# Patient Record
Sex: MALE | Race: WHITE | NOT HISPANIC OR LATINO | Employment: OTHER | URBAN - METROPOLITAN AREA
[De-identification: names, ages, dates, MRNs, and addresses within clinical notes are randomized per-mention and may not be internally consistent; named-entity substitution may affect disease eponyms.]

---

## 2017-01-02 ENCOUNTER — OFFICE VISIT (OUTPATIENT)
Dept: CARDIAC REHAB | Facility: HOSPITAL | Age: 82
End: 2017-01-02

## 2017-01-02 DIAGNOSIS — I21.4 NON-STEMI (NON-ST ELEVATED MYOCARDIAL INFARCTION) (HCC): Primary | ICD-10-CM

## 2017-01-04 ENCOUNTER — OFFICE VISIT (OUTPATIENT)
Dept: CARDIAC REHAB | Facility: HOSPITAL | Age: 82
End: 2017-01-04

## 2017-01-04 DIAGNOSIS — I21.4 NON-STEMI (NON-ST ELEVATED MYOCARDIAL INFARCTION) (HCC): Primary | ICD-10-CM

## 2017-01-05 ENCOUNTER — HOSPITAL ENCOUNTER (OUTPATIENT)
Dept: CARDIOLOGY | Facility: HOSPITAL | Age: 82
Discharge: HOME | End: 2017-01-05
Attending: INTERNAL MEDICINE | Admitting: INTERNAL MEDICINE

## 2017-01-05 ENCOUNTER — HOSPITAL ENCOUNTER (OUTPATIENT)
Dept: CARDIOLOGY | Facility: HOSPITAL | Age: 82
Discharge: HOME | End: 2017-01-05
Attending: INTERNAL MEDICINE

## 2017-01-05 VITALS
DIASTOLIC BLOOD PRESSURE: 70 MMHG | HEIGHT: 68 IN | HEART RATE: 51 BPM | WEIGHT: 170 LBS | SYSTOLIC BLOOD PRESSURE: 144 MMHG | BODY MASS INDEX: 25.76 KG/M2

## 2017-01-05 DIAGNOSIS — G45.3 AMAUROSIS FUGAX OF LEFT EYE: ICD-10-CM

## 2017-01-05 LAB
BH CV ECHO MEAS - ACS: 1.6 CM
BH CV ECHO MEAS - AI DEC SLOPE: 167.5 CM/SEC^2
BH CV ECHO MEAS - AI MAX PG: 74.2 MMHG
BH CV ECHO MEAS - AI MAX VEL: 430.6 CM/SEC
BH CV ECHO MEAS - AI P1/2T: 752.7 MSEC
BH CV ECHO MEAS - AO MAX PG (FULL): 15.9 MMHG
BH CV ECHO MEAS - AO MAX PG: 19.9 MMHG
BH CV ECHO MEAS - AO MEAN PG (FULL): 9.4 MMHG
BH CV ECHO MEAS - AO MEAN PG: 11.5 MMHG
BH CV ECHO MEAS - AO ROOT AREA (BSA CORRECTED): 1.7
BH CV ECHO MEAS - AO ROOT AREA: 8.1 CM^2
BH CV ECHO MEAS - AO ROOT DIAM: 3.2 CM
BH CV ECHO MEAS - AO V2 MAX: 223.2 CM/SEC
BH CV ECHO MEAS - AO V2 MEAN: 156.8 CM/SEC
BH CV ECHO MEAS - AO V2 VTI: 55.8 CM
BH CV ECHO MEAS - AVA(I,A): 2.1 CM^2
BH CV ECHO MEAS - AVA(I,D): 2.1 CM^2
BH CV ECHO MEAS - AVA(V,A): 2.3 CM^2
BH CV ECHO MEAS - AVA(V,D): 2.3 CM^2
BH CV ECHO MEAS - BSA(HAYCOCK): 1.9 M^2
BH CV ECHO MEAS - BSA: 1.9 M^2
BH CV ECHO MEAS - BZI_BMI: 25.8 KILOGRAMS/M^2
BH CV ECHO MEAS - BZI_METRIC_HEIGHT: 172.7 CM
BH CV ECHO MEAS - BZI_METRIC_WEIGHT: 77.1 KG
BH CV ECHO MEAS - CONTRAST EF (2CH): 58.5 ML/M^2
BH CV ECHO MEAS - CONTRAST EF 4CH: 67.1 ML/M^2
BH CV ECHO MEAS - EDV(MOD-SP2): 53 ML
BH CV ECHO MEAS - EDV(MOD-SP4): 85 ML
BH CV ECHO MEAS - EDV(TEICH): 138.1 ML
BH CV ECHO MEAS - EF(CUBED): 85.5 %
BH CV ECHO MEAS - EF(MOD-SP2): 58.5 %
BH CV ECHO MEAS - EF(MOD-SP4): 67.1 %
BH CV ECHO MEAS - EF(TEICH): 78.4 %
BH CV ECHO MEAS - ESV(MOD-SP2): 22 ML
BH CV ECHO MEAS - ESV(MOD-SP4): 28 ML
BH CV ECHO MEAS - ESV(TEICH): 29.9 ML
BH CV ECHO MEAS - FS: 47.4 %
BH CV ECHO MEAS - IVS/LVPW: 0.92
BH CV ECHO MEAS - IVSD: 1.1 CM
BH CV ECHO MEAS - LAT PEAK E' VEL: 6 CM/SEC
BH CV ECHO MEAS - LV DIASTOLIC VOL/BSA (35-75): 44.6 ML/M^2
BH CV ECHO MEAS - LV MASS(C)D: 232.8 GRAMS
BH CV ECHO MEAS - LV MASS(C)DI: 122.1 GRAMS/M^2
BH CV ECHO MEAS - LV MAX PG: 4 MMHG
BH CV ECHO MEAS - LV MEAN PG: 2.1 MMHG
BH CV ECHO MEAS - LV SYSTOLIC VOL/BSA (12-30): 14.7 ML/M^2
BH CV ECHO MEAS - LV V1 MAX: 100.4 CM/SEC
BH CV ECHO MEAS - LV V1 MEAN: 67.6 CM/SEC
BH CV ECHO MEAS - LV V1 VTI: 22.6 CM
BH CV ECHO MEAS - LVIDD: 5.3 CM
BH CV ECHO MEAS - LVIDS: 2.8 CM
BH CV ECHO MEAS - LVLD AP2: 6.4 CM
BH CV ECHO MEAS - LVLD AP4: 7.2 CM
BH CV ECHO MEAS - LVLS AP2: 5.5 CM
BH CV ECHO MEAS - LVLS AP4: 6.7 CM
BH CV ECHO MEAS - LVOT AREA (M): 5.3 CM^2
BH CV ECHO MEAS - LVOT AREA: 5.2 CM^2
BH CV ECHO MEAS - LVOT DIAM: 2.6 CM
BH CV ECHO MEAS - LVPWD: 1.2 CM
BH CV ECHO MEAS - MED PEAK E' VEL: 5 CM/SEC
BH CV ECHO MEAS - MV A DUR: 0.14 SEC
BH CV ECHO MEAS - MV A MAX VEL: 98.5 CM/SEC
BH CV ECHO MEAS - MV DEC SLOPE: 271.3 CM/SEC^2
BH CV ECHO MEAS - MV DEC TIME: 0.25 SEC
BH CV ECHO MEAS - MV E MAX VEL: 66 CM/SEC
BH CV ECHO MEAS - MV E/A: 0.67
BH CV ECHO MEAS - MV MAX PG: 4.1 MMHG
BH CV ECHO MEAS - MV MEAN PG: 1.6 MMHG
BH CV ECHO MEAS - MV P1/2T MAX VEL: 77.9 CM/SEC
BH CV ECHO MEAS - MV P1/2T: 84.1 MSEC
BH CV ECHO MEAS - MV V2 MAX: 100.8 CM/SEC
BH CV ECHO MEAS - MV V2 MEAN: 58.5 CM/SEC
BH CV ECHO MEAS - MV V2 VTI: 36.1 CM
BH CV ECHO MEAS - MVA P1/2T LCG: 2.8 CM^2
BH CV ECHO MEAS - MVA(P1/2T): 2.6 CM^2
BH CV ECHO MEAS - MVA(VTI): 3.3 CM^2
BH CV ECHO MEAS - PA MAX PG (FULL): 5.9 MMHG
BH CV ECHO MEAS - PA MAX PG: 8.3 MMHG
BH CV ECHO MEAS - PA V2 MAX: 144.4 CM/SEC
BH CV ECHO MEAS - PULM A REVS DUR: 0.13 SEC
BH CV ECHO MEAS - PULM A REVS VEL: 37.4 CM/SEC
BH CV ECHO MEAS - PULM DIAS VEL: 62.2 CM/SEC
BH CV ECHO MEAS - PULM S/D: 1.1
BH CV ECHO MEAS - PULM SYS VEL: 68.7 CM/SEC
BH CV ECHO MEAS - PVA(V,A): 2.2 CM^2
BH CV ECHO MEAS - PVA(V,D): 2.2 CM^2
BH CV ECHO MEAS - QP/QS: 0.71
BH CV ECHO MEAS - RV MAX PG: 2.4 MMHG
BH CV ECHO MEAS - RV MEAN PG: 1.5 MMHG
BH CV ECHO MEAS - RV V1 MAX: 77.6 CM/SEC
BH CV ECHO MEAS - RV V1 MEAN: 58.4 CM/SEC
BH CV ECHO MEAS - RV V1 VTI: 20.4 CM
BH CV ECHO MEAS - RVOT AREA: 4.1 CM^2
BH CV ECHO MEAS - RVOT DIAM: 2.3 CM
BH CV ECHO MEAS - SI(AO): 235.7 ML/M^2
BH CV ECHO MEAS - SI(CUBED): 68.5 ML/M^2
BH CV ECHO MEAS - SI(LVOT): 61.8 ML/M^2
BH CV ECHO MEAS - SI(MOD-SP2): 16.3 ML/M^2
BH CV ECHO MEAS - SI(MOD-SP4): 29.9 ML/M^2
BH CV ECHO MEAS - SI(TEICH): 56.8 ML/M^2
BH CV ECHO MEAS - SV(AO): 449.5 ML
BH CV ECHO MEAS - SV(CUBED): 130.6 ML
BH CV ECHO MEAS - SV(LVOT): 117.9 ML
BH CV ECHO MEAS - SV(MOD-SP2): 31 ML
BH CV ECHO MEAS - SV(MOD-SP4): 57 ML
BH CV ECHO MEAS - SV(RVOT): 83.2 ML
BH CV ECHO MEAS - SV(TEICH): 108.3 ML
BH CV ECHO MEAS - TR MAX VEL: 255.2 CM/SEC
BH CV XLRA - RV BASE: 3.6 CM
BH CV XLRA MEAS LEFT CCA RATIO VEL: 56.3 CM/SEC
BH CV XLRA MEAS LEFT DIST CCA EDV: 12.3 CM/SEC
BH CV XLRA MEAS LEFT DIST CCA PSV: 56.3 CM/SEC
BH CV XLRA MEAS LEFT DIST ICA EDV: -16.6 CM/SEC
BH CV XLRA MEAS LEFT DIST ICA PSV: -57.8 CM/SEC
BH CV XLRA MEAS LEFT ICA RATIO VEL: -67.4 CM/SEC
BH CV XLRA MEAS LEFT ICA/CCA RATIO: -1.2
BH CV XLRA MEAS LEFT MID ICA EDV: -21.7 CM/SEC
BH CV XLRA MEAS LEFT MID ICA PSV: -67.4 CM/SEC
BH CV XLRA MEAS LEFT PROX CCA EDV: 11.7 CM/SEC
BH CV XLRA MEAS LEFT PROX CCA PSV: 63.9 CM/SEC
BH CV XLRA MEAS LEFT PROX ECA EDV: -8.8 CM/SEC
BH CV XLRA MEAS LEFT PROX ECA PSV: -53.9 CM/SEC
BH CV XLRA MEAS LEFT PROX ICA EDV: -19.3 CM/SEC
BH CV XLRA MEAS LEFT PROX ICA PSV: -56.9 CM/SEC
BH CV XLRA MEAS LEFT PROX SCLA PSV: 125 CM/SEC
BH CV XLRA MEAS LEFT VERTEBRAL A EDV: 8.8 CM/SEC
BH CV XLRA MEAS LEFT VERTEBRAL A PSV: 42.2 CM/SEC
BH CV XLRA MEAS RIGHT CCA RATIO VEL: 58.9 CM/SEC
BH CV XLRA MEAS RIGHT DIST CCA EDV: 11.8 CM/SEC
BH CV XLRA MEAS RIGHT DIST CCA PSV: 58.9 CM/SEC
BH CV XLRA MEAS RIGHT DIST ICA EDV: -18.2 CM/SEC
BH CV XLRA MEAS RIGHT DIST ICA PSV: -56.9 CM/SEC
BH CV XLRA MEAS RIGHT ICA RATIO VEL: -65.3 CM/SEC
BH CV XLRA MEAS RIGHT ICA/CCA RATIO: -1.1
BH CV XLRA MEAS RIGHT MID ICA EDV: -16 CM/SEC
BH CV XLRA MEAS RIGHT MID ICA PSV: -65.3 CM/SEC
BH CV XLRA MEAS RIGHT PROX CCA EDV: 12.9 CM/SEC
BH CV XLRA MEAS RIGHT PROX CCA PSV: 72.7 CM/SEC
BH CV XLRA MEAS RIGHT PROX ECA EDV: -7.1 CM/SEC
BH CV XLRA MEAS RIGHT PROX ECA PSV: -68.8 CM/SEC
BH CV XLRA MEAS RIGHT PROX ICA EDV: -14.9 CM/SEC
BH CV XLRA MEAS RIGHT PROX ICA PSV: -55 CM/SEC
BH CV XLRA MEAS RIGHT PROX SCLA PSV: 102 CM/SEC
BH CV XLRA MEAS RIGHT VERTEBRAL A EDV: 6.7 CM/SEC
BH CV XLRA MEAS RIGHT VERTEBRAL A PSV: 27.1 CM/SEC
E/E' RATIO: 12
LEFT ARM BP: NORMAL MMHG
LEFT ATRIUM VOLUME INDEX: 29 ML/M2
LV EF 2D ECHO EST: 67 %
RIGHT ARM BP: NORMAL MMHG
Z-SCORE OF LEFT VENTRICULAR DIMENSION IN END SYSTOLE: 2.5

## 2017-01-05 PROCEDURE — 93880 EXTRACRANIAL BILAT STUDY: CPT

## 2017-01-05 PROCEDURE — 93306 TTE W/DOPPLER COMPLETE: CPT

## 2017-01-05 PROCEDURE — 93306 TTE W/DOPPLER COMPLETE: CPT | Performed by: INTERNAL MEDICINE

## 2017-01-06 ENCOUNTER — OFFICE VISIT (OUTPATIENT)
Dept: CARDIAC REHAB | Facility: HOSPITAL | Age: 82
End: 2017-01-06

## 2017-01-06 DIAGNOSIS — I21.4 NON-STEMI (NON-ST ELEVATED MYOCARDIAL INFARCTION) (HCC): Primary | ICD-10-CM

## 2017-01-09 ENCOUNTER — OFFICE VISIT (OUTPATIENT)
Dept: CARDIAC REHAB | Facility: HOSPITAL | Age: 82
End: 2017-01-09

## 2017-01-09 DIAGNOSIS — I21.4 NON-STEMI (NON-ST ELEVATED MYOCARDIAL INFARCTION) (HCC): Primary | ICD-10-CM

## 2017-01-10 ENCOUNTER — TELEPHONE (OUTPATIENT)
Dept: INTERNAL MEDICINE | Facility: CLINIC | Age: 82
End: 2017-01-10

## 2017-01-10 NOTE — TELEPHONE ENCOUNTER
----- Message from Luis Armando Lang MD sent at 1/10/2017 10:19 AM EST -----  Holter monitor study is normal.

## 2017-01-11 ENCOUNTER — OFFICE VISIT (OUTPATIENT)
Dept: CARDIAC REHAB | Facility: HOSPITAL | Age: 82
End: 2017-01-11

## 2017-01-11 DIAGNOSIS — I21.4 NON-STEMI (NON-ST ELEVATED MYOCARDIAL INFARCTION) (HCC): Primary | ICD-10-CM

## 2017-01-13 ENCOUNTER — OFFICE VISIT (OUTPATIENT)
Dept: CARDIAC REHAB | Facility: HOSPITAL | Age: 82
End: 2017-01-13

## 2017-01-13 DIAGNOSIS — I21.4 NON-STEMI (NON-ST ELEVATED MYOCARDIAL INFARCTION) (HCC): Primary | ICD-10-CM

## 2017-01-16 ENCOUNTER — OFFICE VISIT (OUTPATIENT)
Dept: CARDIAC REHAB | Facility: HOSPITAL | Age: 82
End: 2017-01-16

## 2017-01-16 DIAGNOSIS — I21.4 NON-STEMI (NON-ST ELEVATED MYOCARDIAL INFARCTION) (HCC): Primary | ICD-10-CM

## 2017-01-18 ENCOUNTER — OFFICE VISIT (OUTPATIENT)
Dept: CARDIAC REHAB | Facility: HOSPITAL | Age: 82
End: 2017-01-18

## 2017-01-18 DIAGNOSIS — I21.4 NON-STEMI (NON-ST ELEVATED MYOCARDIAL INFARCTION) (HCC): Primary | ICD-10-CM

## 2017-01-20 ENCOUNTER — OFFICE VISIT (OUTPATIENT)
Dept: CARDIAC REHAB | Facility: HOSPITAL | Age: 82
End: 2017-01-20

## 2017-01-20 DIAGNOSIS — I21.4 NON-STEMI (NON-ST ELEVATED MYOCARDIAL INFARCTION) (HCC): Primary | ICD-10-CM

## 2017-01-23 ENCOUNTER — OFFICE VISIT (OUTPATIENT)
Dept: CARDIAC REHAB | Facility: HOSPITAL | Age: 82
End: 2017-01-23

## 2017-01-23 DIAGNOSIS — I21.4 NON-STEMI (NON-ST ELEVATED MYOCARDIAL INFARCTION) (HCC): Primary | ICD-10-CM

## 2017-01-25 ENCOUNTER — OFFICE VISIT (OUTPATIENT)
Dept: CARDIAC REHAB | Facility: HOSPITAL | Age: 82
End: 2017-01-25

## 2017-01-25 DIAGNOSIS — I21.4 NON-STEMI (NON-ST ELEVATED MYOCARDIAL INFARCTION) (HCC): Primary | ICD-10-CM

## 2017-02-01 ENCOUNTER — APPOINTMENT (OUTPATIENT)
Dept: WOMENS IMAGING | Facility: HOSPITAL | Age: 82
End: 2017-02-01

## 2017-02-01 ENCOUNTER — OFFICE VISIT (OUTPATIENT)
Dept: INTERNAL MEDICINE | Facility: CLINIC | Age: 82
End: 2017-02-01

## 2017-02-01 VITALS
WEIGHT: 174 LBS | SYSTOLIC BLOOD PRESSURE: 134 MMHG | HEIGHT: 68 IN | BODY MASS INDEX: 26.37 KG/M2 | DIASTOLIC BLOOD PRESSURE: 68 MMHG

## 2017-02-01 DIAGNOSIS — M54.41 ACUTE RIGHT-SIDED LOW BACK PAIN WITH RIGHT-SIDED SCIATICA: Primary | ICD-10-CM

## 2017-02-01 LAB
BILIRUB UR QL STRIP: NEGATIVE
CLARITY UR: CLEAR
COLOR UR: YELLOW
GLUCOSE UR STRIP-MCNC: NEGATIVE MG/DL
HGB UR QL STRIP.AUTO: NEGATIVE
KETONES UR QL STRIP: ABNORMAL
LEUKOCYTE ESTERASE UR QL STRIP.AUTO: NEGATIVE
NITRITE UR QL STRIP: NEGATIVE
PH UR STRIP.AUTO: <=5 [PH] (ref 5–8)
PROT UR QL STRIP: NEGATIVE
SP GR UR STRIP: >=1.03 (ref 1–1.03)
UROBILINOGEN UR QL STRIP: ABNORMAL

## 2017-02-01 PROCEDURE — 99213 OFFICE O/P EST LOW 20 MIN: CPT | Performed by: NURSE PRACTITIONER

## 2017-02-01 PROCEDURE — 72110 X-RAY EXAM L-2 SPINE 4/>VWS: CPT | Performed by: RADIOLOGY

## 2017-02-01 PROCEDURE — 72110 X-RAY EXAM L-2 SPINE 4/>VWS: CPT | Performed by: NURSE PRACTITIONER

## 2017-02-01 PROCEDURE — 81003 URINALYSIS AUTO W/O SCOPE: CPT | Performed by: NURSE PRACTITIONER

## 2017-02-01 RX ORDER — METHYLPREDNISOLONE 4 MG/1
TABLET ORAL
Qty: 21 TABLET | Refills: 0 | Status: SHIPPED | OUTPATIENT
Start: 2017-02-01 | End: 2017-02-16

## 2017-02-01 NOTE — PROGRESS NOTES
Subjective   Vicente Delgado is a 89 y.o. male.     HPI Comments: He was at airport and helped his daughter with lifting luggage. He noticed symptoms a few  days later. He had previous MRI 6/2014.     Back Pain   This is a new problem. Episode onset: 7 days ago  The problem occurs intermittently. The problem has been gradually worsening since onset. The pain is present in the lumbar spine. The quality of the pain is described as burning. The pain radiates to the right thigh. The pain is at a severity of 2/10 (worst pain level 10). The pain is mild. The symptoms are aggravated by sitting. Associated symptoms include numbness (right thigh/right groin, intermittent ). Pertinent negatives include no abdominal pain, bladder incontinence, bowel incontinence, chest pain, dysuria, fever or leg pain. He has tried heat (lortab ) for the symptoms. The treatment provided mild relief.        The following portions of the patient's history were reviewed and updated as appropriate: allergies, current medications and problem list.    Review of Systems   Constitutional: Negative for activity change, appetite change, fatigue and fever.   Respiratory: Negative for cough.    Cardiovascular: Negative for chest pain.   Gastrointestinal: Negative for abdominal pain and bowel incontinence.   Genitourinary: Negative for bladder incontinence, dysuria, frequency and urgency.   Musculoskeletal: Positive for back pain (lower right back ).   Neurological: Positive for numbness (right thigh/right groin, intermittent ).       Objective   Physical Exam   Constitutional: He is oriented to person, place, and time. He appears well-developed and well-nourished.   HENT:   Head: Normocephalic.   Nose: Nose normal.   Cardiovascular: Regular rhythm and normal heart sounds.  Exam reveals no S3 and no S4.    No murmur heard.  Pulmonary/Chest: Effort normal and breath sounds normal. He has no decreased breath sounds. He has no wheezes. He has no rhonchi. He  has no rales.   Musculoskeletal: He exhibits no edema.        Lumbar back: He exhibits decreased range of motion, tenderness and pain. He exhibits no swelling, no edema and no spasm.        Back:    (+) SLR on right side    Neurological: He is alert and oriented to person, place, and time. Gait normal.   Reflex Scores:       Patellar reflexes are 2+ on the right side and 2+ on the left side.  Skin: Skin is warm and dry.   Psychiatric: He has a normal mood and affect.       Assessment/Plan   Vicente was seen today for back pain.    Diagnoses and all orders for this visit:    Acute right-sided low back pain with right-sided sciatica  Comments:  strain vs spasm; ice/massage and stretching   Orders:  -     XR Spine Lumbar 4+ View  -     MethylPREDNISolone (MEDROL) 4 MG tablet; follow package directions  -     Urinalysis With / Microscopic If Indicated; Future        Xray with no acute findings. Compared with previous film 4/2016. Sent for final review. He was given back exercises (handout). He will need to massage, apply ice and/or heat. If symptoms persist or worsen will refer to PT (previously went to [facility]).

## 2017-02-01 NOTE — PATIENT INSTRUCTIONS
Back Pain, Adult  Back pain is very common in adults. The cause of back pain is rarely dangerous and the pain often gets better over time. The cause of your back pain may not be known. Some common causes of back pain include:  · Strain of the muscles or ligaments supporting the spine.  · Wear and tear (degeneration) of the spinal disks.  · Arthritis.  · Direct injury to the back.  For many people, back pain may return. Since back pain is rarely dangerous, most people can learn to manage this condition on their own.  HOME CARE INSTRUCTIONS  Watch your back pain for any changes. The following actions may help to lessen any discomfort you are feeling:  · Remain active. It is stressful on your back to sit or  one place for long periods of time. Do not sit, drive, or  one place for more than 30 minutes at a time. Take short walks on even surfaces as soon as you are able. Try to increase the length of time you walk each day.  · Exercise regularly as directed by your health care provider. Exercise helps your back heal faster. It also helps avoid future injury by keeping your muscles strong and flexible.  · Do not stay in bed. Resting more than 1-2 days can delay your recovery.  · Pay attention to your body when you bend and lift. The most comfortable positions are those that put less stress on your recovering back. Always use proper lifting techniques, including:    Bending your knees.    Keeping the load close to your body.    Avoiding twisting.  · Find a comfortable position to sleep. Use a firm mattress and lie on your side with your knees slightly bent. If you lie on your back, put a pillow under your knees.  · Avoid feeling anxious or stressed. Stress increases muscle tension and can worsen back pain. It is important to recognize when you are anxious or stressed and learn ways to manage it, such as with exercise.  · Take medicines only as directed by your health care provider. Over-the-counter  medicines to reduce pain and inflammation are often the most helpful. Your health care provider may prescribe muscle relaxant drugs. These medicines help dull your pain so you can more quickly return to your normal activities and healthy exercise.  · Apply ice to the injured area:    Put ice in a plastic bag.    Place a towel between your skin and the bag.    Leave the ice on for 20 minutes, 2-3 times a day for the first 2-3 days. After that, ice and heat may be alternated to reduce pain and spasms.  · Maintain a healthy weight. Excess weight puts extra stress on your back and makes it difficult to maintain good posture.  SEEK MEDICAL CARE IF:  · You have pain that is not relieved with rest or medicine.  · You have increasing pain going down into the legs or buttocks.  · You have pain that does not improve in one week.  · You have night pain.  · You lose weight.  · You have a fever or chills.  SEEK IMMEDIATE MEDICAL CARE IF:   · You develop new bowel or bladder control problems.  · You have unusual weakness or numbness in your arms or legs.  · You develop nausea or vomiting.  · You develop abdominal pain.  · You feel faint.     This information is not intended to replace advice given to you by your health care provider. Make sure you discuss any questions you have with your health care provider.     Document Released: 12/18/2006 Document Revised: 01/08/2016 Document Reviewed: 04/21/2015  [employer] Interactive Patient Education ©2016 [employer]

## 2017-02-08 ENCOUNTER — TELEPHONE (OUTPATIENT)
Dept: INTERNAL MEDICINE | Facility: CLINIC | Age: 82
End: 2017-02-08

## 2017-02-08 DIAGNOSIS — M54.41 ACUTE RIGHT-SIDED LOW BACK PAIN WITH RIGHT-SIDED SCIATICA: ICD-10-CM

## 2017-02-08 DIAGNOSIS — M54.42 ACUTE RIGHT-SIDED LOW BACK PAIN WITH LEFT-SIDED SCIATICA: Primary | ICD-10-CM

## 2017-02-08 NOTE — TELEPHONE ENCOUNTER
----- Message from Margy Medina sent at 2/8/2017  9:14 AM EST -----  Contact: Patient  Patient called.  He was seen by [facility] a week ago for low back pain.  States he used the steroids and is using the exercises, and is still having problems.  Wants to know if [facility] can [facility].  Please advise.     Patient:  [phone]    [facility]:  [phone]

## 2017-02-08 NOTE — TELEPHONE ENCOUNTER
----- Message from Margy Medina sent at 2/8/2017  2:19 PM EST -----  Contact: Patient's daughter  Patient's daughter called regarding appt for patient.  Informed her I had talked to patient earlier and he only informed me he wanted appt with [facility], and daughter confirmed.  This should be made at [facility] at # [id number]     [facility]: [phone]

## 2017-02-13 ENCOUNTER — TRANSCRIBE ORDERS (OUTPATIENT)
Dept: CARDIAC REHAB | Facility: HOSPITAL | Age: 82
End: 2017-02-13

## 2017-02-13 DIAGNOSIS — I21.4 NON-STEMI (NON-ST ELEVATED MYOCARDIAL INFARCTION) (HCC): Primary | ICD-10-CM

## 2017-02-16 ENCOUNTER — OFFICE VISIT (OUTPATIENT)
Dept: INTERNAL MEDICINE | Facility: CLINIC | Age: 82
End: 2017-02-16

## 2017-02-16 VITALS
DIASTOLIC BLOOD PRESSURE: 60 MMHG | SYSTOLIC BLOOD PRESSURE: 112 MMHG | BODY MASS INDEX: 26.67 KG/M2 | HEIGHT: 68 IN | OXYGEN SATURATION: 95 % | WEIGHT: 176 LBS | HEART RATE: 53 BPM

## 2017-02-16 DIAGNOSIS — I10 ESSENTIAL HYPERTENSION: ICD-10-CM

## 2017-02-16 DIAGNOSIS — F39 MOOD DISORDER (HCC): ICD-10-CM

## 2017-02-16 DIAGNOSIS — I25.10 CORONARY ARTERY DISEASE INVOLVING NATIVE CORONARY ARTERY OF NATIVE HEART WITHOUT ANGINA PECTORIS: Primary | ICD-10-CM

## 2017-02-16 PROCEDURE — 99214 OFFICE O/P EST MOD 30 MIN: CPT | Performed by: INTERNAL MEDICINE

## 2017-02-16 RX ORDER — LORAZEPAM 1 MG/1
1 TABLET ORAL EVERY 6 HOURS PRN
Qty: 30 TABLET | Refills: 0 | Status: SHIPPED | OUTPATIENT
Start: 2017-02-16 | End: 2017-03-08 | Stop reason: SDUPTHER

## 2017-02-16 NOTE — PROGRESS NOTES
Subjective   Vicente Delgado is a 89 y.o. male.     Hyperlipidemia   This is a chronic problem. The current episode started more than 1 year ago.   Hypertension   This is a chronic problem. The current episode started more than 1 year ago.        The following portions of the patient's history were reviewed and updated as appropriate: allergies, current medications, past family history, past medical history, past social history, past surgical history and problem list.    Review of Systems   Constitutional:        Here for F/U Doing about the same Under a lot of stress caring for wife who has Alzheimers   HENT: Negative.    Eyes: Negative.         Has another episode of flashes of light  in L eye only Last about 15 seconds & went away none since All his cardiac studies were nomal NEG Already taking ASA 81MG QD   Respiratory: Negative.    Cardiovascular: Negative.    Gastrointestinal: Negative.    Genitourinary: Negative.    Musculoskeletal:        Usual  aches & pains   Neurological: Negative.        Objective   Physical Exam   Constitutional: He is oriented to person, place, and time. He appears well-developed.   HENT:   Head: Normocephalic.   Eyes: EOM are normal.   Neck: Neck supple.   Cardiovascular: Normal rate, regular rhythm and normal heart sounds.    Repeat 146/86   Pulmonary/Chest: Effort normal and breath sounds normal.   Neurological: He is alert and oriented to person, place, and time.   Vitals reviewed.      Assessment/Plan   Vicente was seen today for hyperlipidemia, hypertension and med refill.    Diagnoses and all orders for this visit:    Coronary artery disease involving native coronary artery of native heart without angina pectoris    Essential hypertension    Mood disorder  -     LORazepam (ATIVAN) 1 MG tablet; Take 1 tablet by mouth Every 6 (Six) Hours As Needed for anxiety.

## 2017-02-17 ENCOUNTER — APPOINTMENT (OUTPATIENT)
Dept: CARDIAC REHAB | Facility: HOSPITAL | Age: 82
End: 2017-02-17

## 2017-03-01 ENCOUNTER — OFFICE VISIT (OUTPATIENT)
Dept: CARDIAC REHAB | Facility: HOSPITAL | Age: 82
End: 2017-03-01

## 2017-03-01 DIAGNOSIS — I21.4 NON-STEMI (NON-ST ELEVATED MYOCARDIAL INFARCTION) (HCC): Primary | ICD-10-CM

## 2017-03-03 ENCOUNTER — OFFICE VISIT (OUTPATIENT)
Dept: CARDIAC REHAB | Facility: HOSPITAL | Age: 82
End: 2017-03-03

## 2017-03-03 DIAGNOSIS — I21.4 NON-STEMI (NON-ST ELEVATED MYOCARDIAL INFARCTION) (HCC): Primary | ICD-10-CM

## 2017-03-06 ENCOUNTER — OFFICE VISIT (OUTPATIENT)
Dept: CARDIAC REHAB | Facility: HOSPITAL | Age: 82
End: 2017-03-06

## 2017-03-06 DIAGNOSIS — I21.4 NON-STEMI (NON-ST ELEVATED MYOCARDIAL INFARCTION) (HCC): Primary | ICD-10-CM

## 2017-03-08 ENCOUNTER — OFFICE VISIT (OUTPATIENT)
Dept: CARDIAC REHAB | Facility: HOSPITAL | Age: 82
End: 2017-03-08

## 2017-03-08 DIAGNOSIS — F39 MOOD DISORDER (HCC): ICD-10-CM

## 2017-03-08 DIAGNOSIS — I21.4 NON-STEMI (NON-ST ELEVATED MYOCARDIAL INFARCTION) (HCC): Primary | ICD-10-CM

## 2017-03-09 RX ORDER — LORAZEPAM 1 MG/1
TABLET ORAL
Qty: 30 TABLET | Refills: 2 | OUTPATIENT
Start: 2017-03-09 | End: 2017-07-24 | Stop reason: SDUPTHER

## 2017-03-10 ENCOUNTER — OFFICE VISIT (OUTPATIENT)
Dept: CARDIAC REHAB | Facility: HOSPITAL | Age: 82
End: 2017-03-10

## 2017-03-10 DIAGNOSIS — I21.4 NON-STEMI (NON-ST ELEVATED MYOCARDIAL INFARCTION) (HCC): Primary | ICD-10-CM

## 2017-03-13 ENCOUNTER — OFFICE VISIT (OUTPATIENT)
Dept: CARDIAC REHAB | Facility: HOSPITAL | Age: 82
End: 2017-03-13

## 2017-03-13 DIAGNOSIS — I21.4 NON-STEMI (NON-ST ELEVATED MYOCARDIAL INFARCTION) (HCC): Primary | ICD-10-CM

## 2017-03-15 ENCOUNTER — OFFICE VISIT (OUTPATIENT)
Dept: CARDIAC REHAB | Facility: HOSPITAL | Age: 82
End: 2017-03-15

## 2017-03-15 DIAGNOSIS — I21.4 NON-STEMI (NON-ST ELEVATED MYOCARDIAL INFARCTION) (HCC): Primary | ICD-10-CM

## 2017-03-17 ENCOUNTER — OFFICE VISIT (OUTPATIENT)
Dept: CARDIAC REHAB | Facility: HOSPITAL | Age: 82
End: 2017-03-17

## 2017-03-17 DIAGNOSIS — I21.4 NON-STEMI (NON-ST ELEVATED MYOCARDIAL INFARCTION) (HCC): Primary | ICD-10-CM

## 2017-03-20 ENCOUNTER — OFFICE VISIT (OUTPATIENT)
Dept: CARDIAC REHAB | Facility: HOSPITAL | Age: 82
End: 2017-03-20

## 2017-03-20 DIAGNOSIS — I21.4 NON-STEMI (NON-ST ELEVATED MYOCARDIAL INFARCTION) (HCC): Primary | ICD-10-CM

## 2017-03-22 ENCOUNTER — OFFICE VISIT (OUTPATIENT)
Dept: CARDIAC REHAB | Facility: HOSPITAL | Age: 82
End: 2017-03-22

## 2017-03-22 DIAGNOSIS — I21.4 NON-STEMI (NON-ST ELEVATED MYOCARDIAL INFARCTION) (HCC): Primary | ICD-10-CM

## 2017-03-24 ENCOUNTER — OFFICE VISIT (OUTPATIENT)
Dept: CARDIAC REHAB | Facility: HOSPITAL | Age: 82
End: 2017-03-24

## 2017-03-24 DIAGNOSIS — I21.4 NON-STEMI (NON-ST ELEVATED MYOCARDIAL INFARCTION) (HCC): Primary | ICD-10-CM

## 2017-03-27 ENCOUNTER — OFFICE VISIT (OUTPATIENT)
Dept: CARDIAC REHAB | Facility: HOSPITAL | Age: 82
End: 2017-03-27

## 2017-03-27 DIAGNOSIS — I21.4 NON-STEMI (NON-ST ELEVATED MYOCARDIAL INFARCTION) (HCC): Primary | ICD-10-CM

## 2017-03-29 ENCOUNTER — OFFICE VISIT (OUTPATIENT)
Dept: CARDIAC REHAB | Facility: HOSPITAL | Age: 82
End: 2017-03-29

## 2017-03-29 DIAGNOSIS — I21.4 NON-STEMI (NON-ST ELEVATED MYOCARDIAL INFARCTION) (HCC): Primary | ICD-10-CM

## 2017-03-31 ENCOUNTER — OFFICE VISIT (OUTPATIENT)
Dept: CARDIAC REHAB | Facility: HOSPITAL | Age: 82
End: 2017-03-31

## 2017-03-31 DIAGNOSIS — I21.4 NON-STEMI (NON-ST ELEVATED MYOCARDIAL INFARCTION) (HCC): Primary | ICD-10-CM

## 2017-04-03 ENCOUNTER — OFFICE VISIT (OUTPATIENT)
Dept: CARDIAC REHAB | Facility: HOSPITAL | Age: 82
End: 2017-04-03

## 2017-04-03 DIAGNOSIS — I21.4 NON-STEMI (NON-ST ELEVATED MYOCARDIAL INFARCTION) (HCC): Primary | ICD-10-CM

## 2017-04-05 ENCOUNTER — OFFICE VISIT (OUTPATIENT)
Dept: CARDIAC REHAB | Facility: HOSPITAL | Age: 82
End: 2017-04-05

## 2017-04-05 DIAGNOSIS — I21.4 NON-STEMI (NON-ST ELEVATED MYOCARDIAL INFARCTION) (HCC): Primary | ICD-10-CM

## 2017-04-07 ENCOUNTER — OFFICE VISIT (OUTPATIENT)
Dept: CARDIAC REHAB | Facility: HOSPITAL | Age: 82
End: 2017-04-07

## 2017-04-07 DIAGNOSIS — I21.4 NON-STEMI (NON-ST ELEVATED MYOCARDIAL INFARCTION) (HCC): Primary | ICD-10-CM

## 2017-04-11 ENCOUNTER — OFFICE VISIT (OUTPATIENT)
Dept: CARDIAC REHAB | Facility: HOSPITAL | Age: 82
End: 2017-04-11

## 2017-04-11 DIAGNOSIS — I21.4 NON-STEMI (NON-ST ELEVATED MYOCARDIAL INFARCTION) (HCC): Primary | ICD-10-CM

## 2017-04-12 ENCOUNTER — OFFICE VISIT (OUTPATIENT)
Dept: CARDIAC REHAB | Facility: HOSPITAL | Age: 82
End: 2017-04-12

## 2017-04-12 DIAGNOSIS — I21.4 NON-STEMI (NON-ST ELEVATED MYOCARDIAL INFARCTION) (HCC): Primary | ICD-10-CM

## 2017-04-14 ENCOUNTER — OFFICE VISIT (OUTPATIENT)
Dept: CARDIAC REHAB | Facility: HOSPITAL | Age: 82
End: 2017-04-14

## 2017-04-14 DIAGNOSIS — I21.4 NON-STEMI (NON-ST ELEVATED MYOCARDIAL INFARCTION) (HCC): Primary | ICD-10-CM

## 2017-04-17 ENCOUNTER — OFFICE VISIT (OUTPATIENT)
Dept: CARDIAC REHAB | Facility: HOSPITAL | Age: 82
End: 2017-04-17

## 2017-04-17 DIAGNOSIS — I21.4 NON-STEMI (NON-ST ELEVATED MYOCARDIAL INFARCTION) (HCC): Primary | ICD-10-CM

## 2017-04-19 ENCOUNTER — OFFICE VISIT (OUTPATIENT)
Dept: CARDIAC REHAB | Facility: HOSPITAL | Age: 82
End: 2017-04-19

## 2017-04-19 DIAGNOSIS — I21.4 NON-STEMI (NON-ST ELEVATED MYOCARDIAL INFARCTION) (HCC): Primary | ICD-10-CM

## 2017-04-21 ENCOUNTER — OFFICE VISIT (OUTPATIENT)
Dept: CARDIAC REHAB | Facility: HOSPITAL | Age: 82
End: 2017-04-21

## 2017-04-21 DIAGNOSIS — I21.4 NON-STEMI (NON-ST ELEVATED MYOCARDIAL INFARCTION) (HCC): Primary | ICD-10-CM

## 2017-04-24 ENCOUNTER — OFFICE VISIT (OUTPATIENT)
Dept: CARDIAC REHAB | Facility: HOSPITAL | Age: 82
End: 2017-04-24

## 2017-04-24 DIAGNOSIS — I21.4 NON-STEMI (NON-ST ELEVATED MYOCARDIAL INFARCTION) (HCC): Primary | ICD-10-CM

## 2017-04-26 ENCOUNTER — OFFICE VISIT (OUTPATIENT)
Dept: CARDIAC REHAB | Facility: HOSPITAL | Age: 82
End: 2017-04-26

## 2017-04-26 DIAGNOSIS — I21.4 NON-STEMI (NON-ST ELEVATED MYOCARDIAL INFARCTION) (HCC): Primary | ICD-10-CM

## 2017-04-28 ENCOUNTER — OFFICE VISIT (OUTPATIENT)
Dept: CARDIAC REHAB | Facility: HOSPITAL | Age: 82
End: 2017-04-28

## 2017-04-28 DIAGNOSIS — I21.4 NON-STEMI (NON-ST ELEVATED MYOCARDIAL INFARCTION) (HCC): Primary | ICD-10-CM

## 2017-05-03 ENCOUNTER — OFFICE VISIT (OUTPATIENT)
Dept: CARDIAC REHAB | Facility: HOSPITAL | Age: 82
End: 2017-05-03

## 2017-05-03 DIAGNOSIS — I21.4 NON-STEMI (NON-ST ELEVATED MYOCARDIAL INFARCTION) (HCC): Primary | ICD-10-CM

## 2017-05-05 ENCOUNTER — OFFICE VISIT (OUTPATIENT)
Dept: CARDIAC REHAB | Facility: HOSPITAL | Age: 82
End: 2017-05-05

## 2017-05-05 DIAGNOSIS — I21.4 NON-STEMI (NON-ST ELEVATED MYOCARDIAL INFARCTION) (HCC): Primary | ICD-10-CM

## 2017-05-08 ENCOUNTER — OFFICE VISIT (OUTPATIENT)
Dept: CARDIAC REHAB | Facility: HOSPITAL | Age: 82
End: 2017-05-08

## 2017-05-08 DIAGNOSIS — I21.4 NON-STEMI (NON-ST ELEVATED MYOCARDIAL INFARCTION) (HCC): Primary | ICD-10-CM

## 2017-05-10 ENCOUNTER — OFFICE VISIT (OUTPATIENT)
Dept: CARDIAC REHAB | Facility: HOSPITAL | Age: 82
End: 2017-05-10

## 2017-05-10 DIAGNOSIS — I21.4 NON-STEMI (NON-ST ELEVATED MYOCARDIAL INFARCTION) (HCC): Primary | ICD-10-CM

## 2017-05-12 ENCOUNTER — OFFICE VISIT (OUTPATIENT)
Dept: CARDIAC REHAB | Facility: HOSPITAL | Age: 82
End: 2017-05-12

## 2017-05-12 DIAGNOSIS — I21.4 NON-STEMI (NON-ST ELEVATED MYOCARDIAL INFARCTION) (HCC): Primary | ICD-10-CM

## 2017-05-15 ENCOUNTER — OFFICE VISIT (OUTPATIENT)
Dept: CARDIAC REHAB | Facility: HOSPITAL | Age: 82
End: 2017-05-15

## 2017-05-15 DIAGNOSIS — I21.4 NON-STEMI (NON-ST ELEVATED MYOCARDIAL INFARCTION) (HCC): Primary | ICD-10-CM

## 2017-05-16 ENCOUNTER — OFFICE VISIT (OUTPATIENT)
Dept: INTERNAL MEDICINE | Facility: CLINIC | Age: 82
End: 2017-05-16

## 2017-05-16 VITALS
HEART RATE: 48 BPM | HEIGHT: 68 IN | WEIGHT: 173 LBS | BODY MASS INDEX: 26.22 KG/M2 | OXYGEN SATURATION: 95 % | SYSTOLIC BLOOD PRESSURE: 132 MMHG | DIASTOLIC BLOOD PRESSURE: 74 MMHG

## 2017-05-16 DIAGNOSIS — I10 ESSENTIAL HYPERTENSION: Primary | ICD-10-CM

## 2017-05-16 DIAGNOSIS — E78.2 MIXED HYPERLIPIDEMIA: ICD-10-CM

## 2017-05-16 DIAGNOSIS — F39 MOOD DISORDER (HCC): ICD-10-CM

## 2017-05-16 DIAGNOSIS — I25.10 CORONARY ARTERY DISEASE INVOLVING NATIVE CORONARY ARTERY OF NATIVE HEART WITHOUT ANGINA PECTORIS: ICD-10-CM

## 2017-05-16 LAB
BASOPHILS # BLD AUTO: 0.02 10*3/MM3 (ref 0–0.2)
BASOPHILS NFR BLD AUTO: 0.4 % (ref 0–2)
DEPRECATED RDW RBC AUTO: 43.7 FL (ref 37–54)
EOSINOPHIL # BLD AUTO: 0.05 10*3/MM3 (ref 0–0.7)
EOSINOPHIL NFR BLD AUTO: 0.9 % (ref 0–5)
ERYTHROCYTE [DISTWIDTH] IN BLOOD BY AUTOMATED COUNT: 12.8 % (ref 11.5–15)
HCT VFR BLD AUTO: 43.4 % (ref 40.1–51)
HGB BLD-MCNC: 14.7 G/DL (ref 13.7–17.5)
LYMPHOCYTES # BLD AUTO: 1.79 10*3/MM3 (ref 0.8–7)
LYMPHOCYTES NFR BLD AUTO: 31.8 % (ref 10–60)
MCH RBC QN AUTO: 31.7 PG (ref 26–34)
MCHC RBC AUTO-ENTMCNC: 33.9 G/DL (ref 31–37)
MCV RBC AUTO: 93.7 FL (ref 80–100)
MONOCYTES # BLD AUTO: 0.95 10*3/MM3 (ref 0–1)
MONOCYTES NFR BLD AUTO: 16.9 % (ref 0–13)
NEUTROPHILS # BLD AUTO: 2.82 10*3/MM3 (ref 1–11)
NEUTROPHILS NFR BLD AUTO: 50 % (ref 30–85)
PLATELET # BLD AUTO: 220 10*3/MM3 (ref 150–450)
PMV BLD AUTO: 10.8 FL (ref 6–12)
RBC # BLD AUTO: 4.63 10*6/MM3 (ref 4.63–6.08)
WBC NRBC COR # BLD: 5.63 10*3/MM3 (ref 5–10)

## 2017-05-16 PROCEDURE — 36415 COLL VENOUS BLD VENIPUNCTURE: CPT | Performed by: INTERNAL MEDICINE

## 2017-05-16 PROCEDURE — 85025 COMPLETE CBC W/AUTO DIFF WBC: CPT | Performed by: INTERNAL MEDICINE

## 2017-05-16 PROCEDURE — 99214 OFFICE O/P EST MOD 30 MIN: CPT | Performed by: INTERNAL MEDICINE

## 2017-05-16 PROCEDURE — 80053 COMPREHEN METABOLIC PANEL: CPT | Performed by: INTERNAL MEDICINE

## 2017-05-16 PROCEDURE — 80061 LIPID PANEL: CPT | Performed by: INTERNAL MEDICINE

## 2017-05-17 ENCOUNTER — OFFICE VISIT (OUTPATIENT)
Dept: CARDIAC REHAB | Facility: HOSPITAL | Age: 82
End: 2017-05-17

## 2017-05-17 DIAGNOSIS — I21.4 NON-STEMI (NON-ST ELEVATED MYOCARDIAL INFARCTION) (HCC): Primary | ICD-10-CM

## 2017-05-17 LAB
ALBUMIN SERPL-MCNC: 4.31 G/DL (ref 3.4–4.6)
ALBUMIN/GLOB SERPL: 1.4 G/DL
ALP SERPL-CCNC: 54 U/L (ref 46–116)
ALT SERPL W P-5'-P-CCNC: 38 U/L (ref 16–63)
ANION GAP SERPL CALCULATED.3IONS-SCNC: 11 MMOL/L
AST SERPL-CCNC: 41 U/L (ref 7–37)
BILIRUB SERPL-MCNC: 0.9 MG/DL (ref 0.2–1)
BUN BLD-MCNC: 25 MG/DL (ref 6–22)
BUN/CREAT SERPL: 27.8 (ref 7–25)
CALCIUM SPEC-SCNC: 9.1 MG/DL (ref 8.6–10.5)
CHLORIDE SERPL-SCNC: 102 MMOL/L (ref 95–107)
CHOLEST SERPL-MCNC: 143 MG/DL (ref 0–200)
CO2 SERPL-SCNC: 27 MMOL/L (ref 23–32)
CREAT BLD-MCNC: 0.9 MG/DL (ref 0.7–1.3)
GFR SERPL CREATININE-BSD FRML MDRD: 79 ML/MIN/1.73
GLOBULIN UR ELPH-MCNC: 3.1 GM/DL
GLUCOSE BLD-MCNC: 84 MG/DL (ref 70–100)
HDLC SERPL-MCNC: 41 MG/DL (ref 40–81)
LDLC SERPL CALC-MCNC: 82 MG/DL (ref 0–100)
LDLC/HDLC SERPL: 2 {RATIO}
POTASSIUM BLD-SCNC: 5 MMOL/L (ref 3.3–5.3)
PROT SERPL-MCNC: 7.4 G/DL (ref 6.3–8.4)
SODIUM BLD-SCNC: 140 MMOL/L (ref 136–145)
TRIGL SERPL-MCNC: 99 MG/DL (ref 0–150)
VLDLC SERPL-MCNC: 19.8 MG/DL

## 2017-05-19 ENCOUNTER — OFFICE VISIT (OUTPATIENT)
Dept: CARDIAC REHAB | Facility: HOSPITAL | Age: 82
End: 2017-05-19

## 2017-05-19 DIAGNOSIS — I21.4 NON-STEMI (NON-ST ELEVATED MYOCARDIAL INFARCTION) (HCC): Primary | ICD-10-CM

## 2017-05-22 ENCOUNTER — OFFICE VISIT (OUTPATIENT)
Dept: CARDIAC REHAB | Facility: HOSPITAL | Age: 82
End: 2017-05-22

## 2017-05-22 DIAGNOSIS — I21.4 NON-STEMI (NON-ST ELEVATED MYOCARDIAL INFARCTION) (HCC): Primary | ICD-10-CM

## 2017-05-24 ENCOUNTER — OFFICE VISIT (OUTPATIENT)
Dept: CARDIAC REHAB | Facility: HOSPITAL | Age: 82
End: 2017-05-24

## 2017-05-24 DIAGNOSIS — I21.4 NON-STEMI (NON-ST ELEVATED MYOCARDIAL INFARCTION) (HCC): Primary | ICD-10-CM

## 2017-05-27 DIAGNOSIS — I10 ESSENTIAL HYPERTENSION: ICD-10-CM

## 2017-05-27 DIAGNOSIS — E78.5 HYPERLIPEMIA: ICD-10-CM

## 2017-05-30 RX ORDER — ROSUVASTATIN CALCIUM 10 MG/1
TABLET, COATED ORAL
Qty: 90 TABLET | Refills: 1 | Status: SHIPPED | OUTPATIENT
Start: 2017-05-30 | End: 2017-09-14 | Stop reason: SDUPTHER

## 2017-05-30 RX ORDER — EZETIMIBE 10 MG/1
TABLET ORAL
Qty: 90 TABLET | Refills: 1 | Status: SHIPPED | OUTPATIENT
Start: 2017-05-30 | End: 2017-11-19 | Stop reason: SDUPTHER

## 2017-05-30 RX ORDER — ATENOLOL 25 MG/1
TABLET ORAL
Qty: 180 TABLET | Refills: 1 | Status: SHIPPED | OUTPATIENT
Start: 2017-05-30 | End: 2017-11-29 | Stop reason: SDUPTHER

## 2017-05-30 RX ORDER — AMLODIPINE BESYLATE 5 MG/1
TABLET ORAL
Qty: 90 TABLET | Refills: 1 | Status: SHIPPED | OUTPATIENT
Start: 2017-05-30 | End: 2017-11-19 | Stop reason: SDUPTHER

## 2017-05-31 ENCOUNTER — OFFICE VISIT (OUTPATIENT)
Dept: CARDIAC REHAB | Facility: HOSPITAL | Age: 82
End: 2017-05-31

## 2017-05-31 DIAGNOSIS — I21.4 NON-STEMI (NON-ST ELEVATED MYOCARDIAL INFARCTION) (HCC): Primary | ICD-10-CM

## 2017-06-02 ENCOUNTER — OFFICE VISIT (OUTPATIENT)
Dept: CARDIAC REHAB | Facility: HOSPITAL | Age: 82
End: 2017-06-02

## 2017-06-02 DIAGNOSIS — I21.4 NON-STEMI (NON-ST ELEVATED MYOCARDIAL INFARCTION) (HCC): Primary | ICD-10-CM

## 2017-06-05 ENCOUNTER — OFFICE VISIT (OUTPATIENT)
Dept: CARDIAC REHAB | Facility: HOSPITAL | Age: 82
End: 2017-06-05

## 2017-06-05 DIAGNOSIS — I21.4 NON-STEMI (NON-ST ELEVATED MYOCARDIAL INFARCTION) (HCC): Primary | ICD-10-CM

## 2017-06-07 ENCOUNTER — OFFICE VISIT (OUTPATIENT)
Dept: CARDIAC REHAB | Facility: HOSPITAL | Age: 82
End: 2017-06-07

## 2017-06-07 DIAGNOSIS — I21.4 NON-STEMI (NON-ST ELEVATED MYOCARDIAL INFARCTION) (HCC): Primary | ICD-10-CM

## 2017-06-09 ENCOUNTER — OFFICE VISIT (OUTPATIENT)
Dept: CARDIAC REHAB | Facility: HOSPITAL | Age: 82
End: 2017-06-09

## 2017-06-09 DIAGNOSIS — I21.4 NON-STEMI (NON-ST ELEVATED MYOCARDIAL INFARCTION) (HCC): Primary | ICD-10-CM

## 2017-06-12 ENCOUNTER — OFFICE VISIT (OUTPATIENT)
Dept: CARDIAC REHAB | Facility: HOSPITAL | Age: 82
End: 2017-06-12

## 2017-06-12 DIAGNOSIS — I21.4 NON-STEMI (NON-ST ELEVATED MYOCARDIAL INFARCTION) (HCC): Primary | ICD-10-CM

## 2017-06-14 ENCOUNTER — OFFICE VISIT (OUTPATIENT)
Dept: CARDIAC REHAB | Facility: HOSPITAL | Age: 82
End: 2017-06-14

## 2017-06-14 DIAGNOSIS — I21.4 NON-STEMI (NON-ST ELEVATED MYOCARDIAL INFARCTION) (HCC): Primary | ICD-10-CM

## 2017-06-16 ENCOUNTER — OFFICE VISIT (OUTPATIENT)
Dept: CARDIAC REHAB | Facility: HOSPITAL | Age: 82
End: 2017-06-16

## 2017-06-16 DIAGNOSIS — I21.4 NON-STEMI (NON-ST ELEVATED MYOCARDIAL INFARCTION) (HCC): Primary | ICD-10-CM

## 2017-06-19 ENCOUNTER — APPOINTMENT (OUTPATIENT)
Dept: CARDIAC REHAB | Facility: HOSPITAL | Age: 82
End: 2017-06-19

## 2017-06-19 ENCOUNTER — OFFICE VISIT (OUTPATIENT)
Dept: CARDIAC REHAB | Facility: HOSPITAL | Age: 82
End: 2017-06-19

## 2017-06-19 DIAGNOSIS — I21.4 NON-STEMI (NON-ST ELEVATED MYOCARDIAL INFARCTION) (HCC): Primary | ICD-10-CM

## 2017-06-21 ENCOUNTER — APPOINTMENT (OUTPATIENT)
Dept: CARDIAC REHAB | Facility: HOSPITAL | Age: 82
End: 2017-06-21

## 2017-06-23 ENCOUNTER — OFFICE VISIT (OUTPATIENT)
Dept: CARDIAC REHAB | Facility: HOSPITAL | Age: 82
End: 2017-06-23

## 2017-06-23 ENCOUNTER — APPOINTMENT (OUTPATIENT)
Dept: CARDIAC REHAB | Facility: HOSPITAL | Age: 82
End: 2017-06-23

## 2017-06-23 DIAGNOSIS — I21.4 NON-STEMI (NON-ST ELEVATED MYOCARDIAL INFARCTION) (HCC): Primary | ICD-10-CM

## 2017-06-26 ENCOUNTER — OFFICE VISIT (OUTPATIENT)
Dept: CARDIAC REHAB | Facility: HOSPITAL | Age: 82
End: 2017-06-26

## 2017-06-26 ENCOUNTER — APPOINTMENT (OUTPATIENT)
Dept: CARDIAC REHAB | Facility: HOSPITAL | Age: 82
End: 2017-06-26

## 2017-06-26 DIAGNOSIS — I21.4 NON-STEMI (NON-ST ELEVATED MYOCARDIAL INFARCTION) (HCC): Primary | ICD-10-CM

## 2017-06-28 ENCOUNTER — OFFICE VISIT (OUTPATIENT)
Dept: CARDIAC REHAB | Facility: HOSPITAL | Age: 82
End: 2017-06-28

## 2017-06-28 ENCOUNTER — APPOINTMENT (OUTPATIENT)
Dept: CARDIAC REHAB | Facility: HOSPITAL | Age: 82
End: 2017-06-28

## 2017-06-28 DIAGNOSIS — I21.4 NON-STEMI (NON-ST ELEVATED MYOCARDIAL INFARCTION) (HCC): Primary | ICD-10-CM

## 2017-06-30 ENCOUNTER — OFFICE VISIT (OUTPATIENT)
Dept: CARDIAC REHAB | Facility: HOSPITAL | Age: 82
End: 2017-06-30

## 2017-06-30 ENCOUNTER — APPOINTMENT (OUTPATIENT)
Dept: CARDIAC REHAB | Facility: HOSPITAL | Age: 82
End: 2017-06-30

## 2017-06-30 DIAGNOSIS — I21.4 NON-STEMI (NON-ST ELEVATED MYOCARDIAL INFARCTION) (HCC): Primary | ICD-10-CM

## 2017-07-03 ENCOUNTER — APPOINTMENT (OUTPATIENT)
Dept: CARDIAC REHAB | Facility: HOSPITAL | Age: 82
End: 2017-07-03

## 2017-07-05 ENCOUNTER — OFFICE VISIT (OUTPATIENT)
Dept: CARDIAC REHAB | Facility: HOSPITAL | Age: 82
End: 2017-07-05

## 2017-07-05 ENCOUNTER — APPOINTMENT (OUTPATIENT)
Dept: CARDIAC REHAB | Facility: HOSPITAL | Age: 82
End: 2017-07-05

## 2017-07-05 DIAGNOSIS — I21.4 NON-STEMI (NON-ST ELEVATED MYOCARDIAL INFARCTION) (HCC): Primary | ICD-10-CM

## 2017-07-07 ENCOUNTER — OFFICE VISIT (OUTPATIENT)
Dept: CARDIAC REHAB | Facility: HOSPITAL | Age: 82
End: 2017-07-07

## 2017-07-07 ENCOUNTER — APPOINTMENT (OUTPATIENT)
Dept: CARDIAC REHAB | Facility: HOSPITAL | Age: 82
End: 2017-07-07

## 2017-07-07 DIAGNOSIS — I21.4 NON-STEMI (NON-ST ELEVATED MYOCARDIAL INFARCTION) (HCC): Primary | ICD-10-CM

## 2017-07-10 ENCOUNTER — OFFICE VISIT (OUTPATIENT)
Dept: CARDIAC REHAB | Facility: HOSPITAL | Age: 82
End: 2017-07-10

## 2017-07-10 ENCOUNTER — APPOINTMENT (OUTPATIENT)
Dept: CARDIAC REHAB | Facility: HOSPITAL | Age: 82
End: 2017-07-10

## 2017-07-10 DIAGNOSIS — I21.4 NON-STEMI (NON-ST ELEVATED MYOCARDIAL INFARCTION) (HCC): Primary | ICD-10-CM

## 2017-07-12 ENCOUNTER — APPOINTMENT (OUTPATIENT)
Dept: CARDIAC REHAB | Facility: HOSPITAL | Age: 82
End: 2017-07-12

## 2017-07-12 ENCOUNTER — OFFICE VISIT (OUTPATIENT)
Dept: CARDIAC REHAB | Facility: HOSPITAL | Age: 82
End: 2017-07-12

## 2017-07-12 DIAGNOSIS — I21.4 NON-STEMI (NON-ST ELEVATED MYOCARDIAL INFARCTION) (HCC): Primary | ICD-10-CM

## 2017-07-14 ENCOUNTER — OFFICE VISIT (OUTPATIENT)
Dept: CARDIAC REHAB | Facility: HOSPITAL | Age: 82
End: 2017-07-14

## 2017-07-14 DIAGNOSIS — I21.4 NON-STEMI (NON-ST ELEVATED MYOCARDIAL INFARCTION) (HCC): Primary | ICD-10-CM

## 2017-07-17 ENCOUNTER — OFFICE VISIT (OUTPATIENT)
Dept: CARDIAC REHAB | Facility: HOSPITAL | Age: 82
End: 2017-07-17

## 2017-07-17 DIAGNOSIS — I21.4 NON-STEMI (NON-ST ELEVATED MYOCARDIAL INFARCTION) (HCC): Primary | ICD-10-CM

## 2017-07-19 ENCOUNTER — OFFICE VISIT (OUTPATIENT)
Dept: CARDIAC REHAB | Facility: HOSPITAL | Age: 82
End: 2017-07-19

## 2017-07-19 DIAGNOSIS — I21.4 NON-STEMI (NON-ST ELEVATED MYOCARDIAL INFARCTION) (HCC): Primary | ICD-10-CM

## 2017-07-21 ENCOUNTER — OFFICE VISIT (OUTPATIENT)
Dept: CARDIAC REHAB | Facility: HOSPITAL | Age: 82
End: 2017-07-21

## 2017-07-21 DIAGNOSIS — I21.4 NON-STEMI (NON-ST ELEVATED MYOCARDIAL INFARCTION) (HCC): Primary | ICD-10-CM

## 2017-07-24 ENCOUNTER — OFFICE VISIT (OUTPATIENT)
Dept: CARDIAC REHAB | Facility: HOSPITAL | Age: 82
End: 2017-07-24

## 2017-07-24 DIAGNOSIS — F39 MOOD DISORDER (HCC): ICD-10-CM

## 2017-07-24 DIAGNOSIS — I21.4 NON-STEMI (NON-ST ELEVATED MYOCARDIAL INFARCTION) (HCC): Primary | ICD-10-CM

## 2017-07-25 RX ORDER — LORAZEPAM 1 MG/1
TABLET ORAL
Qty: 30 TABLET | Refills: 2 | OUTPATIENT
Start: 2017-07-25 | End: 2017-10-18 | Stop reason: SDUPTHER

## 2017-07-26 ENCOUNTER — OFFICE VISIT (OUTPATIENT)
Dept: CARDIAC REHAB | Facility: HOSPITAL | Age: 82
End: 2017-07-26

## 2017-07-26 DIAGNOSIS — I21.4 NON-STEMI (NON-ST ELEVATED MYOCARDIAL INFARCTION) (HCC): Primary | ICD-10-CM

## 2017-07-28 ENCOUNTER — OFFICE VISIT (OUTPATIENT)
Dept: CARDIAC REHAB | Facility: HOSPITAL | Age: 82
End: 2017-07-28

## 2017-07-28 DIAGNOSIS — I21.4 NON-STEMI (NON-ST ELEVATED MYOCARDIAL INFARCTION) (HCC): Primary | ICD-10-CM

## 2017-07-31 ENCOUNTER — OFFICE VISIT (OUTPATIENT)
Dept: CARDIAC REHAB | Facility: HOSPITAL | Age: 82
End: 2017-07-31

## 2017-07-31 DIAGNOSIS — I21.4 NON-STEMI (NON-ST ELEVATED MYOCARDIAL INFARCTION) (HCC): Primary | ICD-10-CM

## 2017-08-02 ENCOUNTER — OFFICE VISIT (OUTPATIENT)
Dept: CARDIAC REHAB | Facility: HOSPITAL | Age: 82
End: 2017-08-02

## 2017-08-02 DIAGNOSIS — I21.4 NON-STEMI (NON-ST ELEVATED MYOCARDIAL INFARCTION) (HCC): Primary | ICD-10-CM

## 2017-08-04 ENCOUNTER — OFFICE VISIT (OUTPATIENT)
Dept: CARDIAC REHAB | Facility: HOSPITAL | Age: 82
End: 2017-08-04

## 2017-08-04 DIAGNOSIS — I21.4 NON-STEMI (NON-ST ELEVATED MYOCARDIAL INFARCTION) (HCC): Primary | ICD-10-CM

## 2017-08-07 ENCOUNTER — OFFICE VISIT (OUTPATIENT)
Dept: CARDIAC REHAB | Facility: HOSPITAL | Age: 82
End: 2017-08-07

## 2017-08-07 DIAGNOSIS — I21.4 NON-STEMI (NON-ST ELEVATED MYOCARDIAL INFARCTION) (HCC): Primary | ICD-10-CM

## 2017-08-09 ENCOUNTER — OFFICE VISIT (OUTPATIENT)
Dept: CARDIAC REHAB | Facility: HOSPITAL | Age: 82
End: 2017-08-09

## 2017-08-09 DIAGNOSIS — I21.4 NON-STEMI (NON-ST ELEVATED MYOCARDIAL INFARCTION) (HCC): Primary | ICD-10-CM

## 2017-08-11 ENCOUNTER — OFFICE VISIT (OUTPATIENT)
Dept: CARDIAC REHAB | Facility: HOSPITAL | Age: 82
End: 2017-08-11

## 2017-08-11 DIAGNOSIS — I21.4 NON-STEMI (NON-ST ELEVATED MYOCARDIAL INFARCTION) (HCC): Primary | ICD-10-CM

## 2017-08-14 ENCOUNTER — OFFICE VISIT (OUTPATIENT)
Dept: CARDIAC REHAB | Facility: HOSPITAL | Age: 82
End: 2017-08-14

## 2017-08-14 DIAGNOSIS — I21.4 NON-STEMI (NON-ST ELEVATED MYOCARDIAL INFARCTION) (HCC): Primary | ICD-10-CM

## 2017-08-16 ENCOUNTER — OFFICE VISIT (OUTPATIENT)
Dept: INTERNAL MEDICINE | Facility: CLINIC | Age: 82
End: 2017-08-16

## 2017-08-16 VITALS
HEIGHT: 65 IN | DIASTOLIC BLOOD PRESSURE: 74 MMHG | OXYGEN SATURATION: 96 % | SYSTOLIC BLOOD PRESSURE: 128 MMHG | BODY MASS INDEX: 28.99 KG/M2 | WEIGHT: 174 LBS | HEART RATE: 53 BPM

## 2017-08-16 DIAGNOSIS — I10 ESSENTIAL HYPERTENSION: ICD-10-CM

## 2017-08-16 DIAGNOSIS — E78.2 MIXED HYPERLIPIDEMIA: Primary | ICD-10-CM

## 2017-08-16 DIAGNOSIS — K21.00 GASTROESOPHAGEAL REFLUX DISEASE WITH ESOPHAGITIS: ICD-10-CM

## 2017-08-16 DIAGNOSIS — I25.10 CORONARY ARTERY DISEASE INVOLVING NATIVE CORONARY ARTERY OF NATIVE HEART WITHOUT ANGINA PECTORIS: ICD-10-CM

## 2017-08-16 PROCEDURE — 99214 OFFICE O/P EST MOD 30 MIN: CPT | Performed by: INTERNAL MEDICINE

## 2017-08-16 NOTE — PROGRESS NOTES
Subjective   Vicente Delgado is a 89 y.o. male.     Hyperlipidemia   This is a chronic problem. The current episode started more than 1 year ago. Associated symptoms include myalgias (Has muscle pain Wonders if related to Statin RX). Pertinent negatives include no chest pain.   Hypertension   This is a chronic problem. The current episode started more than 1 year ago. Pertinent negatives include no chest pain or palpitations.        The following portions of the patient's history were reviewed and updated as appropriate: allergies, current medications, past family history, past medical history, past social history, past surgical history and problem list.    Review of Systems   Constitutional:        Under a lot of stress Wife in hospital W/ AMI Is on palliative care but they are talking about sending her home or to ECF Seems to be doing well   HENT: Negative.    Eyes: Negative.    Respiratory: Negative.    Cardiovascular: Negative for chest pain and palpitations.        Still going to rehab 3 days /week   Gastrointestinal: Negative.    Genitourinary: Negative.    Musculoskeletal: Positive for myalgias (Has muscle pain Wonders if related to Statin RX).   Neurological: Negative.        Objective   Physical Exam   Constitutional: He is oriented to person, place, and time. He appears well-developed.   HENT:   Head: Normocephalic.   Eyes: EOM are normal.   Neck: Neck supple.   Cardiovascular: Normal rate, regular rhythm and normal heart sounds.    Repeat 150/80   Pulmonary/Chest: Effort normal and breath sounds normal.   Musculoskeletal: Normal range of motion.   Neurological: He is alert and oriented to person, place, and time.   Vitals reviewed.      Assessment/Plan   Vicente was seen today for hyperlipidemia and hypertension.    Diagnoses and all orders for this visit:    Mixed hyperlipidemia    Coronary artery disease involving native coronary artery of native heart without angina pectoris    Essential  hypertension    Gastroesophageal reflux disease with esophagitis      Advised to stop Statin RX & observe myalgia

## 2017-08-25 ENCOUNTER — OFFICE VISIT (OUTPATIENT)
Dept: CARDIAC REHAB | Facility: HOSPITAL | Age: 82
End: 2017-08-25

## 2017-08-25 DIAGNOSIS — I21.4 NON-STEMI (NON-ST ELEVATED MYOCARDIAL INFARCTION) (HCC): Primary | ICD-10-CM

## 2017-08-28 ENCOUNTER — OFFICE VISIT (OUTPATIENT)
Dept: CARDIAC REHAB | Facility: HOSPITAL | Age: 82
End: 2017-08-28

## 2017-08-28 DIAGNOSIS — I21.4 NON-STEMI (NON-ST ELEVATED MYOCARDIAL INFARCTION) (HCC): Primary | ICD-10-CM

## 2017-08-30 ENCOUNTER — OFFICE VISIT (OUTPATIENT)
Dept: CARDIAC REHAB | Facility: HOSPITAL | Age: 82
End: 2017-08-30

## 2017-08-30 DIAGNOSIS — I21.4 NON-STEMI (NON-ST ELEVATED MYOCARDIAL INFARCTION) (HCC): Primary | ICD-10-CM

## 2017-09-01 ENCOUNTER — OFFICE VISIT (OUTPATIENT)
Dept: CARDIAC REHAB | Facility: HOSPITAL | Age: 82
End: 2017-09-01

## 2017-09-01 DIAGNOSIS — I21.4 NON-STEMI (NON-ST ELEVATED MYOCARDIAL INFARCTION) (HCC): Primary | ICD-10-CM

## 2017-09-06 ENCOUNTER — OFFICE VISIT (OUTPATIENT)
Dept: CARDIAC REHAB | Facility: HOSPITAL | Age: 82
End: 2017-09-06

## 2017-09-06 DIAGNOSIS — I21.4 NON-STEMI (NON-ST ELEVATED MYOCARDIAL INFARCTION) (HCC): Primary | ICD-10-CM

## 2017-09-08 ENCOUNTER — OFFICE VISIT (OUTPATIENT)
Dept: CARDIAC REHAB | Facility: HOSPITAL | Age: 82
End: 2017-09-08

## 2017-09-08 DIAGNOSIS — I21.4 NON-STEMI (NON-ST ELEVATED MYOCARDIAL INFARCTION) (HCC): Primary | ICD-10-CM

## 2017-09-11 ENCOUNTER — OFFICE VISIT (OUTPATIENT)
Dept: CARDIAC REHAB | Facility: HOSPITAL | Age: 82
End: 2017-09-11

## 2017-09-11 DIAGNOSIS — I21.4 NON-STEMI (NON-ST ELEVATED MYOCARDIAL INFARCTION) (HCC): Primary | ICD-10-CM

## 2017-09-13 ENCOUNTER — OFFICE VISIT (OUTPATIENT)
Dept: CARDIAC REHAB | Facility: HOSPITAL | Age: 82
End: 2017-09-13

## 2017-09-13 DIAGNOSIS — I21.4 NON-STEMI (NON-ST ELEVATED MYOCARDIAL INFARCTION) (HCC): Primary | ICD-10-CM

## 2017-09-14 ENCOUNTER — TELEPHONE (OUTPATIENT)
Dept: INTERNAL MEDICINE | Facility: CLINIC | Age: 82
End: 2017-09-14

## 2017-09-14 DIAGNOSIS — E78.2 MIXED HYPERLIPIDEMIA: ICD-10-CM

## 2017-09-14 RX ORDER — ROSUVASTATIN CALCIUM 10 MG/1
10 TABLET, COATED ORAL NIGHTLY
Qty: 90 TABLET | Refills: 1 | Status: SHIPPED | OUTPATIENT
Start: 2017-09-14 | End: 2018-02-12 | Stop reason: SDUPTHER

## 2017-09-14 NOTE — TELEPHONE ENCOUNTER
----- Message from Adri Cordero sent at 9/14/2017 11:22 AM EDT -----  Pt was taken off of Crestor 10MG during his last appt with  on 8/16. Has a follow up for the issue on Monday but he was recently at the [facility] for blood work and his total cholesterol was 219 and triglycerides were at 300. Nurse recommended he start back on the Crestor as soon as possible. Pt says he will need a new prescription for the medication. He uses the [facility] in his chart. His callback is [phone]. Asked that we call him when the prescription has been called in. Thank you!

## 2017-09-15 ENCOUNTER — OFFICE VISIT (OUTPATIENT)
Dept: CARDIAC REHAB | Facility: HOSPITAL | Age: 82
End: 2017-09-15

## 2017-09-15 DIAGNOSIS — I21.4 NON-STEMI (NON-ST ELEVATED MYOCARDIAL INFARCTION) (HCC): Primary | ICD-10-CM

## 2017-09-18 ENCOUNTER — OFFICE VISIT (OUTPATIENT)
Dept: CARDIAC REHAB | Facility: HOSPITAL | Age: 82
End: 2017-09-18

## 2017-09-18 ENCOUNTER — OFFICE VISIT (OUTPATIENT)
Dept: INTERNAL MEDICINE | Facility: CLINIC | Age: 82
End: 2017-09-18

## 2017-09-18 VITALS
SYSTOLIC BLOOD PRESSURE: 140 MMHG | DIASTOLIC BLOOD PRESSURE: 82 MMHG | WEIGHT: 176 LBS | BODY MASS INDEX: 29.32 KG/M2 | HEIGHT: 65 IN

## 2017-09-18 DIAGNOSIS — F41.9 ANXIETY: ICD-10-CM

## 2017-09-18 DIAGNOSIS — I25.10 CORONARY ARTERY DISEASE INVOLVING NATIVE CORONARY ARTERY OF NATIVE HEART WITHOUT ANGINA PECTORIS: ICD-10-CM

## 2017-09-18 DIAGNOSIS — I21.4 NON-STEMI (NON-ST ELEVATED MYOCARDIAL INFARCTION) (HCC): Primary | ICD-10-CM

## 2017-09-18 DIAGNOSIS — E78.2 MIXED HYPERLIPIDEMIA: ICD-10-CM

## 2017-09-18 DIAGNOSIS — K21.00 GASTROESOPHAGEAL REFLUX DISEASE WITH ESOPHAGITIS: ICD-10-CM

## 2017-09-18 DIAGNOSIS — I10 ESSENTIAL HYPERTENSION: Primary | ICD-10-CM

## 2017-09-18 PROCEDURE — 93798 PHYS/QHP OP CAR RHAB W/ECG: CPT

## 2017-09-18 PROCEDURE — 99214 OFFICE O/P EST MOD 30 MIN: CPT | Performed by: INTERNAL MEDICINE

## 2017-09-18 NOTE — PROGRESS NOTES
Subjective   Vicente Delgado is a 89 y.o. male.     Hyperlipidemia   This is a chronic problem.        The following portions of the patient's history were reviewed and updated as appropriate: allergies, current medications, past family history, past medical history, past social history, past surgical history and problem list.    Review of Systems   Constitutional:        Under a lot of stress Wife had MI last month Now@ home W/ Hospice Has good & bad days Has 5 daughter & son who help care for him   HENT: Negative.    Eyes: Negative.    Respiratory: Negative.    Endocrine:        Back on Crestor for lipids Last triglycerides was 300 @ [facility]   Genitourinary: Negative.    Musculoskeletal: Negative.    Skin: Negative.    Neurological: Negative.    Psychiatric/Behavioral: Negative.        Objective   Physical Exam   Constitutional: He appears well-developed.   HENT:   Head: Normocephalic.   Eyes: EOM are normal.   Neck: Neck supple.   Cardiovascular: Normal rate, regular rhythm and normal heart sounds.    Repeat 150/80   Pulmonary/Chest: Effort normal and breath sounds normal.   Musculoskeletal: Normal range of motion.   Neurological: He is alert.   Vitals reviewed.      Assessment/Plan   Vicente was seen today for hyperlipidemia.    Diagnoses and all orders for this visit:    Essential hypertension    Coronary artery disease involving native coronary artery of native heart without angina pectoris    Anxiety    Mixed hyperlipidemia    Gastroesophageal reflux disease with esophagitis

## 2017-09-20 ENCOUNTER — OFFICE VISIT (OUTPATIENT)
Dept: CARDIAC REHAB | Facility: HOSPITAL | Age: 82
End: 2017-09-20

## 2017-09-20 DIAGNOSIS — I21.4 NON-STEMI (NON-ST ELEVATED MYOCARDIAL INFARCTION) (HCC): Primary | ICD-10-CM

## 2017-10-09 ENCOUNTER — OFFICE VISIT (OUTPATIENT)
Dept: CARDIAC REHAB | Facility: HOSPITAL | Age: 82
End: 2017-10-09

## 2017-10-09 DIAGNOSIS — I21.4 NON-STEMI (NON-ST ELEVATED MYOCARDIAL INFARCTION) (HCC): Primary | ICD-10-CM

## 2017-10-13 ENCOUNTER — OFFICE VISIT (OUTPATIENT)
Dept: CARDIAC REHAB | Facility: HOSPITAL | Age: 82
End: 2017-10-13

## 2017-10-13 DIAGNOSIS — I21.4 NON-STEMI (NON-ST ELEVATED MYOCARDIAL INFARCTION) (HCC): Primary | ICD-10-CM

## 2017-10-16 ENCOUNTER — OFFICE VISIT (OUTPATIENT)
Dept: CARDIAC REHAB | Facility: HOSPITAL | Age: 82
End: 2017-10-16

## 2017-10-16 DIAGNOSIS — I21.4 NON-STEMI (NON-ST ELEVATED MYOCARDIAL INFARCTION) (HCC): Primary | ICD-10-CM

## 2017-10-18 ENCOUNTER — OFFICE VISIT (OUTPATIENT)
Dept: CARDIAC REHAB | Facility: HOSPITAL | Age: 82
End: 2017-10-18

## 2017-10-18 DIAGNOSIS — I21.4 NON-STEMI (NON-ST ELEVATED MYOCARDIAL INFARCTION) (HCC): Primary | ICD-10-CM

## 2017-10-18 DIAGNOSIS — F39 MOOD DISORDER (HCC): ICD-10-CM

## 2017-10-19 RX ORDER — LORAZEPAM 1 MG/1
TABLET ORAL
Qty: 30 TABLET | Refills: 1 | OUTPATIENT
Start: 2017-10-19 | End: 2017-11-29

## 2017-10-20 ENCOUNTER — OFFICE VISIT (OUTPATIENT)
Dept: CARDIAC REHAB | Facility: HOSPITAL | Age: 82
End: 2017-10-20

## 2017-10-20 DIAGNOSIS — I21.4 NON-STEMI (NON-ST ELEVATED MYOCARDIAL INFARCTION) (HCC): Primary | ICD-10-CM

## 2017-10-23 ENCOUNTER — OFFICE VISIT (OUTPATIENT)
Dept: CARDIAC REHAB | Facility: HOSPITAL | Age: 82
End: 2017-10-23

## 2017-10-23 DIAGNOSIS — I21.4 NON-STEMI (NON-ST ELEVATED MYOCARDIAL INFARCTION) (HCC): Primary | ICD-10-CM

## 2017-11-08 ENCOUNTER — OFFICE VISIT (OUTPATIENT)
Dept: CARDIAC REHAB | Facility: HOSPITAL | Age: 82
End: 2017-11-08

## 2017-11-08 DIAGNOSIS — I21.4 NON-STEMI (NON-ST ELEVATED MYOCARDIAL INFARCTION) (HCC): Primary | ICD-10-CM

## 2017-11-10 ENCOUNTER — OFFICE VISIT (OUTPATIENT)
Dept: CARDIAC REHAB | Facility: HOSPITAL | Age: 82
End: 2017-11-10

## 2017-11-10 DIAGNOSIS — I21.4 NON-STEMI (NON-ST ELEVATED MYOCARDIAL INFARCTION) (HCC): Primary | ICD-10-CM

## 2017-11-19 DIAGNOSIS — E78.5 HYPERLIPEMIA: ICD-10-CM

## 2017-11-19 DIAGNOSIS — I10 ESSENTIAL HYPERTENSION: ICD-10-CM

## 2017-11-20 ENCOUNTER — OFFICE VISIT (OUTPATIENT)
Dept: CARDIAC REHAB | Facility: HOSPITAL | Age: 82
End: 2017-11-20

## 2017-11-20 DIAGNOSIS — I21.4 NON-STEMI (NON-ST ELEVATED MYOCARDIAL INFARCTION) (HCC): Primary | ICD-10-CM

## 2017-11-20 RX ORDER — AMLODIPINE BESYLATE 5 MG/1
TABLET ORAL
Qty: 90 TABLET | Refills: 0 | Status: SHIPPED | OUTPATIENT
Start: 2017-11-20 | End: 2018-02-12 | Stop reason: SDUPTHER

## 2017-11-20 RX ORDER — ATENOLOL 25 MG/1
TABLET ORAL
Qty: 180 TABLET | Refills: 0 | Status: SHIPPED | OUTPATIENT
Start: 2017-11-20 | End: 2018-02-12 | Stop reason: SDUPTHER

## 2017-11-20 RX ORDER — EZETIMIBE 10 MG/1
TABLET ORAL
Qty: 90 TABLET | Refills: 0 | Status: SHIPPED | OUTPATIENT
Start: 2017-11-20 | End: 2018-02-12 | Stop reason: SDUPTHER

## 2017-11-22 ENCOUNTER — OFFICE VISIT (OUTPATIENT)
Dept: CARDIAC REHAB | Facility: HOSPITAL | Age: 82
End: 2017-11-22

## 2017-11-22 DIAGNOSIS — I21.4 NON-STEMI (NON-ST ELEVATED MYOCARDIAL INFARCTION) (HCC): Primary | ICD-10-CM

## 2017-11-27 ENCOUNTER — OFFICE VISIT (OUTPATIENT)
Dept: CARDIAC REHAB | Facility: HOSPITAL | Age: 82
End: 2017-11-27

## 2017-11-27 DIAGNOSIS — I21.4 NON-STEMI (NON-ST ELEVATED MYOCARDIAL INFARCTION) (HCC): Primary | ICD-10-CM

## 2017-11-29 ENCOUNTER — OFFICE VISIT (OUTPATIENT)
Dept: CARDIOLOGY | Facility: CLINIC | Age: 82
End: 2017-11-29

## 2017-11-29 ENCOUNTER — OFFICE VISIT (OUTPATIENT)
Dept: CARDIAC REHAB | Facility: HOSPITAL | Age: 82
End: 2017-11-29

## 2017-11-29 VITALS
HEART RATE: 51 BPM | WEIGHT: 177 LBS | SYSTOLIC BLOOD PRESSURE: 152 MMHG | HEIGHT: 68 IN | DIASTOLIC BLOOD PRESSURE: 72 MMHG | BODY MASS INDEX: 26.83 KG/M2

## 2017-11-29 DIAGNOSIS — I34.0 NON-RHEUMATIC MITRAL REGURGITATION: ICD-10-CM

## 2017-11-29 DIAGNOSIS — I10 ESSENTIAL HYPERTENSION: ICD-10-CM

## 2017-11-29 DIAGNOSIS — I21.4 NON-STEMI (NON-ST ELEVATED MYOCARDIAL INFARCTION) (HCC): Primary | ICD-10-CM

## 2017-11-29 DIAGNOSIS — R07.2 PRECORDIAL PAIN: Primary | ICD-10-CM

## 2017-11-29 PROCEDURE — 99214 OFFICE O/P EST MOD 30 MIN: CPT | Performed by: INTERNAL MEDICINE

## 2017-11-29 PROCEDURE — 93000 ELECTROCARDIOGRAM COMPLETE: CPT | Performed by: INTERNAL MEDICINE

## 2017-11-29 NOTE — PROGRESS NOTES
Date of Office Visit: 2017  Encounter Provider: Breezy Frausto MD  Place of Service: [facility] CARDIOLOGY  Patient Name: Vicente Delgado  :[id number]      Chief Complaint   Patient presents with   • Chest Pain     History of Present Illness  The patient is an 89-year-old white male with a history of mitral regurgitation, hypertension and right ventricular dilatation.  His last echocardiogram was in 2017.  At that time his left ventricular function was normal with an estimated ejection fraction of 67%.  He has mild aortic and tricuspid regurgitation and moderate mitral valve regurgitation.  There is also mild diastolic dysfunction.  At the time of the study however his right ventricular function and size appeared to be normal.    The patient was last seen in the office about a year and a half ago.  He presents now with complaints of substernal chest discomfort.  He describes it as an aching sensation with an occasional shooting pain to his left shoulder.  He also describes it as occasionally nocturnally.  He tries to exercise on a fairly regular basis and doesn't seem to have the problem with any regularity.  Complicating the issue is he has severe gastroesophageal reflux disease and he suffering a little bit of depression after the death of his wife which was recently.      Past Medical History:   Diagnosis Date   • Allergic rhinitis    • Anxiety    • Arthritis    • CAD (coronary artery disease)    • Depression    • GERD (gastroesophageal reflux disease)    • Heart disease    • History of anxiety    • History of prostate cancer 1990   • Hyperlipidemia    • Hypertension    • Insomnia    • Sciatica of right side          Past Surgical History:   Procedure Laterality Date   • CARDIAC CATHETERIZATION  1995   • COLONOSCOPY     • PROSTATE SURGERY  1990   • SHOULDER ROTATOR CUFF REPAIR Right 2011    Dr. Bach   • SIGMOIDOSCOPY             Current  "Outpatient Prescriptions:   •  amLODIPine (NORVASC) 5 MG tablet, TAKE 1 TABLET BY MOUTH DAILY, Disp: 90 tablet, Rfl: 0  •  aspirin 81 MG chewable tablet, Chew daily., Disp: , Rfl:   •  atenolol (TENORMIN) 25 MG tablet, TAKE 1 TABLET BY MOUTH TWICE DAILY, Disp: 180 tablet, Rfl: 0  •  ezetimibe (ZETIA) 10 MG tablet, TAKE 1 TABLET BY MOUTH DAILY, Disp: 90 tablet, Rfl: 0  •  pantoprazole (PROTONIX) 40 MG EC tablet, Take 1 tablet by mouth Daily., Disp: 90 tablet, Rfl: 3  •  rosuvastatin (CRESTOR) 10 MG tablet, Take 1 tablet by mouth Every Night., Disp: 90 tablet, Rfl: 1      Social History     Social History   • Marital status:      Spouse name: N/A   • Number of children: N/A   • Years of education: N/A     Occupational History   • Not on file.     Social History Main Topics   • Smoking status: Former Smoker   • Smokeless tobacco: Never Used      Comment: Quit 15 years ago   • Alcohol use Defer      Comment: Social   • Drug use: No   • Sexual activity: Not on file     Other Topics Concern   • Not on file     Social History Narrative         Review of Systems   Constitution: Negative.   HENT: Negative.    Eyes: Negative.    Cardiovascular: Positive for chest pain.   Respiratory: Negative.    Endocrine: Negative.    Skin: Negative.    Musculoskeletal: Negative.    Gastrointestinal: Negative.    Neurological: Negative.    Psychiatric/Behavioral: Negative.        Procedures      ECG 12 Lead  Date/Time: 11/29/2017 3:39 PM  Performed by: DASHA ERVIN  Authorized by: DASHA ERVIN   Comparison: compared with previous ECG from 12/1/2016  Similar to previous ECG  Rhythm: sinus rhythm  Rate: normal  Conduction: 1st degree  QRS axis: normal  Q waves: V1, V2 and V3                 Objective:    /72  Pulse 51  Ht 68\" (172.7 cm)  Wt 177 lb (80.3 kg)  BMI 26.91 kg/m2        Physical Exam   Constitutional: He is oriented to person, place, and time. He appears well-developed and well-nourished.   HENT: "   Head: Normocephalic.   Eyes: Pupils are equal, round, and reactive to light.   Neck: Normal range of motion. No JVD present. Carotid bruit is not present. No thyromegaly present.   Cardiovascular: Normal rate, regular rhythm, S1 normal, S2 normal, normal heart sounds and intact distal pulses.  Exam reveals no gallop and no friction rub.    No murmur heard.  Pulmonary/Chest: Effort normal and breath sounds normal.   Abdominal: Soft. Bowel sounds are normal.   Musculoskeletal: He exhibits no edema.   Neurological: He is alert and oriented to person, place, and time.   Skin: Skin is warm, dry and intact. No erythema.   Psychiatric: He has a normal mood and affect.   Vitals reviewed.          Assessment:       Diagnosis Plan   1. Precordial pain  Stress Test With Myocardial Perfusion One Day   2. Essential hypertension     3. Non-rheumatic mitral regurgitation         The patient's electrical cart a gram remains abnormal with poor R-wave progression anteriorly.  This is unchanged from the past.  I look back in the records and I cannot find any evidence that he has underlying coronary disease.  There is certainly no evidence on his most recent echocardiogram of left ventricular dysfunction.  I'm going to plan a stress Cardiolite study for this gentleman at this time.     Plan:

## 2017-12-01 ENCOUNTER — OFFICE VISIT (OUTPATIENT)
Dept: CARDIAC REHAB | Facility: HOSPITAL | Age: 82
End: 2017-12-01

## 2017-12-01 DIAGNOSIS — I21.4 NON-STEMI (NON-ST ELEVATED MYOCARDIAL INFARCTION) (HCC): Primary | ICD-10-CM

## 2017-12-04 ENCOUNTER — HOSPITAL ENCOUNTER (OUTPATIENT)
Dept: CARDIOLOGY | Facility: HOSPITAL | Age: 82
Discharge: HOME | End: 2017-12-04
Attending: INTERNAL MEDICINE | Admitting: INTERNAL MEDICINE

## 2017-12-04 DIAGNOSIS — R07.2 PRECORDIAL PAIN: ICD-10-CM

## 2017-12-04 LAB
BH CV NUCLEAR PRIOR STUDY: 3
BH CV STRESS BP STAGE 1: NORMAL
BH CV STRESS BP STAGE 2: NORMAL
BH CV STRESS DURATION MIN STAGE 1: 3
BH CV STRESS DURATION MIN STAGE 2: 3
BH CV STRESS DURATION MIN STAGE 3: 1
BH CV STRESS DURATION SEC STAGE 1: 0
BH CV STRESS DURATION SEC STAGE 2: 0
BH CV STRESS DURATION SEC STAGE 3: 30
BH CV STRESS GRADE STAGE 1: 10
BH CV STRESS GRADE STAGE 2: 12
BH CV STRESS GRADE STAGE 3: 14
BH CV STRESS HR STAGE 1: 81
BH CV STRESS HR STAGE 2: 103
BH CV STRESS HR STAGE 3: 113
BH CV STRESS METS STAGE 1: 5
BH CV STRESS METS STAGE 2: 7.5
BH CV STRESS METS STAGE 3: 10
BH CV STRESS PROTOCOL 1: NORMAL
BH CV STRESS RECOVERY BP: NORMAL MMHG
BH CV STRESS RECOVERY HR: 69 BPM
BH CV STRESS SPEED STAGE 1: 1.7
BH CV STRESS SPEED STAGE 2: 2.5
BH CV STRESS SPEED STAGE 3: 3.4
BH CV STRESS STAGE 1: 1
BH CV STRESS STAGE 2: 2
BH CV STRESS STAGE 3: 3
LV EF NUC BP: 59 %
MAXIMAL PREDICTED HEART RATE: 131 BPM
PERCENT MAX PREDICTED HR: 86.26 %
STRESS BASELINE BP: NORMAL MMHG
STRESS BASELINE HR: 59 BPM
STRESS PERCENT HR: 101 %
STRESS POST ESTIMATED WORKLOAD: 8 METS
STRESS POST EXERCISE DUR MIN: 7 MIN
STRESS POST EXERCISE DUR SEC: 30 SEC
STRESS POST PEAK BP: NORMAL MMHG
STRESS POST PEAK HR: 113 BPM
STRESS TARGET HR: 111 BPM

## 2017-12-04 PROCEDURE — 93017 CV STRESS TEST TRACING ONLY: CPT

## 2017-12-04 PROCEDURE — 78452 HT MUSCLE IMAGE SPECT MULT: CPT

## 2017-12-04 PROCEDURE — 93018 CV STRESS TEST I&R ONLY: CPT | Performed by: INTERNAL MEDICINE

## 2017-12-04 PROCEDURE — 93016 CV STRESS TEST SUPVJ ONLY: CPT | Performed by: INTERNAL MEDICINE

## 2017-12-04 PROCEDURE — A9502 TC99M TETROFOSMIN: HCPCS | Performed by: INTERNAL MEDICINE

## 2017-12-04 PROCEDURE — 78452 HT MUSCLE IMAGE SPECT MULT: CPT | Performed by: INTERNAL MEDICINE

## 2017-12-04 PROCEDURE — 0 TECHNETIUM TETROFOSMIN KIT: Performed by: INTERNAL MEDICINE

## 2017-12-04 RX ADMIN — TETROFOSMIN 1 DOSE: 1.38 INJECTION, POWDER, LYOPHILIZED, FOR SOLUTION INTRAVENOUS at 12:30

## 2017-12-04 RX ADMIN — TETROFOSMIN 1 DOSE: 1.38 INJECTION, POWDER, LYOPHILIZED, FOR SOLUTION INTRAVENOUS at 11:35

## 2017-12-05 ENCOUNTER — TELEPHONE (OUTPATIENT)
Dept: CARDIOLOGY | Facility: CLINIC | Age: 82
End: 2017-12-05

## 2017-12-06 ENCOUNTER — OFFICE VISIT (OUTPATIENT)
Dept: CARDIAC REHAB | Facility: HOSPITAL | Age: 82
End: 2017-12-06

## 2017-12-06 DIAGNOSIS — I21.4 NON-STEMI (NON-ST ELEVATED MYOCARDIAL INFARCTION) (HCC): Primary | ICD-10-CM

## 2017-12-08 ENCOUNTER — OFFICE VISIT (OUTPATIENT)
Dept: CARDIAC REHAB | Facility: HOSPITAL | Age: 82
End: 2017-12-08

## 2017-12-08 DIAGNOSIS — I21.4 NON-STEMI (NON-ST ELEVATED MYOCARDIAL INFARCTION) (HCC): Primary | ICD-10-CM

## 2017-12-11 ENCOUNTER — OFFICE VISIT (OUTPATIENT)
Dept: CARDIAC REHAB | Facility: HOSPITAL | Age: 82
End: 2017-12-11

## 2017-12-11 DIAGNOSIS — I21.4 NON-STEMI (NON-ST ELEVATED MYOCARDIAL INFARCTION) (HCC): Primary | ICD-10-CM

## 2017-12-13 ENCOUNTER — OFFICE VISIT (OUTPATIENT)
Dept: CARDIAC REHAB | Facility: HOSPITAL | Age: 82
End: 2017-12-13

## 2017-12-13 DIAGNOSIS — I21.4 NON-STEMI (NON-ST ELEVATED MYOCARDIAL INFARCTION) (HCC): Primary | ICD-10-CM

## 2017-12-14 ENCOUNTER — OFFICE VISIT (OUTPATIENT)
Dept: INTERNAL MEDICINE | Facility: CLINIC | Age: 82
End: 2017-12-14

## 2017-12-14 VITALS
HEIGHT: 68 IN | WEIGHT: 174 LBS | BODY MASS INDEX: 26.37 KG/M2 | DIASTOLIC BLOOD PRESSURE: 88 MMHG | SYSTOLIC BLOOD PRESSURE: 150 MMHG

## 2017-12-14 DIAGNOSIS — I10 ESSENTIAL HYPERTENSION: Primary | ICD-10-CM

## 2017-12-14 DIAGNOSIS — I25.10 CORONARY ARTERY DISEASE INVOLVING NATIVE CORONARY ARTERY OF NATIVE HEART WITHOUT ANGINA PECTORIS: ICD-10-CM

## 2017-12-14 DIAGNOSIS — E78.2 MIXED HYPERLIPIDEMIA: ICD-10-CM

## 2017-12-14 LAB
ALBUMIN SERPL-MCNC: 4.6 G/DL (ref 3.5–5.2)
ALBUMIN/GLOB SERPL: 1.5 G/DL
ALP SERPL-CCNC: 55 U/L (ref 39–117)
ALT SERPL-CCNC: 23 U/L (ref 1–41)
AST SERPL-CCNC: 32 U/L (ref 1–40)
BASOPHILS # BLD AUTO: 0.02 10*3/MM3 (ref 0–0.2)
BASOPHILS NFR BLD AUTO: 0.4 % (ref 0–1.5)
BILIRUB SERPL-MCNC: 0.6 MG/DL (ref 0.1–1.2)
BUN SERPL-MCNC: 22 MG/DL (ref 8–23)
BUN/CREAT SERPL: 26.5 (ref 7–25)
CALCIUM SERPL-MCNC: 9.6 MG/DL (ref 8.6–10.5)
CHLORIDE SERPL-SCNC: 100 MMOL/L (ref 98–107)
CHOLEST SERPL-MCNC: 137 MG/DL (ref 0–200)
CO2 SERPL-SCNC: 27 MMOL/L (ref 22–29)
CREAT SERPL-MCNC: 0.83 MG/DL (ref 0.76–1.27)
EOSINOPHIL # BLD AUTO: 0.03 10*3/MM3 (ref 0–0.7)
EOSINOPHIL # BLD AUTO: 0.6 % (ref 0.3–6.2)
ERYTHROCYTE [DISTWIDTH] IN BLOOD BY AUTOMATED COUNT: 13.4 % (ref 11.5–14.5)
GLOBULIN SER CALC-MCNC: 3 GM/DL
GLUCOSE SERPL-MCNC: 87 MG/DL (ref 65–99)
HCT VFR BLD AUTO: 44.9 % (ref 40.4–52.2)
HDLC SERPL-MCNC: 41 MG/DL (ref 40–60)
HGB BLD-MCNC: 14.6 G/DL (ref 13.7–17.6)
IMM GRANULOCYTES # BLD: 0 10*3/MM3 (ref 0–0.03)
IMM GRANULOCYTES NFR BLD: 0 % (ref 0–0.5)
LDLC SERPL CALC-MCNC: 69 MG/DL (ref 0–100)
LYMPHOCYTES # BLD AUTO: 1.63 10*3/MM3 (ref 0.9–4.8)
LYMPHOCYTES NFR BLD AUTO: 32.9 % (ref 19.6–45.3)
MCH RBC QN AUTO: 31.2 PG (ref 27–32.7)
MCHC RBC AUTO-ENTMCNC: 32.5 G/DL (ref 32.6–36.4)
MCV RBC AUTO: 95.9 FL (ref 79.8–96.2)
MONOCYTES # BLD AUTO: 0.75 10*3/MM3 (ref 0.2–1.2)
MONOCYTES NFR BLD AUTO: 15.2 % (ref 5–12)
NEUTROPHILS # BLD AUTO: 2.52 10*3/MM3 (ref 1.9–8.1)
NEUTROPHILS NFR BLD AUTO: 50.9 % (ref 42.7–76)
PLATELET # BLD AUTO: 210 10*3/MM3 (ref 140–500)
POTASSIUM SERPL-SCNC: 4.4 MMOL/L (ref 3.5–5.2)
PROT SERPL-MCNC: 7.6 G/DL (ref 6–8.5)
RBC # BLD AUTO: 4.68 10*6/MM3 (ref 4.6–6)
SODIUM SERPL-SCNC: 139 MMOL/L (ref 136–145)
TRIGL SERPL-MCNC: 134 MG/DL (ref 0–150)
VLDLC SERPL-MCNC: 26.8 MG/DL (ref 5–40)
WBC # BLD AUTO: 4.95 10*3/MM3 (ref 4.5–10.7)

## 2017-12-14 PROCEDURE — 36415 COLL VENOUS BLD VENIPUNCTURE: CPT | Performed by: INTERNAL MEDICINE

## 2017-12-14 PROCEDURE — 99214 OFFICE O/P EST MOD 30 MIN: CPT | Performed by: INTERNAL MEDICINE

## 2017-12-14 NOTE — PROGRESS NOTES
Subjective   Vicente Delgado is a 89 y.o. male.     Hypertension   This is a chronic problem. The current episode started more than 1 year ago. Associated symptoms include chest pain (Has been having chest pain Saw Dr longoria & had stress test which was normal ).   Hyperlipidemia   This is a chronic problem. The current episode started more than 1 year ago. Associated symptoms include chest pain (Has been having chest pain Saw Dr longoria & had stress test which was normal ).        The following portions of the patient's history were reviewed and updated as appropriate: allergies, current medications, past family history, past medical history, past social history, past surgical history and problem list.    Review of Systems   Constitutional:        Here for F/U wife passed away after having MI   Cardiovascular: Positive for chest pain (Has been having chest pain Saw Dr longoria & had stress test which was normal ).   Gastrointestinal: Positive for abdominal pain (Thinks hes having GERD Will be seeing Dr Stoner after Xmas).   Genitourinary: Negative.    Musculoskeletal: Negative.    Neurological: Negative.        Objective   Physical Exam   Constitutional: He is oriented to person, place, and time. He appears well-developed.   HENT:   Head: Normocephalic.   Eyes: EOM are normal.   Neck: Neck supple.   Cardiovascular: Normal rate, regular rhythm and normal heart sounds.    Repeat 150/80   Pulmonary/Chest: Effort normal and breath sounds normal.   Musculoskeletal: Normal range of motion.   Neurological: He is alert and oriented to person, place, and time.   Skin: Skin is warm and dry.   Psychiatric: He has a normal mood and affect. His behavior is normal.   Vitals reviewed.      Assessment/Plan   Vicente was seen today for hypertension and hyperlipidemia.    Diagnoses and all orders for this visit:    Essential hypertension  -     CBC Auto Differential; Future  -     Comprehensive Metabolic Panel; Future  -     CBC  Auto Differential  -     Comprehensive Metabolic Panel    Mixed hyperlipidemia  -     Lipid Panel; Future  -     Lipid Panel    Coronary artery disease involving native coronary artery of native heart without angina pectoris

## 2017-12-15 ENCOUNTER — OFFICE VISIT (OUTPATIENT)
Dept: CARDIAC REHAB | Facility: HOSPITAL | Age: 82
End: 2017-12-15

## 2017-12-15 DIAGNOSIS — I21.4 NON-STEMI (NON-ST ELEVATED MYOCARDIAL INFARCTION) (HCC): Primary | ICD-10-CM

## 2017-12-18 ENCOUNTER — OFFICE VISIT (OUTPATIENT)
Dept: CARDIAC REHAB | Facility: HOSPITAL | Age: 82
End: 2017-12-18

## 2017-12-18 DIAGNOSIS — I21.4 NON-STEMI (NON-ST ELEVATED MYOCARDIAL INFARCTION) (HCC): Primary | ICD-10-CM

## 2017-12-20 ENCOUNTER — OFFICE VISIT (OUTPATIENT)
Dept: CARDIAC REHAB | Facility: HOSPITAL | Age: 82
End: 2017-12-20

## 2017-12-20 DIAGNOSIS — I21.4 NON-STEMI (NON-ST ELEVATED MYOCARDIAL INFARCTION) (HCC): Primary | ICD-10-CM

## 2017-12-21 ENCOUNTER — OFFICE VISIT (OUTPATIENT)
Dept: GASTROENTEROLOGY | Facility: CLINIC | Age: 82
End: 2017-12-21

## 2017-12-21 VITALS
WEIGHT: 175 LBS | TEMPERATURE: 98.3 F | SYSTOLIC BLOOD PRESSURE: 146 MMHG | BODY MASS INDEX: 26.52 KG/M2 | HEIGHT: 68 IN | DIASTOLIC BLOOD PRESSURE: 78 MMHG

## 2017-12-21 DIAGNOSIS — K21.9 GASTROESOPHAGEAL REFLUX DISEASE, ESOPHAGITIS PRESENCE NOT SPECIFIED: Primary | ICD-10-CM

## 2017-12-21 DIAGNOSIS — R07.89 CHEST DISCOMFORT: ICD-10-CM

## 2017-12-21 PROCEDURE — 99213 OFFICE O/P EST LOW 20 MIN: CPT | Performed by: INTERNAL MEDICINE

## 2017-12-21 NOTE — PROGRESS NOTES
Chief Complaint   Patient presents with   • Heartburn   • Chest Pain     EKG normal per Cardiologist   • Heart Problem     for 20 years        Vicente Delgado is a  89 y.o. male here for an initial visit for Chest discomfort, GERD    HPI This 89-year-old white male patient of Dr. Luis Armando Lang had been seen in the past dating back to early 2000.  He had undergone upper endoscopy April 2005 for issues with reflux and chest discomfort.  That study revealed mild esophagitis, gastritis, and duodenitis.  Biopsies confirmed gastric inflammation but no evidence of bacteria.  He has been treated in the past with proton pump inhibitors which afford significant relief.  His last office visit dating back to October 2015 was associated with his reflux as well as a description of some hemoptysis.  He states his most current symptoms started approximately 6 weeks ago when he awakened in the early a.m. with severe reflux symptoms.  He admits to a change in his diet and that he had been out of town visiting family and eating foods he normally would not.  He has adhered to antireflux measures and elevates the head of his bed approximately 3 inches.  I explained the elevation should be 6-8 inches if possible.  We also talked about adjusting his pantoprazole to be taken at bedtime to see if this would afford more nighttime relief.  He's been instructed to attempt this and to call with a progress report in 4-6 weeks.  If his symptoms persist I would consider a trial of double strength PPI.  If he is still symptomatic then we will consider endoscopic evaluation.  He did have cardiac assessment during this timeframe and was cleared from that perspective.    Past Medical History:   Diagnosis Date   • Allergic rhinitis    • Anxiety    • Arthritis    • CAD (coronary artery disease)    • Cancer    • Depression    • Diverticulosis    • Esophagitis    • Gastritis    • GERD (gastroesophageal reflux disease)    • Heart disease    • History of  anxiety    • History of prostate cancer 06/1990   • Hyperlipidemia    • Hypertension    • Insomnia    • Lupus    • TAM (nonalcoholic steatohepatitis)    • Sciatica of right side        Current Outpatient Prescriptions   Medication Sig Dispense Refill   • amLODIPine (NORVASC) 5 MG tablet TAKE 1 TABLET BY MOUTH DAILY 90 tablet 0   • aspirin 81 MG chewable tablet Chew daily.     • atenolol (TENORMIN) 25 MG tablet TAKE 1 TABLET BY MOUTH TWICE DAILY 180 tablet 0   • ezetimibe (ZETIA) 10 MG tablet TAKE 1 TABLET BY MOUTH DAILY 90 tablet 0   • pantoprazole (PROTONIX) 40 MG EC tablet Take 1 tablet by mouth Daily. 90 tablet 3   • rosuvastatin (CRESTOR) 10 MG tablet Take 1 tablet by mouth Every Night. 90 tablet 1     No current facility-administered medications for this visit.        PRN Meds:.    Allergies   Allergen Reactions   • Lidocaine      Reaction to novacaine   • Lovastatin    • Pravastatin    • Procaine    • Simvastatin        Social History     Social History   • Marital status:      Spouse name: N/A   • Number of children: N/A   • Years of education: N/A     Occupational History   • Not on file.     Social History Main Topics   • Smoking status: Former Smoker   • Smokeless tobacco: Never Used      Comment: Quit 15 years ago   • Alcohol use No      Comment: Social   • Drug use: No   • Sexual activity: No     Other Topics Concern   • Not on file     Social History Narrative       Family History   Problem Relation Age of Onset   • Heart failure Mother    • Alcohol abuse Father    • Diabetes Father        Review of Systems   Constitutional: Positive for fatigue. Negative for activity change, appetite change, diaphoresis, fever and unexpected weight change.   HENT: Negative for congestion, facial swelling, sore throat, trouble swallowing and voice change.    Eyes: Negative for photophobia and visual disturbance.   Respiratory: Negative for cough, choking and shortness of breath.    Cardiovascular: Positive for  chest pain. Negative for palpitations.   Gastrointestinal: Negative for abdominal distention, abdominal pain, anal bleeding, blood in stool, constipation, diarrhea, nausea, rectal pain and vomiting.        GERD   Endocrine: Negative for polyphagia.   Musculoskeletal: Negative for arthralgias, back pain, gait problem, joint swelling and neck pain.   Skin: Negative for color change, pallor and rash.   Allergic/Immunologic: Negative for food allergies.   Neurological: Positive for weakness. Negative for dizziness, syncope, speech difficulty, light-headedness and headaches.   Hematological: Does not bruise/bleed easily.   Psychiatric/Behavioral: Negative for agitation, confusion and sleep disturbance.       Vitals:    12/21/17 1300   BP: 146/78   Temp: 98.3 °F (36.8 °C)       Physical Exam   Constitutional: He is oriented to person, place, and time. He appears well-developed and well-nourished.   HENT:   Head: Normocephalic.   Mouth/Throat: Oropharynx is clear and moist.   Eyes: Conjunctivae and EOM are normal.   Neck: Normal range of motion.   Cardiovascular: Normal rate and regular rhythm.    Pulmonary/Chest: Breath sounds normal.   Abdominal: Soft. Bowel sounds are normal.   Musculoskeletal: Normal range of motion.   Neurological: He is alert and oriented to person, place, and time.   Skin: Skin is warm and dry.   Psychiatric: He has a normal mood and affect. His behavior is normal.       ASSESSMENT  #1 GERD: Recent exacerbation  #2 chest discomfort: Suspect reflux related with negative cardiac workup      PLAN  Patient to try pantoprazole with evening meal or at bedtime  He will call with progress report in 4-6 weeks, if still symptomatic we'll consider double strength regimen and possible endoscopic evaluation      ICD-10-CM ICD-9-CM   1. Gastroesophageal reflux disease, esophagitis presence not specified K21.9 530.81   2. Chest discomfort R07.89 786.59          Answers for HPI/ROS submitted by the patient on  12/20/2017   Neurologic complaint  altered mental status: No  clumsiness: No  focal sensory loss: No  focal weakness: No  loss of balance: No  memory loss: No  near-syncope: No  slurred speech: No  visual change: No  Chronicity: new  Onset: more than 1 month ago  Onset quality: insidiously  Progression since onset: gradually improving  auditory change: No  aura: No  bladder incontinence: Yes  bowel incontinence: No  vertigo: No  Treatments tried: aspirin, medication  Improvement on treatment: mild

## 2017-12-22 ENCOUNTER — OFFICE VISIT (OUTPATIENT)
Dept: CARDIAC REHAB | Facility: HOSPITAL | Age: 82
End: 2017-12-22

## 2017-12-22 DIAGNOSIS — I21.4 NON-STEMI (NON-ST ELEVATED MYOCARDIAL INFARCTION) (HCC): Primary | ICD-10-CM

## 2017-12-27 ENCOUNTER — OFFICE VISIT (OUTPATIENT)
Dept: CARDIAC REHAB | Facility: HOSPITAL | Age: 82
End: 2017-12-27

## 2017-12-27 DIAGNOSIS — I21.4 NON-STEMI (NON-ST ELEVATED MYOCARDIAL INFARCTION) (HCC): Primary | ICD-10-CM

## 2017-12-29 ENCOUNTER — OFFICE VISIT (OUTPATIENT)
Dept: CARDIAC REHAB | Facility: HOSPITAL | Age: 82
End: 2017-12-29

## 2017-12-29 DIAGNOSIS — I21.4 NON-STEMI (NON-ST ELEVATED MYOCARDIAL INFARCTION) (HCC): Primary | ICD-10-CM

## 2018-01-05 ENCOUNTER — TRANSCRIBE ORDERS (OUTPATIENT)
Dept: CARDIAC REHAB | Facility: HOSPITAL | Age: 83
End: 2018-01-05

## 2018-01-05 ENCOUNTER — OFFICE VISIT (OUTPATIENT)
Dept: CARDIAC REHAB | Facility: HOSPITAL | Age: 83
End: 2018-01-05

## 2018-01-05 DIAGNOSIS — I21.4 NON-STEMI (NON-ST ELEVATED MYOCARDIAL INFARCTION) (HCC): Primary | ICD-10-CM

## 2018-01-08 ENCOUNTER — OFFICE VISIT (OUTPATIENT)
Dept: CARDIAC REHAB | Facility: HOSPITAL | Age: 83
End: 2018-01-08

## 2018-01-08 DIAGNOSIS — I21.4 NON-STEMI (NON-ST ELEVATED MYOCARDIAL INFARCTION) (HCC): Primary | ICD-10-CM

## 2018-01-10 ENCOUNTER — OFFICE VISIT (OUTPATIENT)
Dept: CARDIAC REHAB | Facility: HOSPITAL | Age: 83
End: 2018-01-10

## 2018-01-10 DIAGNOSIS — I21.4 NON-STEMI (NON-ST ELEVATED MYOCARDIAL INFARCTION) (HCC): Primary | ICD-10-CM

## 2018-01-15 ENCOUNTER — OFFICE VISIT (OUTPATIENT)
Dept: CARDIAC REHAB | Facility: HOSPITAL | Age: 83
End: 2018-01-15

## 2018-01-15 DIAGNOSIS — I21.4 NON-STEMI (NON-ST ELEVATED MYOCARDIAL INFARCTION) (HCC): Primary | ICD-10-CM

## 2018-02-05 ENCOUNTER — OFFICE VISIT (OUTPATIENT)
Dept: CARDIAC REHAB | Facility: HOSPITAL | Age: 83
End: 2018-02-05

## 2018-02-05 DIAGNOSIS — I21.4 NON-STEMI (NON-ST ELEVATED MYOCARDIAL INFARCTION) (HCC): Primary | ICD-10-CM

## 2018-02-07 ENCOUNTER — OFFICE VISIT (OUTPATIENT)
Dept: CARDIAC REHAB | Facility: HOSPITAL | Age: 83
End: 2018-02-07

## 2018-02-07 DIAGNOSIS — I21.4 NON-STEMI (NON-ST ELEVATED MYOCARDIAL INFARCTION) (HCC): Primary | ICD-10-CM

## 2018-02-08 ENCOUNTER — OFFICE VISIT (OUTPATIENT)
Dept: INTERNAL MEDICINE | Facility: CLINIC | Age: 83
End: 2018-02-08

## 2018-02-08 VITALS
TEMPERATURE: 98 F | HEART RATE: 61 BPM | HEIGHT: 67 IN | WEIGHT: 180 LBS | OXYGEN SATURATION: 96 % | BODY MASS INDEX: 28.25 KG/M2 | SYSTOLIC BLOOD PRESSURE: 110 MMHG | DIASTOLIC BLOOD PRESSURE: 72 MMHG

## 2018-02-08 DIAGNOSIS — R68.89 FLU-LIKE SYMPTOMS: Primary | ICD-10-CM

## 2018-02-08 DIAGNOSIS — A08.4 VIRAL GASTROENTERITIS: ICD-10-CM

## 2018-02-08 LAB
EXPIRATION DATE: NORMAL
FLUAV AG NPH QL: NEGATIVE
FLUBV AG NPH QL: NEGATIVE
INTERNAL CONTROL: NORMAL
Lab: NORMAL

## 2018-02-08 PROCEDURE — 99213 OFFICE O/P EST LOW 20 MIN: CPT | Performed by: NURSE PRACTITIONER

## 2018-02-08 PROCEDURE — 87804 INFLUENZA ASSAY W/OPTIC: CPT | Performed by: NURSE PRACTITIONER

## 2018-02-08 NOTE — PATIENT INSTRUCTIONS
Viral Gastroenteritis, Adult  Viral gastroenteritis is also known as the stomach flu. This condition is caused by various viruses. These viruses can be passed from person to person very easily (are very contagious). This condition may affect your stomach, small intestine, and large intestine. It can cause sudden watery diarrhea, fever, and vomiting.  Diarrhea and vomiting can make you feel weak and cause you to become dehydrated. You may not be able to keep fluids down. Dehydration can make you tired and thirsty, cause you to have a dry mouth, and decrease how often you urinate. Older adults and people with other diseases or a weak immune system are at higher risk for dehydration.  It is important to replace the fluids that you lose from diarrhea and vomiting. If you become severely dehydrated, you may need to get fluids through an IV tube.  What are the causes?  Gastroenteritis is caused by various viruses, including rotavirus and norovirus. Norovirus is the most common cause in adults.  You can get sick by eating food, drinking water, or touching a surface contaminated with one of these viruses. You can also get sick from sharing utensils or other personal items with an infected person.  What increases the risk?  This condition is more likely to develop in people:  · Who have a weak defense system (immune system).  · Who live with one or more children who are younger than 2 years old.  · Who live in a nursing home.  · Who go on cruise ships.  What are the signs or symptoms?  Symptoms of this condition start suddenly 1-2 days after exposure to a virus. Symptoms may last a few days or as long as a week. The most common symptoms are watery diarrhea and vomiting. Other symptoms include:  · Fever.  · Headache.  · Fatigue.  · Pain in the abdomen.  · Chills.  · Weakness.  · Nausea.  · Muscle aches.  · Loss of appetite.  How is this diagnosed?  This condition is diagnosed with a medical history and physical exam. You may  also have a stool test to check for viruses or other infections.  How is this treated?  This condition typically goes away on its own. The focus of treatment is to restore lost fluids (rehydration). Your health care provider may recommend that you take an oral rehydration solution (ORS) to replace important salts and minerals (electrolytes) in your body. Severe cases of this condition may require giving fluids through an IV tube.  Treatment may also include medicine to help with your symptoms.  Follow these instructions at home:  Follow instructions from your health care provider about how to care for yourself at home.  Eating and drinking  Follow these recommendations as told by your health care provider:  · Take an ORS. This is a drink that is sold at pharmacies and retail stores.  · Drink clear fluids in small amounts as you are able. Clear fluids include water, ice chips, diluted fruit juice, and low-calorie sports drinks.  · Eat bland, easy-to-digest foods in small amounts as you are able. These foods include bananas, applesauce, rice, lean meats, toast, and crackers.  · Avoid fluids that contain a lot of sugar or caffeine, such as energy drinks, sports drinks, and soda.  · Avoid alcohol.  · Avoid spicy or fatty foods.  General instructions  · Drink enough fluid to keep your urine clear or pale yellow.  · Wash your hands often. If soap and water are not available, use hand .  · Make sure that all people in your household wash their hands well and often.  · Take over-the-counter and prescription medicines only as told by your health care provider.  · Rest at home while you recover.  · Watch your condition for any changes.  · Take a warm bath to relieve any burning or pain from frequent diarrhea episodes.  · Keep all follow-up visits as told by your health care provider. This is important.  Contact a health care provider if:  · You cannot keep fluids down.  · Your symptoms get worse.  · You have new  symptoms.  · You feel light-headed or dizzy.  · You have muscle cramps.  Get help right away if:  · You have chest pain.  · You feel extremely weak or you faint.  · You see blood in your vomit.  · Your vomit looks like coffee grounds.  · You have bloody or black stools or stools that look like tar.  · You have a severe headache, a stiff neck, or both.  · You have a rash.  · You have severe pain, cramping, or bloating in your abdomen.  · You have trouble breathing or you are breathing very quickly.  · Your heart is beating very quickly.  · Your skin feels cold and clammy.  · You feel confused.  · You have pain when you urinate.  · You have signs of dehydration, such as:  ¨ Dark urine, very little urine, or no urine.  ¨ Cracked lips.  ¨ Dry mouth.  ¨ Sunken eyes.  ¨ Sleepiness.  ¨ Weakness.  This information is not intended to replace advice given to you by your health care provider. Make sure you discuss any questions you have with your health care provider.  Document Released: 12/18/2006 Document Revised: 05/31/2017 Document Reviewed: 08/23/2016  [employer] Patient Education © 2017 [employer]

## 2018-02-08 NOTE — PROGRESS NOTES
Subjective   Vicente Delgado is a 89 y.o. male.     HPI Comments: He returned from [place] on 2/2/2018. No known ID contact.     Flu Symptoms   This is a new problem. The current episode started in the past 7 days. The problem has been gradually worsening. Associated symptoms include chills, coughing (very little productive ), fatigue, myalgias, nausea, a sore throat and vomiting (3 episodes, resolved now  (after eating spaghetti and meatballs-he forced 2 of the episodes)). Pertinent negatives include no abdominal pain, chest pain, congestion, fever (? ) or headaches. Associated symptoms comments: Belching . Treatments tried: vitamin c, aleve  The treatment provided mild relief.        The following portions of the patient's history were reviewed and updated as appropriate: allergies, current medications and problem list.    Review of Systems   Constitutional: Positive for chills and fatigue. Negative for appetite change and fever (? ).   HENT: Positive for postnasal drip (very little ), rhinorrhea (very little ) and sore throat. Negative for congestion, ear discharge, ear pain, facial swelling, hearing loss, sinus pressure, sneezing and tinnitus.    Respiratory: Positive for cough (very little productive ). Negative for chest tightness, shortness of breath and wheezing.    Cardiovascular: Negative for chest pain, palpitations and leg swelling.   Gastrointestinal: Positive for nausea and vomiting (3 episodes, resolved now  (after eating spaghetti and meatballs-he forced 2 of the episodes)). Negative for abdominal pain, blood in stool and diarrhea.        Belching    Musculoskeletal: Positive for myalgias.   Neurological: Positive for light-headedness. Negative for headaches.   Hematological: Negative for adenopathy.       Objective   Physical Exam   Constitutional: He appears well-developed and well-nourished. He is cooperative. He does not have a sickly appearance. He does not appear ill.   HENT:   Head:  Normocephalic.   Right Ear: Hearing and external ear normal. No drainage, swelling or tenderness. No mastoid tenderness. Tympanic membrane is bulging. Tympanic membrane is not injected, not scarred and not erythematous. Tympanic membrane mobility is normal. No middle ear effusion. No decreased hearing is noted.   Left Ear: Hearing and external ear normal. No drainage, swelling or tenderness. No mastoid tenderness. Tympanic membrane is bulging. Tympanic membrane is not injected, not scarred and not erythematous. Tympanic membrane mobility is normal.  No middle ear effusion. No decreased hearing is noted.   Nose: Nose normal. No mucosal edema, rhinorrhea, sinus tenderness or nasal deformity. Right sinus exhibits no maxillary sinus tenderness and no frontal sinus tenderness. Left sinus exhibits no maxillary sinus tenderness and no frontal sinus tenderness.   Mouth/Throat: Mucous membranes are normal. Normal dentition. Posterior oropharyngeal erythema (mild ) present. No tonsillar exudate.   Eyes: Conjunctivae, EOM and lids are normal. Pupils are equal, round, and reactive to light. Right eye exhibits no discharge and no exudate. Left eye exhibits no discharge and no exudate.   Neck: Trachea normal and normal range of motion. No edema present. No thyroid mass and no thyromegaly present.   Cardiovascular: Regular rhythm, normal heart sounds and normal pulses.    No murmur heard.  Pulmonary/Chest: Breath sounds normal. No respiratory distress. He has no decreased breath sounds. He has no wheezes. He has no rhonchi. He has no rales.   Lymphadenopathy:        Head (right side): No submental, no submandibular, no tonsillar, no preauricular, no posterior auricular and no occipital adenopathy present.        Head (left side): No submental, no submandibular, no tonsillar, no preauricular, no posterior auricular and no occipital adenopathy present.     He has no cervical adenopathy.   Neurological: He is alert. No cranial nerve  deficit. Gait normal.   Skin: Skin is warm, dry and intact. No cyanosis. Nails show no clubbing.       Assessment/Plan   Vicente was seen today for flu symptoms.    Diagnoses and all orders for this visit:    Flu-like symptoms  -     POCT Influenza A/B    Viral gastroenteritis  Comments:  drink plenty of water; bland diet advance as tolerated    Negative flu. He will return if worsening of symptoms. He is accompanied by his daughter.

## 2018-02-12 DIAGNOSIS — E78.2 MIXED HYPERLIPIDEMIA: ICD-10-CM

## 2018-02-12 DIAGNOSIS — I10 ESSENTIAL HYPERTENSION: ICD-10-CM

## 2018-02-12 DIAGNOSIS — K21.00 GASTROESOPHAGEAL REFLUX DISEASE WITH ESOPHAGITIS: ICD-10-CM

## 2018-02-12 DIAGNOSIS — E78.5 HYPERLIPEMIA: ICD-10-CM

## 2018-02-13 ENCOUNTER — APPOINTMENT (OUTPATIENT)
Dept: WOMENS IMAGING | Facility: HOSPITAL | Age: 83
End: 2018-02-13

## 2018-02-13 ENCOUNTER — OFFICE VISIT (OUTPATIENT)
Dept: INTERNAL MEDICINE | Facility: CLINIC | Age: 83
End: 2018-02-13

## 2018-02-13 VITALS
SYSTOLIC BLOOD PRESSURE: 128 MMHG | OXYGEN SATURATION: 96 % | DIASTOLIC BLOOD PRESSURE: 86 MMHG | HEART RATE: 63 BPM | RESPIRATION RATE: 16 BRPM | WEIGHT: 173 LBS | TEMPERATURE: 97.7 F | BODY MASS INDEX: 27.1 KG/M2

## 2018-02-13 DIAGNOSIS — R05.9 COUGH: Primary | ICD-10-CM

## 2018-02-13 DIAGNOSIS — R04.2 HEMOPTYSIS: ICD-10-CM

## 2018-02-13 DIAGNOSIS — K21.00 GASTROESOPHAGEAL REFLUX DISEASE WITH ESOPHAGITIS: ICD-10-CM

## 2018-02-13 DIAGNOSIS — J40 BRONCHITIS: ICD-10-CM

## 2018-02-13 PROCEDURE — 71046 X-RAY EXAM CHEST 2 VIEWS: CPT | Performed by: RADIOLOGY

## 2018-02-13 PROCEDURE — 99214 OFFICE O/P EST MOD 30 MIN: CPT | Performed by: NURSE PRACTITIONER

## 2018-02-13 PROCEDURE — 71046 X-RAY EXAM CHEST 2 VIEWS: CPT | Performed by: NURSE PRACTITIONER

## 2018-02-13 RX ORDER — PANTOPRAZOLE SODIUM 40 MG/1
TABLET, DELAYED RELEASE ORAL
Qty: 90 TABLET | Refills: 0 | Status: SHIPPED | OUTPATIENT
Start: 2018-02-13 | End: 2018-05-18 | Stop reason: SDUPTHER

## 2018-02-13 RX ORDER — EZETIMIBE 10 MG/1
TABLET ORAL
Qty: 90 TABLET | Refills: 0 | Status: SHIPPED | OUTPATIENT
Start: 2018-02-13 | End: 2018-05-18 | Stop reason: SDUPTHER

## 2018-02-13 RX ORDER — AMLODIPINE BESYLATE 5 MG/1
TABLET ORAL
Qty: 90 TABLET | Refills: 0 | Status: SHIPPED | OUTPATIENT
Start: 2018-02-13 | End: 2018-05-18 | Stop reason: SDUPTHER

## 2018-02-13 RX ORDER — ATENOLOL 25 MG/1
TABLET ORAL
Qty: 180 TABLET | Refills: 0 | Status: SHIPPED | OUTPATIENT
Start: 2018-02-13 | End: 2018-05-18 | Stop reason: SDUPTHER

## 2018-02-13 RX ORDER — ROSUVASTATIN CALCIUM 10 MG/1
TABLET, COATED ORAL
Qty: 90 TABLET | Refills: 0 | Status: SHIPPED | OUTPATIENT
Start: 2018-02-13 | End: 2018-05-18 | Stop reason: SDUPTHER

## 2018-02-13 RX ORDER — AZITHROMYCIN 250 MG/1
250 TABLET, FILM COATED ORAL DAILY
Qty: 6 TABLET | Refills: 0 | Status: SHIPPED | OUTPATIENT
Start: 2018-02-13 | End: 2018-04-18

## 2018-02-13 RX ORDER — GUAIFENESIN 600 MG/1
600 TABLET, EXTENDED RELEASE ORAL 2 TIMES DAILY
Qty: 14 TABLET | Refills: 0
Start: 2018-02-13 | End: 2018-02-20

## 2018-02-13 NOTE — PROGRESS NOTES
"Subjective   Vicente Delgado is a 89 y.o. male.     HPI Comments: He was seen in office on 2/8/2018 for gastro symptoms. He reports this morning he was coughing a lot and then coughed up \"bright red\" blood in sputum. He is going to see GI and have endoscopy ( Dr Stoner)./    URI    This is a new problem. The current episode started in the past 7 days. The problem has been gradually worsening. Maximum temperature: low grade fever  Associated symptoms include congestion, coughing, nausea (resolved ), vomiting (resolved ) and wheezing. Pertinent negatives include no abdominal pain, chest pain, ear pain, headaches, rhinorrhea, sinus pain, sneezing or sore throat. Treatments tried: aleve  The treatment provided mild relief.        The following portions of the patient's history were reviewed and updated as appropriate: allergies, current medications and problem list.    Review of Systems   HENT: Positive for congestion and postnasal drip. Negative for ear pain, rhinorrhea, sinus pain, sinus pressure, sneezing, sore throat and voice change.    Respiratory: Positive for cough and wheezing. Negative for chest tightness and shortness of breath.    Cardiovascular: Negative for chest pain.   Gastrointestinal: Positive for nausea (resolved ) and vomiting (resolved ). Negative for abdominal pain.        Reflux    Neurological: Negative for headaches.       Objective   Physical Exam   Constitutional: He appears well-developed and well-nourished. He is cooperative. He does not have a sickly appearance. He does not appear ill.   HENT:   Head: Normocephalic.   Right Ear: Tympanic membrane and external ear normal. No drainage, swelling or tenderness. No mastoid tenderness. Tympanic membrane is not injected, not scarred, not erythematous and not bulging. Tympanic membrane mobility is normal. No middle ear effusion. Decreased hearing is noted.   Left Ear: Tympanic membrane and external ear normal. No drainage, swelling or " tenderness. No mastoid tenderness. Tympanic membrane is not injected, not scarred, not erythematous and not bulging. Tympanic membrane mobility is normal.  No middle ear effusion. Decreased hearing is noted.   Nose: Nose normal. No mucosal edema, rhinorrhea, sinus tenderness or nasal deformity. Right sinus exhibits no maxillary sinus tenderness and no frontal sinus tenderness. Left sinus exhibits no maxillary sinus tenderness and no frontal sinus tenderness.   Mouth/Throat: Mucous membranes are normal. Normal dentition. Posterior oropharyngeal erythema (mild ) present. No tonsillar exudate.   Eyes: Conjunctivae and lids are normal. Pupils are equal, round, and reactive to light. Right eye exhibits no discharge and no exudate. Left eye exhibits no discharge and no exudate.   Neck: Trachea normal and normal range of motion. Carotid bruit is not present. No edema present. No thyroid mass and no thyromegaly present.   Cardiovascular: Regular rhythm, normal heart sounds and normal pulses.    No murmur heard.  Pulmonary/Chest: Breath sounds normal. No respiratory distress. He has no decreased breath sounds. He has no wheezes. He has no rhonchi. He has no rales.   Moist cough    Lymphadenopathy:        Head (right side): No submental, no submandibular, no tonsillar, no preauricular, no posterior auricular and no occipital adenopathy present.        Head (left side): No submental, no submandibular, no tonsillar, no preauricular, no posterior auricular and no occipital adenopathy present.     He has no cervical adenopathy.   Neurological: He is alert.   Skin: Skin is warm, dry and intact. No cyanosis. Nails show no clubbing.       Assessment/Plan   Vicente was seen today for uri and coughing up blood.    Diagnoses and all orders for this visit:    Cough  -     XR Chest 2 View  -     HYDROcod Polst-CPM Polst ER (TUSSIONEX PENNKINETIC) 10-8 MG/5ML ER suspension; Take 5 mL by mouth Every 12 (Twelve) Hours As Needed for  Cough.    Hemoptysis  Comments:  stop aleve, will monitor     Bronchitis  Comments:  drink plenty of water   Orders:  -     guaiFENesin (MUCINEX) 600 MG 12 hr tablet; Take 1 tablet by mouth 2 (Two) Times a Day for 7 days.  -     azithromycin (ZITHROMAX Z-GARDENIA) 250 MG tablet; Take 1 tablet by mouth Daily. Take 2 tablets the first day, then 1 tablet daily for 4 days.    Gastroesophageal reflux disease with esophagitis  Comments:  due to see Dr Stoner       Discussed case with Dr Lang. CXR with no acute findings. Compared with previous film 5/2011. Sent for final review.   He is due to see Dr Stoner for reflux.    Controlled substance agreement form obtained.   NEERAJ query complete. Treatment plan to include limited course of prescribed controlled substance. Risks including addiction, benefits, and alternatives presented to patient.

## 2018-02-15 ENCOUNTER — OFFICE VISIT (OUTPATIENT)
Dept: GASTROENTEROLOGY | Facility: CLINIC | Age: 83
End: 2018-02-15

## 2018-02-15 VITALS
TEMPERATURE: 98.5 F | HEIGHT: 67 IN | DIASTOLIC BLOOD PRESSURE: 76 MMHG | BODY MASS INDEX: 27.15 KG/M2 | WEIGHT: 173 LBS | SYSTOLIC BLOOD PRESSURE: 136 MMHG

## 2018-02-15 DIAGNOSIS — R10.13 EPIGASTRIC PAIN: ICD-10-CM

## 2018-02-15 DIAGNOSIS — K21.9 GASTROESOPHAGEAL REFLUX DISEASE, ESOPHAGITIS PRESENCE NOT SPECIFIED: Primary | ICD-10-CM

## 2018-02-15 PROCEDURE — 99213 OFFICE O/P EST LOW 20 MIN: CPT | Performed by: INTERNAL MEDICINE

## 2018-02-15 NOTE — PROGRESS NOTES
Chief Complaint   Patient presents with   • Abdominal Pain     Epigastric pain (GERD)        Vicente Delgado is a  89 y.o. male here for a follow up visit for GERD, epigastric pain    HPI this 89-year-old white male patient of Dr. Luis Armando Lang returns in follow-up since his initial visit in December 2017.  At that time.  Discussed issues with reflux and adjusted his dose of pantoprazole to be taken at bedtime as well as elevated the head of his bed.  He noted significant improvement of his reflux symptoms with dose adjustments, however, he had traveled to [place] and upon his return flight in January had acquired a virus that precipitated gastroenteritis.  This was associated with nausea and vomiting as well as fever.  He had been treated with an antibiotic course as well as an expectorant and codeine to address his cough.  He showing gradual improvement of the symptoms and I advised she complete these medications to see if his reflux again will be controlled with the aforementioned adjustments.  He is to call with a progress report in 2 weeks.  If his symptoms do not jesus we will consider increasing the pantoprazole to twice daily and if that is not successful then I would offer upper endoscopic evaluation.    Past Medical History:   Diagnosis Date   • Allergic rhinitis    • Anxiety    • Arthritis    • CAD (coronary artery disease)    • Cancer    • Depression    • Diverticulosis    • Esophagitis    • Gastritis    • GERD (gastroesophageal reflux disease)    • Heart disease    • History of anxiety    • History of prostate cancer 06/1990   • Hyperlipidemia    • Hypertension    • Insomnia    • Lupus    • TAM (nonalcoholic steatohepatitis)    • Sciatica of right side        Current Outpatient Prescriptions   Medication Sig Dispense Refill   • amLODIPine (NORVASC) 5 MG tablet TAKE 1 TABLET BY MOUTH DAILY 90 tablet 0   • aspirin 81 MG chewable tablet Chew daily.     • atenolol (TENORMIN) 25 MG tablet TAKE 1 TABLET BY  MOUTH TWICE DAILY 180 tablet 0   • azithromycin (ZITHROMAX Z-GARDENIA) 250 MG tablet Take 1 tablet by mouth Daily. Take 2 tablets the first day, then 1 tablet daily for 4 days. 6 tablet 0   • ezetimibe (ZETIA) 10 MG tablet TAKE 1 TABLET BY MOUTH DAILY 90 tablet 0   • guaiFENesin (MUCINEX) 600 MG 12 hr tablet Take 1 tablet by mouth 2 (Two) Times a Day for 7 days. 14 tablet 0   • HYDROcod Polst-CPM Polst ER (TUSSIONEX PENNKINETIC) 10-8 MG/5ML ER suspension Take 5 mL by mouth Every 12 (Twelve) Hours As Needed for Cough. 50 mL 0   • pantoprazole (PROTONIX) 40 MG EC tablet TAKE 1 TABLET BY MOUTH DAILY 90 tablet 0   • rosuvastatin (CRESTOR) 10 MG tablet TAKE 1 TABLET BY MOUTH EVERY NIGHT 90 tablet 0     No current facility-administered medications for this visit.        PRN Meds:.    Allergies   Allergen Reactions   • Lidocaine      Reaction to novacaine   • Lovastatin    • Pravastatin    • Procaine    • Simvastatin        Social History     Social History   • Marital status:      Spouse name: N/A   • Number of children: N/A   • Years of education: N/A     Occupational History   • Not on file.     Social History Main Topics   • Smoking status: Former Smoker   • Smokeless tobacco: Never Used      Comment: Quit 15 years ago   • Alcohol use No      Comment: Social   • Drug use: No   • Sexual activity: No     Other Topics Concern   • Not on file     Social History Narrative       Family History   Problem Relation Age of Onset   • Heart failure Mother    • Alcohol abuse Father    • Diabetes Father        Review of Systems   Constitutional: Negative for activity change, appetite change, fatigue and unexpected weight change.   HENT: Negative for congestion, facial swelling, sore throat, trouble swallowing and voice change.    Eyes: Negative for photophobia and visual disturbance.   Respiratory: Negative for cough and choking.    Cardiovascular: Negative for chest pain.   Gastrointestinal: Negative for abdominal distention,  abdominal pain, anal bleeding, blood in stool, constipation, diarrhea, nausea, rectal pain and vomiting.        GERD   Endocrine: Negative for polyphagia.   Musculoskeletal: Negative for arthralgias, gait problem and joint swelling.        Swelling in his hands   Skin: Negative for color change, pallor and rash.   Allergic/Immunologic: Negative for food allergies.   Neurological: Negative for speech difficulty and headaches.   Hematological: Does not bruise/bleed easily.   Psychiatric/Behavioral: Negative for agitation, confusion and sleep disturbance.       Vitals:    02/15/18 1636   BP: 136/76   Temp: 98.5 °F (36.9 °C)       Physical Exam   Constitutional: He is oriented to person, place, and time. He appears well-developed and well-nourished.   HENT:   Head: Normocephalic.   Mouth/Throat: Oropharynx is clear and moist.   Eyes: Conjunctivae and EOM are normal.   Neck: Normal range of motion.   Cardiovascular: Normal rate and regular rhythm.    Pulmonary/Chest: Breath sounds normal.   Abdominal: Soft. Bowel sounds are normal.   Musculoskeletal: Normal range of motion.   Neurological: He is alert and oriented to person, place, and time.   Skin: Skin is warm and dry.   Psychiatric: He has a normal mood and affect. His behavior is normal.       ASSESSMENT   #1 GERD: Better with PPI and antireflux measures      PLAN  Patient to call with a progress report in 2 weeks.  If symptoms persist would consider double strength PPI      ICD-10-CM ICD-9-CM   1. Gastroesophageal reflux disease, esophagitis presence not specified K21.9 530.81   2. Epigastric pain R10.13 789.06

## 2018-02-16 ENCOUNTER — OFFICE VISIT (OUTPATIENT)
Dept: CARDIAC REHAB | Facility: HOSPITAL | Age: 83
End: 2018-02-16

## 2018-02-16 DIAGNOSIS — I21.4 NON-STEMI (NON-ST ELEVATED MYOCARDIAL INFARCTION) (HCC): Primary | ICD-10-CM

## 2018-02-19 ENCOUNTER — OFFICE VISIT (OUTPATIENT)
Dept: CARDIAC REHAB | Facility: HOSPITAL | Age: 83
End: 2018-02-19

## 2018-02-19 DIAGNOSIS — I21.4 NON-STEMI (NON-ST ELEVATED MYOCARDIAL INFARCTION) (HCC): Primary | ICD-10-CM

## 2018-02-21 ENCOUNTER — OFFICE VISIT (OUTPATIENT)
Dept: CARDIAC REHAB | Facility: HOSPITAL | Age: 83
End: 2018-02-21

## 2018-02-21 DIAGNOSIS — I21.4 NON-STEMI (NON-ST ELEVATED MYOCARDIAL INFARCTION) (HCC): Primary | ICD-10-CM

## 2018-02-23 ENCOUNTER — OFFICE VISIT (OUTPATIENT)
Dept: CARDIAC REHAB | Facility: HOSPITAL | Age: 83
End: 2018-02-23

## 2018-02-23 DIAGNOSIS — I21.4 NON-STEMI (NON-ST ELEVATED MYOCARDIAL INFARCTION) (HCC): Primary | ICD-10-CM

## 2018-02-26 ENCOUNTER — OFFICE VISIT (OUTPATIENT)
Dept: CARDIAC REHAB | Facility: HOSPITAL | Age: 83
End: 2018-02-26

## 2018-02-26 DIAGNOSIS — I21.4 NON-STEMI (NON-ST ELEVATED MYOCARDIAL INFARCTION) (HCC): Primary | ICD-10-CM

## 2018-02-28 ENCOUNTER — OFFICE VISIT (OUTPATIENT)
Dept: CARDIAC REHAB | Facility: HOSPITAL | Age: 83
End: 2018-02-28

## 2018-02-28 DIAGNOSIS — I21.4 NON-STEMI (NON-ST ELEVATED MYOCARDIAL INFARCTION) (HCC): Primary | ICD-10-CM

## 2018-03-05 ENCOUNTER — APPOINTMENT (OUTPATIENT)
Dept: GENERAL RADIOLOGY | Facility: HOSPITAL | Age: 83
End: 2018-03-05

## 2018-03-05 ENCOUNTER — TELEPHONE (OUTPATIENT)
Dept: GASTROENTEROLOGY | Facility: CLINIC | Age: 83
End: 2018-03-05

## 2018-03-05 ENCOUNTER — HOSPITAL ENCOUNTER (EMERGENCY)
Facility: HOSPITAL | Age: 83
Discharge: HOME | End: 2018-03-05
Attending: EMERGENCY MEDICINE | Admitting: EMERGENCY MEDICINE

## 2018-03-05 VITALS
BODY MASS INDEX: 25.76 KG/M2 | HEIGHT: 68 IN | DIASTOLIC BLOOD PRESSURE: 70 MMHG | RESPIRATION RATE: 18 BRPM | HEART RATE: 59 BPM | OXYGEN SATURATION: 94 % | WEIGHT: 170 LBS | TEMPERATURE: 97.6 F | SYSTOLIC BLOOD PRESSURE: 120 MMHG

## 2018-03-05 DIAGNOSIS — Z87.19 HISTORY OF GASTROESOPHAGEAL REFLUX (GERD): ICD-10-CM

## 2018-03-05 DIAGNOSIS — R07.81 PLEURITIC CHEST PAIN: Primary | ICD-10-CM

## 2018-03-05 LAB
ALBUMIN SERPL-MCNC: 4.5 G/DL (ref 3.5–5.2)
ALBUMIN/GLOB SERPL: 1.4 G/DL
ALP SERPL-CCNC: 51 U/L (ref 39–117)
ALT SERPL W P-5'-P-CCNC: 21 U/L (ref 1–41)
ANION GAP SERPL CALCULATED.3IONS-SCNC: 10.5 MMOL/L
AST SERPL-CCNC: 25 U/L (ref 1–40)
BASOPHILS # BLD AUTO: 0.01 10*3/MM3 (ref 0–0.2)
BASOPHILS NFR BLD AUTO: 0.1 % (ref 0–1.5)
BILIRUB SERPL-MCNC: 0.8 MG/DL (ref 0.1–1.2)
BUN BLD-MCNC: 21 MG/DL (ref 8–23)
BUN/CREAT SERPL: 25.6 (ref 7–25)
CALCIUM SPEC-SCNC: 9.6 MG/DL (ref 8.2–9.6)
CHLORIDE SERPL-SCNC: 102 MMOL/L (ref 98–107)
CO2 SERPL-SCNC: 28.5 MMOL/L (ref 22–29)
CREAT BLD-MCNC: 0.82 MG/DL (ref 0.76–1.27)
D DIMER PPP FEU-MCNC: 0.31 MCGFEU/ML (ref 0–0.49)
DEPRECATED RDW RBC AUTO: 44.4 FL (ref 37–54)
EOSINOPHIL # BLD AUTO: 0.05 10*3/MM3 (ref 0–0.7)
EOSINOPHIL NFR BLD AUTO: 0.6 % (ref 0.3–6.2)
ERYTHROCYTE [DISTWIDTH] IN BLOOD BY AUTOMATED COUNT: 13 % (ref 11.5–14.5)
GFR SERPL CREATININE-BSD FRML MDRD: 88 ML/MIN/1.73
GLOBULIN UR ELPH-MCNC: 3.2 GM/DL
GLUCOSE BLD-MCNC: 90 MG/DL (ref 65–99)
HCT VFR BLD AUTO: 44 % (ref 40.4–52.2)
HGB BLD-MCNC: 14.6 G/DL (ref 13.7–17.6)
HOLD SPECIMEN: NORMAL
HOLD SPECIMEN: NORMAL
IMM GRANULOCYTES # BLD: 0.02 10*3/MM3 (ref 0–0.03)
IMM GRANULOCYTES NFR BLD: 0.2 % (ref 0–0.5)
LYMPHOCYTES # BLD AUTO: 1.61 10*3/MM3 (ref 0.9–4.8)
LYMPHOCYTES NFR BLD AUTO: 18.7 % (ref 19.6–45.3)
MCH RBC QN AUTO: 31.1 PG (ref 27–32.7)
MCHC RBC AUTO-ENTMCNC: 33.2 G/DL (ref 32.6–36.4)
MCV RBC AUTO: 93.6 FL (ref 79.8–96.2)
MONOCYTES # BLD AUTO: 1.62 10*3/MM3 (ref 0.2–1.2)
MONOCYTES NFR BLD AUTO: 18.8 % (ref 5–12)
NEUTROPHILS # BLD AUTO: 5.29 10*3/MM3 (ref 1.9–8.1)
NEUTROPHILS NFR BLD AUTO: 61.6 % (ref 42.7–76)
NT-PROBNP SERPL-MCNC: 126.2 PG/ML (ref 0–1800)
PLATELET # BLD AUTO: 210 10*3/MM3 (ref 140–500)
PMV BLD AUTO: 10.3 FL (ref 6–12)
POTASSIUM BLD-SCNC: 4.1 MMOL/L (ref 3.5–5.2)
PROT SERPL-MCNC: 7.7 G/DL (ref 6–8.5)
RBC # BLD AUTO: 4.7 10*6/MM3 (ref 4.6–6)
SODIUM BLD-SCNC: 141 MMOL/L (ref 136–145)
TROPONIN T SERPL-MCNC: <0.01 NG/ML (ref 0–0.03)
TROPONIN T SERPL-MCNC: <0.01 NG/ML (ref 0–0.03)
WBC NRBC COR # BLD: 8.6 10*3/MM3 (ref 4.5–10.7)
WHOLE BLOOD HOLD SPECIMEN: NORMAL
WHOLE BLOOD HOLD SPECIMEN: NORMAL

## 2018-03-05 PROCEDURE — 83880 ASSAY OF NATRIURETIC PEPTIDE: CPT | Performed by: PHYSICIAN ASSISTANT

## 2018-03-05 PROCEDURE — 93010 ELECTROCARDIOGRAM REPORT: CPT | Performed by: INTERNAL MEDICINE

## 2018-03-05 PROCEDURE — 25010000002 KETOROLAC TROMETHAMINE PER 15 MG: Performed by: PHYSICIAN ASSISTANT

## 2018-03-05 PROCEDURE — 85025 COMPLETE CBC W/AUTO DIFF WBC: CPT | Performed by: EMERGENCY MEDICINE

## 2018-03-05 PROCEDURE — 93005 ELECTROCARDIOGRAM TRACING: CPT | Performed by: EMERGENCY MEDICINE

## 2018-03-05 PROCEDURE — 84484 ASSAY OF TROPONIN QUANT: CPT | Performed by: PHYSICIAN ASSISTANT

## 2018-03-05 PROCEDURE — 96374 THER/PROPH/DIAG INJ IV PUSH: CPT

## 2018-03-05 PROCEDURE — 71046 X-RAY EXAM CHEST 2 VIEWS: CPT

## 2018-03-05 PROCEDURE — 99285 EMERGENCY DEPT VISIT HI MDM: CPT

## 2018-03-05 PROCEDURE — 85379 FIBRIN DEGRADATION QUANT: CPT | Performed by: PHYSICIAN ASSISTANT

## 2018-03-05 PROCEDURE — 80053 COMPREHEN METABOLIC PANEL: CPT | Performed by: EMERGENCY MEDICINE

## 2018-03-05 PROCEDURE — 84484 ASSAY OF TROPONIN QUANT: CPT | Performed by: EMERGENCY MEDICINE

## 2018-03-05 RX ORDER — ASPIRIN 325 MG
325 TABLET ORAL ONCE
Status: DISCONTINUED | OUTPATIENT
Start: 2018-03-05 | End: 2018-03-05

## 2018-03-05 RX ORDER — DICLOFENAC POTASSIUM 50 MG/1
50 TABLET, FILM COATED ORAL 3 TIMES DAILY
Qty: 21 TABLET | Refills: 0 | Status: SHIPPED | OUTPATIENT
Start: 2018-03-05 | End: 2018-04-18

## 2018-03-05 RX ORDER — DOCUSATE SODIUM 100 MG/1
100 CAPSULE, LIQUID FILLED ORAL ONCE
Status: COMPLETED | OUTPATIENT
Start: 2018-03-05 | End: 2018-03-05

## 2018-03-05 RX ORDER — KETOROLAC TROMETHAMINE 30 MG/ML
15 INJECTION, SOLUTION INTRAMUSCULAR; INTRAVENOUS ONCE
Status: COMPLETED | OUTPATIENT
Start: 2018-03-05 | End: 2018-03-05

## 2018-03-05 RX ORDER — SODIUM CHLORIDE 0.9 % (FLUSH) 0.9 %
10 SYRINGE (ML) INJECTION AS NEEDED
Status: DISCONTINUED | OUTPATIENT
Start: 2018-03-05 | End: 2018-03-05 | Stop reason: HOSPADM

## 2018-03-05 RX ADMIN — DOCUSATE SODIUM 100 MG: 100 CAPSULE, LIQUID FILLED ORAL at 09:41

## 2018-03-05 RX ADMIN — KETOROLAC TROMETHAMINE 15 MG: 30 INJECTION, SOLUTION INTRAMUSCULAR at 08:36

## 2018-03-05 NOTE — ED PROVIDER NOTES
Pt presents to the ED with hx of GERD c/o 'stabbing' midsternal CP that radiates intermittently to his back muscles which began at 0100.  On exam, pt is in no distress.  Pt's heart is RRR, his lungs are CTAB, his abd is soft and nontender, and he has no calf tenderness.  Pt states the CP is still present upon breathing.    EKG           EKG time: 0616  Rhythm/Rate: SR, 60  P waves and RI: normal, 1st degree AV block  QRS, axis: L axis deviation, anterior Q waves  ST and T waves: nonspecific changes     Interpreted Contemporaneously by me, independently viewed  Unchanged compared to prior 11/11/12.    I reviewed the pt's workup including CXR showing NAD and currently unremarkable lab work.  I agree with the plan to repeat Troponin level.       Attestation:  I supervised care provided by the midlevel provider.    We have discussed this patient's history, physical exam, and treatment plan.   I have reviewed the note and personally saw and examined the patient and agree with the plan of care.    Documentation assistance provided by iglesia Lucio for Dr. Torres. Information recorded by the eligioe was done at my direction and has been verified and validated by me.     Huma Lucio  03/05/18 9192       Nabor Torres MD  03/05/18 6058

## 2018-03-05 NOTE — TELEPHONE ENCOUNTER
----- Message from Paty Kingston sent at 3/5/2018 10:05 AM EST -----  Patient came by, he went to the ER this morning thinking he was having a heart attack. He was not. He is just wanting to update [facility] on everything. I said we would call if we have any questions.       Thanks

## 2018-03-05 NOTE — TELEPHONE ENCOUNTER
Called pt and pt reports that he had chest pain with stabbing that would not leave.  He states he was soa.  This sent him to the ER.  He states they checked his heart out and it was fine.  He reports they told him he had pleuritic type pain. He states they gave him cataflam.  Advised pt would update Dr Stoner.

## 2018-03-05 NOTE — ED PROVIDER NOTES
"EMERGENCY DEPARTMENT ENCOUNTER    CHIEF COMPLAINT  Chief Complaint: CP  History given by: patient and family  History limited by: nothing  Room Number: 13/13  PMD: Luis Armando Lang MD      HPI:  Pt is a 89 y.o. male who presents with constant midsternal CP that radiates intermittently towards the R side of his chest and to his back around 0100 this morning. Pt states it \"feels like I have to burp but I can't\". Pt reports pain is exacerbated by taking deep breaths, alleviated by laying back, and ranks it a 6/10. Pt had bronchitis last month and states he went on a 4 day retreat this past weekend where he felt like he exerted himself. Pt took 325mg ASA on his way to ER.    Duration: since 0100 this morning.  Timing: constant  Location: midsternal  Radiation: R side of chest and his back  Quality: \"it feels like I have to burp but I can't\"  Intensity/Severity: moderate  Progression: unchanged  Associated Symptoms: none stated  Aggravating Factors: taking deep breaths  Alleviating Factors: laying back  Previous Episodes: Pt does not report any previous episodes.  Treatment before arrival: Pt took 325mg ASA on his way to ER.    MEDICAL RECORD REVIEW  Pt has hx of HTN, CAD, high cholesterol, and lupus. He is currently in cardiac rehab for previous NSTEMI. Pt had echo completed on 1/5/17 that showed EF of 67%. He then had a stress test 12/4/17 that showed occasional PVCs and an EF of 59%.    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/21/2016   • CAD (coronary artery disease) 07/01/2016   • Hyperlipidemia 07/01/2016     Resolved Ambulatory Problems     Diagnosis Date Noted   • No Resolved Ambulatory Problems     Past Medical History:   Diagnosis Date   • Allergic rhinitis    • Anxiety    • Arthritis    • CAD (coronary artery disease)    • Cancer    • Depression    • " Diverticulosis    • Esophagitis    • Gastritis    • GERD (gastroesophageal reflux disease)    • Heart disease    • History of anxiety    • History of prostate cancer 06/1990   • Hyperlipidemia    • Hypertension    • Insomnia    • Lupus    • TAM (nonalcoholic steatohepatitis)    • Sciatica of right side        PAST SURGICAL HISTORY  Past Surgical History:   Procedure Laterality Date   • ABDOMINAL SURGERY     • CARDIAC CATHETERIZATION  11/16/1995   • COLONOSCOPY  01/09/2002   • ELBOW PROCEDURE     • JOINT REPLACEMENT     • LUNG BIOPSY     • NASAL POLYP SURGERY     • PACEMAKER IMPLANTATION     • PROSTATE SURGERY  06/1990   • SHOULDER ROTATOR CUFF REPAIR Right 08/24/2011    Dr. Bach   • SIGMOIDOSCOPY     • UPPER GASTROINTESTINAL ENDOSCOPY  09/12/1997    Gastritis, Duodenitis, hiatal hernia (Pathology: Gastric antrum minimal chronic inflammation)   • UPPER GASTROINTESTINAL ENDOSCOPY  04/11/2005    Mild esophagitis, Small hiatal hernia, Mild gastritis and mild duodenitis       FAMILY HISTORY  Family History   Problem Relation Age of Onset   • Heart failure Mother    • Alcohol abuse Father    • Diabetes Father        SOCIAL HISTORY  Social History     Social History   • Marital status:      Spouse name: N/A   • Number of children: N/A   • Years of education: N/A     Occupational History   • Not on file.     Social History Main Topics   • Smoking status: Former Smoker   • Smokeless tobacco: Never Used      Comment: Quit 15 years ago   • Alcohol use No      Comment: Social   • Drug use: No   • Sexual activity: No     Other Topics Concern   • Not on file     Social History Narrative       ALLERGIES  Lidocaine; Lovastatin; Pravastatin; Procaine; and Simvastatin    REVIEW OF SYSTEMS  Review of Systems   Constitutional: Negative for activity change, appetite change and fever.   HENT: Negative for congestion and sore throat.    Respiratory: Negative for cough and shortness of breath.    Cardiovascular: Positive for  chest pain (midsternal, radiates intermittently to R side of chest and back). Negative for leg swelling.   Gastrointestinal: Negative for abdominal pain, diarrhea and vomiting.   Genitourinary: Negative for decreased urine volume and dysuria.   Musculoskeletal: Negative for neck pain.   Skin: Negative for rash and wound.   Neurological: Negative for weakness, numbness and headaches.   All other systems reviewed and are negative.      PHYSICAL EXAM  ED Triage Vitals   Temp Heart Rate Resp BP SpO2   03/05/18 0606 03/05/18 0606 03/05/18 0606 03/05/18 0613 03/05/18 0606   97.6 °F (36.4 °C) 72 20 165/84 98 %      Temp src Heart Rate Source Patient Position BP Location FiO2 (%)   03/05/18 0606 -- 03/05/18 0613 03/05/18 0613 --   Tympanic  Lying Right arm        Physical Exam   Constitutional: He is oriented to person, place, and time and well-developed, well-nourished, and in no distress. No distress.   HENT:   Head: Normocephalic and atraumatic.   Mouth/Throat: Oropharynx is clear and moist.   Eyes: EOM are normal. Pupils are equal, round, and reactive to light.   Neck: Normal range of motion. Neck supple.   Cardiovascular: Normal rate, regular rhythm and normal heart sounds.    Pulmonary/Chest: Effort normal and breath sounds normal. No respiratory distress. He has no wheezes. He exhibits tenderness.   Positive pleuretic chest pain that occurs with deep breathing. Pt felt better when he was sitting up versus lying back.     Abdominal: Soft. He exhibits no distension. There is no tenderness. There is no rebound and no guarding.   Musculoskeletal: Normal range of motion. He exhibits no edema.   Lymphadenopathy:     He has no cervical adenopathy.   Neurological: He is alert and oriented to person, place, and time.   Skin: Skin is warm and dry. No rash noted. No pallor.   Psychiatric: Mood, memory, affect and judgment normal.   Nursing note and vitals reviewed.      LAB RESULTS  Recent Results (from the past 24 hour(s))    Comprehensive Metabolic Panel    Collection Time: 03/05/18  6:29 AM   Result Value Ref Range    Glucose 90 65 - 99 mg/dL    BUN 21 8 - 23 mg/dL    Creatinine 0.82 0.76 - 1.27 mg/dL    Sodium 141 136 - 145 mmol/L    Potassium 4.1 3.5 - 5.2 mmol/L    Chloride 102 98 - 107 mmol/L    CO2 28.5 22.0 - 29.0 mmol/L    Calcium 9.6 8.2 - 9.6 mg/dL    Total Protein 7.7 6.0 - 8.5 g/dL    Albumin 4.50 3.50 - 5.20 g/dL    ALT (SGPT) 21 1 - 41 U/L    AST (SGOT) 25 1 - 40 U/L    Alkaline Phosphatase 51 39 - 117 U/L    Total Bilirubin 0.8 0.1 - 1.2 mg/dL    eGFR Non African Amer 88 >60 mL/min/1.73    Globulin 3.2 gm/dL    A/G Ratio 1.4 g/dL    BUN/Creatinine Ratio 25.6 (H) 7.0 - 25.0    Anion Gap 10.5 mmol/L   Troponin    Collection Time: 03/05/18  6:29 AM   Result Value Ref Range    Troponin T <0.010 0.000 - 0.030 ng/mL   Light Blue Top    Collection Time: 03/05/18  6:29 AM   Result Value Ref Range    Extra Tube hold for add-on    Green Top (Gel)    Collection Time: 03/05/18  6:29 AM   Result Value Ref Range    Extra Tube Hold for add-ons.    Lavender Top    Collection Time: 03/05/18  6:29 AM   Result Value Ref Range    Extra Tube hold for add-on    Gold Top - SST    Collection Time: 03/05/18  6:29 AM   Result Value Ref Range    Extra Tube Hold for add-ons.    CBC Auto Differential    Collection Time: 03/05/18  6:29 AM   Result Value Ref Range    WBC 8.60 4.50 - 10.70 10*3/mm3    RBC 4.70 4.60 - 6.00 10*6/mm3    Hemoglobin 14.6 13.7 - 17.6 g/dL    Hematocrit 44.0 40.4 - 52.2 %    MCV 93.6 79.8 - 96.2 fL    MCH 31.1 27.0 - 32.7 pg    MCHC 33.2 32.6 - 36.4 g/dL    RDW 13.0 11.5 - 14.5 %    RDW-SD 44.4 37.0 - 54.0 fl    MPV 10.3 6.0 - 12.0 fL    Platelets 210 140 - 500 10*3/mm3    Neutrophil % 61.6 42.7 - 76.0 %    Lymphocyte % 18.7 (L) 19.6 - 45.3 %    Monocyte % 18.8 (H) 5.0 - 12.0 %    Eosinophil % 0.6 0.3 - 6.2 %    Basophil % 0.1 0.0 - 1.5 %    Immature Grans % 0.2 0.0 - 0.5 %    Neutrophils, Absolute 5.29 1.90 - 8.10 10*3/mm3     Lymphocytes, Absolute 1.61 0.90 - 4.80 10*3/mm3    Monocytes, Absolute 1.62 (H) 0.20 - 1.20 10*3/mm3    Eosinophils, Absolute 0.05 0.00 - 0.70 10*3/mm3    Basophils, Absolute 0.01 0.00 - 0.20 10*3/mm3    Immature Grans, Absolute 0.02 0.00 - 0.03 10*3/mm3   BNP    Collection Time: 03/05/18  6:29 AM   Result Value Ref Range    proBNP 126.2 0.0 - 1800.0 pg/mL   D-dimer, Quantitative    Collection Time: 03/05/18  6:29 AM   Result Value Ref Range    D-Dimer, Quantitative 0.31 0.00 - 0.49 MCGFEU/mL   Troponin    Collection Time: 03/05/18  7:44 AM   Result Value Ref Range    Troponin T <0.010 0.000 - 0.030 ng/mL       I ordered the above labs and reviewed the results    RADIOLOGY  XR Chest 2 View   Final Result   HISTORY: 89-year-old male with chest pain     COMPARISON: 11/11/2012     FINDINGS:  1. Stable negative acute chest     This report was finalized on 3/5/2018 6:52 AM by Dr. Ventura Simeon MD.       I ordered the above noted radiological studies and reviewed the images on the PACS system.      EKG    ekg was interpreted by Dr. Torres      [facility]  [facility]    History  Highly suspicious              2    Moderately suspicious             1    Slightly or non-suspicious             0    ECG  Significant ST depression              2    Nonspecific repol disturbance            1    Normal               0    Age  > or = 65                          2     46-65                           1    < or = 45                          0    Risk factors (hypercholesterolemia, HTN, DM, smoking, pos fam hx, obesity)                            > or = to 3 RF for atherosclerotic dx   2    1 or 2                 1    No risk factors                0    Troponin > or = 3x normal limit               2    1-3x normal limit    1    < or = Normal limit    0    Score  0 - 3 is low risk    This patient's HEART score is 3     We discussed the shared decision pathway regarding the patient's heart score and choice for being discharged home  "versus admitted to the hospital for further evaluation. Patient is in agreement to be discharged at this time for follow-up with her family physician and/ or cardiologist.        COURSE & MEDICAL DECISION MAKING  Pertinent Labs and Imaging studies that were ordered and reviewed are noted above.  Results were reviewed/discussed with the patient and they were also made aware of online assess.  Pt also made aware that some labs, such as cultures, will not be resulted during ER visit and follow up with PMD is necessary.       PROGRESS AND CONSULTS    Progress Notes:    ED Course     0720  Reviewed pt's labs and CXR, which showed nothing acute.    0721  Reviewed pt's history and workup with Dr. Torres.  After a bedside evaluation; Dr Torres agrees with the plan of care to order repeat troponin and evaluate.    0830  Repeat troponin was negative. Will order pain medication and reassess in 30 minutes.    0910  Rechecked pt who is resting comfortably. Notified pt of his negative lab and CXR. Discussed plan to d/c home. Patient requested a Colace in the department prior to discharge.  Declined a Rx for home.  The patient also went in to detail that his GERD has been \"out of control\".  States that Dr. Stoner, his GI physician, has been changing around some of his medications for better control.  I discussed that this may be a component to the picture but also feel pleurisy is a component.  I discussed that I did not want to add a medication for his GERD, as Dr. Stoner is actively managing these medications.  He plans to go directly to Dr. Stoner's office after his ER discharge today.    0917   The patient's history, physical exam, and lab findings were discussed with the physician, who also performed a face to face history and physical exam.  I discussed all results and noted any abnormalities with patient.  Discussed absoute need to recheck abnormalities with their family physician.  I answered any of the " "patient's questions.  Discussed plan for discharge, as there is no emergent indication for admission.  Pt is agreeable and understands need for follow up and repeat testing.  Pt is aware that discharge does not mean that nothing is wrong but it indicates no emergency is present and they must continue care with their family physician.  Pt is discharged with instructions to follow up with primary care doctor to have their blood pressure rechecked.       MEDICATIONS GIVEN IN ER  Medications   sodium chloride 0.9 % flush 10 mL (not administered)   docusate sodium (COLACE) capsule 100 mg (not administered)   ketorolac (TORADOL) injection 15 mg (15 mg Intravenous Given 3/5/18 0836)       /70  Pulse 59  Temp 97.6 °F (36.4 °C) (Tympanic)   Resp 18  Ht 172.7 cm (68\")  Wt 77.1 kg (170 lb)  SpO2 94%  BMI 25.85 kg/m2      DIAGNOSIS  Final diagnoses:   Pleuritic chest pain   History of gastroesophageal reflux (GERD)       FOLLOW UP   Luis Armando Lang MD  [address] [facility] [address]  [place] [address]  [phone]          Matthew MCGOVERN MD  [address]  [place] [address]  [phone]            RX     Medication List      New Prescriptions          diclofenac 50 MG tablet   Commonly known as:  CATAFLAM   Take 1 tablet by mouth 3 (Three) Times a Day.         Stop          azithromycin 250 MG tablet   Commonly known as:  ZITHROMAX Z-GARDENIA       HYDROcod Polst-CPM Polst ER 10-8 MG/5ML ER suspension   Commonly known as:  TUSSIONEX PENNKINETIC           Documentation assistance provided by iglesia Warren for Trice Hernandez PA-C.  Information recorded by the iglesia was done at my direction and has been verified and validated by me.  Electronically signed by Britni Warren on 3/5/2018 at time 9:28 AM     Britni Warren  03/05/18 0929       Trice Hernandez PA-C  03/05/18 0951       Trice Hernandez PA-C  03/05/18 0957    "

## 2018-03-05 NOTE — DISCHARGE INSTRUCTIONS
PLEASE READ AND REVIEW ALL DISCHARGE INSTRUCTIONS.     Please follow up with your primary care physician for any further evaluation/treatment and further management of your blood pressure.     Recheck in emergency department for any worsening and/or concerning symptoms including change in or worsening of chest pain, fever, vomiting.     Take all prescribed medicine as written and continue chronic medication.    Please follow up with Dr. Stoner today, regarding your chornic GERD.    Begin anti-inflammatory for pleuritic chest pain.  DO NOT TAKE WITH ADDITIONAL ALEVE OR IBUPROFEN.

## 2018-03-05 NOTE — ED NOTES
Pt from home (lives by himself) complains of mid-sternal CP and SOA that started @ 0030 this am. Pt has difficulty taking a deep breath due to the pain.  Pt denies any N/V or diaphoresis. Pt states the pain radiates to his back and his shoulders. It is not associated with movement.  Pt has history of GERD and anxiety which he has been   Dr Lang is PCP; Dr Turner is his cardiologist and Dr Stoner for his GERD.  Pt had diagnosis in Feb 2018 with Bronchitis. All symptoms had resolved from this before he left. He went on a retreat this weekend for 4 days and he was constantly going. He just returned yesterday at 1830.      Doreen Ugarte RN  03/05/18 1807

## 2018-03-07 ENCOUNTER — TELEPHONE (OUTPATIENT)
Dept: SOCIAL WORK | Facility: HOSPITAL | Age: 83
End: 2018-03-07

## 2018-03-12 ENCOUNTER — OFFICE VISIT (OUTPATIENT)
Dept: CARDIAC REHAB | Facility: HOSPITAL | Age: 83
End: 2018-03-12

## 2018-03-12 DIAGNOSIS — I21.4 NON-STEMI (NON-ST ELEVATED MYOCARDIAL INFARCTION) (HCC): Primary | ICD-10-CM

## 2018-03-14 ENCOUNTER — OFFICE VISIT (OUTPATIENT)
Dept: CARDIAC REHAB | Facility: HOSPITAL | Age: 83
End: 2018-03-14

## 2018-03-14 DIAGNOSIS — I21.4 NON-STEMI (NON-ST ELEVATED MYOCARDIAL INFARCTION) (HCC): Primary | ICD-10-CM

## 2018-03-16 ENCOUNTER — OFFICE VISIT (OUTPATIENT)
Dept: CARDIAC REHAB | Facility: HOSPITAL | Age: 83
End: 2018-03-16

## 2018-03-16 DIAGNOSIS — I21.4 NON-STEMI (NON-ST ELEVATED MYOCARDIAL INFARCTION) (HCC): Primary | ICD-10-CM

## 2018-03-19 ENCOUNTER — OFFICE VISIT (OUTPATIENT)
Dept: CARDIAC REHAB | Facility: HOSPITAL | Age: 83
End: 2018-03-19

## 2018-03-19 DIAGNOSIS — I21.4 NON-STEMI (NON-ST ELEVATED MYOCARDIAL INFARCTION) (HCC): Primary | ICD-10-CM

## 2018-03-20 ENCOUNTER — OFFICE VISIT (OUTPATIENT)
Dept: CARDIAC REHAB | Facility: HOSPITAL | Age: 83
End: 2018-03-20

## 2018-03-20 DIAGNOSIS — I21.4 NON-STEMI (NON-ST ELEVATED MYOCARDIAL INFARCTION) (HCC): Primary | ICD-10-CM

## 2018-03-26 ENCOUNTER — OFFICE VISIT (OUTPATIENT)
Dept: CARDIAC REHAB | Facility: HOSPITAL | Age: 83
End: 2018-03-26

## 2018-03-26 DIAGNOSIS — I21.4 NON-STEMI (NON-ST ELEVATED MYOCARDIAL INFARCTION) (HCC): Primary | ICD-10-CM

## 2018-03-28 ENCOUNTER — OFFICE VISIT (OUTPATIENT)
Dept: CARDIAC REHAB | Facility: HOSPITAL | Age: 83
End: 2018-03-28

## 2018-03-28 DIAGNOSIS — I21.4 NON-STEMI (NON-ST ELEVATED MYOCARDIAL INFARCTION) (HCC): Primary | ICD-10-CM

## 2018-03-30 ENCOUNTER — OFFICE VISIT (OUTPATIENT)
Dept: CARDIAC REHAB | Facility: HOSPITAL | Age: 83
End: 2018-03-30

## 2018-03-30 DIAGNOSIS — I21.4 NON-STEMI (NON-ST ELEVATED MYOCARDIAL INFARCTION) (HCC): Primary | ICD-10-CM

## 2018-04-02 ENCOUNTER — OFFICE VISIT (OUTPATIENT)
Dept: CARDIAC REHAB | Facility: HOSPITAL | Age: 83
End: 2018-04-02

## 2018-04-02 DIAGNOSIS — I21.4 NON-STEMI (NON-ST ELEVATED MYOCARDIAL INFARCTION) (HCC): Primary | ICD-10-CM

## 2018-04-02 RX ORDER — LORAZEPAM 1 MG/1
1 TABLET ORAL EVERY 6 HOURS PRN
Qty: 30 TABLET | Refills: 0 | OUTPATIENT
Start: 2018-04-02 | End: 2018-06-13 | Stop reason: SDUPTHER

## 2018-04-02 NOTE — TELEPHONE ENCOUNTER
----- Message from Bernie Mcknight sent at 4/2/2018 11:50 AM EDT -----  Contact: patient stopped by  Vicente Delgado requested refill of his Lorazepam 1 mg tablets.    [facility] [address] [place], [place] [address]SAIGE TENA AT [facility]([address] [address] - [phone]  - [phone] (Phone)  [phone] (Fax)    Patient call back:  [phone]    Thank you,    Bernie

## 2018-04-04 ENCOUNTER — OFFICE VISIT (OUTPATIENT)
Dept: CARDIAC REHAB | Facility: HOSPITAL | Age: 83
End: 2018-04-04

## 2018-04-04 DIAGNOSIS — I21.4 NON-STEMI (NON-ST ELEVATED MYOCARDIAL INFARCTION) (HCC): Primary | ICD-10-CM

## 2018-04-06 ENCOUNTER — OFFICE VISIT (OUTPATIENT)
Dept: CARDIAC REHAB | Facility: HOSPITAL | Age: 83
End: 2018-04-06

## 2018-04-06 DIAGNOSIS — I21.4 NON-STEMI (NON-ST ELEVATED MYOCARDIAL INFARCTION) (HCC): Primary | ICD-10-CM

## 2018-04-16 ENCOUNTER — OFFICE VISIT (OUTPATIENT)
Dept: CARDIAC REHAB | Facility: HOSPITAL | Age: 83
End: 2018-04-16

## 2018-04-16 DIAGNOSIS — I21.4 NON-STEMI (NON-ST ELEVATED MYOCARDIAL INFARCTION) (HCC): Primary | ICD-10-CM

## 2018-04-18 ENCOUNTER — OFFICE VISIT (OUTPATIENT)
Dept: CARDIAC REHAB | Facility: HOSPITAL | Age: 83
End: 2018-04-18

## 2018-04-18 ENCOUNTER — OFFICE VISIT (OUTPATIENT)
Dept: INTERNAL MEDICINE | Facility: CLINIC | Age: 83
End: 2018-04-18

## 2018-04-18 VITALS
HEIGHT: 67 IN | DIASTOLIC BLOOD PRESSURE: 80 MMHG | OXYGEN SATURATION: 98 % | WEIGHT: 175 LBS | SYSTOLIC BLOOD PRESSURE: 146 MMHG | BODY MASS INDEX: 27.47 KG/M2 | HEART RATE: 54 BPM

## 2018-04-18 DIAGNOSIS — E78.2 MIXED HYPERLIPIDEMIA: ICD-10-CM

## 2018-04-18 DIAGNOSIS — K21.00 GASTROESOPHAGEAL REFLUX DISEASE WITH ESOPHAGITIS: ICD-10-CM

## 2018-04-18 DIAGNOSIS — I25.10 CORONARY ARTERY DISEASE INVOLVING NATIVE CORONARY ARTERY OF NATIVE HEART WITHOUT ANGINA PECTORIS: ICD-10-CM

## 2018-04-18 DIAGNOSIS — I21.4 NON-STEMI (NON-ST ELEVATED MYOCARDIAL INFARCTION) (HCC): Primary | ICD-10-CM

## 2018-04-18 DIAGNOSIS — I10 ESSENTIAL HYPERTENSION: Primary | ICD-10-CM

## 2018-04-18 LAB
ALBUMIN SERPL-MCNC: 4.3 G/DL (ref 3.5–5.2)
ALBUMIN/GLOB SERPL: 1.3 G/DL
ALP SERPL-CCNC: 44 U/L (ref 39–117)
ALT SERPL W P-5'-P-CCNC: 22 U/L (ref 1–41)
ANION GAP SERPL CALCULATED.3IONS-SCNC: 12.4 MMOL/L
AST SERPL-CCNC: 31 U/L (ref 1–40)
BASOPHILS # BLD AUTO: 0.01 10*3/MM3 (ref 0–0.2)
BASOPHILS NFR BLD AUTO: 0.2 % (ref 0–1.5)
BILIRUB SERPL-MCNC: 0.7 MG/DL (ref 0.1–1.2)
BUN BLD-MCNC: 22 MG/DL (ref 8–23)
BUN/CREAT SERPL: 24.7 (ref 7–25)
CALCIUM SPEC-SCNC: 9.9 MG/DL (ref 8.2–9.6)
CHLORIDE SERPL-SCNC: 100 MMOL/L (ref 98–107)
CHOLEST SERPL-MCNC: 152 MG/DL (ref 0–200)
CO2 SERPL-SCNC: 26.6 MMOL/L (ref 22–29)
CREAT BLD-MCNC: 0.89 MG/DL (ref 0.76–1.27)
EOSINOPHIL # BLD AUTO: 0.03 10*3/MM3 (ref 0–0.7)
EOSINOPHIL # BLD AUTO: 0.6 % (ref 0.3–6.2)
ERYTHROCYTE [DISTWIDTH] IN BLOOD BY AUTOMATED COUNT: 13.8 % (ref 11.5–14.5)
GFR SERPL CREATININE-BSD FRML MDRD: 80 ML/MIN/1.73
GLOBULIN UR ELPH-MCNC: 3.4 GM/DL
GLUCOSE BLD-MCNC: 83 MG/DL (ref 65–99)
HCT VFR BLD AUTO: 44.4 % (ref 40.4–52.2)
HDLC SERPL-MCNC: 40 MG/DL (ref 40–60)
HGB BLD-MCNC: 14.6 G/DL (ref 13.7–17.6)
IMM GRANULOCYTES # BLD: 0 10*3/MM3 (ref 0–0.03)
IMM GRANULOCYTES NFR BLD: 0 % (ref 0–0.5)
LDLC SERPL CALC-MCNC: 82 MG/DL (ref 0–100)
LDLC/HDLC SERPL: 2.05 {RATIO}
LYMPHOCYTES # BLD AUTO: 1.55 10*3/MM3 (ref 0.9–4.8)
LYMPHOCYTES NFR BLD AUTO: 29.4 % (ref 19.6–45.3)
MCH RBC QN AUTO: 30.9 PG (ref 27–32.7)
MCHC RBC AUTO-ENTMCNC: 32.9 G/DL (ref 32.6–36.4)
MCV RBC AUTO: 94.1 FL (ref 79.8–96.2)
MONOCYTES # BLD AUTO: 0.92 10*3/MM3 (ref 0.2–1.2)
MONOCYTES NFR BLD AUTO: 17.5 % (ref 5–12)
NEUTROPHILS # BLD AUTO: 2.76 10*3/MM3 (ref 1.9–8.1)
NEUTROPHILS NFR BLD AUTO: 52.3 % (ref 42.7–76)
PLATELET # BLD AUTO: 204 10*3/MM3 (ref 140–500)
POTASSIUM BLD-SCNC: 4.4 MMOL/L (ref 3.5–5.2)
PROT SERPL-MCNC: 7.7 G/DL (ref 6–8.5)
RBC # BLD AUTO: 4.72 10*6/MM3 (ref 4.6–6)
SODIUM BLD-SCNC: 139 MMOL/L (ref 136–145)
TRIGL SERPL-MCNC: 151 MG/DL (ref 0–150)
VLDLC SERPL-MCNC: 30.2 MG/DL (ref 5–40)
WBC # BLD AUTO: 5.27 10*3/MM3 (ref 4.5–10.7)

## 2018-04-18 PROCEDURE — 80053 COMPREHEN METABOLIC PANEL: CPT | Performed by: INTERNAL MEDICINE

## 2018-04-18 PROCEDURE — 80061 LIPID PANEL: CPT | Performed by: INTERNAL MEDICINE

## 2018-04-18 PROCEDURE — 99214 OFFICE O/P EST MOD 30 MIN: CPT | Performed by: INTERNAL MEDICINE

## 2018-04-18 NOTE — PROGRESS NOTES
Subjective   Vicente Delgado is a 89 y.o. male.     Here for F/U  & general review         The following portions of the patient's history were reviewed and updated as appropriate: allergies, current medications, past family history, past medical history, past social history, past surgical history and problem list.    Review of Systems   Constitutional:        Has been doing well Lonesome since wife passed away last October   HENT: Negative.    Eyes: Negative.    Respiratory: Negative.    Cardiovascular:        Going to cardiac rehab regularly   Gastrointestinal:        Hasn'Pam any more GERD SX   Genitourinary: Negative.    Musculoskeletal: Positive for arthralgias (Rt shoulder replacemen is uncomfortable if he lays on it & has decreased ROM).   Neurological: Negative.        Objective   Physical Exam   Constitutional: He is oriented to person, place, and time. He appears well-developed and well-nourished.   HENT:   Head: Normocephalic.   Eyes: EOM are normal.   Neck: Neck supple.   Cardiovascular: Normal rate, regular rhythm, normal heart sounds and intact distal pulses.    Repeat 150/90   Pulmonary/Chest: Effort normal and breath sounds normal.   Neurological: He is alert and oriented to person, place, and time.   Has pretty good sensation in both feet   Skin: Skin is warm and dry.       Assessment/Plan   Diagnoses and all orders for this visit:    Essential hypertension  -     CBC Auto Differential; Future  -     Comprehensive Metabolic Panel; Future    Coronary artery disease involving native coronary artery of native heart without angina pectoris    Mixed hyperlipidemia  -     Lipid Panel; Future    Gastroesophageal reflux disease with esophagitis

## 2018-04-20 ENCOUNTER — OFFICE VISIT (OUTPATIENT)
Dept: CARDIAC REHAB | Facility: HOSPITAL | Age: 83
End: 2018-04-20

## 2018-04-20 DIAGNOSIS — I21.4 NON-STEMI (NON-ST ELEVATED MYOCARDIAL INFARCTION) (HCC): Primary | ICD-10-CM

## 2018-04-23 ENCOUNTER — OFFICE VISIT (OUTPATIENT)
Dept: CARDIAC REHAB | Facility: HOSPITAL | Age: 83
End: 2018-04-23

## 2018-04-23 DIAGNOSIS — I21.4 NON-STEMI (NON-ST ELEVATED MYOCARDIAL INFARCTION) (HCC): Primary | ICD-10-CM

## 2018-04-25 ENCOUNTER — OFFICE VISIT (OUTPATIENT)
Dept: CARDIAC REHAB | Facility: HOSPITAL | Age: 83
End: 2018-04-25

## 2018-04-25 DIAGNOSIS — I21.4 NON-STEMI (NON-ST ELEVATED MYOCARDIAL INFARCTION) (HCC): Primary | ICD-10-CM

## 2018-04-27 ENCOUNTER — OFFICE VISIT (OUTPATIENT)
Dept: CARDIAC REHAB | Facility: HOSPITAL | Age: 83
End: 2018-04-27

## 2018-04-27 DIAGNOSIS — I21.4 NON-STEMI (NON-ST ELEVATED MYOCARDIAL INFARCTION) (HCC): Primary | ICD-10-CM

## 2018-04-30 ENCOUNTER — OFFICE VISIT (OUTPATIENT)
Dept: CARDIAC REHAB | Facility: HOSPITAL | Age: 83
End: 2018-04-30

## 2018-04-30 DIAGNOSIS — I21.4 NON-STEMI (NON-ST ELEVATED MYOCARDIAL INFARCTION) (HCC): Primary | ICD-10-CM

## 2018-05-02 ENCOUNTER — OFFICE VISIT (OUTPATIENT)
Dept: CARDIAC REHAB | Facility: HOSPITAL | Age: 83
End: 2018-05-02

## 2018-05-02 DIAGNOSIS — I21.4 NON-STEMI (NON-ST ELEVATED MYOCARDIAL INFARCTION) (HCC): Primary | ICD-10-CM

## 2018-05-04 ENCOUNTER — OFFICE VISIT (OUTPATIENT)
Dept: CARDIAC REHAB | Facility: HOSPITAL | Age: 83
End: 2018-05-04

## 2018-05-04 DIAGNOSIS — I21.4 NON-STEMI (NON-ST ELEVATED MYOCARDIAL INFARCTION) (HCC): Primary | ICD-10-CM

## 2018-05-07 ENCOUNTER — OFFICE VISIT (OUTPATIENT)
Dept: CARDIAC REHAB | Facility: HOSPITAL | Age: 83
End: 2018-05-07

## 2018-05-07 DIAGNOSIS — I21.4 NON-STEMI (NON-ST ELEVATED MYOCARDIAL INFARCTION) (HCC): Primary | ICD-10-CM

## 2018-05-09 ENCOUNTER — OFFICE VISIT (OUTPATIENT)
Dept: CARDIAC REHAB | Facility: HOSPITAL | Age: 83
End: 2018-05-09

## 2018-05-09 DIAGNOSIS — I21.4 NON-STEMI (NON-ST ELEVATED MYOCARDIAL INFARCTION) (HCC): Primary | ICD-10-CM

## 2018-05-11 ENCOUNTER — OFFICE VISIT (OUTPATIENT)
Dept: CARDIAC REHAB | Facility: HOSPITAL | Age: 83
End: 2018-05-11

## 2018-05-11 DIAGNOSIS — I21.4 NON-STEMI (NON-ST ELEVATED MYOCARDIAL INFARCTION) (HCC): Primary | ICD-10-CM

## 2018-05-14 ENCOUNTER — OFFICE VISIT (OUTPATIENT)
Dept: CARDIAC REHAB | Facility: HOSPITAL | Age: 83
End: 2018-05-14

## 2018-05-14 DIAGNOSIS — I21.4 NON-STEMI (NON-ST ELEVATED MYOCARDIAL INFARCTION) (HCC): Primary | ICD-10-CM

## 2018-05-16 ENCOUNTER — OFFICE VISIT (OUTPATIENT)
Dept: CARDIAC REHAB | Facility: HOSPITAL | Age: 83
End: 2018-05-16

## 2018-05-16 DIAGNOSIS — I21.4 NON-STEMI (NON-ST ELEVATED MYOCARDIAL INFARCTION) (HCC): Primary | ICD-10-CM

## 2018-05-18 ENCOUNTER — OFFICE VISIT (OUTPATIENT)
Dept: CARDIAC REHAB | Facility: HOSPITAL | Age: 83
End: 2018-05-18

## 2018-05-18 DIAGNOSIS — K21.00 GASTROESOPHAGEAL REFLUX DISEASE WITH ESOPHAGITIS: ICD-10-CM

## 2018-05-18 DIAGNOSIS — E78.2 MIXED HYPERLIPIDEMIA: ICD-10-CM

## 2018-05-18 DIAGNOSIS — E78.5 HYPERLIPEMIA: ICD-10-CM

## 2018-05-18 DIAGNOSIS — I21.4 NON-STEMI (NON-ST ELEVATED MYOCARDIAL INFARCTION) (HCC): Primary | ICD-10-CM

## 2018-05-18 DIAGNOSIS — I10 ESSENTIAL HYPERTENSION: ICD-10-CM

## 2018-05-18 RX ORDER — EZETIMIBE 10 MG/1
TABLET ORAL
Qty: 90 TABLET | Refills: 0 | Status: SHIPPED | OUTPATIENT
Start: 2018-05-18 | End: 2018-08-13 | Stop reason: SDUPTHER

## 2018-05-18 RX ORDER — AMLODIPINE BESYLATE 5 MG/1
TABLET ORAL
Qty: 90 TABLET | Refills: 0 | Status: SHIPPED | OUTPATIENT
Start: 2018-05-18 | End: 2018-07-26

## 2018-05-18 RX ORDER — ROSUVASTATIN CALCIUM 10 MG/1
TABLET, COATED ORAL
Qty: 90 TABLET | Refills: 0 | Status: SHIPPED | OUTPATIENT
Start: 2018-05-18 | End: 2018-08-13 | Stop reason: SDUPTHER

## 2018-05-18 RX ORDER — PANTOPRAZOLE SODIUM 40 MG/1
TABLET, DELAYED RELEASE ORAL
Qty: 90 TABLET | Refills: 0 | Status: SHIPPED | OUTPATIENT
Start: 2018-05-18 | End: 2018-08-13 | Stop reason: SDUPTHER

## 2018-05-18 RX ORDER — ATENOLOL 25 MG/1
TABLET ORAL
Qty: 180 TABLET | Refills: 0 | Status: SHIPPED | OUTPATIENT
Start: 2018-05-18 | End: 2019-01-17 | Stop reason: SDUPTHER

## 2018-05-21 ENCOUNTER — OFFICE VISIT (OUTPATIENT)
Dept: CARDIAC REHAB | Facility: HOSPITAL | Age: 83
End: 2018-05-21

## 2018-05-21 DIAGNOSIS — I21.4 NON-STEMI (NON-ST ELEVATED MYOCARDIAL INFARCTION) (HCC): Primary | ICD-10-CM

## 2018-05-23 ENCOUNTER — OFFICE VISIT (OUTPATIENT)
Dept: CARDIAC REHAB | Facility: HOSPITAL | Age: 83
End: 2018-05-23

## 2018-05-23 DIAGNOSIS — I21.4 NON-STEMI (NON-ST ELEVATED MYOCARDIAL INFARCTION) (HCC): Primary | ICD-10-CM

## 2018-05-25 ENCOUNTER — OFFICE VISIT (OUTPATIENT)
Dept: CARDIAC REHAB | Facility: HOSPITAL | Age: 83
End: 2018-05-25

## 2018-05-25 DIAGNOSIS — I21.4 NON-STEMI (NON-ST ELEVATED MYOCARDIAL INFARCTION) (HCC): Primary | ICD-10-CM

## 2018-05-30 ENCOUNTER — OFFICE VISIT (OUTPATIENT)
Dept: CARDIAC REHAB | Facility: HOSPITAL | Age: 83
End: 2018-05-30

## 2018-05-30 DIAGNOSIS — I21.4 NON-STEMI (NON-ST ELEVATED MYOCARDIAL INFARCTION) (HCC): Primary | ICD-10-CM

## 2018-06-01 ENCOUNTER — OFFICE VISIT (OUTPATIENT)
Dept: CARDIAC REHAB | Facility: HOSPITAL | Age: 83
End: 2018-06-01

## 2018-06-01 ENCOUNTER — OFFICE VISIT (OUTPATIENT)
Dept: INTERNAL MEDICINE | Facility: CLINIC | Age: 83
End: 2018-06-01

## 2018-06-01 VITALS
WEIGHT: 180 LBS | DIASTOLIC BLOOD PRESSURE: 80 MMHG | HEART RATE: 98 BPM | BODY MASS INDEX: 28.19 KG/M2 | SYSTOLIC BLOOD PRESSURE: 118 MMHG | TEMPERATURE: 97.5 F

## 2018-06-01 DIAGNOSIS — M54.50 ACUTE RIGHT-SIDED LOW BACK PAIN WITHOUT SCIATICA: Primary | ICD-10-CM

## 2018-06-01 DIAGNOSIS — I21.4 NON-STEMI (NON-ST ELEVATED MYOCARDIAL INFARCTION) (HCC): Primary | ICD-10-CM

## 2018-06-01 LAB
BILIRUB UR QL STRIP: NEGATIVE
CLARITY UR: CLEAR
COLOR UR: YELLOW
GLUCOSE UR STRIP-MCNC: NEGATIVE MG/DL
HGB UR QL STRIP.AUTO: NEGATIVE
KETONES UR QL STRIP: NEGATIVE
LEUKOCYTE ESTERASE UR QL STRIP.AUTO: NEGATIVE
NITRITE UR QL STRIP: NEGATIVE
PH UR STRIP.AUTO: 5.5 [PH] (ref 5–8)
PROT UR QL STRIP: NEGATIVE
SP GR UR STRIP: >=1.03 (ref 1–1.03)
UROBILINOGEN UR QL STRIP: NORMAL

## 2018-06-01 PROCEDURE — 81003 URINALYSIS AUTO W/O SCOPE: CPT | Performed by: NURSE PRACTITIONER

## 2018-06-01 PROCEDURE — 99213 OFFICE O/P EST LOW 20 MIN: CPT | Performed by: NURSE PRACTITIONER

## 2018-06-01 RX ORDER — METHYLPREDNISOLONE 4 MG/1
TABLET ORAL
Qty: 21 TABLET | Refills: 0 | Status: SHIPPED | OUTPATIENT
Start: 2018-06-01 | End: 2018-06-13

## 2018-06-01 NOTE — PROGRESS NOTES
Subjective   Vicente Delgado is a 89 y.o. male.     No recent trauma or injury.      Back Pain   This is a new problem. The current episode started 1 to 4 weeks ago. The problem has been gradually worsening since onset. The pain is present in the lumbar spine (RIGHT SIDE ). Quality: intermittent stabbing  The pain does not radiate. The pain is at a severity of 5/10. The pain is moderate. The symptoms are aggravated by sitting, twisting and standing. Pertinent negatives include no abdominal pain, bladder incontinence, bowel incontinence, chest pain, fever, leg pain, numbness or tingling. Treatments tried: aleve, heat, tens unit, massage  The treatment provided mild relief.        The following portions of the patient's history were reviewed and updated as appropriate: allergies, current medications, past social history, past surgical history and problem list.    Review of Systems   Constitutional: Negative for activity change, appetite change, fatigue and fever.   Respiratory: Negative for cough, shortness of breath and wheezing.    Cardiovascular: Negative for chest pain, palpitations and leg swelling.   Gastrointestinal: Negative for abdominal pain and bowel incontinence.   Genitourinary: Negative for bladder incontinence.   Musculoskeletal: Positive for back pain.   Neurological: Negative for tingling and numbness.       Objective   Physical Exam   Constitutional: He is oriented to person, place, and time. He appears well-developed and well-nourished.   HENT:   Head: Normocephalic.   Nose: Nose normal.   Cardiovascular: Regular rhythm and normal heart sounds.  Exam reveals no S3 and no S4.    No murmur heard.  Pulmonary/Chest: Effort normal and breath sounds normal. He has no decreased breath sounds. He has no wheezes. He has no rhonchi. He has no rales.   Musculoskeletal: He exhibits no edema.        Right hip: He exhibits normal range of motion, normal strength and no tenderness.        Left hip: He exhibits  normal range of motion, normal strength and no tenderness.        Lumbar back: He exhibits tenderness and pain. He exhibits normal range of motion and no spasm.   (-) SLR    Neurological: He is alert and oriented to person, place, and time. Gait normal.   Reflex Scores:       Patellar reflexes are 2+ on the right side and 2+ on the left side.  Skin: Skin is warm and dry.   Psychiatric: He has a normal mood and affect.       Assessment/Plan   Vicente was seen today for back pain.    Diagnoses and all orders for this visit:    Acute right-sided low back pain without sciatica  Comments:  heat, massage and stretching   Orders:  -     Urinalysis With / Microscopic If Indicated (No Culture) - Urine, Clean Catch; Future  -     Urinalysis With / Microscopic If Indicated (No Culture) - Urine, Clean Catch  -     Ambulatory Referral to Physical Therapy Evaluate and treat  -     MethylPREDNISolone (MEDROL) 4 MG tablet; follow package directions    Urine normal. Will hold on imaging today (our machine is down). He will return sooner if no relief and will obtain imaging.

## 2018-06-08 ENCOUNTER — OFFICE VISIT (OUTPATIENT)
Dept: CARDIAC REHAB | Facility: HOSPITAL | Age: 83
End: 2018-06-08

## 2018-06-08 DIAGNOSIS — I21.4 NON-STEMI (NON-ST ELEVATED MYOCARDIAL INFARCTION) (HCC): Primary | ICD-10-CM

## 2018-06-11 ENCOUNTER — OFFICE VISIT (OUTPATIENT)
Dept: CARDIAC REHAB | Facility: HOSPITAL | Age: 83
End: 2018-06-11

## 2018-06-11 DIAGNOSIS — I21.4 NON-STEMI (NON-ST ELEVATED MYOCARDIAL INFARCTION) (HCC): Primary | ICD-10-CM

## 2018-06-13 ENCOUNTER — OFFICE VISIT (OUTPATIENT)
Dept: CARDIAC REHAB | Facility: HOSPITAL | Age: 83
End: 2018-06-13

## 2018-06-13 ENCOUNTER — OFFICE VISIT (OUTPATIENT)
Dept: INTERNAL MEDICINE | Facility: CLINIC | Age: 83
End: 2018-06-13

## 2018-06-13 VITALS
HEIGHT: 67 IN | OXYGEN SATURATION: 98 % | BODY MASS INDEX: 27 KG/M2 | WEIGHT: 172 LBS | DIASTOLIC BLOOD PRESSURE: 82 MMHG | SYSTOLIC BLOOD PRESSURE: 136 MMHG | HEART RATE: 53 BPM

## 2018-06-13 DIAGNOSIS — I10 ESSENTIAL HYPERTENSION: ICD-10-CM

## 2018-06-13 DIAGNOSIS — G89.29 CHRONIC MIDLINE LOW BACK PAIN WITHOUT SCIATICA: Primary | ICD-10-CM

## 2018-06-13 DIAGNOSIS — I25.10 CORONARY ARTERY DISEASE INVOLVING NATIVE CORONARY ARTERY OF NATIVE HEART WITHOUT ANGINA PECTORIS: ICD-10-CM

## 2018-06-13 DIAGNOSIS — M54.50 CHRONIC MIDLINE LOW BACK PAIN WITHOUT SCIATICA: Primary | ICD-10-CM

## 2018-06-13 DIAGNOSIS — I21.4 NON-STEMI (NON-ST ELEVATED MYOCARDIAL INFARCTION) (HCC): Primary | ICD-10-CM

## 2018-06-13 DIAGNOSIS — F41.9 ANXIETY DISORDER, UNSPECIFIED TYPE: ICD-10-CM

## 2018-06-13 PROCEDURE — 99214 OFFICE O/P EST MOD 30 MIN: CPT | Performed by: INTERNAL MEDICINE

## 2018-06-13 RX ORDER — LORAZEPAM 1 MG/1
1 TABLET ORAL EVERY 6 HOURS PRN
Qty: 30 TABLET | Refills: 0 | OUTPATIENT
Start: 2018-06-13 | End: 2018-07-26

## 2018-06-13 NOTE — PROGRESS NOTES
Subjective   Vicente Delgado is a 89 y.o. male.     Back Pain   This is a new problem. The current episode started 1 to 4 weeks ago. Pertinent negatives include no abdominal pain or chest pain.        The following portions of the patient's history were reviewed and updated as appropriate: allergies, current medications, past family history, past medical history, past social history, past surgical history and problem list.    Review of Systems   Constitutional:        Generally doing well   HENT: Negative.    Respiratory: Negative.    Cardiovascular: Negative.  Negative for chest pain and palpitations.   Gastrointestinal: Negative.  Negative for abdominal pain and constipation.   Genitourinary: Negative.    Musculoskeletal: Positive for back pain ( Here for F/U back pain Is doing better Going to [facility] for PT & making progress Will finish end of month).   Neurological: Negative.    Psychiatric/Behavioral:        Continues to grieve over loss of wife       Objective   Physical Exam   Constitutional: He appears well-developed.   HENT:   Head: Normocephalic.   Eyes: EOM are normal.   Neck: Neck supple.   Cardiovascular: Normal rate, regular rhythm and normal heart sounds.    Repeat 150/80   Pulmonary/Chest: Effort normal and breath sounds normal.   Musculoskeletal:   Back exam unremarkable   Vitals reviewed.      Assessment/Plan   Vicente was seen today for back pain.    Diagnoses and all orders for this visit:    Chronic midline low back pain without sciatica    Essential hypertension    Coronary artery disease involving native coronary artery of native heart without angina pectoris    Anxiety disorder, unspecified type  -     LORazepam (ATIVAN) 1 MG tablet; Take 1 tablet by mouth Every 6 (Six) Hours As Needed for Anxiety.

## 2018-06-15 ENCOUNTER — OFFICE VISIT (OUTPATIENT)
Dept: CARDIAC REHAB | Facility: HOSPITAL | Age: 83
End: 2018-06-15

## 2018-06-15 DIAGNOSIS — I21.4 NON-STEMI (NON-ST ELEVATED MYOCARDIAL INFARCTION) (HCC): Primary | ICD-10-CM

## 2018-06-18 ENCOUNTER — OFFICE VISIT (OUTPATIENT)
Dept: CARDIAC REHAB | Facility: HOSPITAL | Age: 83
End: 2018-06-18

## 2018-06-18 DIAGNOSIS — I21.4 NON-STEMI (NON-ST ELEVATED MYOCARDIAL INFARCTION) (HCC): Primary | ICD-10-CM

## 2018-06-18 DIAGNOSIS — I10 ESSENTIAL HYPERTENSION: ICD-10-CM

## 2018-06-18 RX ORDER — ATENOLOL 25 MG/1
TABLET ORAL
Qty: 180 TABLET | Refills: 1 | Status: SHIPPED | OUTPATIENT
Start: 2018-06-18 | End: 2018-07-12 | Stop reason: SDUPTHER

## 2018-06-20 ENCOUNTER — OFFICE VISIT (OUTPATIENT)
Dept: CARDIAC REHAB | Facility: HOSPITAL | Age: 83
End: 2018-06-20

## 2018-06-20 DIAGNOSIS — I21.4 NON-STEMI (NON-ST ELEVATED MYOCARDIAL INFARCTION) (HCC): Primary | ICD-10-CM

## 2018-06-21 ENCOUNTER — OFFICE VISIT (OUTPATIENT)
Dept: INTERNAL MEDICINE | Facility: CLINIC | Age: 83
End: 2018-06-21

## 2018-06-21 VITALS
WEIGHT: 177 LBS | BODY MASS INDEX: 27.72 KG/M2 | OXYGEN SATURATION: 98 % | HEART RATE: 54 BPM | DIASTOLIC BLOOD PRESSURE: 82 MMHG | SYSTOLIC BLOOD PRESSURE: 122 MMHG

## 2018-06-21 DIAGNOSIS — M54.2 CERVICALGIA: ICD-10-CM
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
## 2018-06-21 DIAGNOSIS — H53.9 VISUAL DISTURBANCE: Primary | ICD-10-CM

## 2018-06-21 PROCEDURE — 99214 OFFICE O/P EST MOD 30 MIN: CPT | Performed by: NURSE PRACTITIONER

## 2018-06-22 ENCOUNTER — OFFICE VISIT (OUTPATIENT)
Dept: CARDIAC REHAB | Facility: HOSPITAL | Age: 83
End: 2018-06-22

## 2018-06-22 DIAGNOSIS — I21.4 NON-STEMI (NON-ST ELEVATED MYOCARDIAL INFARCTION) (HCC): Primary | ICD-10-CM
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
## 2018-06-25 ENCOUNTER — OFFICE VISIT (OUTPATIENT)
Dept: CARDIAC REHAB | Facility: HOSPITAL | Age: 83
End: 2018-06-25

## 2018-06-25 DIAGNOSIS — I21.4 NON-STEMI (NON-ST ELEVATED MYOCARDIAL INFARCTION) (HCC): Primary | ICD-10-CM

## 2018-06-27 ENCOUNTER — OFFICE VISIT (OUTPATIENT)
Dept: CARDIAC REHAB | Facility: HOSPITAL | Age: 83
End: 2018-06-27

## 2018-06-27 DIAGNOSIS — I21.4 NON-STEMI (NON-ST ELEVATED MYOCARDIAL INFARCTION) (HCC): Primary | ICD-10-CM

## 2018-06-29 ENCOUNTER — OFFICE VISIT (OUTPATIENT)
Dept: CARDIAC REHAB | Facility: HOSPITAL | Age: 83
End: 2018-06-29

## 2018-06-29 DIAGNOSIS — I21.4 NON-STEMI (NON-ST ELEVATED MYOCARDIAL INFARCTION) (HCC): Primary | ICD-10-CM

## 2018-07-02 ENCOUNTER — OFFICE VISIT (OUTPATIENT)
Dept: CARDIAC REHAB | Facility: HOSPITAL | Age: 83
End: 2018-07-02

## 2018-07-02 DIAGNOSIS — I21.4 NON-STEMI (NON-ST ELEVATED MYOCARDIAL INFARCTION) (HCC): Primary | ICD-10-CM

## 2018-07-06 ENCOUNTER — OFFICE VISIT (OUTPATIENT)
Dept: CARDIAC REHAB | Facility: HOSPITAL | Age: 83
End: 2018-07-06

## 2018-07-06 DIAGNOSIS — I21.4 NON-STEMI (NON-ST ELEVATED MYOCARDIAL INFARCTION) (HCC): Primary | ICD-10-CM

## 2018-07-11 ENCOUNTER — OFFICE VISIT (OUTPATIENT)
Dept: CARDIAC REHAB | Facility: HOSPITAL | Age: 83
End: 2018-07-11

## 2018-07-11 DIAGNOSIS — I21.4 NON-STEMI (NON-ST ELEVATED MYOCARDIAL INFARCTION) (HCC): Primary | ICD-10-CM

## 2018-07-12 ENCOUNTER — OFFICE VISIT (OUTPATIENT)
Dept: INTERNAL MEDICINE | Facility: CLINIC | Age: 83
End: 2018-07-12

## 2018-07-12 VITALS
HEIGHT: 67 IN | SYSTOLIC BLOOD PRESSURE: 120 MMHG | BODY MASS INDEX: 27.78 KG/M2 | TEMPERATURE: 97.4 F | DIASTOLIC BLOOD PRESSURE: 78 MMHG | HEART RATE: 52 BPM | OXYGEN SATURATION: 98 % | WEIGHT: 177 LBS

## 2018-07-12 DIAGNOSIS — M54.50 ACUTE RIGHT-SIDED LOW BACK PAIN WITHOUT SCIATICA: Primary | ICD-10-CM

## 2018-07-12 PROCEDURE — 99213 OFFICE O/P EST LOW 20 MIN: CPT | Performed by: NURSE PRACTITIONER

## 2018-07-13 NOTE — PROGRESS NOTES
Subjective   Vicente Delgado is a 89 y.o. male who presents due to low back pain.    He was doing well with his low back tightness and discomfort until the area was massaged earlier this week. Since then he c/o worsening muscle tightness in right lumbar area which is worse with prolonged sitting/standing. No radicular sx.  He was seen recently and c/o visual disturbance with blurred vision, has seen his opth (Dr. Elias) and is scheduled for cataract surgery next week.      Back Pain   This is a recurrent problem. The current episode started more than 1 month ago. The problem occurs daily. The problem has been waxing and waning since onset. The pain is present in the lumbar spine (RIGHT SIDE ). Quality: intermittent stabbing  The pain does not radiate. The pain is at a severity of 5/10. The pain is moderate. The symptoms are aggravated by sitting, twisting and standing. Pertinent negatives include no abdominal pain, bladder incontinence, bowel incontinence, chest pain, fever, headaches, leg pain, numbness, tingling or weakness. Treatments tried: has started PT which has been helpful. The treatment provided moderate relief.        The following portions of the patient's history were reviewed and updated as appropriate: allergies, current medications, past social history and problem list.    Past Medical History:   Diagnosis Date   • Allergic rhinitis    • Anxiety    • Arthritis    • CAD (coronary artery disease)    • Cancer (CMS/HCC)    • Depression    • Diverticulosis    • Esophagitis    • Gastritis    • GERD (gastroesophageal reflux disease)    • Heart disease    • History of anxiety    • History of prostate cancer 06/1990   • Hyperlipidemia    • Hypertension    • Insomnia    • Lupus    • TAM (nonalcoholic steatohepatitis)    • Sciatica of right side          Current Outpatient Prescriptions:   •  amLODIPine (NORVASC) 5 MG tablet, TAKE 1 TABLET BY MOUTH DAILY, Disp: 90 tablet, Rfl: 0  •  aspirin 81 MG chewable  "tablet, Chew daily., Disp: , Rfl:   •  atenolol (TENORMIN) 25 MG tablet, TAKE 1 TABLET BY MOUTH TWICE DAILY, Disp: 180 tablet, Rfl: 0  •  ezetimibe (ZETIA) 10 MG tablet, TAKE 1 TABLET BY MOUTH DAILY, Disp: 90 tablet, Rfl: 0  •  LORazepam (ATIVAN) 1 MG tablet, Take 1 tablet by mouth Every 6 (Six) Hours As Needed for Anxiety., Disp: 30 tablet, Rfl: 0  •  pantoprazole (PROTONIX) 40 MG EC tablet, TAKE 1 TABLET BY MOUTH DAILY, Disp: 90 tablet, Rfl: 0  •  rosuvastatin (CRESTOR) 10 MG tablet, TAKE 1 TABLET BY MOUTH EVERY NIGHT, Disp: 90 tablet, Rfl: 0  •  Acetaminophen (TYLENOL) 325 MG capsule, Take 1-2 capsules by mouth 3 (Three) Times a Day As Needed (mild pain) for up to 7 days., Disp: 30 capsule, Rfl: 0    Allergies   Allergen Reactions   • Lidocaine      Reaction to novacaine   • Lovastatin    • Pravastatin    • Procaine    • Simvastatin        Review of Systems   Constitutional: Positive for activity change. Negative for appetite change, chills, fever and unexpected weight change.   HENT: Negative for ear pain, sinus pressure and sore throat.    Eyes: Positive for visual disturbance.   Respiratory: Negative for cough, shortness of breath and wheezing.    Cardiovascular: Negative for chest pain, palpitations and leg swelling.   Gastrointestinal: Negative for abdominal pain, blood in stool and bowel incontinence.   Genitourinary: Negative for bladder incontinence and difficulty urinating (no change in bladder function).   Musculoskeletal: Positive for back pain and gait problem.   Neurological: Negative for dizziness, tingling, syncope, weakness, light-headedness, numbness and headaches.   Psychiatric/Behavioral: Negative for dysphoric mood.       Objective   Vitals:    07/12/18 0823   BP: 120/78   BP Location: Left arm   Patient Position: Sitting   Cuff Size: Adult   Pulse: 52   Temp: 97.4 °F (36.3 °C)   TempSrc: Oral   SpO2: 98%   Weight: 80.3 kg (177 lb)   Height: 170.2 cm (67\")     Physical Exam   Constitutional: " He appears well-developed and well-nourished. He is cooperative. He does not have a sickly appearance. He does not appear ill.   HENT:   Head: Normocephalic.   Right Ear: Hearing, tympanic membrane and external ear normal.   Left Ear: Hearing, tympanic membrane and external ear normal.   Nose: Nose normal. No mucosal edema, rhinorrhea, sinus tenderness or nasal deformity. Right sinus exhibits no maxillary sinus tenderness and no frontal sinus tenderness. Left sinus exhibits no maxillary sinus tenderness and no frontal sinus tenderness.   Mouth/Throat: Oropharynx is clear and moist and mucous membranes are normal. Normal dentition.   Eyes: Conjunctivae and lids are normal. Right eye exhibits no discharge and no exudate. Left eye exhibits no discharge and no exudate.   Neck: Trachea normal and normal range of motion. Carotid bruit is not present. No edema present. No thyroid mass and no thyromegaly present.   Cardiovascular: Regular rhythm, normal heart sounds and normal pulses.    No murmur heard.  Pulmonary/Chest: Breath sounds normal. No respiratory distress. He has no decreased breath sounds. He has no wheezes. He has no rhonchi. He has no rales.   Musculoskeletal:        Lumbar back: He exhibits tenderness and spasm.   Lymphadenopathy:        Head (right side): No submental, no submandibular, no tonsillar, no preauricular, no posterior auricular and no occipital adenopathy present.        Head (left side): No submental, no submandibular, no tonsillar, no preauricular, no posterior auricular and no occipital adenopathy present.   Neurological: He is alert. No sensory deficit.   Skin: Skin is warm, dry and intact. No cyanosis. Nails show no clubbing.       Assessment/Plan   Vicente was seen today for back pain.    Diagnoses and all orders for this visit:    Acute right-sided low back pain without sciatica  -     Acetaminophen (TYLENOL) 325 MG capsule; Take 1-2 capsules by mouth 3 (Three) Times a Day As Needed (mild  pain) for up to 7 days.    Apply heat to area and continue PT (attends on Tues & Thur), sx appear more muscular at this time without neurological symptoms. Continue to monitor.

## 2018-07-23 ENCOUNTER — OFFICE VISIT (OUTPATIENT)
Dept: INTERNAL MEDICINE | Facility: CLINIC | Age: 83
End: 2018-07-23

## 2018-07-23 VITALS
WEIGHT: 179 LBS | HEIGHT: 67 IN | DIASTOLIC BLOOD PRESSURE: 68 MMHG | SYSTOLIC BLOOD PRESSURE: 136 MMHG | BODY MASS INDEX: 28.09 KG/M2

## 2018-07-23 DIAGNOSIS — M62.08 DIASTASIS RECTI: Primary | ICD-10-CM

## 2018-07-23 DIAGNOSIS — I25.10 CORONARY ARTERY DISEASE INVOLVING NATIVE CORONARY ARTERY OF NATIVE HEART WITHOUT ANGINA PECTORIS: ICD-10-CM

## 2018-07-23 DIAGNOSIS — I10 ESSENTIAL HYPERTENSION: ICD-10-CM

## 2018-07-23 PROCEDURE — 99214 OFFICE O/P EST MOD 30 MIN: CPT | Performed by: INTERNAL MEDICINE

## 2018-07-23 NOTE — PROGRESS NOTES
Subjective   Vicente Delgado is a 89 y.o. male.     Abdomial  mass         The following portions of the patient's history were reviewed and updated as appropriate: allergies, current medications, past family history, past medical history, past social history, past surgical history and problem list.    Review of Systems   Constitutional:        Generally feeling feeling well    HENT: Positive for hearing loss (Wears aids).    Eyes:        Had cataract removal Dr Elias OS To have OD done soon Taking Gtts QID for cataract surgery    Respiratory: Negative.    Cardiovascular: Negative for chest pain and palpitations.        To see Dr Frausto later this week    Gastrointestinal:        After he sat up from having drops put in eye he noticed a lump in his abdomen Not tender or painful Noticed last Thursday after surgery BMs OK   Genitourinary: Negative.    Musculoskeletal: Negative.        Objective   Physical Exam   Constitutional: He is oriented to person, place, and time. He appears well-developed and well-nourished.   HENT:   Head: Normocephalic.   Eyes: EOM are normal.   Neck: Neck supple.   Cardiovascular: Normal rate, regular rhythm and normal heart sounds.    Re peat 150/80   Pulmonary/Chest: Effort normal and breath sounds normal.   Abdominal: Soft. Bowel sounds are normal. He exhibits no mass. There is no tenderness.   Has diastasis rectii Re aassured as to benign nature of his problem   Musculoskeletal: Normal range of motion.   Neurological: He is alert and oriented to person, place, and time.       Assessment/Plan   Vicente was seen today for hernia.    Diagnoses and all orders for this visit:    Diastasis recti    Coronary artery disease involving native coronary artery of native heart without angina pectoris    Essential hypertension

## 2018-07-26 ENCOUNTER — OFFICE VISIT (OUTPATIENT)
Dept: CARDIOLOGY | Facility: CLINIC | Age: 83
End: 2018-07-26

## 2018-07-26 VITALS
DIASTOLIC BLOOD PRESSURE: 68 MMHG | SYSTOLIC BLOOD PRESSURE: 90 MMHG | HEART RATE: 50 BPM | BODY MASS INDEX: 27.13 KG/M2 | HEIGHT: 68 IN | WEIGHT: 179 LBS

## 2018-07-26 DIAGNOSIS — I34.0 NON-RHEUMATIC MITRAL REGURGITATION: Primary | ICD-10-CM

## 2018-07-26 PROCEDURE — 93000 ELECTROCARDIOGRAM COMPLETE: CPT | Performed by: INTERNAL MEDICINE

## 2018-07-26 PROCEDURE — 99214 OFFICE O/P EST MOD 30 MIN: CPT | Performed by: INTERNAL MEDICINE

## 2018-07-26 NOTE — PROGRESS NOTES
Date of Office Visit: 2018  Encounter Provider: Breezy Frausto MD  Place of Service: [facility] CARDIOLOGY  Patient Name: Vicente Delgado  :1928      Chief Complaint   Patient presents with   • Hypertension     History of Present Illness    The patient is a 89-year-old white male with a history of hypertension, mitral regurgitation  who enters the office today for follow-up evaluation.  Since I saw him 6 months ago he has done actually very well from a cardiac standpoint.  He does not complain of any chest pain or shortness of breath.  He denies any lightheadedness nor dizziness.  Occasionally he will notice some peripheral edema as well as easy fatigability.  I do note today that his blood pressure is rather low.  He does not complain of any dizziness.     He was seen in December he had been complaining of chest pain.  He had a stress Cardiolite evaluation that was unremarkable with respect to coronary disease.  Did have some premature ventricular ectopics.  An echocardiogram was also performed which shows that he has mild aortic valve regurgitation as well as moderate mitral valve regurgitation.  The results of his most recent echo showed that his right-sided chambers appeared to be normal    Past Medical History:   Diagnosis Date   • Allergic rhinitis    • Anxiety    • Arthritis    • CAD (coronary artery disease)    • Cancer (CMS/HCC)    • Depression    • Diverticulosis    • Esophagitis    • Gastritis    • GERD (gastroesophageal reflux disease)    • Heart disease    • History of anxiety    • History of prostate cancer 1990   • Hyperlipidemia    • Hypertension    • Insomnia    • Lupus    • TAM (nonalcoholic steatohepatitis)    • Sciatica of right side          Past Surgical History:   Procedure Laterality Date   • ABDOMINAL SURGERY     • CARDIAC CATHETERIZATION  1995   • CATARACT EXTRACTION Left 2018   • COLONOSCOPY  2002   • ELBOW PROCEDURE     •  JOINT REPLACEMENT     • LUNG BIOPSY     • NASAL POLYP SURGERY     • PACEMAKER IMPLANTATION     • PROSTATE SURGERY  06/1990   • SHOULDER ROTATOR CUFF REPAIR Right 08/24/2011    Dr. Bach   • SIGMOIDOSCOPY     • UPPER GASTROINTESTINAL ENDOSCOPY  09/12/1997    Gastritis, Duodenitis, hiatal hernia (Pathology: Gastric antrum minimal chronic inflammation)   • UPPER GASTROINTESTINAL ENDOSCOPY  04/11/2005    Mild esophagitis, Small hiatal hernia, Mild gastritis and mild duodenitis           Current Outpatient Prescriptions:   •  aspirin 81 MG chewable tablet, Chew daily., Disp: , Rfl:   •  atenolol (TENORMIN) 25 MG tablet, TAKE 1 TABLET BY MOUTH TWICE DAILY, Disp: 180 tablet, Rfl: 0  •  ezetimibe (ZETIA) 10 MG tablet, TAKE 1 TABLET BY MOUTH DAILY, Disp: 90 tablet, Rfl: 0  •  pantoprazole (PROTONIX) 40 MG EC tablet, TAKE 1 TABLET BY MOUTH DAILY, Disp: 90 tablet, Rfl: 0  •  rosuvastatin (CRESTOR) 10 MG tablet, TAKE 1 TABLET BY MOUTH EVERY NIGHT, Disp: 90 tablet, Rfl: 0      Social History     Social History   • Marital status:      Spouse name: N/A   • Number of children: N/A   • Years of education: N/A     Occupational History   • Not on file.     Social History Main Topics   • Smoking status: Former Smoker   • Smokeless tobacco: Never Used      Comment: Quit 15 years ago   • Alcohol use No      Comment: Daily caffeine use   • Drug use: No   • Sexual activity: No     Other Topics Concern   • Not on file     Social History Narrative   • No narrative on file         Review of Systems   Constitution: Positive for malaise/fatigue.   HENT: Negative.    Eyes: Negative.    Cardiovascular: Positive for leg swelling.   Respiratory: Negative.    Endocrine: Negative.    Skin: Negative.    Musculoskeletal: Negative.    Gastrointestinal: Negative.    Neurological: Negative.    Psychiatric/Behavioral: Negative.        Procedures      ECG 12 Lead  Date/Time: 7/26/2018 3:25 PM  Performed by: DASHA ERVIN  Authorized by:  "DASHA ERVIN   Comparison: compared with previous ECG from 3/5/2018  Similar to previous ECG  Rhythm: sinus rhythm  Rate: normal  Conduction: 1st degree  QRS axis: normal  Other findings: PRWP                Objective:    BP 90/68   Pulse 50   Ht 172.7 cm (68\")   Wt 81.2 kg (179 lb)   BMI 27.22 kg/m²         Physical Exam   Constitutional: He is oriented to person, place, and time. He appears well-developed and well-nourished.   HENT:   Head: Normocephalic.   Eyes: Pupils are equal, round, and reactive to light.   Neck: Normal range of motion. No JVD present. Carotid bruit is not present. No thyromegaly present.   Cardiovascular: Normal rate, regular rhythm, S1 normal, S2 normal and intact distal pulses.  Exam reveals no gallop and no friction rub.    Murmur heard.  High-pitched blowing holosystolic murmur is present with a grade of 1/6  at the apex  Pulmonary/Chest: Effort normal and breath sounds normal.   Abdominal: Soft. Bowel sounds are normal.   Musculoskeletal: He exhibits no edema.   Neurological: He is alert and oriented to person, place, and time.   Skin: Skin is warm, dry and intact. No erythema.   Psychiatric: He has a normal mood and affect.   Vitals reviewed.          Assessment:        1.  Hypertension: Patient's blood pressures a little on the low side.  I'm going to discontinue his amlodipine  2.  Ankle edema: At this point I believe it is secondary to amlodipine.  Discontinue  3.  Mitral regurgitation: Asymptomatic   Plan:   Follow-up in one year  "

## 2018-07-27 ENCOUNTER — PATIENT OUTREACH (OUTPATIENT)
Dept: CASE MANAGEMENT | Facility: OTHER | Age: 83
End: 2018-07-27

## 2018-07-27 NOTE — OUTREACH NOTE
Care Management Plan 7/27/2018   Lifestyle Goals Eat a healthy diet;Medication management;Routine follow-up with doctor(s)   Self Management Get flu/pneumonia shot;Medication Adherence   Annual Wellness Visit:  Patient Will Schedule   Care Gaps Addressed Pneumonia Vaccine   Specific Disease Process Teaching Heart Disease   Does patient have depression diagnosis? No   Ed Visits past 12 months: 1   Hospitalizations past 12 months None   Health Literacy Good     The main concerns and/or symptoms the patient would like to address are: Plans for cataract surgery 8/1/18    Education/instruction provided by Care Coordinator: As above, Discuss with Dr. Lang zoster and pneumonia vaccines, AWV due.    Follow Up Outreach Due: As needed    Kyree Kaufman RN

## 2018-08-10 ENCOUNTER — OFFICE VISIT (OUTPATIENT)
Dept: CARDIAC REHAB | Facility: HOSPITAL | Age: 83
End: 2018-08-10

## 2018-08-10 DIAGNOSIS — I21.4 NON-STEMI (NON-ST ELEVATED MYOCARDIAL INFARCTION) (HCC): Primary | ICD-10-CM

## 2018-08-13 ENCOUNTER — OFFICE VISIT (OUTPATIENT)
Dept: CARDIAC REHAB | Facility: HOSPITAL | Age: 83
End: 2018-08-13

## 2018-08-13 DIAGNOSIS — I21.4 NON-STEMI (NON-ST ELEVATED MYOCARDIAL INFARCTION) (HCC): Primary | ICD-10-CM

## 2018-08-13 DIAGNOSIS — E78.5 HYPERLIPEMIA: ICD-10-CM

## 2018-08-13 DIAGNOSIS — E78.2 MIXED HYPERLIPIDEMIA: ICD-10-CM

## 2018-08-13 DIAGNOSIS — K21.00 GASTROESOPHAGEAL REFLUX DISEASE WITH ESOPHAGITIS: ICD-10-CM

## 2018-08-13 RX ORDER — EZETIMIBE 10 MG/1
TABLET ORAL
Qty: 90 TABLET | Refills: 1 | Status: SHIPPED | OUTPATIENT
Start: 2018-08-13 | End: 2018-12-20 | Stop reason: SDUPTHER

## 2018-08-13 RX ORDER — ROSUVASTATIN CALCIUM 10 MG/1
TABLET, COATED ORAL
Qty: 90 TABLET | Refills: 1 | Status: SHIPPED | OUTPATIENT
Start: 2018-08-13 | End: 2018-12-20 | Stop reason: SDUPTHER

## 2018-08-13 RX ORDER — PANTOPRAZOLE SODIUM 40 MG/1
TABLET, DELAYED RELEASE ORAL
Qty: 90 TABLET | Refills: 1 | Status: SHIPPED | OUTPATIENT
Start: 2018-08-13 | End: 2018-12-20 | Stop reason: SDUPTHER

## 2018-08-15 ENCOUNTER — OFFICE VISIT (OUTPATIENT)
Dept: CARDIAC REHAB | Facility: HOSPITAL | Age: 83
End: 2018-08-15

## 2018-08-15 DIAGNOSIS — I21.4 NON-STEMI (NON-ST ELEVATED MYOCARDIAL INFARCTION) (HCC): Primary | ICD-10-CM

## 2018-08-17 ENCOUNTER — OFFICE VISIT (OUTPATIENT)
Dept: CARDIAC REHAB | Facility: HOSPITAL | Age: 83
End: 2018-08-17

## 2018-08-17 DIAGNOSIS — I21.4 NON-STEMI (NON-ST ELEVATED MYOCARDIAL INFARCTION) (HCC): Primary | ICD-10-CM

## 2018-08-20 ENCOUNTER — OFFICE VISIT (OUTPATIENT)
Dept: CARDIAC REHAB | Facility: HOSPITAL | Age: 83
End: 2018-08-20

## 2018-08-20 DIAGNOSIS — I21.4 NON-STEMI (NON-ST ELEVATED MYOCARDIAL INFARCTION) (HCC): Primary | ICD-10-CM

## 2018-08-22 ENCOUNTER — OFFICE VISIT (OUTPATIENT)
Dept: CARDIAC REHAB | Facility: HOSPITAL | Age: 83
End: 2018-08-22

## 2018-08-22 ENCOUNTER — OFFICE VISIT (OUTPATIENT)
Dept: INTERNAL MEDICINE | Facility: CLINIC | Age: 83
End: 2018-08-22

## 2018-08-22 VITALS
HEART RATE: 51 BPM | DIASTOLIC BLOOD PRESSURE: 80 MMHG | WEIGHT: 173 LBS | HEIGHT: 67 IN | BODY MASS INDEX: 27.15 KG/M2 | OXYGEN SATURATION: 98 % | SYSTOLIC BLOOD PRESSURE: 120 MMHG

## 2018-08-22 DIAGNOSIS — I10 ESSENTIAL HYPERTENSION: ICD-10-CM

## 2018-08-22 DIAGNOSIS — I21.4 NON-STEMI (NON-ST ELEVATED MYOCARDIAL INFARCTION) (HCC): Primary | ICD-10-CM

## 2018-08-22 DIAGNOSIS — E78.2 MIXED HYPERLIPIDEMIA: ICD-10-CM

## 2018-08-22 DIAGNOSIS — K21.00 GASTROESOPHAGEAL REFLUX DISEASE WITH ESOPHAGITIS: ICD-10-CM

## 2018-08-22 DIAGNOSIS — I25.10 CORONARY ARTERY DISEASE INVOLVING NATIVE CORONARY ARTERY OF NATIVE HEART WITHOUT ANGINA PECTORIS: Primary | ICD-10-CM

## 2018-08-22 DIAGNOSIS — M15.9 PRIMARY OSTEOARTHRITIS INVOLVING MULTIPLE JOINTS: ICD-10-CM

## 2018-08-22 LAB
ALBUMIN SERPL-MCNC: 4.8 G/DL (ref 3.5–5.2)
ALBUMIN/GLOB SERPL: 1.6 G/DL
ALP SERPL-CCNC: 54 U/L (ref 39–117)
ALT SERPL W P-5'-P-CCNC: 21 U/L (ref 1–41)
ANION GAP SERPL CALCULATED.3IONS-SCNC: 9.8 MMOL/L
AST SERPL-CCNC: 23 U/L (ref 1–40)
BASOPHILS # BLD AUTO: 0.02 10*3/MM3 (ref 0–0.2)
BASOPHILS NFR BLD AUTO: 0.3 % (ref 0–2)
BILIRUB SERPL-MCNC: 0.6 MG/DL (ref 0.1–1.2)
BUN BLD-MCNC: 20 MG/DL (ref 8–23)
BUN/CREAT SERPL: 20.4 (ref 7–25)
CALCIUM SPEC-SCNC: 9.3 MG/DL (ref 8.2–9.6)
CHLORIDE SERPL-SCNC: 102 MMOL/L (ref 98–107)
CHOLEST SERPL-MCNC: 146 MG/DL (ref 0–200)
CO2 SERPL-SCNC: 27.2 MMOL/L (ref 22–29)
CREAT BLD-MCNC: 0.98 MG/DL (ref 0.76–1.27)
DEPRECATED RDW RBC AUTO: 43.4 FL (ref 37–54)
EOSINOPHIL # BLD AUTO: 0.04 10*3/MM3 (ref 0–0.7)
EOSINOPHIL NFR BLD AUTO: 0.7 % (ref 0–5)
ERYTHROCYTE [DISTWIDTH] IN BLOOD BY AUTOMATED COUNT: 12.9 % (ref 11.5–15)
GFR SERPL CREATININE-BSD FRML MDRD: 72 ML/MIN/1.73
GLOBULIN UR ELPH-MCNC: 3 GM/DL
GLUCOSE BLD-MCNC: 80 MG/DL (ref 65–99)
HCT VFR BLD AUTO: 44.7 % (ref 40.1–51)
HDLC SERPL-MCNC: 35 MG/DL (ref 40–60)
HGB BLD-MCNC: 14.8 G/DL (ref 13.7–17.5)
LDLC SERPL CALC-MCNC: 80 MG/DL (ref 0–100)
LDLC/HDLC SERPL: 2.3 {RATIO}
LYMPHOCYTES # BLD AUTO: 1.56 10*3/MM3 (ref 0.8–7)
LYMPHOCYTES NFR BLD AUTO: 27 % (ref 10–60)
MCH RBC QN AUTO: 31.3 PG (ref 26–34)
MCHC RBC AUTO-ENTMCNC: 33.1 G/DL (ref 31–37)
MCV RBC AUTO: 94.5 FL (ref 80–100)
MONOCYTES # BLD AUTO: 0.88 10*3/MM3 (ref 0–1)
MONOCYTES NFR BLD AUTO: 15.3 % (ref 0–13)
NEUTROPHILS # BLD AUTO: 3.27 10*3/MM3 (ref 1–11)
NEUTROPHILS NFR BLD AUTO: 56.7 % (ref 30–85)
PLATELET # BLD AUTO: 210 10*3/MM3 (ref 150–450)
PMV BLD AUTO: 10.6 FL (ref 6–12)
POTASSIUM BLD-SCNC: 4.6 MMOL/L (ref 3.5–5.2)
PROT SERPL-MCNC: 7.8 G/DL (ref 6–8.5)
RBC # BLD AUTO: 4.73 10*6/MM3 (ref 4.63–6.08)
SODIUM BLD-SCNC: 139 MMOL/L (ref 136–145)
TRIGL SERPL-MCNC: 153 MG/DL (ref 0–150)
VLDLC SERPL-MCNC: 30.6 MG/DL (ref 5–40)
WBC NRBC COR # BLD: 5.77 10*3/MM3 (ref 5–10)

## 2018-08-22 PROCEDURE — 36415 COLL VENOUS BLD VENIPUNCTURE: CPT | Performed by: INTERNAL MEDICINE

## 2018-08-22 PROCEDURE — 80061 LIPID PANEL: CPT | Performed by: INTERNAL MEDICINE

## 2018-08-22 PROCEDURE — 80053 COMPREHEN METABOLIC PANEL: CPT | Performed by: INTERNAL MEDICINE

## 2018-08-22 PROCEDURE — 85025 COMPLETE CBC W/AUTO DIFF WBC: CPT | Performed by: INTERNAL MEDICINE

## 2018-08-22 PROCEDURE — 99214 OFFICE O/P EST MOD 30 MIN: CPT | Performed by: INTERNAL MEDICINE

## 2018-08-22 RX ORDER — CLOTRIMAZOLE AND BETAMETHASONE DIPROPIONATE 10; .64 MG/G; MG/G
CREAM TOPICAL
Refills: 2 | COMMUNITY
Start: 2018-08-09 | End: 2020-05-07

## 2018-08-22 NOTE — PROGRESS NOTES
Subjective   Vicente Delgado is a 89 y.o. male.     Hypertension   This is a chronic problem. The current episode started more than 1 year ago. Pertinent negatives include no chest pain or palpitations.        The following portions of the patient's history were reviewed and updated as appropriate: allergies, current medications, past family history, past medical history, past social history, past surgical history and problem list.    Review of Systems   Constitutional: Positive for fatigue (Not as much energy Back to going to rehab 3 days/week).        Here for F/U Doing OK Getting over cataract surgery   HENT: Positive for hearing loss (Wears hearing aids).    Eyes:        Recent cataract surgery   Respiratory: Negative.    Cardiovascular: Negative.  Negative for chest pain and palpitations.   Gastrointestinal: Negative.    Genitourinary: Negative.    Musculoskeletal: Negative.    Neurological: Negative.        Objective   Physical Exam   Constitutional: He is oriented to person, place, and time. He appears well-developed and well-nourished.   HENT:   Head: Normocephalic.   Eyes: EOM are normal.   Neck: Normal range of motion.   Cardiovascular: Normal rate, regular rhythm and normal heart sounds.    Repeat 150/90   Pulmonary/Chest: Effort normal and breath sounds normal.   Musculoskeletal: Normal range of motion.   Neurological: He is alert and oriented to person, place, and time.   Skin: Skin is warm and dry.       Assessment/Plan   Vicente was seen today for hyperlipidemia, hypertension and coronary artery disease.    Diagnoses and all orders for this visit:    Coronary artery disease involving native coronary artery of native heart without angina pectoris    Essential hypertension  -     CBC Auto Differential; Future  -     Comprehensive Metabolic Panel; Future  -     CBC Auto Differential  -     Comprehensive Metabolic Panel    Gastroesophageal reflux disease with esophagitis    Mixed hyperlipidemia  -     Lipid  Panel; Future  -     Lipid Panel    Primary osteoarthritis involving multiple joints

## 2018-09-10 ENCOUNTER — OFFICE VISIT (OUTPATIENT)
Dept: CARDIAC REHAB | Facility: HOSPITAL | Age: 83
End: 2018-09-10

## 2018-09-10 DIAGNOSIS — I21.4 NON-STEMI (NON-ST ELEVATED MYOCARDIAL INFARCTION) (HCC): Primary | ICD-10-CM

## 2018-09-12 ENCOUNTER — OFFICE VISIT (OUTPATIENT)
Dept: CARDIAC REHAB | Facility: HOSPITAL | Age: 83
End: 2018-09-12

## 2018-09-12 DIAGNOSIS — I21.4 NON-STEMI (NON-ST ELEVATED MYOCARDIAL INFARCTION) (HCC): Primary | ICD-10-CM

## 2018-09-14 ENCOUNTER — OFFICE VISIT (OUTPATIENT)
Dept: CARDIAC REHAB | Facility: HOSPITAL | Age: 83
End: 2018-09-14

## 2018-09-14 DIAGNOSIS — I21.4 NON-STEMI (NON-ST ELEVATED MYOCARDIAL INFARCTION) (HCC): Primary | ICD-10-CM

## 2018-09-17 ENCOUNTER — OFFICE VISIT (OUTPATIENT)
Dept: CARDIAC REHAB | Facility: HOSPITAL | Age: 83
End: 2018-09-17

## 2018-09-17 DIAGNOSIS — I21.4 NON-STEMI (NON-ST ELEVATED MYOCARDIAL INFARCTION) (HCC): Primary | ICD-10-CM

## 2018-09-19 ENCOUNTER — PATIENT MESSAGE (OUTPATIENT)
Dept: CARDIOLOGY | Facility: CLINIC | Age: 83
End: 2018-09-19

## 2018-09-19 ENCOUNTER — OFFICE VISIT (OUTPATIENT)
Dept: CARDIAC REHAB | Facility: HOSPITAL | Age: 83
End: 2018-09-19

## 2018-09-19 DIAGNOSIS — I21.4 NON-STEMI (NON-ST ELEVATED MYOCARDIAL INFARCTION) (HCC): Primary | ICD-10-CM

## 2018-09-20 NOTE — TELEPHONE ENCOUNTER
From: Vicente Delgado  To: Breezy Frausto MD  Sent: 9/19/2018 7:30 PM EDT  Subject: Non-Urgent Medical Question    With all the confusion regarding 81 mg. Aspirin, I have been on this dose for over 25 years an louise 89 years old is it necessary for me to continue this aspirin thank you. Vicente Delgado

## 2018-09-21 ENCOUNTER — OFFICE VISIT (OUTPATIENT)
Dept: CARDIAC REHAB | Facility: HOSPITAL | Age: 83
End: 2018-09-21

## 2018-09-21 DIAGNOSIS — I21.4 NON-STEMI (NON-ST ELEVATED MYOCARDIAL INFARCTION) (HCC): Primary | ICD-10-CM

## 2018-09-24 ENCOUNTER — OFFICE VISIT (OUTPATIENT)
Dept: CARDIAC REHAB | Facility: HOSPITAL | Age: 83
End: 2018-09-24

## 2018-09-24 DIAGNOSIS — I21.4 NON-STEMI (NON-ST ELEVATED MYOCARDIAL INFARCTION) (HCC): Primary | ICD-10-CM

## 2018-09-26 ENCOUNTER — OFFICE VISIT (OUTPATIENT)
Dept: CARDIAC REHAB | Facility: HOSPITAL | Age: 83
End: 2018-09-26

## 2018-09-26 DIAGNOSIS — I21.4 NON-STEMI (NON-ST ELEVATED MYOCARDIAL INFARCTION) (HCC): Primary | ICD-10-CM

## 2018-09-28 ENCOUNTER — OFFICE VISIT (OUTPATIENT)
Dept: CARDIAC REHAB | Facility: HOSPITAL | Age: 83
End: 2018-09-28

## 2018-09-28 DIAGNOSIS — I21.4 NON-STEMI (NON-ST ELEVATED MYOCARDIAL INFARCTION) (HCC): Primary | ICD-10-CM

## 2018-10-01 ENCOUNTER — OFFICE VISIT (OUTPATIENT)
Dept: CARDIAC REHAB | Facility: HOSPITAL | Age: 83
End: 2018-10-01

## 2018-10-01 DIAGNOSIS — I21.4 NON-STEMI (NON-ST ELEVATED MYOCARDIAL INFARCTION) (HCC): Primary | ICD-10-CM

## 2018-10-03 ENCOUNTER — OFFICE VISIT (OUTPATIENT)
Dept: CARDIAC REHAB | Facility: HOSPITAL | Age: 83
End: 2018-10-03

## 2018-10-03 DIAGNOSIS — I21.4 NON-STEMI (NON-ST ELEVATED MYOCARDIAL INFARCTION) (HCC): Primary | ICD-10-CM

## 2018-10-03 NOTE — PROGRESS NOTES
Tolerated exercise well, no complaints voiced.   89 yr old male with PMHX of CAD,HTN,Lipid D/O,Anemia,CRI,DM who presents s/p fall with RT hip pain.  1.Pt at moderate risk for franklin-operative cardiac complication.  2.HTN and CAD-asa,lopressor.  3.Anemia-IV Iron.  4.CRI-F/U lytes.  5.DM-Insulin.  6.Pain control.  7.GI and DVT prophylaxis.

## 2018-10-05 ENCOUNTER — TELEPHONE (OUTPATIENT)
Dept: CARDIOLOGY | Facility: CLINIC | Age: 83
End: 2018-10-05

## 2018-10-05 ENCOUNTER — OFFICE VISIT (OUTPATIENT)
Dept: CARDIAC REHAB | Facility: HOSPITAL | Age: 83
End: 2018-10-05

## 2018-10-05 DIAGNOSIS — I21.4 NON-STEMI (NON-ST ELEVATED MYOCARDIAL INFARCTION) (HCC): Primary | ICD-10-CM

## 2018-10-05 RX ORDER — BENAZEPRIL HYDROCHLORIDE 10 MG/1
10 TABLET ORAL 2 TIMES DAILY
Qty: 180 TABLET | Refills: 1 | Status: SHIPPED | OUTPATIENT
Start: 2018-10-05 | End: 2019-03-22 | Stop reason: SDUPTHER

## 2018-10-05 NOTE — TELEPHONE ENCOUNTER
Pt had been taken off of Norvasc by you previously, but recently when they are taking his bp in Cardiac rehab it has been going up. Tuesday it was the highest it has ever been at 185/84. Weds it was 153/86 & today 158/87. However after stopping the Norvasc, the swelling in his ankles disappeared. Do you want to try him on another bp med, he is about to go on a trip to the [facility] next week. Pt # [phone]. dmk

## 2018-10-08 ENCOUNTER — OFFICE VISIT (OUTPATIENT)
Dept: CARDIAC REHAB | Facility: HOSPITAL | Age: 83
End: 2018-10-08

## 2018-10-08 DIAGNOSIS — I21.4 NON-STEMI (NON-ST ELEVATED MYOCARDIAL INFARCTION) (HCC): Primary | ICD-10-CM

## 2018-10-09 ENCOUNTER — EPISODE CHANGES (OUTPATIENT)
Dept: CASE MANAGEMENT | Facility: OTHER | Age: 83
End: 2018-10-09

## 2018-10-17 ENCOUNTER — OFFICE VISIT (OUTPATIENT)
Dept: CARDIAC REHAB | Facility: HOSPITAL | Age: 83
End: 2018-10-17

## 2018-10-17 DIAGNOSIS — I21.4 NON-STEMI (NON-ST ELEVATED MYOCARDIAL INFARCTION) (HCC): Primary | ICD-10-CM

## 2018-10-22 ENCOUNTER — OFFICE VISIT (OUTPATIENT)
Dept: CARDIAC REHAB | Facility: HOSPITAL | Age: 83
End: 2018-10-22

## 2018-10-22 DIAGNOSIS — I21.4 NON-STEMI (NON-ST ELEVATED MYOCARDIAL INFARCTION) (HCC): Primary | ICD-10-CM

## 2018-10-24 ENCOUNTER — OFFICE VISIT (OUTPATIENT)
Dept: CARDIAC REHAB | Facility: HOSPITAL | Age: 83
End: 2018-10-24

## 2018-10-24 DIAGNOSIS — I21.4 NON-STEMI (NON-ST ELEVATED MYOCARDIAL INFARCTION) (HCC): Primary | ICD-10-CM

## 2018-10-26 ENCOUNTER — OFFICE VISIT (OUTPATIENT)
Dept: CARDIAC REHAB | Facility: HOSPITAL | Age: 83
End: 2018-10-26

## 2018-10-26 DIAGNOSIS — I21.4 NON-STEMI (NON-ST ELEVATED MYOCARDIAL INFARCTION) (HCC): Primary | ICD-10-CM

## 2018-10-29 ENCOUNTER — OFFICE VISIT (OUTPATIENT)
Dept: CARDIAC REHAB | Facility: HOSPITAL | Age: 83
End: 2018-10-29

## 2018-10-29 DIAGNOSIS — I21.4 NON-STEMI (NON-ST ELEVATED MYOCARDIAL INFARCTION) (HCC): Primary | ICD-10-CM

## 2018-10-29 DIAGNOSIS — F41.9 ANXIETY DISORDER, UNSPECIFIED TYPE: ICD-10-CM

## 2018-10-29 RX ORDER — LORAZEPAM 1 MG/1
1 TABLET ORAL EVERY 6 HOURS PRN
Qty: 30 TABLET | Refills: 0 | OUTPATIENT
Start: 2018-10-29 | End: 2019-01-17 | Stop reason: SDUPTHER

## 2018-10-29 NOTE — TELEPHONE ENCOUNTER
Last OV   08/22    Next OV not scheduled     NEERAJ done 07/12    please review med refill and advise

## 2018-10-29 NOTE — TELEPHONE ENCOUNTER
----- Message from Shantel Conway sent at 10/25/2018  4:35 PM EDT -----  Regarding: Rx Refill Request  Contact: [phone]  Patient came into the office requesting refill on Lorazepam.  Please call patient when ready for pickup.  Thanks.

## 2018-11-02 ENCOUNTER — OFFICE VISIT (OUTPATIENT)
Dept: INTERNAL MEDICINE | Facility: CLINIC | Age: 83
End: 2018-11-02

## 2018-11-02 ENCOUNTER — OFFICE VISIT (OUTPATIENT)
Dept: CARDIAC REHAB | Facility: HOSPITAL | Age: 83
End: 2018-11-02

## 2018-11-02 VITALS
BODY MASS INDEX: 27.78 KG/M2 | SYSTOLIC BLOOD PRESSURE: 136 MMHG | HEART RATE: 53 BPM | WEIGHT: 177 LBS | DIASTOLIC BLOOD PRESSURE: 84 MMHG | OXYGEN SATURATION: 98 % | HEIGHT: 67 IN

## 2018-11-02 DIAGNOSIS — I21.4 NON-STEMI (NON-ST ELEVATED MYOCARDIAL INFARCTION) (HCC): Primary | ICD-10-CM

## 2018-11-02 DIAGNOSIS — M54.41 ACUTE MIDLINE LOW BACK PAIN WITH RIGHT-SIDED SCIATICA: Primary | ICD-10-CM

## 2018-11-02 PROCEDURE — 99213 OFFICE O/P EST LOW 20 MIN: CPT | Performed by: NURSE PRACTITIONER

## 2018-11-02 RX ORDER — METHYLPREDNISOLONE 4 MG/1
TABLET ORAL
Qty: 21 EACH | Refills: 0 | Status: SHIPPED | OUTPATIENT
Start: 2018-11-02 | End: 2018-12-03

## 2018-11-02 NOTE — PROGRESS NOTES
Subjective   Vicente Delgado is a 89 y.o. male.     He has chronic back pain, however in the last week has had increased in symptoms. He has been very active with his family visiting several places throughout town with increased walking.       Back Pain   This is a chronic problem. Episode onset: worst in last week  The problem occurs constantly. The problem has been gradually worsening since onset. The pain is present in the lumbar spine. The quality of the pain is described as stabbing and aching. Radiates to: right hip and right knee  The pain is at a severity of 5/10. The pain is moderate. The symptoms are aggravated by standing and bending. Associated symptoms include leg pain (right leg ). Pertinent negatives include no abdominal pain, bladder incontinence, bowel incontinence, chest pain, fever, numbness or tingling. Treatments tried: tens unit, massage  The treatment provided moderate relief.        The following portions of the patient's history were reviewed and updated as appropriate: allergies, current medications, past family history, past medical history, past social history, past surgical history and problem list.    Review of Systems   Constitutional: Negative for activity change, appetite change, fatigue and fever.   Respiratory: Negative for cough, shortness of breath and wheezing.    Cardiovascular: Negative for chest pain, palpitations and leg swelling.   Gastrointestinal: Negative for abdominal pain and bowel incontinence.   Genitourinary: Negative for bladder incontinence.   Musculoskeletal: Positive for arthralgias (right knee ) and back pain. Negative for joint swelling.   Neurological: Negative for tingling and numbness.       Objective   Physical Exam   Constitutional: He is oriented to person, place, and time. He appears well-developed and well-nourished.   HENT:   Head: Normocephalic.   Nose: Nose normal.   Cardiovascular: Regular rhythm and normal heart sounds.  Exam reveals no S3 and no  S4.    No murmur heard.  Pulmonary/Chest: Effort normal and breath sounds normal. He has no decreased breath sounds. He has no wheezes. He has no rhonchi. He has no rales.   Musculoskeletal: He exhibits no edema.        Lumbar back: He exhibits decreased range of motion, tenderness and pain. He exhibits no swelling.   (+) SLR    Neurological: He is alert and oriented to person, place, and time. Gait normal.   Skin: Skin is warm and dry.   Psychiatric: He has a normal mood and affect.       Assessment/Plan   Vicente was seen today for lumbar pain.    Diagnoses and all orders for this visit:    Acute midline low back pain with right-sided sciatica  Comments:  needs to stretch, continue with tens unit, heat and massage   Orders:  -     MethylPREDNISolone (MEDROL, GARDENIA,) 4 MG tablet; Take as directed on package instructions.  -     Ambulatory Referral to Physical Therapy Evaluate and treat

## 2018-11-05 ENCOUNTER — OFFICE VISIT (OUTPATIENT)
Dept: CARDIAC REHAB | Facility: HOSPITAL | Age: 83
End: 2018-11-05

## 2018-11-05 DIAGNOSIS — I21.4 NON-STEMI (NON-ST ELEVATED MYOCARDIAL INFARCTION) (HCC): Primary | ICD-10-CM

## 2018-11-09 ENCOUNTER — OFFICE VISIT (OUTPATIENT)
Dept: CARDIAC REHAB | Facility: HOSPITAL | Age: 83
End: 2018-11-09

## 2018-11-09 DIAGNOSIS — I21.4 NON-STEMI (NON-ST ELEVATED MYOCARDIAL INFARCTION) (HCC): Primary | ICD-10-CM

## 2018-11-12 ENCOUNTER — OFFICE VISIT (OUTPATIENT)
Dept: CARDIAC REHAB | Facility: HOSPITAL | Age: 83
End: 2018-11-12

## 2018-11-12 DIAGNOSIS — I21.4 NON-STEMI (NON-ST ELEVATED MYOCARDIAL INFARCTION) (HCC): Primary | ICD-10-CM

## 2018-11-14 ENCOUNTER — OFFICE VISIT (OUTPATIENT)
Dept: CARDIAC REHAB | Facility: HOSPITAL | Age: 83
End: 2018-11-14

## 2018-11-14 DIAGNOSIS — I21.4 NON-STEMI (NON-ST ELEVATED MYOCARDIAL INFARCTION) (HCC): Primary | ICD-10-CM

## 2018-11-16 ENCOUNTER — OFFICE VISIT (OUTPATIENT)
Dept: CARDIAC REHAB | Facility: HOSPITAL | Age: 83
End: 2018-11-16

## 2018-11-16 DIAGNOSIS — I21.4 NON-STEMI (NON-ST ELEVATED MYOCARDIAL INFARCTION) (HCC): Primary | ICD-10-CM

## 2018-11-23 ENCOUNTER — EPISODE CHANGES (OUTPATIENT)
Dept: CASE MANAGEMENT | Facility: OTHER | Age: 83
End: 2018-11-23

## 2018-11-30 ENCOUNTER — OFFICE VISIT (OUTPATIENT)
Dept: CARDIAC REHAB | Facility: HOSPITAL | Age: 83
End: 2018-11-30

## 2018-11-30 DIAGNOSIS — I21.4 NON-STEMI (NON-ST ELEVATED MYOCARDIAL INFARCTION) (HCC): Primary | ICD-10-CM

## 2018-12-03 ENCOUNTER — OFFICE VISIT (OUTPATIENT)
Dept: INTERNAL MEDICINE | Facility: CLINIC | Age: 83
End: 2018-12-03

## 2018-12-03 ENCOUNTER — OFFICE VISIT (OUTPATIENT)
Dept: CARDIAC REHAB | Facility: HOSPITAL | Age: 83
End: 2018-12-03

## 2018-12-03 VITALS
HEIGHT: 67 IN | DIASTOLIC BLOOD PRESSURE: 80 MMHG | WEIGHT: 178 LBS | HEART RATE: 57 BPM | SYSTOLIC BLOOD PRESSURE: 122 MMHG | OXYGEN SATURATION: 98 % | BODY MASS INDEX: 27.94 KG/M2

## 2018-12-03 DIAGNOSIS — Z23 NEED FOR VACCINATION: ICD-10-CM

## 2018-12-03 DIAGNOSIS — I21.4 NON-STEMI (NON-ST ELEVATED MYOCARDIAL INFARCTION) (HCC): Primary | ICD-10-CM

## 2018-12-03 DIAGNOSIS — M54.41 ACUTE MIDLINE LOW BACK PAIN WITH RIGHT-SIDED SCIATICA: Primary | ICD-10-CM

## 2018-12-03 PROCEDURE — 90670 PCV13 VACCINE IM: CPT | Performed by: NURSE PRACTITIONER

## 2018-12-03 PROCEDURE — 99213 OFFICE O/P EST LOW 20 MIN: CPT | Performed by: NURSE PRACTITIONER

## 2018-12-03 PROCEDURE — G0009 ADMIN PNEUMOCOCCAL VACCINE: HCPCS | Performed by: NURSE PRACTITIONER

## 2018-12-03 NOTE — PROGRESS NOTES
Subjective   Vicente Delgado is a 89 y.o. male.     He has been going to PT twice a week and doing much better. He has three more sessions left.       Back Pain   This is a recurrent problem. The problem has been gradually improving since onset. The pain is present in the lumbar spine. The quality of the pain is described as aching. The pain does not radiate. The pain is at a severity of 3/10. The pain is mild. Pertinent negatives include no abdominal pain, bladder incontinence, bowel incontinence, chest pain, fever, leg pain, numbness or tingling. Treatments tried: aleve, oral steroid, PT therapy  The treatment provided moderate relief.        The following portions of the patient's history were reviewed and updated as appropriate: allergies, current medications, past family history, past medical history, past social history, past surgical history and problem list.    Review of Systems   Constitutional: Negative for fever.   Cardiovascular: Negative for chest pain, palpitations and leg swelling.   Gastrointestinal: Negative for abdominal pain and bowel incontinence.   Genitourinary: Negative for bladder incontinence.   Musculoskeletal: Positive for back pain (improved).   Neurological: Negative for dizziness, tingling and numbness.       Objective   Physical Exam   Constitutional: He is oriented to person, place, and time. He appears well-developed and well-nourished.   HENT:   Head: Normocephalic.   Nose: Nose normal.   Cardiovascular: Regular rhythm and normal heart sounds. Exam reveals no S3 and no S4.   No murmur heard.  Pulmonary/Chest: Effort normal and breath sounds normal. He has no decreased breath sounds. He has no wheezes. He has no rhonchi. He has no rales.   Musculoskeletal: He exhibits no edema.        Lumbar back: He exhibits tenderness (mild on left lower back ). He exhibits normal range of motion, no pain and no spasm.        Back:    (-) SLR    Neurological: He is alert and oriented to person,  place, and time. Gait normal.   Reflex Scores:       Patellar reflexes are 2+ on the right side and 2+ on the left side.  Skin: Skin is warm and dry.   Psychiatric: He has a normal mood and affect.       Assessment/Plan   Vicente was seen today for lumbar pain.    Diagnoses and all orders for this visit:    Acute midline low back pain with right-sided sciatica  Comments:  much improved; he will complete PT and return if any changes     Need for vaccination  -     Pneumococcal Conjugate Vaccine 13-Valent All (PCV13)

## 2018-12-05 ENCOUNTER — OFFICE VISIT (OUTPATIENT)
Dept: CARDIAC REHAB | Facility: HOSPITAL | Age: 83
End: 2018-12-05

## 2018-12-05 DIAGNOSIS — I21.4 NON-STEMI (NON-ST ELEVATED MYOCARDIAL INFARCTION) (HCC): Primary | ICD-10-CM

## 2018-12-07 ENCOUNTER — OFFICE VISIT (OUTPATIENT)
Dept: CARDIAC REHAB | Facility: HOSPITAL | Age: 83
End: 2018-12-07

## 2018-12-07 DIAGNOSIS — I21.4 NON-STEMI (NON-ST ELEVATED MYOCARDIAL INFARCTION) (HCC): Primary | ICD-10-CM

## 2018-12-10 ENCOUNTER — OFFICE VISIT (OUTPATIENT)
Dept: CARDIAC REHAB | Facility: HOSPITAL | Age: 83
End: 2018-12-10

## 2018-12-10 DIAGNOSIS — I21.4 NON-STEMI (NON-ST ELEVATED MYOCARDIAL INFARCTION) (HCC): Primary | ICD-10-CM

## 2018-12-12 ENCOUNTER — OFFICE VISIT (OUTPATIENT)
Dept: CARDIAC REHAB | Facility: HOSPITAL | Age: 83
End: 2018-12-12

## 2018-12-12 DIAGNOSIS — I21.4 NON-STEMI (NON-ST ELEVATED MYOCARDIAL INFARCTION) (HCC): Primary | ICD-10-CM

## 2018-12-14 ENCOUNTER — TRANSCRIBE ORDERS (OUTPATIENT)
Dept: CARDIAC REHAB | Facility: HOSPITAL | Age: 83
End: 2018-12-14

## 2018-12-14 ENCOUNTER — OFFICE VISIT (OUTPATIENT)
Dept: CARDIAC REHAB | Facility: HOSPITAL | Age: 83
End: 2018-12-14

## 2018-12-14 DIAGNOSIS — I21.4 NON-STEMI (NON-ST ELEVATED MYOCARDIAL INFARCTION) (HCC): Primary | ICD-10-CM

## 2018-12-17 ENCOUNTER — OFFICE VISIT (OUTPATIENT)
Dept: CARDIAC REHAB | Facility: HOSPITAL | Age: 83
End: 2018-12-17

## 2018-12-17 DIAGNOSIS — I21.4 NON-STEMI (NON-ST ELEVATED MYOCARDIAL INFARCTION) (HCC): Primary | ICD-10-CM

## 2018-12-19 ENCOUNTER — OFFICE VISIT (OUTPATIENT)
Dept: CARDIAC REHAB | Facility: HOSPITAL | Age: 83
End: 2018-12-19

## 2018-12-19 DIAGNOSIS — I21.4 NON-STEMI (NON-ST ELEVATED MYOCARDIAL INFARCTION) (HCC): Primary | ICD-10-CM

## 2018-12-20 DIAGNOSIS — K21.00 GASTROESOPHAGEAL REFLUX DISEASE WITH ESOPHAGITIS: ICD-10-CM

## 2018-12-20 DIAGNOSIS — E78.5 HYPERLIPEMIA: ICD-10-CM

## 2018-12-20 DIAGNOSIS — E78.2 MIXED HYPERLIPIDEMIA: ICD-10-CM

## 2018-12-20 RX ORDER — ROSUVASTATIN CALCIUM 10 MG/1
TABLET, COATED ORAL
Qty: 90 TABLET | Refills: 0 | Status: SHIPPED | OUTPATIENT
Start: 2018-12-20 | End: 2019-02-12 | Stop reason: SDUPTHER

## 2018-12-20 RX ORDER — PANTOPRAZOLE SODIUM 40 MG/1
TABLET, DELAYED RELEASE ORAL
Qty: 90 TABLET | Refills: 0 | Status: SHIPPED | OUTPATIENT
Start: 2018-12-20 | End: 2019-02-12 | Stop reason: SDUPTHER

## 2018-12-20 RX ORDER — EZETIMIBE 10 MG/1
TABLET ORAL
Qty: 90 TABLET | Refills: 0 | Status: SHIPPED | OUTPATIENT
Start: 2018-12-20 | End: 2019-02-13 | Stop reason: SDUPTHER

## 2018-12-22 DIAGNOSIS — I10 ESSENTIAL HYPERTENSION: ICD-10-CM

## 2018-12-24 ENCOUNTER — OFFICE VISIT (OUTPATIENT)
Dept: CARDIAC REHAB | Facility: HOSPITAL | Age: 83
End: 2018-12-24

## 2018-12-24 DIAGNOSIS — I21.4 NON-STEMI (NON-ST ELEVATED MYOCARDIAL INFARCTION) (HCC): Primary | ICD-10-CM

## 2018-12-24 RX ORDER — ATENOLOL 25 MG/1
TABLET ORAL
Qty: 180 TABLET | Refills: 0 | Status: SHIPPED | OUTPATIENT
Start: 2018-12-24 | End: 2019-03-22 | Stop reason: SDUPTHER

## 2018-12-28 ENCOUNTER — OFFICE VISIT (OUTPATIENT)
Dept: CARDIAC REHAB | Facility: HOSPITAL | Age: 83
End: 2018-12-28

## 2018-12-28 DIAGNOSIS — I21.4 NON-STEMI (NON-ST ELEVATED MYOCARDIAL INFARCTION) (HCC): Primary | ICD-10-CM

## 2018-12-31 ENCOUNTER — OFFICE VISIT (OUTPATIENT)
Dept: CARDIAC REHAB | Facility: HOSPITAL | Age: 83
End: 2018-12-31

## 2018-12-31 DIAGNOSIS — I21.4 NON-STEMI (NON-ST ELEVATED MYOCARDIAL INFARCTION) (HCC): Primary | ICD-10-CM

## 2019-01-02 ENCOUNTER — OFFICE VISIT (OUTPATIENT)
Dept: CARDIAC REHAB | Facility: HOSPITAL | Age: 84
End: 2019-01-02

## 2019-01-02 DIAGNOSIS — I21.4 NON-STEMI (NON-ST ELEVATED MYOCARDIAL INFARCTION) (HCC): Primary | ICD-10-CM

## 2019-01-04 ENCOUNTER — OFFICE VISIT (OUTPATIENT)
Dept: CARDIAC REHAB | Facility: HOSPITAL | Age: 84
End: 2019-01-04

## 2019-01-04 DIAGNOSIS — I21.4 NON-STEMI (NON-ST ELEVATED MYOCARDIAL INFARCTION) (HCC): Primary | ICD-10-CM

## 2019-01-07 ENCOUNTER — OFFICE VISIT (OUTPATIENT)
Dept: CARDIAC REHAB | Facility: HOSPITAL | Age: 84
End: 2019-01-07

## 2019-01-07 DIAGNOSIS — I21.4 NON-STEMI (NON-ST ELEVATED MYOCARDIAL INFARCTION) (HCC): Primary | ICD-10-CM

## 2019-01-16 ENCOUNTER — OFFICE VISIT (OUTPATIENT)
Dept: CARDIAC REHAB | Facility: HOSPITAL | Age: 84
End: 2019-01-16

## 2019-01-16 DIAGNOSIS — I21.4 NON-STEMI (NON-ST ELEVATED MYOCARDIAL INFARCTION) (HCC): Primary | ICD-10-CM

## 2019-01-17 ENCOUNTER — OFFICE VISIT (OUTPATIENT)
Dept: INTERNAL MEDICINE | Facility: CLINIC | Age: 84
End: 2019-01-17

## 2019-01-17 ENCOUNTER — TELEPHONE (OUTPATIENT)
Dept: INTERNAL MEDICINE | Facility: CLINIC | Age: 84
End: 2019-01-17

## 2019-01-17 VITALS
OXYGEN SATURATION: 98 % | SYSTOLIC BLOOD PRESSURE: 130 MMHG | WEIGHT: 176 LBS | HEART RATE: 54 BPM | BODY MASS INDEX: 27.62 KG/M2 | HEIGHT: 67 IN | DIASTOLIC BLOOD PRESSURE: 78 MMHG

## 2019-01-17 DIAGNOSIS — K21.00 GASTROESOPHAGEAL REFLUX DISEASE WITH ESOPHAGITIS: ICD-10-CM

## 2019-01-17 DIAGNOSIS — M54.41 ACUTE MIDLINE LOW BACK PAIN WITH RIGHT-SIDED SCIATICA: ICD-10-CM

## 2019-01-17 DIAGNOSIS — F41.9 ANXIETY DISORDER, UNSPECIFIED TYPE: ICD-10-CM

## 2019-01-17 DIAGNOSIS — I10 ESSENTIAL HYPERTENSION: ICD-10-CM

## 2019-01-17 DIAGNOSIS — E78.5 HYPERLIPIDEMIA, UNSPECIFIED HYPERLIPIDEMIA TYPE: Primary | ICD-10-CM

## 2019-01-17 LAB
ALBUMIN SERPL-MCNC: 4.6 G/DL (ref 3.5–5.2)
ALBUMIN/GLOB SERPL: 1.5 G/DL
ALP SERPL-CCNC: 49 U/L (ref 39–117)
ALT SERPL-CCNC: 21 U/L (ref 1–41)
AST SERPL-CCNC: 27 U/L (ref 1–40)
BILIRUB SERPL-MCNC: 0.7 MG/DL (ref 0.1–1.2)
BUN SERPL-MCNC: 22 MG/DL (ref 8–23)
BUN/CREAT SERPL: 21.6 (ref 7–25)
CALCIUM SERPL-MCNC: 9.8 MG/DL (ref 8.2–9.6)
CHLORIDE SERPL-SCNC: 101 MMOL/L (ref 98–107)
CHOLEST SERPL-MCNC: 138 MG/DL (ref 0–200)
CO2 SERPL-SCNC: 25.4 MMOL/L (ref 22–29)
CREAT SERPL-MCNC: 1.02 MG/DL (ref 0.76–1.27)
DEPRECATED RDW RBC AUTO: 44.7 FL (ref 37–54)
ERYTHROCYTE [DISTWIDTH] IN BLOOD BY AUTOMATED COUNT: 13.3 % (ref 11.5–15)
GLOBULIN SER CALC-MCNC: 3.1 GM/DL
GLUCOSE SERPL-MCNC: 86 MG/DL (ref 65–99)
HCT VFR BLD AUTO: 43.6 % (ref 40.1–51)
HDLC SERPL-MCNC: 44 MG/DL (ref 40–60)
HGB BLD-MCNC: 14.7 G/DL (ref 13.7–17.5)
LDLC SERPL CALC-MCNC: 77 MG/DL (ref 0–100)
MCH RBC QN AUTO: 31.2 PG (ref 26–34)
MCHC RBC AUTO-ENTMCNC: 33.7 G/DL (ref 31–37)
MCV RBC AUTO: 92.6 FL (ref 80–100)
PLATELET # BLD AUTO: 181 10*3/MM3 (ref 150–450)
PMV BLD AUTO: 10.4 FL (ref 6–12)
POTASSIUM SERPL-SCNC: 4.5 MMOL/L (ref 3.5–5.2)
PROT SERPL-MCNC: 7.7 G/DL (ref 6–8.5)
RBC # BLD AUTO: 4.71 10*6/MM3 (ref 4.63–6.08)
SODIUM SERPL-SCNC: 141 MMOL/L (ref 136–145)
TRIGL SERPL-MCNC: 83 MG/DL (ref 0–150)
VLDLC SERPL-MCNC: 16.6 MG/DL (ref 5–40)
WBC NRBC COR # BLD: 5.55 10*3/MM3 (ref 5–10)

## 2019-01-17 PROCEDURE — 85027 COMPLETE CBC AUTOMATED: CPT | Performed by: NURSE PRACTITIONER

## 2019-01-17 PROCEDURE — 99214 OFFICE O/P EST MOD 30 MIN: CPT | Performed by: NURSE PRACTITIONER

## 2019-01-17 RX ORDER — LORAZEPAM 1 MG/1
1 TABLET ORAL EVERY 6 HOURS PRN
Qty: 30 TABLET | Refills: 0 | OUTPATIENT
Start: 2019-01-17 | End: 2019-07-31 | Stop reason: SDUPTHER

## 2019-01-17 NOTE — PROGRESS NOTES
Subjective   Vicente Delgado is a 89 y.o. male.     He has been doing back exercises every other day. He has been going to massage therapist every other week, which has helped with his back pain. He just returned [place] and cardiac rehab yesterday and everything went well.       Hyperlipidemia   This is a chronic problem. The current episode started more than 1 year ago. The problem is controlled. Recent lipid tests were reviewed and are normal. Pertinent negatives include no chest pain, leg pain, myalgias or shortness of breath. Current antihyperlipidemic treatment includes statins. The current treatment provides significant improvement of lipids.   Hypertension   This is a chronic problem. The current episode started more than 1 year ago. The problem is controlled. Pertinent negatives include no anxiety, blurred vision, chest pain, headaches, orthopnea, palpitations, peripheral edema, PND or shortness of breath. Current antihypertension treatment includes beta blockers and ACE inhibitors. The current treatment provides significant improvement.        The following portions of the patient's history were reviewed and updated as appropriate: allergies, current medications, past family history, past medical history, past social history, past surgical history and problem list.    Review of Systems   Constitutional: Negative for activity change, appetite change, fatigue and fever.        Overall doing well    Eyes: Negative for blurred vision.   Respiratory: Negative for cough, shortness of breath and wheezing.    Cardiovascular: Negative for chest pain, palpitations, orthopnea, leg swelling and PND.   Musculoskeletal: Positive for back pain (improved with PT exercises and massage therapy ). Negative for myalgias.   Neurological: Negative for dizziness, speech difficulty, weakness and headaches.       Objective   Physical Exam   Constitutional: He is oriented to person, place, and time. He appears well-developed and  well-nourished.   HENT:   Head: Normocephalic.   Right Ear: Decreased hearing is noted.   Left Ear: Decreased hearing is noted.   Nose: Nose normal.   Bilateral hearing aids    Cardiovascular: Regular rhythm. Exam reveals no S3 and no S4.   Murmur heard.  Repeat bp left arm 132/78  No pedal edema    Pulmonary/Chest: Effort normal and breath sounds normal. He has no decreased breath sounds. He has no wheezes. He has no rhonchi. He has no rales.   Musculoskeletal: He exhibits no edema.   Neurological: He is alert and oriented to person, place, and time. Gait normal.   Skin: Skin is warm and dry.   Psychiatric: He has a normal mood and affect.       Assessment/Plan   Vicente was seen today for hyperlipidemia and hypertension.    Diagnoses and all orders for this visit:    Hyperlipidemia, unspecified hyperlipidemia type  -     Lipid Panel; Future  -     Lipid Panel    Essential hypertension  -     Comprehensive Metabolic Panel; Future  -     CBC (No Diff); Future  -     Comprehensive Metabolic Panel  -     CBC (No Diff)    Gastroesophageal reflux disease with esophagitis    Acute midline low back pain with right-sided sciatica  Comments:  resolved     Anxiety disorder, unspecified type  Comments:  stable; using ativan sparingly   Orders:  -     LORazepam (ATIVAN) 1 MG tablet; Take 1 tablet by mouth Every 6 (Six) Hours As Needed for Anxiety.

## 2019-01-18 ENCOUNTER — OFFICE VISIT (OUTPATIENT)
Dept: CARDIAC REHAB | Facility: HOSPITAL | Age: 84
End: 2019-01-18

## 2019-01-18 DIAGNOSIS — I21.4 NON-STEMI (NON-ST ELEVATED MYOCARDIAL INFARCTION) (HCC): Primary | ICD-10-CM

## 2019-01-21 ENCOUNTER — OFFICE VISIT (OUTPATIENT)
Dept: CARDIAC REHAB | Facility: HOSPITAL | Age: 84
End: 2019-01-21

## 2019-01-21 DIAGNOSIS — I21.4 NON-STEMI (NON-ST ELEVATED MYOCARDIAL INFARCTION) (HCC): Primary | ICD-10-CM

## 2019-01-23 ENCOUNTER — OFFICE VISIT (OUTPATIENT)
Dept: CARDIAC REHAB | Facility: HOSPITAL | Age: 84
End: 2019-01-23

## 2019-01-23 DIAGNOSIS — I21.4 NON-STEMI (NON-ST ELEVATED MYOCARDIAL INFARCTION) (HCC): Primary | ICD-10-CM

## 2019-01-25 ENCOUNTER — OFFICE VISIT (OUTPATIENT)
Dept: CARDIAC REHAB | Facility: HOSPITAL | Age: 84
End: 2019-01-25

## 2019-01-25 DIAGNOSIS — I21.4 NON-STEMI (NON-ST ELEVATED MYOCARDIAL INFARCTION) (HCC): Primary | ICD-10-CM

## 2019-01-28 ENCOUNTER — OFFICE VISIT (OUTPATIENT)
Dept: CARDIAC REHAB | Facility: HOSPITAL | Age: 84
End: 2019-01-28

## 2019-01-28 DIAGNOSIS — I21.4 NON-STEMI (NON-ST ELEVATED MYOCARDIAL INFARCTION) (HCC): Primary | ICD-10-CM

## 2019-02-01 ENCOUNTER — OFFICE VISIT (OUTPATIENT)
Dept: CARDIAC REHAB | Facility: HOSPITAL | Age: 84
End: 2019-02-01

## 2019-02-01 DIAGNOSIS — I21.4 NON-STEMI (NON-ST ELEVATED MYOCARDIAL INFARCTION) (HCC): Primary | ICD-10-CM

## 2019-02-04 ENCOUNTER — OFFICE VISIT (OUTPATIENT)
Dept: CARDIAC REHAB | Facility: HOSPITAL | Age: 84
End: 2019-02-04

## 2019-02-04 DIAGNOSIS — I21.4 NON-STEMI (NON-ST ELEVATED MYOCARDIAL INFARCTION) (HCC): Primary | ICD-10-CM

## 2019-02-06 ENCOUNTER — OFFICE VISIT (OUTPATIENT)
Dept: CARDIAC REHAB | Facility: HOSPITAL | Age: 84
End: 2019-02-06

## 2019-02-06 DIAGNOSIS — I21.4 NON-STEMI (NON-ST ELEVATED MYOCARDIAL INFARCTION) (HCC): Primary | ICD-10-CM

## 2019-02-08 ENCOUNTER — OFFICE VISIT (OUTPATIENT)
Dept: CARDIAC REHAB | Facility: HOSPITAL | Age: 84
End: 2019-02-08

## 2019-02-08 DIAGNOSIS — I21.4 NON-STEMI (NON-ST ELEVATED MYOCARDIAL INFARCTION) (HCC): Primary | ICD-10-CM

## 2019-02-11 ENCOUNTER — OFFICE VISIT (OUTPATIENT)
Dept: CARDIAC REHAB | Facility: HOSPITAL | Age: 84
End: 2019-02-11

## 2019-02-11 DIAGNOSIS — I21.4 NON-STEMI (NON-ST ELEVATED MYOCARDIAL INFARCTION) (HCC): Primary | ICD-10-CM

## 2019-02-12 DIAGNOSIS — E78.2 MIXED HYPERLIPIDEMIA: ICD-10-CM

## 2019-02-12 DIAGNOSIS — K21.00 GASTROESOPHAGEAL REFLUX DISEASE WITH ESOPHAGITIS: ICD-10-CM

## 2019-02-12 RX ORDER — PANTOPRAZOLE SODIUM 40 MG/1
40 TABLET, DELAYED RELEASE ORAL DAILY
Qty: 90 TABLET | Refills: 1 | Status: SHIPPED | OUTPATIENT
Start: 2019-02-12 | End: 2019-09-13 | Stop reason: SDUPTHER

## 2019-02-12 RX ORDER — ROSUVASTATIN CALCIUM 10 MG/1
10 TABLET, COATED ORAL NIGHTLY
Qty: 90 TABLET | Refills: 1 | Status: SHIPPED | OUTPATIENT
Start: 2019-02-12 | End: 2019-09-13 | Stop reason: SDUPTHER

## 2019-02-13 ENCOUNTER — OFFICE VISIT (OUTPATIENT)
Dept: CARDIAC REHAB | Facility: HOSPITAL | Age: 84
End: 2019-02-13

## 2019-02-13 DIAGNOSIS — E78.5 HYPERLIPEMIA: ICD-10-CM

## 2019-02-13 DIAGNOSIS — I21.4 NON-STEMI (NON-ST ELEVATED MYOCARDIAL INFARCTION) (HCC): Primary | ICD-10-CM

## 2019-02-13 RX ORDER — EZETIMIBE 10 MG/1
10 TABLET ORAL DAILY
Qty: 90 TABLET | Refills: 0 | Status: SHIPPED | OUTPATIENT
Start: 2019-02-13 | End: 2019-09-13 | Stop reason: SDUPTHER

## 2019-02-15 ENCOUNTER — OFFICE VISIT (OUTPATIENT)
Dept: CARDIAC REHAB | Facility: HOSPITAL | Age: 84
End: 2019-02-15

## 2019-02-15 DIAGNOSIS — I21.4 NON-STEMI (NON-ST ELEVATED MYOCARDIAL INFARCTION) (HCC): Primary | ICD-10-CM

## 2019-02-18 ENCOUNTER — OFFICE VISIT (OUTPATIENT)
Dept: CARDIAC REHAB | Facility: HOSPITAL | Age: 84
End: 2019-02-18

## 2019-02-18 DIAGNOSIS — I21.4 NON-STEMI (NON-ST ELEVATED MYOCARDIAL INFARCTION) (HCC): Primary | ICD-10-CM

## 2019-02-20 ENCOUNTER — OFFICE VISIT (OUTPATIENT)
Dept: CARDIAC REHAB | Facility: HOSPITAL | Age: 84
End: 2019-02-20

## 2019-02-20 DIAGNOSIS — I21.4 NON-STEMI (NON-ST ELEVATED MYOCARDIAL INFARCTION) (HCC): Primary | ICD-10-CM

## 2019-02-22 ENCOUNTER — OFFICE VISIT (OUTPATIENT)
Dept: CARDIAC REHAB | Facility: HOSPITAL | Age: 84
End: 2019-02-22

## 2019-02-22 DIAGNOSIS — I21.4 NON-STEMI (NON-ST ELEVATED MYOCARDIAL INFARCTION) (HCC): Primary | ICD-10-CM

## 2019-02-25 ENCOUNTER — OFFICE VISIT (OUTPATIENT)
Dept: CARDIAC REHAB | Facility: HOSPITAL | Age: 84
End: 2019-02-25

## 2019-02-25 DIAGNOSIS — I21.4 NON-STEMI (NON-ST ELEVATED MYOCARDIAL INFARCTION) (HCC): Primary | ICD-10-CM

## 2019-02-27 ENCOUNTER — OFFICE VISIT (OUTPATIENT)
Dept: CARDIAC REHAB | Facility: HOSPITAL | Age: 84
End: 2019-02-27

## 2019-02-27 DIAGNOSIS — I21.4 NON-STEMI (NON-ST ELEVATED MYOCARDIAL INFARCTION) (HCC): Primary | ICD-10-CM

## 2019-03-04 ENCOUNTER — OFFICE VISIT (OUTPATIENT)
Dept: CARDIAC REHAB | Facility: HOSPITAL | Age: 84
End: 2019-03-04

## 2019-03-04 DIAGNOSIS — I21.4 NON-STEMI (NON-ST ELEVATED MYOCARDIAL INFARCTION) (HCC): Primary | ICD-10-CM

## 2019-03-06 ENCOUNTER — OFFICE VISIT (OUTPATIENT)
Dept: CARDIAC REHAB | Facility: HOSPITAL | Age: 84
End: 2019-03-06

## 2019-03-06 DIAGNOSIS — I21.4 NON-STEMI (NON-ST ELEVATED MYOCARDIAL INFARCTION) (HCC): Primary | ICD-10-CM

## 2019-03-22 ENCOUNTER — OFFICE VISIT (OUTPATIENT)
Dept: CARDIAC REHAB | Facility: HOSPITAL | Age: 84
End: 2019-03-22

## 2019-03-22 DIAGNOSIS — I21.4 NON-STEMI (NON-ST ELEVATED MYOCARDIAL INFARCTION) (HCC): Primary | ICD-10-CM

## 2019-03-22 DIAGNOSIS — I10 ESSENTIAL HYPERTENSION: ICD-10-CM

## 2019-03-22 RX ORDER — ATENOLOL 25 MG/1
25 TABLET ORAL 2 TIMES DAILY
Qty: 180 TABLET | Refills: 0 | Status: SHIPPED | OUTPATIENT
Start: 2019-03-22 | End: 2019-09-13 | Stop reason: SDUPTHER

## 2019-03-25 ENCOUNTER — OFFICE VISIT (OUTPATIENT)
Dept: CARDIAC REHAB | Facility: HOSPITAL | Age: 84
End: 2019-03-25

## 2019-03-25 DIAGNOSIS — I21.4 NON-STEMI (NON-ST ELEVATED MYOCARDIAL INFARCTION) (HCC): Primary | ICD-10-CM

## 2019-03-25 RX ORDER — BENAZEPRIL HYDROCHLORIDE 10 MG/1
TABLET ORAL
Qty: 180 TABLET | Refills: 2 | Status: SHIPPED | OUTPATIENT
Start: 2019-03-25 | End: 2019-12-12 | Stop reason: SDUPTHER

## 2019-03-27 ENCOUNTER — OFFICE VISIT (OUTPATIENT)
Dept: CARDIAC REHAB | Facility: HOSPITAL | Age: 84
End: 2019-03-27

## 2019-03-27 DIAGNOSIS — I21.4 NON-STEMI (NON-ST ELEVATED MYOCARDIAL INFARCTION) (HCC): Primary | ICD-10-CM

## 2019-03-29 ENCOUNTER — OFFICE VISIT (OUTPATIENT)
Dept: CARDIAC REHAB | Facility: HOSPITAL | Age: 84
End: 2019-03-29

## 2019-03-29 DIAGNOSIS — I21.4 NON-STEMI (NON-ST ELEVATED MYOCARDIAL INFARCTION) (HCC): Primary | ICD-10-CM

## 2019-04-01 ENCOUNTER — OFFICE VISIT (OUTPATIENT)
Dept: CARDIAC REHAB | Facility: HOSPITAL | Age: 84
End: 2019-04-01

## 2019-04-01 DIAGNOSIS — I21.4 NON-STEMI (NON-ST ELEVATED MYOCARDIAL INFARCTION) (HCC): Primary | ICD-10-CM

## 2019-04-05 ENCOUNTER — OFFICE VISIT (OUTPATIENT)
Dept: CARDIAC REHAB | Facility: HOSPITAL | Age: 84
End: 2019-04-05

## 2019-04-05 DIAGNOSIS — I21.4 NON-STEMI (NON-ST ELEVATED MYOCARDIAL INFARCTION) (HCC): Primary | ICD-10-CM

## 2019-04-08 ENCOUNTER — OFFICE VISIT (OUTPATIENT)
Dept: CARDIAC REHAB | Facility: HOSPITAL | Age: 84
End: 2019-04-08

## 2019-04-08 DIAGNOSIS — I21.4 NON-STEMI (NON-ST ELEVATED MYOCARDIAL INFARCTION) (HCC): Primary | ICD-10-CM

## 2019-04-10 ENCOUNTER — OFFICE VISIT (OUTPATIENT)
Dept: CARDIAC REHAB | Facility: HOSPITAL | Age: 84
End: 2019-04-10

## 2019-04-10 DIAGNOSIS — I21.4 NON-STEMI (NON-ST ELEVATED MYOCARDIAL INFARCTION) (HCC): Primary | ICD-10-CM

## 2019-04-12 ENCOUNTER — OFFICE VISIT (OUTPATIENT)
Dept: CARDIAC REHAB | Facility: HOSPITAL | Age: 84
End: 2019-04-12

## 2019-04-12 DIAGNOSIS — I21.4 NON-STEMI (NON-ST ELEVATED MYOCARDIAL INFARCTION) (HCC): Primary | ICD-10-CM

## 2019-04-15 ENCOUNTER — OFFICE VISIT (OUTPATIENT)
Dept: CARDIAC REHAB | Facility: HOSPITAL | Age: 84
End: 2019-04-15

## 2019-04-15 DIAGNOSIS — I21.4 NON-STEMI (NON-ST ELEVATED MYOCARDIAL INFARCTION) (HCC): Primary | ICD-10-CM

## 2019-04-17 ENCOUNTER — OFFICE VISIT (OUTPATIENT)
Dept: CARDIAC REHAB | Facility: HOSPITAL | Age: 84
End: 2019-04-17

## 2019-04-17 DIAGNOSIS — I21.4 NON-STEMI (NON-ST ELEVATED MYOCARDIAL INFARCTION) (HCC): Primary | ICD-10-CM

## 2019-04-19 ENCOUNTER — OFFICE VISIT (OUTPATIENT)
Dept: CARDIAC REHAB | Facility: HOSPITAL | Age: 84
End: 2019-04-19

## 2019-04-19 DIAGNOSIS — I21.4 NON-STEMI (NON-ST ELEVATED MYOCARDIAL INFARCTION) (HCC): Primary | ICD-10-CM

## 2019-04-22 ENCOUNTER — OFFICE VISIT (OUTPATIENT)
Dept: CARDIAC REHAB | Facility: HOSPITAL | Age: 84
End: 2019-04-22

## 2019-04-22 ENCOUNTER — OFFICE VISIT (OUTPATIENT)
Dept: INTERNAL MEDICINE | Facility: CLINIC | Age: 84
End: 2019-04-22

## 2019-04-22 VITALS
HEART RATE: 57 BPM | BODY MASS INDEX: 27.47 KG/M2 | DIASTOLIC BLOOD PRESSURE: 82 MMHG | HEIGHT: 67 IN | SYSTOLIC BLOOD PRESSURE: 130 MMHG | WEIGHT: 175 LBS | OXYGEN SATURATION: 94 %

## 2019-04-22 DIAGNOSIS — F41.9 ANXIETY DISORDER, UNSPECIFIED TYPE: ICD-10-CM

## 2019-04-22 DIAGNOSIS — I21.4 NON-STEMI (NON-ST ELEVATED MYOCARDIAL INFARCTION) (HCC): Primary | ICD-10-CM

## 2019-04-22 DIAGNOSIS — E78.5 HYPERLIPIDEMIA, UNSPECIFIED HYPERLIPIDEMIA TYPE: ICD-10-CM

## 2019-04-22 DIAGNOSIS — I10 ESSENTIAL HYPERTENSION: Primary | ICD-10-CM

## 2019-04-22 LAB
ALBUMIN SERPL-MCNC: 4.7 G/DL (ref 3.5–5.2)
ALBUMIN/GLOB SERPL: 1.7 G/DL
ALP SERPL-CCNC: 49 U/L (ref 39–117)
ALT SERPL-CCNC: 19 U/L (ref 1–41)
AST SERPL-CCNC: 23 U/L (ref 1–40)
BILIRUB SERPL-MCNC: 0.6 MG/DL (ref 0.2–1.2)
BUN SERPL-MCNC: 19 MG/DL (ref 8–23)
BUN/CREAT SERPL: 19.8 (ref 7–25)
CALCIUM SERPL-MCNC: 9.9 MG/DL (ref 8.2–9.6)
CHLORIDE SERPL-SCNC: 101 MMOL/L (ref 98–107)
CHOLEST SERPL-MCNC: 141 MG/DL (ref 0–200)
CO2 SERPL-SCNC: 28.8 MMOL/L (ref 22–29)
CREAT SERPL-MCNC: 0.96 MG/DL (ref 0.76–1.27)
DEPRECATED RDW RBC AUTO: 43.6 FL (ref 37–54)
ERYTHROCYTE [DISTWIDTH] IN BLOOD BY AUTOMATED COUNT: 12.9 % (ref 12.3–15.4)
GLOBULIN SER CALC-MCNC: 2.7 GM/DL
GLUCOSE SERPL-MCNC: 88 MG/DL (ref 65–99)
HCT VFR BLD AUTO: 43.9 % (ref 37.5–51)
HDLC SERPL-MCNC: 39 MG/DL (ref 40–60)
HGB BLD-MCNC: 14.4 G/DL (ref 13–17.7)
LDLC SERPL CALC-MCNC: 73 MG/DL (ref 0–100)
MCH RBC QN AUTO: 31 PG (ref 26.6–33)
MCHC RBC AUTO-ENTMCNC: 32.8 G/DL (ref 31.5–35.7)
MCV RBC AUTO: 94.6 FL (ref 79–97)
PLATELET # BLD AUTO: 177 10*3/MM3 (ref 140–450)
PMV BLD AUTO: 10.1 FL (ref 6–12)
POTASSIUM SERPL-SCNC: 4.7 MMOL/L (ref 3.5–5.2)
PROT SERPL-MCNC: 7.4 G/DL (ref 6–8.5)
RBC # BLD AUTO: 4.64 10*6/MM3 (ref 4.14–5.8)
SODIUM SERPL-SCNC: 140 MMOL/L (ref 136–145)
TRIGL SERPL-MCNC: 143 MG/DL (ref 0–150)
VLDLC SERPL-MCNC: 28.6 MG/DL
WBC NRBC COR # BLD: 4.95 10*3/MM3 (ref 3.4–10.8)

## 2019-04-22 PROCEDURE — 99214 OFFICE O/P EST MOD 30 MIN: CPT | Performed by: NURSE PRACTITIONER

## 2019-04-22 PROCEDURE — 85027 COMPLETE CBC AUTOMATED: CPT | Performed by: NURSE PRACTITIONER

## 2019-04-22 NOTE — PROGRESS NOTES
Subjective   Vicente Delgado is a 89 y.o. male.     He continues with cardiac rehab three times a week. He has been going to a podiatrist for typical foot care. He is up to date with eye exam.       Hypertension   This is a chronic problem. The current episode started more than 1 year ago. The problem is controlled. Associated symptoms include anxiety. Pertinent negatives include no blurred vision, chest pain, headaches, orthopnea, palpitations, peripheral edema, PND or shortness of breath. Current antihypertension treatment includes ACE inhibitors and beta blockers. The current treatment provides significant improvement.   Hyperlipidemia   This is a chronic problem. The current episode started more than 1 year ago. The problem is controlled. Recent lipid tests were reviewed and are normal. Pertinent negatives include no chest pain, leg pain, myalgias or shortness of breath. Current antihyperlipidemic treatment includes statins. The current treatment provides significant improvement of lipids.   Anxiety   Presents for follow-up visit. Symptoms include nervous/anxious behavior (improved, no use of ativan ). Patient reports no chest pain, excessive worry, palpitations or shortness of breath. Symptoms occur rarely. The severity of symptoms is mild.     Compliance with medications is 0-25%.        The following portions of the patient's history were reviewed and updated as appropriate: allergies, current medications, past family history, past medical history, past social history, past surgical history and problem list.    Review of Systems   Constitutional: Negative for activity change, appetite change, fatigue and fever.        Overall doing well    Eyes: Positive for visual disturbance (wears glasses ). Negative for blurred vision.   Respiratory: Negative for cough, shortness of breath and wheezing.    Cardiovascular: Negative for chest pain, palpitations, orthopnea, leg swelling and PND.   Musculoskeletal: Negative  for back pain and myalgias.   Neurological: Negative for headaches.   Hematological: Bruises/bleeds easily (right hip ).   Psychiatric/Behavioral: The patient is nervous/anxious (improved, no use of ativan ).        Objective   Physical Exam   Constitutional: He is oriented to person, place, and time. He appears well-developed and well-nourished.   HENT:   Head: Normocephalic.   Right Ear: Decreased hearing is noted.   Left Ear: Decreased hearing is noted.   Nose: Nose normal.   Bilateral hearing aids    Neck: Carotid bruit is not present. No thyroid mass and no thyromegaly present.   Cardiovascular: Regular rhythm. Exam reveals no S3 and no S4.   Murmur heard.  Repeat bp left arm 128/78  No pedal edema    Pulmonary/Chest: Effort normal and breath sounds normal. He has no decreased breath sounds. He has no wheezes. He has no rhonchi. He has no rales.   Musculoskeletal: He exhibits no edema.   Neurological: He is alert and oriented to person, place, and time. Gait normal.   Skin: Skin is warm and dry. Bruising (yellowish greenish right hip) noted.   Psychiatric: He has a normal mood and affect. His speech is normal and behavior is normal. Judgment and thought content normal. Cognition and memory are normal.       Assessment/Plan   Vicente was seen today for hypertension, hyperlipidemia and anxiety.    Diagnoses and all orders for this visit:    Essential hypertension  Comments:  stable   Orders:  -     Comprehensive Metabolic Panel; Future  -     CBC (No Diff); Future  -     Comprehensive Metabolic Panel  -     CBC (No Diff)    Hyperlipidemia, unspecified hyperlipidemia type  Comments:  tolerating statin therapy   Orders:  -     Lipid Panel; Future  -     Lipid Panel    Anxiety disorder, unspecified type  Comments:  stable       He will return for wellness visit at next appt.   He declines shingrix vaccination

## 2019-04-24 ENCOUNTER — OFFICE VISIT (OUTPATIENT)
Dept: CARDIAC REHAB | Facility: HOSPITAL | Age: 84
End: 2019-04-24

## 2019-04-24 DIAGNOSIS — I21.4 NON-STEMI (NON-ST ELEVATED MYOCARDIAL INFARCTION) (HCC): Primary | ICD-10-CM

## 2019-04-26 ENCOUNTER — OFFICE VISIT (OUTPATIENT)
Dept: CARDIAC REHAB | Facility: HOSPITAL | Age: 84
End: 2019-04-26

## 2019-04-26 DIAGNOSIS — I21.4 NON-STEMI (NON-ST ELEVATED MYOCARDIAL INFARCTION) (HCC): Primary | ICD-10-CM

## 2019-04-29 ENCOUNTER — OFFICE VISIT (OUTPATIENT)
Dept: CARDIAC REHAB | Facility: HOSPITAL | Age: 84
End: 2019-04-29

## 2019-04-29 DIAGNOSIS — I21.4 NON-STEMI (NON-ST ELEVATED MYOCARDIAL INFARCTION) (HCC): Primary | ICD-10-CM

## 2019-05-01 ENCOUNTER — OFFICE VISIT (OUTPATIENT)
Dept: CARDIAC REHAB | Facility: HOSPITAL | Age: 84
End: 2019-05-01

## 2019-05-01 DIAGNOSIS — I21.4 NON-STEMI (NON-ST ELEVATED MYOCARDIAL INFARCTION) (HCC): Primary | ICD-10-CM

## 2019-05-03 ENCOUNTER — OFFICE VISIT (OUTPATIENT)
Dept: CARDIAC REHAB | Facility: HOSPITAL | Age: 84
End: 2019-05-03

## 2019-05-03 DIAGNOSIS — I21.4 NON-STEMI (NON-ST ELEVATED MYOCARDIAL INFARCTION) (HCC): Primary | ICD-10-CM

## 2019-05-06 ENCOUNTER — OFFICE VISIT (OUTPATIENT)
Dept: CARDIAC REHAB | Facility: HOSPITAL | Age: 84
End: 2019-05-06

## 2019-05-06 DIAGNOSIS — I21.4 NON-STEMI (NON-ST ELEVATED MYOCARDIAL INFARCTION) (HCC): Primary | ICD-10-CM

## 2019-05-08 ENCOUNTER — OFFICE VISIT (OUTPATIENT)
Dept: CARDIAC REHAB | Facility: HOSPITAL | Age: 84
End: 2019-05-08

## 2019-05-08 DIAGNOSIS — I21.4 NON-STEMI (NON-ST ELEVATED MYOCARDIAL INFARCTION) (HCC): Primary | ICD-10-CM

## 2019-05-13 ENCOUNTER — OFFICE VISIT (OUTPATIENT)
Dept: CARDIAC REHAB | Facility: HOSPITAL | Age: 84
End: 2019-05-13

## 2019-05-13 DIAGNOSIS — I21.4 NON-STEMI (NON-ST ELEVATED MYOCARDIAL INFARCTION) (HCC): Primary | ICD-10-CM

## 2019-05-15 ENCOUNTER — OFFICE VISIT (OUTPATIENT)
Dept: CARDIAC REHAB | Facility: HOSPITAL | Age: 84
End: 2019-05-15

## 2019-05-15 DIAGNOSIS — I21.4 NON-STEMI (NON-ST ELEVATED MYOCARDIAL INFARCTION) (HCC): Primary | ICD-10-CM

## 2019-05-17 ENCOUNTER — OFFICE VISIT (OUTPATIENT)
Dept: CARDIAC REHAB | Facility: HOSPITAL | Age: 84
End: 2019-05-17

## 2019-05-17 DIAGNOSIS — I21.4 NON-STEMI (NON-ST ELEVATED MYOCARDIAL INFARCTION) (HCC): Primary | ICD-10-CM

## 2019-05-20 ENCOUNTER — OFFICE VISIT (OUTPATIENT)
Dept: CARDIAC REHAB | Facility: HOSPITAL | Age: 84
End: 2019-05-20

## 2019-05-20 ENCOUNTER — OFFICE VISIT (OUTPATIENT)
Dept: INTERNAL MEDICINE | Facility: CLINIC | Age: 84
End: 2019-05-20

## 2019-05-20 VITALS
TEMPERATURE: 98 F | DIASTOLIC BLOOD PRESSURE: 80 MMHG | SYSTOLIC BLOOD PRESSURE: 138 MMHG | HEART RATE: 67 BPM | OXYGEN SATURATION: 97 % | WEIGHT: 176 LBS | BODY MASS INDEX: 27.56 KG/M2

## 2019-05-20 DIAGNOSIS — J30.2 SEASONAL ALLERGIC RHINITIS, UNSPECIFIED TRIGGER: Primary | ICD-10-CM

## 2019-05-20 DIAGNOSIS — I21.4 NON-STEMI (NON-ST ELEVATED MYOCARDIAL INFARCTION) (HCC): Primary | ICD-10-CM

## 2019-05-20 PROCEDURE — 99213 OFFICE O/P EST LOW 20 MIN: CPT | Performed by: NURSE PRACTITIONER

## 2019-05-20 NOTE — PROGRESS NOTES
Subjective   Vicente Delgado is a 89 y.o. male.     He presents today for chest congestion x 2 days. He developed a sore throat and cough 4 days ago following being outside for an extended period of time.       URI    This is a new problem. The current episode started in the past 7 days. The problem has been gradually improving. There has been no fever. Associated symptoms include congestion, coughing (very little sputum), rhinorrhea (resolved with claritin), sneezing and a sore throat. Pertinent negatives include no abdominal pain, chest pain, ear pain, headaches, nausea, plugged ear sensation, sinus pain or wheezing. Treatments tried: claritin, flonase. The treatment provided moderate relief.        The following portions of the patient's history were reviewed and updated as appropriate: allergies, current medications, past family history, past medical history, past social history, past surgical history and problem list.    Review of Systems   HENT: Positive for congestion, rhinorrhea (resolved with claritin), sinus pressure (resolved with claritin), sneezing and sore throat. Negative for ear pain and sinus pain.    Respiratory: Positive for cough (very little sputum). Negative for chest tightness, shortness of breath and wheezing.    Cardiovascular: Negative for chest pain.   Gastrointestinal: Negative for abdominal pain and nausea.   Neurological: Negative for headaches.       Objective   Physical Exam   Constitutional: He appears well-developed and well-nourished.   HENT:   Head: Normocephalic and atraumatic.   Cardiovascular: Normal rate, regular rhythm and normal heart sounds.   No murmur heard.  Pulmonary/Chest: Effort normal and breath sounds normal. No respiratory distress.   Musculoskeletal: Normal range of motion.   Neurological: He is alert.   Skin: Skin is warm and dry.   Psychiatric: He has a normal mood and affect. His behavior is normal. Judgment and thought content normal.   Vitals  reviewed.      Assessment/Plan   Vicente was seen today for nasal congestion and sore throat.    Diagnoses and all orders for this visit:    Seasonal allergic rhinitis, unspecified trigger    Continue taking claritin and flonase. Add mucinex to help with chest congestion.     Increase fluid intake and rest.    Return for worsening of sx.

## 2019-05-22 ENCOUNTER — TELEPHONE (OUTPATIENT)
Dept: INTERNAL MEDICINE | Facility: CLINIC | Age: 84
End: 2019-05-22

## 2019-05-22 ENCOUNTER — OFFICE VISIT (OUTPATIENT)
Dept: CARDIAC REHAB | Facility: HOSPITAL | Age: 84
End: 2019-05-22

## 2019-05-22 DIAGNOSIS — I21.4 NON-STEMI (NON-ST ELEVATED MYOCARDIAL INFARCTION) (HCC): Primary | ICD-10-CM

## 2019-05-22 DIAGNOSIS — J06.9 ACUTE URI: Primary | ICD-10-CM

## 2019-05-22 RX ORDER — AZITHROMYCIN 250 MG/1
TABLET, FILM COATED ORAL
Qty: 6 TABLET | Refills: 0 | Status: SHIPPED | OUTPATIENT
Start: 2019-05-22 | End: 2019-05-28

## 2019-05-22 NOTE — TELEPHONE ENCOUNTER
Spoke to pt he was in cardiac rehab and was having a hard time completing tasks. He does not feel that he is any better and would like and abx. Given his history and age I will send abx.

## 2019-05-24 ENCOUNTER — OFFICE VISIT (OUTPATIENT)
Dept: CARDIAC REHAB | Facility: HOSPITAL | Age: 84
End: 2019-05-24

## 2019-05-24 DIAGNOSIS — I21.4 NON-STEMI (NON-ST ELEVATED MYOCARDIAL INFARCTION) (HCC): Primary | ICD-10-CM

## 2019-05-29 ENCOUNTER — OFFICE VISIT (OUTPATIENT)
Dept: CARDIAC REHAB | Facility: HOSPITAL | Age: 84
End: 2019-05-29

## 2019-05-29 DIAGNOSIS — I21.4 NON-STEMI (NON-ST ELEVATED MYOCARDIAL INFARCTION) (HCC): Primary | ICD-10-CM

## 2019-05-31 ENCOUNTER — OFFICE VISIT (OUTPATIENT)
Dept: CARDIAC REHAB | Facility: HOSPITAL | Age: 84
End: 2019-05-31

## 2019-05-31 DIAGNOSIS — I21.4 NON-STEMI (NON-ST ELEVATED MYOCARDIAL INFARCTION) (HCC): Primary | ICD-10-CM

## 2019-06-12 ENCOUNTER — OFFICE VISIT (OUTPATIENT)
Dept: CARDIAC REHAB | Facility: HOSPITAL | Age: 84
End: 2019-06-12

## 2019-06-12 DIAGNOSIS — I21.4 NON-STEMI (NON-ST ELEVATED MYOCARDIAL INFARCTION) (HCC): Primary | ICD-10-CM

## 2019-06-17 ENCOUNTER — OFFICE VISIT (OUTPATIENT)
Dept: CARDIAC REHAB | Facility: HOSPITAL | Age: 84
End: 2019-06-17

## 2019-06-17 DIAGNOSIS — I21.4 NON-STEMI (NON-ST ELEVATED MYOCARDIAL INFARCTION) (HCC): Primary | ICD-10-CM

## 2019-06-19 ENCOUNTER — OFFICE VISIT (OUTPATIENT)
Dept: CARDIAC REHAB | Facility: HOSPITAL | Age: 84
End: 2019-06-19

## 2019-06-19 DIAGNOSIS — I21.4 NON-STEMI (NON-ST ELEVATED MYOCARDIAL INFARCTION) (HCC): Primary | ICD-10-CM

## 2019-06-21 ENCOUNTER — OFFICE VISIT (OUTPATIENT)
Dept: CARDIAC REHAB | Facility: HOSPITAL | Age: 84
End: 2019-06-21

## 2019-06-21 DIAGNOSIS — I21.4 NON-STEMI (NON-ST ELEVATED MYOCARDIAL INFARCTION) (HCC): Primary | ICD-10-CM

## 2019-06-24 ENCOUNTER — OFFICE VISIT (OUTPATIENT)
Dept: CARDIAC REHAB | Facility: HOSPITAL | Age: 84
End: 2019-06-24

## 2019-06-24 DIAGNOSIS — I21.4 NON-STEMI (NON-ST ELEVATED MYOCARDIAL INFARCTION) (HCC): Primary | ICD-10-CM

## 2019-06-26 ENCOUNTER — OFFICE VISIT (OUTPATIENT)
Dept: CARDIAC REHAB | Facility: HOSPITAL | Age: 84
End: 2019-06-26

## 2019-06-26 DIAGNOSIS — I21.4 NON-STEMI (NON-ST ELEVATED MYOCARDIAL INFARCTION) (HCC): Primary | ICD-10-CM

## 2019-06-28 ENCOUNTER — OFFICE VISIT (OUTPATIENT)
Dept: CARDIAC REHAB | Facility: HOSPITAL | Age: 84
End: 2019-06-28

## 2019-06-28 DIAGNOSIS — I21.4 NON-STEMI (NON-ST ELEVATED MYOCARDIAL INFARCTION) (HCC): Primary | ICD-10-CM

## 2019-07-01 ENCOUNTER — OFFICE VISIT (OUTPATIENT)
Dept: CARDIAC REHAB | Facility: HOSPITAL | Age: 84
End: 2019-07-01

## 2019-07-01 DIAGNOSIS — I21.4 NON-STEMI (NON-ST ELEVATED MYOCARDIAL INFARCTION) (HCC): Primary | ICD-10-CM

## 2019-07-03 ENCOUNTER — OFFICE VISIT (OUTPATIENT)
Dept: CARDIAC REHAB | Facility: HOSPITAL | Age: 84
End: 2019-07-03

## 2019-07-03 DIAGNOSIS — I21.4 NON-STEMI (NON-ST ELEVATED MYOCARDIAL INFARCTION) (HCC): Primary | ICD-10-CM

## 2019-07-05 ENCOUNTER — OFFICE VISIT (OUTPATIENT)
Dept: CARDIAC REHAB | Facility: HOSPITAL | Age: 84
End: 2019-07-05

## 2019-07-05 DIAGNOSIS — I21.4 NON-STEMI (NON-ST ELEVATED MYOCARDIAL INFARCTION) (HCC): Primary | ICD-10-CM

## 2019-07-08 ENCOUNTER — OFFICE VISIT (OUTPATIENT)
Dept: CARDIAC REHAB | Facility: HOSPITAL | Age: 84
End: 2019-07-08

## 2019-07-08 DIAGNOSIS — I21.4 NON-STEMI (NON-ST ELEVATED MYOCARDIAL INFARCTION) (HCC): Primary | ICD-10-CM

## 2019-07-10 ENCOUNTER — OFFICE VISIT (OUTPATIENT)
Dept: CARDIAC REHAB | Facility: HOSPITAL | Age: 84
End: 2019-07-10

## 2019-07-10 DIAGNOSIS — I21.4 NON-STEMI (NON-ST ELEVATED MYOCARDIAL INFARCTION) (HCC): Primary | ICD-10-CM

## 2019-07-17 ENCOUNTER — OFFICE VISIT (OUTPATIENT)
Dept: CARDIAC REHAB | Facility: HOSPITAL | Age: 84
End: 2019-07-17

## 2019-07-17 DIAGNOSIS — I21.4 NON-STEMI (NON-ST ELEVATED MYOCARDIAL INFARCTION) (HCC): Primary | ICD-10-CM

## 2019-07-19 ENCOUNTER — OFFICE VISIT (OUTPATIENT)
Dept: CARDIAC REHAB | Facility: HOSPITAL | Age: 84
End: 2019-07-19

## 2019-07-19 DIAGNOSIS — I21.4 NON-STEMI (NON-ST ELEVATED MYOCARDIAL INFARCTION) (HCC): Primary | ICD-10-CM

## 2019-07-22 ENCOUNTER — OFFICE VISIT (OUTPATIENT)
Dept: CARDIAC REHAB | Facility: HOSPITAL | Age: 84
End: 2019-07-22

## 2019-07-22 DIAGNOSIS — I21.4 NON-STEMI (NON-ST ELEVATED MYOCARDIAL INFARCTION) (HCC): Primary | ICD-10-CM

## 2019-07-24 ENCOUNTER — OFFICE VISIT (OUTPATIENT)
Dept: CARDIAC REHAB | Facility: HOSPITAL | Age: 84
End: 2019-07-24

## 2019-07-24 ENCOUNTER — OFFICE VISIT (OUTPATIENT)
Dept: CARDIOLOGY | Facility: CLINIC | Age: 84
End: 2019-07-24

## 2019-07-24 VITALS
BODY MASS INDEX: 27.88 KG/M2 | OXYGEN SATURATION: 98 % | HEIGHT: 67 IN | DIASTOLIC BLOOD PRESSURE: 74 MMHG | WEIGHT: 177.6 LBS | SYSTOLIC BLOOD PRESSURE: 106 MMHG | HEART RATE: 57 BPM

## 2019-07-24 DIAGNOSIS — I34.0 NON-RHEUMATIC MITRAL REGURGITATION: Primary | ICD-10-CM

## 2019-07-24 DIAGNOSIS — E78.2 MIXED HYPERLIPIDEMIA: ICD-10-CM

## 2019-07-24 DIAGNOSIS — I21.4 NON-STEMI (NON-ST ELEVATED MYOCARDIAL INFARCTION) (HCC): Primary | ICD-10-CM

## 2019-07-24 PROCEDURE — 99214 OFFICE O/P EST MOD 30 MIN: CPT | Performed by: INTERNAL MEDICINE

## 2019-07-24 PROCEDURE — 93000 ELECTROCARDIOGRAM COMPLETE: CPT | Performed by: INTERNAL MEDICINE

## 2019-07-24 NOTE — PROGRESS NOTES
Date of Office Visit: 2019  Encounter Provider: Breezy Frausto MD  Place of Service: [facility] CARDIOLOGY  Patient Name: Vicente Delgado  :1928    Mitral regurgitation, hyperlipidemia, hypertension  History of Present Illness    The patient is a 89-year-old white male who returns to the office today for follow-up.  He has been diagnosed with mitral regurgitation, nonrheumatic and has been treated over the years for hypertension.  He also carries a diagnosis of coronary disease but this is fairly insignificant involving only a small nondominant right coronary artery.    The patient's echocardiogram was last performed in .  At that time his left ventricular function was normal with an ejection fraction of 67%.  The aortic valve was abnormal in structure with mild regurgitation but no stenosis.  There is moderate mitral valve regurgitation with moderate mitral annular calcification.  His right-sided chambers are normal.    The patient reports only peripheral edema secondary to his amlodipine.  He denies any palpitations or shortness of breath.  He has no complaints of angina pectoris.  He denies fatigue.  Past Medical History:   Diagnosis Date   • Allergic rhinitis    • Anxiety    • Arthritis    • CAD (coronary artery disease)    • Cancer (CMS/HCC)    • Depression    • Diverticulosis    • Esophagitis    • Gastritis    • GERD (gastroesophageal reflux disease)    • Heart disease    • History of anxiety    • History of prostate cancer 1990   • Hyperlipidemia    • Hypertension    • Insomnia    • Lupus    • TAM (nonalcoholic steatohepatitis)    • Sciatica of right side          Past Surgical History:   Procedure Laterality Date   • ABDOMINAL SURGERY     • CARDIAC CATHETERIZATION  1995   • CATARACT EXTRACTION Left 2018   • COLONOSCOPY  2002   • ELBOW PROCEDURE     • JOINT REPLACEMENT     • LUNG BIOPSY     • NASAL POLYP SURGERY     • PACEMAKER  IMPLANTATION     • PROSTATE SURGERY  06/1990   • SHOULDER ROTATOR CUFF REPAIR Right 08/24/2011    Dr. Bach   • SIGMOIDOSCOPY     • UPPER GASTROINTESTINAL ENDOSCOPY  09/12/1997    Gastritis, Duodenitis, hiatal hernia (Pathology: Gastric antrum minimal chronic inflammation)   • UPPER GASTROINTESTINAL ENDOSCOPY  04/11/2005    Mild esophagitis, Small hiatal hernia, Mild gastritis and mild duodenitis           Current Outpatient Medications:   •  aspirin 81 MG chewable tablet, Chew daily., Disp: , Rfl:   •  atenolol (TENORMIN) 25 MG tablet, Take 1 tablet by mouth 2 (Two) Times a Day., Disp: 180 tablet, Rfl: 0  •  benazepril (LOTENSIN) 10 MG tablet, TAKE 1 TABLET BY MOUTH TWICE DAILY, Disp: 180 tablet, Rfl: 2  •  clotrimazole-betamethasone (LOTRISONE) 1-0.05 % cream, JESS EXT AA BID UTD, Disp: , Rfl: 2  •  ezetimibe (ZETIA) 10 MG tablet, Take 1 tablet by mouth Daily., Disp: 90 tablet, Rfl: 0  •  LORazepam (ATIVAN) 1 MG tablet, Take 1 tablet by mouth Every 6 (Six) Hours As Needed for Anxiety., Disp: 30 tablet, Rfl: 0  •  pantoprazole (PROTONIX) 40 MG EC tablet, Take 1 tablet by mouth Daily., Disp: 90 tablet, Rfl: 1  •  rosuvastatin (CRESTOR) 10 MG tablet, Take 1 tablet by mouth Every Night., Disp: 90 tablet, Rfl: 1      Social History     Socioeconomic History   • Marital status:      Spouse name: Not on file   • Number of children: Not on file   • Years of education: Not on file   • Highest education level: Not on file   Tobacco Use   • Smoking status: Former Smoker   • Smokeless tobacco: Never Used   • Tobacco comment: Quit 15 years ago   Substance and Sexual Activity   • Alcohol use: No     Comment: Daily caffeine use   • Drug use: No   • Sexual activity: No         Review of Systems   Constitution: Negative.   HENT: Negative.    Eyes: Negative.    Cardiovascular: Positive for leg swelling.   Respiratory: Negative.    Endocrine: Negative.    Skin: Negative.    Musculoskeletal: Negative.    Gastrointestinal:  "Negative.    Neurological: Negative.    Psychiatric/Behavioral: Negative.        Procedures      ECG 12 Lead  Date/Time: 7/24/2019 1:11 PM  Performed by: Breezy Frausto MD  Authorized by: Breezy Frausto MD   Comparison: compared with previous ECG from 7/26/2018  Similar to previous ECG  Rhythm: sinus rhythm                Objective:    /74 (BP Location: Left arm, Patient Position: Standing, Cuff Size: Adult)   Pulse 57   Ht 170.2 cm (67\")   Wt 80.6 kg (177 lb 9.6 oz)   SpO2 98%   BMI 27.82 kg/m²         Physical Exam   Constitutional: He is oriented to person, place, and time. He appears well-developed and well-nourished.   HENT:   Head: Normocephalic.   Eyes: Pupils are equal, round, and reactive to light.   Neck: Normal range of motion. No JVD present. Carotid bruit is not present. No thyromegaly present.   Cardiovascular: Normal rate, regular rhythm, S1 normal, S2 normal and intact distal pulses. Exam reveals no gallop and no friction rub.   Murmur heard.  High-pitched blowing holosystolic murmur is present with a grade of 1/6 at the apex.  Pulmonary/Chest: Effort normal and breath sounds normal.   Abdominal: Soft. Bowel sounds are normal.   Musculoskeletal: He exhibits no edema.   Neurological: He is alert and oriented to person, place, and time.   Skin: Skin is warm, dry and intact. No erythema.   Psychiatric: He has a normal mood and affect.   Vitals reviewed.          Assessment:       Diagnosis Plan   1. Non-rheumatic mitral regurgitation     2. Mixed hyperlipidemia       1.  Mitral regurgitation: Mild to moderate, asymptomatic  2.  Aortic regurgitation: Mild, asymptomatic  3.  Hyperlipidemia: The patient is on Crestor.  He asked if he could take grapefruit.  Crestor is 1 of the statins that is compatible with grapefruit.    No changes in medication are being made at the present time.  I will see him back in 1 year  Plan:         "

## 2019-07-26 ENCOUNTER — OFFICE VISIT (OUTPATIENT)
Dept: CARDIAC REHAB | Facility: HOSPITAL | Age: 84
End: 2019-07-26

## 2019-07-26 DIAGNOSIS — I21.4 NON-STEMI (NON-ST ELEVATED MYOCARDIAL INFARCTION) (HCC): Primary | ICD-10-CM

## 2019-07-29 ENCOUNTER — OFFICE VISIT (OUTPATIENT)
Dept: CARDIAC REHAB | Facility: HOSPITAL | Age: 84
End: 2019-07-29

## 2019-07-29 DIAGNOSIS — I21.4 NON-STEMI (NON-ST ELEVATED MYOCARDIAL INFARCTION) (HCC): Primary | ICD-10-CM

## 2019-07-31 ENCOUNTER — OFFICE VISIT (OUTPATIENT)
Dept: CARDIAC REHAB | Facility: HOSPITAL | Age: 84
End: 2019-07-31

## 2019-07-31 ENCOUNTER — OFFICE VISIT (OUTPATIENT)
Dept: INTERNAL MEDICINE | Facility: CLINIC | Age: 84
End: 2019-07-31

## 2019-07-31 VITALS
HEIGHT: 67 IN | SYSTOLIC BLOOD PRESSURE: 126 MMHG | WEIGHT: 177.8 LBS | OXYGEN SATURATION: 98 % | BODY MASS INDEX: 27.91 KG/M2 | HEART RATE: 54 BPM | DIASTOLIC BLOOD PRESSURE: 80 MMHG

## 2019-07-31 DIAGNOSIS — I10 ESSENTIAL HYPERTENSION: ICD-10-CM

## 2019-07-31 DIAGNOSIS — F41.9 ANXIETY DISORDER, UNSPECIFIED TYPE: ICD-10-CM

## 2019-07-31 DIAGNOSIS — I21.4 NON-STEMI (NON-ST ELEVATED MYOCARDIAL INFARCTION) (HCC): Primary | ICD-10-CM

## 2019-07-31 DIAGNOSIS — E78.5 HYPERLIPIDEMIA, UNSPECIFIED HYPERLIPIDEMIA TYPE: ICD-10-CM

## 2019-07-31 DIAGNOSIS — K21.00 GASTROESOPHAGEAL REFLUX DISEASE WITH ESOPHAGITIS: ICD-10-CM

## 2019-07-31 DIAGNOSIS — Z00.00 MEDICARE ANNUAL WELLNESS VISIT, INITIAL: Primary | ICD-10-CM

## 2019-07-31 LAB
ALBUMIN SERPL-MCNC: 4.7 G/DL (ref 3.5–5.2)
ALBUMIN/GLOB SERPL: 1.7 G/DL
ALP SERPL-CCNC: 43 U/L (ref 39–117)
ALT SERPL W P-5'-P-CCNC: 19 U/L (ref 1–41)
ANION GAP SERPL CALCULATED.3IONS-SCNC: 11.3 MMOL/L (ref 5–15)
AST SERPL-CCNC: 26 U/L (ref 1–40)
BILIRUB SERPL-MCNC: 0.6 MG/DL (ref 0.2–1.2)
BUN BLD-MCNC: 20 MG/DL (ref 8–23)
BUN/CREAT SERPL: 20.6 (ref 7–25)
CALCIUM SPEC-SCNC: 8.7 MG/DL (ref 8.2–9.6)
CHLORIDE SERPL-SCNC: 101 MMOL/L (ref 98–107)
CHOLEST SERPL-MCNC: 153 MG/DL (ref 0–200)
CO2 SERPL-SCNC: 27.7 MMOL/L (ref 22–29)
CREAT BLD-MCNC: 0.97 MG/DL (ref 0.76–1.27)
DEPRECATED RDW RBC AUTO: 43.9 FL (ref 37–54)
ERYTHROCYTE [DISTWIDTH] IN BLOOD BY AUTOMATED COUNT: 12.9 % (ref 12.3–15.4)
GFR SERPL CREATININE-BSD FRML MDRD: 73 ML/MIN/1.73
GLOBULIN UR ELPH-MCNC: 2.7 GM/DL
GLUCOSE BLD-MCNC: 90 MG/DL (ref 65–99)
HCT VFR BLD AUTO: 45.4 % (ref 37.5–51)
HDLC SERPL-MCNC: 37 MG/DL (ref 40–60)
HGB BLD-MCNC: 15 G/DL (ref 13–17.7)
LDLC SERPL CALC-MCNC: 70 MG/DL (ref 0–100)
LDLC/HDLC SERPL: 1.9 {RATIO}
MCH RBC QN AUTO: 31.1 PG (ref 26.6–33)
MCHC RBC AUTO-ENTMCNC: 33 G/DL (ref 31.5–35.7)
MCV RBC AUTO: 94.2 FL (ref 79–97)
PLATELET # BLD AUTO: 187 10*3/MM3 (ref 140–450)
PMV BLD AUTO: 10 FL (ref 6–12)
POTASSIUM BLD-SCNC: 5.3 MMOL/L (ref 3.5–5.2)
PROT SERPL-MCNC: 7.4 G/DL (ref 6–8.5)
RBC # BLD AUTO: 4.82 10*6/MM3 (ref 4.14–5.8)
SODIUM BLD-SCNC: 140 MMOL/L (ref 136–145)
TRIGL SERPL-MCNC: 228 MG/DL (ref 0–150)
VLDLC SERPL-MCNC: 45.6 MG/DL (ref 5–40)
WBC NRBC COR # BLD: 4.99 10*3/MM3 (ref 3.4–10.8)

## 2019-07-31 PROCEDURE — 85027 COMPLETE CBC AUTOMATED: CPT | Performed by: NURSE PRACTITIONER

## 2019-07-31 PROCEDURE — G0438 PPPS, INITIAL VISIT: HCPCS | Performed by: NURSE PRACTITIONER

## 2019-07-31 PROCEDURE — 99214 OFFICE O/P EST MOD 30 MIN: CPT | Performed by: NURSE PRACTITIONER

## 2019-07-31 PROCEDURE — 36415 COLL VENOUS BLD VENIPUNCTURE: CPT | Performed by: NURSE PRACTITIONER

## 2019-07-31 PROCEDURE — 80061 LIPID PANEL: CPT | Performed by: NURSE PRACTITIONER

## 2019-07-31 PROCEDURE — 80053 COMPREHEN METABOLIC PANEL: CPT | Performed by: NURSE PRACTITIONER

## 2019-07-31 RX ORDER — LORAZEPAM 1 MG/1
1 TABLET ORAL EVERY 6 HOURS PRN
Qty: 30 TABLET | Refills: 0 | Status: SHIPPED | OUTPATIENT
Start: 2019-07-31 | End: 2019-11-07 | Stop reason: SDUPTHER

## 2019-07-31 NOTE — PATIENT INSTRUCTIONS
Medicare Wellness  Personal Prevention Plan of Service     Date of Office Visit:  2019  Encounter Provider:  GE Remy  Place of Service:  [facility] INTERNAL MEDICINE  Patient Name: Vicente Delgado  :  1928    As part of the Medicare Wellness portion of your visit today, we are providing you with this personalized preventive plan of services (PPPS). This plan is based upon recommendations of the [place] Preventive Services Task Force (USPSTF) and the Advisory Committee on Immunization Practices (ACIP).    This lists the preventive care services that should be considered, and provides dates of when you are due. Items listed as completed are up-to-date and do not require any further intervention.    Health Maintenance   Topic Date Due   • TDAP/TD VACCINES (1 - Tdap) 2005   • MEDICARE ANNUAL WELLNESS  2016   • ZOSTER VACCINE (1 of 2) 2019 (Originally 1978)   • INFLUENZA VACCINE  2019   • LIPID PANEL  2020   • PNEUMOCOCCAL VACCINES (65+ LOW/MEDIUM RISK)  Completed       Orders Placed This Encounter   Procedures   • Comprehensive Metabolic Panel     Standing Status:   Future     Standing Expiration Date:   2020   • Lipid Panel     Standing Status:   Future     Standing Expiration Date:   2020   • CBC No Differential     Standing Status:   Future     Standing Expiration Date:   2020     Budget-Friendly Healthy Eating  There are many ways to save money at the grocery store and continue to eat healthy. You can be successful if you:  · Plan meals according to your budget.  · Make a grocery list and only purchase food according to your grocery list.  · Prepare food yourself.  What are tips for following this plan?    Reading food labels  · Compare food labels between brand name foods and the store brand. Often the nutritional value is the same, but the store brand is lower cost.  · Look for products that do not have added  "sugar, fat, or salt (sodium). These often cost the same but are healthier for you. Products may be labeled as:  ? Sugar-free.  ? Nonfat.  ? Low-fat.  ? Sodium-free.  ? Low-sodium.  · Look for lean ground beef labeled as at least 92% lean and 8% fat.  Shopping  · Buy only the items on your grocery list and go only to the areas of the store that have the items on your list.  · Use coupons only for foods and brands you normally buy. Avoid buying items you wouldn't normally buy simply because they are on sale.  · Check online and in newspapers for weekly deals.  · Buy healthy items from the bulk bins when available, such as herbs, spices, flour, pasta, nuts, and dried fruit.  · Buy fruits and vegetables that are in season. Prices are usually lower on in-season produce.  · Look at the unit price on the price tag. Use it to compare different brands and sizes to find out which item is the best deal.  · Choose healthy items that are often low-cost, such as carrots, potatoes, apples, bananas, and oranges. Dried or canned beans are a low-cost protein source.  · Buy in bulk and freeze extra food. Items you can buy in bulk include meats, fish, poultry, frozen fruits, and frozen vegetables.  · Avoid buying \"ready-to-eat\" foods, such as pre-cut fruits and vegetables and pre-made salads.  · If possible, shop around to discover where you can find the best prices. Consider other retailers such as dollar stores, larger wholesale stores, local fruit and vegetable [employer], and [employer] markets.  · Do not shop when you are hungry. If you shop while hungry, it may be hard to stick to your list and budget.  · Resist impulse buying. Use your grocery list as your official plan for the week.  · Buy a variety of vegetables and fruits by purchasing fresh, frozen, and canned items.  · Look at the top and bottom shelves for deals. Foods at eye level (eye level of an adult or child) are usually more expensive.  · Be efficient with your time when " "shopping. The more time you spend at the store, the more money you are likely to spend.  · To save money when choosing more expensive foods like meats and dairy:  ? Choose cheaper cuts of meat, such as bone-in chicken thighs and drumsticks instead of skinless and boneless chicken. When you are ready to prepare the chicken, you can remove the skin yourself to make it healthier.  ? Choose lean meats like chicken or turkey instead of beef.  ? Choose canned seafood, such as tuna, salmon, or sardines.  ? Buy eggs as a low-cost source of protein.  ? Buy dried beans and peas, such as lentils, split peas, or kidney beans instead of meats. Dried beans and peas are a good alternative source of protein.  ? Buy the larger tubs of yogurt instead of individual-sized containers.  · Choose water instead of sodas and other sweetened beverages.  · Avoid buying chips, cookies, and other \"junk food.\" These items are usually expensive and not healthy.  Cooking  · Make extra food and freeze the extras in meal-sized containers or in individual portions for fast meals and snacks.  · Pre-cook on days when you have extra time to prepare meals in advance. You can keep these meals in the fridge or freezer and reheat for a quick meal.  · When you come home from the grocery store, wash, peel, and cut fruits and vegetables so they are ready to use and eat. This will help reduce food waste.  Meal planning  · Do not eat out or get fast food. Prepare food at home.  · Make a grocery list and make sure to bring it with you to the store. If you have a smart phone, you could use your phone to create your shopping list.  · Plan meals and snacks according to a grocery list and budget you create.  · Use leftovers in your meal plan for the week.  · Look for recipes where you can cook once and make enough food for two meals.  · Include budget-friendly meals like stews, casseroles, and stir-mera dishes.  · Try some meatless meals or try \"no cook\" meals like " salads.  · Make sure that half your plate is filled with fruits or vegetables. Choose from fresh, frozen, or canned fruits and vegetables. If eating canned, remember to rinse them before eating. This will remove any excess salt added for packaging.  Summary  · Eating healthy on a budget is possible if you plan your meals according to your budget, purchase according to your budget and grocery list, and prepare food yourself.  · Tips for buying more food on a limited budget include buying generic brands, using coupons only for foods you normally buy, and buying healthy items from the bulk bins when available.  · Tips for buying cheaper food to replace expensive food include choosing cheaper, lean cuts of meat, and buying dried beans and peas.  This information is not intended to replace advice given to you by your health care provider. Make sure you discuss any questions you have with your health care provider.  Document Released: 08/21/2015 Document Revised: 12/19/2018 Document Reviewed: 12/19/2018  [employer] Interactive Patient Education © 2019 [employer]    Understanding Your Risk for Falls  Each year, millions of people suffer serious injuries from falls. It is important to understand your risk for falling. Talk with your health care provider about your risk and what you can do to lower it. There are actions you can take at home to lower your risk.  If you do have a serious fall, it is important to tell your health care provider. Falling once raises your risk for falling again.  How can falls affect me?  Serious injuries from falls are common. These include:  · Broken bones. Most hip fractures are caused by falls.  · Traumatic brain injury (TBI). Falls are the most common cause of TBI.  Fear of falling can also cause you to avoid activities and stay at home. This can make your muscles weaker and actually raise your risk for a fall.  What can increase my risk?  Serious injuries from a fall most often happen to  people older than age 65. Children and young adults ages 15-29 are also at higher risk. The more risk factors you have for falling, the higher your risk. Risk factors include:  · Weakness in the lower body.  · Lack (deficiency) of vitamin D.  · Weak bones (osteoporosis).  · Being generally weak or confused due to long-term (chronic) illness.  · Dizziness or balance problems.  · Poor vision.  · Having depression.  · Medicine that causes dizziness or drowsiness. These can include medicines for your blood pressure, heart, anxiety, insomnia, or edema, as well as pain medicines and muscle relaxants.  · Drinking alcohol.  · Foot pain or improper footwear.  · Working at a dangerous job.  · Having had a fall in the past.  · Tripping hazards at home, such as floor clutter or loose rugs, or poor lighting.  · Having pets or clutter in your home.  What actions can I take to lower my risk of falling?    · Maintain physical fitness:  ? Do strength and balance exercises. Consider taking a regular class to build strength and balance. Yoga and [employer]dsey chi are good options.  ? Have your eyes checked every year and your vision prescription updated as needed.  · Remove all clutter from walkways and stairways, including extension cords.  · Use a cordless phone.  · Do not use throw rugs. Make sure all carpeting is taped or tacked down securely.  · Use good lighting in all rooms. Keep a flashlight near your bed.  · Make sure there is a clear path from your bed to the bathroom. Use night-lights.  · Install grab bars for your tub, shower, and toilet. Use a bath mat in your tub or shower.  · Attach secure railings on both sides of your stairs.  · Repair uneven or broken steps.  · Use a cane or walker as directed by your health care provider.  · Wear nonskid shoes. Do not wear high heels. Do not walk around the house in socks or slippers.  · Avoid walking on icy or slippery surfaces. Walk on the grass instead of on icy or slick sidewalks. Where  you can, use ice melt to get rid of ice on walkways.  Questions to ask your health care provider  · Can you help me evaluate my risk for a fall?  · Do any of my medicines make me more likely to fall?  · Should I take a vitamin D supplement?  · What exercises can I do to improve my strength and balance?  · Should I make an appointment to have my vision checked?  · Do I need a bone density test to check for osteoporosis?  · Would it help to use a cane or a walker?  Where to find more information  · Centers for Disease Control and Prevention, STEADI: cdc.gov  · Community-Based Fall Prevention Programs: cdc.gov  · National [place] on Aging: rb3olot.christopher.nih.gov  Contact a health care provider if:  · You fall at home.  · You are afraid of falling at home.  · You feel weak, drowsy, or dizzy at home.  Summary  · People 65 and older are at high risk for falling. However, older people are not the only ones injured in falls. Children and young adults have a higher-than-normal risk, too.  · Talk with your health care provider about your risks for falling and how to lower those risks.  · Taking certain precautions at home can lower your risk for falling.  · If you fall, always tell your health care provider.  This information is not intended to replace advice given to you by your health care provider. Make sure you discuss any questions you have with your health care provider.  Document Released: 08/01/2018 Document Revised: 08/01/2018 Document Reviewed: 08/01/2018  [employer] Interactive Patient Education © 2019 [employer]      Return in about 3 months (around 10/31/2019) for Recheck.

## 2019-07-31 NOTE — PROGRESS NOTES
The ABCs of the Annual Wellness Visit  Initial Medicare Wellness Visit    Chief Complaint   Patient presents with   • Medicare Wellness-Initial Visit       Subjective   History of Present Illness:  Vicente Delgado is a 89 y.o. male who presents for an Initial Medicare Wellness Visit.    HEALTH RISK ASSESSMENT    Recent Hospitalizations:  No hospitalization(s) within the last year.    Current Medical Providers:  Patient Care Team:  Luis Armando Lang MD as PCP - General (Internal Medicine)  Margoth Louis APRN as PCP - Claims Attributed  Margoth Louis APRN as Nurse Practitioner (Family Medicine)  Reginaldo Palacio MD (Dermatology)  Janki Elias MD as Consulting Physician (Ophthalmology)  Kristopher Machado DPM as Consulting Physician (Podiatry)  Aby Marquez MD as Consulting Physician (Hand Surgery)  Wilton Ramos MD as Consulting Physician (Orthopedic Surgery)    Smoking Status:  Social History     Tobacco Use   Smoking Status Former Smoker   Smokeless Tobacco Never Used   Tobacco Comment    Quit 15 years ago       Alcohol Consumption:  Social History     Substance and Sexual Activity   Alcohol Use No    Comment: Daily caffeine use       Depression Screen:   PHQ-2/PHQ-9 Depression Screening 7/31/2019   Little interest or pleasure in doing things 0   Feeling down, depressed, or hopeless 0   Total Score 0       Fall Risk Screen:  STEADI Fall Risk Assessment was completed, and patient is at LOW risk for falls.Assessment completed on:7/31/2019    Health Habits and Functional and Cognitive Screening:  Functional & Cognitive Status 7/31/2019   Do you have difficulty preparing food and eating? No   Do you have difficulty bathing yourself, getting dressed or grooming yourself? No   Do you have difficulty using the toilet? No   Do you have difficulty moving around from place to place? No   Do you have trouble with steps or getting out of a bed or a chair? No   Do you need help using the  phone?  No   Are you deaf or do you have serious difficulty hearing?  No   Do you need help with transportation? No   Do you need help shopping? No   Do you need help preparing meals?  No   Do you need help with housework?  No   Do you need help with laundry? No   Do you need help taking your medications? No   Do you need help managing money? No   Do you ever drive or ride in a car without wearing a seat belt? No   Have you felt unusual stress, anger or loneliness in the last month? No   Who do you live with? Alone   If you need help, do you have trouble finding someone available to you? No   Do you have difficulty concentrating, remembering or making decisions? No         Does the patient have evidence of cognitive impairment? No    Asprin use counseling:Taking ASA appropriately as indicated    Age-appropriate Screening Schedule:  Refer to the list below for future screening recommendations based on patient's age, sex and/or medical conditions. Orders for these recommended tests are listed in the plan section. The patient has been provided with a written plan.    Health Maintenance   Topic Date Due   • TDAP/TD VACCINES (1 - Tdap) 08/11/2005   • ZOSTER VACCINE (1 of 2) 07/31/2019 (Originally 1/11/1978)   • INFLUENZA VACCINE  08/01/2019   • LIPID PANEL  04/22/2020   • PNEUMOCOCCAL VACCINES (65+ LOW/MEDIUM RISK)  Completed          The following portions of the patient's history were reviewed and updated as appropriate: allergies, current medications, past family history, past medical history, past social history, past surgical history and problem list.    Outpatient Medications Prior to Visit   Medication Sig Dispense Refill   • aspirin 81 MG chewable tablet Chew daily.     • atenolol (TENORMIN) 25 MG tablet Take 1 tablet by mouth 2 (Two) Times a Day. 180 tablet 0   • benazepril (LOTENSIN) 10 MG tablet TAKE 1 TABLET BY MOUTH TWICE DAILY 180 tablet 2   • clotrimazole-betamethasone (LOTRISONE) 1-0.05 % cream JESS EXT AA  "BID UTD  2   • ezetimibe (ZETIA) 10 MG tablet Take 1 tablet by mouth Daily. 90 tablet 0   • pantoprazole (PROTONIX) 40 MG EC tablet Take 1 tablet by mouth Daily. 90 tablet 1   • rosuvastatin (CRESTOR) 10 MG tablet Take 1 tablet by mouth Every Night. 90 tablet 1   • LORazepam (ATIVAN) 1 MG tablet Take 1 tablet by mouth Every 6 (Six) Hours As Needed for Anxiety. 30 tablet 0     No facility-administered medications prior to visit.        Patient Active Problem List   Diagnosis   • BP (high blood pressure)   • MI (mitral incompetence)   • Disorder of right ventricle of heart   • CAD (coronary artery disease)   • Hyperlipidemia       Advanced Care Planning:  Patient has an advance directive - a copy has not been provided. Have asked the patient to send this to us to add to record    Review of Systems    Compared to one year ago, the patient feels his physical health is worse.  Compared to one year ago, the patient feels his mental health is the same.    Reviewed chart for potential of high risk medication in the elderly: yes  Reviewed chart for potential of harmful drug interactions in the elderly:yes    Objective         Vitals:    07/31/19 1009   BP: 126/80   BP Location: Left arm   Patient Position: Sitting   Cuff Size: Adult   Pulse: 54   SpO2: 98%   Weight: 80.6 kg (177 lb 12.8 oz)   Height: 170.2 cm (67\")       Body mass index is 27.85 kg/m².  Discussed the patient's BMI with him. The BMI is above average; BMI management plan is completed.    Physical Exam    Lab Results   Component Value Date    TRIG 228 (H) 07/31/2019    HDL 37 (L) 07/31/2019    LDL 70 07/31/2019    VLDL 45.6 (H) 07/31/2019        Assessment/Plan   Medicare Risks and Personalized Health Plan  CMS Preventative Services Quick Reference  Advance Directive Discussion  Fall Risk  Hearing Problem  Immunizations Discussed/Encouraged (specific immunizations; adacel Tdap and Shingrix )  Obesity/Overweight     The above risks/problems have been discussed " with the patient.  Pertinent information has been shared with the patient in the After Visit Summary.  Follow up plans and orders are seen below in the Assessment/Plan Section.    Diagnoses and all orders for this visit:    1. Medicare annual wellness visit, initial (Primary)    2. Essential hypertension  Comments:  stable   Orders:  -     Comprehensive Metabolic Panel; Future  -     CBC No Differential; Future  -     Comprehensive Metabolic Panel  -     CBC No Differential    3. Hyperlipidemia, unspecified hyperlipidemia type  Comments:  tolerating statin therapy   Orders:  -     Lipid Panel; Future  -     Lipid Panel    4. Gastroesophageal reflux disease with esophagitis  Comments:  stable with protonix     5. Anxiety disorder, unspecified type  Comments:  using ativan sparingly   Orders:  -     LORazepam (ATIVAN) 1 MG tablet; Take 1 tablet by mouth Every 6 (Six) Hours As Needed for Anxiety.  Dispense: 30 tablet; Refill: 0      Follow Up:  Return in about 3 months (around 10/31/2019) for Recheck.     An After Visit Summary and PPPS were given to the patient.      He will bring copy of advance directive  Discussed healthy diet and exercise  Discussed falls prevention  He declines shingrix

## 2019-07-31 NOTE — PROGRESS NOTES
Subjective   Vicente Delgado is a 89 y.o. male.     He is still going to cardiac rehab three times a week and tolerating well. He has been having hand pain and due to see Dr Marquez next week. He saw cardiologist last week and good exam. He is also having right shoulder pain and he is due to see next week. He will be having routine skin exam in next couple weeks. He continues with back massages every 2 weeks, which has been helpful. He is up to date with eye exam.       Hypertension   This is a chronic problem. The current episode started more than 1 year ago. The problem is unchanged. The problem is controlled. Pertinent negatives include no anxiety, blurred vision, chest pain, headaches, orthopnea, palpitations, peripheral edema, PND or shortness of breath. Current antihypertension treatment includes ACE inhibitors and beta blockers. The current treatment provides significant improvement.   Hyperlipidemia   This is a chronic problem. The current episode started more than 1 year ago. The problem is controlled. Recent lipid tests were reviewed and are normal. Pertinent negatives include no chest pain, leg pain, myalgias or shortness of breath. Current antihyperlipidemic treatment includes statins and ezetimibe. The current treatment provides significant improvement of lipids.   Heartburn   He reports no abdominal pain, no belching, no chest pain, no coughing or no wheezing. This is a chronic problem. The current episode started more than 1 year ago. The problem has been unchanged. The symptoms are aggravated by certain foods. Pertinent negatives include no fatigue. He has tried a PPI for the symptoms. The treatment provided significant relief.        The following portions of the patient's history were reviewed and updated as appropriate: allergies, current medications, past family history, past medical history, past social history, past surgical history and problem list.    Review of Systems   Constitutional:  Negative for activity change, appetite change, fatigue and fever.   Eyes: Positive for visual disturbance (wears glasses, left eye blurred, had recent eye examg). Negative for blurred vision.   Respiratory: Negative for cough, shortness of breath and wheezing.    Cardiovascular: Negative for chest pain, palpitations, orthopnea, leg swelling and PND.   Gastrointestinal: Negative for abdominal pain.   Musculoskeletal: Positive for arthralgias (hand pain and right shoulder pain ). Negative for myalgias.   Neurological: Negative for dizziness, light-headedness, numbness and headaches.       Objective   Physical Exam   Constitutional: He is oriented to person, place, and time. He appears well-developed and well-nourished.   HENT:   Head: Normocephalic.   Nose: Nose normal.   Neck: Carotid bruit is not present. No thyroid mass and no thyromegaly present.   Cardiovascular: Regular rhythm and normal heart sounds. Exam reveals no S3 and no S4.   No murmur heard.  Repeat bp left arm 138/82  No pedal edema    Pulmonary/Chest: Effort normal and breath sounds normal. He has no decreased breath sounds. He has no wheezes. He has no rhonchi. He has no rales.   Musculoskeletal: He exhibits no edema.   Neurological: He is alert and oriented to person, place, and time. Gait normal.   Skin: Skin is warm and dry.   Psychiatric: He has a normal mood and affect. His speech is normal and behavior is normal. Judgment and thought content normal. Cognition and memory are normal.       Assessment/Plan   Vicente was seen today for medicare wellness-initial visit.    Diagnoses and all orders for this visit:    Medicare annual wellness visit, initial    Essential hypertension  Comments:  stable   Orders:  -     Comprehensive Metabolic Panel; Future  -     CBC No Differential; Future  -     Comprehensive Metabolic Panel  -     CBC No Differential    Hyperlipidemia, unspecified hyperlipidemia type  Comments:  tolerating statin therapy   Orders:  -      Lipid Panel; Future  -     Lipid Panel    Gastroesophageal reflux disease with esophagitis  Comments:  stable with protonix     Anxiety disorder, unspecified type  Comments:  using ativan sparingly   Orders:  -     LORazepam (ATIVAN) 1 MG tablet; Take 1 tablet by mouth Every 6 (Six) Hours As Needed for Anxiety.    NEERAJ query complete. Treatment plan to include limited course of prescribedcontrolled substance. Risks including addiction, benefits, and alternatives presented to patient.

## 2019-08-02 ENCOUNTER — OFFICE VISIT (OUTPATIENT)
Dept: CARDIAC REHAB | Facility: HOSPITAL | Age: 84
End: 2019-08-02

## 2019-08-02 DIAGNOSIS — I21.4 NON-STEMI (NON-ST ELEVATED MYOCARDIAL INFARCTION) (HCC): Primary | ICD-10-CM

## 2019-08-05 ENCOUNTER — OFFICE VISIT (OUTPATIENT)
Dept: CARDIAC REHAB | Facility: HOSPITAL | Age: 84
End: 2019-08-05

## 2019-08-05 DIAGNOSIS — I21.4 NON-STEMI (NON-ST ELEVATED MYOCARDIAL INFARCTION) (HCC): Primary | ICD-10-CM

## 2019-08-07 ENCOUNTER — OFFICE VISIT (OUTPATIENT)
Dept: CARDIAC REHAB | Facility: HOSPITAL | Age: 84
End: 2019-08-07

## 2019-08-07 DIAGNOSIS — I21.4 NON-STEMI (NON-ST ELEVATED MYOCARDIAL INFARCTION) (HCC): Primary | ICD-10-CM

## 2019-08-09 ENCOUNTER — OFFICE VISIT (OUTPATIENT)
Dept: CARDIAC REHAB | Facility: HOSPITAL | Age: 84
End: 2019-08-09

## 2019-08-09 DIAGNOSIS — I21.4 NON-STEMI (NON-ST ELEVATED MYOCARDIAL INFARCTION) (HCC): Primary | ICD-10-CM

## 2019-08-12 ENCOUNTER — OFFICE VISIT (OUTPATIENT)
Dept: CARDIAC REHAB | Facility: HOSPITAL | Age: 84
End: 2019-08-12

## 2019-08-12 DIAGNOSIS — I21.4 NON-STEMI (NON-ST ELEVATED MYOCARDIAL INFARCTION) (HCC): Primary | ICD-10-CM

## 2019-08-14 ENCOUNTER — OFFICE VISIT (OUTPATIENT)
Dept: CARDIAC REHAB | Facility: HOSPITAL | Age: 84
End: 2019-08-14

## 2019-08-14 DIAGNOSIS — I21.4 NON-STEMI (NON-ST ELEVATED MYOCARDIAL INFARCTION) (HCC): Primary | ICD-10-CM

## 2019-08-16 ENCOUNTER — OFFICE VISIT (OUTPATIENT)
Dept: CARDIAC REHAB | Facility: HOSPITAL | Age: 84
End: 2019-08-16

## 2019-08-16 DIAGNOSIS — I21.4 NON-STEMI (NON-ST ELEVATED MYOCARDIAL INFARCTION) (HCC): Primary | ICD-10-CM

## 2019-08-19 ENCOUNTER — OFFICE VISIT (OUTPATIENT)
Dept: CARDIAC REHAB | Facility: HOSPITAL | Age: 84
End: 2019-08-19

## 2019-08-19 DIAGNOSIS — I21.4 NON-STEMI (NON-ST ELEVATED MYOCARDIAL INFARCTION) (HCC): Primary | ICD-10-CM

## 2019-08-23 ENCOUNTER — OFFICE VISIT (OUTPATIENT)
Dept: INTERNAL MEDICINE | Facility: CLINIC | Age: 84
End: 2019-08-23

## 2019-08-23 VITALS
WEIGHT: 175.4 LBS | HEIGHT: 67 IN | SYSTOLIC BLOOD PRESSURE: 122 MMHG | BODY MASS INDEX: 27.53 KG/M2 | HEART RATE: 58 BPM | OXYGEN SATURATION: 99 % | DIASTOLIC BLOOD PRESSURE: 70 MMHG | TEMPERATURE: 97.9 F

## 2019-08-23 DIAGNOSIS — J06.9 ACUTE URI: Primary | ICD-10-CM

## 2019-08-23 PROCEDURE — 99213 OFFICE O/P EST LOW 20 MIN: CPT | Performed by: NURSE PRACTITIONER

## 2019-08-23 RX ORDER — FEXOFENADINE HYDROCHLORIDE 60 MG/1
60 TABLET, FILM COATED ORAL DAILY
Qty: 7 TABLET | Refills: 0
Start: 2019-08-23 | End: 2020-05-07

## 2019-08-23 RX ORDER — AZELASTINE 1 MG/ML
2 SPRAY, METERED NASAL 2 TIMES DAILY PRN
Qty: 30 ML | Refills: 1 | Status: SHIPPED | OUTPATIENT
Start: 2019-08-23 | End: 2021-01-12

## 2019-08-23 RX ORDER — AZITHROMYCIN 250 MG/1
250 TABLET, FILM COATED ORAL DAILY
Qty: 6 TABLET | Refills: 0 | Status: SHIPPED | OUTPATIENT
Start: 2019-08-23 | End: 2019-11-07

## 2019-08-23 NOTE — PATIENT INSTRUCTIONS
"Upper Respiratory Infection, Adult  An upper respiratory infection (URI) affects the nose, throat, and upper air passages. URIs are caused by germs (viruses). The most common type of URI is often called \"the common cold.\"  Medicines cannot cure URIs, but you can do things at home to relieve your symptoms. URIs usually get better within 7-10 days.  Follow these instructions at home:  Activity  · Rest as needed.  · If you have a fever, stay home from work or school until your fever is gone, or until your doctor says you may return to work or school.  ? You should stay home until you cannot spread the infection anymore (you are not contagious).  ? Your doctor may have you wear a face mask so you have less risk of spreading the infection.  Relieving symptoms  · Gargle with a salt-water mixture 3-4 times a day or as needed. To make a salt-water mixture, completely dissolve ½-1 tsp of salt in 1 cup of warm water.  · Use a cool-mist humidifier to add moisture to the air. This can help you breathe more easily.  Eating and drinking    · Drink enough fluid to keep your pee (urine) pale yellow.  · Eat soups and other clear broths.  General instructions    · Take over-the-counter and prescription medicines only as told by your doctor. These include cold medicines, fever reducers, and cough suppressants.  · Do not use any products that contain nicotine or tobacco. These include cigarettes and e-cigarettes. If you need help quitting, ask your doctor.  · Avoid being where people are smoking (avoid secondhand smoke).  · Make sure you get regular shots and get the flu shot every year.  · Keep all follow-up visits as told by your doctor. This is important.  How to avoid spreading infection to others    · Wash your hands often with soap and water. If you do not have soap and water, use hand .  · Avoid touching your mouth, face, eyes, or nose.  · Cough or sneeze into a tissue or your sleeve or elbow. Do not cough or sneeze " "into your hand or into the air.  Contact a doctor if:  · You are getting worse, not better.  · You have any of these:  ? A fever.  ? Chills.  ? Brown or red mucus in your nose.  ? Yellow or brown fluid (discharge)coming from your nose.  ? Pain in your face, especially when you bend forward.  ? Swollen neck glands.  ? Pain with swallowing.  ? White areas in the back of your throat.  Get help right away if:  · You have shortness of breath that gets worse.  · You have very bad or constant:  ? Headache.  ? Ear pain.  ? Pain in your forehead, behind your eyes, and over your cheekbones (sinus pain).  ? Chest pain.  · You have long-lasting (chronic) lung disease along with any of these:  ? Wheezing.  ? Long-lasting cough.  ? Coughing up blood.  ? A change in your usual mucus.  · You have a stiff neck.  · You have changes in your:  ? Vision.  ? Hearing.  ? Thinking.  ? Mood.  Summary  · An upper respiratory infection (URI) is caused by a germ called a virus. The most common type of URI is often called \"the common cold.\"  · URIs usually get better within 7-10 days.  · Take over-the-counter and prescription medicines only as told by your doctor.  This information is not intended to replace advice given to you by your health care provider. Make sure you discuss any questions you have with your health care provider.  Document Released: 06/05/2009 Document Revised: 08/10/2018 Document Reviewed: 08/10/2018  [employer] Patient Education © 2019 [employer]      "

## 2019-08-23 NOTE — PROGRESS NOTES
Subjective   Vicente Delgado is a 89 y.o. male.     No recent air travel. [place] and started with symptoms shortly afterwards (eyes burning, sore throat and itchy ears).       URI    This is a new problem. The current episode started in the past 7 days. There has been no fever. Associated symptoms include coughing (dry intermittent, ), headaches, rhinorrhea and a sore throat. Pertinent negatives include no abdominal pain, chest pain, congestion, ear pain, nausea, plugged ear sensation, sinus pain, sneezing, vomiting or wheezing. Associated symptoms comments: Irritated eyes and burning . Treatments tried: mucinex (one dose) and tylenol  The treatment provided mild relief.        The following portions of the patient's history were reviewed and updated as appropriate: allergies, current medications, past social history and problem list.    Review of Systems   Constitutional: Positive for fatigue. Negative for chills and fever.   HENT: Positive for postnasal drip, rhinorrhea and sore throat. Negative for congestion, ear pain, sinus pain and sneezing.    Respiratory: Positive for cough (dry intermittent, ). Negative for wheezing.    Cardiovascular: Negative for chest pain.   Gastrointestinal: Negative for abdominal pain, nausea and vomiting.   Neurological: Positive for headaches.       Objective   Physical Exam   Constitutional: He appears well-developed and well-nourished. He is cooperative. He does not have a sickly appearance. He does not appear ill.   HENT:   Head: Normocephalic.   Right Ear: Hearing, tympanic membrane and external ear normal. No drainage, swelling or tenderness. No mastoid tenderness. Tympanic membrane is not injected, not scarred, not erythematous and not bulging. Tympanic membrane mobility is normal. No middle ear effusion. No decreased hearing is noted.   Left Ear: Hearing, tympanic membrane and external ear normal. No drainage, swelling or tenderness. No mastoid  tenderness. Tympanic membrane is not injected, not scarred, not erythematous and not bulging. Tympanic membrane mobility is normal.  No middle ear effusion. No decreased hearing is noted.   Nose: Mucosal edema and rhinorrhea present. No sinus tenderness or nasal deformity. Right sinus exhibits no maxillary sinus tenderness and no frontal sinus tenderness. Left sinus exhibits no maxillary sinus tenderness and no frontal sinus tenderness.   Mouth/Throat: Mucous membranes are normal. Normal dentition. Posterior oropharyngeal erythema present. No tonsillar exudate.   Eyes: Conjunctivae and lids are normal. Pupils are equal, round, and reactive to light. Right eye exhibits no discharge and no exudate. Left eye exhibits no discharge and no exudate.   Neck: Trachea normal and normal range of motion. No edema present. No thyroid mass and no thyromegaly present.   Cardiovascular: Regular rhythm, normal heart sounds and normal pulses.   No murmur heard.  Pulmonary/Chest: Breath sounds normal. No respiratory distress. He has no decreased breath sounds. He has no wheezes. He has no rhonchi. He has no rales.   Dry cough    Lymphadenopathy:        Head (right side): No submental, no submandibular, no tonsillar, no preauricular, no posterior auricular and no occipital adenopathy present.        Head (left side): No submental, no submandibular, no tonsillar, no preauricular, no posterior auricular and no occipital adenopathy present.     He has no cervical adenopathy.   Neurological: He is alert.   Skin: Skin is warm, dry and intact. No cyanosis. Nails show no clubbing.       Assessment/Plan   Vicente was seen today for nasal congestion.    Diagnoses and all orders for this visit:    Acute URI  -     azithromycin (ZITHROMAX Z-GARDENIA) 250 MG tablet; Take 1 tablet by mouth Daily. Take 2 tablets the first day, then 1 tablet daily for 4 days.  -     fexofenadine (ALLEGRA ALLERGY) 60 MG tablet; Take 1 tablet by mouth Daily.  -     azelastine  (ASTELIN) 0.1 % nasal spray; 2 sprays into the nostril(s) as directed by provider 2 (Two) Times a Day As Needed for Rhinitis. Use in each nostril as directed      He will return for worsening of symptoms.

## 2019-08-26 ENCOUNTER — OFFICE VISIT (OUTPATIENT)
Dept: CARDIAC REHAB | Facility: HOSPITAL | Age: 84
End: 2019-08-26

## 2019-08-26 DIAGNOSIS — I21.4 NON-STEMI (NON-ST ELEVATED MYOCARDIAL INFARCTION) (HCC): Primary | ICD-10-CM

## 2019-08-28 ENCOUNTER — OFFICE VISIT (OUTPATIENT)
Dept: CARDIAC REHAB | Facility: HOSPITAL | Age: 84
End: 2019-08-28

## 2019-08-28 DIAGNOSIS — I21.4 NON-STEMI (NON-ST ELEVATED MYOCARDIAL INFARCTION) (HCC): Primary | ICD-10-CM

## 2019-08-30 ENCOUNTER — OFFICE VISIT (OUTPATIENT)
Dept: CARDIAC REHAB | Facility: HOSPITAL | Age: 84
End: 2019-08-30

## 2019-08-30 DIAGNOSIS — I21.4 NON-STEMI (NON-ST ELEVATED MYOCARDIAL INFARCTION) (HCC): Primary | ICD-10-CM

## 2019-09-06 ENCOUNTER — OFFICE VISIT (OUTPATIENT)
Dept: CARDIAC REHAB | Facility: HOSPITAL | Age: 84
End: 2019-09-06

## 2019-09-06 DIAGNOSIS — I21.4 NON-STEMI (NON-ST ELEVATED MYOCARDIAL INFARCTION) (HCC): Primary | ICD-10-CM

## 2019-09-09 ENCOUNTER — OFFICE VISIT (OUTPATIENT)
Dept: CARDIAC REHAB | Facility: HOSPITAL | Age: 84
End: 2019-09-09

## 2019-09-09 DIAGNOSIS — I21.4 NON-STEMI (NON-ST ELEVATED MYOCARDIAL INFARCTION) (HCC): Primary | ICD-10-CM

## 2019-09-11 ENCOUNTER — OFFICE VISIT (OUTPATIENT)
Dept: CARDIAC REHAB | Facility: HOSPITAL | Age: 84
End: 2019-09-11

## 2019-09-11 DIAGNOSIS — I21.4 NON-STEMI (NON-ST ELEVATED MYOCARDIAL INFARCTION) (HCC): Primary | ICD-10-CM

## 2019-09-13 ENCOUNTER — OFFICE VISIT (OUTPATIENT)
Dept: CARDIAC REHAB | Facility: HOSPITAL | Age: 84
End: 2019-09-13

## 2019-09-13 DIAGNOSIS — I10 ESSENTIAL HYPERTENSION: ICD-10-CM

## 2019-09-13 DIAGNOSIS — E78.5 HYPERLIPEMIA: ICD-10-CM

## 2019-09-13 DIAGNOSIS — K21.00 GASTROESOPHAGEAL REFLUX DISEASE WITH ESOPHAGITIS: ICD-10-CM

## 2019-09-13 DIAGNOSIS — I21.4 NON-STEMI (NON-ST ELEVATED MYOCARDIAL INFARCTION) (HCC): Primary | ICD-10-CM

## 2019-09-13 DIAGNOSIS — E78.2 MIXED HYPERLIPIDEMIA: ICD-10-CM

## 2019-09-13 RX ORDER — EZETIMIBE 10 MG/1
10 TABLET ORAL DAILY
Qty: 90 TABLET | Refills: 1 | Status: SHIPPED | OUTPATIENT
Start: 2019-09-13 | End: 2020-03-16

## 2019-09-13 RX ORDER — ROSUVASTATIN CALCIUM 10 MG/1
10 TABLET, COATED ORAL NIGHTLY
Qty: 90 TABLET | Refills: 1 | Status: SHIPPED | OUTPATIENT
Start: 2019-09-13 | End: 2020-03-16

## 2019-09-13 RX ORDER — ATENOLOL 25 MG/1
25 TABLET ORAL 2 TIMES DAILY
Qty: 180 TABLET | Refills: 1 | Status: SHIPPED | OUTPATIENT
Start: 2019-09-13 | End: 2020-03-16

## 2019-09-13 RX ORDER — PANTOPRAZOLE SODIUM 40 MG/1
40 TABLET, DELAYED RELEASE ORAL DAILY
Qty: 90 TABLET | Refills: 1 | Status: SHIPPED | OUTPATIENT
Start: 2019-09-13 | End: 2019-11-07 | Stop reason: SDUPTHER

## 2019-09-16 ENCOUNTER — OFFICE VISIT (OUTPATIENT)
Dept: CARDIAC REHAB | Facility: HOSPITAL | Age: 84
End: 2019-09-16

## 2019-09-16 DIAGNOSIS — I21.4 NON-STEMI (NON-ST ELEVATED MYOCARDIAL INFARCTION) (HCC): Primary | ICD-10-CM

## 2019-09-18 ENCOUNTER — OFFICE VISIT (OUTPATIENT)
Dept: CARDIAC REHAB | Facility: HOSPITAL | Age: 84
End: 2019-09-18

## 2019-09-18 DIAGNOSIS — I21.4 NON-STEMI (NON-ST ELEVATED MYOCARDIAL INFARCTION) (HCC): Primary | ICD-10-CM

## 2019-09-20 ENCOUNTER — OFFICE VISIT (OUTPATIENT)
Dept: CARDIAC REHAB | Facility: HOSPITAL | Age: 84
End: 2019-09-20

## 2019-09-20 DIAGNOSIS — I21.4 NON-STEMI (NON-ST ELEVATED MYOCARDIAL INFARCTION) (HCC): Primary | ICD-10-CM

## 2019-09-23 ENCOUNTER — OFFICE VISIT (OUTPATIENT)
Dept: CARDIAC REHAB | Facility: HOSPITAL | Age: 84
End: 2019-09-23

## 2019-09-23 DIAGNOSIS — I21.4 NON-STEMI (NON-ST ELEVATED MYOCARDIAL INFARCTION) (HCC): Primary | ICD-10-CM

## 2019-09-25 ENCOUNTER — OFFICE VISIT (OUTPATIENT)
Dept: CARDIAC REHAB | Facility: HOSPITAL | Age: 84
End: 2019-09-25

## 2019-09-25 DIAGNOSIS — I21.4 NON-STEMI (NON-ST ELEVATED MYOCARDIAL INFARCTION) (HCC): Primary | ICD-10-CM

## 2019-09-27 ENCOUNTER — OFFICE VISIT (OUTPATIENT)
Dept: CARDIAC REHAB | Facility: HOSPITAL | Age: 84
End: 2019-09-27

## 2019-09-27 DIAGNOSIS — I21.4 NON-STEMI (NON-ST ELEVATED MYOCARDIAL INFARCTION) (HCC): Primary | ICD-10-CM

## 2019-10-02 ENCOUNTER — OFFICE VISIT (OUTPATIENT)
Dept: CARDIAC REHAB | Facility: HOSPITAL | Age: 84
End: 2019-10-02

## 2019-10-02 DIAGNOSIS — I21.4 NON-STEMI (NON-ST ELEVATED MYOCARDIAL INFARCTION) (HCC): Primary | ICD-10-CM

## 2019-10-07 ENCOUNTER — OFFICE VISIT (OUTPATIENT)
Dept: CARDIAC REHAB | Facility: HOSPITAL | Age: 84
End: 2019-10-07

## 2019-10-07 DIAGNOSIS — I21.4 NON-STEMI (NON-ST ELEVATED MYOCARDIAL INFARCTION) (HCC): Primary | ICD-10-CM

## 2019-10-14 ENCOUNTER — OFFICE VISIT (OUTPATIENT)
Dept: CARDIAC REHAB | Facility: HOSPITAL | Age: 84
End: 2019-10-14

## 2019-10-14 DIAGNOSIS — I21.4 NON-STEMI (NON-ST ELEVATED MYOCARDIAL INFARCTION) (HCC): Primary | ICD-10-CM

## 2019-10-16 ENCOUNTER — OFFICE VISIT (OUTPATIENT)
Dept: CARDIAC REHAB | Facility: HOSPITAL | Age: 84
End: 2019-10-16

## 2019-10-16 DIAGNOSIS — I21.4 NON-STEMI (NON-ST ELEVATED MYOCARDIAL INFARCTION) (HCC): Primary | ICD-10-CM

## 2019-10-21 ENCOUNTER — OFFICE VISIT (OUTPATIENT)
Dept: CARDIAC REHAB | Facility: HOSPITAL | Age: 84
End: 2019-10-21

## 2019-10-21 DIAGNOSIS — I21.4 NON-STEMI (NON-ST ELEVATED MYOCARDIAL INFARCTION) (HCC): Primary | ICD-10-CM

## 2019-10-25 ENCOUNTER — OFFICE VISIT (OUTPATIENT)
Dept: CARDIAC REHAB | Facility: HOSPITAL | Age: 84
End: 2019-10-25

## 2019-10-25 DIAGNOSIS — I21.4 NON-STEMI (NON-ST ELEVATED MYOCARDIAL INFARCTION) (HCC): Primary | ICD-10-CM

## 2019-10-28 ENCOUNTER — OFFICE VISIT (OUTPATIENT)
Dept: CARDIAC REHAB | Facility: HOSPITAL | Age: 84
End: 2019-10-28

## 2019-10-28 DIAGNOSIS — I21.4 NON-STEMI (NON-ST ELEVATED MYOCARDIAL INFARCTION) (HCC): Primary | ICD-10-CM

## 2019-10-30 ENCOUNTER — OFFICE VISIT (OUTPATIENT)
Dept: CARDIAC REHAB | Facility: HOSPITAL | Age: 84
End: 2019-10-30

## 2019-10-30 DIAGNOSIS — I21.4 NON-STEMI (NON-ST ELEVATED MYOCARDIAL INFARCTION) (HCC): Primary | ICD-10-CM

## 2019-11-01 ENCOUNTER — OFFICE VISIT (OUTPATIENT)
Dept: CARDIAC REHAB | Facility: HOSPITAL | Age: 84
End: 2019-11-01

## 2019-11-01 DIAGNOSIS — I21.4 NON-STEMI (NON-ST ELEVATED MYOCARDIAL INFARCTION) (HCC): Primary | ICD-10-CM

## 2019-11-04 ENCOUNTER — OFFICE VISIT (OUTPATIENT)
Dept: CARDIAC REHAB | Facility: HOSPITAL | Age: 84
End: 2019-11-04

## 2019-11-04 DIAGNOSIS — I21.4 NON-STEMI (NON-ST ELEVATED MYOCARDIAL INFARCTION) (HCC): Primary | ICD-10-CM

## 2019-11-06 ENCOUNTER — OFFICE VISIT (OUTPATIENT)
Dept: CARDIAC REHAB | Facility: HOSPITAL | Age: 84
End: 2019-11-06

## 2019-11-06 DIAGNOSIS — I21.4 NON-STEMI (NON-ST ELEVATED MYOCARDIAL INFARCTION) (HCC): Primary | ICD-10-CM

## 2019-11-07 ENCOUNTER — OFFICE VISIT (OUTPATIENT)
Dept: INTERNAL MEDICINE | Facility: CLINIC | Age: 84
End: 2019-11-07

## 2019-11-07 VITALS
OXYGEN SATURATION: 97 % | HEART RATE: 61 BPM | SYSTOLIC BLOOD PRESSURE: 126 MMHG | HEIGHT: 67 IN | DIASTOLIC BLOOD PRESSURE: 74 MMHG | WEIGHT: 176.2 LBS | BODY MASS INDEX: 27.65 KG/M2

## 2019-11-07 DIAGNOSIS — E78.2 MIXED HYPERLIPIDEMIA: ICD-10-CM

## 2019-11-07 DIAGNOSIS — F41.9 ANXIETY DISORDER, UNSPECIFIED TYPE: ICD-10-CM

## 2019-11-07 DIAGNOSIS — K21.00 GASTROESOPHAGEAL REFLUX DISEASE WITH ESOPHAGITIS: ICD-10-CM

## 2019-11-07 DIAGNOSIS — I10 ESSENTIAL HYPERTENSION: Primary | ICD-10-CM

## 2019-11-07 PROCEDURE — 99214 OFFICE O/P EST MOD 30 MIN: CPT | Performed by: NURSE PRACTITIONER

## 2019-11-07 RX ORDER — PANTOPRAZOLE SODIUM 40 MG/1
40 TABLET, DELAYED RELEASE ORAL DAILY
Qty: 90 TABLET | Refills: 1 | Status: SHIPPED | OUTPATIENT
Start: 2019-11-07 | End: 2020-03-16

## 2019-11-07 RX ORDER — LORAZEPAM 1 MG/1
1 TABLET ORAL EVERY 6 HOURS PRN
Qty: 30 TABLET | Refills: 0 | Status: SHIPPED | OUTPATIENT
Start: 2019-11-07 | End: 2020-03-11 | Stop reason: SDUPTHER

## 2019-11-07 NOTE — PROGRESS NOTES
Subjective   Vicente Delgado is a 89 y.o. male.     Overall doing well. He continues with cardiac rehab three times a week.       Hypertension   This is a chronic problem. The current episode started more than 1 year ago. The problem is unchanged. Pertinent negatives include no anxiety, blurred vision, chest pain, headaches, orthopnea, palpitations, peripheral edema, PND or shortness of breath. Current antihypertension treatment includes beta blockers and ACE inhibitors. The current treatment provides significant improvement.   Hyperlipidemia   This is a chronic problem. The current episode started more than 1 year ago. Recent lipid tests were reviewed and are variable. Pertinent negatives include no chest pain or shortness of breath. Current antihyperlipidemic treatment includes ezetimibe and statins. The current treatment provides significant improvement of lipids.        The following portions of the patient's history were reviewed and updated as appropriate: allergies, current medications, past family history, past medical history, past social history, past surgical history and problem list.    Review of Systems   Constitutional: Negative for activity change, appetite change, fatigue and fever.        Overall doing well    Eyes: Positive for visual disturbance (having ocular migraines, seeing opthalmologist currently ). Negative for blurred vision.   Respiratory: Negative for cough, shortness of breath and wheezing.    Cardiovascular: Negative for chest pain, palpitations, orthopnea, leg swelling and PND.   Neurological: Negative for dizziness, speech difficulty and headaches.   Psychiatric/Behavioral: Negative for decreased concentration, dysphoric mood, hallucinations, self-injury, sleep disturbance and suicidal ideas. The patient is nervous/anxious.        Objective   Physical Exam   Constitutional: He is oriented to person, place, and time. He appears well-developed and well-nourished.   HENT:   Head:  Normocephalic.   Nose: Nose normal.   Neck: Carotid bruit is not present. No thyroid mass and no thyromegaly present.   Cardiovascular: Regular rhythm. Exam reveals no S3 and no S4.   Murmur heard.  Repeat bp left arm 130/80   No pedal edema    Pulmonary/Chest: Effort normal and breath sounds normal. He has no decreased breath sounds. He has no wheezes. He has no rhonchi. He has no rales.   Musculoskeletal: He exhibits no edema.   Neurological: He is alert and oriented to person, place, and time. Gait normal.   Skin: Skin is warm and dry.   Psychiatric: He has a normal mood and affect.       Assessment/Plan   Vicente was seen today for hypertension and hyperlipidemia.    Diagnoses and all orders for this visit:    Essential hypertension  Comments:  stable     Mixed hyperlipidemia  Comments:  tolerating statin therapy   Orders:  -     Comprehensive Metabolic Panel; Future  -     Lipid Panel; Future    Gastroesophageal reflux disease with esophagitis  Comments:  stable with protonix   Orders:  -     pantoprazole (PROTONIX) 40 MG EC tablet; Take 1 tablet by mouth Daily.    Anxiety disorder, unspecified type  Comments:  using ativan sparingly   Orders:  -     LORazepam (ATIVAN) 1 MG tablet; Take 1 tablet by mouth Every 6 (Six) Hours As Needed for Anxiety.      NEERAJ query complete. Treatment plan to include limited course of prescribed controlled substance. Risks including addiction, benefits, and alternatives presented to patient.

## 2019-11-08 ENCOUNTER — OFFICE VISIT (OUTPATIENT)
Dept: CARDIAC REHAB | Facility: HOSPITAL | Age: 84
End: 2019-11-08

## 2019-11-08 DIAGNOSIS — I21.4 NON-STEMI (NON-ST ELEVATED MYOCARDIAL INFARCTION) (HCC): Primary | ICD-10-CM

## 2019-11-08 LAB
ALBUMIN SERPL-MCNC: 4.9 G/DL (ref 3.5–5.2)
ALBUMIN/GLOB SERPL: 2 G/DL
ALP SERPL-CCNC: 53 U/L (ref 39–117)
ALT SERPL-CCNC: 16 U/L (ref 1–41)
AST SERPL-CCNC: 24 U/L (ref 1–40)
BILIRUB SERPL-MCNC: 0.5 MG/DL (ref 0.2–1.2)
BUN SERPL-MCNC: 17 MG/DL (ref 8–23)
BUN/CREAT SERPL: 17.3 (ref 7–25)
CALCIUM SERPL-MCNC: 9.5 MG/DL (ref 8.2–9.6)
CHLORIDE SERPL-SCNC: 101 MMOL/L (ref 98–107)
CHOLEST SERPL-MCNC: 153 MG/DL (ref 0–200)
CO2 SERPL-SCNC: 24 MMOL/L (ref 22–29)
CREAT SERPL-MCNC: 0.98 MG/DL (ref 0.76–1.27)
GLOBULIN SER CALC-MCNC: 2.4 GM/DL
GLUCOSE SERPL-MCNC: 87 MG/DL (ref 65–99)
HDLC SERPL-MCNC: 43 MG/DL (ref 40–60)
LDLC SERPL CALC-MCNC: 86 MG/DL (ref 0–100)
POTASSIUM SERPL-SCNC: 4.8 MMOL/L (ref 3.5–5.2)
PROT SERPL-MCNC: 7.3 G/DL (ref 6–8.5)
SODIUM SERPL-SCNC: 140 MMOL/L (ref 136–145)
TRIGL SERPL-MCNC: 120 MG/DL (ref 0–150)
VLDLC SERPL-MCNC: 24 MG/DL

## 2019-11-13 ENCOUNTER — OFFICE VISIT (OUTPATIENT)
Dept: CARDIAC REHAB | Facility: HOSPITAL | Age: 84
End: 2019-11-13

## 2019-11-13 DIAGNOSIS — I21.4 NON-STEMI (NON-ST ELEVATED MYOCARDIAL INFARCTION) (HCC): Primary | ICD-10-CM

## 2019-11-15 ENCOUNTER — OFFICE VISIT (OUTPATIENT)
Dept: CARDIAC REHAB | Facility: HOSPITAL | Age: 84
End: 2019-11-15

## 2019-11-15 DIAGNOSIS — I21.4 NON-STEMI (NON-ST ELEVATED MYOCARDIAL INFARCTION) (HCC): Primary | ICD-10-CM

## 2019-11-18 ENCOUNTER — OFFICE VISIT (OUTPATIENT)
Dept: CARDIAC REHAB | Facility: HOSPITAL | Age: 84
End: 2019-11-18

## 2019-11-18 DIAGNOSIS — I21.4 NON-STEMI (NON-ST ELEVATED MYOCARDIAL INFARCTION) (HCC): Primary | ICD-10-CM

## 2019-11-20 ENCOUNTER — OFFICE VISIT (OUTPATIENT)
Dept: CARDIAC REHAB | Facility: HOSPITAL | Age: 84
End: 2019-11-20

## 2019-11-20 DIAGNOSIS — I21.4 NON-STEMI (NON-ST ELEVATED MYOCARDIAL INFARCTION) (HCC): Primary | ICD-10-CM

## 2019-12-02 ENCOUNTER — OFFICE VISIT (OUTPATIENT)
Dept: CARDIAC REHAB | Facility: HOSPITAL | Age: 84
End: 2019-12-02

## 2019-12-02 DIAGNOSIS — I21.4 NON-STEMI (NON-ST ELEVATED MYOCARDIAL INFARCTION) (HCC): Primary | ICD-10-CM

## 2019-12-04 ENCOUNTER — OFFICE VISIT (OUTPATIENT)
Dept: CARDIAC REHAB | Facility: HOSPITAL | Age: 84
End: 2019-12-04

## 2019-12-04 DIAGNOSIS — I21.4 NON-STEMI (NON-ST ELEVATED MYOCARDIAL INFARCTION) (HCC): Primary | ICD-10-CM

## 2019-12-06 ENCOUNTER — OFFICE VISIT (OUTPATIENT)
Dept: CARDIAC REHAB | Facility: HOSPITAL | Age: 84
End: 2019-12-06

## 2019-12-06 DIAGNOSIS — I21.4 NON-STEMI (NON-ST ELEVATED MYOCARDIAL INFARCTION) (HCC): Primary | ICD-10-CM

## 2019-12-11 ENCOUNTER — TRANSCRIBE ORDERS (OUTPATIENT)
Dept: CARDIAC REHAB | Facility: HOSPITAL | Age: 84
End: 2019-12-11

## 2019-12-11 DIAGNOSIS — I21.4 NON-STEMI (NON-ST ELEVATED MYOCARDIAL INFARCTION) (HCC): Primary | ICD-10-CM

## 2019-12-12 RX ORDER — BENAZEPRIL HYDROCHLORIDE 10 MG/1
TABLET ORAL
Qty: 180 TABLET | Refills: 2 | Status: SHIPPED | OUTPATIENT
Start: 2019-12-12 | End: 2020-09-08

## 2019-12-18 ENCOUNTER — LAB REQUISITION (OUTPATIENT)
Dept: LAB | Facility: HOSPITAL | Age: 84
End: 2019-12-18

## 2019-12-18 DIAGNOSIS — G56.01 CARPAL TUNNEL SYNDROME, RIGHT UPPER LIMB: ICD-10-CM

## 2019-12-18 PROCEDURE — 88305 TISSUE EXAM BY PATHOLOGIST: CPT | Performed by: PLASTIC SURGERY

## 2019-12-20 LAB
CYTO UR: NORMAL
LAB AP CASE REPORT: NORMAL
LAB AP CLINICAL INFORMATION: NORMAL
PATH REPORT.FINAL DX SPEC: NORMAL
PATH REPORT.GROSS SPEC: NORMAL

## 2019-12-27 ENCOUNTER — OFFICE VISIT (OUTPATIENT)
Dept: CARDIAC REHAB | Facility: HOSPITAL | Age: 84
End: 2019-12-27

## 2019-12-27 DIAGNOSIS — I21.4 NON-STEMI (NON-ST ELEVATED MYOCARDIAL INFARCTION) (HCC): Primary | ICD-10-CM

## 2019-12-30 ENCOUNTER — OFFICE VISIT (OUTPATIENT)
Dept: CARDIAC REHAB | Facility: HOSPITAL | Age: 84
End: 2019-12-30

## 2019-12-30 DIAGNOSIS — I21.4 NON-STEMI (NON-ST ELEVATED MYOCARDIAL INFARCTION) (HCC): Primary | ICD-10-CM

## 2020-01-03 ENCOUNTER — OFFICE VISIT (OUTPATIENT)
Dept: CARDIAC REHAB | Facility: HOSPITAL | Age: 85
End: 2020-01-03

## 2020-01-03 DIAGNOSIS — I21.4 NON-STEMI (NON-ST ELEVATED MYOCARDIAL INFARCTION) (HCC): Primary | ICD-10-CM

## 2020-01-06 ENCOUNTER — OFFICE VISIT (OUTPATIENT)
Dept: CARDIAC REHAB | Facility: HOSPITAL | Age: 85
End: 2020-01-06

## 2020-01-06 DIAGNOSIS — I21.4 NON-STEMI (NON-ST ELEVATED MYOCARDIAL INFARCTION) (HCC): Primary | ICD-10-CM

## 2020-01-08 ENCOUNTER — OFFICE VISIT (OUTPATIENT)
Dept: CARDIAC REHAB | Facility: HOSPITAL | Age: 85
End: 2020-01-08

## 2020-01-08 DIAGNOSIS — I21.4 NON-STEMI (NON-ST ELEVATED MYOCARDIAL INFARCTION) (HCC): Primary | ICD-10-CM

## 2020-01-13 ENCOUNTER — OFFICE VISIT (OUTPATIENT)
Dept: CARDIAC REHAB | Facility: HOSPITAL | Age: 85
End: 2020-01-13

## 2020-01-13 DIAGNOSIS — I21.4 NON-STEMI (NON-ST ELEVATED MYOCARDIAL INFARCTION) (HCC): Primary | ICD-10-CM

## 2020-01-15 ENCOUNTER — OFFICE VISIT (OUTPATIENT)
Dept: CARDIAC REHAB | Facility: HOSPITAL | Age: 85
End: 2020-01-15

## 2020-01-15 DIAGNOSIS — I21.4 NON-STEMI (NON-ST ELEVATED MYOCARDIAL INFARCTION) (HCC): Primary | ICD-10-CM

## 2020-01-17 ENCOUNTER — OFFICE VISIT (OUTPATIENT)
Dept: CARDIAC REHAB | Facility: HOSPITAL | Age: 85
End: 2020-01-17

## 2020-01-17 DIAGNOSIS — I21.4 NON-STEMI (NON-ST ELEVATED MYOCARDIAL INFARCTION) (HCC): Primary | ICD-10-CM

## 2020-01-20 ENCOUNTER — OFFICE VISIT (OUTPATIENT)
Dept: CARDIAC REHAB | Facility: HOSPITAL | Age: 85
End: 2020-01-20

## 2020-01-20 DIAGNOSIS — I21.4 NON-STEMI (NON-ST ELEVATED MYOCARDIAL INFARCTION) (HCC): Primary | ICD-10-CM

## 2020-02-07 ENCOUNTER — OFFICE VISIT (OUTPATIENT)
Dept: INTERNAL MEDICINE | Facility: CLINIC | Age: 85
End: 2020-02-07

## 2020-02-07 VITALS
SYSTOLIC BLOOD PRESSURE: 124 MMHG | BODY MASS INDEX: 27.84 KG/M2 | WEIGHT: 177.4 LBS | DIASTOLIC BLOOD PRESSURE: 82 MMHG | HEIGHT: 67 IN

## 2020-02-07 DIAGNOSIS — E78.2 MIXED HYPERLIPIDEMIA: ICD-10-CM

## 2020-02-07 DIAGNOSIS — F41.9 ANXIETY DISORDER, UNSPECIFIED TYPE: ICD-10-CM

## 2020-02-07 DIAGNOSIS — I10 ESSENTIAL HYPERTENSION: Primary | ICD-10-CM

## 2020-02-07 DIAGNOSIS — S39.012A LUMBAR STRAIN, INITIAL ENCOUNTER: ICD-10-CM

## 2020-02-07 DIAGNOSIS — K21.00 GASTROESOPHAGEAL REFLUX DISEASE WITH ESOPHAGITIS: ICD-10-CM

## 2020-02-07 LAB
ALBUMIN SERPL-MCNC: 4.7 G/DL (ref 3.5–5.2)
ALBUMIN/GLOB SERPL: 2 G/DL
ALP SERPL-CCNC: 42 U/L (ref 39–117)
ALT SERPL-CCNC: 15 U/L (ref 1–41)
AST SERPL-CCNC: 21 U/L (ref 1–40)
BILIRUB SERPL-MCNC: 0.6 MG/DL (ref 0.2–1.2)
BUN SERPL-MCNC: 19 MG/DL (ref 8–23)
BUN/CREAT SERPL: 22.4 (ref 7–25)
CALCIUM SERPL-MCNC: 9.3 MG/DL (ref 8.2–9.6)
CHLORIDE SERPL-SCNC: 101 MMOL/L (ref 98–107)
CO2 SERPL-SCNC: 24.8 MMOL/L (ref 22–29)
CREAT SERPL-MCNC: 0.85 MG/DL (ref 0.76–1.27)
GLOBULIN SER CALC-MCNC: 2.3 GM/DL
GLUCOSE SERPL-MCNC: 78 MG/DL (ref 65–99)
POTASSIUM SERPL-SCNC: 4.6 MMOL/L (ref 3.5–5.2)
PROT SERPL-MCNC: 7 G/DL (ref 6–8.5)
SODIUM SERPL-SCNC: 139 MMOL/L (ref 136–145)

## 2020-02-07 PROCEDURE — 99214 OFFICE O/P EST MOD 30 MIN: CPT | Performed by: NURSE PRACTITIONER

## 2020-02-07 NOTE — PROGRESS NOTES
Subjective   Vicente Delgado is a 89 y.o. male.     He just returned from his trip with his daughter and while he was there he started having back pain. He went to urgent care and was instructed to go to PT once her returned home. He denies any known injury.     Hypertension   This is a chronic problem. The current episode started more than 1 year ago. The problem is unchanged. The problem is controlled. Associated symptoms include anxiety and palpitations (forgot to take medicationf). Pertinent negatives include no blurred vision, chest pain, headaches, orthopnea, peripheral edema, PND or shortness of breath. Current antihypertension treatment includes ACE inhibitors and beta blockers. The current treatment provides significant improvement.   Hyperlipidemia   This is a chronic problem. The current episode started more than 1 year ago. The problem is controlled. Recent lipid tests were reviewed and are normal. Pertinent negatives include no chest pain, leg pain or shortness of breath. Current antihyperlipidemic treatment includes statins and ezetimibe. The current treatment provides significant improvement of lipids.   Back Pain   This is a recurrent problem. The current episode started 1 to 4 weeks ago. The problem occurs constantly. The problem is unchanged. The pain is present in the lumbar spine. The quality of the pain is described as aching. The pain does not radiate. The pain is at a severity of 6/10. The pain is moderate. Pertinent negatives include no abdominal pain, bladder incontinence, bowel incontinence, chest pain, fever, headaches, leg pain, numbness or tingling. Treatments tried: heat, massage, cbd oil         The following portions of the patient's history were reviewed and updated as appropriate: allergies, current medications, past family history, past medical history, past social history, past surgical history and problem list.    Review of Systems   Constitutional: Negative for activity change,  appetite change, fatigue and fever.   Eyes: Negative for blurred vision and visual disturbance.   Respiratory: Negative for cough, shortness of breath and wheezing.    Cardiovascular: Positive for palpitations (forgot to take medicationf). Negative for chest pain, orthopnea, leg swelling and PND.   Gastrointestinal: Negative for abdominal pain and bowel incontinence.   Genitourinary: Negative for bladder incontinence.   Musculoskeletal: Positive for back pain.   Neurological: Negative for dizziness, tingling, numbness and headaches.   Psychiatric/Behavioral: Negative for decreased concentration, dysphoric mood, sleep disturbance and suicidal ideas. The patient is nervous/anxious (taking ativan sparingly ).        Objective   Physical Exam   Constitutional: He is oriented to person, place, and time. He appears well-developed and well-nourished.   HENT:   Head: Normocephalic.   Nose: Nose normal.   Bilateral hearing aids    Neck: Carotid bruit is not present. No thyroid mass and no thyromegaly present.   Cardiovascular: Regular rhythm. Exam reveals no S3 and no S4.   Murmur heard.  Repeat bp left arm 132/78  No pedal edema    Pulmonary/Chest: Effort normal and breath sounds normal. He has no decreased breath sounds. He has no wheezes. He has no rhonchi. He has no rales.   Musculoskeletal: He exhibits no edema.        Lumbar back: He exhibits tenderness. He exhibits normal range of motion, no pain, no spasm and normal pulse.   (-) SLR   Left paravertebral tenderness with palpation    Neurological: He is alert and oriented to person, place, and time. Gait normal.   Reflex Scores:       Patellar reflexes are 2+ on the right side and 2+ on the left side.  Skin: Skin is warm and dry.   Psychiatric: He has a normal mood and affect. His speech is normal and behavior is normal. Judgment and thought content normal. Cognition and memory are normal.       Assessment/Plan   Vicente was seen today for hypertension.    Diagnoses and  all orders for this visit:    Essential hypertension  Comments:  stable   Orders:  -     Comprehensive Metabolic Panel  -     CBC No Differential; Future    Mixed hyperlipidemia  Comments:  tolerating statin therapy     Anxiety disorder, unspecified type  Comments:  stable using ativan sparingly     Gastroesophageal reflux disease with esophagitis  Comments:  stable with protonix     Lumbar strain, initial encounter  Comments:  will send to PT (patient requested [place] location near home)  Orders:  -     Ambulatory Referral to Physical Therapy Evaluate and treat

## 2020-02-12 ENCOUNTER — RESULTS ENCOUNTER (OUTPATIENT)
Dept: INTERNAL MEDICINE | Facility: CLINIC | Age: 85
End: 2020-02-12

## 2020-02-12 DIAGNOSIS — I10 ESSENTIAL HYPERTENSION: ICD-10-CM

## 2020-02-19 ENCOUNTER — OFFICE VISIT (OUTPATIENT)
Dept: INTERNAL MEDICINE | Facility: CLINIC | Age: 85
End: 2020-02-19

## 2020-02-19 VITALS
HEIGHT: 67 IN | TEMPERATURE: 97.5 F | OXYGEN SATURATION: 97 % | BODY MASS INDEX: 27.56 KG/M2 | HEART RATE: 54 BPM | WEIGHT: 175.6 LBS | SYSTOLIC BLOOD PRESSURE: 110 MMHG | DIASTOLIC BLOOD PRESSURE: 70 MMHG

## 2020-02-19 DIAGNOSIS — R10.13 EPIGASTRIC PAIN: Primary | ICD-10-CM

## 2020-02-19 LAB
ALBUMIN SERPL-MCNC: 4.5 G/DL (ref 3.5–5.2)
ALBUMIN/GLOB SERPL: 1.5 G/DL
ALP SERPL-CCNC: 51 U/L (ref 39–117)
ALT SERPL W P-5'-P-CCNC: 15 U/L (ref 1–41)
ANION GAP SERPL CALCULATED.3IONS-SCNC: 15.3 MMOL/L (ref 5–15)
AST SERPL-CCNC: 23 U/L (ref 1–40)
BASOPHILS # BLD AUTO: 0.04 10*3/MM3 (ref 0–0.2)
BASOPHILS NFR BLD AUTO: 0.7 % (ref 0–1.5)
BILIRUB SERPL-MCNC: 0.4 MG/DL (ref 0.2–1.2)
BUN BLD-MCNC: 20 MG/DL (ref 8–23)
BUN/CREAT SERPL: 23 (ref 7–25)
CALCIUM SPEC-SCNC: 9.6 MG/DL (ref 8.2–9.6)
CHLORIDE SERPL-SCNC: 99 MMOL/L (ref 98–107)
CO2 SERPL-SCNC: 24.7 MMOL/L (ref 22–29)
CREAT BLD-MCNC: 0.87 MG/DL (ref 0.76–1.27)
DEPRECATED RDW RBC AUTO: 41.9 FL (ref 37–54)
EOSINOPHIL # BLD AUTO: 0.2 10*3/MM3 (ref 0–0.4)
EOSINOPHIL NFR BLD AUTO: 3.3 % (ref 0.3–6.2)
ERYTHROCYTE [DISTWIDTH] IN BLOOD BY AUTOMATED COUNT: 12.3 % (ref 12.3–15.4)
GFR SERPL CREATININE-BSD FRML MDRD: 82 ML/MIN/1.73
GLOBULIN UR ELPH-MCNC: 3 GM/DL
GLUCOSE BLD-MCNC: 84 MG/DL (ref 65–99)
HCT VFR BLD AUTO: 43.5 % (ref 37.5–51)
HGB BLD-MCNC: 14.4 G/DL (ref 13–17.7)
IMM GRANULOCYTES # BLD AUTO: 0.02 10*3/MM3 (ref 0–0.05)
IMM GRANULOCYTES NFR BLD AUTO: 0.3 % (ref 0–0.5)
LYMPHOCYTES # BLD AUTO: 1.53 10*3/MM3 (ref 0.7–3.1)
LYMPHOCYTES NFR BLD AUTO: 25.4 % (ref 19.6–45.3)
MCH RBC QN AUTO: 30.7 PG (ref 26.6–33)
MCHC RBC AUTO-ENTMCNC: 33.1 G/DL (ref 31.5–35.7)
MCV RBC AUTO: 92.8 FL (ref 79–97)
MONOCYTES # BLD AUTO: 1.11 10*3/MM3 (ref 0.1–0.9)
MONOCYTES NFR BLD AUTO: 18.4 % (ref 5–12)
NEUTROPHILS # BLD AUTO: 3.13 10*3/MM3 (ref 1.7–7)
NEUTROPHILS NFR BLD AUTO: 51.9 % (ref 42.7–76)
NRBC BLD AUTO-RTO: 0 /100 WBC (ref 0–0.2)
PLATELET # BLD AUTO: 200 10*3/MM3 (ref 140–450)
PMV BLD AUTO: 10.3 FL (ref 6–12)
POTASSIUM BLD-SCNC: 4.4 MMOL/L (ref 3.5–5.2)
PROT SERPL-MCNC: 7.5 G/DL (ref 6–8.5)
RBC # BLD AUTO: 4.69 10*6/MM3 (ref 4.14–5.8)
SODIUM BLD-SCNC: 139 MMOL/L (ref 136–145)
WBC NRBC COR # BLD: 6.03 10*3/MM3 (ref 3.4–10.8)

## 2020-02-19 PROCEDURE — 99213 OFFICE O/P EST LOW 20 MIN: CPT | Performed by: NURSE PRACTITIONER

## 2020-02-19 PROCEDURE — 85025 COMPLETE CBC W/AUTO DIFF WBC: CPT | Performed by: NURSE PRACTITIONER

## 2020-02-19 PROCEDURE — 80053 COMPREHEN METABOLIC PANEL: CPT | Performed by: NURSE PRACTITIONER

## 2020-02-19 PROCEDURE — 36415 COLL VENOUS BLD VENIPUNCTURE: CPT | Performed by: NURSE PRACTITIONER

## 2020-02-19 NOTE — PROGRESS NOTES
Subjective   Vicente Delgado is a 89 y.o. male.     He started with pain in his abdomen this Monday that has progressive worsen. It is constant aching pain. He reports normal. He states pain started after doing PT exercise. He denies any nausea/vomiting. No bowel or urinary symptom changes.     Abdominal Pain   This is a new problem. The current episode started in the past 7 days. The problem has been unchanged. The pain is located in the epigastric region. The pain is at a severity of 5/10. The pain is moderate. The quality of the pain is cramping and dull. The abdominal pain radiates to the back. Associated symptoms include belching. Pertinent negatives include no anorexia, arthralgias, constipation, diarrhea, dysuria, fever, hematuria, melena, nausea or vomiting. Associated symptoms comments: Decreased appetite . The pain is relieved by certain positions and eating. He has tried acetaminophen for the symptoms. The treatment provided mild relief.        The following portions of the patient's history were reviewed and updated as appropriate: allergies, current medications, past family history, past medical history, past social history, past surgical history and problem list.    Review of Systems   Constitutional: Positive for appetite change. Negative for activity change, fatigue and fever.   Respiratory: Negative for cough, shortness of breath and wheezing.    Cardiovascular: Negative for chest pain, palpitations and leg swelling.   Gastrointestinal: Positive for abdominal pain. Negative for abdominal distention, anorexia, blood in stool, constipation, diarrhea, melena, nausea and vomiting.   Genitourinary: Negative for dysuria, hematuria and urgency.   Musculoskeletal: Positive for back pain. Negative for arthralgias.       Objective   Physical Exam   Constitutional: He is oriented to person, place, and time. He appears well-developed and well-nourished.   HENT:   Head: Normocephalic.   Nose: Nose normal.    Cardiovascular: Regular rhythm and normal heart sounds. Exam reveals no S3 and no S4.   No murmur heard.  Pulmonary/Chest: Effort normal and breath sounds normal. He has no decreased breath sounds. He has no wheezes. He has no rhonchi. He has no rales.   Abdominal: Normal appearance and bowel sounds are normal. There is tenderness in the epigastric area. There is guarding.   Musculoskeletal: He exhibits no edema.        Lumbar back: He exhibits tenderness. He exhibits normal range of motion, no pain and no spasm.   Neurological: He is alert and oriented to person, place, and time. Gait normal.   Skin: Skin is warm and dry.   Psychiatric: He has a normal mood and affect.       Assessment/Plan   Vicente was seen today for abdominal pain.    Diagnoses and all orders for this visit:    Epigastric pain  -     Cancel: CBC & Differential  -     Cancel: Comprehensive Metabolic Panel  -     Comprehensive Metabolic Panel  -     Cancel: CBC Auto Differential  -     Scan Slide; Future  -     Cancel: Scan Slide  -     US Gallbladder; Future  -     CBC & Differential; Future  -     CBC & Differential  -     CBC Auto Differential      Normal CBC. Will check labs and obtain US gallbladder, although symptoms appear more muscular,

## 2020-02-20 ENCOUNTER — HOSPITAL ENCOUNTER (OUTPATIENT)
Dept: ULTRASOUND IMAGING | Facility: HOSPITAL | Age: 85
Discharge: HOME | End: 2020-02-20
Admitting: NURSE PRACTITIONER

## 2020-02-20 DIAGNOSIS — R10.13 EPIGASTRIC PAIN: ICD-10-CM

## 2020-02-20 LAB — REQUEST PROBLEM: NORMAL

## 2020-02-20 PROCEDURE — 76705 ECHO EXAM OF ABDOMEN: CPT

## 2020-02-21 ENCOUNTER — TELEPHONE (OUTPATIENT)
Dept: INTERNAL MEDICINE | Facility: CLINIC | Age: 85
End: 2020-02-21

## 2020-02-21 NOTE — TELEPHONE ENCOUNTER
I called patient to follow up with patient. I informed of lab work. He still having pain in his stomach. However back and hip pain have improved. He denies any nausea and vomiting. He denies any chest pain or shortness of breath.  I am awaiting US gallbladder results. He has been advised to ER if any worsening of symptoms.

## 2020-02-25 ENCOUNTER — TELEPHONE (OUTPATIENT)
Dept: INTERNAL MEDICINE | Facility: CLINIC | Age: 85
End: 2020-02-25

## 2020-02-25 NOTE — TELEPHONE ENCOUNTER
PATIENT CALLED TO REQUEST THE RESULTS ON HIS U/S HE HAD ON HIS GALLBLADDER AND WOULD LIKE TO GET A CALL BACK AS SOON AS POSSIBLE. PT CAN BE REACHED -[phone]

## 2020-02-25 NOTE — TELEPHONE ENCOUNTER
I returned call to patient and informed him of US gallbladder results (gallstone noted and renal cyst) . He still has abdominal pain., however improved. His current pain 5/10. He reports sometimes eating makes it worst and he experience the pain the morning. Will obtain further imaging.

## 2020-02-26 DIAGNOSIS — R10.13 EPIGASTRIC PAIN: Primary | ICD-10-CM

## 2020-02-28 ENCOUNTER — HOSPITAL ENCOUNTER (OUTPATIENT)
Dept: CT IMAGING | Facility: HOSPITAL | Age: 85
Discharge: HOME | End: 2020-02-28
Admitting: NURSE PRACTITIONER

## 2020-02-28 DIAGNOSIS — R10.13 EPIGASTRIC PAIN: ICD-10-CM

## 2020-02-28 LAB — CREAT BLDA-MCNC: 1.1 MG/DL (ref 0.6–1.3)

## 2020-02-28 PROCEDURE — 82565 ASSAY OF CREATININE: CPT

## 2020-02-28 PROCEDURE — 25010000002 IOPAMIDOL 61 % SOLUTION: Performed by: NURSE PRACTITIONER

## 2020-02-28 PROCEDURE — 74177 CT ABD & PELVIS W/CONTRAST: CPT

## 2020-02-28 RX ADMIN — IOPAMIDOL 85 ML: 612 INJECTION, SOLUTION INTRAVENOUS at 15:05

## 2020-03-02 ENCOUNTER — TELEPHONE (OUTPATIENT)
Dept: INTERNAL MEDICINE | Facility: CLINIC | Age: 85
End: 2020-03-02

## 2020-03-02 NOTE — TELEPHONE ENCOUNTER
He reports his symptoms have improved some, however still having symptoms. He reports no fever, nausea or vomiting. His symptoms are not exacerbated with food intake. He will monitor symptoms.

## 2020-03-03 ENCOUNTER — TELEPHONE (OUTPATIENT)
Dept: INTERNAL MEDICINE | Facility: CLINIC | Age: 85
End: 2020-03-03

## 2020-03-03 DIAGNOSIS — K80.20 CALCULUS OF GALLBLADDER WITHOUT CHOLECYSTITIS WITHOUT OBSTRUCTION: Primary | ICD-10-CM

## 2020-03-03 NOTE — TELEPHONE ENCOUNTER
I called patient and his daughter was present and discussed his right upper quadrant pain. Discussed obtain HIDA scan. His daughter Sandra.

## 2020-03-04 ENCOUNTER — OFFICE VISIT (OUTPATIENT)
Dept: CARDIAC REHAB | Facility: HOSPITAL | Age: 85
End: 2020-03-04

## 2020-03-04 DIAGNOSIS — I21.4 NON-STEMI (NON-ST ELEVATED MYOCARDIAL INFARCTION) (HCC): Primary | ICD-10-CM

## 2020-03-06 ENCOUNTER — OFFICE VISIT (OUTPATIENT)
Dept: CARDIAC REHAB | Facility: HOSPITAL | Age: 85
End: 2020-03-06

## 2020-03-06 DIAGNOSIS — I21.4 NON-STEMI (NON-ST ELEVATED MYOCARDIAL INFARCTION) (HCC): Primary | ICD-10-CM

## 2020-03-09 ENCOUNTER — HOSPITAL ENCOUNTER (OUTPATIENT)
Dept: NUCLEAR MEDICINE | Facility: HOSPITAL | Age: 85
Discharge: HOME | End: 2020-03-09

## 2020-03-09 ENCOUNTER — OFFICE VISIT (OUTPATIENT)
Dept: CARDIAC REHAB | Facility: HOSPITAL | Age: 85
End: 2020-03-09

## 2020-03-09 DIAGNOSIS — K80.20 CALCULUS OF GALLBLADDER WITHOUT CHOLECYSTITIS WITHOUT OBSTRUCTION: ICD-10-CM

## 2020-03-09 DIAGNOSIS — I21.4 NON-STEMI (NON-ST ELEVATED MYOCARDIAL INFARCTION) (HCC): Primary | ICD-10-CM

## 2020-03-09 PROCEDURE — 0 TECHNETIUM TC 99M MEBROFENIN KIT: Performed by: NURSE PRACTITIONER

## 2020-03-09 PROCEDURE — 78227 HEPATOBIL SYST IMAGE W/DRUG: CPT

## 2020-03-09 PROCEDURE — A9537 TC99M MEBROFENIN: HCPCS | Performed by: NURSE PRACTITIONER

## 2020-03-09 PROCEDURE — 25010000002 SINCALIDE PER 5 MCG: Performed by: NURSE PRACTITIONER

## 2020-03-09 RX ORDER — KIT FOR THE PREPARATION OF TECHNETIUM TC 99M MEBROFENIN 45 MG/10ML
1 INJECTION, POWDER, LYOPHILIZED, FOR SOLUTION INTRAVENOUS
Status: COMPLETED | OUTPATIENT
Start: 2020-03-09 | End: 2020-03-09

## 2020-03-09 RX ADMIN — MEBROFENIN 1 DOSE: 45 INJECTION, POWDER, LYOPHILIZED, FOR SOLUTION INTRAVENOUS at 08:30

## 2020-03-09 RX ADMIN — SINCALIDE 1.6 MCG: 5 INJECTION, POWDER, LYOPHILIZED, FOR SOLUTION INTRAVENOUS at 09:50

## 2020-03-11 ENCOUNTER — APPOINTMENT (OUTPATIENT)
Dept: WOMENS IMAGING | Facility: HOSPITAL | Age: 85
End: 2020-03-11

## 2020-03-11 ENCOUNTER — OFFICE VISIT (OUTPATIENT)
Dept: CARDIAC REHAB | Facility: HOSPITAL | Age: 85
End: 2020-03-11

## 2020-03-11 ENCOUNTER — OFFICE VISIT (OUTPATIENT)
Dept: INTERNAL MEDICINE | Facility: CLINIC | Age: 85
End: 2020-03-11

## 2020-03-11 VITALS
WEIGHT: 176 LBS | DIASTOLIC BLOOD PRESSURE: 76 MMHG | OXYGEN SATURATION: 98 % | HEART RATE: 52 BPM | TEMPERATURE: 97.7 F | BODY MASS INDEX: 27.62 KG/M2 | HEIGHT: 67 IN | SYSTOLIC BLOOD PRESSURE: 134 MMHG

## 2020-03-11 DIAGNOSIS — I21.4 NON-STEMI (NON-ST ELEVATED MYOCARDIAL INFARCTION) (HCC): Primary | ICD-10-CM

## 2020-03-11 DIAGNOSIS — S39.012D LUMBAR STRAIN, SUBSEQUENT ENCOUNTER: ICD-10-CM

## 2020-03-11 DIAGNOSIS — F41.9 ANXIETY DISORDER, UNSPECIFIED TYPE: ICD-10-CM

## 2020-03-11 DIAGNOSIS — R07.81 RIB PAIN ON RIGHT SIDE: Primary | ICD-10-CM

## 2020-03-11 PROCEDURE — 99213 OFFICE O/P EST LOW 20 MIN: CPT | Performed by: NURSE PRACTITIONER

## 2020-03-11 PROCEDURE — 71100 X-RAY EXAM RIBS UNI 2 VIEWS: CPT | Performed by: RADIOLOGY

## 2020-03-11 PROCEDURE — 71046 X-RAY EXAM CHEST 2 VIEWS: CPT | Performed by: NURSE PRACTITIONER

## 2020-03-11 PROCEDURE — 71100 X-RAY EXAM RIBS UNI 2 VIEWS: CPT | Performed by: NURSE PRACTITIONER

## 2020-03-11 PROCEDURE — 71046 X-RAY EXAM CHEST 2 VIEWS: CPT | Performed by: RADIOLOGY

## 2020-03-11 RX ORDER — LORAZEPAM 1 MG/1
1 TABLET ORAL EVERY 6 HOURS PRN
Qty: 30 TABLET | Refills: 0 | Status: SHIPPED | OUTPATIENT
Start: 2020-03-11 | End: 2020-07-28

## 2020-03-11 NOTE — PROGRESS NOTES
Subjective   Vicente Delgado is a 89 y.o. male.     He presents for reevaluation of right flank, upper abdominal/back pain. He still persist with intermittent pain. He denies any change in symptoms with food intake. Most of his pain is aggravated with positional change at which time he will get a catching pain.  He has US gallbladder/HIDA scan and CT scan of abd/pelvis. He has gallstones and gallbladder function is 18%. I have requested he return to clinic to obtain xray of ribs and CXR due to r/o rib fracture. He denies any injury, however due to presentation of symptoms and discussion would like to r/o. Also recent labs stable.     Abdominal Pain   This is a new problem. The current episode started 1 to 4 weeks ago. The problem occurs intermittently. The problem has been waxing and waning. The pain is located in the RUQ. Quality: intermittent sharp, grabbing pain  The abdominal pain radiates to the right flank. Pertinent negatives include no anorexia, arthralgias, belching, constipation, diarrhea, frequency, melena, myalgias, nausea or vomiting. Treatments tried: tylenol    Back Pain   This is a new problem. The current episode started 1 to 4 weeks ago. The problem has been waxing and waning since onset. Pain location: right rib area  The symptoms are aggravated by sitting, standing and twisting. Associated symptoms include abdominal pain. Pertinent negatives include no bladder incontinence, bowel incontinence, chest pain, leg pain, numbness, pelvic pain or tingling.        The following portions of the patient's history were reviewed and updated as appropriate: allergies, current medications, past family history, past medical history, past social history, past surgical history and problem list.    Review of Systems   Constitutional: Negative for activity change, appetite change and fatigue.   Respiratory: Negative for wheezing.    Cardiovascular: Negative for chest pain and leg swelling.   Gastrointestinal:  Positive for abdominal pain. Negative for anorexia, bowel incontinence, constipation, diarrhea, melena, nausea and vomiting.   Genitourinary: Negative for bladder incontinence, frequency and pelvic pain.   Musculoskeletal: Positive for back pain. Negative for arthralgias and myalgias.   Neurological: Negative for tingling and numbness.       Objective   Physical Exam   Constitutional: He is oriented to person, place, and time. He appears well-developed and well-nourished.   HENT:   Head: Normocephalic.   Nose: Nose normal.   Cardiovascular: Regular rhythm and normal heart sounds. Exam reveals no S3 and no S4.   No murmur heard.  Pulmonary/Chest: Effort normal and breath sounds normal. He has no decreased breath sounds. He has no wheezes. He has no rhonchi. He has no rales. He exhibits tenderness. He exhibits no mass, no laceration and no crepitus.       Abdominal: Normal appearance and bowel sounds are normal. There is no tenderness. There is no CVA tenderness.   Musculoskeletal: He exhibits no edema.        Arms:  Neurological: He is alert and oriented to person, place, and time. Gait normal.   Skin: Skin is warm and dry.   Psychiatric: He has a normal mood and affect.       Assessment/Plan   Vicente was seen today for abdominal pain and back pain.    Diagnoses and all orders for this visit:    Rib pain on right side  -     XR Chest 2 View  -     XR Ribs 2 View Right    Lumbar strain, subsequent encounter  Comments:  improved     Anxiety disorder, unspecified type  Comments:  using ativan sparingly   Orders:  -     LORazepam (ATIVAN) 1 MG tablet; Take 1 tablet by mouth Every 6 (Six) Hours As Needed for Anxiety.    Xray of rib with fracture noted. Sent for review. Compared with previous film,   fredi up to date. He was advised to use ativan sparingly.

## 2020-03-13 ENCOUNTER — OFFICE VISIT (OUTPATIENT)
Dept: CARDIAC REHAB | Facility: HOSPITAL | Age: 85
End: 2020-03-13

## 2020-03-13 DIAGNOSIS — I21.4 NON-STEMI (NON-ST ELEVATED MYOCARDIAL INFARCTION) (HCC): Primary | ICD-10-CM

## 2020-03-16 DIAGNOSIS — K21.00 GASTROESOPHAGEAL REFLUX DISEASE WITH ESOPHAGITIS: ICD-10-CM

## 2020-03-16 DIAGNOSIS — E78.2 MIXED HYPERLIPIDEMIA: ICD-10-CM

## 2020-03-16 DIAGNOSIS — E78.5 HYPERLIPEMIA: ICD-10-CM

## 2020-03-16 DIAGNOSIS — I10 ESSENTIAL HYPERTENSION: ICD-10-CM

## 2020-03-16 RX ORDER — ATENOLOL 25 MG/1
TABLET ORAL
Qty: 180 TABLET | Refills: 1 | Status: SHIPPED | OUTPATIENT
Start: 2020-03-16 | End: 2020-09-08

## 2020-03-16 RX ORDER — EZETIMIBE 10 MG/1
10 TABLET ORAL DAILY
Qty: 90 TABLET | Refills: 1 | Status: SHIPPED | OUTPATIENT
Start: 2020-03-16 | End: 2020-09-08

## 2020-03-16 RX ORDER — ROSUVASTATIN CALCIUM 10 MG/1
10 TABLET, COATED ORAL NIGHTLY
Qty: 90 TABLET | Refills: 1 | Status: SHIPPED | OUTPATIENT
Start: 2020-03-16 | End: 2020-09-08

## 2020-03-16 RX ORDER — PANTOPRAZOLE SODIUM 40 MG/1
40 TABLET, DELAYED RELEASE ORAL DAILY
Qty: 90 TABLET | Refills: 1 | Status: SHIPPED | OUTPATIENT
Start: 2020-03-16 | End: 2020-09-08

## 2020-03-17 ENCOUNTER — TELEPHONE (OUTPATIENT)
Dept: INTERNAL MEDICINE | Facility: CLINIC | Age: 85
End: 2020-03-17

## 2020-03-17 NOTE — TELEPHONE ENCOUNTER
I attempted to call patient to discuss his imaging results. There was no answer and unable to leave message. I will attempt to call patient again tomorrow.

## 2020-05-07 ENCOUNTER — OFFICE VISIT (OUTPATIENT)
Dept: INTERNAL MEDICINE | Facility: CLINIC | Age: 85
End: 2020-05-07

## 2020-05-07 ENCOUNTER — APPOINTMENT (OUTPATIENT)
Dept: WOMENS IMAGING | Facility: HOSPITAL | Age: 85
End: 2020-05-07

## 2020-05-07 VITALS
HEIGHT: 67 IN | DIASTOLIC BLOOD PRESSURE: 80 MMHG | BODY MASS INDEX: 27.47 KG/M2 | OXYGEN SATURATION: 98 % | HEART RATE: 49 BPM | SYSTOLIC BLOOD PRESSURE: 138 MMHG | WEIGHT: 175 LBS

## 2020-05-07 DIAGNOSIS — I10 ESSENTIAL HYPERTENSION: Primary | ICD-10-CM

## 2020-05-07 DIAGNOSIS — E78.2 MIXED HYPERLIPIDEMIA: ICD-10-CM

## 2020-05-07 DIAGNOSIS — M25.552 PAIN OF LEFT HIP JOINT: ICD-10-CM

## 2020-05-07 DIAGNOSIS — F41.9 ANXIETY DISORDER, UNSPECIFIED TYPE: ICD-10-CM

## 2020-05-07 DIAGNOSIS — K21.00 GASTROESOPHAGEAL REFLUX DISEASE WITH ESOPHAGITIS: ICD-10-CM

## 2020-05-07 LAB
ALBUMIN SERPL-MCNC: 4.4 G/DL (ref 3.5–5.2)
ALBUMIN/GLOB SERPL: 1.6 G/DL
ALP SERPL-CCNC: 52 U/L (ref 39–117)
ALT SERPL-CCNC: 18 U/L (ref 1–41)
AST SERPL-CCNC: 29 U/L (ref 1–40)
BASOPHILS # BLD AUTO: 0.02 10*3/MM3 (ref 0–0.2)
BASOPHILS NFR BLD AUTO: 0.4 % (ref 0–1.5)
BILIRUB SERPL-MCNC: 0.6 MG/DL (ref 0.2–1.2)
BUN SERPL-MCNC: 20 MG/DL (ref 8–23)
BUN/CREAT SERPL: 20.6 (ref 7–25)
CALCIUM SERPL-MCNC: 9.7 MG/DL (ref 8.2–9.6)
CHLORIDE SERPL-SCNC: 102 MMOL/L (ref 98–107)
CHOLEST SERPL-MCNC: 127 MG/DL (ref 0–200)
CO2 SERPL-SCNC: 27 MMOL/L (ref 22–29)
CREAT SERPL-MCNC: 0.97 MG/DL (ref 0.76–1.27)
EOSINOPHIL # BLD AUTO: 0.06 10*3/MM3 (ref 0–0.4)
EOSINOPHIL NFR BLD AUTO: 1.2 % (ref 0.3–6.2)
ERYTHROCYTE [DISTWIDTH] IN BLOOD BY AUTOMATED COUNT: 12.3 % (ref 12.3–15.4)
GLOBULIN SER CALC-MCNC: 2.8 GM/DL
GLUCOSE SERPL-MCNC: 88 MG/DL (ref 65–99)
HCT VFR BLD AUTO: 42.1 % (ref 37.5–51)
HDLC SERPL-MCNC: 35 MG/DL (ref 40–60)
HGB BLD-MCNC: 14.2 G/DL (ref 13–17.7)
IMM GRANULOCYTES # BLD AUTO: 0.01 10*3/MM3 (ref 0–0.05)
IMM GRANULOCYTES NFR BLD AUTO: 0.2 % (ref 0–0.5)
LDLC SERPL CALC-MCNC: 60 MG/DL (ref 0–100)
LYMPHOCYTES # BLD AUTO: 1.39 10*3/MM3 (ref 0.7–3.1)
LYMPHOCYTES NFR BLD AUTO: 27.1 % (ref 19.6–45.3)
MCH RBC QN AUTO: 30.9 PG (ref 26.6–33)
MCHC RBC AUTO-ENTMCNC: 33.7 G/DL (ref 31.5–35.7)
MCV RBC AUTO: 91.5 FL (ref 79–97)
MONOCYTES # BLD AUTO: 0.88 10*3/MM3 (ref 0.1–0.9)
MONOCYTES NFR BLD AUTO: 17.2 % (ref 5–12)
NEUTROPHILS # BLD AUTO: 2.77 10*3/MM3 (ref 1.7–7)
NEUTROPHILS NFR BLD AUTO: 53.9 % (ref 42.7–76)
NRBC BLD AUTO-RTO: 0 /100 WBC (ref 0–0.2)
PLATELET # BLD AUTO: 218 10*3/MM3 (ref 140–450)
POTASSIUM SERPL-SCNC: 4.4 MMOL/L (ref 3.5–5.2)
PROT SERPL-MCNC: 7.2 G/DL (ref 6–8.5)
RBC # BLD AUTO: 4.6 10*6/MM3 (ref 4.14–5.8)
SODIUM SERPL-SCNC: 140 MMOL/L (ref 136–145)
TRIGL SERPL-MCNC: 160 MG/DL (ref 0–150)
VLDLC SERPL CALC-MCNC: 32 MG/DL (ref 5–40)
WBC # BLD AUTO: 5.13 10*3/MM3 (ref 3.4–10.8)

## 2020-05-07 PROCEDURE — 73502 X-RAY EXAM HIP UNI 2-3 VIEWS: CPT | Performed by: NURSE PRACTITIONER

## 2020-05-07 PROCEDURE — 99214 OFFICE O/P EST MOD 30 MIN: CPT | Performed by: NURSE PRACTITIONER

## 2020-05-07 PROCEDURE — 73130 X-RAY EXAM OF HAND: CPT | Performed by: RADIOLOGY

## 2020-05-07 RX ORDER — METHYLPREDNISOLONE 4 MG/1
TABLET ORAL
Qty: 21 TABLET | Refills: 0 | Status: SHIPPED | OUTPATIENT
Start: 2020-05-07 | End: 2020-05-22

## 2020-05-07 NOTE — PROGRESS NOTES
Subjective   Vicente Delgado is a 89 y.o. male.     He has been practicing social distancing. He does not check his blood pressure. He has been exercising  (riding bike) every day except rainy days 30-60 minutes. He has been having left hip pain.     Hypertension   This is a chronic problem. The current episode started more than 1 year ago. The problem is unchanged. Associated symptoms include anxiety. Pertinent negatives include no blurred vision, chest pain, headaches, orthopnea, palpitations, peripheral edema, PND, shortness of breath or sweats. Current antihypertension treatment includes beta blockers and ACE inhibitors. The current treatment provides significant improvement.   Hyperlipidemia   This is a chronic problem. The current episode started more than 1 year ago. The problem is controlled. Recent lipid tests were reviewed and are normal. Pertinent negatives include no chest pain, focal weakness, leg pain, myalgias or shortness of breath. Current antihyperlipidemic treatment includes statins and ezetimibe. The current treatment provides significant improvement of lipids.   Hip Pain    The incident occurred more than 1 week ago. The pain is present in the left hip. Quality: stinging, throbbing, aching  The pain is at a severity of 7/10. The pain is moderate. The pain has been fluctuating since onset. Pertinent negatives include no inability to bear weight, loss of motion, loss of sensation, muscle weakness, numbness or tingling. Exacerbated by: walking and sitting  He has tried ice, heat and rest (hydrocodone ) for the symptoms. The treatment provided moderate relief.        The following portions of the patient's history were reviewed and updated as appropriate: allergies, current medications, past family history, past medical history, past social history, past surgical history and problem list.    Review of Systems   Constitutional: Negative for activity change, appetite change, fatigue and fever.   HENT:  Positive for hearing loss (bilateral hearing loss ).    Eyes: Negative for blurred vision.   Respiratory: Negative for cough, shortness of breath and wheezing.    Cardiovascular: Negative for chest pain, palpitations, orthopnea, leg swelling and PND.   Gastrointestinal: Negative for abdominal pain, anal bleeding, blood in stool, constipation, diarrhea and nausea.        Bowel movement regular      Musculoskeletal: Positive for arthralgias (right shoulder and left hip ). Negative for myalgias.   Neurological: Negative for tingling, focal weakness, numbness and headaches.   Psychiatric/Behavioral: The patient is nervous/anxious.        Objective   Physical Exam   Constitutional: He is oriented to person, place, and time. He appears well-developed and well-nourished.   HENT:   Head: Normocephalic.   Nose: Nose normal.   Cardiovascular: Regular rhythm. Exam reveals no S3 and no S4.   Murmur heard.  Repeat bp left arm 142/78  No pedal edema    Pulmonary/Chest: Effort normal and breath sounds normal. He has no decreased breath sounds. He has no wheezes. He has no rhonchi. He has no rales.   Musculoskeletal: He exhibits no edema.        Right hip: He exhibits normal range of motion, normal strength and no tenderness.        Left hip: He exhibits tenderness. He exhibits normal range of motion, no swelling and no deformity.   (-) SLR    Neurological: He is alert and oriented to person, place, and time. Gait normal.   Skin: Skin is warm and dry.   Psychiatric: He has a normal mood and affect. His speech is normal and behavior is normal. Judgment and thought content normal. Cognition and memory are normal.       Assessment/Plan   Vicente was seen today for hypertension and hyperlipidemia.    Diagnoses and all orders for this visit:    Essential hypertension  Comments:  stable   Orders:  -     CBC & Differential  -     Comprehensive Metabolic Panel    Mixed hyperlipidemia  Comments:  tolerating statin therapy   Orders:  -      Lipid Panel    Gastroesophageal reflux disease with esophagitis  Comments:  stable with protonix     Anxiety disorder, unspecified type  Comments:  using ativan sparingly     Pain of left hip joint  Comments:  if symptoms persist will refer to ortho   Orders:  -     XR Hip With or Without Pelvis 2 - 3 View Left  -     methylPREDNISolone (Medrol) 4 MG tablet; follow package directions      Xray of left hip with degenerative changes noted. Sent for review.

## 2020-05-08 ENCOUNTER — TELEPHONE (OUTPATIENT)
Dept: INTERNAL MEDICINE | Facility: CLINIC | Age: 85
End: 2020-05-08

## 2020-05-08 NOTE — TELEPHONE ENCOUNTER
I called and informed patient of xray of hip results-moderate degenerative changes. Also discussed lab work (see comments). He reports he has had significant improvement in hip pain since started oral steroid. He will update me next week on how is he doing.

## 2020-05-13 ENCOUNTER — TELEPHONE (OUTPATIENT)
Dept: INTERNAL MEDICINE | Facility: CLINIC | Age: 85
End: 2020-05-13

## 2020-05-13 NOTE — TELEPHONE ENCOUNTER
PATIENT CALLED IN AND STATED HE FINISHED THE STEROIDS  5/12/2020. THAT WERE PRESCRIBED AND HE SAID ALL IS GOOD ,   PATIENT CALL BACK [phone].

## 2020-05-22 ENCOUNTER — HOSPITAL ENCOUNTER (EMERGENCY)
Facility: HOSPITAL | Age: 85
Discharge: HOME | End: 2020-05-22
Attending: EMERGENCY MEDICINE | Admitting: EMERGENCY MEDICINE

## 2020-05-22 ENCOUNTER — APPOINTMENT (OUTPATIENT)
Dept: CT IMAGING | Facility: HOSPITAL | Age: 85
End: 2020-05-22

## 2020-05-22 VITALS
BODY MASS INDEX: 27.62 KG/M2 | DIASTOLIC BLOOD PRESSURE: 71 MMHG | WEIGHT: 176 LBS | HEART RATE: 50 BPM | RESPIRATION RATE: 18 BRPM | HEIGHT: 67 IN | TEMPERATURE: 97.9 F | OXYGEN SATURATION: 93 % | SYSTOLIC BLOOD PRESSURE: 151 MMHG

## 2020-05-22 DIAGNOSIS — M54.9 MUSCULOSKELETAL BACK PAIN: Primary | ICD-10-CM

## 2020-05-22 LAB
ALBUMIN SERPL-MCNC: 4.2 G/DL (ref 3.5–5.2)
ALBUMIN/GLOB SERPL: 1.7 G/DL
ALP SERPL-CCNC: 41 U/L (ref 39–117)
ALT SERPL W P-5'-P-CCNC: 16 U/L (ref 1–41)
ANION GAP SERPL CALCULATED.3IONS-SCNC: 9.2 MMOL/L (ref 5–15)
AST SERPL-CCNC: 21 U/L (ref 1–40)
BASOPHILS # BLD AUTO: 0.03 10*3/MM3 (ref 0–0.2)
BASOPHILS NFR BLD AUTO: 0.5 % (ref 0–1.5)
BILIRUB SERPL-MCNC: 0.4 MG/DL (ref 0.2–1.2)
BILIRUB UR QL STRIP: NEGATIVE
BUN BLD-MCNC: 19 MG/DL (ref 8–23)
BUN/CREAT SERPL: 22.6 (ref 7–25)
CALCIUM SPEC-SCNC: 9 MG/DL (ref 8.2–9.6)
CHLORIDE SERPL-SCNC: 104 MMOL/L (ref 98–107)
CLARITY UR: CLEAR
CO2 SERPL-SCNC: 26.8 MMOL/L (ref 22–29)
COLOR UR: YELLOW
CREAT BLD-MCNC: 0.84 MG/DL (ref 0.76–1.27)
DEPRECATED RDW RBC AUTO: 40.2 FL (ref 37–54)
EOSINOPHIL # BLD AUTO: 0.07 10*3/MM3 (ref 0–0.4)
EOSINOPHIL NFR BLD AUTO: 1.1 % (ref 0.3–6.2)
ERYTHROCYTE [DISTWIDTH] IN BLOOD BY AUTOMATED COUNT: 12.3 % (ref 12.3–15.4)
GFR SERPL CREATININE-BSD FRML MDRD: 85 ML/MIN/1.73
GLOBULIN UR ELPH-MCNC: 2.5 GM/DL
GLUCOSE BLD-MCNC: 88 MG/DL (ref 65–99)
GLUCOSE UR STRIP-MCNC: NEGATIVE MG/DL
HCT VFR BLD AUTO: 40.6 % (ref 37.5–51)
HGB BLD-MCNC: 14 G/DL (ref 13–17.7)
HGB UR QL STRIP.AUTO: NEGATIVE
HOLD SPECIMEN: NORMAL
HOLD SPECIMEN: NORMAL
IMM GRANULOCYTES # BLD AUTO: 0.01 10*3/MM3 (ref 0–0.05)
IMM GRANULOCYTES NFR BLD AUTO: 0.2 % (ref 0–0.5)
KETONES UR QL STRIP: NEGATIVE
LEUKOCYTE ESTERASE UR QL STRIP.AUTO: NEGATIVE
LIPASE SERPL-CCNC: 23 U/L (ref 13–60)
LYMPHOCYTES # BLD AUTO: 1.39 10*3/MM3 (ref 0.7–3.1)
LYMPHOCYTES NFR BLD AUTO: 22.4 % (ref 19.6–45.3)
MCH RBC QN AUTO: 31.4 PG (ref 26.6–33)
MCHC RBC AUTO-ENTMCNC: 34.5 G/DL (ref 31.5–35.7)
MCV RBC AUTO: 91 FL (ref 79–97)
MONOCYTES # BLD AUTO: 1.12 10*3/MM3 (ref 0.1–0.9)
MONOCYTES NFR BLD AUTO: 18.1 % (ref 5–12)
NEUTROPHILS # BLD AUTO: 3.58 10*3/MM3 (ref 1.7–7)
NEUTROPHILS NFR BLD AUTO: 57.7 % (ref 42.7–76)
NITRITE UR QL STRIP: NEGATIVE
NRBC BLD AUTO-RTO: 0 /100 WBC (ref 0–0.2)
PH UR STRIP.AUTO: 6.5 [PH] (ref 5–8)
PLATELET # BLD AUTO: 184 10*3/MM3 (ref 140–450)
PMV BLD AUTO: 9.8 FL (ref 6–12)
POTASSIUM BLD-SCNC: 4 MMOL/L (ref 3.5–5.2)
PROT SERPL-MCNC: 6.7 G/DL (ref 6–8.5)
PROT UR QL STRIP: NEGATIVE
RBC # BLD AUTO: 4.46 10*6/MM3 (ref 4.14–5.8)
SODIUM BLD-SCNC: 140 MMOL/L (ref 136–145)
SP GR UR STRIP: 1.02 (ref 1–1.03)
UROBILINOGEN UR QL STRIP: NORMAL
WBC NRBC COR # BLD: 6.2 10*3/MM3 (ref 3.4–10.8)
WHOLE BLOOD HOLD SPECIMEN: NORMAL
WHOLE BLOOD HOLD SPECIMEN: NORMAL

## 2020-05-22 PROCEDURE — 96374 THER/PROPH/DIAG INJ IV PUSH: CPT

## 2020-05-22 PROCEDURE — 81003 URINALYSIS AUTO W/O SCOPE: CPT | Performed by: EMERGENCY MEDICINE

## 2020-05-22 PROCEDURE — 85025 COMPLETE CBC W/AUTO DIFF WBC: CPT | Performed by: EMERGENCY MEDICINE

## 2020-05-22 PROCEDURE — 80053 COMPREHEN METABOLIC PANEL: CPT | Performed by: EMERGENCY MEDICINE

## 2020-05-22 PROCEDURE — 99283 EMERGENCY DEPT VISIT LOW MDM: CPT

## 2020-05-22 PROCEDURE — 25010000002 KETOROLAC TROMETHAMINE PER 15 MG: Performed by: PHYSICIAN ASSISTANT

## 2020-05-22 PROCEDURE — 83690 ASSAY OF LIPASE: CPT | Performed by: EMERGENCY MEDICINE

## 2020-05-22 PROCEDURE — 74176 CT ABD & PELVIS W/O CONTRAST: CPT

## 2020-05-22 RX ORDER — KETOROLAC TROMETHAMINE 15 MG/ML
15 INJECTION, SOLUTION INTRAMUSCULAR; INTRAVENOUS ONCE
Status: COMPLETED | OUTPATIENT
Start: 2020-05-22 | End: 2020-05-22

## 2020-05-22 RX ORDER — ACETAMINOPHEN 500 MG
1000 TABLET ORAL ONCE
Status: COMPLETED | OUTPATIENT
Start: 2020-05-22 | End: 2020-05-22

## 2020-05-22 RX ORDER — SODIUM CHLORIDE 0.9 % (FLUSH) 0.9 %
10 SYRINGE (ML) INJECTION AS NEEDED
Status: DISCONTINUED | OUTPATIENT
Start: 2020-05-22 | End: 2020-05-22 | Stop reason: HOSPADM

## 2020-05-22 RX ADMIN — KETOROLAC TROMETHAMINE 15 MG: 15 INJECTION, SOLUTION INTRAMUSCULAR; INTRAVENOUS at 08:18

## 2020-05-22 RX ADMIN — ACETAMINOPHEN 1000 MG: 500 TABLET, FILM COATED ORAL at 07:36

## 2020-05-22 NOTE — ED PROVIDER NOTES
Pt presents to the ED c/o  left lower quadrant abdominal pain starting abruptly last night.  It is been intermittent since onset.  No nausea, vomiting, diarrhea.  No urinary difficulties.     On exam,   Awake, alert, no acute distress.  He looks great for 89 years of age.  Slightly hard of hearing.  CV-regular rhythm and rate, no murmurs, gallops or rubs.  Lungs-clear to auscultation bilaterally without wheezes rhonchi rales.  Abdomen-soft, nondistended.  There is no CVA tenderness.  He has tenderness to deep palpation in the left lower quadrant without guarding or rebound.     Plan: Labs are unremarkable.  Urinalysis is unremarkable.  He does have a history of AAA, although small on CT scan from January.  I am going to give him a small dose of IV Toradol and repeat a CT scan.     8:42 AM  Abdominal CT was independently viewed by me and discussed with Dr. Collado, no acute processes are identified.  For official interpretation, see dictated report.    Discussed all lab and imaging with the patient.  The patient's abdominal exam has improved.  I explained that the patient should return to the emergency department should the pain recur or for any new symptoms (fever, blood in emesis/stool).  I also advised the patient to follow up with their PMD for further evaluation.  There is nothing on workup to suggest appendicitis, diverticulitis, cholecystitis, pancreatitis, peritonitis, mesenteric ischemia, ovarian/testicular torsion, ureteral stones or any other emergent intra-abdominal process.  My clinical suspicion for serious intra-abdominal pathology is low, and the patient can be safely discharged home for outpatient follow up.    Attestation:  The [facility] and I have discussed this patient's history, physical exam, and treatment plan.  I have reviewed the documentation and personally had a face to face interaction with the patient. I affirm the documentation and agree with the treatment and plan.  The attached note describes  my personal findings.          Nabor Guerin MD  05/22/20 [id number]

## 2020-05-22 NOTE — ED TRIAGE NOTES
"Pt here with left sided flank pain that started around 5pm last date. Pt is pain free at this time but states pain is worse with certain movement. Pt states\" I went to the doctor and was told I have a kidney stone not for sure where they said it was but I know they said I had one\" Pt has mask on at time of triage  "

## 2020-05-22 NOTE — DISCHARGE INSTRUCTIONS
Please take Tylenol as needed for your pain and light activity for the next few days.  Do not exceed more than 4 g of Tylenol a day.  Follow-up with your primary care provider.

## 2020-05-22 NOTE — ED PROVIDER NOTES
EMERGENCY DEPARTMENT ENCOUNTER    Room Number:  01/01  Date seen:  5/22/2020  Time seen: 7:12 AM  PCP: Margoth Louis APRN  Historian: Patient    HPI:  Chief complaint: Left lower quadrant abdominal pain  Context:Vicente Delgado is a 89 y.o. male, who presents to the ED with c/o sudden left lower quadrant abdominal pain which started last night.  Says it has been waxing and waning since then, had a CT of the abdomen and HIDA scan done in January which showed gallstones and kidney stone.  Patient denies dysuria, hematuria, urinary frequency, he does report that he has a history of prostatectomy and urinary incontinence is chronic for him.  Last bowel movement was yesterday at 5 PM.  Patient denies cough, shortness of breath, fever, chest pain.    Timing: Sudden  Duration: 1 day  Location: Left lower quadrant  Radiation: No radiation  Quality: Waxing and waning    Patient was placed in face mask in first look. Patient was wearing facemask when I entered the room and throughout our encounter. I wore full protective equipment throughout this patient encounter including a face mask, and gloves. Hand hygiene was performed before donning protective equipment and after removal when leaving the room.      MEDICAL RECORD REVIEW      ALLERGIES  Lidocaine; Lovastatin; Pravastatin; Procaine; and Simvastatin    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/21/2016   • CAD (coronary artery disease) 07/01/2016   • Hyperlipidemia 07/01/2016     Resolved Ambulatory Problems     Diagnosis Date Noted   • No Resolved Ambulatory Problems     Past Medical History:   Diagnosis Date   • Allergic rhinitis    • Anxiety    • Arthritis    • Cancer (CMS/HCC)    • Depression    • Diverticulosis    • Esophagitis    • Gastritis    • GERD (gastroesophageal reflux disease)    • Heart disease    •  History of anxiety    • History of prostate cancer 06/1990   • Hypertension    • Insomnia    • Lupus (CMS/HCC)    • TAM (nonalcoholic steatohepatitis)    • Sciatica of right side        PAST SURGICAL HISTORY  Past Surgical History:   Procedure Laterality Date   • ABDOMINAL SURGERY     • CARDIAC CATHETERIZATION  11/16/1995   • CATARACT EXTRACTION Left 07/2018   • COLONOSCOPY  01/09/2002   • ELBOW PROCEDURE     • JOINT REPLACEMENT     • LUNG BIOPSY     • NASAL POLYP SURGERY     • PACEMAKER IMPLANTATION     • PROSTATE SURGERY  06/1990   • SHOULDER ROTATOR CUFF REPAIR Right 08/24/2011    Dr. Bach   • SIGMOIDOSCOPY     • UPPER GASTROINTESTINAL ENDOSCOPY  09/12/1997    Gastritis, Duodenitis, hiatal hernia (Pathology: Gastric antrum minimal chronic inflammation)   • UPPER GASTROINTESTINAL ENDOSCOPY  04/11/2005    Mild esophagitis, Small hiatal hernia, Mild gastritis and mild duodenitis       FAMILY HISTORY  Family History   Problem Relation Age of Onset   • Heart failure Mother    • Alcohol abuse Father    • Diabetes Father        SOCIAL HISTORY  Social History     Socioeconomic History   • Marital status:      Spouse name: Not on file   • Number of children: Not on file   • Years of education: Not on file   • Highest education level: Not on file   Tobacco Use   • Smoking status: Former Smoker   • Smokeless tobacco: Never Used   • Tobacco comment: Quit 15 years ago   Substance and Sexual Activity   • Alcohol use: No     Comment: Daily caffeine use   • Drug use: No   • Sexual activity: Never       REVIEW OF SYSTEMS  Review of Systems    All systems reviewed and negative except for those discussed in HPI.     PHYSICAL EXAM    ED Triage Vitals   Temp Heart Rate Resp BP SpO2   05/22/20 0526 05/22/20 0526 05/22/20 0526 05/22/20 0636 05/22/20 0526   97.9 °F (36.6 °C) 55 18 169/81 98 %      Temp src Heart Rate Source Patient Position BP Location FiO2 (%)   -- -- -- -- --            Physical Exam    I have reviewed the  triage vital signs and nursing notes.      GENERAL: not distressed; slightly hard of hearing  HENT: nares patent  EYES: no scleral icterus  NECK: no ROM limitations  CV: regular rhythm, regular rate  RESPIRATORY: normal effort  ABDOMEN: soft; no CVA tenderness; left lower quadrant some tenderness without guarding or rebound  : deferred  MUSCULOSKELETAL: no deformity  NEURO: alert, moves all extremities, follows commands  SKIN: warm, dry    LAB RESULTS  Recent Results (from the past 24 hour(s))   Comprehensive Metabolic Panel    Collection Time: 05/22/20  6:39 AM   Result Value Ref Range    Glucose 88 65 - 99 mg/dL    BUN 19 8 - 23 mg/dL    Creatinine 0.84 0.76 - 1.27 mg/dL    Sodium 140 136 - 145 mmol/L    Potassium 4.0 3.5 - 5.2 mmol/L    Chloride 104 98 - 107 mmol/L    CO2 26.8 22.0 - 29.0 mmol/L    Calcium 9.0 8.2 - 9.6 mg/dL    Total Protein 6.7 6.0 - 8.5 g/dL    Albumin 4.20 3.50 - 5.20 g/dL    ALT (SGPT) 16 1 - 41 U/L    AST (SGOT) 21 1 - 40 U/L    Alkaline Phosphatase 41 39 - 117 U/L    Total Bilirubin 0.4 0.2 - 1.2 mg/dL    eGFR Non African Amer 85 >60 mL/min/1.73    Globulin 2.5 gm/dL    A/G Ratio 1.7 g/dL    BUN/Creatinine Ratio 22.6 7.0 - 25.0    Anion Gap 9.2 5.0 - 15.0 mmol/L   Lipase    Collection Time: 05/22/20  6:39 AM   Result Value Ref Range    Lipase 23 13 - 60 U/L   Light Blue Top    Collection Time: 05/22/20  6:39 AM   Result Value Ref Range    Extra Tube hold for add-on    Green Top (Gel)    Collection Time: 05/22/20  6:39 AM   Result Value Ref Range    Extra Tube Hold for add-ons.    Lavender Top    Collection Time: 05/22/20  6:39 AM   Result Value Ref Range    Extra Tube hold for add-on    Gold Top - SST    Collection Time: 05/22/20  6:39 AM   Result Value Ref Range    Extra Tube Hold for add-ons.    CBC Auto Differential    Collection Time: 05/22/20  6:39 AM   Result Value Ref Range    WBC 6.20 3.40 - 10.80 10*3/mm3    RBC 4.46 4.14 - 5.80 10*6/mm3    Hemoglobin 14.0 13.0 - 17.7 g/dL     Hematocrit 40.6 37.5 - 51.0 %    MCV 91.0 79.0 - 97.0 fL    MCH 31.4 26.6 - 33.0 pg    MCHC 34.5 31.5 - 35.7 g/dL    RDW 12.3 12.3 - 15.4 %    RDW-SD 40.2 37.0 - 54.0 fl    MPV 9.8 6.0 - 12.0 fL    Platelets 184 140 - 450 10*3/mm3    Neutrophil % 57.7 42.7 - 76.0 %    Lymphocyte % 22.4 19.6 - 45.3 %    Monocyte % 18.1 (H) 5.0 - 12.0 %    Eosinophil % 1.1 0.3 - 6.2 %    Basophil % 0.5 0.0 - 1.5 %    Immature Grans % 0.2 0.0 - 0.5 %    Neutrophils, Absolute 3.58 1.70 - 7.00 10*3/mm3    Lymphocytes, Absolute 1.39 0.70 - 3.10 10*3/mm3    Monocytes, Absolute 1.12 (H) 0.10 - 0.90 10*3/mm3    Eosinophils, Absolute 0.07 0.00 - 0.40 10*3/mm3    Basophils, Absolute 0.03 0.00 - 0.20 10*3/mm3    Immature Grans, Absolute 0.01 0.00 - 0.05 10*3/mm3    nRBC 0.0 0.0 - 0.2 /100 WBC   Urinalysis With Microscopic If Indicated (No Culture) - Urine, Clean Catch    Collection Time: 05/22/20  6:42 AM   Result Value Ref Range    Color, UA Yellow Yellow, Straw    Appearance, UA Clear Clear    pH, UA 6.5 5.0 - 8.0    Specific Gravity, UA 1.017 1.005 - 1.030    Glucose, UA Negative Negative    Ketones, UA Negative Negative    Bilirubin, UA Negative Negative    Blood, UA Negative Negative    Protein, UA Negative Negative    Leuk Esterase, UA Negative Negative    Nitrite, UA Negative Negative    Urobilinogen, UA 0.2 E.U./dL 0.2 - 1.0 E.U./dL         RADIOLOGY RESULTS  CT Abdomen Pelvis Without Contrast   Final Result   1. There is sigmoid diverticulosis without evidence for diverticulitis.   2. Left nephrolithiasis. There are no ureteral stones or hydronephrosis   bilaterally.   3. There are stable interstitial lung changes at the lung bases, but   there are some groundglass densities at the periphery which were not   present previously and for this reason COVID-19 testing is recommended.       Discussed with EDITH Daily.       This report was finalized on 5/22/2020 11:58 AM by Dr. Rasheeda Collado M.D.                PROGRESS, DATA ANALYSIS,  CONSULTS AND MEDICAL DECISION MAKING  All labs have been independently reviewed by me.  All radiology studies have been reviewed by me and discussed with radiologist dictating the report. Discussion below represents my analysis of pertinent findings related to patient's condition, differential diagnosis, treatment plan and final disposition.          The differential diagnosis include but are not limited to kidney stone, musculoskeletal strain, degenerative disc disease,.    Pt has been slightly bradycardic in the ED with HR in the 50s. It appears from previous visits, his HR is at baseline in the 50s.      Recheck patient and patient reports feeling better after the Tylenol and Toradol.  Informed him of his imaging and lab results.  He still denying any shortness of breath, cough, fever, chest pain.  Patient does report that he rides his bike 30 minutes to 1 hour every day which he thinks may be contributing to his pain.  Advised to take Tylenol as needed every 6 hours and light activity the next few days.  Reports that he will follow-up with his nurse practitioner in a few days.  All questions addressed at this time.     Reviewed pt's history and workup with Dr. Guerin.  After a bedside evaluation, Dr. Winston agrees with the plan of care.    The patient's history, physical exam, and lab findings were discussed with the physician, who also performed a face to face history and physical exam.  I discussed all results and noted any abnormalities with patient.  Discussed absoute need to recheck abnormalities with their family physician.  I answered any of the patient's questions.  Discussed plan for discharge, as there is no emergent indication for admission.  Pt is agreeable and understands need for follow up and repeat testing.  Pt is aware that discharge does not mean that nothing is wrong but it indicates no emergency is present and they must continue care with their family physician.  Pt is discharged with  "instructions to follow up with primary care doctor to have their blood pressure rechecked.          Disposition vitals:  /71   Pulse 50   Temp 97.9 °F (36.6 °C)   Resp 18   Ht 170.2 cm (67\")   Wt 79.8 kg (176 lb)   SpO2 93%   BMI 27.57 kg/m²       DIAGNOSIS  Final diagnoses:   Musculoskeletal back pain       FOLLOW UP   Margoth Louis, GE  [address] [facility] [address] [phone]    Call   For follow up if symptoms continue    [facility] Emergency Department  [address] [facility] [address] [phone]    As needed, If symptoms worsen         Elsa Mathew PA-C  05/22/20 2223    "

## 2020-07-06 ENCOUNTER — OFFICE VISIT (OUTPATIENT)
Dept: INTERNAL MEDICINE | Facility: CLINIC | Age: 85
End: 2020-07-06

## 2020-07-06 VITALS
SYSTOLIC BLOOD PRESSURE: 128 MMHG | BODY MASS INDEX: 27.56 KG/M2 | HEIGHT: 67 IN | HEART RATE: 56 BPM | WEIGHT: 175.6 LBS | OXYGEN SATURATION: 98 % | DIASTOLIC BLOOD PRESSURE: 74 MMHG

## 2020-07-06 DIAGNOSIS — M54.6 THORACIC SPINE PAIN: ICD-10-CM

## 2020-07-06 DIAGNOSIS — K80.20 CALCULUS OF GALLBLADDER WITHOUT CHOLECYSTITIS WITHOUT OBSTRUCTION: ICD-10-CM

## 2020-07-06 DIAGNOSIS — M54.50 CHRONIC BILATERAL LOW BACK PAIN WITHOUT SCIATICA: Primary | ICD-10-CM

## 2020-07-06 DIAGNOSIS — G89.29 CHRONIC BILATERAL LOW BACK PAIN WITHOUT SCIATICA: Primary | ICD-10-CM

## 2020-07-06 PROCEDURE — 99214 OFFICE O/P EST MOD 30 MIN: CPT | Performed by: NURSE PRACTITIONER

## 2020-07-06 RX ORDER — LANOLIN ALCOHOL/MO/W.PET/CERES
1000 CREAM (GRAM) TOPICAL DAILY
COMMUNITY
End: 2020-09-10

## 2020-07-06 RX ORDER — BACLOFEN 10 MG/1
5 TABLET ORAL EVERY 8 HOURS PRN
Qty: 30 TABLET | Refills: 0 | Status: SHIPPED | OUTPATIENT
Start: 2020-07-06 | End: 2020-07-28

## 2020-07-06 NOTE — PROGRESS NOTES
Subjective   Vicente Delgado is a 89 y.o. male.     He went to [facility] ER due to musculoskeletal pain and abdominal pain. He had CT abd/pelvis and no acute findings.     Back Pain   This is a chronic problem. The current episode started more than 1 year ago. The problem occurs intermittently. The problem has been waxing and waning since onset. The pain is present in the lumbar spine and thoracic spine. The quality of the pain is described as cramping. The pain is severe. The symptoms are aggravated by sitting and twisting. Associated symptoms include abdominal pain (stable ). Pertinent negatives include no chest pain, fever or headaches. Treatments tried: stretching, heat, tylenol  The treatment provided mild relief.   Abdominal Pain   This is a recurrent problem. The current episode started more than 1 month ago. The problem occurs intermittently. The problem has been waxing and waning. Pertinent negatives include no constipation, fever, headaches, nausea or vomiting.        The following portions of the patient's history were reviewed and updated as appropriate: allergies, current medications, past family history, past medical history, past social history, past surgical history and problem list.    Review of Systems   Constitutional: Negative for activity change, appetite change, fatigue and fever.   Respiratory: Negative for cough, shortness of breath and wheezing.    Cardiovascular: Negative for chest pain, palpitations and leg swelling.   Gastrointestinal: Positive for abdominal pain (stable ). Negative for blood in stool, constipation, nausea and vomiting.   Musculoskeletal: Positive for back pain (waxing and waning, throacic and lumbar region ).   Neurological: Negative for dizziness and headaches.       Objective   Physical Exam   Constitutional: He is oriented to person, place, and time. He appears well-developed and well-nourished.   HENT:   Head: Normocephalic.   Nose: Nose normal.   Cardiovascular:  Regular rhythm and normal heart sounds. Exam reveals no S3 and no S4.   No murmur heard.  Pulmonary/Chest: Effort normal and breath sounds normal. He has no decreased breath sounds. He has no wheezes. He has no rhonchi. He has no rales.   Musculoskeletal: He exhibits no edema.        Thoracic back: He exhibits tenderness and pain. He exhibits normal range of motion.        Lumbar back: He exhibits tenderness and pain. He exhibits normal range of motion.        Back:    (-) SLR    Neurological: He is alert and oriented to person, place, and time. Gait normal.   Reflex Scores:       Patellar reflexes are 2+ on the right side and 2+ on the left side.  Skin: Skin is warm and dry.   Psychiatric: He has a normal mood and affect.       Assessment/Plan   Vicente was seen today for back pain and abdominal pain.    Diagnoses and all orders for this visit:    Chronic bilateral low back pain without sciatica  -     MRI Lumbar Spine Without Contrast; Future    Thoracic spine pain  -     MRI thoracic spine wo contrast; Future  -     baclofen (LIORESAL) 10 MG tablet; Take 0.5 tablets by mouth Every 8 (Eight) Hours As Needed for Muscle Spasms.  -     Ambulatory Referral to Pain Management    Calculus of gallbladder without cholecystitis without obstruction  Comments:  will monitor for now     He is accompanied by his daughter. Will try low dose baclofen prn. May take tylenol prn and needs routine stretching. He was advised to drowsiness and family will stay with patient. He was advised no ativan which he takes sparingly. Will obtain MRI of lumbar and thoracic region and consider epidural injections for pain relief.

## 2020-07-09 NOTE — PROGRESS NOTES
CHIEF COMPLAINT: Back Pain    Vicente Delgado is a 89 y.o. male referred here by GE Remy. 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 Located right  and does radiate across the back sometimes, but not to the chest.  Severity:  Pain rated as a 3 /10.  Symptoms have been episodic.  Exacerbation:  Nothing in particular.   Alleviation:  Baclofen helps a lot.  Associated Symptoms:  Denies radiating pain to the chest, no lesions or rash.   Ambulates: Without assistive device.   Limitations: This pain limits the patient from nothing.   Goals: Functional goals include ability to do nothing.     Past therapies:  Physical Therapy: yes  Chiropractor: yes  Massage Therapy: no  TENS: yes  Neck or back surgery: no  Past pain management: no      Back Pain   The current episode started more than 1 month ago. The problem occurs daily. The problem has been waxing and waning since onset. The pain is present in the lumbar spine. The quality of the pain is described as aching, burning, shooting and stabbing. The pain is at a severity of 8/10. The pain is the same all the time. The symptoms are aggravated by bending, position and twisting. Stiffness is present in the morning and at night. Associated symptoms include bladder incontinence and numbness. Pertinent negatives include no abdominal pain, bowel incontinence, chest pain, dysuria, fever, headaches, leg pain, paresis, paresthesias, pelvic pain, perianal numbness, tingling or weakness. Risk factors include history of cancer.        PEG Assessment   What number best describes your pain on average in the past week?3  What number best describes how, during the past week, pain has interfered with your  enjoyment of life?2  What number best describes how, during the past week, pain has interfered with your general activity?  0        Current Outpatient Medications:   •  aspirin 81 MG chewable tablet, Chew daily., Disp: , Rfl:   •  atenolol (TENORMIN) 25 MG tablet, TAKE 1 TABLET BY MOUTH TWICE DAILY, Disp: 180 tablet, Rfl: 1  •  azelastine (ASTELIN) 0.1 % nasal spray, 2 sprays into the nostril(s) as directed by provider 2 (Two) Times a Day As Needed for Rhinitis. Use in each nostril as directed, Disp: 30 mL, Rfl: 1  •  baclofen (LIORESAL) 10 MG tablet, Take 0.5 tablets by mouth Every 8 (Eight) Hours As Needed for Muscle Spasms., Disp: 30 tablet, Rfl: 0  •  benazepril (LOTENSIN) 10 MG tablet, TAKE 1 TABLET BY MOUTH TWICE DAILY, Disp: 180 tablet, Rfl: 2  •  ezetimibe (ZETIA) 10 MG tablet, TAKE 1 TABLET BY MOUTH DAILY, Disp: 90 tablet, Rfl: 1  •  fluocinonide (LIDEX) 0.05 % cream, APPLY TO OUTER EAR CANAL TID PRF ITCHING, Disp: , Rfl: 0  •  LORazepam (ATIVAN) 1 MG tablet, Take 1 tablet by mouth Every 6 (Six) Hours As Needed for Anxiety., Disp: 30 tablet, Rfl: 0  •  pantoprazole (PROTONIX) 40 MG EC tablet, TAKE 1 TABLET BY MOUTH DAILY, Disp: 90 tablet, Rfl: 1  •  rosuvastatin (CRESTOR) 10 MG tablet, TAKE 1 TABLET BY MOUTH EVERY NIGHT, Disp: 90 tablet, Rfl: 1  •  vitamin B-12 (CYANOCOBALAMIN) 1000 MCG tablet, Take 1,000 mcg by mouth Daily., Disp: , Rfl:     The following portions of the patient's history were reviewed and updated as appropriate: allergies, current medications, past family history, past medical history, past social history, past surgical history and problem list.      REVIEW OF PERTINENT MEDICAL DATA    5/22/2020 Creatinine 0.84, Platelets 184    5/22/2020 CT Abd/Pelvis: FINDINGS: Noncontrasted liver appears unremarkable. There is a gallstone  within the gallbladder without evidence for cholecystitis and there is  no biliary dilatation. Splenic size is normal. Noncontrasted pancreas,  adrenals, and right  kidney appear unremarkable other than a stable 3 cm  cyst at the lower pole of the right kidney. There is no change in the 2  nonobstructing stones at the upper pole of the left kidney and there is  also a stable 1.3 cm slightly hyperdense left renal lesion which likely  represents a proteinaceous cyst. There is formed stool throughout most  of the colon. There is moderate sigmoid diverticulosis without  convincing evidence for diverticulitis. The appendix appears normal.  Radical prostatectomy changes are noted. There is no free fluid or  lymphadenopathy. 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 There are no pulmonary  airspace consolidations or effusions at the visualized lower lung  fields. There is stable coarsening of the interstitium and  bronchiolectasis at the lung bases. There is some groundglass densities  at the lung bases which were not present on the previous examination.     IMPRESSION:  1. There is sigmoid diverticulosis without evidence for diverticulitis.  2. Left nephrolithiasis. There are no ureteral stones or hydronephrosis  bilaterally.  3. There are stable interstitial lung changes at the lung bases, but  there are some groundglass densities at the periphery which were not  present previously and for this reason COVID-19 testing is recommended.    Review of Systems   Constitutional: Negative for fever.   HENT: Positive for hearing loss. Negative for congestion.    Cardiovascular: Negative for chest pain.   Gastrointestinal: Negative for abdominal pain and bowel incontinence.   Genitourinary: Positive for bladder incontinence. Negative for dysuria and pelvic pain.   Musculoskeletal: Positive for back pain.   Neurological: Positive for numbness. Negative for tingling, weakness, headaches and paresthesias.  "  Psychiatric/Behavioral: Positive for sleep disturbance. Negative for suicidal ideas. The patient is not nervous/anxious.          Vitals:    07/10/20 1527   BP: 168/67   Pulse: 50   Resp: 18   Temp: 96.8 °F (36 °C)   SpO2: 97%   Weight: 79.6 kg (175 lb 6.4 oz)   Height: 170.2 cm (67\")   PainSc:   3         Objective   Physical Exam   Constitutional: He is oriented to person, place, and time. He appears well-developed and well-nourished.   HENT:   Head: Normocephalic and atraumatic.   Eyes: Conjunctivae are normal. No scleral icterus.   Cardiovascular: Normal rate and regular rhythm.   Pulmonary/Chest: Effort normal. No respiratory distress.   Musculoskeletal:   Negative tenderness to palpation of the thoracic spinous processes, negative tenderness to palpation of the thoracic facet joints, negative tenderness to palpation of the ribs in the region of pain, negative tenderness to palpation of the muscle overlying the area of pain.  Negative pain with stretching of the psoas muscle on the right side.     Neurological: He is alert and oriented to person, place, and time.   Skin: Skin is warm and dry.   Psychiatric: He has a normal mood and affect. His behavior is normal.   Vitals reviewed.      Assessment/Plan   Problems Addressed this Visit     None      Visit Diagnoses     Chronic pain syndrome    -  Primary    Relevant Orders    Urine Drug Screen Confirmation - Urine, Clean Catch    POC Urine Drug Screen, Triage    Chronic right-sided thoracic back pain        Muscle spasm              - Baseline urine drug screen was obtained.  - Reviewed pertinent labs.   - Reviewed pertinent imaging.   - Advised that he obtain the MRIs that were ordered, as this could be an atypical facet or disc mediated pain.   - Discussed possible topical NSAID for this area.  We will touch base with his cardiologist prior to starting it.   - Continue Baclofen at this time since he denies side effects and has significant relief from this " medication.   - Due to complexities with cormorbidities and odd presentation of pain, spent at least 25 minutes of 47 minute visit with this patient evaluating and discussing treatment plan, possible pain pathologies, and options for pain management.     --- Follow-up after MRI.            NEERAJ REPORT    NEERAJ report has been reviewed and scanned into the patient's chart.    As the clinician, I personally reviewed the NEERAJ from 7/9/2020 while the patient was in the office today.    While examining this patient, I wore protective equipment including a mask and gloves.  I washed my hands before and after this patient encounter.  The patient wore a mask throughout the visit as well.     Destinee Snider MD  Pain Management

## 2020-07-10 ENCOUNTER — OFFICE VISIT (OUTPATIENT)
Dept: PAIN MEDICINE | Facility: CLINIC | Age: 85
End: 2020-07-10

## 2020-07-10 VITALS
SYSTOLIC BLOOD PRESSURE: 168 MMHG | OXYGEN SATURATION: 97 % | TEMPERATURE: 96.8 F | HEART RATE: 50 BPM | RESPIRATION RATE: 18 BRPM | DIASTOLIC BLOOD PRESSURE: 67 MMHG | BODY MASS INDEX: 27.53 KG/M2 | WEIGHT: 175.4 LBS | HEIGHT: 67 IN

## 2020-07-10 DIAGNOSIS — G89.4 CHRONIC PAIN SYNDROME: Primary | ICD-10-CM

## 2020-07-10 DIAGNOSIS — M62.838 MUSCLE SPASM: ICD-10-CM

## 2020-07-10 DIAGNOSIS — M54.6 CHRONIC RIGHT-SIDED THORACIC BACK PAIN: ICD-10-CM

## 2020-07-10 DIAGNOSIS — G89.29 CHRONIC RIGHT-SIDED THORACIC BACK PAIN: ICD-10-CM

## 2020-07-10 LAB
POC AMPHETAMINES: NEGATIVE
POC BARBITURATES: NEGATIVE
POC BENZODIAZEPHINES: NEGATIVE
POC COCAINE: NEGATIVE
POC METHADONE: NEGATIVE
POC METHAMPHETAMINE SCREEN URINE: NEGATIVE
POC OPIATES: NEGATIVE
POC OXYCODONE: NEGATIVE
POC PHENCYCLIDINE: NEGATIVE
POC PROPOXYPHENE: NEGATIVE
POC THC: NEGATIVE
POC TRICYCLIC ANTIDEPRESSANTS: NEGATIVE

## 2020-07-10 PROCEDURE — 80305 DRUG TEST PRSMV DIR OPT OBS: CPT | Performed by: ANESTHESIOLOGY

## 2020-07-10 PROCEDURE — 99204 OFFICE O/P NEW MOD 45 MIN: CPT | Performed by: ANESTHESIOLOGY

## 2020-07-13 ENCOUNTER — DOCUMENTATION (OUTPATIENT)
Dept: PAIN MEDICINE | Facility: CLINIC | Age: 85
End: 2020-07-13

## 2020-07-13 ENCOUNTER — APPOINTMENT (OUTPATIENT)
Dept: CARDIAC REHAB | Facility: HOSPITAL | Age: 85
End: 2020-07-13

## 2020-07-13 NOTE — PROGRESS NOTES
Attempted to call  Danny to let him know his cardiologist is okay with Aspercreme or Voltaren gel.  He did not answer and did not have a voice mail.

## 2020-07-15 ENCOUNTER — RESULTS ENCOUNTER (OUTPATIENT)
Dept: PAIN MEDICINE | Facility: CLINIC | Age: 85
End: 2020-07-15

## 2020-07-15 ENCOUNTER — APPOINTMENT (OUTPATIENT)
Dept: CARDIAC REHAB | Facility: HOSPITAL | Age: 85
End: 2020-07-15

## 2020-07-15 DIAGNOSIS — G89.4 CHRONIC PAIN SYNDROME: ICD-10-CM

## 2020-07-17 ENCOUNTER — APPOINTMENT (OUTPATIENT)
Dept: CARDIAC REHAB | Facility: HOSPITAL | Age: 85
End: 2020-07-17

## 2020-07-20 ENCOUNTER — APPOINTMENT (OUTPATIENT)
Dept: CARDIAC REHAB | Facility: HOSPITAL | Age: 85
End: 2020-07-20

## 2020-07-22 ENCOUNTER — APPOINTMENT (OUTPATIENT)
Dept: CARDIAC REHAB | Facility: HOSPITAL | Age: 85
End: 2020-07-22

## 2020-07-24 ENCOUNTER — APPOINTMENT (OUTPATIENT)
Dept: CARDIAC REHAB | Facility: HOSPITAL | Age: 85
End: 2020-07-24

## 2020-07-27 ENCOUNTER — HOSPITAL ENCOUNTER (OUTPATIENT)
Dept: MRI IMAGING | Facility: HOSPITAL | Age: 85
Discharge: HOME | End: 2020-07-27
Admitting: NURSE PRACTITIONER

## 2020-07-27 ENCOUNTER — APPOINTMENT (OUTPATIENT)
Dept: CARDIAC REHAB | Facility: HOSPITAL | Age: 85
End: 2020-07-27

## 2020-07-27 ENCOUNTER — HOSPITAL ENCOUNTER (OUTPATIENT)
Dept: MRI IMAGING | Facility: HOSPITAL | Age: 85
Discharge: HOME | End: 2020-07-27

## 2020-07-27 DIAGNOSIS — M54.6 THORACIC SPINE PAIN: ICD-10-CM

## 2020-07-27 DIAGNOSIS — M54.50 CHRONIC BILATERAL LOW BACK PAIN WITHOUT SCIATICA: ICD-10-CM

## 2020-07-27 DIAGNOSIS — G89.29 CHRONIC BILATERAL LOW BACK PAIN WITHOUT SCIATICA: ICD-10-CM

## 2020-07-27 PROCEDURE — 72148 MRI LUMBAR SPINE W/O DYE: CPT

## 2020-07-27 PROCEDURE — 72146 MRI CHEST SPINE W/O DYE: CPT

## 2020-07-28 ENCOUNTER — OFFICE VISIT (OUTPATIENT)
Dept: CARDIOLOGY | Facility: CLINIC | Age: 85
End: 2020-07-28

## 2020-07-28 VITALS
BODY MASS INDEX: 27.28 KG/M2 | HEIGHT: 67 IN | SYSTOLIC BLOOD PRESSURE: 140 MMHG | WEIGHT: 173.8 LBS | HEART RATE: 54 BPM | DIASTOLIC BLOOD PRESSURE: 70 MMHG

## 2020-07-28 DIAGNOSIS — I25.10 CORONARY ARTERY DISEASE INVOLVING NATIVE CORONARY ARTERY OF NATIVE HEART WITHOUT ANGINA PECTORIS: Primary | ICD-10-CM

## 2020-07-28 DIAGNOSIS — I34.0 NONRHEUMATIC MITRAL VALVE REGURGITATION: ICD-10-CM

## 2020-07-28 DIAGNOSIS — I35.1 NONRHEUMATIC AORTIC VALVE INSUFFICIENCY: ICD-10-CM

## 2020-07-28 DIAGNOSIS — I10 ESSENTIAL HYPERTENSION: ICD-10-CM

## 2020-07-28 DIAGNOSIS — E78.2 MIXED HYPERLIPIDEMIA: ICD-10-CM

## 2020-07-28 PROCEDURE — 93000 ELECTROCARDIOGRAM COMPLETE: CPT | Performed by: INTERNAL MEDICINE

## 2020-07-28 PROCEDURE — 99214 OFFICE O/P EST MOD 30 MIN: CPT | Performed by: INTERNAL MEDICINE

## 2020-07-28 NOTE — PROGRESS NOTES
Date of Office Visit: 2020    Patient Name: Vicente Delgado  : 1928    Encounter Provider: Breezy Frausto MD  Referring Provider: No ref. provider found  Place of Service: [facility] CARDIOLOGY  Patient Care Team:  Margoth Louis APRN as PCP - General (Family Medicine)  Margoth Louis APRN as PCP - Claims Attributed  Margoth Louis APRN as Nurse Practitioner (Family Medicine)  Reginaldo Palacio MD (Dermatology)  Janki Elias MD as Consulting Physician (Ophthalmology)  Kristopher Machado DPM as Consulting Physician (Podiatry)  Aby Marquez MD as Consulting Physician (Hand Surgery)  Wilton Ramos MD as Consulting Physician (Orthopedic Surgery)      Chief Complaint   Patient presents with   • Non-rheumatic mitral regurgitation     History of Present Illness    Patient is a 89-year-old white male with a history of coronary disease as well as mitral regurgitation.  He returns to the office today for follow-up.  He denies any chest pain, shortness of breath, lightheadedness nor dizziness.  Occasionally he will have some fatigue issues if he overdoes it.    The patient has normal left ventricular systolic function.  He has mild regurgitation of the aortic valve but no stenosis.  There is also moderate mitral valve regurgitation as well as mitral annular calcification but no significant enlargement of the right-sided chambers.  Respect to coronary disease the patient has only mild coronary artery disease.    The patient reports that he feels quite well.  He is exercising every day at least riding a bicycle or walking.  He does not have any symptoms.    Past Medical History:   Diagnosis Date   • Allergic rhinitis    • Anxiety    • Arthritis    • CAD (coronary artery disease)    • Cancer (CMS/[facility])    • Depression    • Diverticulosis    • Esophagitis    • Gastritis    • GERD (gastroesophageal reflux disease)    • Heart disease     • History of anxiety    • History of prostate cancer 06/1990   • Hyperlipidemia    • Hypertension    • Insomnia    • Lupus (CMS/HCC)    • TAM (nonalcoholic steatohepatitis)    • Sciatica of right side          Past Surgical History:   Procedure Laterality Date   • ABDOMINAL SURGERY     • CARDIAC CATHETERIZATION  11/16/1995   • CATARACT EXTRACTION Left 07/2018   • COLONOSCOPY  01/09/2002   • ELBOW PROCEDURE     • JOINT REPLACEMENT     • LUNG BIOPSY     • NASAL POLYP SURGERY     • PACEMAKER IMPLANTATION     • PROSTATE SURGERY  06/1990   • SHOULDER ROTATOR CUFF REPAIR Right 08/24/2011    Dr. Bach   • SIGMOIDOSCOPY     • UPPER GASTROINTESTINAL ENDOSCOPY  09/12/1997    Gastritis, Duodenitis, hiatal hernia (Pathology: Gastric antrum minimal chronic inflammation)   • UPPER GASTROINTESTINAL ENDOSCOPY  04/11/2005    Mild esophagitis, Small hiatal hernia, Mild gastritis and mild duodenitis           Current Outpatient Medications:   •  aspirin 81 MG chewable tablet, Chew daily., Disp: , Rfl:   •  atenolol (TENORMIN) 25 MG tablet, TAKE 1 TABLET BY MOUTH TWICE DAILY, Disp: 180 tablet, Rfl: 1  •  azelastine (ASTELIN) 0.1 % nasal spray, 2 sprays into the nostril(s) as directed by provider 2 (Two) Times a Day As Needed for Rhinitis. Use in each nostril as directed, Disp: 30 mL, Rfl: 1  •  benazepril (LOTENSIN) 10 MG tablet, TAKE 1 TABLET BY MOUTH TWICE DAILY, Disp: 180 tablet, Rfl: 2  •  ezetimibe (ZETIA) 10 MG tablet, TAKE 1 TABLET BY MOUTH DAILY, Disp: 90 tablet, Rfl: 1  •  pantoprazole (PROTONIX) 40 MG EC tablet, TAKE 1 TABLET BY MOUTH DAILY, Disp: 90 tablet, Rfl: 1  •  rosuvastatin (CRESTOR) 10 MG tablet, TAKE 1 TABLET BY MOUTH EVERY NIGHT, Disp: 90 tablet, Rfl: 1  •  vitamin B-12 (CYANOCOBALAMIN) 1000 MCG tablet, Take 1,000 mcg by mouth Daily., Disp: , Rfl:       Social History     Socioeconomic History   • Marital status:      Spouse name: Not on file   • Number of children: Not on file   • Years of education:  "Not on file   • Highest education level: Not on file   Tobacco Use   • Smoking status: Current Some Day Smoker     Types: Cigars   • Smokeless tobacco: Never Used   • Tobacco comment: Quit 15 years ago   Substance and Sexual Activity   • Alcohol use: No     Comment: Daily caffeine use   • Drug use: No   • Sexual activity: Not Currently         Review of Systems   Constitution: Negative.   HENT: Negative.    Eyes: Negative.    Cardiovascular: Negative.    Respiratory: Negative.    Endocrine: Negative.    Skin: Negative.    Musculoskeletal: Negative.    Gastrointestinal: Negative.    Neurological: Negative.    Psychiatric/Behavioral: Negative.        Procedures      ECG 12 Lead  Date/Time: 7/28/2020 11:28 AM  Performed by: Breezy Frausto MD  Authorized by: Breezy Frausto MD   Comparison: compared with previous ECG from 7/24/2019  Similar to previous ECG  Rhythm: sinus rhythm  Rate: normal  Conduction: conduction normal  Q waves: II, aVF, V1, V2, V3 and V4    QRS axis: normal                  Objective:    /70   Pulse 54   Ht 170.2 cm (67\")   Wt 78.8 kg (173 lb 12.8 oz)   BMI 27.22 kg/m²         Physical Exam   Constitutional: He is oriented to person, place, and time. He appears well-developed and well-nourished.   HENT:   Head: Normocephalic.   Eyes: Pupils are equal, round, and reactive to light.   Neck: Normal range of motion. No JVD present. Carotid bruit is not present. No thyromegaly present.   Cardiovascular: Normal rate, regular rhythm, S1 normal, S2 normal, normal heart sounds and intact distal pulses. Exam reveals no gallop and no friction rub.   No murmur heard.  Pulmonary/Chest: Effort normal and breath sounds normal.   Abdominal: Soft. Bowel sounds are normal.   Musculoskeletal: He exhibits no edema.   Neurological: He is alert and oriented to person, place, and time.   Skin: Skin is warm, dry and intact. No erythema.   Psychiatric: He has a normal mood and affect.   Vitals " reviewed.          Assessment:       Diagnosis Plan   1. Coronary artery disease involving native coronary artery of native heart without angina pectoris     2. Nonrheumatic mitral valve regurgitation     3. Mixed hyperlipidemia     4. Essential hypertension     5. Nonrheumatic aortic valve insufficiency       1.  Coronary artery disease: Minimal  2.  Mitral regurgitation: Moderate asymptomatic  3.  Aortic regurgitation: Mild  4.  Hypertension: Controlled  5.  Dyslipidemia: On statin therapy       Plan:

## 2020-07-29 ENCOUNTER — APPOINTMENT (OUTPATIENT)
Dept: CARDIAC REHAB | Facility: HOSPITAL | Age: 85
End: 2020-07-29

## 2020-07-31 ENCOUNTER — TELEPHONE (OUTPATIENT)
Dept: INTERNAL MEDICINE | Facility: CLINIC | Age: 85
End: 2020-07-31

## 2020-07-31 ENCOUNTER — APPOINTMENT (OUTPATIENT)
Dept: CARDIAC REHAB | Facility: HOSPITAL | Age: 85
End: 2020-07-31

## 2020-07-31 ENCOUNTER — TELEPHONE (OUTPATIENT)
Dept: PAIN MEDICINE | Facility: CLINIC | Age: 85
End: 2020-07-31

## 2020-07-31 ENCOUNTER — TELEPHONE (OUTPATIENT)
Dept: PEDIATRICS | Facility: OTHER | Age: 85
End: 2020-07-31

## 2020-07-31 DIAGNOSIS — G89.29 CHRONIC BILATERAL LOW BACK PAIN WITHOUT SCIATICA: Primary | ICD-10-CM

## 2020-07-31 DIAGNOSIS — M54.50 CHRONIC BILATERAL LOW BACK PAIN WITHOUT SCIATICA: Primary | ICD-10-CM

## 2020-07-31 RX ORDER — METHYLPREDNISOLONE 4 MG/1
TABLET ORAL
Qty: 21 TABLET | Refills: 0 | Status: SHIPPED | OUTPATIENT
Start: 2020-07-31 | End: 2020-08-05

## 2020-07-31 NOTE — TELEPHONE ENCOUNTER
I returned call to patient daughter and he is in acute pain since yesterday after twisting the wrong way. I did inform her of MRI findings. He will reschedule appt with pain management due to recent MRI. He has been advised to continue with baclofen prn, voltaren topical and will send oral steroids due to weekend.

## 2020-07-31 NOTE — TELEPHONE ENCOUNTER
DAUGHTER STATED DAD  IS IN PAIN. HE HAD THE LUMBAR SPINE MRI DONE AND HE HAS AN APPOINTMENT ON THE 17TH TO SEE [facility] BUT WANTS TO KNOW WHAT HE SHOULD DO ABOUT HIS PAIN UNTIL THEN. DOES HE GO BACK TO PAIN MANAGEMENT?    PLEASE ADVISE  [phone] BRYAN

## 2020-07-31 NOTE — TELEPHONE ENCOUNTER
DAUGHTER STATED DAD  IS IN PAIN. HE HAD THE LUMBAR SPINE MRI DONE AND HE HAS AN APPOINTMENT ON THE 17TH TO SEE [facility] BUT WANTS TO KNOW WHAT HE SHOULD DO ABOUT HIS PAIN UNTIL THEN. DOES HE GO BACK TO PAIN MANAGEMENT?    PLEASE ADVISE  [phone] BRYAN

## 2020-07-31 NOTE — TELEPHONE ENCOUNTER
I attempted to call both patient and his daughter Constance  (both contact numbers listed) to discuss MRI findings. There was no answer and unable to leave message. Will send [facility].

## 2020-08-03 ENCOUNTER — APPOINTMENT (OUTPATIENT)
Dept: CARDIAC REHAB | Facility: HOSPITAL | Age: 85
End: 2020-08-03

## 2020-08-04 NOTE — PROGRESS NOTES
The patient has a pain history of the following:  Chronic pain syndrome  Chronic right-sided thoracic back pain  Muscle spasm    Previous interventions that the patient has received include:   None for this issue    Pain medications include:  Topical diclofenac  Baclofen - no benefit   PO steroids     Other conservative modalities which the patient reports using include:  Physical therapy  Chiropractor   TENs unit     HPI:     CHIEF COMPLAINT Back Pain      Subjective   Vicente Delgado is a 89 y.o. male  who presents for follow-up.  He has a history of right sided thoracic pain.    His pain has improved slightly. He has been on a steroid pack which may be helping some. He has also been on baclofen which he states does not work. He states that the pain is worst when he bends or twists.  It comes and goes and is shooting/sharp.        PEG Assessment   What number best describes your pain on average in the past week?5  What number best describes how, during the past week, pain has interfered with your enjoyment of life?0  What number best describes how, during the past week, pain has interfered with your general activity?  5      The following portions of the patient's history were reviewed and updated as appropriate: allergies, current medications, past family history, past medical history, past social history, past surgical history and problem list.    Review of Systems   Constitutional: Positive for activity change. Negative for chills, fatigue and fever.   HENT: Positive for hearing loss. Negative for congestion.    Respiratory: Negative for chest tightness and shortness of breath.    Cardiovascular: Negative for chest pain.   Gastrointestinal: Negative for abdominal pain, constipation and diarrhea.   Genitourinary: Negative for difficulty urinating and dysuria.   Musculoskeletal: Positive for back pain.   Neurological: Positive for numbness. Negative for dizziness, weakness, light-headedness and headaches.    Psychiatric/Behavioral: Negative for agitation, sleep disturbance and suicidal ideas. The patient is not nervous/anxious.      I have reviewed and confirmed the accuracy of the ROS as documented by the MA/LPN/RN Destinee Snider MD    Review of Imagin2020 Thoracic & Lumbar MRI   IMPRESSION:     A disc osteophyte complex eccentric to the right at the T1-T2 level  results in moderate right foraminal narrowing. No significant degree of  canal stenosis is identified.        TECHNIQUE: MRI of the lumbar spine was obtained with sagittal T1,  proton-density, and T2-weighted images. Additionally, there are axial T1  and T2-weighted images through the lumbar spine.     COMPARISON: Comparison is made to previous MRI of the lumbar spine dated  2014.     FINDINGS: The conus medullaris terminates at the L1-L2 level and has  normal signal intensity. The visualized distal thoracic spinal cord is  unremarkable.     At L1-L2, there is no significant canal or foraminal narrowing.     At L2-L3, there is degenerative retrolisthesis of L2 on L3 by  approximately 5 mm. On the previous examination, there was approximately  2 mm of retrolisthesis of L2 on L3. Also, there are degenerative disc  changes identified at the L2-L3 level along with adjacent degenerative  endplate marrow edema. These degenerative disc changes and adjacent  degenerative endplate marrow changes have progressed in the interval.  There is mild-to-moderate bilateral foraminal narrowing at the L2-L3  level secondary to bulging disc material. This foraminal narrowing has  progressed in the interval. There is a mild degree of central canal  stenosis secondary to disc bulging, and this has also progressed in the  interval. Mild-to-moderate facet hypertrophic changes are identified.     At L3-L4, there is a mild degree of central canal stenosis secondary to  disc bulging, ligamentum flavum thickening, and mild facet arthropathy.  There is mild bilateral  "foraminal narrowing secondary to bulging disc  material. The foraminal narrowing is stable when compared to the prior  exam. Mild degree of central canal stenosis is new when compared to the  previous study. Again noted is mild narrowing of the left lateral  recess.     At L4-L5, there are degenerative disc changes along with adjacent  degenerative endplate marrow changes. There is a mild-to-moderate degree  of bilateral foraminal narrowing secondary to a disc osteophyte complex.  Similar findings are noted on the prior exam. Mild-to-moderate facet  hypertrophy is seen.     At L5-S1, there is mild-to-moderate facet hypertrophic change. There is  a posterior annular fissure. However, there is no significant canal or  foraminal narrowing.     IMPRESSION:     There is grade 1 degenerative retrolisthesis of L2 on L3 by  approximately 5 mm and this has progressed from approximately 2 mm on  the previous examination. Degenerative disc changes along with adjacent  degenerative endplate marrow changes have also progressed.  Mild-to-moderate bilateral foraminal narrowing and mild central canal  stenosis at L2-L3 have additionally progressed.      At the L3-L4 level, there is a mild degree of central canal stenosis and  mild degree of bilateral foraminal narrowing. A similar degree of L3-4  foraminal narrowing is seen on the prior exam, although canal narrowing  is new since the previous study.     At the L4-L5 level, mild-to-moderate bilateral foraminal narrowing is  noted and these findings are stable.     At L5-S1, a posterior annular fissure is again noted.     The remaining multilevel degenerative phenomena within the lumbar spine  are as discussed in detail above.      Vitals:    08/05/20 0940   BP: 161/73   Pulse: 55   Resp: 16   SpO2: 98%   Weight: 78.9 kg (174 lb)   Height: 170.2 cm (67\")   PainSc:   5   PainLoc: Back         Objective   Physical Exam   Constitutional: He appears well-developed and well-nourished. "   HENT:   Head: Normocephalic.   Hearing slightly diminished   Pulmonary/Chest: Effort normal. No respiratory distress.   Musculoskeletal:   Negative tenderness to palpation over the painful region.  Negative vertebral spinous process pain.  Negative pain with back extension.   Neurological: He is alert.   Skin: Skin is warm and dry.   Psychiatric: He has a normal mood and affect. His behavior is normal.   Vitals reviewed.          Assessment/Plan   Problems Addressed this Visit     None      Visit Diagnoses     Chronic right-sided thoracic back pain    -  Primary    Chronic pain syndrome        Muscle spasm            - Reviewed thoracic and lumbar MRI with the patient and his daughter.   - His daughter was present and provided some of the history.  - Explained options.  I would recommend that if he feels the pain interferes with his life significantly that we see if he benefits from a T8-9 epidural steroid injection.  It does appear that he has some degeneration in this area on MRI that is worse towards the right side.  Risks discussed.  He would like to hold on scheduling the procedure at this time.     --- Follow-up prn.            NEERAJ REPORT    NEERAJ report has been reviewed and scanned into the patient's chart.    As the clinician, I personally reviewed the NEERAJ from 8/4/2020 .    While examining this patient, I wore protective equipment including a mask and gloves.  I washed my hands before and after this patient encounter.  The patient wore a mask throughout the visit as well.     Destinee Snider MD  Pain Management

## 2020-08-05 ENCOUNTER — OFFICE VISIT (OUTPATIENT)
Dept: PAIN MEDICINE | Facility: CLINIC | Age: 85
End: 2020-08-05

## 2020-08-05 ENCOUNTER — APPOINTMENT (OUTPATIENT)
Dept: CARDIAC REHAB | Facility: HOSPITAL | Age: 85
End: 2020-08-05

## 2020-08-05 VITALS
OXYGEN SATURATION: 98 % | DIASTOLIC BLOOD PRESSURE: 73 MMHG | SYSTOLIC BLOOD PRESSURE: 161 MMHG | BODY MASS INDEX: 27.31 KG/M2 | HEART RATE: 55 BPM | RESPIRATION RATE: 16 BRPM | HEIGHT: 67 IN | WEIGHT: 174 LBS

## 2020-08-05 DIAGNOSIS — G89.4 CHRONIC PAIN SYNDROME: ICD-10-CM

## 2020-08-05 DIAGNOSIS — M62.838 MUSCLE SPASM: ICD-10-CM

## 2020-08-05 DIAGNOSIS — M54.6 CHRONIC RIGHT-SIDED THORACIC BACK PAIN: Primary | ICD-10-CM

## 2020-08-05 DIAGNOSIS — G89.29 CHRONIC RIGHT-SIDED THORACIC BACK PAIN: Primary | ICD-10-CM

## 2020-08-05 PROCEDURE — 99212 OFFICE O/P EST SF 10 MIN: CPT | Performed by: ANESTHESIOLOGY

## 2020-08-05 RX ORDER — BACLOFEN 10 MG/1
10 TABLET ORAL 3 TIMES DAILY
COMMUNITY
End: 2020-09-28

## 2020-08-07 ENCOUNTER — APPOINTMENT (OUTPATIENT)
Dept: CARDIAC REHAB | Facility: HOSPITAL | Age: 85
End: 2020-08-07

## 2020-08-10 ENCOUNTER — APPOINTMENT (OUTPATIENT)
Dept: CARDIAC REHAB | Facility: HOSPITAL | Age: 85
End: 2020-08-10

## 2020-08-12 ENCOUNTER — APPOINTMENT (OUTPATIENT)
Dept: CARDIAC REHAB | Facility: HOSPITAL | Age: 85
End: 2020-08-12

## 2020-08-14 ENCOUNTER — APPOINTMENT (OUTPATIENT)
Dept: CARDIAC REHAB | Facility: HOSPITAL | Age: 85
End: 2020-08-14

## 2020-08-17 ENCOUNTER — APPOINTMENT (OUTPATIENT)
Dept: CARDIAC REHAB | Facility: HOSPITAL | Age: 85
End: 2020-08-17

## 2020-08-19 ENCOUNTER — APPOINTMENT (OUTPATIENT)
Dept: CARDIAC REHAB | Facility: HOSPITAL | Age: 85
End: 2020-08-19

## 2020-08-21 ENCOUNTER — APPOINTMENT (OUTPATIENT)
Dept: CARDIAC REHAB | Facility: HOSPITAL | Age: 85
End: 2020-08-21

## 2020-08-26 NOTE — PROGRESS NOTES
The patient has a pain history of the following:  Chronic pain syndrome  Chronic right-sided thoracic back pain  Muscle spasm  Thoracic disc disease  Thoracic spondylosis      Previous interventions that the patient has received include:   None for this issue     Pain medications include:  Topical diclofenac  Baclofen - no benefit   PO steroids      Other conservative modalities which the patient reports using include:  Physical therapy  Chiropractor   TENs unit     HPI:     TELEPHONE VISIT    You have chosen to receive care through a telephone visit. Do you consent to use a telephone visit for your medical care today? Yes    CHIEF COMPLAINT Back pain      Subjective   Vicente Delgado is a 89 y.o. male  who presents for follow-up.  He has a history of right sided thoracic pain.  At his last visit we reviewed his imaging and discussed a T8-9 Epidural steroid injection to help with his pain.      Mr. Delgado continues having sharp, shooting right mid-back pains that seem to be brought on by certain movements.  He states that one day this week he also had abdominal bloating/hard to the touch.  That has since resolved and has only happened on the one occasion.  He wants to move forward with other treatment options today.  He asks about acupuncture, medication, and thoracic epidural injection.     He asks that I speak with his daughter today who also provided part of the history for him.          REVIEW OF PERTINENT MEDICAL DATA  No new     The following portions of the patient's history were reviewed and updated as appropriate: allergies, current medications, past family history, past medical history, past social history, past surgical history and problem list.    Review of Systems   Constitutional: Positive for activity change.   Musculoskeletal: Positive for back pain.     Objective   Physical Exam   Constitutional: He is oriented to person, place, and time.   Neurological: He is alert and oriented to person, place,  and time.   Negative slurring   Psychiatric: He has a normal mood and affect. His behavior is normal.           Assessment/Plan   Problems Addressed this Visit     None      Visit Diagnoses     Chronic right-sided thoracic back pain    -  Primary    Chronic pain syndrome        Muscle spasm        Thoracic disc disease        Thoracic spondylosis            - He will trial acupuncture to see if that helps with his pain.   - If his abdominal bloating returns and does not go away within a day or he stops having bowel movements, I have advised him to seek medical attention.   - Will start gabapentin with low dose and slow titration.  Explained side effects in detail.  Discussed medication with the patient.  Included in this discussion was the potential for side effects and adverse events.  Patient verbalized understanding and wished to proceed.  Prescription will be sent to pharmacy.    --- Follow-up 1 month office visit.            NEERAJ REPORT    As part of the patient's treatment plan, I am prescribing controlled substances. The patient has been made aware of appropriate use of such medications, including potential risk of somnolence, limited ability to drive and/or work safely, and the potential for dependence or overdose. It has also bee made clear that these medications are for use by this patient only, without concomitant use of alcohol or other substances unless prescribed.       NEERAJ report has been reviewed and scanned into the patient's chart.    As the clinician, I personally reviewed the NEERAJ from 8/26/2020 .    History and physical exam exhibit continued safe and appropriate use of controlled substances.     Destinee Snider MD  Pain Management         Our practice is offering alternative &/or electronic methods to continue to follow our patients while at the same time further the efforts toward social distancing, in accordance with our organizational policies, professional societies' guidance, and  [facility] mandates.  I support the Healthy at Home campaign and in this visit I have counseled the patient on our needs to limit in-person office visits and physical encounters with medical facilities whenever possible.  I have also educated the patient on the medical necessities of maintaining social distancing while we continue to function during this crisis period.       The patient was counseled on the need to consider telehealth options. The patient agreed to a Telephone Check-In.     TIME:  Total Time:  12 minutes.

## 2020-08-27 ENCOUNTER — OFFICE VISIT (OUTPATIENT)
Dept: PAIN MEDICINE | Facility: CLINIC | Age: 85
End: 2020-08-27

## 2020-08-27 DIAGNOSIS — G89.29 CHRONIC RIGHT-SIDED THORACIC BACK PAIN: Primary | ICD-10-CM

## 2020-08-27 DIAGNOSIS — M54.6 CHRONIC RIGHT-SIDED THORACIC BACK PAIN: Primary | ICD-10-CM

## 2020-08-27 DIAGNOSIS — G89.4 CHRONIC PAIN SYNDROME: ICD-10-CM

## 2020-08-27 DIAGNOSIS — M62.838 MUSCLE SPASM: ICD-10-CM

## 2020-08-27 DIAGNOSIS — M47.814 THORACIC SPONDYLOSIS: ICD-10-CM

## 2020-08-27 DIAGNOSIS — M51.9 THORACIC DISC DISEASE: ICD-10-CM

## 2020-08-27 PROCEDURE — 99442 PR PHYS/QHP TELEPHONE EVALUATION 11-20 MIN: CPT | Performed by: ANESTHESIOLOGY

## 2020-08-27 RX ORDER — GABAPENTIN 100 MG/1
CAPSULE ORAL
Qty: 90 CAPSULE | Refills: 0 | Status: SHIPPED | OUTPATIENT
Start: 2020-08-27 | End: 2020-09-10 | Stop reason: SINTOL

## 2020-08-28 DIAGNOSIS — F41.9 ANXIETY DISORDER, UNSPECIFIED TYPE: ICD-10-CM

## 2020-08-28 RX ORDER — LORAZEPAM 1 MG/1
1 TABLET ORAL EVERY 6 HOURS PRN
Qty: 30 TABLET | Refills: 0 | Status: SHIPPED | OUTPATIENT
Start: 2020-08-28 | End: 2020-09-28

## 2020-08-28 RX ORDER — LORAZEPAM 1 MG/1
TABLET ORAL
Qty: 30 TABLET | OUTPATIENT
Start: 2020-08-28

## 2020-08-28 NOTE — TELEPHONE ENCOUNTER
Last office visit: 07/06/2020  Last fill date: 03/11/2020  Elliott ordered   CSA Agreements: Needs to be updated.

## 2020-09-08 DIAGNOSIS — E78.5 HYPERLIPEMIA: ICD-10-CM

## 2020-09-08 DIAGNOSIS — K21.00 GASTROESOPHAGEAL REFLUX DISEASE WITH ESOPHAGITIS: ICD-10-CM

## 2020-09-08 DIAGNOSIS — I10 ESSENTIAL HYPERTENSION: ICD-10-CM

## 2020-09-08 DIAGNOSIS — E78.2 MIXED HYPERLIPIDEMIA: ICD-10-CM

## 2020-09-08 RX ORDER — ROSUVASTATIN CALCIUM 10 MG/1
10 TABLET, COATED ORAL NIGHTLY
Qty: 90 TABLET | Refills: 1 | Status: SHIPPED | OUTPATIENT
Start: 2020-09-08 | End: 2021-02-12 | Stop reason: SDUPTHER

## 2020-09-08 RX ORDER — PANTOPRAZOLE SODIUM 40 MG/1
40 TABLET, DELAYED RELEASE ORAL DAILY
Qty: 90 TABLET | Refills: 1 | Status: SHIPPED | OUTPATIENT
Start: 2020-09-08 | End: 2021-02-12 | Stop reason: SDUPTHER

## 2020-09-08 RX ORDER — ATENOLOL 25 MG/1
TABLET ORAL
Qty: 180 TABLET | Refills: 1 | Status: SHIPPED | OUTPATIENT
Start: 2020-09-08 | End: 2020-09-28

## 2020-09-08 RX ORDER — EZETIMIBE 10 MG/1
10 TABLET ORAL DAILY
Qty: 90 TABLET | Refills: 1 | Status: ON HOLD | OUTPATIENT
Start: 2020-09-08 | End: 2020-09-28

## 2020-09-08 RX ORDER — BENAZEPRIL HYDROCHLORIDE 10 MG/1
TABLET ORAL
Qty: 180 TABLET | Refills: 2 | Status: SHIPPED | OUTPATIENT
Start: 2020-09-08 | End: 2020-09-28

## 2020-09-10 ENCOUNTER — OFFICE VISIT (OUTPATIENT)
Dept: INTERNAL MEDICINE | Facility: CLINIC | Age: 85
End: 2020-09-10

## 2020-09-10 VITALS
HEART RATE: 51 BPM | SYSTOLIC BLOOD PRESSURE: 110 MMHG | HEIGHT: 67 IN | WEIGHT: 174.8 LBS | OXYGEN SATURATION: 99 % | BODY MASS INDEX: 27.44 KG/M2 | DIASTOLIC BLOOD PRESSURE: 70 MMHG

## 2020-09-10 DIAGNOSIS — E78.2 MIXED HYPERLIPIDEMIA: ICD-10-CM

## 2020-09-10 DIAGNOSIS — M54.6 THORACIC SPINE PAIN: ICD-10-CM

## 2020-09-10 DIAGNOSIS — K21.00 GASTROESOPHAGEAL REFLUX DISEASE WITH ESOPHAGITIS: ICD-10-CM

## 2020-09-10 DIAGNOSIS — Z00.00 MEDICARE ANNUAL WELLNESS VISIT, SUBSEQUENT: Primary | ICD-10-CM

## 2020-09-10 DIAGNOSIS — I10 ESSENTIAL HYPERTENSION: ICD-10-CM

## 2020-09-10 PROCEDURE — G0439 PPPS, SUBSEQ VISIT: HCPCS | Performed by: NURSE PRACTITIONER

## 2020-09-10 RX ORDER — MAGNESIUM GLUCONATE 27 MG(500)
500 TABLET ORAL DAILY
COMMUNITY
End: 2020-09-28

## 2020-09-10 NOTE — PROGRESS NOTES
The ABCs of the Annual Wellness Visit  Subsequent Medicare Wellness Visit    Chief Complaint   Patient presents with   • Medicare Wellness-subsequent       Subjective   History of Present Illness:  Vicente Delgado is a 89 y.o. male who presents for a Subsequent Medicare Wellness Visit.    HEALTH RISK ASSESSMENT    Recent Hospitalizations:  No hospitalization(s) within the last year.    Current Medical Providers:  Patient Care Team:  Margoth Louis APRN as PCP - General (Family Medicine)  Margoth Louis APRN as PCP - Claims Attributed  Margoth Louis APRN as Nurse Practitioner (Family Medicine)  Reginaldo Palacio MD (Dermatology)  Janki Elias MD as Consulting Physician (Ophthalmology)  Kristopher Machado DPM as Consulting Physician (Podiatry)  Aby Marquez MD as Consulting Physician (Hand Surgery)  Wilton Ramos MD as Consulting Physician (Orthopedic Surgery)  Destinee Snider MD as Consulting Physician (Pain Medicine)  Breezy Frausot MD as Consulting Physician (Cardiology)    Smoking Status:  Social History     Tobacco Use   Smoking Status Current Some Day Smoker   • Types: Cigars   Smokeless Tobacco Never Used   Tobacco Comment    Quit 15 years ago       Alcohol Consumption:  Social History     Substance and Sexual Activity   Alcohol Use No    Comment: Daily caffeine use       Depression Screen:   PHQ-2/PHQ-9 Depression Screening 9/10/2020   Little interest or pleasure in doing things 0   Feeling down, depressed, or hopeless 1   Trouble falling or staying asleep, or sleeping too much 0   Feeling tired or having little energy 0   Poor appetite or overeating 0   Feeling bad about yourself - or that you are a failure or have let yourself or your family down 0   Trouble concentrating on things, such as reading the newspaper or watching television 0   Moving or speaking so slowly that other people could have noticed. Or the opposite - being so fidgety or restless that you  have been moving around a lot more than usual 0   Thoughts that you would be better off dead, or of hurting yourself in some way 0   Total Score 1   If you checked off any problems, how difficult have these problems made it for you to do your work, take care of things at home, or get along with other people? Not difficult at all       Fall Risk Screen:  EZRA Fall Risk Assessment was completed, and patient is at LOW risk for falls.Assessment completed on:9/10/2020    Health Habits and Functional and Cognitive Screening:  Functional & Cognitive Status 9/10/2020   Do you have difficulty preparing food and eating? No   Do you have difficulty bathing yourself, getting dressed or grooming yourself? No   Do you have difficulty using the toilet? No   Do you have difficulty moving around from place to place? No   Do you have trouble with steps or getting out of a bed or a chair? No   Do you need help using the phone?  No   Are you deaf or do you have serious difficulty hearing?  Yes   Do you need help with transportation? No   Do you need help shopping? No   Do you need help preparing meals?  No   Do you need help with housework?  No   Do you need help with laundry? No   Do you need help taking your medications? No   Do you need help managing money? No   Do you ever drive or ride in a car without wearing a seat belt? No   Have you felt unusual stress, anger or loneliness in the last month? No   Who do you live with? Alone   If you need help, do you have trouble finding someone available to you? No   Have you been bothered in the last four weeks by sexual problems? No   Do you have difficulty concentrating, remembering or making decisions? No         Does the patient have evidence of cognitive impairment? No    Asprin use counseling:Taking ASA appropriately as indicated    Age-appropriate Screening Schedule:  Refer to the list below for future screening recommendations based on patient's age, sex and/or medical conditions.  Orders for these recommended tests are listed in the plan section. The patient has been provided with a written plan.    Health Maintenance   Topic Date Due   • TDAP/TD VACCINES (1 - Tdap) 01/11/1939   • ZOSTER VACCINE (1 of 2) 01/11/1978   • LIPID PANEL  05/07/2021   • INFLUENZA VACCINE  Completed          The following portions of the patient's history were reviewed and updated as appropriate: allergies, current medications, past family history, past medical history, past social history, past surgical history and problem list.    Outpatient Medications Prior to Visit   Medication Sig Dispense Refill   • aspirin 81 MG chewable tablet Chew daily.     • atenolol (TENORMIN) 25 MG tablet TAKE 1 TABLET BY MOUTH TWICE DAILY 180 tablet 1   • azelastine (ASTELIN) 0.1 % nasal spray 2 sprays into the nostril(s) as directed by provider 2 (Two) Times a Day As Needed for Rhinitis. Use in each nostril as directed 30 mL 1   • baclofen (LIORESAL) 10 MG tablet Take 10 mg by mouth 3 (Three) Times a Day.     • benazepril (LOTENSIN) 10 MG tablet TAKE 1 TABLET BY MOUTH TWICE DAILY 180 tablet 2   • ezetimibe (ZETIA) 10 MG tablet TAKE 1 TABLET BY MOUTH DAILY 90 tablet 1   • LORazepam (ATIVAN) 1 MG tablet Take 1 tablet by mouth Every 6 (Six) Hours As Needed for Anxiety. 30 tablet 0   • magnesium gluconate (MAGONATE) 500 MG tablet Take 500 mg by mouth Daily.     • pantoprazole (PROTONIX) 40 MG EC tablet TAKE 1 TABLET BY MOUTH DAILY 90 tablet 1   • rosuvastatin (CRESTOR) 10 MG tablet TAKE 1 TABLET BY MOUTH EVERY NIGHT 90 tablet 1   • gabapentin (NEURONTIN) 100 MG capsule Take 1 capsule nightly for 1 week.  If no side effects increase to 1 capsule BID for 1 week.  Then increase to 1 capsule TID. 90 capsule 0   • vitamin B-12 (CYANOCOBALAMIN) 1000 MCG tablet Take 1,000 mcg by mouth Daily.       No facility-administered medications prior to visit.        Patient Active Problem List   Diagnosis   • BP (high blood pressure)   • MI (mitral  "incompetence)   • Disorder of right ventricle of heart   • CAD (coronary artery disease)   • Hyperlipidemia       Advanced Care Planning:  ACP discussion was held with the patient during this visit. Patient has an advance directive (not in EMR), copy requested.    Review of Systems       Compared to one year ago, the patient feels his physical health is worse.  Compared to one year ago, the patient feels his mental health is the same.    Reviewed chart for potential of high risk medication in the elderly: yes  Reviewed chart for potential of harmful drug interactions in the elderly:yes    Objective         Vitals:    09/10/20 1032   BP: 110/70   BP Location: Left arm   Patient Position: Sitting   Cuff Size: Adult   Pulse: 51   SpO2: 99%   Weight: 79.3 kg (174 lb 12.8 oz)   Height: 170.2 cm (67\")       Body mass index is 27.38 kg/m².  Discussed the patient's BMI with him. The BMI is in the acceptable range.    Physical Exam   Neck: Carotid bruit is not present. No thyroid mass and no thyromegaly present.   Cardiovascular:   Murmur heard.  Repeat bp left arm 138/68  No pedal edema              Assessment/Plan   Medicare Risks and Personalized Health Plan  CMS Preventative Services Quick Reference  Advance Directive Discussion  Immunizations Discussed/Encouraged (specific immunizations; Shingrix )    The above risks/problems have been discussed with the patient.  Pertinent information has been shared with the patient in the After Visit Summary.  Follow up plans and orders are seen below in the Assessment/Plan Section.    Diagnoses and all orders for this visit:    1. Medicare annual wellness visit, subsequent (Primary)    2. Essential hypertension  Comments:  well controlled   Orders:  -     Magnesium; Future  -     Comprehensive Metabolic Panel  -     Magnesium    3. Mixed hyperlipidemia  Comments:  tolerating statin therapy    4. Gastroesophageal reflux disease with esophagitis  Comments:  stable with protonix    5. " Thoracic spine pain  Comments:  current with accupuncture as of this week; he started otc magnesium per there recommendations      Follow Up:  Return in about 6 months (around 3/10/2021) for Recheck, HTN, HLD.     An After Visit Summary and PPPS were given to the patient.        He is accompanied by his daughter Sandra  He will provide copy of advance directive  He will hold on shingrix vaccination   He was advised to covid 19 precautions-facial mask, social distancing and hand washing

## 2020-09-11 LAB
ALBUMIN SERPL-MCNC: 4.6 G/DL (ref 3.5–5.2)
ALBUMIN/GLOB SERPL: 2.2 G/DL
ALP SERPL-CCNC: 47 U/L (ref 39–117)
ALT SERPL-CCNC: 18 U/L (ref 1–41)
AST SERPL-CCNC: 24 U/L (ref 1–40)
BILIRUB SERPL-MCNC: 0.5 MG/DL (ref 0–1.2)
BUN SERPL-MCNC: 19 MG/DL (ref 8–23)
BUN/CREAT SERPL: 23.5 (ref 7–25)
CALCIUM SERPL-MCNC: 9.1 MG/DL (ref 8.2–9.6)
CHLORIDE SERPL-SCNC: 101 MMOL/L (ref 98–107)
CO2 SERPL-SCNC: 28.7 MMOL/L (ref 22–29)
CREAT SERPL-MCNC: 0.81 MG/DL (ref 0.76–1.27)
GLOBULIN SER CALC-MCNC: 2.1 GM/DL
GLUCOSE SERPL-MCNC: 73 MG/DL (ref 65–99)
MAGNESIUM SERPL-MCNC: 2.2 MG/DL (ref 1.7–2.3)
POTASSIUM SERPL-SCNC: 4.5 MMOL/L (ref 3.5–5.2)
PROT SERPL-MCNC: 6.7 G/DL (ref 6–8.5)
SODIUM SERPL-SCNC: 138 MMOL/L (ref 136–145)

## 2020-09-28 ENCOUNTER — APPOINTMENT (OUTPATIENT)
Dept: CT IMAGING | Facility: HOSPITAL | Age: 85
End: 2020-09-28

## 2020-09-28 ENCOUNTER — TELEPHONE (OUTPATIENT)
Dept: INTERNAL MEDICINE | Facility: CLINIC | Age: 85
End: 2020-09-28

## 2020-09-28 ENCOUNTER — HOSPITAL ENCOUNTER (OUTPATIENT)
Facility: HOSPITAL | Age: 85
LOS: 1 days | Discharge: HOME | End: 2020-10-01
Attending: EMERGENCY MEDICINE | Admitting: SURGERY

## 2020-09-28 DIAGNOSIS — K80.50 BILIARY COLIC: ICD-10-CM

## 2020-09-28 DIAGNOSIS — K80.20 CALCULUS OF GALLBLADDER WITHOUT CHOLECYSTITIS WITHOUT OBSTRUCTION: Primary | ICD-10-CM

## 2020-09-28 LAB
ALBUMIN SERPL-MCNC: 4.4 G/DL (ref 3.5–5.2)
ALBUMIN/GLOB SERPL: 1.7 G/DL
ALP SERPL-CCNC: 45 U/L (ref 39–117)
ALT SERPL W P-5'-P-CCNC: 16 U/L (ref 1–41)
ANION GAP SERPL CALCULATED.3IONS-SCNC: 8.3 MMOL/L (ref 5–15)
AST SERPL-CCNC: 23 U/L (ref 1–40)
B PARAPERT DNA SPEC QL NAA+PROBE: NOT DETECTED
B PERT DNA SPEC QL NAA+PROBE: NOT DETECTED
BACTERIA UR QL AUTO: ABNORMAL /HPF
BASOPHILS # BLD AUTO: 0.01 10*3/MM3 (ref 0–0.2)
BASOPHILS NFR BLD AUTO: 0.2 % (ref 0–1.5)
BILIRUB SERPL-MCNC: 0.5 MG/DL (ref 0–1.2)
BILIRUB UR QL STRIP: NEGATIVE
BUN SERPL-MCNC: 20 MG/DL (ref 8–23)
BUN/CREAT SERPL: 23.3 (ref 7–25)
C PNEUM DNA NPH QL NAA+NON-PROBE: NOT DETECTED
CALCIUM SPEC-SCNC: 9.1 MG/DL (ref 8.2–9.6)
CHLORIDE SERPL-SCNC: 102 MMOL/L (ref 98–107)
CLARITY UR: CLEAR
CO2 SERPL-SCNC: 25.7 MMOL/L (ref 22–29)
COLOR UR: YELLOW
CREAT SERPL-MCNC: 0.86 MG/DL (ref 0.76–1.27)
DEPRECATED RDW RBC AUTO: 40.9 FL (ref 37–54)
EOSINOPHIL # BLD AUTO: 0.02 10*3/MM3 (ref 0–0.4)
EOSINOPHIL NFR BLD AUTO: 0.4 % (ref 0.3–6.2)
ERYTHROCYTE [DISTWIDTH] IN BLOOD BY AUTOMATED COUNT: 12.4 % (ref 12.3–15.4)
FLUAV H1 2009 PAND RNA NPH QL NAA+PROBE: NOT DETECTED
FLUAV H1 HA GENE NPH QL NAA+PROBE: NOT DETECTED
FLUAV H3 RNA NPH QL NAA+PROBE: NOT DETECTED
FLUAV SUBTYP SPEC NAA+PROBE: NOT DETECTED
FLUBV RNA ISLT QL NAA+PROBE: NOT DETECTED
GFR SERPL CREATININE-BSD FRML MDRD: 83 ML/MIN/1.73
GLOBULIN UR ELPH-MCNC: 2.6 GM/DL
GLUCOSE SERPL-MCNC: 94 MG/DL (ref 65–99)
GLUCOSE UR STRIP-MCNC: NEGATIVE MG/DL
HADV DNA SPEC NAA+PROBE: NOT DETECTED
HCOV 229E RNA SPEC QL NAA+PROBE: NOT DETECTED
HCOV HKU1 RNA SPEC QL NAA+PROBE: NOT DETECTED
HCOV NL63 RNA SPEC QL NAA+PROBE: NOT DETECTED
HCOV OC43 RNA SPEC QL NAA+PROBE: NOT DETECTED
HCT VFR BLD AUTO: 40.6 % (ref 37.5–51)
HGB BLD-MCNC: 13.7 G/DL (ref 13–17.7)
HGB UR QL STRIP.AUTO: ABNORMAL
HMPV RNA NPH QL NAA+NON-PROBE: NOT DETECTED
HOLD SPECIMEN: NORMAL
HOLD SPECIMEN: NORMAL
HPIV1 RNA SPEC QL NAA+PROBE: NOT DETECTED
HPIV2 RNA SPEC QL NAA+PROBE: NOT DETECTED
HPIV3 RNA NPH QL NAA+PROBE: NOT DETECTED
HPIV4 P GENE NPH QL NAA+PROBE: NOT DETECTED
HYALINE CASTS UR QL AUTO: ABNORMAL /LPF
IMM GRANULOCYTES # BLD AUTO: 0.01 10*3/MM3 (ref 0–0.05)
IMM GRANULOCYTES NFR BLD AUTO: 0.2 % (ref 0–0.5)
INR PPP: 1.1 (ref 0.9–1.1)
KETONES UR QL STRIP: NEGATIVE
LEUKOCYTE ESTERASE UR QL STRIP.AUTO: NEGATIVE
LIPASE SERPL-CCNC: 26 U/L (ref 13–60)
LYMPHOCYTES # BLD AUTO: 1.3 10*3/MM3 (ref 0.7–3.1)
LYMPHOCYTES NFR BLD AUTO: 25.5 % (ref 19.6–45.3)
M PNEUMO IGG SER IA-ACNC: NOT DETECTED
MCH RBC QN AUTO: 30.7 PG (ref 26.6–33)
MCHC RBC AUTO-ENTMCNC: 33.7 G/DL (ref 31.5–35.7)
MCV RBC AUTO: 91 FL (ref 79–97)
MONOCYTES # BLD AUTO: 0.85 10*3/MM3 (ref 0.1–0.9)
MONOCYTES NFR BLD AUTO: 16.7 % (ref 5–12)
NEUTROPHILS NFR BLD AUTO: 2.91 10*3/MM3 (ref 1.7–7)
NEUTROPHILS NFR BLD AUTO: 57 % (ref 42.7–76)
NITRITE UR QL STRIP: NEGATIVE
NRBC BLD AUTO-RTO: 0 /100 WBC (ref 0–0.2)
PH UR STRIP.AUTO: 6.5 [PH] (ref 5–8)
PLATELET # BLD AUTO: 186 10*3/MM3 (ref 140–450)
PMV BLD AUTO: 9.7 FL (ref 6–12)
POTASSIUM SERPL-SCNC: 4.2 MMOL/L (ref 3.5–5.2)
PROT SERPL-MCNC: 7 G/DL (ref 6–8.5)
PROT UR QL STRIP: NEGATIVE
PROTHROMBIN TIME: 14.1 SECONDS (ref 11.7–14.2)
RBC # BLD AUTO: 4.46 10*6/MM3 (ref 4.14–5.8)
RBC # UR: ABNORMAL /HPF
REF LAB TEST METHOD: ABNORMAL
RHINOVIRUS RNA SPEC NAA+PROBE: NOT DETECTED
RSV RNA NPH QL NAA+NON-PROBE: NOT DETECTED
SARS-COV-2 RNA NPH QL NAA+NON-PROBE: NOT DETECTED
SODIUM SERPL-SCNC: 136 MMOL/L (ref 136–145)
SP GR UR STRIP: <=1.005 (ref 1–1.03)
SQUAMOUS #/AREA URNS HPF: ABNORMAL /HPF
TROPONIN T SERPL-MCNC: <0.01 NG/ML (ref 0–0.03)
UROBILINOGEN UR QL STRIP: ABNORMAL
WBC # BLD AUTO: 5.1 10*3/MM3 (ref 3.4–10.8)
WBC UR QL AUTO: ABNORMAL /HPF
WHOLE BLOOD HOLD SPECIMEN: NORMAL
WHOLE BLOOD HOLD SPECIMEN: NORMAL

## 2020-09-28 PROCEDURE — 93005 ELECTROCARDIOGRAM TRACING: CPT | Performed by: EMERGENCY MEDICINE

## 2020-09-28 PROCEDURE — 85025 COMPLETE CBC W/AUTO DIFF WBC: CPT | Performed by: EMERGENCY MEDICINE

## 2020-09-28 PROCEDURE — 93010 ELECTROCARDIOGRAM REPORT: CPT | Performed by: INTERNAL MEDICINE

## 2020-09-28 PROCEDURE — 81001 URINALYSIS AUTO W/SCOPE: CPT | Performed by: EMERGENCY MEDICINE

## 2020-09-28 PROCEDURE — 99204 OFFICE O/P NEW MOD 45 MIN: CPT | Performed by: SURGERY

## 2020-09-28 PROCEDURE — 0202U NFCT DS 22 TRGT SARS-COV-2: CPT | Performed by: EMERGENCY MEDICINE

## 2020-09-28 PROCEDURE — 85610 PROTHROMBIN TIME: CPT | Performed by: EMERGENCY MEDICINE

## 2020-09-28 PROCEDURE — 84484 ASSAY OF TROPONIN QUANT: CPT | Performed by: EMERGENCY MEDICINE

## 2020-09-28 PROCEDURE — 99284 EMERGENCY DEPT VISIT MOD MDM: CPT

## 2020-09-28 PROCEDURE — 25010000002 IOPAMIDOL 61 % SOLUTION: Performed by: EMERGENCY MEDICINE

## 2020-09-28 PROCEDURE — 80053 COMPREHEN METABOLIC PANEL: CPT | Performed by: EMERGENCY MEDICINE

## 2020-09-28 PROCEDURE — 83690 ASSAY OF LIPASE: CPT | Performed by: EMERGENCY MEDICINE

## 2020-09-28 PROCEDURE — 74177 CT ABD & PELVIS W/CONTRAST: CPT

## 2020-09-28 RX ORDER — HYDROCODONE BITARTRATE AND ACETAMINOPHEN 5; 325 MG/1; MG/1
1 TABLET ORAL EVERY 6 HOURS PRN
Status: DISCONTINUED | OUTPATIENT
Start: 2020-09-28 | End: 2020-10-01 | Stop reason: HOSPADM

## 2020-09-28 RX ORDER — ACETAMINOPHEN 160 MG/5ML
650 SOLUTION ORAL EVERY 4 HOURS PRN
Status: DISCONTINUED | OUTPATIENT
Start: 2020-09-28 | End: 2020-10-01 | Stop reason: HOSPADM

## 2020-09-28 RX ORDER — ATENOLOL 25 MG/1
25 TABLET ORAL 2 TIMES DAILY
Status: DISCONTINUED | OUTPATIENT
Start: 2020-09-28 | End: 2020-10-01 | Stop reason: HOSPADM

## 2020-09-28 RX ORDER — SODIUM CHLORIDE 0.9 % (FLUSH) 0.9 %
10 SYRINGE (ML) INJECTION EVERY 12 HOURS SCHEDULED
Status: DISCONTINUED | OUTPATIENT
Start: 2020-09-28 | End: 2020-10-01 | Stop reason: HOSPADM

## 2020-09-28 RX ORDER — ONDANSETRON 2 MG/ML
4 INJECTION INTRAMUSCULAR; INTRAVENOUS ONCE
Status: DISCONTINUED | OUTPATIENT
Start: 2020-09-28 | End: 2020-10-01 | Stop reason: HOSPADM

## 2020-09-28 RX ORDER — SODIUM CHLORIDE 0.9 % (FLUSH) 0.9 %
10 SYRINGE (ML) INJECTION AS NEEDED
Status: DISCONTINUED | OUTPATIENT
Start: 2020-09-28 | End: 2020-10-01 | Stop reason: HOSPADM

## 2020-09-28 RX ORDER — ACETAMINOPHEN 325 MG/1
650 TABLET ORAL EVERY 4 HOURS PRN
Status: DISCONTINUED | OUTPATIENT
Start: 2020-09-28 | End: 2020-10-01 | Stop reason: HOSPADM

## 2020-09-28 RX ORDER — ONDANSETRON 2 MG/ML
4 INJECTION INTRAMUSCULAR; INTRAVENOUS EVERY 6 HOURS PRN
Status: DISCONTINUED | OUTPATIENT
Start: 2020-09-28 | End: 2020-10-01 | Stop reason: HOSPADM

## 2020-09-28 RX ORDER — LISINOPRIL 10 MG/1
10 TABLET ORAL
Status: DISCONTINUED | OUTPATIENT
Start: 2020-09-28 | End: 2020-10-01 | Stop reason: HOSPADM

## 2020-09-28 RX ORDER — ASPIRIN 81 MG/1
81 TABLET, CHEWABLE ORAL DAILY
Status: DISCONTINUED | OUTPATIENT
Start: 2020-09-28 | End: 2020-10-01 | Stop reason: HOSPADM

## 2020-09-28 RX ORDER — ATENOLOL 25 MG/1
25 TABLET ORAL 2 TIMES DAILY
COMMUNITY
End: 2021-02-12 | Stop reason: SDUPTHER

## 2020-09-28 RX ORDER — MORPHINE SULFATE 2 MG/ML
2 INJECTION, SOLUTION INTRAMUSCULAR; INTRAVENOUS ONCE
Status: DISCONTINUED | OUTPATIENT
Start: 2020-09-28 | End: 2020-10-01 | Stop reason: HOSPADM

## 2020-09-28 RX ORDER — BENAZEPRIL HYDROCHLORIDE 10 MG/1
10 TABLET ORAL 2 TIMES DAILY
COMMUNITY
End: 2021-02-12 | Stop reason: SDUPTHER

## 2020-09-28 RX ORDER — MORPHINE SULFATE 2 MG/ML
2 INJECTION, SOLUTION INTRAMUSCULAR; INTRAVENOUS ONCE
Status: DISCONTINUED | OUTPATIENT
Start: 2020-09-28 | End: 2020-09-28

## 2020-09-28 RX ORDER — ACETAMINOPHEN 650 MG/1
650 SUPPOSITORY RECTAL EVERY 4 HOURS PRN
Status: DISCONTINUED | OUTPATIENT
Start: 2020-09-28 | End: 2020-10-01 | Stop reason: HOSPADM

## 2020-09-28 RX ORDER — ONDANSETRON 2 MG/ML
4 INJECTION INTRAMUSCULAR; INTRAVENOUS ONCE
Status: DISCONTINUED | OUTPATIENT
Start: 2020-09-28 | End: 2020-09-28

## 2020-09-28 RX ORDER — PANTOPRAZOLE SODIUM 40 MG/1
40 TABLET, DELAYED RELEASE ORAL DAILY
Status: DISCONTINUED | OUTPATIENT
Start: 2020-09-28 | End: 2020-10-01 | Stop reason: HOSPADM

## 2020-09-28 RX ADMIN — IOPAMIDOL 85 ML: 612 INJECTION, SOLUTION INTRAVENOUS at 11:37

## 2020-09-28 RX ADMIN — HYDROCODONE BITARTRATE AND ACETAMINOPHEN 1 TABLET: 5; 325 TABLET ORAL at 19:27

## 2020-09-28 RX ADMIN — SODIUM CHLORIDE 500 ML: 9 INJECTION, SOLUTION INTRAVENOUS at 10:11

## 2020-09-28 RX ADMIN — PANTOPRAZOLE SODIUM 40 MG: 40 TABLET, DELAYED RELEASE ORAL at 22:03

## 2020-09-28 RX ADMIN — SODIUM CHLORIDE, PRESERVATIVE FREE 10 ML: 5 INJECTION INTRAVENOUS at 22:04

## 2020-09-28 RX ADMIN — SODIUM CHLORIDE, PRESERVATIVE FREE 10 ML: 5 INJECTION INTRAVENOUS at 10:10

## 2020-09-29 ENCOUNTER — APPOINTMENT (OUTPATIENT)
Dept: GENERAL RADIOLOGY | Facility: HOSPITAL | Age: 85
End: 2020-09-29

## 2020-09-29 ENCOUNTER — ANESTHESIA EVENT (OUTPATIENT)
Dept: PERIOP | Facility: HOSPITAL | Age: 85
End: 2020-09-29

## 2020-09-29 ENCOUNTER — ANESTHESIA (OUTPATIENT)
Dept: PERIOP | Facility: HOSPITAL | Age: 85
End: 2020-09-29

## 2020-09-29 LAB
ANION GAP SERPL CALCULATED.3IONS-SCNC: 9.7 MMOL/L (ref 5–15)
BASOPHILS # BLD AUTO: 0.01 10*3/MM3 (ref 0–0.2)
BASOPHILS NFR BLD AUTO: 0.2 % (ref 0–1.5)
BUN SERPL-MCNC: 20 MG/DL (ref 8–23)
BUN/CREAT SERPL: 23.5 (ref 7–25)
CALCIUM SPEC-SCNC: 9.4 MG/DL (ref 8.2–9.6)
CHLORIDE SERPL-SCNC: 101 MMOL/L (ref 98–107)
CO2 SERPL-SCNC: 26.3 MMOL/L (ref 22–29)
CREAT SERPL-MCNC: 0.85 MG/DL (ref 0.76–1.27)
DEPRECATED RDW RBC AUTO: 40.7 FL (ref 37–54)
EOSINOPHIL # BLD AUTO: 0.04 10*3/MM3 (ref 0–0.4)
EOSINOPHIL NFR BLD AUTO: 0.9 % (ref 0.3–6.2)
ERYTHROCYTE [DISTWIDTH] IN BLOOD BY AUTOMATED COUNT: 12.4 % (ref 12.3–15.4)
GFR SERPL CREATININE-BSD FRML MDRD: 84 ML/MIN/1.73
GLUCOSE SERPL-MCNC: 78 MG/DL (ref 65–99)
HCT VFR BLD AUTO: 41.3 % (ref 37.5–51)
HGB BLD-MCNC: 14 G/DL (ref 13–17.7)
IMM GRANULOCYTES # BLD AUTO: 0.01 10*3/MM3 (ref 0–0.05)
IMM GRANULOCYTES NFR BLD AUTO: 0.2 % (ref 0–0.5)
LYMPHOCYTES # BLD AUTO: 1.44 10*3/MM3 (ref 0.7–3.1)
LYMPHOCYTES NFR BLD AUTO: 31.2 % (ref 19.6–45.3)
MAGNESIUM SERPL-MCNC: 2.2 MG/DL (ref 1.7–2.3)
MCH RBC QN AUTO: 30.8 PG (ref 26.6–33)
MCHC RBC AUTO-ENTMCNC: 33.9 G/DL (ref 31.5–35.7)
MCV RBC AUTO: 91 FL (ref 79–97)
MONOCYTES # BLD AUTO: 0.86 10*3/MM3 (ref 0.1–0.9)
MONOCYTES NFR BLD AUTO: 18.7 % (ref 5–12)
NEUTROPHILS NFR BLD AUTO: 2.25 10*3/MM3 (ref 1.7–7)
NEUTROPHILS NFR BLD AUTO: 48.8 % (ref 42.7–76)
NRBC BLD AUTO-RTO: 0 /100 WBC (ref 0–0.2)
PLATELET # BLD AUTO: 190 10*3/MM3 (ref 140–450)
PMV BLD AUTO: 9.8 FL (ref 6–12)
POTASSIUM SERPL-SCNC: 4 MMOL/L (ref 3.5–5.2)
RBC # BLD AUTO: 4.54 10*6/MM3 (ref 4.14–5.8)
SODIUM SERPL-SCNC: 137 MMOL/L (ref 136–145)
WBC # BLD AUTO: 4.61 10*3/MM3 (ref 3.4–10.8)

## 2020-09-29 PROCEDURE — 25010000002 DEXAMETHASONE PER 1 MG: Performed by: NURSE ANESTHETIST, CERTIFIED REGISTERED

## 2020-09-29 PROCEDURE — 25010000002 FENTANYL CITRATE (PF) 100 MCG/2ML SOLUTION: Performed by: NURSE ANESTHETIST, CERTIFIED REGISTERED

## 2020-09-29 PROCEDURE — G0378 HOSPITAL OBSERVATION PER HR: HCPCS

## 2020-09-29 PROCEDURE — 25010000002 ONDANSETRON PER 1 MG: Performed by: NURSE ANESTHETIST, CERTIFIED REGISTERED

## 2020-09-29 PROCEDURE — 99214 OFFICE O/P EST MOD 30 MIN: CPT | Performed by: INTERNAL MEDICINE

## 2020-09-29 PROCEDURE — 25010000002 NEOSTIGMINE PER 0.5 MG: Performed by: NURSE ANESTHETIST, CERTIFIED REGISTERED

## 2020-09-29 PROCEDURE — 0 IOTHALAMATE 60 % SOLUTION: Performed by: SURGERY

## 2020-09-29 PROCEDURE — 25010000003 CEFAZOLIN IN DEXTROSE 2-4 GM/100ML-% SOLUTION: Performed by: SURGERY

## 2020-09-29 PROCEDURE — 80048 BASIC METABOLIC PNL TOTAL CA: CPT | Performed by: INTERNAL MEDICINE

## 2020-09-29 PROCEDURE — 85025 COMPLETE CBC W/AUTO DIFF WBC: CPT | Performed by: INTERNAL MEDICINE

## 2020-09-29 PROCEDURE — 25010000002 FENTANYL CITRATE (PF) 100 MCG/2ML SOLUTION: Performed by: ANESTHESIOLOGY

## 2020-09-29 PROCEDURE — 47562 LAPAROSCOPIC CHOLECYSTECTOMY: CPT | Performed by: SURGERY

## 2020-09-29 PROCEDURE — 88304 TISSUE EXAM BY PATHOLOGIST: CPT | Performed by: SURGERY

## 2020-09-29 PROCEDURE — 83735 ASSAY OF MAGNESIUM: CPT | Performed by: INTERNAL MEDICINE

## 2020-09-29 PROCEDURE — 25010000002 PROPOFOL 10 MG/ML EMULSION: Performed by: NURSE ANESTHETIST, CERTIFIED REGISTERED

## 2020-09-29 PROCEDURE — 96374 THER/PROPH/DIAG INJ IV PUSH: CPT

## 2020-09-29 PROCEDURE — 25010000002 MORPHINE PER 10 MG: Performed by: NURSE PRACTITIONER

## 2020-09-29 PROCEDURE — 25010000002 PHENYLEPHRINE PER 1 ML: Performed by: NURSE ANESTHETIST, CERTIFIED REGISTERED

## 2020-09-29 DEVICE — SEAL HEMO SURG ARISTA/AH ABS/PWDR 3GM: Type: IMPLANTABLE DEVICE | Site: ABDOMEN | Status: FUNCTIONAL

## 2020-09-29 RX ORDER — FENTANYL CITRATE 50 UG/ML
25 INJECTION, SOLUTION INTRAMUSCULAR; INTRAVENOUS
Status: DISCONTINUED | OUTPATIENT
Start: 2020-09-29 | End: 2020-09-29 | Stop reason: HOSPADM

## 2020-09-29 RX ORDER — MORPHINE SULFATE 2 MG/ML
2 INJECTION, SOLUTION INTRAMUSCULAR; INTRAVENOUS ONCE
Status: COMPLETED | OUTPATIENT
Start: 2020-09-29 | End: 2020-09-29

## 2020-09-29 RX ORDER — ONDANSETRON 2 MG/ML
4 INJECTION INTRAMUSCULAR; INTRAVENOUS ONCE AS NEEDED
Status: DISCONTINUED | OUTPATIENT
Start: 2020-09-29 | End: 2020-09-29 | Stop reason: HOSPADM

## 2020-09-29 RX ORDER — LIDOCAINE HYDROCHLORIDE 10 MG/ML
0.5 INJECTION, SOLUTION EPIDURAL; INFILTRATION; INTRACAUDAL; PERINEURAL ONCE AS NEEDED
Status: DISCONTINUED | OUTPATIENT
Start: 2020-09-29 | End: 2020-09-29 | Stop reason: HOSPADM

## 2020-09-29 RX ORDER — NALOXONE HCL 0.4 MG/ML
0.2 VIAL (ML) INJECTION AS NEEDED
Status: DISCONTINUED | OUTPATIENT
Start: 2020-09-29 | End: 2020-09-29 | Stop reason: HOSPADM

## 2020-09-29 RX ORDER — HYDROMORPHONE HYDROCHLORIDE 1 MG/ML
0.25 INJECTION, SOLUTION INTRAMUSCULAR; INTRAVENOUS; SUBCUTANEOUS
Status: DISCONTINUED | OUTPATIENT
Start: 2020-09-29 | End: 2020-09-29 | Stop reason: HOSPADM

## 2020-09-29 RX ORDER — ROCURONIUM BROMIDE 10 MG/ML
INJECTION, SOLUTION INTRAVENOUS AS NEEDED
Status: DISCONTINUED | OUTPATIENT
Start: 2020-09-29 | End: 2020-09-29 | Stop reason: SURG

## 2020-09-29 RX ORDER — PROMETHAZINE HYDROCHLORIDE 25 MG/1
25 SUPPOSITORY RECTAL ONCE AS NEEDED
Status: DISCONTINUED | OUTPATIENT
Start: 2020-09-29 | End: 2020-09-29 | Stop reason: HOSPADM

## 2020-09-29 RX ORDER — FAMOTIDINE 10 MG/ML
20 INJECTION, SOLUTION INTRAVENOUS ONCE
Status: COMPLETED | OUTPATIENT
Start: 2020-09-29 | End: 2020-09-29

## 2020-09-29 RX ORDER — BUPIVACAINE HYDROCHLORIDE 2.5 MG/ML
INJECTION, SOLUTION EPIDURAL; INFILTRATION; INTRACAUDAL AS NEEDED
Status: DISCONTINUED | OUTPATIENT
Start: 2020-09-29 | End: 2020-09-29 | Stop reason: HOSPADM

## 2020-09-29 RX ORDER — LABETALOL HYDROCHLORIDE 5 MG/ML
5 INJECTION, SOLUTION INTRAVENOUS
Status: DISCONTINUED | OUTPATIENT
Start: 2020-09-29 | End: 2020-09-29 | Stop reason: HOSPADM

## 2020-09-29 RX ORDER — DIPHENHYDRAMINE HCL 25 MG
25 CAPSULE ORAL
Status: DISCONTINUED | OUTPATIENT
Start: 2020-09-29 | End: 2020-09-29 | Stop reason: HOSPADM

## 2020-09-29 RX ORDER — HYDROCODONE BITARTRATE AND ACETAMINOPHEN 7.5; 325 MG/1; MG/1
1 TABLET ORAL ONCE AS NEEDED
Status: DISCONTINUED | OUTPATIENT
Start: 2020-09-29 | End: 2020-09-29 | Stop reason: HOSPADM

## 2020-09-29 RX ORDER — PROPOFOL 10 MG/ML
VIAL (ML) INTRAVENOUS AS NEEDED
Status: DISCONTINUED | OUTPATIENT
Start: 2020-09-29 | End: 2020-09-29 | Stop reason: SURG

## 2020-09-29 RX ORDER — ONDANSETRON 2 MG/ML
INJECTION INTRAMUSCULAR; INTRAVENOUS AS NEEDED
Status: DISCONTINUED | OUTPATIENT
Start: 2020-09-29 | End: 2020-09-29 | Stop reason: SURG

## 2020-09-29 RX ORDER — FLUMAZENIL 0.1 MG/ML
0.2 INJECTION INTRAVENOUS AS NEEDED
Status: DISCONTINUED | OUTPATIENT
Start: 2020-09-29 | End: 2020-09-29 | Stop reason: HOSPADM

## 2020-09-29 RX ORDER — FENTANYL CITRATE 50 UG/ML
INJECTION, SOLUTION INTRAMUSCULAR; INTRAVENOUS AS NEEDED
Status: DISCONTINUED | OUTPATIENT
Start: 2020-09-29 | End: 2020-09-29 | Stop reason: SURG

## 2020-09-29 RX ORDER — GLYCOPYRROLATE 0.2 MG/ML
INJECTION INTRAMUSCULAR; INTRAVENOUS AS NEEDED
Status: DISCONTINUED | OUTPATIENT
Start: 2020-09-29 | End: 2020-09-29 | Stop reason: SURG

## 2020-09-29 RX ORDER — MAGNESIUM HYDROXIDE 1200 MG/15ML
LIQUID ORAL AS NEEDED
Status: DISCONTINUED | OUTPATIENT
Start: 2020-09-29 | End: 2020-09-29 | Stop reason: HOSPADM

## 2020-09-29 RX ORDER — SODIUM CHLORIDE, SODIUM LACTATE, POTASSIUM CHLORIDE, CALCIUM CHLORIDE 600; 310; 30; 20 MG/100ML; MG/100ML; MG/100ML; MG/100ML
9 INJECTION, SOLUTION INTRAVENOUS CONTINUOUS
Status: DISCONTINUED | OUTPATIENT
Start: 2020-09-29 | End: 2020-09-29

## 2020-09-29 RX ORDER — DIPHENHYDRAMINE HYDROCHLORIDE 50 MG/ML
12.5 INJECTION INTRAMUSCULAR; INTRAVENOUS
Status: DISCONTINUED | OUTPATIENT
Start: 2020-09-29 | End: 2020-09-29 | Stop reason: HOSPADM

## 2020-09-29 RX ORDER — PROMETHAZINE HYDROCHLORIDE 25 MG/1
25 TABLET ORAL ONCE AS NEEDED
Status: DISCONTINUED | OUTPATIENT
Start: 2020-09-29 | End: 2020-09-29 | Stop reason: HOSPADM

## 2020-09-29 RX ORDER — EPHEDRINE SULFATE 50 MG/ML
5 INJECTION, SOLUTION INTRAVENOUS ONCE AS NEEDED
Status: DISCONTINUED | OUTPATIENT
Start: 2020-09-29 | End: 2020-09-29 | Stop reason: HOSPADM

## 2020-09-29 RX ORDER — SODIUM CHLORIDE 0.9 % (FLUSH) 0.9 %
3-10 SYRINGE (ML) INJECTION AS NEEDED
Status: DISCONTINUED | OUTPATIENT
Start: 2020-09-29 | End: 2020-09-29 | Stop reason: HOSPADM

## 2020-09-29 RX ORDER — DEXAMETHASONE SODIUM PHOSPHATE 10 MG/ML
INJECTION INTRAMUSCULAR; INTRAVENOUS AS NEEDED
Status: DISCONTINUED | OUTPATIENT
Start: 2020-09-29 | End: 2020-09-29 | Stop reason: SURG

## 2020-09-29 RX ORDER — CEFAZOLIN SODIUM 2 G/100ML
2 INJECTION, SOLUTION INTRAVENOUS ONCE
Status: COMPLETED | OUTPATIENT
Start: 2020-09-29 | End: 2020-09-29

## 2020-09-29 RX ORDER — EPHEDRINE SULFATE 50 MG/ML
INJECTION, SOLUTION INTRAVENOUS AS NEEDED
Status: DISCONTINUED | OUTPATIENT
Start: 2020-09-29 | End: 2020-09-29 | Stop reason: SURG

## 2020-09-29 RX ORDER — MIDAZOLAM HYDROCHLORIDE 1 MG/ML
1 INJECTION INTRAMUSCULAR; INTRAVENOUS
Status: DISCONTINUED | OUTPATIENT
Start: 2020-09-29 | End: 2020-09-29 | Stop reason: HOSPADM

## 2020-09-29 RX ORDER — SODIUM CHLORIDE 0.9 % (FLUSH) 0.9 %
3 SYRINGE (ML) INJECTION EVERY 12 HOURS SCHEDULED
Status: DISCONTINUED | OUTPATIENT
Start: 2020-09-29 | End: 2020-09-29 | Stop reason: HOSPADM

## 2020-09-29 RX ORDER — OXYCODONE AND ACETAMINOPHEN 7.5; 325 MG/1; MG/1
1 TABLET ORAL ONCE AS NEEDED
Status: DISCONTINUED | OUTPATIENT
Start: 2020-09-29 | End: 2020-09-29 | Stop reason: HOSPADM

## 2020-09-29 RX ORDER — FENTANYL CITRATE 50 UG/ML
50 INJECTION, SOLUTION INTRAMUSCULAR; INTRAVENOUS
Status: DISCONTINUED | OUTPATIENT
Start: 2020-09-29 | End: 2020-09-29 | Stop reason: HOSPADM

## 2020-09-29 RX ADMIN — HYDROCODONE BITARTRATE AND ACETAMINOPHEN 1 TABLET: 5; 325 TABLET ORAL at 04:24

## 2020-09-29 RX ADMIN — PROPOFOL 10 MG: 10 INJECTION, EMULSION INTRAVENOUS at 15:20

## 2020-09-29 RX ADMIN — PROPOFOL 110 MG: 10 INJECTION, EMULSION INTRAVENOUS at 14:29

## 2020-09-29 RX ADMIN — PHENYLEPHRINE HYDROCHLORIDE 100 MCG: 10 INJECTION INTRAVENOUS at 15:15

## 2020-09-29 RX ADMIN — MORPHINE SULFATE 2 MG: 2 INJECTION, SOLUTION INTRAMUSCULAR; INTRAVENOUS at 06:10

## 2020-09-29 RX ADMIN — SODIUM CHLORIDE, POTASSIUM CHLORIDE, SODIUM LACTATE AND CALCIUM CHLORIDE 9 ML/HR: 600; 310; 30; 20 INJECTION, SOLUTION INTRAVENOUS at 14:15

## 2020-09-29 RX ADMIN — EPHEDRINE SULFATE 10 MG: 50 INJECTION INTRAVENOUS at 15:13

## 2020-09-29 RX ADMIN — EPHEDRINE SULFATE 5 MG: 50 INJECTION INTRAVENOUS at 15:11

## 2020-09-29 RX ADMIN — FENTANYL CITRATE 25 MCG: 50 INJECTION INTRAMUSCULAR; INTRAVENOUS at 15:20

## 2020-09-29 RX ADMIN — SODIUM CHLORIDE, POTASSIUM CHLORIDE, SODIUM LACTATE AND CALCIUM CHLORIDE: 600; 310; 30; 20 INJECTION, SOLUTION INTRAVENOUS at 14:24

## 2020-09-29 RX ADMIN — GLYCOPYRROLATE 0.2 MG: 0.2 INJECTION INTRAMUSCULAR; INTRAVENOUS at 15:33

## 2020-09-29 RX ADMIN — EPHEDRINE SULFATE 5 MG: 50 INJECTION INTRAVENOUS at 15:12

## 2020-09-29 RX ADMIN — ROCURONIUM BROMIDE 5 MG: 10 INJECTION INTRAVENOUS at 14:42

## 2020-09-29 RX ADMIN — SODIUM CHLORIDE, PRESERVATIVE FREE 10 ML: 5 INJECTION INTRAVENOUS at 08:36

## 2020-09-29 RX ADMIN — FENTANYL CITRATE 25 MCG: 50 INJECTION INTRAMUSCULAR; INTRAVENOUS at 14:53

## 2020-09-29 RX ADMIN — NEOSTIGMINE METHYLSULFATE 3 MG: 1 INJECTION INTRAMUSCULAR; INTRAVENOUS; SUBCUTANEOUS at 15:30

## 2020-09-29 RX ADMIN — NEOSTIGMINE METHYLSULFATE 1 MG: 1 INJECTION INTRAMUSCULAR; INTRAVENOUS; SUBCUTANEOUS at 15:33

## 2020-09-29 RX ADMIN — CEFAZOLIN SODIUM 2 G: 2 INJECTION, SOLUTION INTRAVENOUS at 14:36

## 2020-09-29 RX ADMIN — FENTANYL CITRATE 25 MCG: 50 INJECTION INTRAMUSCULAR; INTRAVENOUS at 14:41

## 2020-09-29 RX ADMIN — ROCURONIUM BROMIDE 30 MG: 10 INJECTION INTRAVENOUS at 14:29

## 2020-09-29 RX ADMIN — PROPOFOL 20 MG: 10 INJECTION, EMULSION INTRAVENOUS at 14:53

## 2020-09-29 RX ADMIN — FENTANYL CITRATE 25 MCG: 50 INJECTION INTRAMUSCULAR; INTRAVENOUS at 14:26

## 2020-09-29 RX ADMIN — SODIUM CHLORIDE, PRESERVATIVE FREE 10 ML: 5 INJECTION INTRAVENOUS at 21:00

## 2020-09-29 RX ADMIN — EPHEDRINE SULFATE 10 MG: 50 INJECTION INTRAVENOUS at 14:50

## 2020-09-29 RX ADMIN — PANTOPRAZOLE SODIUM 40 MG: 40 TABLET, DELAYED RELEASE ORAL at 08:35

## 2020-09-29 RX ADMIN — EPHEDRINE SULFATE 5 MG: 50 INJECTION INTRAVENOUS at 15:15

## 2020-09-29 RX ADMIN — GLYCOPYRROLATE 0.1 MG: 0.2 INJECTION INTRAMUSCULAR; INTRAVENOUS at 14:46

## 2020-09-29 RX ADMIN — PHENYLEPHRINE HYDROCHLORIDE 50 MCG: 10 INJECTION INTRAVENOUS at 15:13

## 2020-09-29 RX ADMIN — LISINOPRIL 10 MG: 10 TABLET ORAL at 08:35

## 2020-09-29 RX ADMIN — PROPOFOL 20 MG: 10 INJECTION, EMULSION INTRAVENOUS at 15:23

## 2020-09-29 RX ADMIN — GLYCOPYRROLATE 0.4 MG: 0.2 INJECTION INTRAMUSCULAR; INTRAVENOUS at 15:30

## 2020-09-29 RX ADMIN — ONDANSETRON HYDROCHLORIDE 4 MG: 2 SOLUTION INTRAMUSCULAR; INTRAVENOUS at 15:28

## 2020-09-29 RX ADMIN — FENTANYL CITRATE 50 MCG: 50 INJECTION INTRAMUSCULAR; INTRAVENOUS at 16:15

## 2020-09-29 RX ADMIN — DEXAMETHASONE SODIUM PHOSPHATE 10 MG: 10 INJECTION INTRAMUSCULAR; INTRAVENOUS at 14:46

## 2020-09-29 RX ADMIN — FENTANYL CITRATE 50 MCG: 50 INJECTION INTRAMUSCULAR; INTRAVENOUS at 16:05

## 2020-09-29 RX ADMIN — FAMOTIDINE 20 MG: 10 INJECTION, SOLUTION INTRAVENOUS at 14:15

## 2020-09-29 RX ADMIN — ATENOLOL 25 MG: 25 TABLET ORAL at 22:26

## 2020-09-29 NOTE — ANESTHESIA PREPROCEDURE EVALUATION
Anesthesia Evaluation     Patient summary reviewed and Nursing notes reviewed                Airway   Mallampati: II  Dental      Pulmonary    (+) a smoker Current,   Cardiovascular     ECG reviewed  Patient on routine beta blocker  Rhythm: regular  Rate: abnormal    (+) pacemaker pacemaker, hypertension, valvular problems/murmurs MR, CAD, dysrhythmias Bradycardia, hyperlipidemia,       Neuro/Psych  (+) numbness, psychiatric history Anxiety and Depression,     GI/Hepatic/Renal/Endo    (+)  GERD,  hepatitis, liver disease fatty liver disease,     Musculoskeletal     Abdominal    Substance History - negative use     OB/GYN negative ob/gyn ROS         Other   arthritis,    history of cancer                    Anesthesia Plan    ASA 3     general   (Patient denies ever having a pacemaker placement despite his current electronic medical record under Surgical History)  intravenous induction     Anesthetic plan, all risks, benefits, and alternatives have been provided, discussed and informed consent has been obtained with: patient.

## 2020-09-29 NOTE — ANESTHESIA PROCEDURE NOTES
Airway  Urgency: elective    Date/Time: 9/29/2020 2:34 PM    General Information and Staff    Patient location during procedure: OR  Anesthesiologist: Diana Ayers MD  CRNA: Juli Diaz CRNA    Indications and Patient Condition  Indications for airway management: airway protection    Preoxygenated: yes  Mask difficulty assessment: 1 - vent by mask    Final Airway Details  Final airway type: endotracheal airway      Successful airway: ETT  Cuffed: yes   Successful intubation technique: direct laryngoscopy  Facilitating devices/methods: Bougie  Endotracheal tube insertion site: oral  Blade: Osito  Blade size: 4  ETT size (mm): 7.0  Cormack-Lehane Classification: grade IIa - partial view of glottis  Placement verified by: chest auscultation and capnometry   Measured from: lips  ETT/EBT  to lips (cm): 22  Number of attempts at approach: 1  Assessment: lips, teeth, and gum same as pre-op and atraumatic intubation

## 2020-09-29 NOTE — ANESTHESIA POSTPROCEDURE EVALUATION
Patient: Vicente Delgado    Procedure Summary     Date: 09/29/20 Room / Location: [facility] OR [address] [place] [address] MAIN OR    Anesthesia Start: 1424 Anesthesia Stop: 1548    Procedure: Laparoscopic cholecystectomy (N/A Abdomen) Diagnosis:       Calculus of gallbladder without cholecystitis without obstruction      (Calculus of gallbladder without cholecystitis without obstruction [K80.20])    Surgeon: Javi Peralta MD Provider: Diana Ayers MD    Anesthesia Type: general ASA Status: 3          Anesthesia Type: general    Vitals  Vitals Value Taken Time   /74 09/29/20 1605   Temp 36.4 °C (97.5 °F) 09/29/20 1545   Pulse 67 09/29/20 1611   Resp 16 09/29/20 1600   SpO2 98 % 09/29/20 1611   Vitals shown include unvalidated device data.        Post Anesthesia Care and Evaluation    Patient location during evaluation: bedside  Patient participation: complete - patient participated  Level of consciousness: awake  Pain management: adequate  Airway patency: patent  Anesthetic complications: No anesthetic complications    Cardiovascular status: acceptable  Respiratory status: acceptable  Hydration status: acceptable

## 2020-09-30 LAB
ALBUMIN SERPL-MCNC: 3.9 G/DL (ref 3.5–5.2)
ALBUMIN/GLOB SERPL: 1.3 G/DL
ALP SERPL-CCNC: 47 U/L (ref 39–117)
ALT SERPL W P-5'-P-CCNC: 30 U/L (ref 1–41)
ANION GAP SERPL CALCULATED.3IONS-SCNC: 12.4 MMOL/L (ref 5–15)
AST SERPL-CCNC: 47 U/L (ref 1–40)
BASOPHILS # BLD AUTO: 0.01 10*3/MM3 (ref 0–0.2)
BASOPHILS NFR BLD AUTO: 0.1 % (ref 0–1.5)
BILIRUB SERPL-MCNC: 0.7 MG/DL (ref 0–1.2)
BUN SERPL-MCNC: 17 MG/DL (ref 8–23)
BUN/CREAT SERPL: 19.8 (ref 7–25)
CALCIUM SPEC-SCNC: 9.6 MG/DL (ref 8.2–9.6)
CHLORIDE SERPL-SCNC: 99 MMOL/L (ref 98–107)
CO2 SERPL-SCNC: 23.6 MMOL/L (ref 22–29)
CREAT SERPL-MCNC: 0.86 MG/DL (ref 0.76–1.27)
DEPRECATED RDW RBC AUTO: 41.8 FL (ref 37–54)
EOSINOPHIL # BLD AUTO: 0 10*3/MM3 (ref 0–0.4)
EOSINOPHIL NFR BLD AUTO: 0 % (ref 0.3–6.2)
ERYTHROCYTE [DISTWIDTH] IN BLOOD BY AUTOMATED COUNT: 12.4 % (ref 12.3–15.4)
GFR SERPL CREATININE-BSD FRML MDRD: 83 ML/MIN/1.73
GLOBULIN UR ELPH-MCNC: 3.1 GM/DL
GLUCOSE SERPL-MCNC: 107 MG/DL (ref 65–99)
HCT VFR BLD AUTO: 41.7 % (ref 37.5–51)
HGB BLD-MCNC: 14.2 G/DL (ref 13–17.7)
LYMPHOCYTES # BLD AUTO: 0.88 10*3/MM3 (ref 0.7–3.1)
LYMPHOCYTES NFR BLD AUTO: 9.3 % (ref 19.6–45.3)
MCH RBC QN AUTO: 31.3 PG (ref 26.6–33)
MCHC RBC AUTO-ENTMCNC: 34.1 G/DL (ref 31.5–35.7)
MCV RBC AUTO: 91.9 FL (ref 79–97)
MONOCYTES # BLD AUTO: 1.92 10*3/MM3 (ref 0.1–0.9)
MONOCYTES NFR BLD AUTO: 20.3 % (ref 5–12)
NEUTROPHILS NFR BLD AUTO: 6.62 10*3/MM3 (ref 1.7–7)
NEUTROPHILS NFR BLD AUTO: 69.9 % (ref 42.7–76)
PLAT MORPH BLD: NORMAL
PLATELET # BLD AUTO: 213 10*3/MM3 (ref 140–450)
PMV BLD AUTO: 10.3 FL (ref 6–12)
POTASSIUM SERPL-SCNC: 4.6 MMOL/L (ref 3.5–5.2)
PROT SERPL-MCNC: 7 G/DL (ref 6–8.5)
RBC # BLD AUTO: 4.54 10*6/MM3 (ref 4.14–5.8)
RBC MORPH BLD: NORMAL
SODIUM SERPL-SCNC: 135 MMOL/L (ref 136–145)
WBC # BLD AUTO: 9.47 10*3/MM3 (ref 3.4–10.8)
WBC MORPH BLD: NORMAL

## 2020-09-30 PROCEDURE — G0378 HOSPITAL OBSERVATION PER HR: HCPCS

## 2020-09-30 PROCEDURE — 99213 OFFICE O/P EST LOW 20 MIN: CPT | Performed by: NURSE PRACTITIONER

## 2020-09-30 PROCEDURE — 85025 COMPLETE CBC W/AUTO DIFF WBC: CPT | Performed by: SURGERY

## 2020-09-30 PROCEDURE — 99024 POSTOP FOLLOW-UP VISIT: CPT | Performed by: SURGERY

## 2020-09-30 PROCEDURE — 85007 BL SMEAR W/DIFF WBC COUNT: CPT | Performed by: SURGERY

## 2020-09-30 PROCEDURE — 80053 COMPREHEN METABOLIC PANEL: CPT | Performed by: SURGERY

## 2020-09-30 RX ORDER — POLYETHYLENE GLYCOL 3350 17 G/17G
17 POWDER, FOR SOLUTION ORAL DAILY
Status: DISCONTINUED | OUTPATIENT
Start: 2020-09-30 | End: 2020-10-01 | Stop reason: HOSPADM

## 2020-09-30 RX ADMIN — HYDROCODONE BITARTRATE AND ACETAMINOPHEN 1 TABLET: 5; 325 TABLET ORAL at 20:43

## 2020-09-30 RX ADMIN — SODIUM CHLORIDE, PRESERVATIVE FREE 10 ML: 5 INJECTION INTRAVENOUS at 11:11

## 2020-09-30 RX ADMIN — ASPIRIN 81 MG: 81 TABLET, CHEWABLE ORAL at 11:09

## 2020-09-30 RX ADMIN — ATENOLOL 25 MG: 25 TABLET ORAL at 20:43

## 2020-09-30 RX ADMIN — PANTOPRAZOLE SODIUM 40 MG: 40 TABLET, DELAYED RELEASE ORAL at 11:10

## 2020-09-30 RX ADMIN — ATENOLOL 25 MG: 25 TABLET ORAL at 11:10

## 2020-09-30 RX ADMIN — HYDROCODONE BITARTRATE AND ACETAMINOPHEN 1 TABLET: 5; 325 TABLET ORAL at 12:11

## 2020-09-30 RX ADMIN — POLYETHYLENE GLYCOL 3350 17 G: 17 POWDER, FOR SOLUTION ORAL at 11:10

## 2020-09-30 RX ADMIN — SODIUM CHLORIDE, PRESERVATIVE FREE 10 ML: 5 INJECTION INTRAVENOUS at 20:44

## 2020-10-01 ENCOUNTER — READMISSION MANAGEMENT (OUTPATIENT)
Dept: CALL CENTER | Facility: HOSPITAL | Age: 85
End: 2020-10-01

## 2020-10-01 VITALS
HEART RATE: 57 BPM | HEIGHT: 67 IN | RESPIRATION RATE: 16 BRPM | DIASTOLIC BLOOD PRESSURE: 59 MMHG | WEIGHT: 220.68 LBS | BODY MASS INDEX: 34.64 KG/M2 | SYSTOLIC BLOOD PRESSURE: 124 MMHG | TEMPERATURE: 97.7 F | OXYGEN SATURATION: 97 %

## 2020-10-01 LAB
ANION GAP SERPL CALCULATED.3IONS-SCNC: 9.5 MMOL/L (ref 5–15)
BUN SERPL-MCNC: 20 MG/DL (ref 8–23)
BUN/CREAT SERPL: 24.7 (ref 7–25)
CALCIUM SPEC-SCNC: 8.9 MG/DL (ref 8.2–9.6)
CHLORIDE SERPL-SCNC: 104 MMOL/L (ref 98–107)
CO2 SERPL-SCNC: 23.5 MMOL/L (ref 22–29)
CREAT SERPL-MCNC: 0.81 MG/DL (ref 0.76–1.27)
DEPRECATED RDW RBC AUTO: 40.6 FL (ref 37–54)
ERYTHROCYTE [DISTWIDTH] IN BLOOD BY AUTOMATED COUNT: 12.5 % (ref 12.3–15.4)
GFR SERPL CREATININE-BSD FRML MDRD: 89 ML/MIN/1.73
GLUCOSE SERPL-MCNC: 144 MG/DL (ref 65–99)
HCT VFR BLD AUTO: 37.5 % (ref 37.5–51)
HGB BLD-MCNC: 12.8 G/DL (ref 13–17.7)
MCH RBC QN AUTO: 30.8 PG (ref 26.6–33)
MCHC RBC AUTO-ENTMCNC: 34.1 G/DL (ref 31.5–35.7)
MCV RBC AUTO: 90.1 FL (ref 79–97)
PLATELET # BLD AUTO: 175 10*3/MM3 (ref 140–450)
PMV BLD AUTO: 10 FL (ref 6–12)
POTASSIUM SERPL-SCNC: 4.2 MMOL/L (ref 3.5–5.2)
RBC # BLD AUTO: 4.16 10*6/MM3 (ref 4.14–5.8)
SODIUM SERPL-SCNC: 137 MMOL/L (ref 136–145)
WBC # BLD AUTO: 8.03 10*3/MM3 (ref 3.4–10.8)

## 2020-10-01 PROCEDURE — 85027 COMPLETE CBC AUTOMATED: CPT | Performed by: NURSE PRACTITIONER

## 2020-10-01 PROCEDURE — 99024 POSTOP FOLLOW-UP VISIT: CPT | Performed by: SURGERY

## 2020-10-01 PROCEDURE — 80048 BASIC METABOLIC PNL TOTAL CA: CPT | Performed by: NURSE PRACTITIONER

## 2020-10-01 PROCEDURE — G0378 HOSPITAL OBSERVATION PER HR: HCPCS

## 2020-10-01 RX ORDER — MAGNESIUM CARB/ALUMINUM HYDROX 105-160MG
296 TABLET,CHEWABLE ORAL ONCE
Status: COMPLETED | OUTPATIENT
Start: 2020-10-01 | End: 2020-10-01

## 2020-10-01 RX ORDER — POLYETHYLENE GLYCOL 3350 17 G/17G
17 POWDER, FOR SOLUTION ORAL DAILY
Qty: 510 G | Refills: 1 | Status: SHIPPED | OUTPATIENT
Start: 2020-10-02 | End: 2021-01-05

## 2020-10-01 RX ORDER — BISACODYL 10 MG
10 SUPPOSITORY, RECTAL RECTAL ONCE
Status: COMPLETED | OUTPATIENT
Start: 2020-10-01 | End: 2020-10-01

## 2020-10-01 RX ORDER — HYDROCODONE BITARTRATE AND ACETAMINOPHEN 5; 325 MG/1; MG/1
1 TABLET ORAL EVERY 6 HOURS PRN
Qty: 15 TABLET | Refills: 0 | Status: SHIPPED | OUTPATIENT
Start: 2020-10-01 | End: 2020-10-05

## 2020-10-01 RX ADMIN — ASPIRIN 81 MG: 81 TABLET, CHEWABLE ORAL at 09:16

## 2020-10-01 RX ADMIN — POLYETHYLENE GLYCOL 3350 17 G: 17 POWDER, FOR SOLUTION ORAL at 09:16

## 2020-10-01 RX ADMIN — HYDROCODONE BITARTRATE AND ACETAMINOPHEN 1 TABLET: 5; 325 TABLET ORAL at 06:23

## 2020-10-01 RX ADMIN — BISACODYL 10 MG: 10 SUPPOSITORY RECTAL at 11:38

## 2020-10-01 RX ADMIN — Medication 296 ML: at 13:31

## 2020-10-01 RX ADMIN — LISINOPRIL 10 MG: 10 TABLET ORAL at 09:16

## 2020-10-01 RX ADMIN — ATENOLOL 25 MG: 25 TABLET ORAL at 09:16

## 2020-10-01 RX ADMIN — PANTOPRAZOLE SODIUM 40 MG: 40 TABLET, DELAYED RELEASE ORAL at 09:16

## 2020-10-01 RX ADMIN — SODIUM CHLORIDE, PRESERVATIVE FREE 10 ML: 5 INJECTION INTRAVENOUS at 09:00

## 2020-10-01 NOTE — DISCHARGE SUMMARY
Patient Name: Vicente Delgado  : 1928  MRN: [id number]    Date of Admission: 2020  Date of Discharge:  10/1/2020  Primary Care Physician: Margoth Louis APRN      Chief Complaint:   Abdominal Pain and Back Pain      Discharge Diagnoses     Active Hospital Problems    Diagnosis  POA   • **Calculus of gallbladder without cholecystitis without obstruction [K80.20]  Yes   • CAD (coronary artery disease) [I25.10]  Yes   • Hyperlipidemia [E78.5]  Yes   • HTN (hypertension) [I10]  Yes   • Nonrheumatic mitral valve regurgitation [I34.0]  Yes      Resolved Hospital Problems   No resolved problems to display.        Hospital Course     Mr. Delgado is a 89 y.o. male with a history of CAD, HTN and HLD who presented to [facility] initially complaining of abdominal pain and back pain.  Please see the admitting H&P for further details.  He was found to have gallstones a few months ago but has never been set up for surgery.  He was admitted to the hospital for further evaluation and treatment.  Surgical consultation was obtained and they agreed with need for cholecystectomy which was done .  Cardiology saw him preoperatively for clearance purposes and followed him during the hospital stay.  He did well with the surgery without any cardiac issues.  He has had some issues with constipation and his discharge was held overnight due to some persistent pain and bowel issues but he has finally had a bowel movement today and feels ready to go home.       Day of Discharge     No complaints, still some abdominal pain    Physical Exam:  Temp:  [96.9 °F (36.1 °C)-97.7 °F (36.5 °C)] 97.7 °F (36.5 °C)  Heart Rate:  [55-59] 57  Resp:  [16] 16  BP: (118-137)/(59-62) 124/59  Body mass index is 34.56 kg/m².  Physical Exam  Vitals signs reviewed.   Constitutional:       General: He is not in acute distress.     Appearance: He is well-developed.   HENT:      Head: Normocephalic and atraumatic.      Mouth/Throat:       Pharynx: No oropharyngeal exudate.   Eyes:      General: No scleral icterus.     Pupils: Pupils are equal, round, and reactive to light.   Neck:      Musculoskeletal: Neck supple.      Vascular: No JVD.   Cardiovascular:      Rate and Rhythm: Normal rate and regular rhythm.      Heart sounds: No murmur.   Pulmonary:      Effort: Pulmonary effort is normal. No respiratory distress.      Breath sounds: Normal breath sounds. No wheezing.   Abdominal:      General: Bowel sounds are normal. There is no distension.      Palpations: Abdomen is soft.      Tenderness: There is abdominal tenderness (Appropriately so).   Lymphadenopathy:      Cervical: No cervical adenopathy.   Skin:     General: Skin is warm and dry.      Findings: No rash.   Neurological:      Mental Status: He is alert and oriented to person, place, and time.         Consultants     Consult Orders (all) (From admission, onward)     Start     Ordered    09/28/20 1715  Inpatient Cardiology Consult  Once     Specialty:  Cardiology  Provider:  Breezy Frausto MD    09/28/20 1715    09/28/20 1353  [facility] (on-call MD unless specified) Details  Once     Specialty:  Hospitalist  Provider:  (Not yet assigned)    09/28/20 1353    09/28/20 1330  Surgery (on-call MD unless specified)  Once     Specialty:  General Surgery  Provider:  (Not yet assigned)    09/28/20 1329              Procedures     Laparoscopic cholecystectomy      Imaging Results (All)     Procedure Component Value Units Date/Time    CT Abdomen Pelvis With Contrast [[id number]] Collected: 09/28/20 1228     Updated: 09/29/20 1322    Narrative:      CT ABDOMEN AND PELVIS WITH CONTRAST     HISTORY: Abdominal pain.     TECHNIQUE: Axial CT images of the abdomen and pelvis were obtained  following administration of intravenous contrast. The patient was not  given oral contrast Coronal and sagittal reformats were obtained.     COMPARISON: 05/22/2020     FINDINGS: The liver demonstrates normal attenuation.  An area of focal  fatty infiltration is seen adjacent to the falciform. Cholelithiasis is  present. No gallbladder wall thickening is seen. The pancreas is normal.  Mild ectasia of the pancreatic duct within the head measuring up to 7 to  8 mm. The spleen is normal in size and attenuation. Bilateral adrenal  glands are normal. Both kidneys are normal in size and attenuation.  Nonobstructing calculi are seen at the upper pole of the left kidney,  the largest measuring up to 6 mm. Exophytic cyst is seen within the left  kidney measuring up to 1.3 cm at the midpole. No hydronephrosis.  Bilateral ureters demonstrate normal caliber. There is an exophytic cyst  at the lower pole of the right kidney measuring up to 2.7 cm. The  urinary bladder is moderately distended. The prostate gland is  surgically absent. No evidence of bowel obstruction. Diverticulosis is  present. The appendix is normal. Mild wall thickening of the stomach may  be secondary to gastritis.     Moderate atherosclerotic calcified plaque is demonstrated within the  abdominal aorta and its branches. Ectasia of the infrarenal abdominal  aorta where it measures up to 2.5 cm. No periaortic hematoma is present.  The celiac axis measures 1.4 cm at its bifurcation. Stable coarsening of  the interstitium is seen within the lung bases. Mild degenerative disc  disease is seen in the spine.       Impression:      1. No acute abnormality within the abdomen and pelvis.  2. Cholelithiasis.  3. Mild infrarenal abdominal aortic ectasia.  4. Colonic diverticulosis.     These findings were discussed with Dr. Springer by telephone.     Radiation dose reduction techniques were utilized, including automated  exposure control and exposure modulation based on body size.     This report was finalized on 9/29/2020 1:19 PM by Dr. Blade Rhodes M.D.           Pertinent Labs     Results from last 7 days   Lab Units 10/01/20  0736 09/30/20  0715 09/29/20  0621 09/28/20  1015   WBC  10*3/mm3 8.03 9.47 4.61 5.10   HEMOGLOBIN g/dL 12.8* 14.2 14.0 13.7   PLATELETS 10*3/mm3 175 213 190 186     Results from last 7 days   Lab Units 10/01/20  0736 09/30/20 0715 09/29/20 0621 09/28/20  1015   SODIUM mmol/L 137 135* 137 136   POTASSIUM mmol/L 4.2 4.6 4.0 4.2   CHLORIDE mmol/L 104 99 101 102   CO2 mmol/L 23.5 23.6 26.3 25.7   BUN mg/dL 20 17 20 20   CREATININE mg/dL 0.81 0.86 0.85 0.86   GLUCOSE mg/dL 144* 107* 78 94   Estimated Creatinine Clearance: 65.6 mL/min (by C-G formula based on SCr of 0.81 mg/dL).  Results from last 7 days   Lab Units 09/30/20 0715 09/28/20  1015   ALBUMIN g/dL 3.90 4.40   BILIRUBIN mg/dL 0.7 0.5   ALK PHOS U/L 47 45   AST (SGOT) U/L 47* 23   ALT (SGPT) U/L 30 16     Results from last 7 days   Lab Units 10/01/20  0736 09/30/20  0715 09/29/20  0621 09/28/20  1015   CALCIUM mg/dL 8.9 9.6 9.4 9.1   ALBUMIN g/dL  --  3.90  --  4.40   MAGNESIUM mg/dL  --   --  2.2  --      Results from last 7 days   Lab Units 09/28/20  1015   LIPASE U/L 26     Results from last 7 days   Lab Units 09/28/20  1015   TROPONIN T ng/mL <0.010           Invalid input(s): LDLCALC      Results from last 7 days   Lab Units 09/28/20  1512   COVID19  Not Detected       Test Results Pending at Discharge     Pending Labs     Order Current Status    Tissue Pathology Exam In process          Discharge Details        Discharge Medications      New Medications      Instructions Start Date   HYDROcodone-acetaminophen 5-325 MG per tablet  Commonly known as: NORCO   1 tablet, Oral, Every 6 Hours PRN      polyethylene glycol 17 g packet  Commonly known as: MIRALAX   17 g, Oral, Daily   Start Date: October 2, 2020        Continue These Medications      Instructions Start Date   aspirin 81 MG chewable tablet   81 mg, Oral, Daily      atenolol 25 MG tablet  Commonly known as: TENORMIN   25 mg, Oral, 2 times daily      azelastine 0.1 % nasal spray  Commonly known as: ASTELIN   2 sprays, Nasal, 2 Times Daily PRN, Use in  each nostril as directed      benazepril 10 MG tablet  Commonly known as: LOTENSIN   10 mg, Oral, 2 times daily      pantoprazole 40 MG EC tablet  Commonly known as: PROTONIX   40 mg, Oral, Daily      rosuvastatin 10 MG tablet  Commonly known as: CRESTOR   10 mg, Oral, Nightly             Allergies   Allergen Reactions   • Lidocaine Dizziness     Reaction to novacaine   • Lovastatin Other (See Comments)     Liver enzymes elevated   • Pravastatin Other (See Comments)     Elevated liver enzymes    • Gabapentin GI Intolerance     Bloating, incontinence    • Procaine    • Simvastatin          Discharge Disposition:  Home or Self Care    Discharge Diet:  Diet Order   Procedures   • Diet Regular       Discharge Activity:       CODE STATUS:    Code Status and Medical Interventions:   Ordered at: 09/28/20 1715     Level Of Support Discussed With:    Patient     Code Status:    CPR     Medical Interventions (Level of Support Prior to Arrest):    Full       Future Appointments   Date Time Provider Department Center   3/10/2021 10:00 AM Margoth Louis APRN [facility] None   7/28/2021  9:45 AM Breezy Frausto MD [facility] None     Follow-up Information     Javi Peralta MD. Schedule an appointment as soon as possible for a visit in 2 week(s).    Specialty: General Surgery  Contact information:  [address] [address]  [phone]             Margoth Louis APRN .    Specialty: Family Medicine  Contact information:  [address] [facility] [address]  [phone]                   Time Spent on Discharge:  Greater than 30 minutes      Adelso Jones MD  [facility]  10/01/20  16:25 EDT

## 2020-10-01 NOTE — PROGRESS NOTES
Postop day 2 cholecystectomy    Doing well, tolerating a diet with normal postop pain.  Okay for discharge from my standpoint.  Follow-up in 2 weeks.  Prescription for Norco has been put in.    Javi Peralta MD  General and Endoscopic Surgery  [facility]    [address], [address]  [place], [place], [address]  P: [phone]  F: [phone]

## 2020-10-01 NOTE — PLAN OF CARE
Problem: Adult Inpatient Plan of Care  Goal: Plan of Care Review  Outcome: Ongoing, Progressing  Flowsheets (Taken 10/1/2020 0531)  Progress: improving  Plan of Care Reviewed With: patient  Outcome Summary: Patient slept between care. Norco given for pain. Patient is encouraged to use his incentive spirometer. Lap sites C/D/I. Patient is alert, oriented and up ad sallie. Will continue to monitor vital signs, labs and comfort.   Goal Outcome Evaluation:  Plan of Care Reviewed With: patient  Progress: improving  Outcome Summary: Patient slept between care. Norco given for pain. Patient is encouraged to use his incentive spirometer. Lap sites C/D/I. Patient is alert, oriented and up ad sallie. Will continue to monitor vital signs, labs and comfort.

## 2020-10-02 ENCOUNTER — TRANSITIONAL CARE MANAGEMENT TELEPHONE ENCOUNTER (OUTPATIENT)
Dept: CALL CENTER | Facility: HOSPITAL | Age: 85
End: 2020-10-02

## 2020-10-02 LAB
CYTO UR: NORMAL
LAB AP CASE REPORT: NORMAL
PATH REPORT.FINAL DX SPEC: NORMAL
PATH REPORT.GROSS SPEC: NORMAL

## 2020-10-02 NOTE — OUTREACH NOTE
Prep Survey      Responses   [facility] patient discharged from?  [place]   Is LACE score < 7 ?  Yes   Eligibility  [facility]   Date of Admission  09/28/20   Date of Discharge  10/01/20   Discharge Disposition  Home or Self Care   Discharge diagnosis  Lap ruslan   Does the patient have one of the following disease processes/diagnoses(primary or secondary)?  General Surgery   Does the patient have Home health ordered?  No   Is there a DME ordered?  No   Prep survey completed?  Yes          Azalea Conway RN

## 2020-10-02 NOTE — OUTREACH NOTE
Call Center TCM Note      Responses   [facility] patient discharged from?  [place]   Does the patient have one of the following disease processes/diagnoses(primary or secondary)?  General Surgery   TCM attempt successful?  No   Unsuccessful attempts  Attempt 1          Rodolfo Lin RN    10/2/2020, 11:49 EDT

## 2020-10-02 NOTE — OUTREACH NOTE
Call Center TCM Note      Responses   [facility] patient discharged from?  [place]   Does the patient have one of the following disease processes/diagnoses(primary or secondary)?  General Surgery   TCM attempt successful?  No   Unsuccessful attempts  Attempt 2          Rodolfo Lin RN    10/2/2020, 12:18 EDT

## 2020-10-02 NOTE — PROGRESS NOTES
Case Management Discharge Note      Final Note: Discharged to home and his daughter provided the transportation at discharge on 10/1/2020. AMARILIS Velásquez RN [facility]    Provided Post Acute Provider List?: Yes  Post Acute Provider List: Home Health, Nursing Home  Provided Post Acute Provider Quality & Resource List?: N/A  N/A Quality & Resource List Comment: . The patient’s daughter was provided with a HH/SNF list and not a print out of the HH/nursing home compare list from Medicare.gov as they are currently unsure of any d/c needs for the patient.  Delivered To: Support Person  Support Person: anh Thornton [phone]  Method of Delivery: In person    Selected Continued Care - Discharged on 10/1/2020 Admission date: 9/28/2020 - Discharge disposition: Home or Self Care    Destination    No services have been selected for the patient.              Durable Medical Equipment    No services have been selected for the patient.              Dialysis/Infusion    No services have been selected for the patient.              Home Medical Care    No services have been selected for the patient.              Therapy    No services have been selected for the patient.              Community Resources    No services have been selected for the patient.                  Transportation Services  Private: Car    Final Discharge Disposition Code: 01 - home or self-care

## 2020-10-03 ENCOUNTER — TRANSITIONAL CARE MANAGEMENT TELEPHONE ENCOUNTER (OUTPATIENT)
Dept: CALL CENTER | Facility: HOSPITAL | Age: 85
End: 2020-10-03

## 2020-10-03 NOTE — OUTREACH NOTE
Call Center TCM Note      Responses   [facility] patient discharged from?  [facility]   Does the patient have one of the following disease processes/diagnoses(primary or secondary)?  General Surgery   TCM attempt successful?  Yes   Call start time  1337   Call end time  1351   Discharge diagnosis  Lap ruslan   Is patient permission given to speak with other caregiver?  No   Meds reviewed with patient/caregiver?  Yes   Is the patient having any side effects they believe may be caused by any medication additions or changes?  No   Does the patient have all medications related to this admission filled (includes all antibiotics, pain medications, etc.)  Yes   Is the patient taking all medications as directed (includes completed medication regime)?  Yes   Does the patient have a follow up appointment scheduled with their surgeon?  Yes   Has the patient kept scheduled appointments due by today?  N/A   Comments  patient declines PCP hospital follow up appt.    Has home health visited the patient within 72 hours of discharge?  N/A   Psychosocial issues?  No   Psychosocial comments  Patient reports that his daughter is a med/surg RN and will be following his recovery.    Did the patient receive a copy of their discharge instructions?  Yes   Nursing interventions  Reviewed instructions with patient   What is the patient's perception of their health status since discharge?  Improving   Nursing interventions  Nurse provided patient education   Is the patient /caregiver able to teach back basic post-op care?  Keep incision areas clean,dry and protected, Lifting as instructed by MD in discharge instructions, No tub bath, swimming, or hot tub until instructed by MD   Is the patient/caregiver able to teach back signs and symptoms of incisional infection?  Increased redness, swelling or pain at the incisonal site, Increased drainage or bleeding, Incisional warmth, Pus or odor from incision, Fever   Is the patient/caregiver able  to teach back steps to recovery at home?  Rest and rebuild strength, gradually increase activity, Set small, achievable goals for return to baseline health   Is the patient/caregiver able to teach back the hierarchy of who to call/visit for symptoms/problems? PCP, Specialist, Home health nurse, Urgent Care, ED, 911  Yes   TCM call completed?  Yes          Vandana Artis RN    10/3/2020, 13:51 EDT

## 2020-10-09 ENCOUNTER — OFFICE VISIT (OUTPATIENT)
Dept: INTERNAL MEDICINE | Facility: CLINIC | Age: 85
End: 2020-10-09

## 2020-10-09 VITALS
DIASTOLIC BLOOD PRESSURE: 70 MMHG | TEMPERATURE: 97.5 F | BODY MASS INDEX: 26.84 KG/M2 | SYSTOLIC BLOOD PRESSURE: 126 MMHG | HEIGHT: 67 IN | WEIGHT: 171 LBS | HEART RATE: 56 BPM | OXYGEN SATURATION: 99 %

## 2020-10-09 DIAGNOSIS — I10 ESSENTIAL HYPERTENSION: ICD-10-CM

## 2020-10-09 DIAGNOSIS — Z90.49 S/P LAPAROSCOPIC CHOLECYSTECTOMY: Primary | ICD-10-CM

## 2020-10-09 PROCEDURE — 99496 TRANSJ CARE MGMT HIGH F2F 7D: CPT | Performed by: NURSE PRACTITIONER

## 2020-10-09 NOTE — PROGRESS NOTES
Transitional Care Follow Up Visit  Subjective     Vicente Delgado is a 89 y.o. male who presents for a transitional care management visit.    Within 48 business hours after discharge our office contacted him via telephone to coordinate his care and needs.      I reviewed and discussed the details of that call along with the discharge summary, hospital problems, inpatient lab results, inpatient diagnostic studies, and consultation reports with Vicente.     Current outpatient and discharge medications have been reconciled for the patient.  Reviewed by: GE Remy      Date of TCM Phone Call 10/1/2020   [facility]   Date of Admission 9/28/2020   Date of Discharge 10/1/2020   Discharge Disposition Home or Self Care     Risk for Readmission (LACE) Score: 4 (10/1/2020  6:00 AM)      Abdominal Pain  This is a new problem. The current episode started more than 1 month ago. The problem has been resolved. The pain is at a severity of 0/10. The patient is experiencing no pain. The quality of the pain is aching. Associated symptoms include nausea. Pertinent negatives include no constipation, diarrhea, fever, headaches, melena or vomiting. He has tried nothing for the symptoms.      Course During Hospital Stay:  He was admitted to hospital 9/28/2020 due to abdominal and back pain.His previous CT abd/pelvis reveals gallstones but no acute findings.  It was decided to proceed with lap ruslan which was peformed on 9/292/2020 after cardiac clearance. He has done well with some mild constipation prior to discharge. He has been very active at home walking and bowels have been normal. No complaints of shortness of breath or chest pain.      The following portions of the patient's history were reviewed and updated as appropriate: allergies, current medications, past family history, past medical history, past social history, past surgical history and problem list.    Review of Systems   Constitutional: Negative for  chills, fatigue and fever.   Cardiovascular: Negative for palpitations and leg swelling.   Gastrointestinal: Positive for abdominal pain and nausea. Negative for constipation, diarrhea, melena and vomiting.   Musculoskeletal: Negative for back pain (resolved ).   Neurological: Negative for dizziness and headaches.       Objective   Physical Exam  Constitutional:       Appearance: He is well-developed.   HENT:      Head: Normocephalic.      Nose: Nose normal.   Cardiovascular:      Rate and Rhythm: Regular rhythm.      Heart sounds: Normal heart sounds. No murmur. No S3 or S4 sounds.       Comments: Repeat bp left arm 132/78  No pedal edema   Pulmonary:      Effort: Pulmonary effort is normal.      Breath sounds: Normal breath sounds. No decreased breath sounds, wheezing, rhonchi or rales.   Abdominal:      General: A surgical scar is present.      Tenderness: There is abdominal tenderness (mild with palpation around surgical sites ).      Comments: Lap x 4 with steri strips with bruising noted    Skin:     General: Skin is warm and dry.   Neurological:      Mental Status: He is alert and oriented to person, place, and time.      Gait: Gait normal.   Psychiatric:         Mood and Affect: Mood and affect normal.         Speech: Speech normal.         Behavior: Behavior normal.         Cognition and Memory: Cognition and memory normal.         Judgment: Judgment normal.         Assessment/Plan   Vicente was seen today for abdominal pain.    Diagnoses and all orders for this visit:    S/P laparoscopic cholecystectomy  Comments:  doing well; due to see Dr Peralta next month     Essential hypertension  Comments:  well controlled         Current outpatient and discharge medications have been reconciled for the patient.  Reviewed by: GE Remy   He is accompanied by his daughter.          Answers for HPI/ROS submitted by the patient on 10/8/2020   Back pain  What is the primary reason for your visit?: Back  Pain

## 2020-10-14 ENCOUNTER — OFFICE VISIT (OUTPATIENT)
Dept: SURGERY | Facility: CLINIC | Age: 85
End: 2020-10-14

## 2020-10-14 DIAGNOSIS — K80.20 CALCULUS OF GALLBLADDER WITHOUT CHOLECYSTITIS WITHOUT OBSTRUCTION: Primary | ICD-10-CM

## 2020-10-14 PROCEDURE — 99024 POSTOP FOLLOW-UP VISIT: CPT | Performed by: SURGERY

## 2020-10-14 NOTE — PROGRESS NOTES
Postop cholecystectomy    No complaints, eating well, mild nausea in the morning gradually improving.  Mild right upper quadrant abdominal pain gradually improving.    Objective:  Abdomen is soft benign, incisions are healing nicely    Pathology discussed with patient and demonstrates typical cholecystitis    Assessment and plan:  -Postop cholecystectomy, recovering well  -Gradually increase activity  -Patient expressed concern over the finding of 2.5 cm aortic aneurysm.  I discussed this with the patient.  I told him that at that size no treatment is needed.  I recommended he follow-up with his family doctor about this, but I think that this is unlikely to ever need any management.    Javi Peralta MD  General and Endoscopic Surgery  [facility]    [address], [address]  [place], [place], [address]  P: [phone]  F: [phone]

## 2020-12-04 ENCOUNTER — TELEPHONE (OUTPATIENT)
Dept: INTERNAL MEDICINE | Facility: CLINIC | Age: 85
End: 2020-12-04

## 2020-12-04 DIAGNOSIS — F41.9 ANXIETY DISORDER, UNSPECIFIED TYPE: ICD-10-CM

## 2020-12-04 RX ORDER — LORAZEPAM 1 MG/1
1 TABLET ORAL EVERY 6 HOURS PRN
Qty: 30 TABLET | Refills: 0 | Status: SHIPPED | OUTPATIENT
Start: 2020-12-04 | End: 2021-02-04 | Stop reason: ALTCHOICE

## 2021-01-05 ENCOUNTER — OFFICE VISIT (OUTPATIENT)
Dept: INTERNAL MEDICINE | Facility: CLINIC | Age: 86
End: 2021-01-05

## 2021-01-05 VITALS
WEIGHT: 168 LBS | OXYGEN SATURATION: 98 % | BODY MASS INDEX: 26.37 KG/M2 | HEIGHT: 67 IN | SYSTOLIC BLOOD PRESSURE: 130 MMHG | DIASTOLIC BLOOD PRESSURE: 78 MMHG | HEART RATE: 60 BPM | TEMPERATURE: 97.7 F

## 2021-01-05 DIAGNOSIS — Z90.49 S/P CHOLECYSTECTOMY: ICD-10-CM

## 2021-01-05 DIAGNOSIS — R10.30 LOWER ABDOMINAL PAIN: ICD-10-CM

## 2021-01-05 DIAGNOSIS — K57.92 DIVERTICULITIS: Primary | ICD-10-CM

## 2021-01-05 PROCEDURE — 99214 OFFICE O/P EST MOD 30 MIN: CPT | Performed by: NURSE PRACTITIONER

## 2021-01-05 RX ORDER — EZETIMIBE 10 MG/1
10 TABLET ORAL DAILY
COMMUNITY
End: 2021-02-12 | Stop reason: SDUPTHER

## 2021-01-05 RX ORDER — AMOXICILLIN AND CLAVULANATE POTASSIUM 875; 125 MG/1; MG/1
1 TABLET, FILM COATED ORAL EVERY 12 HOURS SCHEDULED
Qty: 14 TABLET | Refills: 0 | Status: SHIPPED | OUTPATIENT
Start: 2021-01-05 | End: 2021-01-12

## 2021-01-05 NOTE — PROGRESS NOTES
"Chief Complaint  Abdominal Pain    Subjective          Vicente Delgado presents to [facility] due to lower abdominal pain.  He c/o a painful bowel movement last Saturday (straining) with lower abdominal pain. He c/o abdominal pain after eating ham salad Sunday afternoon with difficulty sleeping due to cramping and discomfort. No fever or chills. He has had continued cramping and discomfort since then, denies rectal bleeding.       Objective   Vital Signs:   /78 (BP Location: Left arm, Patient Position: Sitting, Cuff Size: Adult)   Pulse 60   Temp 97.7 °F (36.5 °C) (Oral)   Ht 170.2 cm (67\")   Wt 76.2 kg (168 lb)   SpO2 98%   BMI 26.31 kg/m²     Physical Exam  Constitutional:       Appearance: He is well-developed. He is not ill-appearing.   HENT:      Head: Normocephalic.      Right Ear: Hearing, tympanic membrane and external ear normal.      Left Ear: Hearing, tympanic membrane and external ear normal.      Nose: Nose normal. No nasal deformity, mucosal edema or rhinorrhea.      Right Sinus: No maxillary sinus tenderness or frontal sinus tenderness.      Left Sinus: No maxillary sinus tenderness or frontal sinus tenderness.      Mouth/Throat:      Dentition: Normal dentition.   Eyes:      General: Lids are normal.         Right eye: No discharge.         Left eye: No discharge.      Conjunctiva/sclera: Conjunctivae normal.      Right eye: No exudate.     Left eye: No exudate.  Neck:      Musculoskeletal: Normal range of motion. No edema.      Thyroid: No thyroid mass or thyromegaly.      Vascular: No carotid bruit.      Trachea: Trachea normal.   Cardiovascular:      Rate and Rhythm: Regular rhythm.      Pulses: Normal pulses.      Heart sounds: Normal heart sounds. No murmur.   Pulmonary:      Effort: No respiratory distress.      Breath sounds: Normal breath sounds. No decreased breath sounds, wheezing, rhonchi or rales.   Abdominal:      General: Bowel sounds are " normal.      Palpations: Abdomen is soft.      Tenderness: There is abdominal tenderness in the right lower quadrant and left lower quadrant. There is no guarding or rebound.   Lymphadenopathy:      Head:      Right side of head: No submental, submandibular, tonsillar, preauricular, posterior auricular or occipital adenopathy.      Left side of head: No submental, submandibular, tonsillar, preauricular, posterior auricular or occipital adenopathy.   Skin:     General: Skin is warm and dry.      Nails: There is no clubbing.     Neurological:      Mental Status: He is alert.   Psychiatric:         Behavior: Behavior is cooperative.        Result Review :       Data reviewed: Recent hospitalization notes 9/2020          Assessment and Plan    Problem List Items Addressed This Visit        Gastrointestinal Abdominal     S/P cholecystectomy (Chronic)      Other Visit Diagnoses     Diverticulitis    -  Primary    Lower abdominal pain        Relevant Medications    amoxicillin-clavulanate (AUGMENTIN) 875-125 MG per tablet    Other Relevant Orders    CBC & Differential (Completed)    Comprehensive Metabolic Panel (Completed)    Amylase (Completed)    Lipase (Completed)      1&2. Abdominal pain-he has lower abdominal pain with cramping-will treated for presumed diverticulitis with Augmentin and re-evaluate in 1 week. Check labs today and begin clear liquid diet, advance as tolerated. Consider CT abd/pelvis if sx worsen (advised to call office or report to ER).  3. S/p cholecystectomy-he underwent cholecystectomy 9/2020 and has done, denies upper abdominal pain.        Follow Up   Return in about 1 week (around 1/12/2021).  Patient was given instructions and counseling regarding his condition or for health maintenance advice. Please see specific information pulled into the AVS if appropriate.

## 2021-01-05 NOTE — PATIENT INSTRUCTIONS
Diverticulitis    Diverticulitis is when small pockets in your large intestine (colon) get infected or swollen. This causes stomach pain and watery poop (diarrhea).  These pouches are called diverticula. They form in people who have a condition called diverticulosis.  Follow these instructions at home:  Medicines  · Take over-the-counter and prescription medicines only as told by your doctor. These include:  ? Antibiotics.  ? Pain medicines.  ? Fiber pills.  ? Probiotics.  ? Stool softeners.  · Do not drive or use heavy machinery while taking prescription pain medicine.  · If you were prescribed an antibiotic, take it as told. Do not stop taking it even if you feel better.  General instructions    · Follow a diet as told by your doctor.  · When you feel better, your doctor may tell you to change your diet. You may need to eat a lot of fiber. Fiber makes it easier to poop (have bowel movements). Healthy foods with fiber include:  ? Berries.  ? Beans.  ? Lentils.  ? Green vegetables.  · Exercise 3 or more times a week. Aim for 30 minutes each time. Exercise enough to sweat and make your heart beat faster.  · Keep all follow-up visits as told. This is important. You may need to have an exam of the large intestine. This is called a colonoscopy.  Contact a doctor if:  · Your pain does not get better.  · You have a hard time eating or drinking.  · You are not pooping like normal.  Get help right away if:  · Your pain gets worse.  · Your problems do not get better.  · Your problems get worse very fast.  · You have a fever.  · You throw up (vomit) more than one time.  · You have poop that is:  ? Bloody.  ? Black.  ? Tarry.  Summary  · Diverticulitis is when small pockets in your large intestine (colon) get infected or swollen.  · Take medicines only as told by your doctor.  · Follow a diet as told by your doctor.  This information is not intended to replace advice given to you by your health care provider. Make sure you  discuss any questions you have with your health care provider.  Document Revised: 11/30/2018 Document Reviewed: 01/04/2018  Elsevier Patient Education © 2020 [employer]

## 2021-01-06 LAB
ALBUMIN SERPL-MCNC: 4.1 G/DL (ref 3.5–4.6)
ALBUMIN/GLOB SERPL: 1.5 {RATIO} (ref 1.2–2.2)
ALP SERPL-CCNC: 71 IU/L (ref 39–117)
ALT SERPL-CCNC: 15 IU/L (ref 0–44)
AMYLASE SERPL-CCNC: 50 U/L (ref 31–110)
AST SERPL-CCNC: 23 IU/L (ref 0–40)
BASOPHILS # BLD AUTO: 0 X10E3/UL (ref 0–0.2)
BASOPHILS NFR BLD AUTO: 0 %
BILIRUB SERPL-MCNC: <0.2 MG/DL (ref 0–1.2)
BUN SERPL-MCNC: 18 MG/DL (ref 10–36)
BUN/CREAT SERPL: 19 (ref 10–24)
CALCIUM SERPL-MCNC: 9.1 MG/DL (ref 8.6–10.2)
CHLORIDE SERPL-SCNC: 101 MMOL/L (ref 96–106)
CO2 SERPL-SCNC: 23 MMOL/L (ref 20–29)
CREAT SERPL-MCNC: 0.94 MG/DL (ref 0.76–1.27)
EOSINOPHIL # BLD AUTO: 0 X10E3/UL (ref 0–0.4)
EOSINOPHIL NFR BLD AUTO: 0 %
ERYTHROCYTE [DISTWIDTH] IN BLOOD BY AUTOMATED COUNT: 12.4 % (ref 11.6–15.4)
GLOBULIN SER CALC-MCNC: 2.8 G/DL (ref 1.5–4.5)
GLUCOSE SERPL-MCNC: 75 MG/DL (ref 65–99)
HCT VFR BLD AUTO: 41.3 % (ref 37.5–51)
HGB BLD-MCNC: 13.8 G/DL (ref 13–17.7)
IMM GRANULOCYTES # BLD AUTO: 0 X10E3/UL (ref 0–0.1)
IMM GRANULOCYTES NFR BLD AUTO: 0 %
LIPASE SERPL-CCNC: 19 U/L (ref 13–78)
LYMPHOCYTES # BLD AUTO: 1.2 X10E3/UL (ref 0.7–3.1)
LYMPHOCYTES NFR BLD AUTO: 18 %
MCH RBC QN AUTO: 30.8 PG (ref 26.6–33)
MCHC RBC AUTO-ENTMCNC: 33.4 G/DL (ref 31.5–35.7)
MCV RBC AUTO: 92 FL (ref 79–97)
MONOCYTES # BLD AUTO: 1.2 X10E3/UL (ref 0.1–0.9)
MONOCYTES NFR BLD AUTO: 17 %
NEUTROPHILS # BLD AUTO: 4.5 X10E3/UL (ref 1.4–7)
NEUTROPHILS NFR BLD AUTO: 65 %
PLATELET # BLD AUTO: 194 X10E3/UL (ref 150–450)
POTASSIUM SERPL-SCNC: 4.7 MMOL/L (ref 3.5–5.2)
PROT SERPL-MCNC: 6.9 G/DL (ref 6–8.5)
RBC # BLD AUTO: 4.48 X10E6/UL (ref 4.14–5.8)
SODIUM SERPL-SCNC: 139 MMOL/L (ref 134–144)
WBC # BLD AUTO: 6.9 X10E3/UL (ref 3.4–10.8)

## 2021-01-09 PROBLEM — K80.20 CALCULUS OF GALLBLADDER WITHOUT CHOLECYSTITIS WITHOUT OBSTRUCTION: Chronic | Status: ACTIVE | Noted: 2020-09-28

## 2021-01-09 PROBLEM — Z90.49 S/P CHOLECYSTECTOMY: Chronic | Status: ACTIVE | Noted: 2021-01-09

## 2021-01-09 PROBLEM — Z90.49 S/P CHOLECYSTECTOMY: Status: ACTIVE | Noted: 2021-01-09

## 2021-01-12 ENCOUNTER — OFFICE VISIT (OUTPATIENT)
Dept: INTERNAL MEDICINE | Facility: CLINIC | Age: 86
End: 2021-01-12

## 2021-01-12 VITALS
BODY MASS INDEX: 26.53 KG/M2 | TEMPERATURE: 97.5 F | SYSTOLIC BLOOD PRESSURE: 142 MMHG | WEIGHT: 169 LBS | DIASTOLIC BLOOD PRESSURE: 80 MMHG | HEART RATE: 69 BPM | HEIGHT: 67 IN | OXYGEN SATURATION: 98 %

## 2021-01-12 DIAGNOSIS — R10.30 LOWER ABDOMINAL PAIN: ICD-10-CM

## 2021-01-12 DIAGNOSIS — Z85.46 HISTORY OF PROSTATE CANCER: ICD-10-CM

## 2021-01-12 DIAGNOSIS — K57.92 DIVERTICULITIS: Primary | ICD-10-CM

## 2021-01-12 PROCEDURE — 99213 OFFICE O/P EST LOW 20 MIN: CPT | Performed by: NURSE PRACTITIONER

## 2021-01-12 NOTE — PROGRESS NOTES
"Chief Complaint  Abdominal Pain    Subjective          Vicente Delgado presents to [facility] for f/u regarding lower abdominal pain.    He has completed a course of Augmentin and states abdominal cramping and discomfort have improved. He has had some loose stools but denies rectal bleeding. He does c/o suprapubic pain and a continuous pressure with a previous history of prostate cancer, underwent prostatectomy in 1990.      Objective   Vital Signs:   /80 (BP Location: Left arm, Patient Position: Sitting, Cuff Size: Adult)   Pulse 69   Temp 97.5 °F (36.4 °C) (Oral)   Ht 170.2 cm (67\")   Wt 76.7 kg (169 lb)   SpO2 98%   BMI 26.47 kg/m²     Physical Exam  Constitutional:       Appearance: He is well-developed. He is not ill-appearing.   HENT:      Head: Normocephalic.      Right Ear: Hearing, tympanic membrane and external ear normal.      Left Ear: Hearing, tympanic membrane and external ear normal.      Nose: Nose normal. No nasal deformity, mucosal edema or rhinorrhea.      Right Sinus: No maxillary sinus tenderness or frontal sinus tenderness.      Left Sinus: No maxillary sinus tenderness or frontal sinus tenderness.      Mouth/Throat:      Dentition: Normal dentition.   Eyes:      General: Lids are normal.         Right eye: No discharge.         Left eye: No discharge.      Conjunctiva/sclera: Conjunctivae normal.      Right eye: No exudate.     Left eye: No exudate.  Neck:      Musculoskeletal: Normal range of motion. No edema.      Thyroid: No thyroid mass or thyromegaly.      Vascular: No carotid bruit.      Trachea: Trachea normal.   Cardiovascular:      Rate and Rhythm: Regular rhythm.      Pulses: Normal pulses.      Heart sounds: Normal heart sounds. No murmur.   Pulmonary:      Effort: No respiratory distress.      Breath sounds: Normal breath sounds. No decreased breath sounds, wheezing, rhonchi or rales.   Abdominal:      Palpations: Abdomen is soft.     "  Tenderness: There is abdominal tenderness in the suprapubic area.   Lymphadenopathy:      Head:      Right side of head: No submental, submandibular, tonsillar, preauricular, posterior auricular or occipital adenopathy.      Left side of head: No submental, submandibular, tonsillar, preauricular, posterior auricular or occipital adenopathy.   Skin:     General: Skin is warm and dry.      Nails: There is no clubbing.     Neurological:      Mental Status: He is alert.   Psychiatric:         Behavior: Behavior is cooperative.        Result Review :   The following data was reviewed by: GE Bustos on 01/12/2021:  Common labs    Common Labsle 9/30/20 9/30/20 10/1/20 10/1/20 1/5/21 1/5/21    0715 0715 0736 0736 1125 1125   Glucose      75   BUN 17   20  18   Creatinine 0.86   0.81  0.94   eGFR Non  Am 83   89  70   eGFR African Am      81   Sodium 135 (A)   137  139   Potassium 4.6   4.2  4.7   Chloride 99   104  101   Calcium 9.6   8.9  9.1   Total Protein      6.9   Albumin 3.90     4.1   Total Bilirubin 0.7     <0.2   Alkaline Phosphatase 47     71   AST (SGOT) 47 (A)     23   ALT (SGPT) 30     15   WBC  9.47 8.03  6.9    Hemoglobin  14.2 12.8 (A)  13.8    Hematocrit  41.7 37.5  41.3    Platelets  213 175  194    (A) Abnormal value                      Assessment and Plan    Problem List Items Addressed This Visit     None      Visit Diagnoses     Diverticulitis    -  Primary    Lower abdominal pain        Relevant Orders    CT Abdomen Pelvis With Contrast    History of prostate cancer          He was recently treated for presumed diverticulitis and his abdominal cramping has improved. However, due to continued suprapubic pressure I am going to order a CT scan to further evaluate.        Follow Up   Return if symptoms worsen or fail to improve, for Next scheduled follow up.  Patient was given instructions and counseling regarding his condition or for health maintenance advice. Please see specific  information pulled into the AVS if appropriate.

## 2021-01-18 ENCOUNTER — HOSPITAL ENCOUNTER (OUTPATIENT)
Dept: CT IMAGING | Facility: HOSPITAL | Age: 86
Discharge: HOME | End: 2021-01-18
Admitting: NURSE PRACTITIONER

## 2021-01-18 DIAGNOSIS — R10.30 LOWER ABDOMINAL PAIN: ICD-10-CM

## 2021-01-18 PROCEDURE — 74177 CT ABD & PELVIS W/CONTRAST: CPT

## 2021-01-18 PROCEDURE — 25010000002 IOPAMIDOL 61 % SOLUTION: Performed by: NURSE PRACTITIONER

## 2021-01-18 RX ADMIN — IOPAMIDOL 85 ML: 612 INJECTION, SOLUTION INTRAVENOUS at 17:42

## 2021-01-21 DIAGNOSIS — Z85.46 HISTORY OF PROSTATE CANCER: Primary | ICD-10-CM

## 2021-01-21 DIAGNOSIS — R10.2 SUPRAPUBIC PAIN: ICD-10-CM

## 2021-02-04 ENCOUNTER — TELEPHONE (OUTPATIENT)
Dept: CARDIOLOGY | Facility: CLINIC | Age: 86
End: 2021-02-04

## 2021-02-04 ENCOUNTER — OFFICE VISIT (OUTPATIENT)
Dept: CARDIOLOGY | Facility: CLINIC | Age: 86
End: 2021-02-04

## 2021-02-04 VITALS
OXYGEN SATURATION: 98 % | BODY MASS INDEX: 26.43 KG/M2 | SYSTOLIC BLOOD PRESSURE: 120 MMHG | HEART RATE: 55 BPM | DIASTOLIC BLOOD PRESSURE: 80 MMHG | HEIGHT: 67 IN | WEIGHT: 168.4 LBS

## 2021-02-04 DIAGNOSIS — I34.0 NON-RHEUMATIC MITRAL REGURGITATION: ICD-10-CM

## 2021-02-04 DIAGNOSIS — I10 ESSENTIAL HYPERTENSION: ICD-10-CM

## 2021-02-04 DIAGNOSIS — R00.2 PALPITATIONS: ICD-10-CM

## 2021-02-04 DIAGNOSIS — R42 DIZZINESS: ICD-10-CM

## 2021-02-04 DIAGNOSIS — I25.10 CORONARY ARTERY DISEASE INVOLVING NATIVE CORONARY ARTERY OF NATIVE HEART WITHOUT ANGINA PECTORIS: Primary | ICD-10-CM

## 2021-02-04 DIAGNOSIS — I34.0 NONRHEUMATIC MITRAL VALVE REGURGITATION: ICD-10-CM

## 2021-02-04 DIAGNOSIS — I35.1 NONRHEUMATIC AORTIC VALVE INSUFFICIENCY: ICD-10-CM

## 2021-02-04 PROCEDURE — 93000 ELECTROCARDIOGRAM COMPLETE: CPT | Performed by: NURSE PRACTITIONER

## 2021-02-04 PROCEDURE — 99214 OFFICE O/P EST MOD 30 MIN: CPT | Performed by: NURSE PRACTITIONER

## 2021-02-04 NOTE — TELEPHONE ENCOUNTER
Spoke with patient's daughter and clarified information.  What ever dose of benazepril he is taking he will cut it in half.  In regards to the Holter it has not been scheduled for a month.    Schedulers/Gail- was there no sooner appt for his holter? I prefer he have the monitor as soon as possible and not a month out. If not, can he be placed on a cancellation list? Thanks!

## 2021-02-04 NOTE — PROGRESS NOTES
Date of Office Visit: 21  Encounter Provider: GE Rios  Primary Cardiologist:   Place of Service: [facility] CARDIOLOGY  Patient Name: Vicente Delgado  :1928      Subjective:     Chief Complaint:  Dizziness/palpitations    History of Present Illness:  Vicente Delgado is a very pleasant 89 y.o. male who is new to me .  Outside records have been requested and reviewed by me if available.     This is a patient with a history of mild coronary artery disease mitral regurgitation, aortic regurgitation, hypertension, dyslipidemia, anxiety, GERD.    2017 patient had 48-hour Holter ordered for amaurosis fugax of the left eye.  It was a benign study average heart rate is 54 minimum heart rate 42 maximum heart rate 80 patient had rare PACs and occasional PVCs with a couple couplets but no ventricular tachycardia or evidence of atrial rhythm he has or high-grade blocks.2017 echo EF was normal grade 1 diastolic dysfunction mild aortic regurgitation, moderate mitral regurgitation, mild TR with normal RVSP normal wall contractility and LV cavity size and wall thickness. 2017 patient had carotid artery duplex that showed some plaquing but no stenosis.    2017 stress test EF was 59% with no evidence of ischemia patient reported some indigestion GI artifact was present and patient had occasional PVCs and couplets.    Patient was last seen in the office 2020 by Dr. Frausto and was doing well and was exercising without difficulty.    Late 2020 patient was admitted with back and abdominal pain and was diagnosed with cholelithiasis and colonic diverticulosis plan was for cholecystectomy.  We were consulted for cardiac clearance.    Patient is presenting today for evaluation of dizziness and palpitations.  In general he has been relatively healthy given his age.  He was in his usual state of health and Monday morning he woke up in the clock radio  across room the right digits were spinning in the room his fist spinning he had no nausea.  He laid back down for a few hours and when he woke up felt his heart skipping beats.  He checked his pulse and he was missing about every 10 beats.  Lasted all day and he just kind of rested throughout the day and had his daughter is a nurse come sit with him.  He is wondering if the fact that he went [place] and it was a gloomy day triggered his symptoms.  He also has a history of what sounds like ocular migraines and follows with ophthalmology Dr. Elias but this was a little bit different than what he is experienced with ocular migraines did not necessarily have some of the floaters that he had.  Since then off-and-on he has been checking his heartbeat and typically notices that every 40 or 50 beats he will skip a beat.  He also has felt a couple seconds in between heartbeats in the last day or 2 but has not had really any other dizzy episodes since then.  He does not have some occasional positional lightheadedness if he moves too quickly but has not had any syncope or falls related to this.  He has no chest pain, shortness of breath, leg swelling.  He has had no major changes in his appetite or weight.  He does not check his blood pressure at home.  His orthostatics are negative today.      Past Medical History:   Diagnosis Date   • Allergic rhinitis    • Anxiety    • Arthritis    • CAD (coronary artery disease)    • Cancer (CMS/HCC)    • Depression    • Diverticulosis    • Esophagitis    • Gastritis    • GERD (gastroesophageal reflux disease)    • Heart disease    • History of anxiety    • History of prostate cancer 06/1990   • Hyperlipidemia    • Hypertension    • Insomnia    • Lupus (CMS/HCC)    • TAM (nonalcoholic steatohepatitis)    • Sciatica of right side      Past Surgical History:   Procedure Laterality Date   • ABDOMINAL SURGERY     • CARDIAC CATHETERIZATION  11/16/1995   •  CATARACT EXTRACTION Left 07/2018   • CHOLECYSTECTOMY WITH INTRAOPERATIVE CHOLANGIOGRAM N/A 9/29/2020    Procedure: Laparoscopic cholecystectomy;  Surgeon: Javi Peralta MD;  Location: [facility] OR;  Service: General;  Laterality: N/A;   • COLONOSCOPY  01/09/2002   • ELBOW PROCEDURE     • JOINT REPLACEMENT     • LUNG BIOPSY     • NASAL POLYP SURGERY     • PACEMAKER IMPLANTATION     • PROSTATE SURGERY  06/1990   • SHOULDER ROTATOR CUFF REPAIR Right 08/24/2011    Dr. Bach   • SIGMOIDOSCOPY     • UPPER GASTROINTESTINAL ENDOSCOPY  09/12/1997    Gastritis, Duodenitis, hiatal hernia (Pathology: Gastric antrum minimal chronic inflammation)   • UPPER GASTROINTESTINAL ENDOSCOPY  04/11/2005    Mild esophagitis, Small hiatal hernia, Mild gastritis and mild duodenitis     Outpatient Medications Prior to Visit   Medication Sig Dispense Refill   • aspirin 81 MG chewable tablet Chew 81 mg Daily.     • atenolol (TENORMIN) 25 MG tablet Take 25 mg by mouth 2 (two) times a day.     • benazepril (LOTENSIN) 10 MG tablet Take 10 mg by mouth 2 (two) times a day.     • ezetimibe (Zetia) 10 MG tablet Take 10 mg by mouth Daily.     • pantoprazole (PROTONIX) 40 MG EC tablet TAKE 1 TABLET BY MOUTH DAILY 90 tablet 1   • rosuvastatin (CRESTOR) 10 MG tablet TAKE 1 TABLET BY MOUTH EVERY NIGHT 90 tablet 1   • LORazepam (ATIVAN) 1 MG tablet Take 1 tablet by mouth Every 6 (Six) Hours As Needed for Anxiety. 30 tablet 0     No facility-administered medications prior to visit.        Allergies as of 02/04/2021 - Reviewed 02/04/2021   Allergen Reaction Noted   • Lidocaine Dizziness 12/21/2016   • Lovastatin Other (See Comments) 04/21/2016   • Pravastatin Other (See Comments) 04/21/2016   • Gabapentin GI Intolerance 09/10/2020   • Procaine  04/21/2016   • Simvastatin  04/21/2016     Social History     Socioeconomic History   • Marital status:      Spouse name: Not on file   • Number of children: Not on file   • Years of education: Not on  file   • Highest education level: Not on file   Tobacco Use   • Smoking status: Current Some Day Smoker     Types: Cigars   • Smokeless tobacco: Never Used   • Tobacco comment: smokes cigars mostly daily   Substance and Sexual Activity   • Alcohol use: No     Comment: Daily caffeine use   • Drug use: No   • Sexual activity: Not Currently     Family History   Problem Relation Age of Onset   • Heart failure Mother    • Alcohol abuse Father    • Diabetes Father      Review of Systems   Constitution: Negative for chills, fever and malaise/fatigue.   HENT: Negative for ear pain, hearing loss, nosebleeds and sore throat.    Eyes: Positive for blurred vision (see opthalmology). Negative for double vision, pain, vision loss in left eye and vision loss in right eye.   Cardiovascular: Positive for dyspnea on exertion, irregular heartbeat and palpitations. Negative for chest pain, claudication, leg swelling, near-syncope, orthopnea, paroxysmal nocturnal dyspnea and syncope.   Respiratory: Negative for cough, shortness of breath, snoring and wheezing.    Endocrine: Negative for cold intolerance and heat intolerance.   Hematologic/Lymphatic: Negative for bleeding problem.   Skin: Negative for color change, itching, rash and unusual hair distribution.   Musculoskeletal: Positive for joint pain and myalgias. Negative for joint swelling.   Gastrointestinal: Negative for abdominal pain, diarrhea, hematochezia, melena, nausea and vomiting.   Genitourinary: Negative for decreased libido, frequency, hematuria, hesitancy and incomplete emptying.   Neurological: Positive for dizziness and light-headedness. Negative for excessive daytime sleepiness, headaches, loss of balance, numbness, paresthesias and seizures.   Psychiatric/Behavioral: Negative for depression.          Objective:     Vitals:    02/04/21 1020 02/04/21 1106   BP: 120/80    BP Location: Right arm    Patient Position: Sitting    Pulse: 56 55   SpO2:  98%   Weight: 76.4 kg  "(168 lb 6.4 oz)    Height: 170.2 cm (67\")      Body mass index is 26.38 kg/m².     Older Vitals  2/4/21 10:25 AM  2/4/21 10:26 AM  2/4/21 11:06 AM     Orthostatic BP  120/75   120/78      BP Location  Right arm   Right arm      Patient Position  Standing   Lying      Pulse    55     Orthostatic Pulse  52   57      SpO2    98%          PHYSICAL EXAM:  Vitals signs and nursing note reviewed.   Constitutional:       General: Not in acute distress.     Appearance: Well-developed and not in distress. Not diaphoretic.   Eyes:      Comments:   Wearing glasses   HENT:      Head: Normocephalic and atraumatic.   Neck:      Vascular: No carotid bruit or JVD.   Pulmonary:      Effort: Pulmonary effort is normal. No respiratory distress.      Breath sounds: Normal breath sounds.   Cardiovascular:      Bradycardia present. Occasional ectopic beats. Regular rhythm. Normal S1. Normal S2.      Murmurs:  + 1/6 apex systolic   Pulses:     Intact distal pulses.   Edema:     Peripheral edema absent.   Abdominal:      General: Bowel sounds are normal.      Palpations: Abdomen is soft.   Skin:     General: Skin is warm and dry.      Findings: No erythema.   Neurological:      Mental Status: Alert and oriented to person, place, and time.   Psychiatric:         Attention and Perception: Attention normal.         Mood and Affect: Mood normal.         Speech: Speech normal.         Behavior: Behavior normal.         Thought Content: Thought content normal.         Judgment: Judgment normal.           ECG 12 Lead    Date/Time: 2/4/2021 10:28 AM  Performed by: Judy Sams APRN  Authorized by: Judy Sams APRN   Comparison: compared with previous ECG from 9/28/2020  Similar to previous ECG  Comparison to previous ECG: Prior inferior Q waves resolved otherwise similar, also compared to 7/28/2020 Dr. Frausto ECG  Rhythm: sinus bradycardia  Ectopy: atrial premature contractions  Rate: normal  BPM: 56  Conduction: 1st degree AV block  Q " waves: V1, V2, V3 and aVL    QRS axis: normal    Clinical impression: abnormal EKG  Comments: Indication: Palpitations, dizziness            Most recent lab review:  1/5/2020 CBC unremarkable CMP normal, amylase and lipase normal  5/7/2020 lipid panel under reasonable control LDL 60, HDL low 35, triglycerides mildly elevated 160, total cholesterol 127  Assessment:       Diagnosis Plan   1. Coronary artery disease involving native coronary artery of native heart without angina pectoris  ECG 12 Lead    Holter Monitor - 48 Hour   2. Nonrheumatic aortic valve insufficiency  ECG 12 Lead    Holter Monitor - 48 Hour   3. Non-rheumatic mitral regurgitation  ECG 12 Lead    Holter Monitor - 48 Hour   4. Essential hypertension  ECG 12 Lead    Holter Monitor - 48 Hour   5. Palpitations  ECG 12 Lead    Holter Monitor - 48 Hour   6. Dizziness  ECG 12 Lead    Holter Monitor - 48 Hour   7. Nonrheumatic mitral valve regurgitation  Holter Monitor - 48 Hour       Plan:     1. Dizziness  2. Palpitations  3.  Coronary artery disease: History of mild disease.  Stress test 2017 with no ischemia.  He is on aspirin statin and Zetia.  No angina pectoris. EF normal 1/2017 echo  4.  Mitral regurgitation: Moderate.  1/2017 echo  5. Aortic & Tricuspid regurgitation: Mild   6.  Hypertension:BP is stable.  7.  Dyslipidemia: On statin/zetia therapy managed by PCP.  8.  Cigar smoking: Does this to help with anxiety.  He was counseled.    He has premature atrial contractions and sinus bradycardia with first-degree AV block today which is similar to his prior ECG.  We will have him wear a 48-hour monitor to rule out any arrhythmias but I suspect he is feeling his PACs.  I do want to rule out any long pauses or high-grade blocks that would have contributed to his symptoms.  His blood pressures in the 120s/70s-80s today. HE is not checking it at home and I think for 89-year-old he would tolerate a little bit higher blood pressure especially since he has  occasional positional dizziness and lightheadedness.  I am going to have him cut his benazepril  dosing in half It is listed at 10 mg BID so will cut to daily.  His daughter will help cut this in half and we will see how he does and they will keep a BP and heart rate log and further recommendations will be made from there once we get some BP readings at home as well as his monitor results.  If he continues to experience symptoms which it sounds like this was mostly an isolated episode we will consider repeating an echocardiogram to follow-up on his valvular disease.  I did review the plan of care with patient's daughter Constance who is an RN on the phone during office visit with patient's permission.  Patient and daughter were both advised to call the office if they have not heard about his heart monitor results and plan of care within 3 days.  At that time we will review his blood pressure readings.    Follow up with Jett in July 2021 as scheduled, unless otherwise needed sooner based on recurrence of symptoms or abnormal work-up.  I advised the patient to contact our office with any questions or concerns.         It has been a pleasure to participate in this patient's care. Please feel free to contact me with any questions or concerns.     Judy Sams, GE  02/04/21             Your medication list          Accurate as of February 4, 2021 12:59 PM. If you have any questions, ask your nurse or doctor.            CONTINUE taking these medications      Instructions Last Dose Given Next Dose Due   aspirin 81 MG chewable tablet      Chew 81 mg Daily.       atenolol 25 MG tablet  Commonly known as: TENORMIN      Take 25 mg by mouth 2 (two) times a day.       benazepril 10 MG tablet  Commonly known as: LOTENSIN      Take 10 mg by mouth 2 (two) times a day.       pantoprazole 40 MG EC tablet  Commonly known as: PROTONIX      TAKE 1 TABLET BY MOUTH DAILY       rosuvastatin 10 MG tablet  Commonly known as: CRESTOR       TAKE 1 TABLET BY MOUTH EVERY NIGHT       Zetia 10 MG tablet  Generic drug: ezetimibe      Take 10 mg by mouth Daily.              The above medication changes may not have been made by this provider.  Medication list was updated to reflect medications patient is currently taking including medication changes and discontinuations made by other healthcare providers or the patients themselves.     Dictated utilizing Dragon Dictation System.

## 2021-02-04 NOTE — TELEPHONE ENCOUNTER
Patients daughter Constance called stated she was returning a call. She was told she was giving the wrong information.     #[phone]    Annie BAKER MA

## 2021-02-12 ENCOUNTER — CLINICAL SUPPORT (OUTPATIENT)
Dept: CARDIOLOGY | Facility: CLINIC | Age: 86
End: 2021-02-12

## 2021-02-12 ENCOUNTER — TELEPHONE (OUTPATIENT)
Dept: CARDIOLOGY | Facility: CLINIC | Age: 86
End: 2021-02-12

## 2021-02-12 DIAGNOSIS — E78.2 MIXED HYPERLIPIDEMIA: ICD-10-CM

## 2021-02-12 DIAGNOSIS — K21.00 GASTROESOPHAGEAL REFLUX DISEASE WITH ESOPHAGITIS: ICD-10-CM

## 2021-02-12 RX ORDER — BENAZEPRIL HYDROCHLORIDE 10 MG/1
TABLET ORAL
Qty: 60 TABLET | Refills: 1 | Status: SHIPPED | OUTPATIENT
Start: 2021-02-12 | End: 2021-05-06 | Stop reason: SDUPTHER

## 2021-02-12 NOTE — TELEPHONE ENCOUNTER
PT NEEDS NEW RX WRITTEN BY YOU. THE PHARMACY STILL HAS DR. ZAPATA DOWN AND THEY CAN'T REFILL THEM UNDER HIM.

## 2021-02-12 NOTE — TELEPHONE ENCOUNTER
"I left a message for daughter  Constance--[phone] after patient came in for blood pressure check to reviewed the plan of care.  His cuff read higher than arm manual reading and was high both checks.  He is very excited in the office today but feels fine.  He did not bring his blood pressure log but reports that his blood pressure is lower when he is excited or worked up about something in runs higher when she is sitting and resting as he should.  He states the max readings he is gotten is in the 140s systolic and no diastolic readings in the 90s.  He just turned in his Holter monitor today so hopefully we will have those results for him early next week.  He mentions that he is going to run out of his benazepril after today so I have provided refills for him as his last renal function was normal 10/2020.  He has been taking benazepril 10 mg daily instead of twice daily since I saw him last.  We will have him continue on his current regimen for now and he can take an additional benazepril 10 mg if his systolic blood pressure sustains above 160.  I will review his blood pressure log when I call him with his monitor results next week and we will determine if he needs additional med adjustment. Patient was given written instructions on plan of care with meds.      He states he is actually out of almost all of his medicines and has reached out to his PCP and is waiting to hear back.  I did try to refill his meds for him for temporary supply in case he could not get a hold of his PCP but they are all in a \"pended status\" from PCPs office so I was unable to refill.     Esvin GALLEGOS he came in today for BP and said he was almost out of all his meds if you could pelase address. I took care of the benazepril Rx with the new changes (as that is the only Rx it would let me). Thanks      "

## 2021-02-12 NOTE — PROGRESS NOTES
Patient here for  BP follow up  On Benazepril 10mg once daily im am  If BP above 160 can take an extra 10mg  Benazepril   atenolol 25mg 1 po bid    Today with his machine BP was 183/94 pulse 54  [place] 164/88 p 52    Patient dropped off monitor today 2/12/21  Patient to keep log and call us with those readings or drop readings off    Daughter  Constance--[phone]

## 2021-02-15 RX ORDER — PANTOPRAZOLE SODIUM 40 MG/1
40 TABLET, DELAYED RELEASE ORAL DAILY
Qty: 90 TABLET | Refills: 1 | Status: SHIPPED | OUTPATIENT
Start: 2021-02-15 | End: 2021-08-13

## 2021-02-15 RX ORDER — ATENOLOL 25 MG/1
25 TABLET ORAL 2 TIMES DAILY
Qty: 60 TABLET | Refills: 3 | Status: SHIPPED | OUTPATIENT
Start: 2021-02-15 | End: 2021-07-22 | Stop reason: SDUPTHER

## 2021-02-15 RX ORDER — EZETIMIBE 10 MG/1
10 TABLET ORAL DAILY
Qty: 30 TABLET | Refills: 3 | Status: SHIPPED | OUTPATIENT
Start: 2021-02-15 | End: 2021-08-13

## 2021-02-15 RX ORDER — ROSUVASTATIN CALCIUM 10 MG/1
10 TABLET, COATED ORAL NIGHTLY
Qty: 90 TABLET | Refills: 1 | Status: SHIPPED | OUTPATIENT
Start: 2021-02-15 | End: 2021-08-13

## 2021-02-16 ENCOUNTER — TELEPHONE (OUTPATIENT)
Dept: CARDIOLOGY | Facility: CLINIC | Age: 86
End: 2021-02-16

## 2021-02-16 ENCOUNTER — TELEPHONE (OUTPATIENT)
Dept: INTERNAL MEDICINE | Facility: CLINIC | Age: 86
End: 2021-02-16

## 2021-02-16 NOTE — TELEPHONE ENCOUNTER
I called patient to f/u from his recent visit with cardiologist and there was no answer. I left a message for him to return call.

## 2021-02-16 NOTE — TELEPHONE ENCOUNTER
Called and spoke with the patient.  Reviewed his Holter monitor results.  Average heart rate 54.  He had occasional PVCs otherwise no sustained arrhythmias and was a benign study.  Was very happy to hear this.  He has had no further symptoms of lightheadedness, dizziness or palpitations/skipped beats and feels well.  At home he has been checking his blood pressure and is averaging about 140s/70s-80s.  On rare occasion he will jump up to the 160s that is typically after smoking a cigar.  He is going to quit smoking cigars.  He is going to continue to monitor his blood pressure and I again reviewed goal blood pressures.  I think 140s/70s without symptoms is perfectly acceptable for 89-year-old patient so we will not make any changes he will continue taking his atenolol 25 mg twice daily and he will take benazepril 10 mg once a day and can take an additional 10 mg benazepril dose as needed once a day for sustained systolic blood pressure above 160 or diastolic above 100.  He verbalized understanding of the plan of care today.  He sees his PCP next month and reports he was able to receive his medicine refills.  We will keep his July 2021 appointment with Dr. Frausto but he will call sooner with any issues or concerns.

## 2021-03-02 DIAGNOSIS — Z23 IMMUNIZATION DUE: ICD-10-CM

## 2021-03-10 ENCOUNTER — OFFICE VISIT (OUTPATIENT)
Dept: INTERNAL MEDICINE | Facility: CLINIC | Age: 86
End: 2021-03-10

## 2021-03-10 VITALS
BODY MASS INDEX: 27 KG/M2 | DIASTOLIC BLOOD PRESSURE: 84 MMHG | TEMPERATURE: 97.8 F | OXYGEN SATURATION: 99 % | HEART RATE: 52 BPM | HEIGHT: 67 IN | SYSTOLIC BLOOD PRESSURE: 148 MMHG | WEIGHT: 172 LBS
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
## 2021-03-10 DIAGNOSIS — I10 ESSENTIAL HYPERTENSION: Primary | ICD-10-CM

## 2021-03-10 DIAGNOSIS — F41.9 ANXIETY DISORDER, UNSPECIFIED TYPE: ICD-10-CM

## 2021-03-10 DIAGNOSIS — E78.2 MIXED HYPERLIPIDEMIA: ICD-10-CM

## 2021-03-10 DIAGNOSIS — S30.861A TICK BITE OF ABDOMEN, INITIAL ENCOUNTER: ICD-10-CM

## 2021-03-10 DIAGNOSIS — K21.00 GASTROESOPHAGEAL REFLUX DISEASE WITH ESOPHAGITIS WITHOUT HEMORRHAGE: ICD-10-CM

## 2021-03-10 LAB
ALBUMIN SERPL-MCNC: 4.4 G/DL (ref 3.5–5.2)
ALBUMIN/GLOB SERPL: 1.6 G/DL
ALP SERPL-CCNC: 61 U/L (ref 39–117)
ALT SERPL-CCNC: 21 U/L (ref 1–41)
AST SERPL-CCNC: 32 U/L (ref 1–40)
BASOPHILS # BLD AUTO: 0.02 10*3/MM3 (ref 0–0.2)
BASOPHILS NFR BLD AUTO: 0.4 % (ref 0–1.5)
BILIRUB SERPL-MCNC: 0.5 MG/DL (ref 0–1.2)
BUN SERPL-MCNC: 19 MG/DL (ref 8–23)
BUN/CREAT SERPL: 20.2 (ref 7–25)
CALCIUM SERPL-MCNC: 9.6 MG/DL (ref 8.2–9.6)
CHLORIDE SERPL-SCNC: 100 MMOL/L (ref 98–107)
CHOLEST SERPL-MCNC: 138 MG/DL (ref 0–200)
CO2 SERPL-SCNC: 28.1 MMOL/L (ref 22–29)
CREAT SERPL-MCNC: 0.94 MG/DL (ref 0.76–1.27)
EOSINOPHIL # BLD AUTO: 0.06 10*3/MM3 (ref 0–0.4)
EOSINOPHIL NFR BLD AUTO: 1.3 % (ref 0.3–6.2)
ERYTHROCYTE [DISTWIDTH] IN BLOOD BY AUTOMATED COUNT: 12.7 % (ref 12.3–15.4)
GLOBULIN SER CALC-MCNC: 2.8 GM/DL
GLUCOSE SERPL-MCNC: 88 MG/DL (ref 65–99)
HCT VFR BLD AUTO: 45.3 % (ref 37.5–51)
HDLC SERPL-MCNC: 42 MG/DL (ref 40–60)
HGB BLD-MCNC: 15 G/DL (ref 13–17.7)
IMM GRANULOCYTES # BLD AUTO: 0 10*3/MM3 (ref 0–0.05)
IMM GRANULOCYTES NFR BLD AUTO: 0 % (ref 0–0.5)
LDLC SERPL CALC-MCNC: 76 MG/DL (ref 0–100)
LYMPHOCYTES # BLD AUTO: 1.56 10*3/MM3 (ref 0.7–3.1)
LYMPHOCYTES NFR BLD AUTO: 32.5 % (ref 19.6–45.3)
MCH RBC QN AUTO: 31.1 PG (ref 26.6–33)
MCHC RBC AUTO-ENTMCNC: 33.1 G/DL (ref 31.5–35.7)
MCV RBC AUTO: 94 FL (ref 79–97)
MONOCYTES # BLD AUTO: 0.82 10*3/MM3 (ref 0.1–0.9)
MONOCYTES NFR BLD AUTO: 17.1 % (ref 5–12)
NEUTROPHILS # BLD AUTO: 2.34 10*3/MM3 (ref 1.7–7)
NEUTROPHILS NFR BLD AUTO: 48.7 % (ref 42.7–76)
NRBC BLD AUTO-RTO: 0 /100 WBC (ref 0–0.2)
PLATELET # BLD AUTO: 188 10*3/MM3 (ref 140–450)
POTASSIUM SERPL-SCNC: 4.9 MMOL/L (ref 3.5–5.2)
PROT SERPL-MCNC: 7.2 G/DL (ref 6–8.5)
RBC # BLD AUTO: 4.82 10*6/MM3 (ref 4.14–5.8)
SODIUM SERPL-SCNC: 137 MMOL/L (ref 136–145)
TRIGL SERPL-MCNC: 107 MG/DL (ref 0–150)
TSH SERPL DL<=0.005 MIU/L-ACNC: 1.89 UIU/ML (ref 0.27–4.2)
VLDLC SERPL CALC-MCNC: 20 MG/DL (ref 5–40)
WBC # BLD AUTO: 4.8 10*3/MM3 (ref 3.4–10.8)

## 2021-03-10 PROCEDURE — 99213 OFFICE O/P EST LOW 20 MIN: CPT | Performed by: NURSE PRACTITIONER

## 2021-03-10 RX ORDER — DOXYCYCLINE 100 MG/1
100 TABLET ORAL
COMMUNITY
Start: 2021-03-02 | End: 2021-03-12

## 2021-03-10 RX ORDER — LORAZEPAM 1 MG/1
TABLET ORAL
COMMUNITY
Start: 2020-12-04 | End: 2021-03-10

## 2021-03-10 NOTE — PROGRESS NOTES
Subjective   Vicente Delgado is a 89 y.o. male.     Overall he is doing ok. He is monitoring his blood pressure periodically and readings 140's/70's. He is riding his bicycle daily and stretching. He is up to date with dental and vision exam.     He had routine follow up with cardiologist about one month ago as he had an isolated event of dizziness. He had an holter monitor that was benign.     He has had both covid vaccinations and tolerated well without side effects.     He was also seen recent at [place] urgent [facility] for suspicious tick bite to his abdomen. He is being treated with doxycycline and tolerating well.     Hypertension  This is a chronic problem. The current episode started more than 1 year ago. The problem has been waxing and waning since onset. Pertinent negatives include no anxiety, blurred vision, chest pain, headaches, orthopnea, palpitations, peripheral edema, PND or shortness of breath. Current antihypertension treatment includes ACE inhibitors and beta blockers.   Hyperlipidemia  This is a chronic problem. The current episode started more than 1 year ago. The problem is controlled. Recent lipid tests were reviewed and are normal. Pertinent negatives include no chest pain, myalgias or shortness of breath. Current antihyperlipidemic treatment includes statins. The current treatment provides significant improvement of lipids.        The following portions of the patient's history were reviewed and updated as appropriate: allergies, current medications, past family history, past medical history, past social history, past surgical history and problem list.    Review of Systems   Constitutional: Negative for activity change, appetite change, fatigue and fever.   Eyes: Negative for blurred vision.   Respiratory: Negative for cough, shortness of breath and wheezing.    Cardiovascular: Negative for chest pain, palpitations, orthopnea, leg swelling and PND.        Intermittent irregular beat     Musculoskeletal: Negative for myalgias.   Skin:        Bug bite to right upper abdomen    Neurological: Positive for dizziness (resolved ). Negative for headaches.   Psychiatric/Behavioral: Negative for sleep disturbance and suicidal ideas. The patient is not nervous/anxious.        Objective   Physical Exam  Constitutional:       Appearance: He is well-developed.   HENT:      Head: Normocephalic.      Right Ear: No decreased hearing noted.      Left Ear: No decreased hearing noted.      Ears:      Comments: Bilateral hearing loss      Nose: Nose normal.   Neck:      Thyroid: No thyroid mass.      Vascular: No carotid bruit.   Cardiovascular:      Rate and Rhythm: Regular rhythm.      Heart sounds: Murmur present. No S3 or S4 sounds.       Comments: Repeat bp left arm 144/82  No pedal edema   Pulmonary:      Effort: Pulmonary effort is normal.      Breath sounds: Normal breath sounds. No decreased breath sounds, wheezing, rhonchi or rales.   Skin:     General: Skin is warm and dry.      Findings: No rash.   Neurological:      Mental Status: He is alert and oriented to person, place, and time.      Gait: Gait normal.   Psychiatric:         Mood and Affect: Mood and affect normal.         Speech: Speech normal.         Behavior: Behavior normal.         Thought Content: Thought content normal.         Cognition and Memory: Cognition and memory normal.         Judgment: Judgment normal.         Assessment/Plan   Diagnoses and all orders for this visit:    1. Essential hypertension (Primary)  -     CBC & Differential  -     Comprehensive Metabolic Panel  -     TSH Rfx On Abnormal To Free T4    2. Mixed hyperlipidemia  Comments:  tolerating statin therapy   Orders:  -     Lipid Panel    3. Gastroesophageal reflux disease with esophagitis without hemorrhage  Comments:  stable with protonix     4. Anxiety disorder, unspecified type  Comments:  stable; stopped ativan     5. Tick bite of abdomen, initial  encounter  Comments:  complete doxycycline (1 day left)    [facility]

## 2021-04-21 ENCOUNTER — OFFICE VISIT (OUTPATIENT)
Dept: CARDIOLOGY | Facility: CLINIC | Age: 86
End: 2021-04-21

## 2021-04-21 VITALS
BODY MASS INDEX: 27.47 KG/M2 | HEIGHT: 67 IN | OXYGEN SATURATION: 100 % | HEART RATE: 60 BPM | WEIGHT: 175 LBS | DIASTOLIC BLOOD PRESSURE: 84 MMHG | SYSTOLIC BLOOD PRESSURE: 170 MMHG

## 2021-04-21 DIAGNOSIS — I10 ESSENTIAL HYPERTENSION: Primary | ICD-10-CM

## 2021-04-21 DIAGNOSIS — I25.10 CORONARY ARTERY DISEASE INVOLVING NATIVE CORONARY ARTERY OF NATIVE HEART WITHOUT ANGINA PECTORIS: ICD-10-CM

## 2021-04-21 DIAGNOSIS — I35.1 NONRHEUMATIC AORTIC VALVE INSUFFICIENCY: ICD-10-CM

## 2021-04-21 DIAGNOSIS — I34.0 NON-RHEUMATIC MITRAL REGURGITATION: ICD-10-CM

## 2021-04-21 PROCEDURE — 93000 ELECTROCARDIOGRAM COMPLETE: CPT | Performed by: INTERNAL MEDICINE

## 2021-04-21 PROCEDURE — 99214 OFFICE O/P EST MOD 30 MIN: CPT | Performed by: INTERNAL MEDICINE

## 2021-04-21 NOTE — PROGRESS NOTES
Date of Office Visit: 2021    Patient Name: Vicente Delgado  : 1928    Encounter Provider: Breezy Frausto MD  Referring Provider: No ref. provider found  Place of Service: [facility] CARDIOLOGY  Patient Care Team:  Margoth Louis APRN as PCP - General (Family Medicine)  Margoth Louis APRN as Nurse Practitioner (Family Medicine)  Reginaldo Palacio MD (Dermatology)  Janki Elias MD as Consulting Physician (Ophthalmology)  Kristopher Machado DPM as Consulting Physician (Podiatry)  Aby Marquez MD as Consulting Physician (Hand Surgery)  Wilton Ramos MD as Consulting Physician (Orthopedic Surgery)  Destinee Snider MD as Consulting Physician (Pain Medicine)  Breezy Frausto MD as Consulting Physician (Cardiology)      Chief Complaint   Patient presents with   • Hypertension     History of Present Illness    The patient is a 89-year-old white male with a history of hypertension, coronary disease as well as valvular heart disease who returns to the office today for a follow-up evaluation.    The patient reports that his blood pressure has been somewhat labile.  Occasionally he will notice pressures up, 6 systolic he is higher here today in the office.  I personally took his blood pressure at 180/80.    When he was last here in the office he complained of palpitations.  A Holter monitor was performed which did not show any significant dysrhythmia.  He has had an echocardiogram which shows mild to moderate mitral regurgitation as well as mild aortic regurgitation.  His left ventricular systolic function is normal.  His most recent stress test was several years ago no evidence of myocardial ischemia.    Past Medical History:   Diagnosis Date   • Allergic rhinitis    • Anxiety    • Arthritis    • CAD (coronary artery disease)    • Cancer (CMS/[facility])    • Depression    • Diverticulosis    • Esophagitis    • Gastritis    • GERD  (gastroesophageal reflux disease)    • Heart disease    • History of anxiety    • History of prostate cancer 06/1990   • Hyperlipidemia    • Hypertension    • Insomnia    • Lupus (CMS/HCC)    • TAM (nonalcoholic steatohepatitis)    • Sciatica of right side          Past Surgical History:   Procedure Laterality Date   • ABDOMINAL SURGERY     • CARDIAC CATHETERIZATION  11/16/1995   • CATARACT EXTRACTION Left 07/2018   • CHOLECYSTECTOMY WITH INTRAOPERATIVE CHOLANGIOGRAM N/A 9/29/2020    Procedure: Laparoscopic cholecystectomy;  Surgeon: Javi Peralta MD;  Location: [facility];  Service: General;  Laterality: N/A;   • COLONOSCOPY  01/09/2002   • ELBOW PROCEDURE     • JOINT REPLACEMENT     • LUNG BIOPSY     • NASAL POLYP SURGERY     • PACEMAKER IMPLANTATION     • PROSTATE SURGERY  06/1990   • SHOULDER ROTATOR CUFF REPAIR Right 08/24/2011    Dr. Bach   • SIGMOIDOSCOPY     • UPPER GASTROINTESTINAL ENDOSCOPY  09/12/1997    Gastritis, Duodenitis, hiatal hernia (Pathology: Gastric antrum minimal chronic inflammation)   • UPPER GASTROINTESTINAL ENDOSCOPY  04/11/2005    Mild esophagitis, Small hiatal hernia, Mild gastritis and mild duodenitis           Current Outpatient Medications:   •  aspirin 81 MG chewable tablet, Chew 81 mg Daily., Disp: , Rfl:   •  atenolol (TENORMIN) 25 MG tablet, Take 1 tablet by mouth 2 (two) times a day., Disp: 60 tablet, Rfl: 3  •  benazepril (LOTENSIN) 10 MG tablet, Take 1 tablet (10 mg) daily in AM, may take an additional 10 mg in PM if systolic blood pressure sustaining about 160., Disp: 60 tablet, Rfl: 1  •  ezetimibe (Zetia) 10 MG tablet, Take 1 tablet by mouth Daily., Disp: 30 tablet, Rfl: 3  •  pantoprazole (PROTONIX) 40 MG EC tablet, Take 1 tablet by mouth Daily., Disp: 90 tablet, Rfl: 1  •  rosuvastatin (CRESTOR) 10 MG tablet, Take 1 tablet by mouth Every Night., Disp: 90 tablet, Rfl: 1      Social History     Socioeconomic History   • Marital status:      Spouse name:  "Not on file   • Number of children: Not on file   • Years of education: Not on file   • Highest education level: Not on file   Tobacco Use   • Smoking status: Current Some Day Smoker     Types: Cigars   • Smokeless tobacco: Never Used   • Tobacco comment: smokes cigars mostly daily   Substance and Sexual Activity   • Alcohol use: No     Comment: Daily caffeine use   • Drug use: No   • Sexual activity: Not Currently         Review of Systems   Constitutional: Negative.   HENT: Negative.    Eyes: Negative.    Cardiovascular: Negative.    Respiratory: Negative.    Endocrine: Negative.    Skin: Negative.    Musculoskeletal: Negative.    Gastrointestinal: Negative.    Neurological: Negative.    Psychiatric/Behavioral: Negative.        Procedures      ECG 12 Lead    Date/Time: 4/21/2021 10:21 AM  Performed by: Breezy Frausto MD  Authorized by: Breezy Frausto MD   Comparison: compared with previous ECG from 2/4/2021  Similar to previous ECG  Rhythm: sinus rhythm  Rate: normal  Conduction: 1st degree AV block  ST Segments: ST segments normal  QRS axis: normal                  Objective:    /84 (BP Location: Left arm, Patient Position: Sitting, Cuff Size: Adult)   Pulse 60   Ht 170.2 cm (67\")   Wt 79.4 kg (175 lb)   SpO2 100%   BMI 27.41 kg/m²         Constitutional:       Appearance: Healthy appearance.   Neck:      Vascular: No JVR.   Pulmonary:      Effort: Pulmonary effort is normal.      Breath sounds: Normal breath sounds.   Cardiovascular:      PMI at left midclavicular line. Normal rate. Regular rhythm. Normal S1. Normal S2.      Murmurs: There is a high frequency blowing holosystolic murmur at the apex.      No gallop. No click. No rub.   Pulses:     Intact distal pulses.   Edema:     Peripheral edema absent.             Assessment:       Diagnosis Plan   1. Essential hypertension     2. Non-rheumatic mitral regurgitation     3. Coronary artery disease involving native coronary artery of " native heart without angina pectoris     4. Nonrheumatic aortic valve insufficiency         1.  Hypertension: Uncontrolled.  I am going to increase his benazepril to 20 mg in the morning and have him continue his 10 in the evening.  In addition he will continue his atenolol 25 mg twice daily.  Some of his blood pressure elevations may be anxiety driven.  He will call me in a week to give me an update on his pressures.    2.  Valvular heart disease: Mitral and aortic regurgitation. At this point I do not think his valvular heart disease is significant enough to warrant repeat echocardiogram.  There is no signs of heart failure at this point    3.  Coronary artery disease: No angina pectoris.  Most recent stress test normal.     Plan:

## 2021-05-03 ENCOUNTER — TELEPHONE (OUTPATIENT)
Dept: CARDIOLOGY | Facility: CLINIC | Age: 86
End: 2021-05-03

## 2021-05-03 NOTE — TELEPHONE ENCOUNTER
Patient called and left voicemail returning someone phone call. I called patient back no answer left voicemail asking for his B/P readings. changing his regime B/P medication.

## 2021-05-06 RX ORDER — BENAZEPRIL HYDROCHLORIDE 10 MG/1
TABLET ORAL
Qty: 60 TABLET | Refills: 1 | Status: SHIPPED | OUTPATIENT
Start: 2021-05-06 | End: 2021-06-14

## 2021-05-13 ENCOUNTER — HOSPITAL ENCOUNTER (EMERGENCY)
Facility: HOSPITAL | Age: 86
Discharge: HOME | End: 2021-05-13
Attending: EMERGENCY MEDICINE | Admitting: EMERGENCY MEDICINE

## 2021-05-13 ENCOUNTER — APPOINTMENT (OUTPATIENT)
Dept: CARDIOLOGY | Facility: HOSPITAL | Age: 86
End: 2021-05-13

## 2021-05-13 VITALS
OXYGEN SATURATION: 96 % | DIASTOLIC BLOOD PRESSURE: 73 MMHG | HEART RATE: 55 BPM | TEMPERATURE: 98.2 F | BODY MASS INDEX: 27 KG/M2 | HEIGHT: 67 IN | SYSTOLIC BLOOD PRESSURE: 146 MMHG | RESPIRATION RATE: 18 BRPM | WEIGHT: 172 LBS

## 2021-05-13 DIAGNOSIS — M79.605 LEFT LEG PAIN: Primary | ICD-10-CM

## 2021-05-13 LAB
ALBUMIN SERPL-MCNC: 3.9 G/DL (ref 3.5–5.2)
ALBUMIN/GLOB SERPL: 1.5 G/DL
ALP SERPL-CCNC: 49 U/L (ref 39–117)
ALT SERPL W P-5'-P-CCNC: 16 U/L (ref 1–41)
ANION GAP SERPL CALCULATED.3IONS-SCNC: 8.9 MMOL/L (ref 5–15)
APTT PPP: 34.9 SECONDS (ref 22.7–35.4)
AST SERPL-CCNC: 19 U/L (ref 1–40)
BASOPHILS # BLD AUTO: 0.03 10*3/MM3 (ref 0–0.2)
BASOPHILS NFR BLD AUTO: 0.6 % (ref 0–1.5)
BH CV LOWER ARTERIAL LEFT ABI RATIO: 1.3
BH CV LOWER ARTERIAL LEFT DORSALIS PEDIS SYS MAX: 140 MMHG
BH CV LOWER ARTERIAL LEFT GREAT TOE SYS MAX: 106 MMHG
BH CV LOWER ARTERIAL LEFT POST TIBIAL SYS MAX: 154 MMHG
BH CV LOWER ARTERIAL LEFT TBI RATIO: 0.88
BH CV LOWER ARTERIAL RIGHT ABI RATIO: 1.2
BH CV LOWER ARTERIAL RIGHT DORSALIS PEDIS SYS MAX: 138 MMHG
BH CV LOWER ARTERIAL RIGHT GREAT TOE SYS MAX: 101 MMHG
BH CV LOWER ARTERIAL RIGHT POST TIBIAL SYS MAX: 146 MMHG
BH CV LOWER ARTERIAL RIGHT TBI RATIO: 0.83
BH CV LOWER VASCULAR LEFT COMMON FEMORAL AUGMENT: NORMAL
BH CV LOWER VASCULAR LEFT COMMON FEMORAL COMPETENT: NORMAL
BH CV LOWER VASCULAR LEFT COMMON FEMORAL COMPRESS: NORMAL
BH CV LOWER VASCULAR LEFT COMMON FEMORAL PHASIC: NORMAL
BH CV LOWER VASCULAR LEFT COMMON FEMORAL SPONT: NORMAL
BH CV LOWER VASCULAR LEFT DISTAL FEMORAL COMPRESS: NORMAL
BH CV LOWER VASCULAR LEFT GASTRONEMIUS COMPRESS: NORMAL
BH CV LOWER VASCULAR LEFT GREATER SAPH AK COMPRESS: NORMAL
BH CV LOWER VASCULAR LEFT GREATER SAPH BK COMPRESS: NORMAL
BH CV LOWER VASCULAR LEFT LESSER SAPH COMPRESS: NORMAL
BH CV LOWER VASCULAR LEFT MID FEMORAL AUGMENT: NORMAL
BH CV LOWER VASCULAR LEFT MID FEMORAL COMPETENT: NORMAL
BH CV LOWER VASCULAR LEFT MID FEMORAL COMPRESS: NORMAL
BH CV LOWER VASCULAR LEFT MID FEMORAL PHASIC: NORMAL
BH CV LOWER VASCULAR LEFT MID FEMORAL SPONT: NORMAL
BH CV LOWER VASCULAR LEFT PERONEAL COMPRESS: NORMAL
BH CV LOWER VASCULAR LEFT POPLITEAL AUGMENT: NORMAL
BH CV LOWER VASCULAR LEFT POPLITEAL COMPETENT: NORMAL
BH CV LOWER VASCULAR LEFT POPLITEAL COMPRESS: NORMAL
BH CV LOWER VASCULAR LEFT POPLITEAL PHASIC: NORMAL
BH CV LOWER VASCULAR LEFT POPLITEAL SPONT: NORMAL
BH CV LOWER VASCULAR LEFT POSTERIOR TIBIAL COMPRESS: NORMAL
BH CV LOWER VASCULAR LEFT PROFUNDA FEMORAL COMPRESS: NORMAL
BH CV LOWER VASCULAR LEFT PROXIMAL FEMORAL COMPRESS: NORMAL
BH CV LOWER VASCULAR LEFT SAPHENOFEMORAL JUNCTION COMPRESS: NORMAL
BH CV LOWER VASCULAR RIGHT COMMON FEMORAL AUGMENT: NORMAL
BH CV LOWER VASCULAR RIGHT COMMON FEMORAL COMPETENT: NORMAL
BH CV LOWER VASCULAR RIGHT COMMON FEMORAL COMPRESS: NORMAL
BH CV LOWER VASCULAR RIGHT COMMON FEMORAL PHASIC: NORMAL
BH CV LOWER VASCULAR RIGHT COMMON FEMORAL SPONT: NORMAL
BILIRUB SERPL-MCNC: 0.3 MG/DL (ref 0–1.2)
BUN SERPL-MCNC: 22 MG/DL (ref 8–23)
BUN/CREAT SERPL: 23.2 (ref 7–25)
CALCIUM SPEC-SCNC: 8.7 MG/DL (ref 8.2–9.6)
CHLORIDE SERPL-SCNC: 105 MMOL/L (ref 98–107)
CO2 SERPL-SCNC: 25.1 MMOL/L (ref 22–29)
CREAT SERPL-MCNC: 0.95 MG/DL (ref 0.76–1.27)
DEPRECATED RDW RBC AUTO: 44.9 FL (ref 37–54)
EOSINOPHIL # BLD AUTO: 0.03 10*3/MM3 (ref 0–0.4)
EOSINOPHIL NFR BLD AUTO: 0.6 % (ref 0.3–6.2)
ERYTHROCYTE [DISTWIDTH] IN BLOOD BY AUTOMATED COUNT: 13 % (ref 12.3–15.4)
GFR SERPL CREATININE-BSD FRML MDRD: 74 ML/MIN/1.73
GLOBULIN UR ELPH-MCNC: 2.6 GM/DL
GLUCOSE SERPL-MCNC: 96 MG/DL (ref 65–99)
HCT VFR BLD AUTO: 39.8 % (ref 37.5–51)
HGB BLD-MCNC: 13.2 G/DL (ref 13–17.7)
IMM GRANULOCYTES # BLD AUTO: 0.01 10*3/MM3 (ref 0–0.05)
IMM GRANULOCYTES NFR BLD AUTO: 0.2 % (ref 0–0.5)
INR PPP: 1.09 (ref 0.9–1.1)
LYMPHOCYTES # BLD AUTO: 1.48 10*3/MM3 (ref 0.7–3.1)
LYMPHOCYTES NFR BLD AUTO: 31.5 % (ref 19.6–45.3)
MCH RBC QN AUTO: 31.2 PG (ref 26.6–33)
MCHC RBC AUTO-ENTMCNC: 33.2 G/DL (ref 31.5–35.7)
MCV RBC AUTO: 94.1 FL (ref 79–97)
MONOCYTES # BLD AUTO: 0.83 10*3/MM3 (ref 0.1–0.9)
MONOCYTES NFR BLD AUTO: 17.7 % (ref 5–12)
NEUTROPHILS NFR BLD AUTO: 2.32 10*3/MM3 (ref 1.7–7)
NEUTROPHILS NFR BLD AUTO: 49.4 % (ref 42.7–76)
NRBC BLD AUTO-RTO: 0 /100 WBC (ref 0–0.2)
PLATELET # BLD AUTO: 175 10*3/MM3 (ref 140–450)
PMV BLD AUTO: 9.9 FL (ref 6–12)
POTASSIUM SERPL-SCNC: 4.2 MMOL/L (ref 3.5–5.2)
PROT SERPL-MCNC: 6.5 G/DL (ref 6–8.5)
PROTHROMBIN TIME: 13.9 SECONDS (ref 11.7–14.2)
RBC # BLD AUTO: 4.23 10*6/MM3 (ref 4.14–5.8)
SODIUM SERPL-SCNC: 139 MMOL/L (ref 136–145)
UPPER ARTERIAL LEFT ARM BRACHIAL SYS MAX: 114 MMHG
UPPER ARTERIAL RIGHT ARM BRACHIAL SYS MAX: 121 MMHG
WBC # BLD AUTO: 4.7 10*3/MM3 (ref 3.4–10.8)

## 2021-05-13 PROCEDURE — 93922 UPR/L XTREMITY ART 2 LEVELS: CPT

## 2021-05-13 PROCEDURE — 85730 THROMBOPLASTIN TIME PARTIAL: CPT | Performed by: PHYSICIAN ASSISTANT

## 2021-05-13 PROCEDURE — 99283 EMERGENCY DEPT VISIT LOW MDM: CPT

## 2021-05-13 PROCEDURE — 85610 PROTHROMBIN TIME: CPT | Performed by: PHYSICIAN ASSISTANT

## 2021-05-13 PROCEDURE — 85025 COMPLETE CBC W/AUTO DIFF WBC: CPT | Performed by: PHYSICIAN ASSISTANT

## 2021-05-13 PROCEDURE — 93971 EXTREMITY STUDY: CPT

## 2021-05-13 PROCEDURE — 93923 UPR/LXTR ART STDY 3+ LVLS: CPT

## 2021-05-13 PROCEDURE — 80053 COMPREHEN METABOLIC PANEL: CPT | Performed by: PHYSICIAN ASSISTANT

## 2021-05-17 ENCOUNTER — TRANSCRIBE ORDERS (OUTPATIENT)
Dept: ADMINISTRATIVE | Facility: HOSPITAL | Age: 86
End: 2021-05-17

## 2021-05-17 DIAGNOSIS — I73.9 PERIPHERAL ARTERY DISEASE (HCC): Primary | ICD-10-CM

## 2021-05-27 ENCOUNTER — HOSPITAL ENCOUNTER (OUTPATIENT)
Dept: CT IMAGING | Facility: HOSPITAL | Age: 86
Discharge: HOME | End: 2021-05-27
Admitting: SURGERY

## 2021-05-27 DIAGNOSIS — I73.9 PERIPHERAL ARTERY DISEASE (HCC): ICD-10-CM

## 2021-05-27 LAB — CREAT BLDA-MCNC: 1 MG/DL (ref 0.6–1.3)

## 2021-05-27 PROCEDURE — 75635 CT ANGIO ABDOMINAL ARTERIES: CPT

## 2021-05-27 PROCEDURE — 0 IOPAMIDOL PER 1 ML: Performed by: SURGERY

## 2021-05-27 PROCEDURE — 82565 ASSAY OF CREATININE: CPT

## 2021-05-27 RX ADMIN — IOPAMIDOL 95 ML: 755 INJECTION, SOLUTION INTRAVENOUS at 06:41

## 2021-06-10 ENCOUNTER — APPOINTMENT (OUTPATIENT)
Dept: CT IMAGING | Facility: HOSPITAL | Age: 86
End: 2021-06-10

## 2021-06-14 ENCOUNTER — TELEPHONE (OUTPATIENT)
Dept: CARDIOLOGY | Facility: CLINIC | Age: 86
End: 2021-06-14

## 2021-06-14 NOTE — TELEPHONE ENCOUNTER
Annie-I do not see a note of him calling and what the recommendations were over the weekend but please let him know that I am fine with that blood pressure and heart rate reading. If he has consistent systolic readings above 160s or diastolic readings in the 90s or above 100 please let us know.  He should also keep a record of his heart rate when he locks his blood pressure as well so we can have that information if we were to need to adjust medicine.  I would also contact his daughter to let her know my recommendation as I believe he has had some confusions with medication and dosing/how to take in the past.  I also updated his chart to reflect how you told me he said he was taking his meds.    Thanks.

## 2021-06-14 NOTE — TELEPHONE ENCOUNTER
Per pt he is taking atenolol 25mg po BID and benazpril 10mg 2 tabs po qam and 1 tab po qpm.  His current readings 144/75 HR 59.  Please advise.    C A

## 2021-06-14 NOTE — TELEPHONE ENCOUNTER
Annie- can you please confirm his benazpril dosing and atenolol dosing and get his latest BP and HR readings so I know how to adjust his medication? Thank you.

## 2021-06-14 NOTE — TELEPHONE ENCOUNTER
Pt walked in today stating he had called here over the weekend for HTN and they added additional benazapril to help control it.  He states he has already been taking benazapril BID.  He states the new rx reads 1 tab po qd and 1 additional if systolic is over 160.    He also states at last OV w CB she increased this medication to TID.    Please advise.    [facility]

## 2021-06-15 RX ORDER — BENAZEPRIL HYDROCHLORIDE 10 MG/1
TABLET ORAL
Qty: 90 TABLET | Refills: 1 | Status: SHIPPED | OUTPATIENT
Start: 2021-06-15 | End: 2021-06-24

## 2021-06-15 NOTE — TELEPHONE ENCOUNTER
Pt notified and voiced understanding. He does need refill please.  He will call in one week w recordings of BP. C A

## 2021-06-24 RX ORDER — BENAZEPRIL HYDROCHLORIDE 20 MG/1
20 TABLET ORAL 2 TIMES DAILY
Qty: 60 TABLET | Refills: 2 | Status: SHIPPED | OUTPATIENT
Start: 2021-06-24 | End: 2021-08-04 | Stop reason: SDUPTHER

## 2021-06-24 NOTE — TELEPHONE ENCOUNTER
Advised patient of new medication dose and that it was called in to pharmacy.  He verbalized understanding and will go  the new prescription.    Aby FREED 6/24/21

## 2021-06-24 NOTE — TELEPHONE ENCOUNTER
Aby- please call and have him increase benazepril to 20 mg BID instead of 20 mg in AM and 10 mg in PM. Please have him call with updated BP/HR readings in 1-2 weeks or with any new concerns or intolerance to increased dose before hand. Thanks.

## 2021-06-24 NOTE — TELEPHONE ENCOUNTER
MR. Delgado called with blood pressure readings.    6/18/21 144/73  57  6/19/21 164/80  50  6/20/21 152/74  51  6/21/21 156/76  50  6/22/21 132/67  52  6/23/21 179/69            46  6/24/21 181/85            --  This reading was 2 hours after his medication.     Then 45 minutes after that, his reading was     160/85                 Please advise

## 2021-07-07 ENCOUNTER — OFFICE VISIT (OUTPATIENT)
Dept: INTERNAL MEDICINE | Facility: CLINIC | Age: 86
End: 2021-07-07

## 2021-07-07 VITALS
SYSTOLIC BLOOD PRESSURE: 136 MMHG | HEIGHT: 67 IN | DIASTOLIC BLOOD PRESSURE: 80 MMHG | WEIGHT: 170 LBS | HEART RATE: 96 BPM | TEMPERATURE: 97.3 F | OXYGEN SATURATION: 98 % | BODY MASS INDEX: 26.68 KG/M2

## 2021-07-07 DIAGNOSIS — I25.10 CORONARY ARTERY DISEASE INVOLVING NATIVE CORONARY ARTERY OF NATIVE HEART WITHOUT ANGINA PECTORIS: Chronic | ICD-10-CM

## 2021-07-07 DIAGNOSIS — G56.02 CARPAL TUNNEL SYNDROME OF LEFT WRIST: ICD-10-CM

## 2021-07-07 DIAGNOSIS — K21.00 GASTROESOPHAGEAL REFLUX DISEASE WITH ESOPHAGITIS WITHOUT HEMORRHAGE: ICD-10-CM

## 2021-07-07 DIAGNOSIS — I10 ESSENTIAL HYPERTENSION: Primary | Chronic | ICD-10-CM

## 2021-07-07 DIAGNOSIS — E78.2 MIXED HYPERLIPIDEMIA: Chronic | ICD-10-CM

## 2021-07-07 PROCEDURE — 99214 OFFICE O/P EST MOD 30 MIN: CPT | Performed by: NURSE PRACTITIONER

## 2021-07-07 NOTE — PROGRESS NOTES
"Chief Complaint  Establish Care, Hypertension, Hyperlipidemia, and Heartburn    Subjective          Vicente Delgado presents to [facility] who presents to establish care and to f/u on HTN, hyperlipidemia and GERD.    Patient presents to establish care. Previous medical history discussed with patient in details and reflected in problem list.  He reports feeling well overall, BP typically 130s/70-80s at home with pulse 58-60s. He wears compression socks for mgmt of lower extremity edema.  He continues to smoke 1 cigar daily, not interested in quitting at this time.      Objective   Vital Signs:   /80 (BP Location: Left arm, Patient Position: Sitting, Cuff Size: Adult)   Pulse 96   Temp 97.3 °F (36.3 °C) (Temporal)   Ht 170.2 cm (67\")   Wt 77.1 kg (170 lb)   SpO2 98%   BMI 26.63 kg/m²     Physical Exam  Constitutional:       Appearance: He is well-developed. He is not ill-appearing.   HENT:      Head: Normocephalic.      Right Ear: Hearing, tympanic membrane and external ear normal.      Left Ear: Hearing, tympanic membrane and external ear normal.      Nose: Nose normal. No nasal deformity, mucosal edema or rhinorrhea.      Right Sinus: No maxillary sinus tenderness or frontal sinus tenderness.      Left Sinus: No maxillary sinus tenderness or frontal sinus tenderness.      Mouth/Throat:      Dentition: Normal dentition.   Eyes:      General: Lids are normal.         Right eye: No discharge.         Left eye: No discharge.      Conjunctiva/sclera: Conjunctivae normal.      Right eye: No exudate.     Left eye: No exudate.  Neck:      Thyroid: No thyroid mass or thyromegaly.      Vascular: No carotid bruit.      Trachea: Trachea normal.   Cardiovascular:      Rate and Rhythm: Regular rhythm.      Pulses: Normal pulses.      Heart sounds: Normal heart sounds. No murmur heard.     Pulmonary:      Effort: No respiratory distress.      Breath sounds: Normal breath sounds. No " decreased breath sounds, wheezing, rhonchi or rales.   Abdominal:      General: Bowel sounds are normal.      Palpations: Abdomen is soft.      Tenderness: There is no abdominal tenderness.   Musculoskeletal:      Cervical back: Normal range of motion. No edema.   Lymphadenopathy:      Head:      Right side of head: No submental, submandibular, tonsillar, preauricular, posterior auricular or occipital adenopathy.      Left side of head: No submental, submandibular, tonsillar, preauricular, posterior auricular or occipital adenopathy.   Skin:     General: Skin is warm and dry.      Nails: There is no clubbing.   Neurological:      Mental Status: He is alert.   Psychiatric:         Behavior: Behavior is cooperative.        Result Review :   The following data was reviewed by: GE Bustos on 07/07/2021:  Common labs    Common Labsle 3/10/21 3/10/21 3/10/21 5/13/21 5/13/21 5/27/21    1102 1102 1102 1745 1745    Glucose     96    Glucose  88       BUN  19   22    Creatinine  0.94   0.95 1.00   eGFR Non  Am  75   74    eGFR African Am  91       Sodium  137   139    Potassium  4.9   4.2    Chloride  100   105    Calcium  9.6   8.7    Total Protein  7.2       Albumin  4.40   3.90    Total Bilirubin  0.5   0.3    Alkaline Phosphatase  61   49    AST (SGOT)  32   19    ALT (SGPT)  21   16    WBC 4.80   4.70     Hemoglobin 15.0   13.2     Hematocrit 45.3   39.8     Platelets 188   175     Total Cholesterol   138      Triglycerides   107      HDL Cholesterol   42      LDL Cholesterol    76         Comments are available for some flowsheets but are not being displayed.                     Assessment and Plan    Diagnoses and all orders for this visit:    1. Essential hypertension (Primary)  Assessment & Plan:  Hypertension is unchanged.  Continue current treatment regimen.  Dietary sodium restriction.  Blood pressure will be reassessed at the next regular appointment.      2. Coronary artery disease involving  native coronary artery of native heart without angina pectoris  Assessment & Plan:  History of mild disease.  Stress test 2017 with no ischemia.  He is on aspirin statin and Zetia.  No angina pectoris. EF normal 1/2017 echo      3. Mixed hyperlipidemia  Assessment & Plan:  Lipid abnormalities are unchanged.  Pharmacotherapy as ordered.  Lipids will be reassessed in 6 months.      4. Gastroesophageal reflux disease with esophagitis without hemorrhage  Assessment & Plan:  Sx improved with daily Protonix      5. Carpal tunnel syndrome of left wrist  Assessment & Plan:  Followed by Dr. Marquez        Follow Up   Return in about 4 months (around 11/7/2021).  Patient was given instructions and counseling regarding his condition or for health maintenance advice. Please see specific information pulled into the AVS if appropriate.

## 2021-07-11 PROBLEM — M65.332 TRIGGER MIDDLE FINGER OF LEFT HAND: Status: ACTIVE | Noted: 2021-07-11

## 2021-07-11 PROBLEM — M65.332 TRIGGER MIDDLE FINGER OF LEFT HAND: Chronic | Status: ACTIVE | Noted: 2021-07-11

## 2021-07-11 PROBLEM — K21.00 GASTROESOPHAGEAL REFLUX DISEASE WITH ESOPHAGITIS WITHOUT HEMORRHAGE: Status: ACTIVE | Noted: 2021-07-11

## 2021-07-11 PROBLEM — K21.00 GASTROESOPHAGEAL REFLUX DISEASE WITH ESOPHAGITIS WITHOUT HEMORRHAGE: Chronic | Status: ACTIVE | Noted: 2021-07-11

## 2021-07-11 PROBLEM — G45.3 AMAUROSIS FUGAX OF LEFT EYE: Status: ACTIVE | Noted: 2021-07-11

## 2021-07-11 PROBLEM — I35.1 NONRHEUMATIC AORTIC VALVE INSUFFICIENCY: Chronic | Status: ACTIVE | Noted: 2021-02-04

## 2021-07-11 PROBLEM — I34.0 NON-RHEUMATIC MITRAL REGURGITATION: Chronic | Status: ACTIVE | Noted: 2021-02-04

## 2021-07-11 PROBLEM — G56.02 CARPAL TUNNEL SYNDROME OF LEFT WRIST: Chronic | Status: ACTIVE | Noted: 2021-07-11

## 2021-07-11 PROBLEM — G56.02 CARPAL TUNNEL SYNDROME OF LEFT WRIST: Status: ACTIVE | Noted: 2021-07-11

## 2021-07-11 PROBLEM — G45.3 AMAUROSIS FUGAX OF LEFT EYE: Chronic | Status: ACTIVE | Noted: 2021-07-11

## 2021-07-11 NOTE — ASSESSMENT & PLAN NOTE
History of mild disease.  Stress test 2017 with no ischemia.  He is on aspirin statin and Zetia.  No angina pectoris. EF normal 1/2017 echo

## 2021-07-22 RX ORDER — ATENOLOL 25 MG/1
25 TABLET ORAL 2 TIMES DAILY
Qty: 60 TABLET | Refills: 1 | Status: SHIPPED | OUTPATIENT
Start: 2021-07-22 | End: 2021-09-07

## 2021-08-04 ENCOUNTER — OFFICE VISIT (OUTPATIENT)
Dept: CARDIOLOGY | Facility: CLINIC | Age: 86
End: 2021-08-04

## 2021-08-04 VITALS
HEART RATE: 58 BPM | SYSTOLIC BLOOD PRESSURE: 144 MMHG | DIASTOLIC BLOOD PRESSURE: 80 MMHG | OXYGEN SATURATION: 98 % | WEIGHT: 173.8 LBS | HEIGHT: 67 IN | BODY MASS INDEX: 27.28 KG/M2

## 2021-08-04 DIAGNOSIS — I10 ESSENTIAL HYPERTENSION: Primary | Chronic | ICD-10-CM

## 2021-08-04 DIAGNOSIS — I35.1 NONRHEUMATIC AORTIC VALVE INSUFFICIENCY: Chronic | ICD-10-CM

## 2021-08-04 DIAGNOSIS — I34.0 NON-RHEUMATIC MITRAL REGURGITATION: Chronic | ICD-10-CM

## 2021-08-04 DIAGNOSIS — E78.2 MIXED HYPERLIPIDEMIA: Chronic | ICD-10-CM

## 2021-08-04 DIAGNOSIS — I34.0 NONRHEUMATIC MITRAL VALVE REGURGITATION: Chronic | ICD-10-CM

## 2021-08-04 DIAGNOSIS — I25.10 CORONARY ARTERY DISEASE INVOLVING NATIVE CORONARY ARTERY OF NATIVE HEART WITHOUT ANGINA PECTORIS: Chronic | ICD-10-CM

## 2021-08-04 PROCEDURE — 93000 ELECTROCARDIOGRAM COMPLETE: CPT | Performed by: NURSE PRACTITIONER

## 2021-08-04 PROCEDURE — 99213 OFFICE O/P EST LOW 20 MIN: CPT | Performed by: NURSE PRACTITIONER

## 2021-08-04 RX ORDER — BENAZEPRIL HYDROCHLORIDE 20 MG/1
20 TABLET ORAL 2 TIMES DAILY
Qty: 180 TABLET | Refills: 1 | Status: SHIPPED | OUTPATIENT
Start: 2021-08-04 | End: 2021-09-16 | Stop reason: SDUPTHER

## 2021-08-04 NOTE — PROGRESS NOTES
Date of Office Visit: 21  Encounter Provider: GE Rios  Primary Cardiologist: Dr. Frausto  Place of Service: [facility] CARDIOLOGY  Patient Name: Vicente Delgado  :1928      Subjective:     Chief Complaint:  Follow up HTN    History of Present Illness:  Vicente Delgado is a pleasant 89 y.o. male who is known to me .  Outside records have been requested and reviewed by me if available.        This is a patient with a history of mild coronary artery disease, mitral regurgitation, aortic regurgitation, hypertension, dyslipidemia, anxiety, GERD.     2017 patient had 48-hour Holter ordered for amaurosis fugax of the left eye.  It was a benign study average heart rate is 54 minimum heart rate 42 maximum heart rate 80 patient had rare PACs and occasional PVCs with a couple couplets but no ventricular tachycardia or evidence of atrial rhythm he has or high-grade blocks.2017 echo EF was normal grade 1 diastolic dysfunction mild aortic regurgitation, moderate mitral regurgitation, mild TR with normal RVSP normal wall contractility and LV cavity size and wall thickness. 2017 patient had carotid artery duplex that showed some plaquing but no stenosis.     2017 stress test EF was 59% with no evidence of ischemia patient reported some indigestion GI artifact was present and patient had occasional PVCs and couplets.     Patient was last seen in the office 2020 by Dr. Frausto and was doing well and was exercising without difficulty.     Late 2020 patient was admitted with back and abdominal pain and was diagnosed with cholelithiasis and colonic diverticulosis plan was for cholecystectomy.  We were consulted for cardiac clearance.    I first on 2021 when he presented for evaluation of dizziness and palpitations.  He had been relatively healthy given his age and was in his usual state until Monday morning when he woke up in the clock radio across the  room was spinning.  He laid back down for a few hours when he woke up felt his heart skipping beats.  He checked his pulse and he was missing about every 10 beats lasted all day needed, lasted throughout the day and had his daughter who is a nurse come sit with him.  He was wondering if the fact that he had a gloomy day and went [place] triggered his symptoms.  He also had what sounds like ocular migraines and followed with ophthalmology Dr. Elias but this was little bit different than what he experienced with prior ocular migraines and did not necessarily have some floaters that he had.  Since his episode he had been checking his heartbeat and typically noticed that every 40 or 50 beats he will skip a beat.  He also felt he had a couple seconds in between heartbeats in the last day or so but really had not any further dizzy episodes.  He had some occasional positional lightheadedness if he moves too quickly but no syncope or falls, chest pain, shortness of breath, leg swelling.  He has had no major changes in his appetite or weight.  His orthostatics were negative and he was not checking his blood pressure at home.     He had premature atrial contractions and sinus bradycardia with first-degree AV block.  I had him wear a 48-hour Holter but I suspected he was feeling PACs.  I wanted to rule out long pauses or high-grade blocks.  His blood pressure was stable in the office.  I was going to have him cut his benazepril dosing.  Since it was mostly an isolated episode I did not recommend further cardiac testing.  I discussed the plan of care with his daughter however there was initially some confusion regarding his meds.    At home his blood pressure was doing okay but was high in the office and his cuff correlated few days after his visit.  I recommended he take an extra benazepril if his blood pressure sustained above 160 at home as on occasion it would run higher.  His Holter showed an  average heart rate of 54 and he had occasional PVCs.  He was overall feeling well and had no further symptoms.  His blood pressure was remaining stable.  I did not make any changes and recommended he continue taking atenolol 25 mg twice daily and benazepril 10 mg once daily an additional 10 mg as needed for sustained systolic blood pressure above 160 or diastolic above 100.    4/21/2021 patient saw Dr. Frausto.  His blood pressure was elevated at that office visit.  It was suspected may be some of his blood pressure elevation was anxiety driven.  He was to call with update on blood pressures in a week after increasing his benazepril to 20 mg in the morning and 10 mg in the evening.    In June patient called with some higher blood pressure readings occasionally in the 170s and 180s rarely below 140s.  Heart rate 40s to 50s.  I had him increase his benazepril to 20 mg twice daily and he had improvement in his blood pressure readings.    He is presenting today for follow-up.  He is doing really well.  He is having no chest pain, shortness of breath, palpitations or skipped beats, lower extremity edema, dizziness, lightheadedness, syncope, near syncope and energy levels are good.  He has had no further lightheaded spells or palpitations/skipped beats.  He checks his blood pressure at home and its been doing really well averaging 140-145 systolic with diastolic readings in the 80s.  Heart rate runs upper 50s to low 60s.  He remains quite active and rides his bicycle at home for about an hour and a half without limiting symptoms.  He has had his two Covid vaccines.  His daughter and granddaughter recently traveled to [place] and had been vaccinated and they now have Covid and her currently in self quarantine.  He has not been around them.    Past Medical History:   Diagnosis Date   • Allergic rhinitis    • Anxiety    • Arthritis    • CAD (coronary artery disease)    • Cancer (CMS/HCC)    • Cataract June 2018” and     Ocular mygraine   • Cholelithiasis 2020   • Depression    • Diverticulosis    • Esophagitis    • Gastritis    • GERD (gastroesophageal reflux disease)    • Heart disease    • History of anxiety    • History of medical problems Right shoulder replacemd    2008   • History of prostate cancer 06/1990   • HL (hearing loss) Partial   • Hyperlipidemia    • Hypertension    • Insomnia    • Low back pain Yes  from hworking   • Lupus (CMS/HCC)    • TAM (nonalcoholic steatohepatitis)    • Sciatica of right side    • Visual impairment Ocular mygraine     Past Surgical History:   Procedure Laterality Date   • ABDOMINAL SURGERY     • CARDIAC CATHETERIZATION  11/16/1995   • CARDIAC SURGERY  11/16/1995   • CATARACT EXTRACTION Left 07/2018   • CHOLECYSTECTOMY  2020   • CHOLECYSTECTOMY WITH INTRAOPERATIVE CHOLANGIOGRAM N/A 9/29/2020    Procedure: Laparoscopic cholecystectomy;  Surgeon: Javi Peralta MD;  Location: [facility] OR;  Service: General;  Laterality: N/A;   • COLONOSCOPY  01/09/2002   • ELBOW PROCEDURE     • JOINT REPLACEMENT     • LUNG BIOPSY     • LYMPH NODE BIOPSY  Yes    1992   • NASAL POLYP SURGERY     • PACEMAKER IMPLANTATION     • PROSTATE SURGERY  06/1990   • SHOULDER ROTATOR CUFF REPAIR Right 08/24/2011    Dr. Bach   • SIGMOIDOSCOPY     • TONSILLECTOMY  Yes 1943   • UPPER GASTROINTESTINAL ENDOSCOPY  09/12/1997    Gastritis, Duodenitis, hiatal hernia (Pathology: Gastric antrum minimal chronic inflammation)   • UPPER GASTROINTESTINAL ENDOSCOPY  04/11/2005    Mild esophagitis, Small hiatal hernia, Mild gastritis and mild duodenitis     Outpatient Medications Prior to Visit   Medication Sig Dispense Refill   • aspirin 81 MG chewable tablet Chew 81 mg Daily.     • atenolol (TENORMIN) 25 MG tablet Take 1 tablet by mouth 2 (two) times a day. 60 tablet 1   • ezetimibe (Zetia) 10 MG tablet Take 1 tablet by mouth Daily. 30 tablet 3   • pantoprazole (PROTONIX) 40 MG EC tablet Take 1 tablet by mouth Daily. 90 tablet 1    • rosuvastatin (CRESTOR) 10 MG tablet Take 1 tablet by mouth Every Night. 90 tablet 1   • benazepril (LOTENSIN) 20 MG tablet Take 1 tablet by mouth 2 (two) times a day. Call if blood pressure running above 160/90. 60 tablet 2     No facility-administered medications prior to visit.       Allergies as of 08/04/2021 - Reviewed 08/04/2021   Allergen Reaction Noted   • Lidocaine Dizziness 12/21/2016   • Lovastatin Other (See Comments) 04/21/2016   • Pravastatin Other (See Comments) 04/21/2016   • Gabapentin GI Intolerance 09/10/2020   • Procaine  04/21/2016   • Simvastatin  04/21/2016     Social History     Socioeconomic History   • Marital status:      Spouse name: Not on file   • Number of children: Not on file   • Years of education: Not on file   • Highest education level: Not on file   Tobacco Use   • Smoking status: Former Smoker     Packs/day: 0.00     Years: 0.00     Pack years: 0.00     Types: Cigars   • Smokeless tobacco: Never Used   • Tobacco comment: Smoke a cigar ocassionally   Substance and Sexual Activity   • Alcohol use: No     Comment: Daily caffeine use   • Drug use: No   • Sexual activity: Not Currently     Family History   Problem Relation Age of Onset   • Heart failure Mother    • Hearing loss Mother    • Heart disease Mother         Mother had congestive heart failure   • Hyperlipidemia Mother    • Alcohol abuse Father    • Diabetes Father      Review of Systems   Constitutional: Negative for chills, fever, malaise/fatigue, weight gain and weight loss.   Cardiovascular: Negative for chest pain, dyspnea on exertion, irregular heartbeat, leg swelling, near-syncope, orthopnea, palpitations, paroxysmal nocturnal dyspnea and syncope.   Respiratory: Negative for cough, shortness of breath, snoring and wheezing.    Hematologic/Lymphatic: Negative for bleeding problem. Bruises/bleeds easily.   Skin: Negative for color change.   Musculoskeletal: Negative for falls, joint pain and myalgias.  "  Gastrointestinal: Negative for diarrhea, melena, nausea and vomiting.   Genitourinary: Negative for hematuria.   Neurological: Negative for excessive daytime sleepiness, dizziness and light-headedness.   Psychiatric/Behavioral: Negative for depression. The patient is not nervous/anxious.           Objective:     Vitals:    08/04/21 0941   BP: 144/80   BP Location: Right arm   Patient Position: Sitting   Pulse: 58   SpO2: 98%   Weight: 78.8 kg (173 lb 12.8 oz)   Height: 170.2 cm (67\")     Body mass index is 27.22 kg/m².    PHYSICAL EXAM:  Vitals and nursing note reviewed.   Constitutional:       General: Not in acute distress.     Appearance: Well-developed and not in distress. Not diaphoretic.   HENT:      Head: Normocephalic and atraumatic.   Neck:      Vascular: No carotid bruit or JVD.   Pulmonary:      Effort: Pulmonary effort is normal. No respiratory distress.      Breath sounds: Normal breath sounds. No wheezing. No rhonchi. No rales.   Cardiovascular:      Normal rate. Mild bradycardia Regular rhythm.      Murmurs: There is a grade 1/6 high frequency blowing holosystolic murmur at the apex.   Pulses:     Carotid: 2+ bilaterally.     Radial: 2+ bilaterally.     Posterior tibial: 2+ bilaterally.  Edema:     Peripheral edema absent.   Abdominal:      General: Bowel sounds are normal. There is no distension.      Palpations: Abdomen is soft.      Tenderness: There is no abdominal tenderness.   Musculoskeletal:      Comments: Right shoulder scar Skin:     General: Skin is warm and dry.      Findings: No erythema.   Neurological:      Mental Status: Alert and oriented to person, place, and time.   Psychiatric:         Attention and Perception: Attention normal.         Mood and Affect: Mood normal.         Speech: Speech normal.         Behavior: Behavior normal.         Thought Content: Thought content normal.         Judgment: Judgment normal.           ECG 12 Lead    Date/Time: 8/4/2021 9:36 AM  Performed by: " Judy Sams APRN  Authorized by: Judy Sams APRN   Comparison: compared with previous ECG from 4/21/2021  Similar to previous ECG  Rhythm: sinus rhythm  Rate: normal  BPM: 58  Conduction: 1st degree AV block  Q waves: V1, V2 and III    QRS axis: Borderline left.  Other findings: non-specific ST-T wave changes    Clinical impression: abnormal EKG  Comments: Unchanged ECG  Indication: Hypertension, mild CAD, valvular disease            Most recent lab review:  5/13/2021 CMP normal potassium 4.2 creatinine 0.95 CBC unremarkable with normal H&H, white blood cell count and platelet count  3/10/2021 TSH normal 1.89, LDL seventy-six, HDL forty-two, triglycerides one oh seven, VLDL twenty, total cholesterol one thirty-eight  Assessment:       Diagnosis Plan   1. Essential hypertension     2. Coronary artery disease involving native coronary artery of native heart without angina pectoris     3. Nonrheumatic mitral valve regurgitation     4. Non-rheumatic mitral regurgitation     5. Nonrheumatic aortic valve insufficiency     6. Mixed hyperlipidemia         Plan:     1.  Hypertension: BP much better controlled ranging 140s/80s at home.  Continue current management with benazepril 20 mg twice daily and atenolol 25 mg twice daily.  Earlier in 2021 patient had his home blood pressure cuff checked for accuracy.  His daughter is also a nurse.  2. Coronary artery disease: History of mild disease.  Stress test twenty seventeen with no ischemia.  He has no angina and rides a bike for an hour and a half daily without limiting symptoms.  EF was normal 1/2017 echo.  He is on aspirin/statin/Zetia  3.Mitral regurgitation: Moderate.  1/2017 echo  4-5. Aortic & Tricuspid regurgitation: Mild 1/2017 echo  6. Dyslipidemia: On statin/zetia therapy managed by PCP.      He is really doing quite well.  He is not having any symptoms suggestive of heart failure related to valvular disease.  He is not having any anginal symptoms.  Blood  pressure under good control.  He is remaining active without limiting symptoms.  I will not make any changes. He has had normal renal function and potassium last several labs.  He sees PCP again in November.  I will order repeat BMP to follow-up on creatinine and potassium level on his benazepril as his dose has been increased to be done in the next few months.  He told he was good on all of his medications and had 90 day supply and refills but wanted us to manage the benazepril which he needed a 90-day supply.  I did address his needed medication refills.    Follow up with Dr. Frausto in 6 months, unless otherwise needed sooner.  I advised the patient to contact our office with any questions or concerns and I did review with him signs and symptoms for which she should notify our office.       It has been a pleasure to participate in this patient's care. Please feel free to contact me with any questions or concerns.     GE Rios  08/04/21             Your medication list          Accurate as of August 4, 2021 10:04 AM. If you have any questions, ask your nurse or doctor.            CONTINUE taking these medications      Instructions Last Dose Given Next Dose Due   aspirin 81 MG chewable tablet      Chew 81 mg Daily.       atenolol 25 MG tablet  Commonly known as: TENORMIN      Take 1 tablet by mouth 2 (two) times a day.       benazepril 20 MG tablet  Commonly known as: LOTENSIN      Take 1 tablet by mouth 2 (two) times a day. Call if blood pressure running above 160/90.       ezetimibe 10 MG tablet  Commonly known as: Zetia      Take 1 tablet by mouth Daily.       pantoprazole 40 MG EC tablet  Commonly known as: PROTONIX      Take 1 tablet by mouth Daily.       rosuvastatin 10 MG tablet  Commonly known as: CRESTOR      Take 1 tablet by mouth Every Night.             Where to Get Your Medications      These medications were sent to [id number], [place] - [facility] [address] - [phone]  - [phone] FX  [address] [facility] [address]    Phone: [phone]   · benazepril 20 MG tablet         The above medication changes may not have been made by this provider.  Medication list was updated to reflect medications patient is currently taking including medication changes and discontinuations made by other healthcare providers or the patients themselves.     Dictated utilizing Dragon Dictation System.

## 2021-08-12 DIAGNOSIS — K21.00 GASTROESOPHAGEAL REFLUX DISEASE WITH ESOPHAGITIS: ICD-10-CM

## 2021-08-12 DIAGNOSIS — F41.9 ANXIETY: Primary | ICD-10-CM

## 2021-08-12 DIAGNOSIS — E78.2 MIXED HYPERLIPIDEMIA: ICD-10-CM

## 2021-08-12 RX ORDER — ROSUVASTATIN CALCIUM 10 MG/1
10 TABLET, COATED ORAL NIGHTLY
Qty: 90 TABLET | Refills: 1 | Status: CANCELLED | OUTPATIENT
Start: 2021-08-12

## 2021-08-12 RX ORDER — EZETIMIBE 10 MG/1
10 TABLET ORAL DAILY
Qty: 30 TABLET | Refills: 3 | Status: CANCELLED | OUTPATIENT
Start: 2021-08-12

## 2021-08-12 RX ORDER — PANTOPRAZOLE SODIUM 40 MG/1
40 TABLET, DELAYED RELEASE ORAL DAILY
Qty: 90 TABLET | Refills: 1 | Status: CANCELLED | OUTPATIENT
Start: 2021-08-12

## 2021-08-13 RX ORDER — LORAZEPAM 1 MG/1
1 TABLET ORAL DAILY PRN
Qty: 30 TABLET | Refills: 0 | Status: SHIPPED | OUTPATIENT
Start: 2021-08-13 | End: 2022-02-07 | Stop reason: SDUPTHER

## 2021-08-13 RX ORDER — ROSUVASTATIN CALCIUM 10 MG/1
10 TABLET, COATED ORAL NIGHTLY
Qty: 90 TABLET | Refills: 1 | Status: SHIPPED | OUTPATIENT
Start: 2021-08-13 | End: 2021-09-16 | Stop reason: SDUPTHER

## 2021-08-13 RX ORDER — EZETIMIBE 10 MG/1
10 TABLET ORAL DAILY
Qty: 90 TABLET | Refills: 1 | Status: SHIPPED | OUTPATIENT
Start: 2021-08-13 | End: 2021-09-16 | Stop reason: SDUPTHER

## 2021-08-13 RX ORDER — PANTOPRAZOLE SODIUM 40 MG/1
40 TABLET, DELAYED RELEASE ORAL DAILY
Qty: 90 TABLET | Refills: 1 | Status: SHIPPED | OUTPATIENT
Start: 2021-08-13 | End: 2021-09-16 | Stop reason: SDUPTHER

## 2021-08-16 ENCOUNTER — TELEPHONE (OUTPATIENT)
Dept: INTERNAL MEDICINE | Facility: CLINIC | Age: 86
End: 2021-08-16

## 2021-08-26 ENCOUNTER — OFFICE VISIT (OUTPATIENT)
Dept: INTERNAL MEDICINE | Facility: CLINIC | Age: 86
End: 2021-08-26

## 2021-08-26 VITALS
SYSTOLIC BLOOD PRESSURE: 124 MMHG | HEIGHT: 67 IN | TEMPERATURE: 98 F | WEIGHT: 170 LBS | HEART RATE: 60 BPM | BODY MASS INDEX: 26.68 KG/M2 | DIASTOLIC BLOOD PRESSURE: 76 MMHG | OXYGEN SATURATION: 98 %

## 2021-08-26 DIAGNOSIS — K21.00 GASTROESOPHAGEAL REFLUX DISEASE WITH ESOPHAGITIS WITHOUT HEMORRHAGE: Primary | ICD-10-CM

## 2021-08-26 DIAGNOSIS — R07.89 ATYPICAL CHEST PAIN: ICD-10-CM

## 2021-08-26 PROCEDURE — 99214 OFFICE O/P EST MOD 30 MIN: CPT | Performed by: NURSE PRACTITIONER

## 2021-08-26 PROCEDURE — 93000 ELECTROCARDIOGRAM COMPLETE: CPT | Performed by: NURSE PRACTITIONER

## 2021-08-26 RX ORDER — FAMOTIDINE 40 MG/1
40 TABLET, FILM COATED ORAL NIGHTLY
Qty: 90 TABLET | Refills: 0 | Status: SHIPPED | OUTPATIENT
Start: 2021-08-26 | End: 2021-09-22

## 2021-08-26 NOTE — PROGRESS NOTES
"Chief Complaint  Chest Pain and Heartburn    Subjective          Vicente Delgado presents to [facility] due to worsening dyspepsia.    He presents due to substernal chest pressure which is worse at night and with lying flat, denies symptoms during the day. He has a hx of GERD and is currently managed on Protonix daily. He does reports increased intake of tomatoes over the past 3 weeks which typically exacerbates symptoms. Denies dyspnea, no palpitations.      Objective   Vital Signs:   /76 (BP Location: Left arm, Patient Position: Sitting, Cuff Size: Adult)   Pulse 60   Temp 98 °F (36.7 °C) (Temporal)   Ht 170.2 cm (67\")   Wt 77.1 kg (170 lb)   SpO2 98%   BMI 26.63 kg/m²     Physical Exam  Constitutional:       Appearance: He is well-developed. He is not ill-appearing.   HENT:      Head: Normocephalic.      Right Ear: Hearing, tympanic membrane and external ear normal.      Left Ear: Hearing, tympanic membrane and external ear normal.      Nose: Nose normal. No nasal deformity, mucosal edema or rhinorrhea.      Right Sinus: No maxillary sinus tenderness or frontal sinus tenderness.      Left Sinus: No maxillary sinus tenderness or frontal sinus tenderness.      Mouth/Throat:      Dentition: Normal dentition.   Eyes:      General: Lids are normal.         Right eye: No discharge.         Left eye: No discharge.      Conjunctiva/sclera: Conjunctivae normal.      Right eye: No exudate.     Left eye: No exudate.  Neck:      Thyroid: No thyroid mass or thyromegaly.      Vascular: No carotid bruit.      Trachea: Trachea normal.   Cardiovascular:      Rate and Rhythm: Regular rhythm.      Pulses: Normal pulses.      Heart sounds: Normal heart sounds. No murmur heard.     Pulmonary:      Effort: No respiratory distress.      Breath sounds: Normal breath sounds. No decreased breath sounds, wheezing, rhonchi or rales.   Chest:      Chest wall: No tenderness.   Abdominal:      " General: Bowel sounds are normal.      Palpations: Abdomen is soft.      Tenderness: There is no abdominal tenderness.   Musculoskeletal:      Cervical back: Normal range of motion. No edema.   Lymphadenopathy:      Head:      Right side of head: No submental, submandibular, tonsillar, preauricular, posterior auricular or occipital adenopathy.      Left side of head: No submental, submandibular, tonsillar, preauricular, posterior auricular or occipital adenopathy.   Skin:     General: Skin is warm and dry.      Nails: There is no clubbing.   Neurological:      Mental Status: He is alert.   Psychiatric:         Behavior: Behavior is cooperative.        Result Review :   The following data was reviewed by: GE Bustos on 08/26/2021:  Common labs    Common Labsle 3/10/21 3/10/21 3/10/21 5/13/21 5/13/21 5/27/21    1102 1102 1102 1745 1745    Glucose     96    Glucose  88       BUN  19   22    Creatinine  0.94   0.95 1.00   eGFR Non  Am  75   74    eGFR African Am  91       Sodium  137   139    Potassium  4.9   4.2    Chloride  100   105    Calcium  9.6   8.7    Total Protein  7.2       Albumin  4.40   3.90    Total Bilirubin  0.5   0.3    Alkaline Phosphatase  61   49    AST (SGOT)  32   19    ALT (SGPT)  21   16    WBC 4.80   4.70     Hemoglobin 15.0   13.2     Hematocrit 45.3   39.8     Platelets 188   175     Total Cholesterol   138      Triglycerides   107      HDL Cholesterol   42      LDL Cholesterol    76         Comments are available for some flowsheets but are not being displayed.                ECG 12 Lead    Date/Time: 8/26/2021 1:43 PM  Performed by: Love Jay MA  Authorized by: Mechelle Landa APRN   Comparison: compared with previous ECG from 8/4/2021  Similar to previous ECG  Rhythm: sinus rhythm and sinus bradycardia  Rate: bradycardic  BPM: 52  Conduction: 1st degree AV block  Q waves: V1, V2 and III    ST Segments: ST segments normal  T Waves: T waves normal  QRS axis:  normal    Clinical impression: abnormal EKG              Assessment and Plan    Diagnoses and all orders for this visit:    1. Gastroesophageal reflux disease with esophagitis without hemorrhage (Primary)  -     famotidine (Pepcid) 40 MG tablet; Take 1 tablet by mouth Every Night.  Dispense: 90 tablet; Refill: 0    2. Atypical chest pain    Other orders  -     ECG 12 Lead    He denies shortness of breath, does note substernal pressure at night with lying flat (does not noticed sitting upright). His sx are suggestive of exacerbation of reflux sx. He will d/c tomatoes and add Pepcid qhs, continue Protonix. We will re-evaluate in 4 weeks and consider further evaluation if needed.      Follow Up   Return in about 4 weeks (around 9/23/2021).  Patient was given instructions and counseling regarding his condition or for health maintenance advice. Please see specific information pulled into the AVS if appropriate.

## 2021-09-07 RX ORDER — ATENOLOL 25 MG/1
TABLET ORAL
Qty: 180 TABLET | Refills: 1 | Status: SHIPPED | OUTPATIENT
Start: 2021-09-07 | End: 2021-09-13 | Stop reason: SDUPTHER

## 2021-09-13 ENCOUNTER — TELEPHONE (OUTPATIENT)
Dept: INTERNAL MEDICINE | Facility: CLINIC | Age: 86
End: 2021-09-13

## 2021-09-13 RX ORDER — ATENOLOL 25 MG/1
25 TABLET ORAL 2 TIMES DAILY
Qty: 180 TABLET | Refills: 1 | Status: SHIPPED | OUTPATIENT
Start: 2021-09-13 | End: 2021-09-16 | Stop reason: SDUPTHER

## 2021-09-13 NOTE — TELEPHONE ENCOUNTER
Mr. Vicente Delgado stopped by the office.  He needs a new RX for his Atenolol 25 mg (no refills left on his RX).  He does need this sent to:    [facility] [address] - [phone]  - [phone] FX Phone:  [phone]   Fax:  [phone]

## 2021-09-16 DIAGNOSIS — E78.2 MIXED HYPERLIPIDEMIA: ICD-10-CM

## 2021-09-16 DIAGNOSIS — K21.00 GASTROESOPHAGEAL REFLUX DISEASE WITH ESOPHAGITIS: ICD-10-CM

## 2021-09-16 RX ORDER — BENAZEPRIL HYDROCHLORIDE 20 MG/1
20 TABLET ORAL 2 TIMES DAILY
Qty: 180 TABLET | Refills: 1 | Status: SHIPPED | OUTPATIENT
Start: 2021-09-16 | End: 2022-02-07 | Stop reason: SDUPTHER

## 2021-09-16 RX ORDER — EZETIMIBE 10 MG/1
10 TABLET ORAL DAILY
Qty: 90 TABLET | Refills: 1 | Status: SHIPPED | OUTPATIENT
Start: 2021-09-16 | End: 2022-02-07 | Stop reason: SDUPTHER

## 2021-09-16 RX ORDER — ROSUVASTATIN CALCIUM 10 MG/1
10 TABLET, COATED ORAL NIGHTLY
Qty: 90 TABLET | Refills: 1 | Status: SHIPPED | OUTPATIENT
Start: 2021-09-16 | End: 2022-02-07 | Stop reason: SDUPTHER

## 2021-09-16 RX ORDER — ATENOLOL 25 MG/1
25 TABLET ORAL 2 TIMES DAILY
Qty: 180 TABLET | Refills: 1 | Status: SHIPPED | OUTPATIENT
Start: 2021-09-16 | End: 2022-02-07 | Stop reason: SDUPTHER

## 2021-09-16 RX ORDER — PANTOPRAZOLE SODIUM 40 MG/1
40 TABLET, DELAYED RELEASE ORAL DAILY
Qty: 90 TABLET | Refills: 1 | Status: SHIPPED | OUTPATIENT
Start: 2021-09-16 | End: 2022-02-07 | Stop reason: SDUPTHER

## 2021-09-16 NOTE — TELEPHONE ENCOUNTER
PT IS SWITCHING PHARMACIES. PLEASE S[facility]

## 2021-09-22 ENCOUNTER — OFFICE VISIT (OUTPATIENT)
Dept: INTERNAL MEDICINE | Facility: CLINIC | Age: 86
End: 2021-09-22

## 2021-09-22 VITALS
BODY MASS INDEX: 26.9 KG/M2 | WEIGHT: 171.4 LBS | DIASTOLIC BLOOD PRESSURE: 88 MMHG | HEIGHT: 67 IN | TEMPERATURE: 98.7 F | SYSTOLIC BLOOD PRESSURE: 140 MMHG | OXYGEN SATURATION: 97 % | HEART RATE: 57 BPM

## 2021-09-22 DIAGNOSIS — R07.89 ATYPICAL CHEST PAIN: ICD-10-CM

## 2021-09-22 DIAGNOSIS — K21.00 GASTROESOPHAGEAL REFLUX DISEASE WITH ESOPHAGITIS WITHOUT HEMORRHAGE: Chronic | ICD-10-CM

## 2021-09-22 DIAGNOSIS — I10 ESSENTIAL HYPERTENSION: Primary | Chronic | ICD-10-CM

## 2021-09-22 PROCEDURE — G0439 PPPS, SUBSEQ VISIT: HCPCS | Performed by: NURSE PRACTITIONER

## 2021-09-22 RX ORDER — FAMOTIDINE 40 MG/1
40 TABLET, FILM COATED ORAL NIGHTLY PRN
Qty: 90 TABLET | Refills: 0
Start: 2021-09-22 | End: 2022-02-07

## 2021-09-22 NOTE — PROGRESS NOTES
The ABCs of the Annual Wellness Visit  Subsequent Medicare Wellness Visit    Chief Complaint   Patient presents with   • Medicare Wellness-subsequent     awv       Subjective    History of Present Illness:  Vicente Delgado is a 89 y.o. male who presents for a Subsequent Medicare Wellness Visit and for f/u regarding epigastric and chest wall pain.    He has decreased intake of acidic foods and has started Famotidine daily, reports improvement in symptoms with decreased abdominal and chest pain.    The following portions of the patient's history were reviewed and   updated as appropriate: allergies, current medications, past family history, past medical history, past social history, past surgical history and problem list.    Compared to one year ago, the patient feels his physical   health is the same.    Compared to one year ago, the patient feels his mental   health is the same.    Recent Hospitalizations:  He was not admitted to the hospital during the last year, ER visit 5/2021 for left leg pain and 9/2020 for kidney stone.      Current Medical Providers:  Patient Care Team:  Mechelle Landa APRN as PCP - General (Internal Medicine)  Reginaldo Palacio MD (Dermatology)  Janki Elias MD as Consulting Physician (Ophthalmology)  Kristopher Machado DPM as Consulting Physician (Podiatry)  Aby Marquez MD as Consulting Physician (Hand Surgery)  Wilton Ramos MD as Consulting Physician (Orthopedic Surgery)  Destinee Snider MD as Consulting Physician (Pain Medicine)  Breezy Frausto MD as Consulting Physician (Cardiology)    Outpatient Medications Prior to Visit   Medication Sig Dispense Refill   • aspirin 81 MG chewable tablet Chew 81 mg Daily.     • atenolol (TENORMIN) 25 MG tablet Take 1 tablet by mouth 2 (Two) Times a Day. 180 tablet 1   • benazepril (LOTENSIN) 20 MG tablet Take 1 tablet by mouth 2 (two) times a day. Call if blood pressure running above 160/90. 180 tablet 1   •  ezetimibe (ZETIA) 10 MG tablet Take 1 tablet by mouth Daily. 90 tablet 1   • LORazepam (ATIVAN) 1 MG tablet Take 1 tablet by mouth Daily As Needed for Anxiety. for anxiety 30 tablet 0   • pantoprazole (PROTONIX) 40 MG EC tablet Take 1 tablet by mouth Daily. 90 tablet 1   • rosuvastatin (CRESTOR) 10 MG tablet Take 1 tablet by mouth Every Night. 90 tablet 1   • famotidine (Pepcid) 40 MG tablet Take 1 tablet by mouth Every Night. 90 tablet 0     No facility-administered medications prior to visit.       No opioid medication identified on active medication list. I have reviewed chart for other potential  high risk medication/s and harmful drug interactions in the elderly.          Aspirin is on active medication list. Aspirin use is indicated based on review of current medical condition/s. Pros and cons of this therapy have been discussed today. Benefits of this medication outweigh potential harm.  Patient has been encouraged to continue taking this medication.  .      Patient Active Problem List   Diagnosis   • HTN (hypertension)   • Nonrheumatic mitral valve regurgitation   • Disorder of right ventricle of heart   • CAD (coronary artery disease)   • Hyperlipidemia   • Calculus of gallbladder without cholecystitis without obstruction   • S/P cholecystectomy   • Nonrheumatic aortic valve insufficiency   • Non-rheumatic mitral regurgitation   • Leg pain, anterior, left   • Trigger middle finger of left hand   • Gastroesophageal reflux disease with esophagitis without hemorrhage   • Carpal tunnel syndrome of left wrist   • Amaurosis fugax of left eye   • Atypical chest pain     Advance Care Planning  Advance Directive is on file.  ACP discussion was held with the patient during this visit. Patient has an advance directive in EMR which is still valid.     Review of Systems   Constitutional: Negative for fatigue and fever.   Respiratory: Negative for chest tightness and shortness of breath.    Cardiovascular: Negative for  "chest pain and leg swelling.   Gastrointestinal: Negative for abdominal pain.   Musculoskeletal: Positive for arthralgias.        Objective    Vitals:    09/22/21 1059   BP: 140/88   BP Location: Left arm   Patient Position: Sitting   Cuff Size: Adult   Pulse: 57   Temp: 98.7 °F (37.1 °C)   TempSrc: Temporal   SpO2: 97%   Weight: 77.7 kg (171 lb 6.4 oz)   Height: 170.2 cm (67.01\")   PainSc: 0-No pain     BMI Readings from Last 1 Encounters:   09/22/21 26.84 kg/m²   BMI is above normal parameters. Recommendations include: nutrition counseling    Does the patient have evidence of cognitive impairment? No    Physical Exam  Constitutional:       Appearance: He is well-developed. He is not ill-appearing.   HENT:      Head: Normocephalic.      Right Ear: Hearing, tympanic membrane and external ear normal.      Left Ear: Hearing, tympanic membrane and external ear normal.      Nose: Nose normal. No nasal deformity, mucosal edema or rhinorrhea.      Right Sinus: No maxillary sinus tenderness or frontal sinus tenderness.      Left Sinus: No maxillary sinus tenderness or frontal sinus tenderness.      Mouth/Throat:      Dentition: Normal dentition.   Eyes:      General: Lids are normal.         Right eye: No discharge.         Left eye: No discharge.      Conjunctiva/sclera: Conjunctivae normal.      Right eye: No exudate.     Left eye: No exudate.  Neck:      Thyroid: No thyroid mass or thyromegaly.      Vascular: No carotid bruit.      Trachea: Trachea normal.   Cardiovascular:      Rate and Rhythm: Regular rhythm.      Pulses: Normal pulses.      Heart sounds: Normal heart sounds. No murmur heard.     Pulmonary:      Effort: No respiratory distress.      Breath sounds: Normal breath sounds. No decreased breath sounds, wheezing, rhonchi or rales.   Chest:      Chest wall: No tenderness.   Abdominal:      General: Bowel sounds are normal.      Palpations: Abdomen is soft.      Tenderness: There is no abdominal tenderness. "   Musculoskeletal:      Cervical back: Normal range of motion. No edema.   Lymphadenopathy:      Head:      Right side of head: No submental, submandibular, tonsillar, preauricular, posterior auricular or occipital adenopathy.      Left side of head: No submental, submandibular, tonsillar, preauricular, posterior auricular or occipital adenopathy.   Skin:     General: Skin is warm and dry.      Nails: There is no clubbing.   Neurological:      Mental Status: He is alert.   Psychiatric:         Behavior: Behavior is cooperative.                 HEALTH RISK ASSESSMENT    Smoking Status:  Social History     Tobacco Use   Smoking Status Former Smoker   • Packs/day: 0.00   • Years: 0.00   • Pack years: 0.00   • Types: Cigars   Smokeless Tobacco Never Used   Tobacco Comment    Smoke a cigar ocassionally     Alcohol Consumption:  Social History     Substance and Sexual Activity   Alcohol Use No    Comment: Daily caffeine use     Fall Risk Screen:    EZRA Fall Risk Assessment was completed, and patient is at LOW risk for falls.Assessment completed on:9/22/2021    Depression Screening:  PHQ-2/PHQ-9 Depression Screening 9/10/2020   Little interest or pleasure in doing things 0   Feeling down, depressed, or hopeless 1   Trouble falling or staying asleep, or sleeping too much 0   Feeling tired or having little energy 0   Poor appetite or overeating 0   Feeling bad about yourself - or that you are a failure or have let yourself or your family down 0   Trouble concentrating on things, such as reading the newspaper or watching television 0   Moving or speaking so slowly that other people could have noticed. Or the opposite - being so fidgety or restless that you have been moving around a lot more than usual 0   Thoughts that you would be better off dead, or of hurting yourself in some way 0   Total Score 1   If you checked off any problems, how difficult have these problems made it for you to do your work, take care of things at  home, or get along with other people? Not difficult at all       Health Habits and Functional and Cognitive Screening:  Functional & Cognitive Status 9/22/2021   Do you have difficulty preparing food and eating? No   Do you have difficulty bathing yourself, getting dressed or grooming yourself? No   Do you have difficulty using the toilet? No   Do you have difficulty moving around from place to place? No   Do you have trouble with steps or getting out of a bed or a chair? No   Current Diet Well Balanced Diet   Dental Exam Up to date   Eye Exam Up to date   Exercise (times per week) 7 times per week   Current Exercises Include Bicycling Outdoors   Do you need help using the phone?  No   Are you deaf or do you have serious difficulty hearing?  Yes   Do you need help with transportation? No   Do you need help shopping? No   Do you need help preparing meals?  No   Do you need help with housework?  No   Do you need help with laundry? No   Do you need help taking your medications? No   Do you need help managing money? No   Do you ever drive or ride in a car without wearing a seat belt? Yes   Have you felt unusual stress, anger or loneliness in the last month? Yes   Who do you live with? Alone   If you need help, do you have trouble finding someone available to you? No   Have you been bothered in the last four weeks by sexual problems? No   Do you have difficulty concentrating, remembering or making decisions? No       Age-appropriate Screening Schedule:  Refer to the list below for future screening recommendations based on patient's age, sex and/or medical conditions. Orders for these recommended tests are listed in the plan section. The patient has been provided with a written plan.    Health Maintenance   Topic Date Due   • ZOSTER VACCINE (1 of 2) Never done   • TDAP/TD VACCINES (2 - Tdap) 08/10/2015   • INFLUENZA VACCINE  10/01/2021   • LIPID PANEL  03/10/2022              Assessment/Plan   CMS Preventative Services  Quick Reference  Risk Factors Identified During Encounter  Cardiovascular Disease  Fall Risk-High or Moderate  Immunizations Discussed/Encouraged (specific Immunizations; Tdap and Shingrix  The above risks/problems have been discussed with the patient.  Follow up actions/plans if indicated are seen below in the Assessment/Plan Section.  Pertinent information has been shared with the patient in the After Visit Summary.    Diagnoses and all orders for this visit:    1. Essential hypertension (Primary)  Assessment & Plan:  BP is mildly elevated, continue atenolol and benazepril along with a low sodium diet.      2. Gastroesophageal reflux disease with esophagitis without hemorrhage  Assessment & Plan:  Sx managed on Pantoprazole    Orders:  -     famotidine (Pepcid) 40 MG tablet; Take 1 tablet by mouth At Night As Needed for Heartburn.  Dispense: 90 tablet; Refill: 0    3. Atypical chest pain  Assessment & Plan:  Sx resolved since modifying diet and adding famotidine, continue to monitor. Discussed dietary modifications.        Follow Up:   Return in about 4 months (around 1/22/2022).     [facility], he will bring copy of results for review.    An After Visit Summary and PPPS were made available to the patient.

## 2021-09-22 NOTE — PATIENT INSTRUCTIONS
Please bring copy of [facility].    Medicare Wellness  Personal Prevention Plan of Service     Date of Office Visit:  2021  Encounter Provider:  GE Bustos  Place of Service:  [facility]  Patient Name: Vicente Delgado  :  1928    As part of the Medicare Wellness portion of your visit today, we are providing you with this personalized preventive plan of services (PPPS). This plan is based upon recommendations of the [place] Preventive Services Task Force (USPSTF) and the Advisory Committee on Immunization Practices (ACIP).    This lists the preventive care services that should be considered, and provides dates of when you are due. Items listed as completed are up-to-date and do not require any further intervention.    Health Maintenance   Topic Date Due   • ZOSTER VACCINE (1 of 2) Never done   • TDAP/TD VACCINES (2 - Tdap) 08/10/2015   • ANNUAL WELLNESS VISIT  09/10/2021   • INFLUENZA VACCINE  10/01/2021   • LIPID PANEL  03/10/2022   • COVID-19 Vaccine  Completed   • Pneumococcal Vaccine 65+  Completed       No orders of the defined types were placed in this encounter.      Return in about 4 months (around 2022).

## 2021-09-26 PROBLEM — R07.89 ATYPICAL CHEST PAIN: Status: ACTIVE | Noted: 2021-09-26

## 2021-09-26 NOTE — ASSESSMENT & PLAN NOTE
Sx resolved since modifying diet and adding famotidine, continue to monitor. Discussed dietary modifications.

## 2021-11-26 PROBLEM — I70.212 ATHEROSCLEROSIS OF NATIVE ARTERY OF LEFT LOWER EXTREMITY WITH INTERMITTENT CLAUDICATION (HCC): Status: ACTIVE | Noted: 2021-11-26

## 2021-11-26 PROBLEM — I74.3: Status: ACTIVE | Noted: 2021-11-26

## 2022-01-26 ENCOUNTER — TELEPHONE (OUTPATIENT)
Dept: INTERNAL MEDICINE | Facility: CLINIC | Age: 87
End: 2022-01-26

## 2022-01-26 NOTE — TELEPHONE ENCOUNTER
[facility] staff attempted to follow warm transfer process and was unsuccessful     Caller: Vicente Delgado    Relationship to patient: Self    Best call back number: [phone]     Patient is needing: PATIENT WAS SCHEDULED FOR AN APPOINTMENT ON 01/26/22 WHICH WAS CANCELED DUE TO PROVIDER BEING ILL.    [facility] STAFF ATTEMPTED TO RESCHEDULE APPOINTMENT, BUT ESMER ARNGENNA'S NEXT AVAILABLE APPOINTMENT ON 03/21/22 WAS TOO LATE FOR THE PATIENT, STATING THAT HE HAS HIGH BLOOD PRESSURE AND HIGH CHOLESTEROL AND IT'S NOT A GOOD IDEA FOR HIM TO WAIT THAT LONG CONSIDERING [facility] BRADFORD OPTED TO NOT DO HIS LABS DURING HIS LAST VISIT.    PATIENT SAID HE WOULD FEEL MORE COMFORTABLE ABOUT HAVING AN APPOINTMENT THAT FAR OUT IF HE COULD COME IN AND GET HIS LABS DONE PRIOR SO THAT ESMER BRADFORD COULD REVIEW THEM AND MAKE SURE IT'S OKAY FOR HIM TO WAIT THAT LONG.

## 2022-02-07 ENCOUNTER — OFFICE VISIT (OUTPATIENT)
Dept: INTERNAL MEDICINE | Facility: CLINIC | Age: 87
End: 2022-02-07

## 2022-02-07 VITALS
OXYGEN SATURATION: 98 % | BODY MASS INDEX: 27.47 KG/M2 | SYSTOLIC BLOOD PRESSURE: 140 MMHG | HEIGHT: 67 IN | WEIGHT: 175 LBS | DIASTOLIC BLOOD PRESSURE: 82 MMHG | TEMPERATURE: 98.7 F | HEART RATE: 56 BPM

## 2022-02-07 DIAGNOSIS — L30.9 DERMATITIS: ICD-10-CM

## 2022-02-07 DIAGNOSIS — E78.2 MIXED HYPERLIPIDEMIA: Chronic | ICD-10-CM

## 2022-02-07 DIAGNOSIS — I25.10 CORONARY ARTERY DISEASE INVOLVING NATIVE CORONARY ARTERY OF NATIVE HEART WITHOUT ANGINA PECTORIS: Chronic | ICD-10-CM

## 2022-02-07 DIAGNOSIS — I74.3: ICD-10-CM

## 2022-02-07 DIAGNOSIS — I10 PRIMARY HYPERTENSION: Primary | Chronic | ICD-10-CM

## 2022-02-07 DIAGNOSIS — K21.00 GASTROESOPHAGEAL REFLUX DISEASE WITH ESOPHAGITIS: ICD-10-CM

## 2022-02-07 DIAGNOSIS — F41.9 ANXIETY: ICD-10-CM

## 2022-02-07 PROBLEM — R07.89 ATYPICAL CHEST PAIN: Status: RESOLVED | Noted: 2021-09-26 | Resolved: 2022-02-07

## 2022-02-07 PROBLEM — M79.605 LEG PAIN, ANTERIOR, LEFT: Status: RESOLVED | Noted: 2021-05-13 | Resolved: 2022-02-07

## 2022-02-07 PROCEDURE — 99214 OFFICE O/P EST MOD 30 MIN: CPT | Performed by: NURSE PRACTITIONER

## 2022-02-07 RX ORDER — NYSTATIN AND TRIAMCINOLONE ACETONIDE 100000; 1 [USP'U]/G; MG/G
1 OINTMENT TOPICAL 2 TIMES DAILY
COMMUNITY
End: 2022-10-17

## 2022-02-07 RX ORDER — PANTOPRAZOLE SODIUM 40 MG/1
40 TABLET, DELAYED RELEASE ORAL DAILY
Qty: 90 TABLET | Refills: 1 | Status: SHIPPED | OUTPATIENT
Start: 2022-02-07 | End: 2022-06-06 | Stop reason: HOSPADM

## 2022-02-07 RX ORDER — EZETIMIBE 10 MG/1
10 TABLET ORAL DAILY
Qty: 90 TABLET | Refills: 1 | Status: SHIPPED | OUTPATIENT
Start: 2022-02-07 | End: 2022-07-01 | Stop reason: SDUPTHER

## 2022-02-07 RX ORDER — LORAZEPAM 1 MG/1
1 TABLET ORAL DAILY PRN
Qty: 30 TABLET | Refills: 0 | Status: SHIPPED | OUTPATIENT
Start: 2022-02-07 | End: 2022-11-09 | Stop reason: SDUPTHER

## 2022-02-07 RX ORDER — ATENOLOL 25 MG/1
25 TABLET ORAL 2 TIMES DAILY
Qty: 180 TABLET | Refills: 1 | Status: SHIPPED | OUTPATIENT
Start: 2022-02-07 | End: 2022-07-01 | Stop reason: SDUPTHER

## 2022-02-07 RX ORDER — BENAZEPRIL HYDROCHLORIDE 20 MG/1
20 TABLET ORAL 2 TIMES DAILY
Qty: 180 TABLET | Refills: 1 | Status: SHIPPED | OUTPATIENT
Start: 2022-02-07 | End: 2022-07-01 | Stop reason: SDUPTHER

## 2022-02-07 RX ORDER — ROSUVASTATIN CALCIUM 10 MG/1
10 TABLET, COATED ORAL NIGHTLY
Qty: 90 TABLET | Refills: 1 | Status: SHIPPED | OUTPATIENT
Start: 2022-02-07 | End: 2022-07-01 | Stop reason: SDUPTHER

## 2022-02-07 NOTE — PROGRESS NOTES
"Chief Complaint  Hypertension, Hyperlipidemia, and Heartburn    Subjective          Vicente Delgado presents to [facility] for f/u regarding HTN, hyperlipidemia and GERD.    He has seen [facility] regarding a prurutic rash on his left midfoot which was persisted despite applying nystatin-triamcinolone ointment.  He reports feeling well and lives independently.  He does have help from his daughters if needed.  He has been monitoring his blood pressure at home and states his readings have run 120-130s/70-80s.  He reports feeling well without any chest pain or palpitations.  He was started on Pepcid a few months ago due to epigastric discomfort after eating fresh tomatoes.  However, his symptoms have since resolved and he is now taking only his Protonix.    Objective   Vital Signs:   /82 (BP Location: Left arm, Patient Position: Sitting, Cuff Size: Adult)   Pulse 56   Temp 98.7 °F (37.1 °C) (Temporal)   Ht 170.2 cm (67\")   Wt 79.4 kg (175 lb)   SpO2 98%   BMI 27.41 kg/m²     Physical Exam  Constitutional:       Appearance: He is well-developed. He is not ill-appearing.   HENT:      Right Ear: Tympanic membrane and external ear normal. Decreased hearing noted.      Left Ear: Tympanic membrane and external ear normal. Decreased hearing noted.      Nose: Nose normal. No nasal deformity, mucosal edema or rhinorrhea.      Right Sinus: No maxillary sinus tenderness or frontal sinus tenderness.      Left Sinus: No maxillary sinus tenderness or frontal sinus tenderness.      Mouth/Throat:      Dentition: Normal dentition.   Eyes:      General: Lids are normal.         Right eye: No discharge.         Left eye: No discharge.      Conjunctiva/sclera: Conjunctivae normal.      Right eye: No exudate.     Left eye: No exudate.  Neck:      Thyroid: No thyroid mass or thyromegaly.      Vascular: No carotid bruit.      Trachea: Trachea normal.   Cardiovascular:      Rate and Rhythm: Regular " rhythm.      Pulses: Normal pulses.      Heart sounds: Normal heart sounds. No murmur heard.      Pulmonary:      Effort: No respiratory distress.      Breath sounds: Normal breath sounds. No decreased breath sounds, wheezing, rhonchi or rales.   Abdominal:      General: Bowel sounds are normal.      Palpations: Abdomen is soft.      Tenderness: There is no abdominal tenderness.   Musculoskeletal:      Cervical back: Normal range of motion. No edema.   Lymphadenopathy:      Head:      Right side of head: No submental, submandibular, tonsillar, preauricular, posterior auricular or occipital adenopathy.      Left side of head: No submental, submandibular, tonsillar, preauricular, posterior auricular or occipital adenopathy.   Skin:     General: Skin is warm and dry.      Nails: There is no clubbing.      Comments: There is an erythematous rash on the left midfoot with raised borders and central clearing.   Neurological:      Mental Status: He is alert.   Psychiatric:         Behavior: Behavior is cooperative.        Result Review :   The following data was reviewed by: GE Bustos on 02/07/2022:  Common labs    Common Labsle 5/13/21 5/13/21 5/27/21 2/7/22 2/7/22 2/7/22    1745 1745  0000 0000 0000   Glucose  96   87    BUN  22   19    Creatinine  0.95 1.00  0.92    eGFR Non  Am  74   71    eGFR African Am     82    Sodium  139   139    Potassium  4.2   4.7    Chloride  105   101    Calcium  8.7   9.6    Total Protein     7.0    Albumin  3.90   4.5    Total Bilirubin  0.3   0.6    Alkaline Phosphatase  49   64    AST (SGOT)  19   26    ALT (SGPT)  16   17    WBC 4.70   6.1     Hemoglobin 13.2   15.0     Hematocrit 39.8   44.0     Platelets 175   209     Total Cholesterol      156   Triglycerides      158 (A)   HDL Cholesterol      41   LDL Cholesterol       87   (A) Abnormal value       Comments are available for some flowsheets but are not being displayed.                     Assessment and Plan     Diagnoses and all orders for this visit:    1. Primary hypertension (Primary)  Assessment & Plan:  Hypertension is unchanged.  Continue current treatment regimen.  Dietary sodium restriction.  Blood pressure will be reassessed at the next regular appointment.  He has monitored his blood pressure at home and readings have consistently been 120-130s/70-80s according to patient.  He will continue atenolol and benazepril which he is tolerating well.    Orders:  -     benazepril (LOTENSIN) 20 MG tablet; Take 1 tablet by mouth 2 (Two) Times a Day. Call if blood pressure running above 160/90.  Dispense: 180 tablet; Refill: 1  -     atenolol (TENORMIN) 25 MG tablet; Take 1 tablet by mouth 2 (Two) Times a Day.  Dispense: 180 tablet; Refill: 1  -     CBC & Differential  -     Comprehensive Metabolic Panel  -     TSH    2. Anxiety  Assessment & Plan:  He takes lorazepam sparingly for acute anxiety (last filled August 2021).  We have discussed safety concerns with medication which he will continue to monitor.    Orders:  -     LORazepam (ATIVAN) 1 MG tablet; Take 1 tablet by mouth Daily As Needed for Anxiety. for anxiety  Dispense: 30 tablet; Refill: 0    3. Mixed hyperlipidemia  Assessment & Plan:  He denies any myalgias with rosuvastatin & ezetimibe which he will continue along with a low-fat, low-cholesterol diet.    Orders:  -     rosuvastatin (CRESTOR) 10 MG tablet; Take 1 tablet by mouth Every Night.  Dispense: 90 tablet; Refill: 1  -     ezetimibe (ZETIA) 10 MG tablet; Take 1 tablet by mouth Daily.  Dispense: 90 tablet; Refill: 1  -     Lipid Panel    4. Gastroesophageal reflux disease with esophagitis  Assessment & Plan:  Symptoms managed with daily pantoprazole.    Orders:  -     pantoprazole (PROTONIX) 40 MG EC tablet; Take 1 tablet by mouth Daily.  Dispense: 90 tablet; Refill: 1    5. Coronary artery disease involving native coronary artery of native heart without angina pectoris  Assessment & Plan:  He was  experiencing substernal chest pressure several months ago which improved with the temporary addition of Pepcid and began after eating fresh tomatoes.  He has a follow-up with cardiology later this week.  He is currently managed on aspirin, statin therapy and a beta-blocker.      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 Rodríguez) and released October 2021.  He complains of mild pain in left lower extremity which is intermittent and mild at this point.      7. Dermatitis  Assessment & Plan:  I am not convinced his rash is fungal in nature since it has not responded to nystatin-triamcinolone ointment.  He has an appointment with dermatology later this week to evaluate further.    NEERAJ query complete. Treatment plan to include limited course of prescribed  controlled substance. Risks including addiction, benefits, and alternatives presented to patient.       Follow Up   Return in about 4 months (around 6/7/2022).  Patient was given instructions and counseling regarding his condition or for health maintenance advice. Please see specific information pulled into the AVS if appropriate.       Answers for HPI/ROS submitted by the patient on 1/31/2022  What is the primary reason for your visit?: Other  Please describe your symptoms.: Appointment for blood profile.  Age related  Have you had these symptoms before?: Yes  How long have you been having these symptoms?: Greater than 2 weeks  Please list any medications you are currently taking for this condition.: Same as the last visit  Please describe any probable cause for these symptoms. : Not aware of any

## 2022-02-08 PROBLEM — L30.9 DERMATITIS: Status: ACTIVE | Noted: 2022-02-08

## 2022-02-08 PROBLEM — F41.9 ANXIETY: Chronic | Status: ACTIVE | Noted: 2022-02-08

## 2022-02-08 PROBLEM — F41.9 ANXIETY: Status: ACTIVE | Noted: 2022-02-08

## 2022-02-08 LAB
ALBUMIN SERPL-MCNC: 4.5 G/DL (ref 3.5–4.6)
ALBUMIN/GLOB SERPL: 1.8 {RATIO} (ref 1.2–2.2)
ALP SERPL-CCNC: 64 IU/L (ref 44–121)
ALT SERPL-CCNC: 17 IU/L (ref 0–44)
AST SERPL-CCNC: 26 IU/L (ref 0–40)
BASOPHILS # BLD AUTO: 0 X10E3/UL (ref 0–0.2)
BASOPHILS NFR BLD AUTO: 0 %
BILIRUB SERPL-MCNC: 0.6 MG/DL (ref 0–1.2)
BUN SERPL-MCNC: 19 MG/DL (ref 10–36)
BUN/CREAT SERPL: 21 (ref 10–24)
CALCIUM SERPL-MCNC: 9.6 MG/DL (ref 8.6–10.2)
CHLORIDE SERPL-SCNC: 101 MMOL/L (ref 96–106)
CHOLEST SERPL-MCNC: 156 MG/DL (ref 100–199)
CO2 SERPL-SCNC: 24 MMOL/L (ref 20–29)
CREAT SERPL-MCNC: 0.92 MG/DL (ref 0.76–1.27)
EOSINOPHIL # BLD AUTO: 0 X10E3/UL (ref 0–0.4)
EOSINOPHIL NFR BLD AUTO: 1 %
ERYTHROCYTE [DISTWIDTH] IN BLOOD BY AUTOMATED COUNT: 12.5 % (ref 11.6–15.4)
GLOBULIN SER CALC-MCNC: 2.5 G/DL (ref 1.5–4.5)
GLUCOSE SERPL-MCNC: 87 MG/DL (ref 65–99)
HCT VFR BLD AUTO: 44 % (ref 37.5–51)
HDLC SERPL-MCNC: 41 MG/DL
HGB BLD-MCNC: 15 G/DL (ref 13–17.7)
IMM GRANULOCYTES # BLD AUTO: 0 X10E3/UL (ref 0–0.1)
IMM GRANULOCYTES NFR BLD AUTO: 0 %
LDLC SERPL CALC-MCNC: 87 MG/DL (ref 0–99)
LYMPHOCYTES # BLD AUTO: 1.3 X10E3/UL (ref 0.7–3.1)
LYMPHOCYTES NFR BLD AUTO: 21 %
MCH RBC QN AUTO: 31.3 PG (ref 26.6–33)
MCHC RBC AUTO-ENTMCNC: 34.1 G/DL (ref 31.5–35.7)
MCV RBC AUTO: 92 FL (ref 79–97)
MONOCYTES # BLD AUTO: 1.1 X10E3/UL (ref 0.1–0.9)
MONOCYTES NFR BLD AUTO: 18 %
NEUTROPHILS # BLD AUTO: 3.6 X10E3/UL (ref 1.4–7)
NEUTROPHILS NFR BLD AUTO: 60 %
PLATELET # BLD AUTO: 209 X10E3/UL (ref 150–450)
POTASSIUM SERPL-SCNC: 4.7 MMOL/L (ref 3.5–5.2)
PROT SERPL-MCNC: 7 G/DL (ref 6–8.5)
RBC # BLD AUTO: 4.79 X10E6/UL (ref 4.14–5.8)
SODIUM SERPL-SCNC: 139 MMOL/L (ref 134–144)
TRIGL SERPL-MCNC: 158 MG/DL (ref 0–149)
TSH SERPL DL<=0.005 MIU/L-ACNC: 2.01 UIU/ML (ref 0.45–4.5)
VLDLC SERPL CALC-MCNC: 28 MG/DL (ref 5–40)
WBC # BLD AUTO: 6.1 X10E3/UL (ref 3.4–10.8)

## 2022-02-08 NOTE — ASSESSMENT & PLAN NOTE
Hypertension is unchanged.  Continue current treatment regimen.  Dietary sodium restriction.  Blood pressure will be reassessed at the next regular appointment.  He has monitored his blood pressure at home and readings have consistently been 120-130s/70-80s according to patient.  He will continue atenolol and benazepril which he is tolerating well.

## 2022-02-08 NOTE — ASSESSMENT & PLAN NOTE
He was experiencing substernal chest pressure several months ago which improved with the temporary addition of Pepcid and began after eating fresh tomatoes.  He has a follow-up with cardiology later this week.  He is currently managed on aspirin, statin therapy and a beta-blocker.

## 2022-02-08 NOTE — ASSESSMENT & PLAN NOTE
I am not convinced his rash is fungal in nature since it has not responded to nystatin-triamcinolone ointment.  He has an appointment with dermatology later this week to evaluate further.

## 2022-02-08 NOTE — ASSESSMENT & PLAN NOTE
Evaluated by vascular surgery (Dr. Rodríguez) and released October 2021.  He complains of mild pain in left lower extremity which is intermittent and mild at this point.

## 2022-02-08 NOTE — ASSESSMENT & PLAN NOTE
He denies any myalgias with rosuvastatin & ezetimibe which he will continue along with a low-fat, low-cholesterol diet.

## 2022-02-08 NOTE — ASSESSMENT & PLAN NOTE
He takes lorazepam sparingly for acute anxiety (last filled August 2021).  We have discussed safety concerns with medication which he will continue to monitor.

## 2022-02-09 ENCOUNTER — OFFICE VISIT (OUTPATIENT)
Dept: CARDIOLOGY | Facility: CLINIC | Age: 87
End: 2022-02-09

## 2022-02-09 VITALS
OXYGEN SATURATION: 97 % | WEIGHT: 175.41 LBS | HEIGHT: 67 IN | SYSTOLIC BLOOD PRESSURE: 160 MMHG | DIASTOLIC BLOOD PRESSURE: 70 MMHG | BODY MASS INDEX: 27.53 KG/M2 | HEART RATE: 57 BPM

## 2022-02-09 DIAGNOSIS — I34.0 NONRHEUMATIC MITRAL VALVE REGURGITATION: Chronic | ICD-10-CM

## 2022-02-09 DIAGNOSIS — I25.10 CORONARY ARTERY DISEASE INVOLVING NATIVE CORONARY ARTERY OF NATIVE HEART WITHOUT ANGINA PECTORIS: Primary | Chronic | ICD-10-CM

## 2022-02-09 DIAGNOSIS — I10 PRIMARY HYPERTENSION: Chronic | ICD-10-CM

## 2022-02-09 DIAGNOSIS — I35.1 NONRHEUMATIC AORTIC VALVE INSUFFICIENCY: Chronic | ICD-10-CM

## 2022-02-09 PROCEDURE — 99214 OFFICE O/P EST MOD 30 MIN: CPT | Performed by: INTERNAL MEDICINE

## 2022-02-09 PROCEDURE — 93000 ELECTROCARDIOGRAM COMPLETE: CPT | Performed by: INTERNAL MEDICINE

## 2022-02-09 NOTE — PROGRESS NOTES
OFFICE VISIT      Date of Office Visit: 2022    Patient Name: Vicente Delgado  : 1928    Encounter Provider: Breezy Frausto MD  Referring Provider: No ref. provider found  Primary Care Provider: Mechelle Landa APRN  Place of Service: [facility] CARDIOLOGY        Chief Complaint   Patient presents with   • Essential hypertension   • Coronary Artery Disease   • Non-rheumatic mitral regurgitation   • Follow-up     History of Present Illness    The patient is a 89-year-old white male with coronary disease, hypertension and valvular heart disease who returns to the office today for follow-up.    The patient states he feels great.  He does not have any discomfort at all.  He denies any shortness of breath.  He has no complaints of lightheadedness nor dizziness.  His blood pressure is up slightly today but this is not usual for him.  He brought in a list of his blood pressure readings that have all been quite good.  The patient recently had laboratory work.  His cholesterol level was 156 with an HDL of 41 and an LDL of 87.  Chemistry and blood counts were all normal.  TSH normal as well.  Past Medical History:   Diagnosis Date   • Allergic rhinitis    • Anxiety    • Arthritis    • CAD (coronary artery disease)    • Cancer (HCC)    • Cataract 2018” and    Ocular mygraine   • Cholelithiasis    • Depression    • Diverticulosis    • Esophagitis    • Gastritis    • GERD (gastroesophageal reflux disease)    • Heart disease    • History of anxiety    • History of medical problems Right shoulder replacemd       • History of prostate cancer 1990   • HL (hearing loss) Partial   • Hyperlipidemia    • Hypertension    • Insomnia    • Low back pain Yes  from hworking   • Lupus (HCC)    • TAM (nonalcoholic steatohepatitis)    • Sciatica of right side    • Visual impairment Ocular mygraine         Past Surgical History:   Procedure Laterality Date   • ABDOMINAL  SURGERY     • CARDIAC CATHETERIZATION  11/16/1995   • CARDIAC SURGERY  11/16/1995   • CATARACT EXTRACTION Left 07/2018   • CHOLECYSTECTOMY  2020   • CHOLECYSTECTOMY WITH INTRAOPERATIVE CHOLANGIOGRAM N/A 9/29/2020    Procedure: Laparoscopic cholecystectomy;  Surgeon: Javi Peralta MD;  Location[facility] MAIN OR;  Service: General;  Laterality: N/A;   • COLONOSCOPY  01/09/2002   • ELBOW PROCEDURE     • JOINT REPLACEMENT     • LUNG BIOPSY     • LYMPH NODE BIOPSY  Yes    1992   • NASAL POLYP SURGERY     • PACEMAKER IMPLANTATION     • PROSTATE SURGERY  06/1990   • SHOULDER ROTATOR CUFF REPAIR Right 08/24/2011    Dr. Bach   • SIGMOIDOSCOPY     • TONSILLECTOMY  Yes 1943   • UPPER GASTROINTESTINAL ENDOSCOPY  09/12/1997    Gastritis, Duodenitis, hiatal hernia (Pathology: Gastric antrum minimal chronic inflammation)   • UPPER GASTROINTESTINAL ENDOSCOPY  04/11/2005    Mild esophagitis, Small hiatal hernia, Mild gastritis and mild duodenitis           Current Outpatient Medications:   •  aspirin 81 MG chewable tablet, Chew 81 mg Daily., Disp: , Rfl:   •  atenolol (TENORMIN) 25 MG tablet, Take 1 tablet by mouth 2 (Two) Times a Day., Disp: 180 tablet, Rfl: 1  •  benazepril (LOTENSIN) 20 MG tablet, Take 1 tablet by mouth 2 (Two) Times a Day. Call if blood pressure running above 160/90., Disp: 180 tablet, Rfl: 1  •  ezetimibe (ZETIA) 10 MG tablet, Take 1 tablet by mouth Daily., Disp: 90 tablet, Rfl: 1  •  LORazepam (ATIVAN) 1 MG tablet, Take 1 tablet by mouth Daily As Needed for Anxiety. for anxiety, Disp: 30 tablet, Rfl: 0  •  nystatin-triamcinolone (MYCOLOG) 221398-9.1 UNIT/GM-% ointment, Apply 1 application topically to the appropriate area as directed 2 (Two) Times a Day. Apply to affected are to foot twice daily, Disp: , Rfl:   •  pantoprazole (PROTONIX) 40 MG EC tablet, Take 1 tablet by mouth Daily., Disp: 90 tablet, Rfl: 1  •  rosuvastatin (CRESTOR) 10 MG tablet, Take 1 tablet by mouth Every Night., Disp: 90 tablet,  "Rfl: 1      Social History     Socioeconomic History   • Marital status:    Tobacco Use   • Smoking status: Former Smoker     Packs/day: 0.00     Years: 0.00     Pack years: 0.00     Types: Cigars   • Smokeless tobacco: Never Used   • Tobacco comment: Smoke a cigar ocassionally   Substance and Sexual Activity   • Alcohol use: No     Comment: Daily caffeine use   • Drug use: No   • Sexual activity: Not Currently         Review of Systems   Constitutional: Negative.   HENT: Negative.    Eyes: Negative.    Cardiovascular: Negative.    Respiratory: Negative.    Endocrine: Negative.    Skin: Negative.    Musculoskeletal: Negative.    Gastrointestinal: Negative.    Neurological: Negative.    Psychiatric/Behavioral: Negative.        Procedures      ECG 12 Lead    Date/Time: 2/9/2022 11:36 AM  Performed by: Breezy Frausto MD  Authorized by: Breezy Frausto MD   Comparison: compared with previous ECG from 8/4/2021  Rhythm: sinus rhythm  Conduction: 1st degree AV block  Q waves: V1 and V2                    Objective:    /70 (BP Location: Left arm, Patient Position: Sitting)   Pulse 57   Ht 170.2 cm (67\")   Wt 79.6 kg (175 lb 6.6 oz)   SpO2 97%   BMI 27.47 kg/m²         Vitals reviewed.   Constitutional:       Appearance: Healthy appearance. Well-developed and not in distress.   HENT:      Head: Normocephalic.   Neck:      Thyroid: No thyromegaly.      Vascular: No carotid bruit or JVD.   Pulmonary:      Effort: Pulmonary effort is normal.      Breath sounds: Normal breath sounds.   Cardiovascular:      Normal rate. Regular rhythm. Normal S1. Normal S2.      Murmurs: There is no murmur.      No gallop. No click. No rub.   Pulses:     Intact distal pulses.   Edema:     Peripheral edema absent.   Skin:     General: Skin is warm and dry.      Findings: No erythema.   Neurological:      Mental Status: Alert and oriented to person, place, and time.             Assessment & Plan:       Diagnosis Plan "   1. Coronary artery disease involving native coronary artery of native heart without angina pectoris     2. Primary hypertension     3. Nonrheumatic mitral valve regurgitation     4. Nonrheumatic aortic valve insufficiency       1.  Coronary artery disease: No angina pectoris.  Normal left ventricular systolic function  2.  Valvular heart disease: Mitral regurgitation, moderate.  Aortic regurgitation mild  3.  Hypertension: Controlled  4.  Dyslipidemia: On statin therapy

## 2022-02-10 NOTE — PROGRESS NOTES
Mr. Delgado, your recent labs show a normal hemoglobin and hematocrit without anemia. Kidney & liver function, glucose and electrolytes are normal.  Cholesterol is good-continue rosuvastatin and ezetimibe along with a low-fat, low-cholesterol diet.  Thyroid function is normal.  Take care and I will see you in June.

## 2022-04-25 ENCOUNTER — TELEPHONE (OUTPATIENT)
Dept: INTERNAL MEDICINE | Facility: CLINIC | Age: 87
End: 2022-04-25

## 2022-04-25 NOTE — TELEPHONE ENCOUNTER
Caller: Vicente Delgado    Relationship: Self    Best call back number: [phone]    What orders are you requesting (i.e. lab or imaging): BLOOD WORK    In what timeframe would the patient need to come in: ANY    Additional notes: PATIENT STATED HE STILL NEEDS LAB WORK. HE CAN NO LONGER GET BLOOD WORK DONE AT THE [facility] PLEASE CALL PATIENT BACK TO SCHEDULE

## 2022-04-25 NOTE — TELEPHONE ENCOUNTER
We performed his labs at his appointment 2/2022 so he really is not due, I typically check them every 4-6 months. We can repeat his labs at his appointment in June. Thanks.

## 2022-06-05 ENCOUNTER — APPOINTMENT (OUTPATIENT)
Dept: GENERAL RADIOLOGY | Facility: HOSPITAL | Age: 87
End: 2022-06-05

## 2022-06-05 ENCOUNTER — HOSPITAL ENCOUNTER (OUTPATIENT)
Facility: HOSPITAL | Age: 87
Setting detail: OBSERVATION
Discharge: HOME | End: 2022-06-06
Attending: EMERGENCY MEDICINE | Admitting: EMERGENCY MEDICINE

## 2022-06-05 DIAGNOSIS — K21.9 GASTROESOPHAGEAL REFLUX DISEASE, UNSPECIFIED WHETHER ESOPHAGITIS PRESENT: ICD-10-CM

## 2022-06-05 DIAGNOSIS — I25.118 CORONARY ARTERY DISEASE OF NATIVE ARTERY OF NATIVE HEART WITH STABLE ANGINA PECTORIS: Chronic | ICD-10-CM

## 2022-06-05 DIAGNOSIS — R07.89 ATYPICAL CHEST PAIN: Primary | ICD-10-CM

## 2022-06-05 PROBLEM — R07.9 CHEST PAIN: Status: ACTIVE | Noted: 2022-06-05

## 2022-06-05 LAB
ALBUMIN SERPL-MCNC: 4.2 G/DL (ref 3.5–5.2)
ALBUMIN/GLOB SERPL: 1.5 G/DL
ALP SERPL-CCNC: 55 U/L (ref 39–117)
ALT SERPL W P-5'-P-CCNC: 22 U/L (ref 1–41)
ANION GAP SERPL CALCULATED.3IONS-SCNC: 9.2 MMOL/L (ref 5–15)
APTT PPP: 35.3 SECONDS (ref 22.7–35.4)
AST SERPL-CCNC: 26 U/L (ref 1–40)
BASOPHILS # BLD AUTO: 0.01 10*3/MM3 (ref 0–0.2)
BASOPHILS NFR BLD AUTO: 0.2 % (ref 0–1.5)
BILIRUB SERPL-MCNC: 0.3 MG/DL (ref 0–1.2)
BUN SERPL-MCNC: 15 MG/DL (ref 8–23)
BUN/CREAT SERPL: 15.2 (ref 7–25)
CALCIUM SPEC-SCNC: 9 MG/DL (ref 8.2–9.6)
CHLORIDE SERPL-SCNC: 102 MMOL/L (ref 98–107)
CHOLEST SERPL-MCNC: 140 MG/DL (ref 0–200)
CO2 SERPL-SCNC: 25.8 MMOL/L (ref 22–29)
CREAT SERPL-MCNC: 0.99 MG/DL (ref 0.76–1.27)
D DIMER PPP FEU-MCNC: 0.51 MCGFEU/ML (ref 0–0.49)
DEPRECATED RDW RBC AUTO: 43.6 FL (ref 37–54)
EGFRCR SERPLBLD CKD-EPI 2021: 70.6 ML/MIN/1.73
EOSINOPHIL # BLD AUTO: 0.04 10*3/MM3 (ref 0–0.4)
EOSINOPHIL NFR BLD AUTO: 0.8 % (ref 0.3–6.2)
ERYTHROCYTE [DISTWIDTH] IN BLOOD BY AUTOMATED COUNT: 12.3 % (ref 12.3–15.4)
GLOBULIN UR ELPH-MCNC: 2.8 GM/DL
GLUCOSE SERPL-MCNC: 82 MG/DL (ref 65–99)
HCT VFR BLD AUTO: 42 % (ref 37.5–51)
HDLC SERPL-MCNC: 37 MG/DL (ref 40–60)
HGB BLD-MCNC: 13.7 G/DL (ref 13–17.7)
HOLD SPECIMEN: NORMAL
IMM GRANULOCYTES # BLD AUTO: 0.02 10*3/MM3 (ref 0–0.05)
IMM GRANULOCYTES NFR BLD AUTO: 0.4 % (ref 0–0.5)
INR PPP: 1.08 (ref 0.9–1.1)
LDLC SERPL CALC-MCNC: 68 MG/DL (ref 0–100)
LDLC/HDLC SERPL: 1.65 {RATIO}
LYMPHOCYTES # BLD AUTO: 1.67 10*3/MM3 (ref 0.7–3.1)
LYMPHOCYTES NFR BLD AUTO: 33.6 % (ref 19.6–45.3)
MCH RBC QN AUTO: 30.9 PG (ref 26.6–33)
MCHC RBC AUTO-ENTMCNC: 32.6 G/DL (ref 31.5–35.7)
MCV RBC AUTO: 94.8 FL (ref 79–97)
MONOCYTES # BLD AUTO: 0.85 10*3/MM3 (ref 0.1–0.9)
MONOCYTES NFR BLD AUTO: 17.1 % (ref 5–12)
NEUTROPHILS NFR BLD AUTO: 2.38 10*3/MM3 (ref 1.7–7)
NEUTROPHILS NFR BLD AUTO: 47.9 % (ref 42.7–76)
NRBC BLD AUTO-RTO: 0 /100 WBC (ref 0–0.2)
NT-PROBNP SERPL-MCNC: 160 PG/ML (ref 0–1800)
PLATELET # BLD AUTO: 207 10*3/MM3 (ref 140–450)
PMV BLD AUTO: 10.1 FL (ref 6–12)
POTASSIUM SERPL-SCNC: 4.5 MMOL/L (ref 3.5–5.2)
PROT SERPL-MCNC: 7 G/DL (ref 6–8.5)
PROTHROMBIN TIME: 13.9 SECONDS (ref 11.7–14.2)
QT INTERVAL: 483 MS
RBC # BLD AUTO: 4.43 10*6/MM3 (ref 4.14–5.8)
SARS-COV-2 RNA RESP QL NAA+PROBE: NOT DETECTED
SODIUM SERPL-SCNC: 137 MMOL/L (ref 136–145)
TRIGL SERPL-MCNC: 210 MG/DL (ref 0–150)
TROPONIN T SERPL-MCNC: <0.01 NG/ML (ref 0–0.03)
TROPONIN T SERPL-MCNC: <0.01 NG/ML (ref 0–0.03)
VLDLC SERPL-MCNC: 35 MG/DL (ref 5–40)
WBC NRBC COR # BLD: 4.97 10*3/MM3 (ref 3.4–10.8)
WHOLE BLOOD HOLD COAG: NORMAL
WHOLE BLOOD HOLD SPECIMEN: NORMAL

## 2022-06-05 PROCEDURE — 80053 COMPREHEN METABOLIC PANEL: CPT | Performed by: NURSE PRACTITIONER

## 2022-06-05 PROCEDURE — 96374 THER/PROPH/DIAG INJ IV PUSH: CPT

## 2022-06-05 PROCEDURE — 84484 ASSAY OF TROPONIN QUANT: CPT | Performed by: NURSE PRACTITIONER

## 2022-06-05 PROCEDURE — 93005 ELECTROCARDIOGRAM TRACING: CPT | Performed by: EMERGENCY MEDICINE

## 2022-06-05 PROCEDURE — 85610 PROTHROMBIN TIME: CPT | Performed by: NURSE PRACTITIONER

## 2022-06-05 PROCEDURE — C9803 HOPD COVID-19 SPEC COLLECT: HCPCS

## 2022-06-05 PROCEDURE — G0378 HOSPITAL OBSERVATION PER HR: HCPCS

## 2022-06-05 PROCEDURE — 80061 LIPID PANEL: CPT | Performed by: NURSE PRACTITIONER

## 2022-06-05 PROCEDURE — 93010 ELECTROCARDIOGRAM REPORT: CPT | Performed by: INTERNAL MEDICINE

## 2022-06-05 PROCEDURE — 85379 FIBRIN DEGRADATION QUANT: CPT | Performed by: NURSE PRACTITIONER

## 2022-06-05 PROCEDURE — 85730 THROMBOPLASTIN TIME PARTIAL: CPT | Performed by: NURSE PRACTITIONER

## 2022-06-05 PROCEDURE — U0005 INFEC AGEN DETEC AMPLI PROBE: HCPCS | Performed by: NURSE PRACTITIONER

## 2022-06-05 PROCEDURE — U0003 INFECTIOUS AGENT DETECTION BY NUCLEIC ACID (DNA OR RNA); SEVERE ACUTE RESPIRATORY SYNDROME CORONAVIRUS 2 (SARS-COV-2) (CORONAVIRUS DISEASE [COVID-19]), AMPLIFIED PROBE TECHNIQUE, MAKING USE OF HIGH THROUGHPUT TECHNOLOGIES AS DESCRIBED BY CMS-2020-01-R: HCPCS | Performed by: NURSE PRACTITIONER

## 2022-06-05 PROCEDURE — 85025 COMPLETE CBC W/AUTO DIFF WBC: CPT | Performed by: NURSE PRACTITIONER

## 2022-06-05 PROCEDURE — 99284 EMERGENCY DEPT VISIT MOD MDM: CPT

## 2022-06-05 PROCEDURE — 93005 ELECTROCARDIOGRAM TRACING: CPT

## 2022-06-05 PROCEDURE — 71045 X-RAY EXAM CHEST 1 VIEW: CPT

## 2022-06-05 PROCEDURE — 83880 ASSAY OF NATRIURETIC PEPTIDE: CPT | Performed by: NURSE PRACTITIONER

## 2022-06-05 RX ORDER — ROSUVASTATIN CALCIUM 20 MG/1
10 TABLET, COATED ORAL NIGHTLY
Status: DISCONTINUED | OUTPATIENT
Start: 2022-06-05 | End: 2022-06-06 | Stop reason: HOSPADM

## 2022-06-05 RX ORDER — ATENOLOL 25 MG/1
25 TABLET ORAL 2 TIMES DAILY
Status: DISCONTINUED | OUTPATIENT
Start: 2022-06-05 | End: 2022-06-06 | Stop reason: HOSPADM

## 2022-06-05 RX ORDER — ASPIRIN 81 MG/1
324 TABLET, CHEWABLE ORAL ONCE
Status: COMPLETED | OUTPATIENT
Start: 2022-06-05 | End: 2022-06-05

## 2022-06-05 RX ORDER — LISINOPRIL 20 MG/1
20 TABLET ORAL EVERY 12 HOURS SCHEDULED
Refills: 1 | Status: DISCONTINUED | OUTPATIENT
Start: 2022-06-05 | End: 2022-06-06 | Stop reason: HOSPADM

## 2022-06-05 RX ORDER — NITROGLYCERIN 0.4 MG/1
0.4 TABLET SUBLINGUAL
Status: DISCONTINUED | OUTPATIENT
Start: 2022-06-05 | End: 2022-06-06 | Stop reason: HOSPADM

## 2022-06-05 RX ORDER — PANTOPRAZOLE SODIUM 40 MG/1
40 TABLET, DELAYED RELEASE ORAL EVERY MORNING
Status: DISCONTINUED | OUTPATIENT
Start: 2022-06-06 | End: 2022-06-06

## 2022-06-05 RX ORDER — FAMOTIDINE 10 MG/ML
20 INJECTION, SOLUTION INTRAVENOUS ONCE
Status: COMPLETED | OUTPATIENT
Start: 2022-06-05 | End: 2022-06-05

## 2022-06-05 RX ADMIN — NITROGLYCERIN 0.4 MG: 0.4 TABLET SUBLINGUAL at 19:41

## 2022-06-05 RX ADMIN — ASPIRIN 324 MG: 81 TABLET, CHEWABLE ORAL at 19:03

## 2022-06-05 RX ADMIN — FAMOTIDINE 20 MG: 10 INJECTION, SOLUTION INTRAVENOUS at 20:22

## 2022-06-05 RX ADMIN — LISINOPRIL 20 MG: 20 TABLET ORAL at 22:05

## 2022-06-05 RX ADMIN — NITROGLYCERIN 1 INCH: 20 OINTMENT TOPICAL at 19:56

## 2022-06-05 RX ADMIN — NITROGLYCERIN 0.4 MG: 0.4 TABLET SUBLINGUAL at 19:04

## 2022-06-05 RX ADMIN — ROSUVASTATIN CALCIUM 10 MG: 20 TABLET, FILM COATED ORAL at 22:06

## 2022-06-05 NOTE — ED NOTES
Pt reports discomfort is back slightly. NP Dina Urbina, aware. This RN instructed to give another SL nitro tablet.

## 2022-06-05 NOTE — ED PROVIDER NOTES
I supervised care provided by the midlevel provider.   We have discussed this patient's history, physical exam, and treatment plan.  I have reviewed the note and personally saw and examined the patient and agree with the plan of care.   I talked with the patient as well as daughter in the room.  Patient is a vague historian.  He states since Thursday which would be the past 3 to 4 days he has had this episodic discomfort in his chest.  Starts on the right side and goes across his chest.  At times he has had some radiation to his back.  He describes his discomfort.  It is not sharp or stabbing.  It is not tearing or burning.  He also has the sensation that he needs to burp and feels that he cannot get a full burp at times.  There is no definitive symptom that makes it worse.  He does not feel that it is related to exertion.  It will occur at rest.  Denies any shortness of breath, nausea or vomiting, any fevers or chills.  When I see him he received a nitroglycerin prior to my arrival which relieved the chest discomfort.  The discomfort returned we gave him another nitroglycerin and it relieved the discomfort.  He is currently on nitroglycerin paste.  He is currently asymptomatic and has no pain or shortness of breath or any other complaint.  Patient is acting at baseline but has a very hard time providing specifics about his current complaint over the past several days.  He has a hard time describing the type of pain.  Again currently on my evaluation he is pain-free.  He has nitroglycerin paste    GENERAL: Elderly male that is pleasant not distressed  HENT: nares patent  Head/neck/ face are symmetric without gross deformity or swelling  EYES: no scleral icterus  CV: regular rhythm, regular rate with intact distal pulses.  Systolic murmur that is harsh that is 2 out of 6.  (History of mitral valve insufficiency)  Normal inspection to the back.  He has no pain with palpation or movement.  RESPIRATORY: normal effort  and no respiratory distress.  Clear to auscultation bilaterally ABDOMEN: soft and nontender  MUSCULOSKELETAL: no deformity.  Patient has good strong intact distal pulses to upper extremity and lower extremity.  NEURO: alert and appropriate, moves all extremities, follows commands.  No focal motor or sensory changes  SKIN: warm, dry    Vital signs and nursing notes reviewed.    Plan I have reviewed his EKG, chest x-ray, lab work.  This patient does have a history of coronary artery disease.  I believe this is likely gastro intestinal and GERD in etiology.  He does have a feeling that he needs to burp and cannot burp completely.  But there again the patient is a vague historian.  Does not seem to be any definitive association with exertion on my history.  His EKG does not show any acute change and his cardiac enzymes are unremarkable.  His dimer is normal.  Plan is to admit him to the hospital for observation and have cardiology consult.  I explained to the patient as well as the daughter and they agree with this plan.    ED Course as of 06/05/22 2131   Sun Jun 05, 2022 1927 Patient reports chest pain is improved after the 1 dose of sublingual nitro. [EW]   1941 EKG reading  EKG was done at 1800  Its a rate of 49 it is sinus bradycardia with prolongation of the PA interval there is mild intraventricular duction delay there is Q waves in the inferior leads as well as the anterior septal leads in lead V4 I do not appreciate any acute ST elevation or ST depression  QT looks normal  I compared to a previous EKG from 2/9/2022 and it looks fairly similar.  He did not have the Q waves in V3 and V4 but he had the Q waves in V1 V2 [MM]   1941 D-Dimer, Quant(!): 0.51  Age-adjusted dimer is negative.  Clinically this gentleman does not have signs or symptoms of a PE [MM]   1951 Chest pain has recurred, it is 1/10.  He has had 2nd dose of SL nitro.  I have ordered nitro paste. [EW]   2011 Dimer elevation can be age adjusted.   He explains to Dr. Zuniga that it sort of feels like his GERD and that he might need to belch.  His EKG and troponin are WNL.  He is  not tachycardic or hypoxic.  Will admit to [facility] overnight.  Discussed with GE Dyson who agrees to admit.  [EW]   2044 Troponin T: <0.010 [MM]      ED Course User Index  [EW] Dina Urbina APRN  [MM] Gordon Zuniga MD       We are currently under a pandemic from the COVID19 infection.  The patient presented to the emergency department by ambulance or personal vehicle. I followed the current protocols required by Infection Control at [facility] in my evaluation and treatment of the patient. The patient was wearing a face mask during my evaluation and throughout my encounter. During my whole encounter with this patient I used appropriate personal protective equipment.  This equipment consisted of eye protection, facemask, gown, and gloves.  I applied this equipment before entering the room.    Old medical history reviewed.  Patient had a myocardial stress test on 12/4/2017 there was no evidence of ischemia his ejection fraction was 59%.       Gordon Zuniga MD  06/05/22 [id number]

## 2022-06-05 NOTE — ED PROVIDER NOTES
EMERGENCY DEPARTMENT ENCOUNTER    Room Number:  05/05  Date seen:  6/5/2022  Time seen: 18:49 EDT  PCP: Mechelle Landa APRN  Historian: patient, family members    HPI:  Chief complaint: chest pain  A complete HPI/ROS/PMH/PSH/SH/FH are unobtainable due to: n/a  Context:Vicente Delgado is a 89 y.o. male with history of coronary artery disease, hypertension hyperlipidemia GERD, who presents to the ED with c/o on and off chest pain since Thursday.  He reports that initially the first 3 days that the pain occurred at rest and improved when he was up and moving about.  He states that today the chest pain came when he was in [employer] and that it moved horizontally across to the left of his chest and also radiated up his neck on both sides.  He reports that the pain was much more severe today and more bothersome and that he has felt profoundly weak ever since it happened today.  He is vague historian.  He does deny any shortness of breath, diaphoresis or n/v.  He takes a baby ASA daily.      Patient was placed in face mask in first look. Patient was wearing facemask when I entered the room and throughout our encounter. I wore full protective equipment throughout this patient encounter including a N95 face mask, eye shield and gloves. Hand hygiene/washing of hands was performed before donning protective equipment and after removal when leaving the room.    MEDICAL RECORD REVIEW    ALLERGIES  Lidocaine, Lovastatin, Pravastatin, Gabapentin, Procaine, and Simvastatin    PAST MEDICAL HISTORY  Active Ambulatory Problems     Diagnosis Date Noted   • HTN (hypertension) 04/21/2016   • Nonrheumatic mitral valve regurgitation 04/21/2016   • Disorder of right ventricle of heart 04/21/2016   • CAD (coronary artery disease) 07/01/2016   • Hyperlipidemia 07/01/2016   • Calculus of gallbladder without cholecystitis without obstruction 09/28/2020   • S/P cholecystectomy 01/09/2021   • Nonrheumatic aortic valve insufficiency 02/04/2021   •  Non-rheumatic mitral regurgitation 02/04/2021   • Trigger middle finger of left hand 07/11/2021   • Gastroesophageal reflux disease with esophagitis 07/11/2021   • Carpal tunnel syndrome of left wrist 07/11/2021   • Amaurosis fugax of left eye 07/11/2021   • 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) 11/26/2021   • Atherosclerosis of native artery of left lower extremity with intermittent claudication (HCC) 11/26/2021   • Anxiety 02/08/2022   • Dermatitis 02/08/2022     Resolved Ambulatory Problems     Diagnosis Date Noted   • Leg pain, anterior, left 05/13/2021   • Atypical chest pain 09/26/2021     Past Medical History:   Diagnosis Date   • Allergic rhinitis    • Arthritis    • Cancer (HCC)    • Cataract June 2018” and   • Cholelithiasis 2020   • Depression    • Diverticulosis    • Esophagitis    • Gastritis    • GERD (gastroesophageal reflux disease)    • Heart disease    • History of anxiety    • History of medical problems Right shoulder replacemd   • History of prostate cancer 06/1990   • HL (hearing loss) Partial   • Hypertension    • Insomnia    • Low back pain Yes  from hworking   • Lupus (HCC)    • TAM (nonalcoholic steatohepatitis)    • Sciatica of right side    • Visual impairment Ocular mygraine       PAST SURGICAL HISTORY  Past Surgical History:   Procedure Laterality Date   • ABDOMINAL SURGERY     • CARDIAC CATHETERIZATION  11/16/1995   • CARDIAC SURGERY  11/16/1995   • CATARACT EXTRACTION Left 07/2018   • CHOLECYSTECTOMY  2020   • CHOLECYSTECTOMY WITH INTRAOPERATIVE CHOLANGIOGRAM N/A 9/29/2020    Procedure: Laparoscopic cholecystectomy;  Surgeon: Javi Peralta MD;  Location: [facility];  Service: General;  Laterality: N/A;   • COLONOSCOPY  01/09/2002   • ELBOW PROCEDURE     • JOINT REPLACEMENT     • LUNG BIOPSY     • LYMPH NODE BIOPSY  Yes    1992   • NASAL POLYP SURGERY     • PACEMAKER IMPLANTATION     • PROSTATE SURGERY  06/1990   • SHOULDER ROTATOR CUFF REPAIR Right 08/24/2011      [facility]   • SIGMOIDOSCOPY     • TONSILLECTOMY  Yes 1943   • UPPER GASTROINTESTINAL ENDOSCOPY  09/12/1997    Gastritis, Duodenitis, hiatal hernia (Pathology: Gastric antrum minimal chronic inflammation)   • UPPER GASTROINTESTINAL ENDOSCOPY  04/11/2005    Mild esophagitis, Small hiatal hernia, Mild gastritis and mild duodenitis       FAMILY HISTORY  Family History   Problem Relation Age of Onset   • Heart failure Mother    • Hearing loss Mother    • Heart disease Mother         Mother had congestive heart failure   • Hyperlipidemia Mother    • Alcohol abuse Father    • Diabetes Father        SOCIAL HISTORY  Social History     Socioeconomic History   • Marital status:    Tobacco Use   • Smoking status: Former Smoker     Packs/day: 0.00     Years: 0.00     Pack years: 0.00     Types: Cigars   • Smokeless tobacco: Never Used   • Tobacco comment: Smoke a cigar ocassionally   Substance and Sexual Activity   • Alcohol use: No     Comment: Daily caffeine use   • Drug use: No   • Sexual activity: Not Currently       REVIEW OF SYSTEMS  Review of Systems    All systems reviewed and negative except for those discussed in HPI.     PHYSICAL EXAM    ED Triage Vitals [06/05/22 1752]   Temp Heart Rate Resp BP SpO2   97.9 °F (36.6 °C) 55 16 -- 98 %      Temp src Heart Rate Source Patient Position BP Location FiO2 (%)   Tympanic Monitor -- -- --     Physical Exam    I have reviewed the triage vital signs and nursing notes.      GENERAL: not distressed  HENT: nares patent  EYES: no scleral icterus  NECK: no ROM limitations  CV: regular rhythm, regular rate, no murmur, no rubs, no gallups  RESPIRATORY: normal effort, CTAB  ABDOMEN: soft  : deferred  MUSCULOSKELETAL: no deformity  NEURO: alert, moves all extremities, follows commands  SKIN: warm, dry    HEARTSCORE    History  Highly suspicious              2    Moderately suspicious             1    Slightly or non-suspicious             0    ECG  Significant ST depression               2    Nonspecific repol disturbance            1    Normal                           0    Age  > or = 65                          2     46-65                           1    < or = 45                          0    Risk factors (hypercholesterolemia, HTN, DM, smoking, pos fam hx, obesity)                            > or = to 3 RF for atherosclerotic dx   2    1 or 2                 1    No risk factors                0    Troponin > or = 3x normal limit               2    1-3x normal limit    1    < or = Normal limit    0    Score  0 - 3 is low risk (0.9-1.7% risk of adverse cardiac event)  Score  4 - 6 is moderate risk (12-16.6% risk of adverse cardiac event)  Score  6 - 9 is high risk (50-65% risk of adverse cardiac event)    This patient's HEART score is 4        LAB RESULTS  Recent Results (from the past 24 hour(s))   ECG 12 Lead    Collection Time: 06/05/22  6:00 PM   Result Value Ref Range    QT Interval 483 ms   Green Top (Gel)    Collection Time: 06/05/22  6:53 PM   Result Value Ref Range    Extra Tube Hold for add-ons.    Lavender Top    Collection Time: 06/05/22  6:53 PM   Result Value Ref Range    Extra Tube hold for add-on    Light Blue Top    Collection Time: 06/05/22  6:53 PM   Result Value Ref Range    Extra Tube Hold for add-ons.    Comprehensive Metabolic Panel    Collection Time: 06/05/22  6:53 PM    Specimen: Blood   Result Value Ref Range    Glucose 82 65 - 99 mg/dL    BUN 15 8 - 23 mg/dL    Creatinine 0.99 0.76 - 1.27 mg/dL    Sodium 137 136 - 145 mmol/L    Potassium 4.5 3.5 - 5.2 mmol/L    Chloride 102 98 - 107 mmol/L    CO2 25.8 22.0 - 29.0 mmol/L    Calcium 9.0 8.2 - 9.6 mg/dL    Total Protein 7.0 6.0 - 8.5 g/dL    Albumin 4.20 3.50 - 5.20 g/dL    ALT (SGPT) 22 1 - 41 U/L    AST (SGOT) 26 1 - 40 U/L    Alkaline Phosphatase 55 39 - 117 U/L    Total Bilirubin 0.3 0.0 - 1.2 mg/dL    Globulin 2.8 gm/dL    A/G Ratio 1.5 g/dL    BUN/Creatinine Ratio 15.2 7.0 - 25.0    Anion Gap 9.2 5.0 - 15.0  mmol/L    eGFR 70.6 >60.0 mL/min/1.73   Protime-INR    Collection Time: 06/05/22  6:53 PM    Specimen: Blood   Result Value Ref Range    Protime 13.9 11.7 - 14.2 Seconds    INR 1.08 0.90 - 1.10   aPTT    Collection Time: 06/05/22  6:53 PM    Specimen: Blood   Result Value Ref Range    PTT 35.3 22.7 - 35.4 seconds   Troponin    Collection Time: 06/05/22  6:53 PM    Specimen: Blood   Result Value Ref Range    Troponin T <0.010 0.000 - 0.030 ng/mL   BNP    Collection Time: 06/05/22  6:53 PM    Specimen: Blood   Result Value Ref Range    proBNP 160.0 0.0 - 1,800.0 pg/mL   CBC Auto Differential    Collection Time: 06/05/22  6:53 PM    Specimen: Blood   Result Value Ref Range    WBC 4.97 3.40 - 10.80 10*3/mm3    RBC 4.43 4.14 - 5.80 10*6/mm3    Hemoglobin 13.7 13.0 - 17.7 g/dL    Hematocrit 42.0 37.5 - 51.0 %    MCV 94.8 79.0 - 97.0 fL    MCH 30.9 26.6 - 33.0 pg    MCHC 32.6 31.5 - 35.7 g/dL    RDW 12.3 12.3 - 15.4 %    RDW-SD 43.6 37.0 - 54.0 fl    MPV 10.1 6.0 - 12.0 fL    Platelets 207 140 - 450 10*3/mm3    Neutrophil % 47.9 42.7 - 76.0 %    Lymphocyte % 33.6 19.6 - 45.3 %    Monocyte % 17.1 (H) 5.0 - 12.0 %    Eosinophil % 0.8 0.3 - 6.2 %    Basophil % 0.2 0.0 - 1.5 %    Immature Grans % 0.4 0.0 - 0.5 %    Neutrophils, Absolute 2.38 1.70 - 7.00 10*3/mm3    Lymphocytes, Absolute 1.67 0.70 - 3.10 10*3/mm3    Monocytes, Absolute 0.85 0.10 - 0.90 10*3/mm3    Eosinophils, Absolute 0.04 0.00 - 0.40 10*3/mm3    Basophils, Absolute 0.01 0.00 - 0.20 10*3/mm3    Immature Grans, Absolute 0.02 0.00 - 0.05 10*3/mm3    nRBC 0.0 0.0 - 0.2 /100 WBC   D-dimer, Quantitative    Collection Time: 06/05/22  6:53 PM    Specimen: Blood   Result Value Ref Range    D-Dimer, Quantitative 0.51 (H) 0.00 - 0.49 MCGFEU/mL         RADIOLOGY RESULTS  XR Chest 1 View    Result Date: 6/5/2022  PORTABLE CHEST 06/05/2022 AT 7:30 PM  CLINICAL HISTORY: Chest pain  Compared to the previous chest dated 03/05/2018.  The lungs are well-expanded and appear free  of infiltrates. There are no pleural effusions. No pneumothorax is evident. The heart is normal in size. The thoracic aorta is slightly tortuous.  IMPRESSIONS: No evidence of acute disease within the chest.  This report was finalized on 6/5/2022 8:04 PM by Dr. Elijah Cherry M.D.           PROGRESS, DATA ANALYSIS, CONSULTS AND MEDICAL DECISION MAKING  All labs have been independently reviewed by me.  All radiology studies have been reviewed by me and discussed with radiologist dictating the report.  EKG's independently viewed and interpreted by me unless stated otherwise. Discussion below represents my analysis of pertinent findings related to patient's condition, differential diagnosis, treatment plan and final disposition.     ED Course as of 06/05/22 2013   Sun Jun 05, 2022 1927 Patient reports chest pain is improved after the 1 dose of sublingual nitro. [EW]   1941 EKG reading  EKG was done at 1800  Its a rate of 49 it is sinus bradycardia with prolongation of the MD interval there is mild intraventricular duction delay there is Q waves in the inferior leads as well as the anterior septal leads in lead V4 I do not appreciate any acute ST elevation or ST depression  QT looks normal  I compared to a previous EKG from 2/9/2022 and it looks fairly similar.  He did not have the Q waves in V3 and V4 but he had the Q waves in V1 V2 [MM]   1951 Chest pain has recurred, it is 1/10.  He has had 2nd dose of SL nitro.  I have ordered nitro paste. [EW]   2011 Dimer elevation can be age adjusted.  He explains to Dr. Zuniga that it sort of feels like his GERD and that he might need to belch.  His EKG and troponin are WNL.  He is  not tachycardic or hypoxic.  Will admit to OBS overnight.  Discussed with GE Dyson who agrees to admit.  [EW]      ED Course User Index  [EW] Dina Urbina APRN  [MM] Gordon Zuniga MD     DDX: My differential diagnosis for chest pain includes but is not limited to:  Muscle strain,  "costochondritis, myositis, pleurisy, rib fracture, intercostal neuritis, herpes zoster, tumor, myocardial infarction, coronary syndrome, unstable angina, angina, aortic dissection, mitral valve prolapse, pericarditis, palpitations, pulmonary embolus, pneumonia, pneumothorax, lung cancer, GERD, esophagitis, esophageal spasm     Reviewed pt's history and workup with Dr. Zuniga.  After a bedside evaluation, Dr. Zuniga agrees with the plan of care.     Based on the patient's lab findings and presenting symptoms, the doctor and I feel it is appropriate to admit the patient for further management, evaluation, and treatment.  I have discussed this with the admitting team.  I have also discussed this with the patient/family.  They are in agreement with admission.          Disposition vitals:  /68   Pulse 52   Temp 97.9 °F (36.6 °C) (Tympanic)   Resp 16   Ht 170.2 cm (67\")   Wt 79.4 kg (175 lb)   SpO2 95%   BMI 27.41 kg/m²       DIAGNOSIS  Final diagnoses:   Atypical chest pain   Gastroesophageal reflux disease, unspecified whether esophagitis present       Admission to OBS unit       Dina Urbina, GE  06/05/22 2014    "

## 2022-06-05 NOTE — ED TRIAGE NOTES
Int cp x 4 days.  Today pain is not resolving    Patient was placed in face mask during first look triage.  Patient was wearing a face mask throughout encounter.  I wore personal protective equipment throughout the encounter.  Hand hygiene was performed before and after patient encounter.

## 2022-06-06 ENCOUNTER — READMISSION MANAGEMENT (OUTPATIENT)
Dept: CALL CENTER | Facility: HOSPITAL | Age: 87
End: 2022-06-06

## 2022-06-06 ENCOUNTER — APPOINTMENT (OUTPATIENT)
Dept: CT IMAGING | Facility: HOSPITAL | Age: 87
End: 2022-06-06

## 2022-06-06 ENCOUNTER — APPOINTMENT (OUTPATIENT)
Dept: CARDIOLOGY | Facility: HOSPITAL | Age: 87
End: 2022-06-06

## 2022-06-06 VITALS
BODY MASS INDEX: 27.34 KG/M2 | RESPIRATION RATE: 18 BRPM | SYSTOLIC BLOOD PRESSURE: 104 MMHG | HEIGHT: 67 IN | TEMPERATURE: 97.4 F | WEIGHT: 174.16 LBS | OXYGEN SATURATION: 97 % | DIASTOLIC BLOOD PRESSURE: 93 MMHG | HEART RATE: 50 BPM

## 2022-06-06 LAB
ANION GAP SERPL CALCULATED.3IONS-SCNC: 8.4 MMOL/L (ref 5–15)
AORTIC DIMENSIONLESS INDEX: 0.2 (DI)
BH CV ECHO MEAS - AO MAX PG: 57.9 MMHG
BH CV ECHO MEAS - AO MEAN PG: 31.7 MMHG
BH CV ECHO MEAS - AO V2 MAX: 380.6 CM/SEC
BH CV ECHO MEAS - AO V2 VTI: 102.8 CM
BH CV ECHO MEAS - AVA(I,D): 0.94 CM2
BH CV ECHO MEAS - EDV(CUBED): 123.3 ML
BH CV ECHO MEAS - EDV(MOD-SP2): 61 ML
BH CV ECHO MEAS - EDV(MOD-SP4): 128 ML
BH CV ECHO MEAS - EF(MOD-BP): 60.1 %
BH CV ECHO MEAS - EF(MOD-SP2): 57.4 %
BH CV ECHO MEAS - EF(MOD-SP4): 59.4 %
BH CV ECHO MEAS - ESV(CUBED): 40.1 ML
BH CV ECHO MEAS - ESV(MOD-SP2): 26 ML
BH CV ECHO MEAS - ESV(MOD-SP4): 52 ML
BH CV ECHO MEAS - FS: 31.2 %
BH CV ECHO MEAS - IVS/LVPW: 1.04 CM
BH CV ECHO MEAS - IVSD: 1.33 CM
BH CV ECHO MEAS - LAT PEAK E' VEL: 5.1 CM/SEC
BH CV ECHO MEAS - LV DIASTOLIC VOL/BSA (35-75): 67.2 CM2
BH CV ECHO MEAS - LV MASS(C)D: 261.1 GRAMS
BH CV ECHO MEAS - LV MAX PG: 2.7 MMHG
BH CV ECHO MEAS - LV MEAN PG: 1.28 MMHG
BH CV ECHO MEAS - LV SYSTOLIC VOL/BSA (12-30): 27.3 CM2
BH CV ECHO MEAS - LV V1 MAX: 81.5 CM/SEC
BH CV ECHO MEAS - LV V1 VTI: 19.9 CM
BH CV ECHO MEAS - LVIDD: 5 CM
BH CV ECHO MEAS - LVIDS: 3.4 CM
BH CV ECHO MEAS - LVOT AREA: 4.9 CM2
BH CV ECHO MEAS - LVOT DIAM: 2.49 CM
BH CV ECHO MEAS - LVPWD: 1.28 CM
BH CV ECHO MEAS - MED PEAK E' VEL: 4.9 CM/SEC
BH CV ECHO MEAS - MV A MAX VEL: 108.8 CM/SEC
BH CV ECHO MEAS - MV DEC TIME: 0.32 MSEC
BH CV ECHO MEAS - MV E MAX VEL: 67.8 CM/SEC
BH CV ECHO MEAS - MV E/A: 0.62
BH CV ECHO MEAS - PA ACC TIME: 0.06 SEC
BH CV ECHO MEAS - PA PR(ACCEL): 52.9 MMHG
BH CV ECHO MEAS - RAP SYSTOLE: 8 MMHG
BH CV ECHO MEAS - RV MAX PG: 2.05 MMHG
BH CV ECHO MEAS - RV V1 MAX: 71.6 CM/SEC
BH CV ECHO MEAS - RV V1 VTI: 17.6 CM
BH CV ECHO MEAS - RVSP: 33 MMHG
BH CV ECHO MEAS - SI(MOD-SP2): 18.4 ML/M2
BH CV ECHO MEAS - SI(MOD-SP4): 39.9 ML/M2
BH CV ECHO MEAS - SV(LVOT): 96.6 ML
BH CV ECHO MEAS - SV(MOD-SP2): 35 ML
BH CV ECHO MEAS - SV(MOD-SP4): 76 ML
BH CV ECHO MEAS - TR MAX PG: 25.4 MMHG
BH CV ECHO MEAS - TR MAX VEL: 251.9 CM/SEC
BH CV ECHO MEASUREMENTS AVERAGE E/E' RATIO: 13.56
BH CV XLRA - TDI S': 14.9 CM/SEC
BUN SERPL-MCNC: 16 MG/DL (ref 8–23)
BUN/CREAT SERPL: 15.7 (ref 7–25)
CALCIUM SPEC-SCNC: 8.9 MG/DL (ref 8.2–9.6)
CHLORIDE SERPL-SCNC: 105 MMOL/L (ref 98–107)
CO2 SERPL-SCNC: 24.6 MMOL/L (ref 22–29)
CREAT SERPL-MCNC: 1.02 MG/DL (ref 0.76–1.27)
DEPRECATED RDW RBC AUTO: 42.5 FL (ref 37–54)
EGFRCR SERPLBLD CKD-EPI 2021: 68.1 ML/MIN/1.73
ERYTHROCYTE [DISTWIDTH] IN BLOOD BY AUTOMATED COUNT: 12.3 % (ref 12.3–15.4)
GLUCOSE SERPL-MCNC: 90 MG/DL (ref 65–99)
HCT VFR BLD AUTO: 38.7 % (ref 37.5–51)
HGB BLD-MCNC: 12.8 G/DL (ref 13–17.7)
LEFT ATRIUM VOLUME INDEX: 40.2 ML/M2
MAGNESIUM SERPL-MCNC: 2.2 MG/DL (ref 1.7–2.3)
MAXIMAL PREDICTED HEART RATE: 126 BPM
MCH RBC QN AUTO: 30.8 PG (ref 26.6–33)
MCHC RBC AUTO-ENTMCNC: 33.1 G/DL (ref 31.5–35.7)
MCV RBC AUTO: 93.3 FL (ref 79–97)
PLATELET # BLD AUTO: 164 10*3/MM3 (ref 140–450)
PMV BLD AUTO: 9.8 FL (ref 6–12)
POTASSIUM SERPL-SCNC: 4.2 MMOL/L (ref 3.5–5.2)
RBC # BLD AUTO: 4.15 10*6/MM3 (ref 4.14–5.8)
SINUS: 3.4 CM
SODIUM SERPL-SCNC: 138 MMOL/L (ref 136–145)
STRESS TARGET HR: 107 BPM
TROPONIN T SERPL-MCNC: <0.01 NG/ML (ref 0–0.03)
WBC NRBC COR # BLD: 4.2 10*3/MM3 (ref 3.4–10.8)

## 2022-06-06 PROCEDURE — G0378 HOSPITAL OBSERVATION PER HR: HCPCS

## 2022-06-06 PROCEDURE — 25010000002 PERFLUTREN (DEFINITY) 8.476 MG IN SODIUM CHLORIDE (PF) 0.9 % 10 ML INJECTION: Performed by: INTERNAL MEDICINE

## 2022-06-06 PROCEDURE — 71275 CT ANGIOGRAPHY CHEST: CPT

## 2022-06-06 PROCEDURE — 93306 TTE W/DOPPLER COMPLETE: CPT

## 2022-06-06 PROCEDURE — 0 IOPAMIDOL PER 1 ML: Performed by: EMERGENCY MEDICINE

## 2022-06-06 PROCEDURE — 93306 TTE W/DOPPLER COMPLETE: CPT | Performed by: INTERNAL MEDICINE

## 2022-06-06 PROCEDURE — 84484 ASSAY OF TROPONIN QUANT: CPT | Performed by: NURSE PRACTITIONER

## 2022-06-06 PROCEDURE — 99214 OFFICE O/P EST MOD 30 MIN: CPT | Performed by: INTERNAL MEDICINE

## 2022-06-06 PROCEDURE — 83735 ASSAY OF MAGNESIUM: CPT | Performed by: NURSE PRACTITIONER

## 2022-06-06 PROCEDURE — 80048 BASIC METABOLIC PNL TOTAL CA: CPT | Performed by: NURSE PRACTITIONER

## 2022-06-06 PROCEDURE — 85027 COMPLETE CBC AUTOMATED: CPT | Performed by: NURSE PRACTITIONER

## 2022-06-06 RX ORDER — ISOSORBIDE MONONITRATE 30 MG/1
30 TABLET, EXTENDED RELEASE ORAL
Qty: 30 TABLET | Refills: 1 | Status: SHIPPED | OUTPATIENT
Start: 2022-06-07 | End: 2022-06-16 | Stop reason: SINTOL

## 2022-06-06 RX ORDER — PANTOPRAZOLE SODIUM 40 MG/1
40 TABLET, DELAYED RELEASE ORAL
Status: DISCONTINUED | OUTPATIENT
Start: 2022-06-06 | End: 2022-06-06 | Stop reason: HOSPADM

## 2022-06-06 RX ORDER — PANTOPRAZOLE SODIUM 40 MG/1
40 TABLET, DELAYED RELEASE ORAL
Qty: 60 TABLET | Refills: 1 | Status: SHIPPED | OUTPATIENT
Start: 2022-06-06 | End: 2022-09-06

## 2022-06-06 RX ORDER — ISOSORBIDE MONONITRATE 30 MG/1
30 TABLET, EXTENDED RELEASE ORAL
Status: DISCONTINUED | OUTPATIENT
Start: 2022-06-06 | End: 2022-06-06 | Stop reason: HOSPADM

## 2022-06-06 RX ADMIN — PANTOPRAZOLE SODIUM 40 MG: 40 TABLET, DELAYED RELEASE ORAL at 07:23

## 2022-06-06 RX ADMIN — PERFLUTREN 2 ML: 6.52 INJECTION, SUSPENSION INTRAVENOUS at 09:59

## 2022-06-06 RX ADMIN — ISOSORBIDE MONONITRATE 30 MG: 30 TABLET ORAL at 10:22

## 2022-06-06 RX ADMIN — IOPAMIDOL 95 ML: 755 INJECTION, SOLUTION INTRAVENOUS at 05:02

## 2022-06-06 RX ADMIN — LISINOPRIL 20 MG: 20 TABLET ORAL at 10:22

## 2022-06-06 RX ADMIN — ATENOLOL 25 MG: 25 TABLET ORAL at 10:22

## 2022-06-06 NOTE — PROGRESS NOTES
ED OBSERVATION PROGRESS/DISCHARGE SUMMARY    Date of Admission: 6/5/2022   LOS: 0 days   PCP: Mechelle Landa APRN      Subjective    No acute events overnight.  States his chest pain has resolved at this time.  He states he was having a bit of chest discomfort after his echo but he had a large belch that actually improved his pain.  Denies any shortness of breath or palpitations.  Denies abdominal pain, nausea, vomiting, and diarrhea.  Denies fevers and chills.  Patient is interested in getting set up with cardiac rehab    Hospital Outcome:   89-year-old male admitted to the observation unit for further evaluation of chest pain.  Serial troponins negative x3 and EKG nonischemic.  BNP normal and other lab work fairly unremarkable.  D-dimer was noted at 0.51 and a CTA of the chest was ordered that revealed no acute abnormality with note of some pulmonary hypertension.  Cardiology was consulted and Dr. Sales recommends conservative medical management as patient's enzymes and EKG were normal.  Cardiology added low-dose isosorbide mononitrate and recommended increasing patient's Protonix 40 mg twice daily.  An echocardiogram was ordered that revealed her to severe aortic stenosis, mild mitral regurgitation, and grade 1 diastolic dysfunction.  Dr. Sales reviewed the echocardiogram feels that this new finding of aortic stenosis is not contributing to the patient's chest pain and therefore he can follow-up with them on an outpatient basis.  Cardiology office is going to have him follow-up in 3 to 4 weeks.  A referral was placed for cardiac rehab.  Advised patient to follow-up with his primary care provider in 1 to 2 weeks.      ROS:  General: no fevers, chills  Respiratory: no cough, dyspnea  Cardiovascular: no chest pain, palpitations  Abdomen: No abdominal pain, nausea, vomiting, or diarrhea  Neurologic: No focal weakness    Objective   Physical Exam:  I have reviewed the vital signs.  Temp:  [97.3 °F (36.3 °C)-97.9  °F (36.6 °C)] 97.4 °F (36.3 °C)  Heart Rate:  [47-60] 50  Resp:  [16-18] 18  BP: (101-156)/(56-93) 104/93  General Appearance:  89-year-old male, well-nourished, no acute distress on room air  Head:    Normocephalic, atraumatic  Eyes:    Sclerae anicteric  Neck:   Supple, no mass  Lungs: Clear to auscultation bilaterally, respirations unlabored  Heart: Regular rate and rhythm, S1 and S2 normal, no murmur, rub or gallop  Abdomen:  Soft, non-tender, bowel sounds active, nondistended  Extremities: No clubbing, cyanosis, or edema to lower extremities  Pulses:  2+ and symmetric in distal lower extremities  Skin: No rashes   Neurologic: Oriented x3, Normal strength to extremities    Results Review:    I have reviewed the labs, radiology results and diagnostic studies.    Results from last 7 days   Lab Units 06/06/22  0453   WBC 10*3/mm3 4.20   HEMOGLOBIN g/dL 12.8*   HEMATOCRIT % 38.7   PLATELETS 10*3/mm3 164     Results from last 7 days   Lab Units 06/06/22  0453 06/05/22  1853   SODIUM mmol/L 138 137   POTASSIUM mmol/L 4.2 4.5   CHLORIDE mmol/L 105 102   CO2 mmol/L 24.6 25.8   BUN mg/dL 16 15   CREATININE mg/dL 1.02 0.99   CALCIUM mg/dL 8.9 9.0   BILIRUBIN mg/dL  --  0.3   ALK PHOS U/L  --  55   ALT (SGPT) U/L  --  22   AST (SGOT) U/L  --  26   GLUCOSE mg/dL 90 82     Imaging Results (Last 24 Hours)     Procedure Component Value Units Date/Time    CT Angiogram Chest With & Without Contrast [[id number]] Collected: 06/06/22 0520     Updated: 06/06/22 0528    Narrative:      CT ANGIOGRAM CHEST     HISTORY: Chest pain.     TECHNIQUE: CT angiogram the chest with multiplanar and three-dimensional  reformatted images produced at a separate workstation is provided.     Radiation dose reduction techniques were utilized, including automated  exposure control and exposure modulation based on body size.     FINDINGS: The thoracic aorta is very faintly opacified proximally with  no obvious dissection flap. There is no enlargement of  the aorta. There  is enlargement of the central pulmonary arteries, particularly the right  main pulmonary artery, suggesting pulmonary hypertension. There is no  evidence of acute pulmonary thromboembolism. No right ventricular  enlargement. No pleural or pericardial effusion. There is no thoracic  lymphadenopathy.     The lungs are clear and the central airways are patent. The visualized  upper abdominal structures appear grossly normal. The bones appear  normal.       Impression:      No acute abnormality. Enlarged central pulmonary arteries  suggest pulmonary arterial hypertension. There is no evidence of acute  pulmonary thromboembolism, however.        This report was finalized on 6/6/2022 5:25 AM by Dr. Yousif Forrest M.D.       XR Chest 1 View [[id number]] Collected: 06/05/22 2003     Updated: 06/05/22 2007    Narrative:      PORTABLE CHEST 06/05/2022 AT 7:30 PM     CLINICAL HISTORY: Chest pain     Compared to the previous chest dated 03/05/2018.     The lungs are well-expanded and appear free of infiltrates. There are no  pleural effusions. No pneumothorax is evident. The heart is normal in  size. The thoracic aorta is slightly tortuous.     IMPRESSIONS: No evidence of acute disease within the chest.     This report was finalized on 6/5/2022 8:04 PM by Dr. Elijah Cherry M.D.             I have reviewed the medications.  ---------------------------------------------------------------------------------------------  Assessment & Plan   Assessment/Problem List    Chest pain      Plan:    Chest pain  -Troponin negative x3  -EKG nonischemic  -CTA of the chest negative for acute findings, noted of pulmonary hypertension  -Cardiology consulted recommending conservative medical management, low-dose Imdur added to patient's regimen and they recommended increasing his Protonix to twice daily.  -Echocardiogram was ordered that revealed moderate to severe aortic stenosis, mild atrial valve regurgitation, and grade 1  diastolic dysfunction-Dr. Sales reviewed these findings and states that they are not contributing to his chest pain and he feels comfortable having the patient follow-up in the office in 4 weeks.  -A referral for cardiac rehab was placed by cardiology     Hypertension and hyperlipidemia  -Chronic conditions  -Lipid panel notable for triglycerides 212  -Continue home medications    GERD  -Protonix twice daily    Disposition: Home    Follow-up after Discharge: Cardiology, PCP    This note will serve as a discharge summary.    I wore an face mask, eye protection, and gloves during this patient encounter. Patient also wearing a surgical mask. Hand hygeine performed before and after seeing the patient.      Suzanna Mills PA-C 06/06/22 15:02 EDT

## 2022-06-06 NOTE — CASE MANAGEMENT/SOCIAL WORK
Discharge Planning Assessment  [facility]     Patient Name: Vicente Delgado  MRN: [id number]  Today's Date: 6/6/2022    Admit Date: 6/5/2022     Discharge Needs Assessment    No documentation.                Discharge Plan     Row Name 06/06/22 1047       Plan    Plan Comments Entered room, introduced self and explained role w/PPE in place on self to daughter as patient is in testing currently. Provided MOON notice- daughter requesting referral to cardiac rehab for patient- updated provider. .me              Continued Care and Services - Admitted Since 6/5/2022    Coordination has not been started for this encounter.          Demographic Summary    No documentation.                Functional Status    No documentation.                Psychosocial    No documentation.                Abuse/Neglect    No documentation.                Legal    No documentation.                Substance Abuse    No documentation.                Patient Forms    No documentation.                   Vandana Toth RN

## 2022-06-06 NOTE — PROGRESS NOTES
MD ATTESTATION NOTE    The JESS and I have discussed this patient's history, physical exam, and treatment plan.  I have reviewed the documentation and personally had a face to face interaction with the patient. I affirm the documentation and agree with the treatment and plan.  The attached note describes my personal findings.      I provided a substantive portion of the care of the patient.  I personally performed the physical exam in its entirety, and below are my findings.  For this patient encounter, the patient wore surgical mask, I wore full protective PPE including N95 and eye protection.      Brief HPI: This patient is a 89-year-old male admitted to the observation unit secondary to chest discomfort.  The patient describes it as as an indigestion type feeling in the central portion of his chest with radiation throughout his esophagus.  He does have a history of GERD and feels as though that he is having some symptoms of reflux.  He did seem to get a little bit of relief with sublingual nitroglycerin prior to my evaluation.  He is currently resting comfortably.    PHYSICAL EXAM  ED Triage Vitals   Temp Heart Rate Resp BP SpO2   06/05/22 1752 06/05/22 1752 06/05/22 1752 06/05/22 1850 06/05/22 1752   97.9 °F (36.6 °C) 55 16 150/78 98 %      Temp src Heart Rate Source Patient Position BP Location FiO2 (%)   06/05/22 1752 06/05/22 1752 06/05/22 2100 06/05/22 2100 --   Tympanic Monitor Lying Right arm          GENERAL: Resting comfortably and in no acute distress, nontoxic in appearance  HENT: nares patent  EYES: no scleral icterus  CV: regular rhythm, normal rate, slight systolic ejection murmur noted  RESPIRATORY: normal effort, no M/R/G  ABDOMEN: soft, lungs clear bilaterally  MUSCULOSKELETAL: no deformity, no edema  NEURO: alert, moves all extremities, follows commands  PSYCH:  calm, cooperative  SKIN: warm, dry    Vital signs and nursing notes reviewed.        Plan: The patient's initial EKG and cardiac enzymes  are negative.  We will obtain serial cardiac enzymes throughout the evening and ask cardiology to assess and evaluate in the a.m.  Disposition to follow.

## 2022-06-06 NOTE — PLAN OF CARE
Goal Outcome Evaluation:      Pt admitted to ED OBS for chest pain that started about 4 days ago. Pt reports pain started on the right side of his chest, radiated to the top of his back, and yesterday pain radiated across his chest to the midsternal area. Pt denies other symptoms. Pt sinus bradycardic on the monitor and has had no complaints of pain through the night. Pt reports an extensive hx with acid reflux and gerd. Pt vitals remain stable, AM labs to be drawn, repeat EKG in the AM, awaiting CTA of the chest. Cardio to follow up in the AM. Pt ambulatory, A&O x4. RN to continue to monitor.

## 2022-06-06 NOTE — PLAN OF CARE
Goal Outcome Evaluation:      Pt admitted for chest pain/nausea. Echo resulted. Symptoms resolved. Sinus bradycardic. Cleared by cards. VSS medication changed. No further questions/concerns at this time.

## 2022-06-06 NOTE — ED NOTES
Nursing report ED to floor  Vicente Delgado  89 y.o.  male    HPI :   Chief Complaint   Patient presents with   • Chest Pain       Admitting doctor:   Jacob Ugarte MD    Admitting diagnosis:   The primary encounter diagnosis was Atypical chest pain. A diagnosis of Gastroesophageal reflux disease, unspecified whether esophagitis present was also pertinent to this visit.    Code status:   Current Code Status     Date Active Code Status Order ID Comments User Context       Prior    Advance Care Planning Activity          Allergies:   Lidocaine, Lovastatin, Pravastatin, Gabapentin, Procaine, and Simvastatin    Intake and Output  No intake or output data in the 24 hours ending 06/05/22 2028    Weight:       06/05/22 1850   Weight: 79.4 kg (175 lb)       Most recent vitals:   Vitals:    06/05/22 1948 06/05/22 1950 06/05/22 1956 06/05/22 2001   BP:   104/57 130/68   Pulse: 50 50 50 52   Resp:    16   Temp:       TempSrc:       SpO2: 95% 93%  95%   Weight:       Height:           Active LDAs/IV Access:   Lines, Drains & Airways     Active LDAs     Name Placement date Placement time Site Days    Peripheral IV 06/05/22 1852 Right Forearm 06/05/22 1852  Forearm  less than 1                Labs (abnormal labs have a star):   Labs Reviewed   CBC WITH AUTO DIFFERENTIAL - Abnormal; Notable for the following components:       Result Value    Monocyte % 17.1 (*)     All other components within normal limits   D-DIMER, QUANTITATIVE - Abnormal; Notable for the following components:    D-Dimer, Quantitative 0.51 (*)     All other components within normal limits    Narrative:     The Stago D-Dimer test used in conjunction with a clinical pretest probability (PTP) assessment model, has been approved by the FDA to rule out the presence of venous thromboembolism (VTE) in outpatients suspected of deep venous thrombosis (DVT) or pulmonary embolism (PE). The cut-off for negative predictive value is <0.50 MCGFEU/mL.   COVID-19, [facility]  IN-HOUSE CEPHEID/ANNALEE, NP SWAB IN TRANSPORT MEDIA 8-12 HR TAT - Normal    Narrative:     Fact sheet for providers: https://www.fda.gov/media/735680/download     Fact sheet for patients: https://www.fda.gov/media/575882/download   PROTIME-INR - Normal   APTT - Normal   TROPONIN (IN-HOUSE) - Normal    Narrative:     Troponin T Reference Range:  <= 0.03 ng/mL-   Negative for AMI  >0.03 ng/mL-     Abnormal for myocardial necrosis.  Clinicians would have to utilize clinical acumen, EKG, Troponin and serial changes to determine if it is an Acute Myocardial Infarction or myocardial injury due to an underlying chronic condition.       Results may be falsely decreased if patient taking Biotin.     BNP (IN-HOUSE) - Normal    Narrative:     Among patients with dyspnea, NT-proBNP is highly sensitive for the detection of acute congestive heart failure. In addition NT-proBNP of <300 pg/ml effectively rules out acute congestive heart failure with 99% negative predictive value.    Results may be falsely decreased if patient taking Biotin.     COVID PRE-OP / PRE-PROCEDURE SCREENING ORDER (NO ISOLATION)    Narrative:     The following orders were created for panel order COVID PRE-OP / PRE-PROCEDURE SCREENING ORDER (NO ISOLATION) - Swab, Nasopharynx.  Procedure                               Abnormality         Status                     ---------                               -----------         ------                     COVID-19,[facility] IN-HOUSE...[[id number]]  Normal              Final result                 Please view results for these tests on the individual orders.   RAINBOW DRAW    Narrative:     The following orders were created for panel order [place] Draw.  Procedure                               Abnormality         Status                     ---------                               -----------         ------                     Green Top (Gel)[[id number]]                                  Final result               Lavender  Top[[id number]]                                     Final result               Light Blue Top[[id number]]                                   Final result                 Please view results for these tests on the individual orders.   COMPREHENSIVE METABOLIC PANEL    Narrative:     GFR Normal >60  Chronic Kidney Disease <60  Kidney Failure <15     TROPONIN (IN-HOUSE)   GREEN TOP   LAVENDER TOP   LIGHT BLUE TOP   CBC AND DIFFERENTIAL    Narrative:     The following orders were created for panel order CBC & Differential.  Procedure                               Abnormality         Status                     ---------                               -----------         ------                     CBC Auto Differential[[id number]]        Abnormal            Final result                 Please view results for these tests on the individual orders.       EKG:   ECG 12 Lead   Final Result   HEART RATE= 49  bpm   RR Interval= 1232  ms   DC Interval= 343  ms   P Horizontal Axis= -46  deg   P Front Axis= -4  deg   QRSD Interval= 99  ms   QT Interval= 483  ms   QRS Axis= -36  deg   T Wave Axis= -11  deg   - ABNORMAL ECG -   Sinus bradycardia   Prolonged DC interval   Anterior infarct, old   Inferior infarct, old   No change from prior tracing   Electronically Signed By: Olaf Sales ([facility]) 05-Jun-2022 19:55:48   Date and Time of Study: 2022-06-05 18:00:29          Meds given in ED:   Medications   nitroglycerin (NITROSTAT) SL tablet 0.4 mg (0.4 mg Sublingual Given 6/5/22 1941)   aspirin chewable tablet 324 mg (324 mg Oral Given 6/5/22 1903)   nitroglycerin (NITROSTAT) ointment 1 inch (1 inch Topical Given 6/5/22 1956)   famotidine (PEPCID) injection 20 mg (20 mg Intravenous Given 6/5/22 2022)       Imaging results:  No radiology results for the last day    Ambulatory status:   With assist     Social issues:   Social History     Socioeconomic History   • Marital status:    Tobacco Use   • Smoking status: Former Smoker      Packs/day: 0.00     Years: 0.00     Pack years: 0.00     Types: Cigars   • Smokeless tobacco: Never Used   • Tobacco comment: Smoke a cigar ocassionally   Substance and Sexual Activity   • Alcohol use: No     Comment: Daily caffeine use   • Drug use: No   • Sexual activity: Not Currently       NIH Stroke Scale:        Nursing report ED to floor:

## 2022-06-06 NOTE — PROGRESS NOTES
MD ATTESTATION NOTE    The JESS and I have discussed this patient's history, physical exam, and treatment plan.  I have reviewed the documentation and personally had a face to face interaction with the patient. I affirm the documentation and agree with the treatment and plan.  The attached note describes my personal findings.      I provided a substantive portion of the care of the patient.  I personally performed the physical exam in its entirety, and below are my findings.  For this patient encounter, the patient wore surgical mask, I wore full protective PPE including N95 and eye protection.      Brief HPI: Patient currently denies chest pain or shortness of breath.    PHYSICAL EXAM  ED Triage Vitals   Temp Heart Rate Resp BP SpO2   06/05/22 1752 06/05/22 1752 06/05/22 1752 06/05/22 1850 06/05/22 1752   97.9 °F (36.6 °C) 55 16 150/78 98 %      Temp src Heart Rate Source Patient Position BP Location FiO2 (%)   06/05/22 1752 06/05/22 1752 06/05/22 2100 06/05/22 2100 --   Tympanic Monitor Lying Right arm          GENERAL: Awake and alert.  Elderly but nontoxic-appearing male.  Resting comfortably in no acute distress  HENT: nares patent  EYES: no scleral icterus  CV: regular rhythm, normal rate, equal radial pulses bilaterally  RESPIRATORY: normal effort, clear to auscultation bilaterally   ABDOMEN: soft, nontender   MUSCULOSKELETAL: no deformity, no pedal edema  NEURO: alert, moves all extremities, follows commands  PSYCH:  calm, cooperative  SKIN: warm, dry    Vital signs and nursing notes reviewed.        Plan: Serial troponins were negative.  Patient has been seen by Dr. Sales of cardiology and his input is appreciated.  Echocardiogram is pending.  His chest pain is atypical.  Patient is going to be started on Imdur and his PPI will be increased to twice daily.

## 2022-06-06 NOTE — DISCHARGE INSTRUCTIONS
You will start taking isosorbide mononitrate (Imdur) 80 mg tablet once daily.  Your pantoprazole (Protonix) has been increased to 40 mg twice daily before meals.  Continue all other home medications as prescribed  A outpatient referral for cardiac rehab has been placed, you should get a phone call regarding this referral.  Cardiology is going to set you up with a follow-up appointment in their office in 3 to 4 weeks.  If you have not heard from them in 1 week please call the office at the number provided on your discharge paperwork.  Keep your appointment with your primary care provider next week.    Return to the emergency department with worsening symptoms, uncontrolled pain, inability to tolerate oral liquids, fever greater than 101°F not controlled by Tylenol or as needed with emergent concerns.

## 2022-06-06 NOTE — H&P
.   [facility]   HISTORY AND PHYSICAL    Patient Name: Vicente Delgado  : 1928  MRN: [id number]  Primary Care Physician:  Mechelle Landa APRN  Date of admission: 2022    Subjective   Subjective     Chief Complaint: Chest pain    HPI:    Vicente Delgado is a 89 y.o. male with past medical history including but limited to CAD, hypertension, hyperlipidemia, GERD presents to [facility] with chest pain since Thursday.  Patient describes his pain as discomfort.  Pain is nonradiating.  Denies any associated nausea, vomiting, back pain, dyspnea, or diaphoresis.  Patient reports that his pain has gradually gotten worse.  Pain is nonexertional.  Patient reports right shoulder pain that is not associated with chest pain.  Currently patient rates his pain 2 on a scale 0-10.  Denies any previous MI.  Denies abdominal pain, nausea, vomiting, diarrhea.  Denies cough, chills, or lower extremity edema.  Denies dysuria, materia, increasing frequency/urgency.    Stress test on 2017  · Myocardial perfusion imaging indicates a normal myocardial perfusion study with no evidence of ischemia.  · Left ventricular ejection fraction is normal (Calculated EF = 59%).  · GI artifact is present.  · The patient reported non-exercise-limiting angina during the stress test. Pt c/o CP 5 out of 10 with c/o indigestion and pt stating he needed to burp  · Arrhythmias during stress: occasional PVCs, couplets.      Review of Systems   All systems were reviewed and negative except for: All systems were reviewed and negative except for the mention above in HPI    Personal History     Past Medical History:   Diagnosis Date   • Allergic rhinitis    • Anxiety    • Arthritis    • CAD (coronary artery disease)    • Cancer (HCC)    • Cataract 2018” and    Ocular mygraine   • Cholelithiasis    • Depression    • Diverticulosis    • Esophagitis    • Gastritis    • GERD (gastroesophageal reflux disease)    • Heart  disease    • History of anxiety    • History of medical problems Right shoulder replacemd    2008   • History of prostate cancer 06/1990   • HL (hearing loss) Partial   • Hyperlipidemia    • Hypertension    • Insomnia    • Low back pain Yes  from hworking   • Lupus (HCC)    • TAM (nonalcoholic steatohepatitis)    • Sciatica of right side    • Visual impairment Ocular mygraine       Past Surgical History:   Procedure Laterality Date   • ABDOMINAL SURGERY     • CARDIAC CATHETERIZATION  11/16/1995   • CARDIAC SURGERY  11/16/1995   • CATARACT EXTRACTION Left 07/2018   • CHOLECYSTECTOMY  2020   • CHOLECYSTECTOMY WITH INTRAOPERATIVE CHOLANGIOGRAM N/A 9/29/2020    Procedure: Laparoscopic cholecystectomy;  Surgeon: Javi Peralta MD;  Location: [facility] OR;  Service: General;  Laterality: N/A;   • COLONOSCOPY  01/09/2002   • ELBOW PROCEDURE     • JOINT REPLACEMENT     • LUNG BIOPSY     • LYMPH NODE BIOPSY  Yes    1992   • NASAL POLYP SURGERY     • PACEMAKER IMPLANTATION     • PROSTATE SURGERY  06/1990   • SHOULDER ROTATOR CUFF REPAIR Right 08/24/2011    Dr. Bach   • SIGMOIDOSCOPY     • TONSILLECTOMY  Yes 1943   • UPPER GASTROINTESTINAL ENDOSCOPY  09/12/1997    Gastritis, Duodenitis, hiatal hernia (Pathology: Gastric antrum minimal chronic inflammation)   • UPPER GASTROINTESTINAL ENDOSCOPY  04/11/2005    Mild esophagitis, Small hiatal hernia, Mild gastritis and mild duodenitis       Family History: family history includes Alcohol abuse in his father; Diabetes in his father; Hearing loss in his mother; Heart disease in his mother; Heart failure in his mother; Hyperlipidemia in his mother. Otherwise pertinent FHx was reviewed and not pertinent to current issue.    Social History:  reports that he has been smoking cigars. He has been smoking about 0.00 packs per day for the past 0.00 years. He has never used smokeless tobacco. He reports that he does not drink alcohol and does not use drugs.    Home  Medications:  LORazepam, aspirin, atenolol, benazepril, ezetimibe, nystatin-triamcinolone, pantoprazole, and rosuvastatin    Allergies:  Allergies   Allergen Reactions   • Lidocaine Dizziness     Reaction to novacaine   • Lovastatin Other (See Comments)     Liver enzymes elevated   • Pravastatin Other (See Comments)     Elevated liver enzymes    • Gabapentin GI Intolerance     Bloating, incontinence    • Procaine    • Simvastatin        Objective   Objective     Vitals:   Temp:  [97.9 °F (36.6 °C)] 97.9 °F (36.6 °C)  Heart Rate:  [48-60] 50  Resp:  [16] 16  BP: (101-156)/(56-78) 120/63  Physical Exam    Constitutional: Awake, alert, no acute distress   Eyes: PERRLA, sclerae anicteric, no conjunctival injection   HENT: NCAT, mucous membranes moist   Neck: Supple, no thyromegaly, no lymphadenopathy, trachea midline   Respiratory: Clear to auscultation bilaterally, nonlabored respirations    Cardiovascular: RRR, S1 and S2, no S3 or S4 no murmurs, rubs, or gallops, palpable pedal pulses bilaterally   Gastrointestinal: Positive bowel sounds, soft, nontender, nondistended   Musculoskeletal: No bilateral ankle edema, no clubbing or cyanosis to extremities   Psychiatric: Appropriate affect, cooperative   Neurologic: Oriented x 3, strength symmetric in all extremities, Cranial Nerves grossly intact to confrontation, speech clear   Skin: No rashes     Result Review    Result Review:  I have personally reviewed the results from the time of this admission to 6/5/2022 21:21 EDT and agree with these findings:  [x]  Laboratory  []  Microbiology  []  Radiology  []  EKG/Telemetry   []  Cardiology/Vascular   []  Pathology  []  Old records  []  Other:  Most notable findings include:  Troponin<0.010, , glucose 82, potassium 4.5, creatinine 0.89, D-dimer 0.51, WBC 4.9, hemoglobin 13.7, and platelets 207    Assessment & Plan   Assessment / Plan     Brief Patient Summary:  Vicente Delgado is a 89 y.o. male who was seen and  evaluated the ED for chest pain.  Patient's been admitted to observation for further evaluation and cardiology consult.    Active Hospital Problems:  Active Hospital Problems    Diagnosis    • Chest pain      Plan:   Chest pain  -Initial repeat troponin negative  -EKG nonischemic  -Cardiology consult  -Repeat EKG and troponin in a.m.  -CTA chest secondary to elevated D-dimer negative for PE but demonstrates enlarged central pulmonary arteries with mild represent pulmonary arterial hypertension  -Nitroglycerin as needed  -Telemetry monitoring  -Patient has received 324 mg aspirin     Hypertension and hyperlipidemia  -Chronic conditions, continue medications  -Vital signs per nursing    GERD  -Continue home medications       DVT prophylaxis:  Mechanical DVT prophylaxis orders are present.    CODE STATUS:    Level Of Support Discussed With: Patient  Code Status (Patient has no pulse and is not breathing): CPR (Attempt to Resuscitate)  Medical Interventions (Patient has pulse or is breathing): Full Support    Admission Status:  I believe this patient meets observation status.    I wore an face mask, eye protection, and gloves during this patient encounter. Patient also wearing a surgical mask. Hand hygeine performed before and after seeing the patient.    Electronically signed by GE Alfonso, 06/05/22, 9:21 PM EDT.

## 2022-06-06 NOTE — OUTREACH NOTE
Prep Survey    Flowsheet Row Responses   [facility] facility patient discharged from? [facility]   Is LACE score < 7 ? Yes   Emergency Room discharge w/ pulse ox? No   Eligibility [facility]   Date of Admission 06/05/22   Date of Discharge 06/06/22   Discharge Disposition Home or Self Care   Discharge diagnosis chest pain, HTN   Does the patient have one of the following disease processes/diagnoses(primary or secondary)? Other   Does the patient have Home health ordered? No   Is there a DME ordered? No   Prep survey completed? Yes          LEO JEFF - Registered Nurse

## 2022-06-06 NOTE — CONSULTS
Patient Name: Vicente Delgado  Age/Sex: 89 y.o. male  : 1928  MRN: [id number]    Date of Admission: 2022  Date of Encounter Visit: 22  Encounter Provider: Vandana Barfield RN  Place of Service: [facility] CARDIOLOGY      Referring Provider: Jacob Ugarte MD  Patient Care Team:  Mechelle Landa APRN as PCP - General (Internal Medicine)  Reginaldo Palacio MD (Dermatology)  Janki Elias MD as Consulting Physician (Ophthalmology)  Kristopher Machado DPM as Consulting Physician (Podiatry)  Aby Marquez MD as Consulting Physician (Hand Surgery)  Wilton Ramos MD as Consulting Physician (Orthopedic Surgery)  Destinee Snider MD as Consulting Physician (Pain Medicine)  Breezy Frausto MD as Consulting Physician (Cardiology)    Subjective:     Consulted for: Chest pain    Chief Complaint: Chest pain        History of Present Illness:  Vicente Delgado is a 89 y.o. male patient of Dr Frausto with a history of hypertension, valvular heart disease (non-rheumatic mitral regurgitation), and mild coronary artery disease. He had a stress test in 2017 which showed no ischemia.  His last ECHO in 2017 showed normal EF, moderate MR, mild AI and mild TR.     He presented to the ED last evening with complaints of chest pain that has been intermittent chest pain since Thursday.  Initially this occurred at rest but improved when he was up and moving around.  In talking to him he states that his pain started right-sided subxiphoid and then was left-sided.  It came and it went without any significant intervention.  The subxiphoid pain increased with palpation.  He also stated that he had discomfort in his bilateral shoulders that that is what brought him in.  He reported this was a dull pressure-like and lasted for about 5 minutes before going away on its own      On arrival his EKG showed sinus bradycardia.  Labs included normal troponin  and BNP.  D-dimer was 0.51. He had CT of chest which showed no PE. He received  mg, Pepcid, NTG Sl x 2.  He underwent a CTA of his chest with a very mild coronary calcification and enlarged pulmonary arteries without evidence of pulmonary embolus.        Previous testing:   Stress Test 12/4/2017  · Myocardial perfusion imaging indicates a normal myocardial perfusion study with no evidence of ischemia.  · Left ventricular ejection fraction is normal (Calculated EF = 59%).  · GI artifact is present.  · The patient reported non-exercise-limiting angina during the stress test. Pt c/o CP 5 out of 10 with c/o indigestion and pt stating he needed to burp  · Arrhythmias during stress: occasional PVCs, couplets.     Holter Monitor 2/9/2021  Study Findings    Patient diary was not submitted.The predominant rhythm noted during the testing period was sinus rhythm. Premature atrial contractions occured rarely. There was no evidence of atrial arrhythmias. There were no episodes of supraventricular tachycardia. Premature ventricular contractions occured rarely. The PVCs were all unifocal.  Only one couplet was noted.  There were no triplets, bigeminy, trigeminy or ventricular tachycardia cardiac detected. There were no episodes of ventricular tachycardia. Sinoatrial node conduction was normal. No atrioventricular block noted.     Study Impressions    A relatively benign monitor study.     ECHO 1/5/2017  · All left ventricular wall segments contract normally.  · Left ventricular function is normal. Estimated EF = 67%.  · Mild aortic valve regurgitation is present.  · Mild tricuspid valve regurgitation is present.  · Left ventricular diastolic dysfunction (grade I) consistent with impaired relaxation.  · Moderate mitral valve regurgitation is present           Past Medical History:  Past Medical History:   Diagnosis Date   • Allergic rhinitis    • Anxiety    • Arthritis    • CAD (coronary artery disease)    • Cancer (HCC)     • Cataract June 2018” and    Ocular mygraine   • Cholelithiasis 2020   • Depression    • Diverticulosis    • Esophagitis    • Gastritis    • GERD (gastroesophageal reflux disease)    • Heart disease    • History of anxiety    • History of medical problems Right shoulder replacemd    2008   • History of prostate cancer 06/1990   • HL (hearing loss) Partial   • Hyperlipidemia    • Hypertension    • Insomnia    • Low back pain Yes  from hworking   • Lupus (HCC)    • TAM (nonalcoholic steatohepatitis)    • Sciatica of right side    • Visual impairment Ocular mygraine       Past Surgical History:   Procedure Laterality Date   • ABDOMINAL SURGERY     • CARDIAC CATHETERIZATION  11/16/1995   • CARDIAC SURGERY  11/16/1995   • CATARACT EXTRACTION Left 07/2018   • CHOLECYSTECTOMY  2020   • CHOLECYSTECTOMY WITH INTRAOPERATIVE CHOLANGIOGRAM N/A 9/29/2020    Procedure: Laparoscopic cholecystectomy;  Surgeon: Javi Peralta MD;  Location: [facility];  Service: General;  Laterality: N/A;   • COLONOSCOPY  01/09/2002   • ELBOW PROCEDURE     • JOINT REPLACEMENT     • LUNG BIOPSY     • LYMPH NODE BIOPSY  Yes    1992   • NASAL POLYP SURGERY     • PACEMAKER IMPLANTATION     • PROSTATE SURGERY  06/1990   • SHOULDER ROTATOR CUFF REPAIR Right 08/24/2011    Dr. Bach   • SIGMOIDOSCOPY     • TONSILLECTOMY  Yes 1943   • UPPER GASTROINTESTINAL ENDOSCOPY  09/12/1997    Gastritis, Duodenitis, hiatal hernia (Pathology: Gastric antrum minimal chronic inflammation)   • UPPER GASTROINTESTINAL ENDOSCOPY  04/11/2005    Mild esophagitis, Small hiatal hernia, Mild gastritis and mild duodenitis       Home Medications:   Medications Prior to Admission   Medication Sig Dispense Refill Last Dose   • aspirin 81 MG chewable tablet Chew 81 mg Daily.   6/5/2022 at Unknown time   • atenolol (TENORMIN) 25 MG tablet Take 1 tablet by mouth 2 (Two) Times a Day. 180 tablet 1 6/5/2022 at Unknown time   • benazepril (LOTENSIN) 20 MG tablet Take 1 tablet by  mouth 2 (Two) Times a Day. Call if blood pressure running above 160/90. 180 tablet 1 6/5/2022 at Unknown time   • ezetimibe (ZETIA) 10 MG tablet Take 1 tablet by mouth Daily. 90 tablet 1 6/5/2022 at Unknown time   • LORazepam (ATIVAN) 1 MG tablet Take 1 tablet by mouth Daily As Needed for Anxiety. for anxiety 30 tablet 0 Past Week at Unknown time   • pantoprazole (PROTONIX) 40 MG EC tablet Take 1 tablet by mouth Daily. 90 tablet 1 6/5/2022 at Unknown time   • rosuvastatin (CRESTOR) 10 MG tablet Take 1 tablet by mouth Every Night. 90 tablet 1 6/4/2022 at Unknown time   • nystatin-triamcinolone (MYCOLOG) 836099-3.1 UNIT/GM-% ointment Apply 1 application topically to the appropriate area as directed 2 (Two) Times a Day. Apply to affected are to foot twice daily          Allergies:  Allergies   Allergen Reactions   • Lidocaine Dizziness     Reaction to novacaine   • Lovastatin Other (See Comments)     Liver enzymes elevated   • Pravastatin Other (See Comments)     Elevated liver enzymes    • Gabapentin GI Intolerance     Bloating, incontinence    • Procaine    • Simvastatin        Past Social History:  Social History     Socioeconomic History   • Marital status:    Tobacco Use   • Smoking status: Current Every Day Smoker     Packs/day: 0.00     Years: 0.00     Pack years: 0.00     Types: Cigars   • Smokeless tobacco: Never Used   • Tobacco comment: Smoke a cigar ocassionally   Substance and Sexual Activity   • Alcohol use: No     Comment: Daily caffeine use   • Drug use: No   • Sexual activity: Not Currently        Past Family History:  Family History   Problem Relation Age of Onset   • Heart failure Mother    • Hearing loss Mother    • Heart disease Mother         Mother had congestive heart failure   • Hyperlipidemia Mother    • Alcohol abuse Father    • Diabetes Father          Review of Systems:  All systems reviewed. Pertinent positives identified in HPI. All other systems are negative.         Objective:  "    Objective:  Temp:  [97.5 °F (36.4 °C)-97.9 °F (36.6 °C)] 97.5 °F (36.4 °C)  Heart Rate:  [47-60] 59  Resp:  [16] 16  BP: (101-156)/(56-78) 145/78  No intake or output data in the 24 hours ending 06/06/22 0700  Body mass index is 27.41 kg/m².      06/05/22  1850   Weight: 79.4 kg (175 lb)           Physical Exam:   Temp:  [97.3 °F (36.3 °C)-97.9 °F (36.6 °C)] 97.3 °F (36.3 °C)  Heart Rate:  [47-60] 47  Resp:  [16-18] 18  BP: (101-156)/(56-78) 155/61  No intake or output data in the 24 hours ending 06/06/22 0812  Flowsheet Rows    Flowsheet Row First Filed Value   Admission Height 170.2 cm (67\") Documented at 06/05/2022 1850   Admission Weight 79.4 kg (175 lb) Documented at 06/05/2022 1850          General Appearance:    Alert, cooperative, in no acute distress   Head:    Normocephalic, without obvious abnormality, atraumatic       Neck/Lymph   No adenopathy, supple, no thyromegaly, no carotid bruit, no    JVD   Lungs:     Clear to auscultation bilaterally, no wheezes, rales, or     rhonchi    Cardiac:    Normal rate, regular rhythm, no murmur, no rub, no gallop   Chest Wall:    No abnormalities observed   GI:     Normal bowel sounds, soft, mild tenderness to palpation in the subxiphoid/epigastric region.   Extremities:   No cyanosis, clubbing, or edema   Circulatory/Peripheral Vascular :   Pulses palpable and equal bilaterally   Integumentary:   No bleeding or rash. Normal temperature                 Lab Review:     Results from last 7 days   Lab Units 06/06/22  0453 06/05/22  1853   SODIUM mmol/L 138 137   POTASSIUM mmol/L 4.2 4.5   CHLORIDE mmol/L 105 102   CO2 mmol/L 24.6 25.8   BUN mg/dL 16 15   CREATININE mg/dL 1.02 0.99   GLUCOSE mg/dL 90 82   CALCIUM mg/dL 8.9 9.0       Results from last 7 days   Lab Units 06/06/22  0453 06/05/22  2118 06/05/22  1853   TROPONIN T ng/mL <0.010 <0.010 <0.010     Results from last 7 days   Lab Units 06/06/22  0453   WBC 10*3/mm3 4.20   HEMOGLOBIN g/dL 12.8*   HEMATOCRIT % " 38.7   PLATELETS 10*3/mm3 164     Results from last 7 days   Lab Units 06/05/22  1853   INR  1.08   APTT seconds 35.3     Results from last 7 days   Lab Units 06/05/22  1853   CHOLESTEROL mg/dL 140     Results from last 7 days   Lab Units 06/06/22  0453   MAGNESIUM mg/dL 2.2     Results from last 7 days   Lab Units 06/05/22  1853   CHOLESTEROL mg/dL 140   TRIGLYCERIDES mg/dL 210*   HDL CHOL mg/dL 37*   LDL CHOL mg/dL 68     Results from last 7 days   Lab Units 06/05/22  1853   PROBNP pg/mL 160.0               Imaging:    Imaging Results (Most Recent)     Procedure Component Value Units Date/Time    CT Angiogram Chest With & Without Contrast [[id number]] Collected: 06/06/22 0520     Updated: 06/06/22 0528    Narrative:      CT ANGIOGRAM CHEST     HISTORY: Chest pain.     TECHNIQUE: CT angiogram the chest with multiplanar and three-dimensional  reformatted images produced at a separate workstation is provided.     Radiation dose reduction techniques were utilized, including automated  exposure control and exposure modulation based on body size.     FINDINGS: The thoracic aorta is very faintly opacified proximally with  no obvious dissection flap. There is no enlargement of the aorta. There  is enlargement of the central pulmonary arteries, particularly the right  main pulmonary artery, suggesting pulmonary hypertension. There is no  evidence of acute pulmonary thromboembolism. No right ventricular  enlargement. No pleural or pericardial effusion. There is no thoracic  lymphadenopathy.     The lungs are clear and the central airways are patent. The visualized  upper abdominal structures appear grossly normal. The bones appear  normal.       Impression:      No acute abnormality. Enlarged central pulmonary arteries  suggest pulmonary arterial hypertension. There is no evidence of acute  pulmonary thromboembolism, however.        This report was finalized on 6/6/2022 5:25 AM by Dr. Yousif Forrest M.D.       XR Chest 1  View [[id number]] Collected: 06/05/22 2003     Updated: 06/05/22 2007    Narrative:      PORTABLE CHEST 06/05/2022 AT 7:30 PM     CLINICAL HISTORY: Chest pain     Compared to the previous chest dated 03/05/2018.     The lungs are well-expanded and appear free of infiltrates. There are no  pleural effusions. No pneumothorax is evident. The heart is normal in  size. The thoracic aorta is slightly tortuous.     IMPRESSIONS: No evidence of acute disease within the chest.     This report was finalized on 6/5/2022 8:04 PM by Dr. Elijah Cherry M.D.             Results for orders placed during the hospital encounter of 01/05/17    Adult Transthoracic Echo Complete    Interpretation Summary  · All left ventricular wall segments contract normally.  · Left ventricular function is normal. Estimated EF = 67%.  · Mild aortic valve regurgitation is present.  · Mild tricuspid valve regurgitation is present.  · Left ventricular diastolic dysfunction (grade I) consistent with impaired relaxation.  · Moderate mitral valve regurgitation is present      EKG:         BASELINE:         I personally viewed and interpreted the patient's EKG/Telemetry data.    Assessment:   Assessment & Plan   1.  Chest pain  2.  Previously documented mild CAD  3.  Moderate mitral valve regurgitation  4.  Essential hypertension  5.  Gastroesophageal reflux disease  6.  Advanced age      -I think our best plan at this point in time would be a more conservative approach with his very advanced age.  I will add low-dose isosorbide mononitrate to his regimen.  I also recommended to be double his Protonix to 40 mg p.o. every 12 hours.  - In the setting of negative cardiac biomarkers and BNP and EKG with no evidence of ischemia would not recommend ischemic work-up.  We will try to medically manage him.  - I will get a transthoracic echocardiogram with his discomfort but also pulmonary artery enlargement to assess his RVSP and right side as well.    Thank you for  allowing me to participate in the care of Vicente Delgado. Feel free to contact me directly with any further questions or concerns.    Vandana Barfield RN  [facility]  06/06/22  07:00 EDT

## 2022-06-06 NOTE — CASE MANAGEMENT/SOCIAL WORK
Discharge Planning Assessment  [facility]     Patient Name: Vicente Delgado  MRN: [id number]  Today's Date: 6/5/2022    Admit Date: 6/5/2022     Discharge Needs Assessment     Row Name 06/05/22 2135       Living Environment    People in Home alone    Current Living Arrangements home    Primary Care Provided by self    Provides Primary Care For no one    Family Caregiver if Needed child(tracie), adult    Family Caregiver Names Constance Cota [phone]    Quality of Family Relationships supportive    Able to Return to Prior Arrangements yes       Resource/Environmental Concerns    Transportation Concerns none       Transition Planning    Patient/Family Anticipates Transition to home    Patient/Family Anticipated Services at Transition none    Transportation Anticipated car, drives self;family or friend will provide       Discharge Needs Assessment    Readmission Within the Last 30 Days no previous admission in last 30 days    Equipment Currently Used at Home none    Concerns to be Addressed no discharge needs identified;denies needs/concerns at this time    Anticipated Changes Related to Illness none    Equipment Needed After Discharge none    Provided Post Acute Provider List? N/A    Provided Post Acute Provider Quality & Resource List? N/A               Discharge Plan     Row Name 06/05/22 2136       Plan    Plan Pt intends to return home upon discharge    Patient/Family in Agreement with Plan yes    Provided Post Acute Provider List? N/A    Provided Post Acute Provider Quality & Resource List? N/A    Plan Comments Face sheet verified, role of CCP explained. Pt is independent in ADL's and denies the need for any assistive devices. Pt denies any difficulty paying for medications. Advised pt to contact case management if any further needs arise              Continued Care and Services - Admitted Since 6/5/2022    Coordination has not been started for this encounter.          Demographic Summary    No  documentation.                Functional Status    No documentation.                Psychosocial    No documentation.                Abuse/Neglect    No documentation.                Legal    No documentation.                Substance Abuse    No documentation.                Patient Forms    No documentation.                   Trudy Ramirez RN

## 2022-06-07 ENCOUNTER — TRANSITIONAL CARE MANAGEMENT TELEPHONE ENCOUNTER (OUTPATIENT)
Dept: CALL CENTER | Facility: HOSPITAL | Age: 87
End: 2022-06-07

## 2022-06-07 NOTE — OUTREACH NOTE
Call Center TCM Note    Flowsheet Row Responses   [facility] patient discharged from? [place]   Does the patient have one of the following disease processes/diagnoses(primary or secondary)? Other   TCM attempt successful? No  [No current Verbal release with PCP office]   Unsuccessful attempts Attempt 1          Annie Agustin RN    6/7/2022, 10:55 EDT

## 2022-06-07 NOTE — OUTREACH NOTE
Call Center TCM Note    Flowsheet Row Responses   [facility] patient discharged from? [place]   Does the patient have one of the following disease processes/diagnoses(primary or secondary)? Other   TCM attempt successful? Yes   Call start time 1547   Call end time 1554   Discharge diagnosis chest pain, HTN   Meds reviewed with patient/caregiver? Yes   Is the patient having any side effects they believe may be caused by any medication additions or changes? No   Does the patient have all medications ordered at discharge? Yes   Is the patient taking all medications as directed (includes completed medication regime)? Yes   Does the patient have a primary care provider?  Yes   Comments regarding PCP PCP appt on 6/16/ 22 @ 8:15am. DOes not states HOSP DC FU and Pt declines any other appt as he wants to see Mechelle CRAOLINA.    Has home health visited the patient within 72 hours of discharge? N/A   Psychosocial issues? No   Did the patient receive a copy of their discharge instructions? Yes   Nursing interventions Reviewed instructions with patient   What is the patient's perception of their health status since discharge? Improving   Is the patient/caregiver able to teach back signs and symptoms related to disease process for when to call PCP? Yes   Is the patient/caregiver able to teach back signs and symptoms related to disease process for when to call 911? Yes   Is the patient/caregiver able to teach back the hierarchy of who to call/visit for symptoms/problems? PCP, Specialist, Home health nurse, Urgent Care, ED, 911 Yes   TCM call completed? Yes   Wrap up additional comments Pt reports he is doing better. Pt wants to keep appt with PCP office on 6/16/22 @ 8, 15am.          Annie Agustin RN    6/7/2022, 15:58 EDT

## 2022-06-16 ENCOUNTER — OFFICE VISIT (OUTPATIENT)
Dept: INTERNAL MEDICINE | Facility: CLINIC | Age: 87
End: 2022-06-16

## 2022-06-16 VITALS
DIASTOLIC BLOOD PRESSURE: 82 MMHG | OXYGEN SATURATION: 99 % | TEMPERATURE: 97.8 F | BODY MASS INDEX: 27.5 KG/M2 | HEART RATE: 56 BPM | HEIGHT: 67 IN | WEIGHT: 175.2 LBS | SYSTOLIC BLOOD PRESSURE: 148 MMHG

## 2022-06-16 DIAGNOSIS — K21.00 GASTROESOPHAGEAL REFLUX DISEASE WITH ESOPHAGITIS WITHOUT HEMORRHAGE: Chronic | ICD-10-CM

## 2022-06-16 DIAGNOSIS — R07.2 PRECORDIAL PAIN: Primary | ICD-10-CM

## 2022-06-16 DIAGNOSIS — I10 PRIMARY HYPERTENSION: Chronic | ICD-10-CM

## 2022-06-16 DIAGNOSIS — I20.8 CHRONIC STABLE ANGINA: ICD-10-CM

## 2022-06-16 PROBLEM — I20.89 CHRONIC STABLE ANGINA: Status: ACTIVE | Noted: 2022-06-16

## 2022-06-16 PROCEDURE — 99495 TRANSJ CARE MGMT MOD F2F 14D: CPT | Performed by: NURSE PRACTITIONER

## 2022-06-16 PROCEDURE — 1111F DSCHRG MED/CURRENT MED MERGE: CPT | Performed by: NURSE PRACTITIONER

## 2022-06-16 NOTE — ASSESSMENT & PLAN NOTE
Protonix increased to bid during recent hospitalization which he may continue for 4-6 weeks or take Protonix qd with Pepcid at night (has responded well to this regimen in the past).

## 2022-06-16 NOTE — ASSESSMENT & PLAN NOTE
He has brought home BP readings which are consistently 140s/80s, continue atenolol and benazepril daily.

## 2022-06-16 NOTE — PROGRESS NOTES
Transitional Care Follow Up Visit  Subjective     Vicente Delgado is a 89 y.o. male who presents for a transitional care management visit.    Within 48 business hours after discharge our office contacted him via telephone to coordinate his care and needs.      I reviewed and discussed the details of that call along with the discharge summary, hospital problems, inpatient lab results, inpatient diagnostic studies, and consultation reports with Vicente.     Current outpatient and discharge medications have been reconciled for the patient.  Reviewed by: GE Munguia      Date of TCM Phone Call 6/6/2022   [facility]   Date of Admission 6/5/2022   Date of Discharge 6/6/2022   Discharge Disposition Home or Self Care     Risk for Readmission (LACE) Score: 3 (6/6/2022  6:01 AM)      He presented to the ER June 5 with right anterior chest pain for the past several days which was worse at rest and improved with exertion. His EKG showed bradycardia.  He underwent a CTA of his chest with a very mild coronary calcification and enlarged pulmonary arteries without evidence of pulmonary embolus. Cardiac consult was obtained who recommend conservative management, Imdur was started and Protonix was increased to bid. He c/o feeling lightheaded and off balance with Imdur which resolved with stopping medication.     Course During Hospital Stay:  Chest pain improved     The following portions of the patient's history were reviewed and updated as appropriate: allergies, current medications, past family history, past medical history, past social history, past surgical history and problem list.    Review of Systems   Constitutional: Positive for fatigue.   Respiratory: Negative for cough and shortness of breath.    Cardiovascular: Positive for chest pain (right anterior chest pain, improving). Negative for palpitations and leg swelling.   Gastrointestinal: Negative for abdominal pain, diarrhea and nausea.   Musculoskeletal:  Positive for arthralgias (chronic right shoulder pain).       Objective   Physical Exam  Constitutional:       Appearance: He is well-developed. He is not ill-appearing.   HENT:      Head: Normocephalic.      Right Ear: Hearing, tympanic membrane and external ear normal.      Left Ear: Hearing, tympanic membrane and external ear normal.      Nose: Nose normal. No nasal deformity, mucosal edema or rhinorrhea.      Right Sinus: No maxillary sinus tenderness or frontal sinus tenderness.      Left Sinus: No maxillary sinus tenderness or frontal sinus tenderness.      Mouth/Throat:      Dentition: Normal dentition.   Eyes:      General: Lids are normal.         Right eye: No discharge.         Left eye: No discharge.      Conjunctiva/sclera: Conjunctivae normal.      Right eye: No exudate.     Left eye: No exudate.  Neck:      Thyroid: No thyroid mass or thyromegaly.      Vascular: No carotid bruit.      Trachea: Trachea normal.   Cardiovascular:      Rate and Rhythm: Regular rhythm.      Pulses: Normal pulses.      Heart sounds: Normal heart sounds. No murmur heard.  Pulmonary:      Effort: No respiratory distress.      Breath sounds: Normal breath sounds. No decreased breath sounds, wheezing, rhonchi or rales.   Chest:      Chest wall: No tenderness.   Abdominal:      General: Bowel sounds are normal.      Palpations: Abdomen is soft.      Tenderness: There is no abdominal tenderness.   Musculoskeletal:      Cervical back: Normal range of motion. No edema.   Lymphadenopathy:      Head:      Right side of head: No submental, submandibular, tonsillar, preauricular, posterior auricular or occipital adenopathy.      Left side of head: No submental, submandibular, tonsillar, preauricular, posterior auricular or occipital adenopathy.   Skin:     General: Skin is warm and dry.      Nails: There is no clubbing.   Neurological:      Mental Status: He is alert.   Psychiatric:         Behavior: Behavior is cooperative.          Assessment & Plan   Problems Addressed this Visit        Cardiac and Vasculature    HTN (hypertension) (Chronic)     He has brought home BP readings which are consistently 140s/80s, continue atenolol and benazepril daily.           Chest pain - Primary     No acute abnl noted during recent hospitalization, sx have since improved.           Chronic stable angina (HCC)     He did not tolerate Imdur due to feeling lightheaded, sx improved with stopping medication.              Gastrointestinal Abdominal     Gastroesophageal reflux disease with esophagitis (Chronic)     Protonix increased to bid during recent hospitalization which he may continue for 4-6 weeks or take Protonix qd with Pepcid at night (has responded well to this regimen in the past).             Diagnoses       Codes Comments    Precordial pain    -  Primary ICD-10-CM: R07.2  ICD-9-CM: 786.51     Chronic stable angina (HCC)     ICD-10-CM: I20.8  ICD-9-CM: 413.9     Gastroesophageal reflux disease with esophagitis without hemorrhage     ICD-10-CM: K21.00  ICD-9-CM: 530.81, 530.10     Primary hypertension     ICD-10-CM: I10  ICD-9-CM: 401.9                    Answers for HPI/ROS submitted by the patient on 6/9/2022  What is the primary reason for your visit?: Other  Please describe your symptoms.: Follow up from emergency room an obersavation room visit an blood profile  Have you had these symptoms before?: Yes  How long have you been having these symptoms?: Greater than 2 weeks  Please list any medications you are currently taking for this condition.: Aspirin 81 mg. [id number] ezetimibe 10mmg isosobide mononiytate 30 mg pantoprazole 40mg rosuvastatin 10 mg

## 2022-06-19 ENCOUNTER — TELEPHONE (OUTPATIENT)
Dept: CARDIAC REHAB | Facility: HOSPITAL | Age: 87
End: 2022-06-19

## 2022-06-22 ENCOUNTER — TELEPHONE (OUTPATIENT)
Dept: INTERNAL MEDICINE | Facility: CLINIC | Age: 87
End: 2022-06-22

## 2022-06-22 ENCOUNTER — TELEMEDICINE (OUTPATIENT)
Dept: INTERNAL MEDICINE | Facility: CLINIC | Age: 87
End: 2022-06-22

## 2022-06-22 DIAGNOSIS — U07.1 COVID-19: Primary | ICD-10-CM

## 2022-06-22 PROCEDURE — 99213 OFFICE O/P EST LOW 20 MIN: CPT | Performed by: NURSE PRACTITIONER

## 2022-06-22 RX ORDER — GUAIFENESIN 600 MG/1
600 TABLET, EXTENDED RELEASE ORAL EVERY 12 HOURS SCHEDULED
Qty: 14 TABLET
Start: 2022-06-22 | End: 2022-06-29

## 2022-06-22 NOTE — PROGRESS NOTES
"Chief Complaint  COVID-19    Subjective        Vicente Delgado presents to [facility] due to COVID-19.    Fabiola presents due to increased congestion and drainage over the past couple days.  He was recently (last weekend) exposed to individuals who later tested positive for COVID-19.  He notes a cough which began last evening along with a low-grade temperature.  He started taking Tylenol with improvement in symptoms.  His COVID test today was positive.    You have chosen to receive care through a telehealth visit.  Do you consent to use a video/audio connection for your medical care today? Yes    Objective   Vital Signs:  There were no vitals taken for this visit.  Estimated body mass index is 27.5 kg/m² as calculated from the following:    Height as of 6/16/22: 170 cm (66.93\").    Weight as of 6/16/22: 79.5 kg (175 lb 3.2 oz).    BMI is >= 25 and <30. (Overweight) The following options were offered after discussion;: nutrition counseling/recommendations      Physical Exam  Psychiatric:         Behavior: Behavior normal.         Thought Content: Thought content normal.         Judgment: Judgment normal.        Result Review :  The following data was reviewed by: GE Bustos on 06/22/2022:  Common labs    Common Labsle 2/7/22 2/7/22 2/7/22 6/5/22 6/5/22 6/5/22 6/6/22 6/6/22    0000 0000 0000 1853 1853 1853 0453 0453   Glucose  87   82   90   BUN  19   15   16   Creatinine  0.92   0.99   1.02   eGFR Non  Am  71         eGFR African Am  82         Sodium  139   137   138   Potassium  4.7   4.5   4.2   Chloride  101   102   105   Calcium  9.6   9.0   8.9   Total Protein  7.0         Albumin  4.5   4.20      Total Bilirubin  0.6   0.3      Alkaline Phosphatase  64   55      AST (SGOT)  26   26      ALT (SGPT)  17   22      WBC 6.1   4.97   4.20    Hemoglobin 15.0   13.7   12.8 (A)    Hematocrit 44.0   42.0   38.7    Platelets 209   207   164    Total Cholesterol      140   "   Total Cholesterol   156        Triglycerides   158 (A)   210 (A)     HDL Cholesterol   41   37 (A)     LDL Cholesterol    87   68     (A) Abnormal value       Comments are available for some flowsheets but are not being displayed.                     Assessment and Plan   Diagnoses and all orders for this visit:    1. COVID-19 (Primary)  -     Nirmatrelvir & Ritonavir (PAXLOVID) 10 x 150 MG & 10 x 100MG tablet therapy pack tablet (for renal adjustment); Take 2 tablets by mouth 2 (Two) Times a Day for 5 days.  Dispense: 20 tablet; Refill: 0  -     guaiFENesin (Mucinex) 600 MG 12 hr tablet; Take 1 tablet by mouth Every 12 (Twelve) Hours for 7 days.  Dispense: 14 tablet    He tested positive for COVID-19 this morning after noting increased congestion over the past 24 hours.  He will start Mucinex twice a day for increased cough and congestion.  However, due to risk of complications related to COVID-19 we will also treat with Paxil bid.  He is advised to report to the ER with worsening symptoms and/or shortness of breath.         Follow Up   Return if symptoms worsen or fail to improve, for Next scheduled follow up.  Patient was given instructions and counseling regarding his condition or for health maintenance advice. Please see specific information pulled into the AVS if appropriate.

## 2022-07-01 ENCOUNTER — OFFICE VISIT (OUTPATIENT)
Dept: CARDIOLOGY | Facility: CLINIC | Age: 87
End: 2022-07-01

## 2022-07-01 VITALS
OXYGEN SATURATION: 98 % | BODY MASS INDEX: 27.15 KG/M2 | DIASTOLIC BLOOD PRESSURE: 76 MMHG | HEART RATE: 56 BPM | SYSTOLIC BLOOD PRESSURE: 126 MMHG | HEIGHT: 67 IN | WEIGHT: 173 LBS

## 2022-07-01 DIAGNOSIS — I35.0 AORTIC STENOSIS, MODERATE: ICD-10-CM

## 2022-07-01 DIAGNOSIS — I10 PRIMARY HYPERTENSION: Chronic | ICD-10-CM

## 2022-07-01 DIAGNOSIS — I34.0 NONRHEUMATIC MITRAL VALVE REGURGITATION: Primary | Chronic | ICD-10-CM

## 2022-07-01 DIAGNOSIS — E78.2 MIXED HYPERLIPIDEMIA: Chronic | ICD-10-CM

## 2022-07-01 PROCEDURE — 93000 ELECTROCARDIOGRAM COMPLETE: CPT | Performed by: NURSE PRACTITIONER

## 2022-07-01 PROCEDURE — 99214 OFFICE O/P EST MOD 30 MIN: CPT | Performed by: NURSE PRACTITIONER

## 2022-07-01 RX ORDER — ROSUVASTATIN CALCIUM 10 MG/1
10 TABLET, COATED ORAL NIGHTLY
Qty: 90 TABLET | Refills: 3 | Status: SHIPPED | OUTPATIENT
Start: 2022-07-01 | End: 2022-08-23

## 2022-07-01 RX ORDER — EZETIMIBE 10 MG/1
10 TABLET ORAL DAILY
Qty: 90 TABLET | Refills: 3 | Status: SHIPPED | OUTPATIENT
Start: 2022-07-01 | End: 2022-10-27 | Stop reason: SDUPTHER

## 2022-07-01 RX ORDER — BENAZEPRIL HYDROCHLORIDE 20 MG/1
20 TABLET ORAL 2 TIMES DAILY
Qty: 180 TABLET | Refills: 3 | Status: ON HOLD | OUTPATIENT
Start: 2022-07-01 | End: 2022-12-22 | Stop reason: SDUPTHER

## 2022-07-01 RX ORDER — ATENOLOL 25 MG/1
25 TABLET ORAL 2 TIMES DAILY
Qty: 180 TABLET | Refills: 3 | Status: SHIPPED | OUTPATIENT
Start: 2022-07-01 | End: 2022-11-21

## 2022-07-01 NOTE — PROGRESS NOTES
Date of Office Visit: 2022  Encounter Provider: GE Rizo  Place of Service: [facility]  Patient Name: Vicente Delgado  :1928    Chief Complaint   Patient presents with   • Coronary Artery Disease   • Essential hypertension   • Follow-up   :     HPI: Vicente Delgado is a 89 y.o. male who is a patient of Dr. Frausto.  He is new to me today and presents for a 3-week hospital follow-up.  He has a history of hypertension, nonrheumatic mitral regurgitation, and mild coronary disease.  Presented to the emergency department on  with complaints of intermittent chest pain.  Aggravated with palpation.  Nonexertional.  EKG on arrival showed sinus bradycardia.  He had normal labs.  CT of chest showed very mild coronary calcification and no evidence of pulmonary embolus.  2D echocardiogram showed normal EF and moderate to severe aortic valve stenosis. No dilation of aortic root.   Mild mitral regurgitation was present as well as moderately dilated left atrial cavity.  Normal RV size and systolic function.      Past cardiac studies: Stress test in  showed no ischemia.  His last echocardiogram in  showed normal LV function, moderate mitral regurgitation, mild aortic insufficiency and mild tricuspid regurgitation.  Holter monitor in 2021 was benign.      Today patient presents with no complaints of chest pain, shortness of breath, palpitations or edema.  Been doing very well since he left the hospital.  He stopped taking Imdur as it made his blood pressure drop.  He remains very active and appears younger than his stated age.  Blood pressures well controlled.  He monitors at home.  EKG remained stable.  He states that he was recently positive for COVID around Father's Day.  On physical exam, he appears euvolemic.  Systolic murmur appreciated right upper sternal border.    Previous testing and notes have been reviewed by me.   Past Medical  History:   Diagnosis Date   • Allergic rhinitis    • Anxiety    • Arthritis    • CAD (coronary artery disease)    • Cancer (HCC)    • Cataract June 2018” and    Ocular mygraine   • Cholelithiasis 2020   • Depression    • Diverticulosis    • Esophagitis    • Gastritis    • GERD (gastroesophageal reflux disease)    • Heart disease    • History of anxiety    • History of medical problems Right shoulder replacemd    2008   • History of prostate cancer 06/1990   • HL (hearing loss) Partial   • Hyperlipidemia    • Hypertension    • Insomnia    • Low back pain Yes  from hworking   • Lupus (HCC)    • TAM (nonalcoholic steatohepatitis)    • Sciatica of right side    • Visual impairment Ocular mygraine       Past Surgical History:   Procedure Laterality Date   • ABDOMINAL SURGERY     • CARDIAC CATHETERIZATION  11/16/1995   • CARDIAC SURGERY  11/16/1995   • CATARACT EXTRACTION Left 07/2018   • CHOLECYSTECTOMY  2020   • CHOLECYSTECTOMY WITH INTRAOPERATIVE CHOLANGIOGRAM N/A 9/29/2020    Procedure: Laparoscopic cholecystectomy;  Surgeon: Javi Peralta MD;  Location: [facility];  Service: General;  Laterality: N/A;   • COLONOSCOPY  01/09/2002   • ELBOW PROCEDURE     • JOINT REPLACEMENT     • LUNG BIOPSY     • LYMPH NODE BIOPSY  Yes    1992   • NASAL POLYP SURGERY     • PACEMAKER IMPLANTATION     • PROSTATE SURGERY  06/1990   • SHOULDER ROTATOR CUFF REPAIR Right 08/24/2011    Dr. Bach   • SIGMOIDOSCOPY     • TONSILLECTOMY  Yes 1943   • UPPER GASTROINTESTINAL ENDOSCOPY  09/12/1997    Gastritis, Duodenitis, hiatal hernia (Pathology: Gastric antrum minimal chronic inflammation)   • UPPER GASTROINTESTINAL ENDOSCOPY  04/11/2005    Mild esophagitis, Small hiatal hernia, Mild gastritis and mild duodenitis       Social History     Socioeconomic History   • Marital status:    Tobacco Use   • Smoking status: Passive Smoke Exposure - Never Smoker   • Smokeless tobacco: Never Used   • Tobacco comment: Smoke a cigar  ocassionally   Substance and Sexual Activity   • Alcohol use: No     Comment: Daily caffeine use   • Drug use: No   • Sexual activity: Not Currently     Partners: Female       Family History   Problem Relation Age of Onset   • Heart failure Mother    • Hearing loss Mother    • Heart disease Mother         Mother had congestive heart failure   • Hyperlipidemia Mother    • Alcohol abuse Father    • Diabetes Father        Review of Systems   Constitutional: Negative.   HENT: Negative.    Eyes: Negative.    Cardiovascular: Negative.    Respiratory: Negative.    Endocrine: Negative.    Skin: Negative.    Musculoskeletal: Negative.    Gastrointestinal: Negative.    Genitourinary: Negative.    Neurological: Negative.    Psychiatric/Behavioral: Negative.    Allergic/Immunologic: Negative.        Allergies   Allergen Reactions   • Lidocaine Dizziness     Reaction to novacaine   • Lovastatin Other (See Comments)     Liver enzymes elevated   • Pravastatin Other (See Comments)     Elevated liver enzymes    • Gabapentin GI Intolerance     Bloating, incontinence    • Procaine    • Simvastatin          Current Outpatient Medications:   •  aspirin 81 MG chewable tablet, Chew 81 mg Daily., Disp: , Rfl:   •  atenolol (TENORMIN) 25 MG tablet, Take 1 tablet by mouth 2 (Two) Times a Day., Disp: 180 tablet, Rfl: 3  •  benazepril (LOTENSIN) 20 MG tablet, Take 1 tablet by mouth 2 (Two) Times a Day. Call if blood pressure running above 160/90., Disp: 180 tablet, Rfl: 3  •  ezetimibe (ZETIA) 10 MG tablet, Take 1 tablet by mouth Daily., Disp: 90 tablet, Rfl: 3  •  LORazepam (ATIVAN) 1 MG tablet, Take 1 tablet by mouth Daily As Needed for Anxiety. for anxiety, Disp: 30 tablet, Rfl: 0  •  nystatin-triamcinolone (MYCOLOG) 499141-0.1 UNIT/GM-% ointment, Apply 1 application topically to the appropriate area as directed 2 (Two) Times a Day. Apply to affected are to foot twice daily, Disp: , Rfl:   •  pantoprazole (PROTONIX) 40 MG EC tablet, Take 1  "tablet by mouth 2 (Two) Times a Day Before Meals., Disp: 60 tablet, Rfl: 1  •  rosuvastatin (CRESTOR) 10 MG tablet, Take 1 tablet by mouth Every Night., Disp: 90 tablet, Rfl: 3      Objective:     Vitals:    07/01/22 1344   BP: 126/76   BP Location: Left arm   Patient Position: Sitting   Pulse: 56   SpO2: 98%   Weight: 78.5 kg (173 lb)   Height: 170.2 cm (67\")     Body mass index is 27.1 kg/m².     Cardiac studies:  2D Echocardiogram 6/6/2022:  LVEF 60%, moderate LVH, grade 1 diastolic dysfunction  Normal RV size and systolic function  Moderately dilated LA  Moderate calcification of AV, trace AI, moderate to severe AAS, HELIO 0.94 cm², max pressure gradient 57.9 mmHg, mean pressure gradient 31.7 mmHg, DI 0.20  Mild MR, no MS  No evidence of pulmonary hypertension  No dilation of aortic root    Holter Monitor 2/9/2021  Study Findings     Patient diary was not submitted.The predominant rhythm noted during the testing period was sinus rhythm. Premature atrial contractions occured rarely. There was no evidence of atrial arrhythmias. There were no episodes of supraventricular tachycardia. Premature ventricular contractions occured rarely. The PVCs were all unifocal.  Only one couplet was noted.  There were no triplets, bigeminy, trigeminy or ventricular tachycardia cardiac detected. There were no episodes of ventricular tachycardia. Sinoatrial node conduction was normal. No atrioventricular block noted.      Study Impressions     A relatively benign monitor study.     Stress Test 12/4/2017  · Myocardial perfusion imaging indicates a normal myocardial perfusion study with no evidence of ischemia.  · Left ventricular ejection fraction is normal (Calculated EF = 59%).  · GI artifact is present.  · The patient reported non-exercise-limiting angina during the stress test. Pt c/o CP 5 out of 10 with c/o indigestion and pt stating he needed to burp  · Arrhythmias during stress: occasional PVCs, couplets.       ECHO " 1/5/2017  · All left ventricular wall segments contract normally.  · Left ventricular function is normal. Estimated EF = 67%.  · Mild aortic valve regurgitation is present.  · Mild tricuspid valve regurgitation is present.  · Left ventricular diastolic dysfunction (grade I) consistent with impaired relaxation.  · Moderate mitral valve regurgitation is present      PHYSICAL EXAM:    Constitutional:       Appearance: Healthy appearance. Not in distress.   Neck:      Vascular: No JVR. JVD normal.   Pulmonary:      Effort: Pulmonary effort is normal.      Breath sounds: Normal breath sounds. No wheezing. No rhonchi. No rales.   Chest:      Chest wall: Not tender to palpatation.   Cardiovascular:      PMI at left midclavicular line. Normal rate. Regular rhythm. Normal S1. Normal S2.      Murmurs: There is a systolic murmur.      No gallop. No click. No rub.   Pulses:     Intact distal pulses.   Edema:     Peripheral edema absent.   Abdominal:      General: Bowel sounds are normal.      Palpations: Abdomen is soft.      Tenderness: There is no abdominal tenderness.   Musculoskeletal: Normal range of motion.         General: No tenderness. Skin:     General: Skin is warm and dry.   Neurological:      General: No focal deficit present.      Mental Status: Alert and oriented to person, place and time.           ECG 12 Lead    Date/Time: 7/1/2022 2:15 PM  Performed by: Maki Mcmanus APRN  Authorized by: Maki Mcmanus APRN   Comparison: compared with previous ECG from 6/5/2022  Similar to previous ECG  Rhythm: sinus rhythm  Conduction: 1st degree AV block  Other findings: non-specific ST-T wave changes              Assessment:       Diagnosis Plan   1. Nonrheumatic mitral valve regurgitation     2. Aortic stenosis, moderate     3. Primary hypertension  atenolol (TENORMIN) 25 MG tablet    benazepril (LOTENSIN) 20 MG tablet   4. Mixed hyperlipidemia  ezetimibe (ZETIA) 10 MG tablet    rosuvastatin (CRESTOR) 10 MG  tablet     No orders of the defined types were placed in this encounter.         Plan:       1.  Atypical chest pain: Resolved.  Imdur initiated on recent hospital visit she stopped taking due to low BP  2.  Moderate to severe aortic stenosis: HELIO 0.94 cm², max pressure gradient 57.9 mmHg, mean pressure gradient 31.7 mmHg, DI 0.2.  Medically manage.   3   Nonrheumatic mitral regurgitation: Mild  4.  Hypertension: Well-controlled on current medication  5.  Advanced age: Active.  Appears younger than set age  6.  GERD: On Prilosec.  Previously increased to twice daily for 1 month    Mr. Tenorio will follow up with Dr. Sales in 6 months.  He will call sooner for any questions or concerns.         Your medication list          Accurate as of July 1, 2022  2:09 PM. If you have any questions, ask your nurse or doctor.            CONTINUE taking these medications      Instructions Last Dose Given Next Dose Due   aspirin 81 MG chewable tablet      Chew 81 mg Daily.       atenolol 25 MG tablet  Commonly known as: TENORMIN      Take 1 tablet by mouth 2 (Two) Times a Day.       benazepril 20 MG tablet  Commonly known as: LOTENSIN      Take 1 tablet by mouth 2 (Two) Times a Day. Call if blood pressure running above 160/90.       ezetimibe 10 MG tablet  Commonly known as: ZETIA      Take 1 tablet by mouth Daily.       LORazepam 1 MG tablet  Commonly known as: ATIVAN      Take 1 tablet by mouth Daily As Needed for Anxiety. for anxiety       nystatin-triamcinolone 324578-4.1 UNIT/GM-% ointment  Commonly known as: MYCOLOG      Apply 1 application topically to the appropriate area as directed 2 (Two) Times a Day. Apply to affected are to foot twice daily       pantoprazole 40 MG EC tablet  Commonly known as: PROTONIX      Take 1 tablet by mouth 2 (Two) Times a Day Before Meals.       rosuvastatin 10 MG tablet  Commonly known as: CRESTOR      Take 1 tablet by mouth Every Night.             Where to Get Your Medications      These  medications were sent to [address] [address] [facility] - [phone]  - [phone]   [address] [id number]    Phone: [phone]   · atenolol 25 MG tablet  · benazepril 20 MG tablet  · ezetimibe 10 MG tablet  · rosuvastatin 10 MG tablet           As always, it has been a pleasure to participate in your patient's care.      Sincerely,       GE Alarcon

## 2022-08-23 DIAGNOSIS — E78.2 MIXED HYPERLIPIDEMIA: Chronic | ICD-10-CM

## 2022-08-23 RX ORDER — ROSUVASTATIN CALCIUM 10 MG/1
10 TABLET, COATED ORAL NIGHTLY
Qty: 90 TABLET | Refills: 3 | Status: SHIPPED | OUTPATIENT
Start: 2022-08-23

## 2022-09-06 RX ORDER — PANTOPRAZOLE SODIUM 40 MG/1
TABLET, DELAYED RELEASE ORAL
Qty: 90 TABLET | Refills: 1 | Status: SHIPPED | OUTPATIENT
Start: 2022-09-06 | End: 2023-02-06

## 2022-09-20 NOTE — PROGRESS NOTES
Please follow up with your Primary care provider within 2-5 days if your signs and symptoms have not resolved or worsen.     If your condition worsens or fails to improve we recommend that you receive another evaluation at the emergency room immediately or contact your primary medical clinic to discuss your concerns.   You must understand that you have received an Urgent Care treatment only and that you may be released before all of your medical problems are known or treated. You, the patient, will arrange for follow up care as instructed.     RED FLAGS/WARNING SYMPTOMS DISCUSSED WITH PATIENT THAT WOULD WARRANT EMERGENT MEDICAL ATTENTION. PATIENT VERBALIZED UNDERSTANDING.     Tolerated exercise well, no complaints voiced.

## 2022-10-17 ENCOUNTER — OFFICE VISIT (OUTPATIENT)
Dept: INTERNAL MEDICINE | Facility: CLINIC | Age: 87
End: 2022-10-17

## 2022-10-17 VITALS
TEMPERATURE: 97.5 F | BODY MASS INDEX: 27.47 KG/M2 | DIASTOLIC BLOOD PRESSURE: 82 MMHG | HEART RATE: 58 BPM | WEIGHT: 175 LBS | OXYGEN SATURATION: 95 % | SYSTOLIC BLOOD PRESSURE: 130 MMHG | HEIGHT: 67 IN

## 2022-10-17 DIAGNOSIS — I25.10 CORONARY ARTERY DISEASE INVOLVING NATIVE CORONARY ARTERY OF NATIVE HEART WITHOUT ANGINA PECTORIS: Chronic | ICD-10-CM

## 2022-10-17 DIAGNOSIS — K21.00 GASTROESOPHAGEAL REFLUX DISEASE WITH ESOPHAGITIS WITHOUT HEMORRHAGE: Chronic | ICD-10-CM

## 2022-10-17 DIAGNOSIS — I35.0 AORTIC STENOSIS, MODERATE: ICD-10-CM

## 2022-10-17 DIAGNOSIS — Z00.00 MEDICARE ANNUAL WELLNESS VISIT, SUBSEQUENT: Primary | ICD-10-CM

## 2022-10-17 DIAGNOSIS — G56.02 CARPAL TUNNEL SYNDROME OF LEFT WRIST: Chronic | ICD-10-CM

## 2022-10-17 DIAGNOSIS — I10 PRIMARY HYPERTENSION: Chronic | ICD-10-CM

## 2022-10-17 DIAGNOSIS — I74.3: ICD-10-CM

## 2022-10-17 PROBLEM — G89.29 CHRONIC RIGHT SHOULDER PAIN: Status: ACTIVE | Noted: 2022-10-17

## 2022-10-17 PROBLEM — M25.511 CHRONIC RIGHT SHOULDER PAIN: Status: ACTIVE | Noted: 2022-10-17

## 2022-10-17 LAB
ALBUMIN SERPL-MCNC: 4.4 G/DL (ref 3.5–5.2)
ALBUMIN/GLOB SERPL: 2 G/DL
ALP SERPL-CCNC: 58 U/L (ref 39–117)
ALT SERPL-CCNC: 17 U/L (ref 1–41)
AST SERPL-CCNC: 26 U/L (ref 1–40)
BASOPHILS # BLD AUTO: 0.03 10*3/MM3 (ref 0–0.2)
BASOPHILS NFR BLD AUTO: 0.5 % (ref 0–1.5)
BILIRUB SERPL-MCNC: 0.6 MG/DL (ref 0–1.2)
BUN SERPL-MCNC: 16 MG/DL (ref 8–23)
BUN/CREAT SERPL: 17.6 (ref 7–25)
CALCIUM SERPL-MCNC: 9.4 MG/DL (ref 8.2–9.6)
CHLORIDE SERPL-SCNC: 102 MMOL/L (ref 98–107)
CHOLEST SERPL-MCNC: 140 MG/DL (ref 0–200)
CO2 SERPL-SCNC: 26.5 MMOL/L (ref 22–29)
CREAT SERPL-MCNC: 0.91 MG/DL (ref 0.76–1.27)
EGFRCR SERPLBLD CKD-EPI 2021: 78.1 ML/MIN/1.73
EOSINOPHIL # BLD AUTO: 0.09 10*3/MM3 (ref 0–0.4)
EOSINOPHIL NFR BLD AUTO: 1.4 % (ref 0.3–6.2)
ERYTHROCYTE [DISTWIDTH] IN BLOOD BY AUTOMATED COUNT: 12 % (ref 12.3–15.4)
GLOBULIN SER CALC-MCNC: 2.2 GM/DL
GLUCOSE SERPL-MCNC: 83 MG/DL (ref 65–99)
HCT VFR BLD AUTO: 43.9 % (ref 37.5–51)
HDLC SERPL-MCNC: 41 MG/DL (ref 40–60)
HGB BLD-MCNC: 14.7 G/DL (ref 13–17.7)
IMM GRANULOCYTES # BLD AUTO: 0.02 10*3/MM3 (ref 0–0.05)
IMM GRANULOCYTES NFR BLD AUTO: 0.3 % (ref 0–0.5)
LDLC SERPL CALC-MCNC: 71 MG/DL (ref 0–100)
LYMPHOCYTES # BLD AUTO: 1.58 10*3/MM3 (ref 0.7–3.1)
LYMPHOCYTES NFR BLD AUTO: 25.4 % (ref 19.6–45.3)
MCH RBC QN AUTO: 30.4 PG (ref 26.6–33)
MCHC RBC AUTO-ENTMCNC: 33.5 G/DL (ref 31.5–35.7)
MCV RBC AUTO: 90.9 FL (ref 79–97)
MONOCYTES # BLD AUTO: 1.1 10*3/MM3 (ref 0.1–0.9)
MONOCYTES NFR BLD AUTO: 17.7 % (ref 5–12)
NEUTROPHILS # BLD AUTO: 3.41 10*3/MM3 (ref 1.7–7)
NEUTROPHILS NFR BLD AUTO: 54.7 % (ref 42.7–76)
NRBC BLD AUTO-RTO: 0 /100 WBC (ref 0–0.2)
PLATELET # BLD AUTO: 201 10*3/MM3 (ref 140–450)
POTASSIUM SERPL-SCNC: 4.7 MMOL/L (ref 3.5–5.2)
PROT SERPL-MCNC: 6.6 G/DL (ref 6–8.5)
RBC # BLD AUTO: 4.83 10*6/MM3 (ref 4.14–5.8)
SODIUM SERPL-SCNC: 140 MMOL/L (ref 136–145)
TRIGL SERPL-MCNC: 161 MG/DL (ref 0–150)
TSH SERPL DL<=0.005 MIU/L-ACNC: 3.17 UIU/ML (ref 0.27–4.2)
VLDLC SERPL CALC-MCNC: 28 MG/DL (ref 5–40)
WBC # BLD AUTO: 6.23 10*3/MM3 (ref 3.4–10.8)

## 2022-10-17 PROCEDURE — G0439 PPPS, SUBSEQ VISIT: HCPCS | Performed by: NURSE PRACTITIONER

## 2022-10-17 PROCEDURE — 1126F AMNT PAIN NOTED NONE PRSNT: CPT | Performed by: NURSE PRACTITIONER

## 2022-10-17 PROCEDURE — 1160F RVW MEDS BY RX/DR IN RCRD: CPT | Performed by: NURSE PRACTITIONER

## 2022-10-17 PROCEDURE — 1170F FXNL STATUS ASSESSED: CPT | Performed by: NURSE PRACTITIONER

## 2022-10-27 DIAGNOSIS — E78.2 MIXED HYPERLIPIDEMIA: Chronic | ICD-10-CM

## 2022-10-27 RX ORDER — EZETIMIBE 10 MG/1
10 TABLET ORAL DAILY
Qty: 90 TABLET | Refills: 3 | Status: SHIPPED | OUTPATIENT
Start: 2022-10-27

## 2022-10-27 NOTE — TELEPHONE ENCOUNTER
Caller: Vicente Delgado    Relationship: Self    Best call back number: [phone]    Requested Prescriptions:   Requested Prescriptions     Pending Prescriptions Disp Refills   • ezetimibe (ZETIA) 10 MG tablet 90 tablet 3     Sig: Take 1 tablet by mouth Daily.        Pharmacy where request should be sent: [facility] [id number] [facility] [address] [facility] AT [facility] [place] [phone] [facility] [phone] FX     Additional details provided by patient: PATIENT STATES THAT HE IS OUT OF MEDICATION    Does the patient have less than a 3 day supply:  [x] Yes  [] No    Mira Thompson Rep   10/27/22 14:50 EDT

## 2022-11-09 ENCOUNTER — OFFICE VISIT (OUTPATIENT)
Dept: INTERNAL MEDICINE | Facility: CLINIC | Age: 87
End: 2022-11-09

## 2022-11-09 VITALS
HEART RATE: 60 BPM | OXYGEN SATURATION: 100 % | WEIGHT: 176.6 LBS | HEIGHT: 67 IN | SYSTOLIC BLOOD PRESSURE: 152 MMHG | BODY MASS INDEX: 27.72 KG/M2 | DIASTOLIC BLOOD PRESSURE: 84 MMHG | TEMPERATURE: 98 F

## 2022-11-09 DIAGNOSIS — I10 PRIMARY HYPERTENSION: Primary | Chronic | ICD-10-CM

## 2022-11-09 DIAGNOSIS — F41.9 ANXIETY: ICD-10-CM

## 2022-11-09 PROBLEM — I70.212 ATHEROSCLEROSIS OF NATIVE ARTERY OF LEFT LOWER EXTREMITY WITH INTERMITTENT CLAUDICATION: Chronic | Status: ACTIVE | Noted: 2021-11-26

## 2022-11-09 PROBLEM — I35.0 AORTIC STENOSIS, MODERATE: Chronic | Status: ACTIVE | Noted: 2022-07-01

## 2022-11-09 PROBLEM — Z79.899 CONTROLLED SUBSTANCE AGREEMENT SIGNED: Status: ACTIVE | Noted: 2022-11-09

## 2022-11-09 PROCEDURE — 99213 OFFICE O/P EST LOW 20 MIN: CPT | Performed by: NURSE PRACTITIONER

## 2022-11-09 RX ORDER — LORAZEPAM 0.5 MG/1
0.5 TABLET ORAL DAILY PRN
Qty: 30 TABLET | Refills: 0 | Status: SHIPPED | OUTPATIENT
Start: 2022-11-09 | End: 2023-01-04

## 2022-11-13 NOTE — ASSESSMENT & PLAN NOTE
BP is elevated today but is visibly anxious, he will continue current medications with home BP monitoring. Call if readings are consistently >140/90.

## 2022-11-13 NOTE — PROGRESS NOTES
"Chief Complaint  Anxiety    Subjective        Vicente Delgado presents to [facility] due to increased anxiety.    History of Present Illness  He presents due to increased anxiety over the past several days. He has a hx of anxiety for which he takes Lorazepam very rarely (last filled 2/2022). His daughters and son are visiting from out of town and he notes increased anxiety with elevated blood pressure and difficulty sleeping.    Objective   Vital Signs:  /84 (BP Location: Left arm, Patient Position: Sitting, Cuff Size: Adult)   Pulse 60   Temp 98 °F (36.7 °C) (Temporal)   Ht 170.2 cm (67\")   Wt 80.1 kg (176 lb 9.6 oz)   SpO2 100%   BMI 27.66 kg/m²   Estimated body mass index is 27.66 kg/m² as calculated from the following:    Height as of this encounter: 170.2 cm (67\").    Weight as of this encounter: 80.1 kg (176 lb 9.6 oz).          Physical Exam  Constitutional:       Appearance: He is well-developed. He is not ill-appearing.   HENT:      Head: Normocephalic.      Right Ear: Decreased hearing noted.      Left Ear: Decreased hearing noted.      Nose: Nose normal. No nasal deformity, mucosal edema or rhinorrhea.      Right Sinus: No maxillary sinus tenderness or frontal sinus tenderness.      Left Sinus: No maxillary sinus tenderness or frontal sinus tenderness.      Mouth/Throat:      Dentition: Normal dentition.   Eyes:      General: Lids are normal.         Right eye: No discharge.         Left eye: No discharge.      Conjunctiva/sclera: Conjunctivae normal.      Right eye: No exudate.     Left eye: No exudate.  Neck:      Thyroid: No thyroid mass or thyromegaly.      Vascular: No carotid bruit.      Trachea: Trachea normal.   Cardiovascular:      Rate and Rhythm: Regular rhythm.      Pulses: Normal pulses.      Heart sounds: Murmur heard.    Systolic murmur is present with a grade of 2/6.  Pulmonary:      Effort: No respiratory distress.      Breath sounds: Normal " breath sounds. No decreased breath sounds, wheezing, rhonchi or rales.   Abdominal:      General: Bowel sounds are normal.      Palpations: Abdomen is soft.      Tenderness: There is no abdominal tenderness.   Musculoskeletal:      Cervical back: Normal range of motion. No edema.   Lymphadenopathy:      Head:      Right side of head: No submental, submandibular, tonsillar, preauricular, posterior auricular or occipital adenopathy.      Left side of head: No submental, submandibular, tonsillar, preauricular, posterior auricular or occipital adenopathy.   Skin:     General: Skin is warm and dry.      Nails: There is no clubbing.   Neurological:      Mental Status: He is alert.   Psychiatric:         Behavior: Behavior is cooperative.        Result Review :  The following data was reviewed by: GE Bustos on 11/09/2022:  Common labs    Common Labs 6/5/22 6/5/22 6/5/22 6/6/22 6/6/22 10/17/22 10/17/22 10/17/22    1853 1853 1853 [id number] [id number] 0837 0837 0837   Glucose  82   90  83    BUN  15   16  16    Creatinine  0.99   1.02  0.91    Sodium  137   138  140    Potassium  4.5   4.2  4.7    Chloride  102   105  102    Calcium  9.0   8.9  9.4    Total Protein       6.6    Albumin  4.20     4.40    Total Bilirubin  0.3     0.6    Alkaline Phosphatase  55     58    AST (SGOT)  26     26    ALT (SGPT)  22     17    WBC 4.97   4.20  6.23     Hemoglobin 13.7   12.8 (A)  14.7     Hematocrit 42.0   38.7  43.9     Platelets 207   164  201     Total Cholesterol   140        Total Cholesterol        140   Triglycerides   210 (A)     161 (A)   HDL Cholesterol   37 (A)     41   LDL Cholesterol    68     71   (A) Abnormal value       Comments are available for some flowsheets but are not being displayed.                     Assessment and Plan   Diagnoses and all orders for this visit:    1. Primary hypertension (Primary)  Assessment & Plan:  BP is elevated today but is visibly anxious, he will continue current medications with  home BP monitoring. Call if readings are consistently >140/90.      2. Anxiety  Assessment & Plan:  He requests a refill on Lorazepam due to increased anxiety this week, has had increased nervousness with recent changes at home. We discussed safety concerns with Lorazepam which he has tolerated well. I have refilled his medication at the 0.5 mg dose (lowered from 1 mg). Monitor for worsening balance and/or memory changes.     Orders:  -     LORazepam (ATIVAN) 0.5 MG tablet; Take 1 tablet by mouth Daily As Needed for Anxiety. for anxiety  Dispense: 30 tablet; Refill: 0    NEERAJ query complete. Treatment plan to include limited course of prescribed  controlled substance. Risks including addiction, benefits, and alternatives presented to patient.        Follow Up   Return if symptoms worsen or fail to improve, for Next scheduled follow up.  Patient was given instructions and counseling regarding his condition or for health maintenance advice. Please see specific information pulled into the AVS if appropriate.

## 2022-11-13 NOTE — ASSESSMENT & PLAN NOTE
He requests a refill on Lorazepam due to increased anxiety this week, has had increased nervousness with recent changes at home. We discussed safety concerns with Lorazepam which he has tolerated well. I have refilled his medication at the 0.5 mg dose (lowered from 1 mg). Monitor for worsening balance and/or memory changes.

## 2022-11-15 ENCOUNTER — HOSPITAL ENCOUNTER (OUTPATIENT)
Facility: HOSPITAL | Age: 87
Setting detail: OBSERVATION
Discharge: HOME | End: 2022-11-16
Attending: EMERGENCY MEDICINE | Admitting: EMERGENCY MEDICINE

## 2022-11-15 ENCOUNTER — APPOINTMENT (OUTPATIENT)
Dept: CT IMAGING | Facility: HOSPITAL | Age: 87
End: 2022-11-15

## 2022-11-15 ENCOUNTER — APPOINTMENT (OUTPATIENT)
Dept: CARDIOLOGY | Facility: HOSPITAL | Age: 87
End: 2022-11-15

## 2022-11-15 ENCOUNTER — APPOINTMENT (OUTPATIENT)
Dept: MRI IMAGING | Facility: HOSPITAL | Age: 87
End: 2022-11-15

## 2022-11-15 DIAGNOSIS — G45.9 TIA (TRANSIENT ISCHEMIC ATTACK): Primary | ICD-10-CM

## 2022-11-15 LAB
ALBUMIN SERPL-MCNC: 3.9 G/DL (ref 3.5–5.2)
ALBUMIN/GLOB SERPL: 1.2 G/DL
ALP SERPL-CCNC: 68 U/L (ref 39–117)
ALT SERPL W P-5'-P-CCNC: 14 U/L (ref 1–41)
ANION GAP SERPL CALCULATED.3IONS-SCNC: 12.3 MMOL/L (ref 5–15)
AORTIC DIMENSIONLESS INDEX: 0.2 (DI)
ASCENDING AORTA: 3.9 CM
AST SERPL-CCNC: 24 U/L (ref 1–40)
BASOPHILS # BLD AUTO: 0.02 10*3/MM3 (ref 0–0.2)
BASOPHILS NFR BLD AUTO: 0.3 % (ref 0–1.5)
BH CV ECHO MEAS - AO MAX PG: 32.4 MMHG
BH CV ECHO MEAS - AO MEAN PG: 19.8 MMHG
BH CV ECHO MEAS - AO V2 MAX: 284.7 CM/SEC
BH CV ECHO MEAS - AO V2 VTI: 84.6 CM
BH CV ECHO MEAS - AVA(I,D): 0.56 CM2
BH CV ECHO MEAS - EDV(CUBED): 71.3 ML
BH CV ECHO MEAS - EDV(MOD-SP2): 111 ML
BH CV ECHO MEAS - EDV(MOD-SP4): 116 ML
BH CV ECHO MEAS - EF(MOD-BP): 56.8 %
BH CV ECHO MEAS - EF(MOD-SP2): 49.5 %
BH CV ECHO MEAS - EF(MOD-SP4): 62.1 %
BH CV ECHO MEAS - ESV(CUBED): 29.9 ML
BH CV ECHO MEAS - ESV(MOD-SP2): 56 ML
BH CV ECHO MEAS - ESV(MOD-SP4): 44 ML
BH CV ECHO MEAS - FS: 25.2 %
BH CV ECHO MEAS - IVS/LVPW: 0.99 CM
BH CV ECHO MEAS - IVSD: 1.54 CM
BH CV ECHO MEAS - LAT PEAK E' VEL: 3.8 CM/SEC
BH CV ECHO MEAS - LV DIASTOLIC VOL/BSA (35-75): 61 CM2
BH CV ECHO MEAS - LV MASS(C)D: 255.5 GRAMS
BH CV ECHO MEAS - LV MAX PG: 1.67 MMHG
BH CV ECHO MEAS - LV MEAN PG: 0.63 MMHG
BH CV ECHO MEAS - LV SYSTOLIC VOL/BSA (12-30): 23.1 CM2
BH CV ECHO MEAS - LV V1 MAX: 64.7 CM/SEC
BH CV ECHO MEAS - LV V1 VTI: 14.6 CM
BH CV ECHO MEAS - LVIDD: 4.1 CM
BH CV ECHO MEAS - LVIDS: 3.1 CM
BH CV ECHO MEAS - LVOT AREA: 3.3 CM2
BH CV ECHO MEAS - LVOT DIAM: 2.04 CM
BH CV ECHO MEAS - LVPWD: 1.55 CM
BH CV ECHO MEAS - MED PEAK E' VEL: 3.6 CM/SEC
BH CV ECHO MEAS - MV A DUR: 0.13 SEC
BH CV ECHO MEAS - MV A MAX VEL: 101.5 CM/SEC
BH CV ECHO MEAS - MV DEC SLOPE: 290.9 CM/SEC2
BH CV ECHO MEAS - MV DEC TIME: 220 MSEC
BH CV ECHO MEAS - MV E MAX VEL: 73.3 CM/SEC
BH CV ECHO MEAS - MV E/A: 0.72
BH CV ECHO MEAS - MV MAX PG: 5.8 MMHG
BH CV ECHO MEAS - MV MEAN PG: 1.85 MMHG
BH CV ECHO MEAS - MV P1/2T: 83.4 MSEC
BH CV ECHO MEAS - MV V2 VTI: 32.5 CM
BH CV ECHO MEAS - MVA(P1/2T): 2.6 CM2
BH CV ECHO MEAS - MVA(VTI): 1.46 CM2
BH CV ECHO MEAS - PULM A REVS DUR: 0.17 SEC
BH CV ECHO MEAS - PULM A REVS VEL: 29 CM/SEC
BH CV ECHO MEAS - PULM DIAS VEL: 29.9 CM/SEC
BH CV ECHO MEAS - PULM S/D: 0.7
BH CV ECHO MEAS - PULM SYS VEL: 21 CM/SEC
BH CV ECHO MEAS - RAP SYSTOLE: 3 MMHG
BH CV ECHO MEAS - RVSP: 33 MMHG
BH CV ECHO MEAS - SI(MOD-SP2): 28.9 ML/M2
BH CV ECHO MEAS - SI(MOD-SP4): 37.9 ML/M2
BH CV ECHO MEAS - SV(LVOT): 47.4 ML
BH CV ECHO MEAS - SV(MOD-SP2): 55 ML
BH CV ECHO MEAS - SV(MOD-SP4): 72 ML
BH CV ECHO MEAS - TAPSE (>1.6): 2.47 CM
BH CV ECHO MEAS - TR MAX PG: 30 MMHG
BH CV ECHO MEAS - TR MAX VEL: 273.6 CM/SEC
BH CV ECHO MEASUREMENTS AVERAGE E/E' RATIO: 19.81
BH CV XLRA - RV BASE: 4.2 CM
BH CV XLRA - RV LENGTH: 8.9 CM
BH CV XLRA - RV MID: 3.4 CM
BH CV XLRA - TDI S': 11.6 CM/SEC
BILIRUB SERPL-MCNC: 0.5 MG/DL (ref 0–1.2)
BUN SERPL-MCNC: 20 MG/DL (ref 8–23)
BUN/CREAT SERPL: 23.3 (ref 7–25)
CALCIUM SPEC-SCNC: 9.2 MG/DL (ref 8.2–9.6)
CHLORIDE SERPL-SCNC: 101 MMOL/L (ref 98–107)
CHOLEST SERPL-MCNC: 132 MG/DL (ref 0–200)
CO2 SERPL-SCNC: 23.7 MMOL/L (ref 22–29)
CREAT SERPL-MCNC: 0.86 MG/DL (ref 0.76–1.27)
DEPRECATED RDW RBC AUTO: 39.3 FL (ref 37–54)
EGFRCR SERPLBLD CKD-EPI 2021: 80.2 ML/MIN/1.73
EOSINOPHIL # BLD AUTO: 0.04 10*3/MM3 (ref 0–0.4)
EOSINOPHIL NFR BLD AUTO: 0.7 % (ref 0.3–6.2)
ERYTHROCYTE [DISTWIDTH] IN BLOOD BY AUTOMATED COUNT: 11.9 % (ref 12.3–15.4)
GLOBULIN UR ELPH-MCNC: 3.2 GM/DL
GLUCOSE SERPL-MCNC: 83 MG/DL (ref 65–99)
HBA1C MFR BLD: 6 % (ref 4.8–5.6)
HCT VFR BLD AUTO: 41 % (ref 37.5–51)
HDLC SERPL-MCNC: 41 MG/DL (ref 40–60)
HGB BLD-MCNC: 14.3 G/DL (ref 13–17.7)
IMM GRANULOCYTES # BLD AUTO: 0.01 10*3/MM3 (ref 0–0.05)
IMM GRANULOCYTES NFR BLD AUTO: 0.2 % (ref 0–0.5)
INR PPP: 1.09 (ref 0.9–1.1)
LDLC SERPL CALC-MCNC: 66 MG/DL (ref 0–100)
LDLC/HDLC SERPL: 1.51 {RATIO}
LEFT ATRIUM VOLUME INDEX: 38.5 ML/M2
LYMPHOCYTES # BLD AUTO: 1.65 10*3/MM3 (ref 0.7–3.1)
LYMPHOCYTES NFR BLD AUTO: 27.8 % (ref 19.6–45.3)
MAGNESIUM SERPL-MCNC: 2.1 MG/DL (ref 1.7–2.3)
MAXIMAL PREDICTED HEART RATE: 126 BPM
MCH RBC QN AUTO: 31.3 PG (ref 26.6–33)
MCHC RBC AUTO-ENTMCNC: 34.9 G/DL (ref 31.5–35.7)
MCV RBC AUTO: 89.7 FL (ref 79–97)
MONOCYTES # BLD AUTO: 1.11 10*3/MM3 (ref 0.1–0.9)
MONOCYTES NFR BLD AUTO: 18.7 % (ref 5–12)
NEUTROPHILS NFR BLD AUTO: 3.1 10*3/MM3 (ref 1.7–7)
NEUTROPHILS NFR BLD AUTO: 52.3 % (ref 42.7–76)
NRBC BLD AUTO-RTO: 0 /100 WBC (ref 0–0.2)
PLATELET # BLD AUTO: 202 10*3/MM3 (ref 140–450)
PMV BLD AUTO: 10 FL (ref 6–12)
POTASSIUM SERPL-SCNC: 4.6 MMOL/L (ref 3.5–5.2)
PROT SERPL-MCNC: 7.1 G/DL (ref 6–8.5)
PROTHROMBIN TIME: 14.2 SECONDS (ref 11.7–14.2)
QT INTERVAL: 459 MS
RBC # BLD AUTO: 4.57 10*6/MM3 (ref 4.14–5.8)
SINUS: 3.6 CM
SODIUM SERPL-SCNC: 137 MMOL/L (ref 136–145)
STRESS TARGET HR: 107 BPM
T4 FREE SERPL-MCNC: 1.15 NG/DL (ref 0.93–1.7)
TRIGL SERPL-MCNC: 146 MG/DL (ref 0–150)
TROPONIN T SERPL-MCNC: <0.01 NG/ML (ref 0–0.03)
TSH SERPL DL<=0.05 MIU/L-ACNC: 2.19 UIU/ML (ref 0.27–4.2)
VLDLC SERPL-MCNC: 25 MG/DL (ref 5–40)
WBC NRBC COR # BLD: 5.93 10*3/MM3 (ref 3.4–10.8)

## 2022-11-15 PROCEDURE — 80061 LIPID PANEL: CPT | Performed by: PHYSICIAN ASSISTANT

## 2022-11-15 PROCEDURE — 93306 TTE W/DOPPLER COMPLETE: CPT

## 2022-11-15 PROCEDURE — 93306 TTE W/DOPPLER COMPLETE: CPT | Performed by: INTERNAL MEDICINE

## 2022-11-15 PROCEDURE — 85025 COMPLETE CBC W/AUTO DIFF WBC: CPT | Performed by: EMERGENCY MEDICINE

## 2022-11-15 PROCEDURE — G0378 HOSPITAL OBSERVATION PER HR: HCPCS

## 2022-11-15 PROCEDURE — 99285 EMERGENCY DEPT VISIT HI MDM: CPT

## 2022-11-15 PROCEDURE — 85610 PROTHROMBIN TIME: CPT | Performed by: EMERGENCY MEDICINE

## 2022-11-15 PROCEDURE — 84484 ASSAY OF TROPONIN QUANT: CPT | Performed by: EMERGENCY MEDICINE

## 2022-11-15 PROCEDURE — 70496 CT ANGIOGRAPHY HEAD: CPT

## 2022-11-15 PROCEDURE — 80053 COMPREHEN METABOLIC PANEL: CPT | Performed by: EMERGENCY MEDICINE

## 2022-11-15 PROCEDURE — 93005 ELECTROCARDIOGRAM TRACING: CPT | Performed by: EMERGENCY MEDICINE

## 2022-11-15 PROCEDURE — 0 IOPAMIDOL PER 1 ML: Performed by: EMERGENCY MEDICINE

## 2022-11-15 PROCEDURE — 70551 MRI BRAIN STEM W/O DYE: CPT

## 2022-11-15 PROCEDURE — 84439 ASSAY OF FREE THYROXINE: CPT | Performed by: EMERGENCY MEDICINE

## 2022-11-15 PROCEDURE — 83735 ASSAY OF MAGNESIUM: CPT | Performed by: EMERGENCY MEDICINE

## 2022-11-15 PROCEDURE — 93010 ELECTROCARDIOGRAM REPORT: CPT | Performed by: INTERNAL MEDICINE

## 2022-11-15 PROCEDURE — 84443 ASSAY THYROID STIM HORMONE: CPT | Performed by: EMERGENCY MEDICINE

## 2022-11-15 PROCEDURE — 70498 CT ANGIOGRAPHY NECK: CPT

## 2022-11-15 PROCEDURE — 70450 CT HEAD/BRAIN W/O DYE: CPT

## 2022-11-15 PROCEDURE — 99204 OFFICE O/P NEW MOD 45 MIN: CPT | Performed by: PSYCHIATRY & NEUROLOGY

## 2022-11-15 PROCEDURE — 83036 HEMOGLOBIN GLYCOSYLATED A1C: CPT | Performed by: PHYSICIAN ASSISTANT

## 2022-11-15 RX ORDER — SODIUM CHLORIDE 0.9 % (FLUSH) 0.9 %
10 SYRINGE (ML) INJECTION AS NEEDED
Status: DISCONTINUED | OUTPATIENT
Start: 2022-11-15 | End: 2022-11-16 | Stop reason: HOSPADM

## 2022-11-15 RX ORDER — ONDANSETRON 2 MG/ML
4 INJECTION INTRAMUSCULAR; INTRAVENOUS EVERY 6 HOURS PRN
Status: DISCONTINUED | OUTPATIENT
Start: 2022-11-15 | End: 2022-11-16 | Stop reason: HOSPADM

## 2022-11-15 RX ORDER — CHOLECALCIFEROL (VITAMIN D3) 125 MCG
5 CAPSULE ORAL NIGHTLY PRN
Status: DISCONTINUED | OUTPATIENT
Start: 2022-11-15 | End: 2022-11-16 | Stop reason: HOSPADM

## 2022-11-15 RX ORDER — ROSUVASTATIN CALCIUM 20 MG/1
10 TABLET, COATED ORAL NIGHTLY
Status: DISCONTINUED | OUTPATIENT
Start: 2022-11-15 | End: 2022-11-16 | Stop reason: HOSPADM

## 2022-11-15 RX ORDER — ATENOLOL 25 MG/1
25 TABLET ORAL 2 TIMES DAILY
Status: DISCONTINUED | OUTPATIENT
Start: 2022-11-15 | End: 2022-11-16 | Stop reason: HOSPADM

## 2022-11-15 RX ORDER — LORAZEPAM 0.5 MG/1
0.5 TABLET ORAL DAILY PRN
Status: DISCONTINUED | OUTPATIENT
Start: 2022-11-15 | End: 2022-11-16 | Stop reason: HOSPADM

## 2022-11-15 RX ORDER — NITROGLYCERIN 0.4 MG/1
0.4 TABLET SUBLINGUAL
Status: DISCONTINUED | OUTPATIENT
Start: 2022-11-15 | End: 2022-11-16 | Stop reason: HOSPADM

## 2022-11-15 RX ORDER — LISINOPRIL 10 MG/1
10 TABLET ORAL
Status: DISCONTINUED | OUTPATIENT
Start: 2022-11-15 | End: 2022-11-16 | Stop reason: HOSPADM

## 2022-11-15 RX ORDER — ONDANSETRON 4 MG/1
4 TABLET, FILM COATED ORAL EVERY 6 HOURS PRN
Status: DISCONTINUED | OUTPATIENT
Start: 2022-11-15 | End: 2022-11-16 | Stop reason: HOSPADM

## 2022-11-15 RX ORDER — ACETAMINOPHEN 325 MG/1
650 TABLET ORAL EVERY 4 HOURS PRN
Status: DISCONTINUED | OUTPATIENT
Start: 2022-11-15 | End: 2022-11-16 | Stop reason: HOSPADM

## 2022-11-15 RX ORDER — SODIUM CHLORIDE 0.9 % (FLUSH) 0.9 %
10 SYRINGE (ML) INJECTION EVERY 12 HOURS SCHEDULED
Status: DISCONTINUED | OUTPATIENT
Start: 2022-11-15 | End: 2022-11-16 | Stop reason: HOSPADM

## 2022-11-15 RX ORDER — ASPIRIN 81 MG/1
81 TABLET, CHEWABLE ORAL DAILY
Status: DISCONTINUED | OUTPATIENT
Start: 2022-11-16 | End: 2022-11-16 | Stop reason: HOSPADM

## 2022-11-15 RX ORDER — PANTOPRAZOLE SODIUM 40 MG/1
40 TABLET, DELAYED RELEASE ORAL EVERY MORNING
Status: DISCONTINUED | OUTPATIENT
Start: 2022-11-16 | End: 2022-11-16 | Stop reason: HOSPADM

## 2022-11-15 RX ADMIN — Medication 10 ML: at 20:15

## 2022-11-15 RX ADMIN — IOPAMIDOL 95 ML: 755 INJECTION, SOLUTION INTRAVENOUS at 15:36

## 2022-11-15 RX ADMIN — ROSUVASTATIN CALCIUM 10 MG: 20 TABLET, FILM COATED ORAL at 20:14

## 2022-11-15 RX ADMIN — Medication 10 ML: at 14:00

## 2022-11-15 RX ADMIN — LISINOPRIL 10 MG: 10 TABLET ORAL at 18:26

## 2022-11-15 RX ADMIN — ATENOLOL 25 MG: 25 TABLET ORAL at 20:14

## 2022-11-15 NOTE — ED TRIAGE NOTES
Pt states that he had a fall yesterday. Reports losing his balance and hitting his head. Denies LOC and blood thinners. Reports this morning around 1030 he felt dizzy and like he was going to fall. Reports then he had numbness in his left arm and felt like he could not move it. Pt can move all extremities at this time with no neuro deficits. Pt complains of light headed.     Patient masked at arrival and triage staff wore all appropriate PPE during entire encounter with patient.

## 2022-11-15 NOTE — H&P
[facility]   HISTORY AND PHYSICAL    Patient Name: Vicente Delgado  : 1928  MRN: [id number]  Primary Care Physician:  Mechelle Landa APRN  Date of admission: 11/15/2022    Subjective   Subjective     Chief Complaint:   Chief Complaint   Patient presents with   • Numbness   • Dizziness         HPI:    Vicente Delgado is a 89 y.o. male with GERD, hypertension, hyperlipidemia, nonrheumatic mitral valve regurgitation, and PAD, who presented to the emergency department today complaining of dizziness and left arm paralysis.  Patient was eating breakfast with his family when his daughter handed him a crossword puzzle when he realized he could not use his left arm.  Patient then developed acute onset dizziness/lightheadedness.  He stood up and had some difficulty with his left leg as well.  His symptoms lasted approximately 10 to 15 minutes and have mostly resolved.  At time of my exam, he states his left arm feels sore but is attributing this to recent water aerobics.  Of note, patient states he has been having intermittent palpitations since July which she attributed to a macular degeneration medication.  He states this medication was adjusted and palpitations improved.  However, patient reports he has had palpitations while at the hospital today.    ED evaluation reviewed, patient had CT which was negative acute.  He also has CTA of head and neck which are also negative acute.  Echocardiogram and MRI brain are pending.    Review of Systems   All systems were reviewed and negative except for: What is discussed in the HPI    Personal History     Past Medical History:   Diagnosis Date   • Allergic rhinitis    • Anxiety    • Arthritis    • CAD (coronary artery disease)    • Cancer (HCC)    • Cataract 2018” and    Ocular mygraine   • Cholelithiasis    • Depression    • Diverticulosis    • Esophagitis    • Gastritis    • GERD (gastroesophageal reflux disease)    • Heart disease    • History of anxiety     • History of medical problems Right shoulder replacemd    2008   • History of prostate cancer 06/1990   • HL (hearing loss) Partial   • Hyperlipidemia    • Hypertension    • Insomnia    • Low back pain Yes  from hworking   • Lupus (HCC)    • TAM (nonalcoholic steatohepatitis)    • Sciatica of right side    • Visual impairment Ocular mygraine       Past Surgical History:   Procedure Laterality Date   • ABDOMINAL SURGERY     • CARDIAC CATHETERIZATION  11/16/1995   • CARDIAC SURGERY  11/16/1995   • CATARACT EXTRACTION Left 07/2018   • CHOLECYSTECTOMY  2020   • CHOLECYSTECTOMY WITH INTRAOPERATIVE CHOLANGIOGRAM N/A 9/29/2020    Procedure: Laparoscopic cholecystectomy;  Surgeon: Javi Peralta MD;  Location: [facility] OR;  Service: General;  Laterality: N/A;   • COLONOSCOPY  01/09/2002   • ELBOW PROCEDURE     • JOINT REPLACEMENT     • LUNG BIOPSY     • LYMPH NODE BIOPSY  Yes    1992   • NASAL POLYP SURGERY     • PACEMAKER IMPLANTATION     • PROSTATE SURGERY  06/1990   • SHOULDER ROTATOR CUFF REPAIR Right 08/24/2011    Dr. Bach   • SIGMOIDOSCOPY     • TONSILLECTOMY  Yes 1943   • UPPER GASTROINTESTINAL ENDOSCOPY  09/12/1997    Gastritis, Duodenitis, hiatal hernia (Pathology: Gastric antrum minimal chronic inflammation)   • UPPER GASTROINTESTINAL ENDOSCOPY  04/11/2005    Mild esophagitis, Small hiatal hernia, Mild gastritis and mild duodenitis       Family History: family history includes Alcohol abuse in his father; Diabetes in his father; Hearing loss in his mother; Heart disease in his mother; Heart failure in his mother; Hyperlipidemia in his mother. Otherwise pertinent FHx was reviewed and not pertinent to current issue.    Social History:  reports that he has never smoked. He has been exposed to tobacco smoke. He has never used smokeless tobacco. He reports that he does not drink alcohol and does not use drugs.    Home Medications:  LORazepam, aspirin, atenolol, benazepril, ezetimibe, pantoprazole, and  rosuvastatin    Allergies:  Allergies   Allergen Reactions   • Lidocaine Dizziness     Reaction to novacaine   • Lovastatin Other (See Comments)     Liver enzymes elevated   • Pravastatin Other (See Comments)     Elevated liver enzymes    • Gabapentin GI Intolerance     Bloating, incontinence    • Procaine    • Simvastatin        Objective   Objective     Vitals:   Temp:  [97.6 °F (36.4 °C)-97.7 °F (36.5 °C)] 97.7 °F (36.5 °C)  Heart Rate:  [52-58] 55  Resp:  [16-18] 18  BP: (149-169)/(71-91) 151/77  Physical Exam     GENERAL: 89 y.o. YO male a/o x 4, NAD  SKIN: Warm pink and dry   HEENT:  PERRL, EOM intact, conjunctivae normal, sclerae clear  NECK: supple, no JVD  LUNGS: Clear to auscultation bilaterally without wheezes, rales or rhonchi.  No accessory muscle use and no nasal flaring.  CARDIAC:  Regular rate and rhythm, S1-S2.  No murmurs, rubs or gallops.  No peripheral edema.  Equal pulses bilaterally.  ABDOMEN: Soft, nontender, nondistended.  No guarding or rebound tenderness.  Normal bowel sounds.  MUSCULOSKELETAL: Moves all extremities well.  No deformity.  NEURO: Cranial nerves II through XII grossly intact.  No gross focal deficits.  Alert.  Normal speech and motor.  PSYCH: Normal mood and affect      Result Review    Result Review:  I have personally reviewed the results from the time of this admission to 11/15/2022 17:56 EST and agree with these findings:  [x]  Laboratory list / accordion  []  Microbiology  [x]  Radiology  [x]  EKG/Telemetry   []  Cardiology/Vascular   []  Pathology  []  Old records  []  Other:  Most notable findings include:     CT Head Without Contrast    Result Date: 11/15/2022  1. No acute process identified.  Radiation dose reduction techniques were utilized, including automated exposure control and exposure modulation based on body size.  This report was finalized on 11/15/2022 1:03 PM by Dr. Quinten Willard M.D.      CT Angiogram Neck    Result Date: 11/15/2022  1. No  hemodynamically significant focal stenosis, aneurysm, or dissection in the cervical carotid or vertebral arteries or in the arteries at the base of the brain.  2. No acute intracranial hemorrhage or hydrocephalus. No enhancing lesion in brain. If there is further clinical concern, MRI could be considered for further evaluation.  This report was finalized on 11/15/2022 4:16 PM by Dr. Selvin Brown M.D.      CT Angiogram Head    Result Date: 11/15/2022  1. No hemodynamically significant focal stenosis, aneurysm, or dissection in the cervical carotid or vertebral arteries or in the arteries at the base of the brain.  2. No acute intracranial hemorrhage or hydrocephalus. No enhancing lesion in brain. If there is further clinical concern, MRI could be considered for further evaluation.  This report was finalized on 11/15/2022 4:16 PM by Dr. Selvin Brown M.D.                Assessment & Plan   Assessment / Plan     Brief Patient Summary:  Vicente Delgado is a 89 y.o. male who is being admitted to observation unit for further evaluation of left sided weakness.    Active Hospital Problems:  Active Hospital Problems    Diagnosis    • **TIA (transient ischemic attack)      Plan:     Left-sided weakness/TIA  -CTA head and neck reviewed  -Echocardiogram pending  -MRI brain pending  -Neurology consulted, please see their note for further details  -Aspirin/statin  -Lipid/hemoglobin A1c    Palpitations  -Telemetry monitoring  -Home with Zio patch    Hypertension/hyperlipidemia  -Chronic conditions  -Continue home medications as prescribed      DVT prophylaxis:  Mechanical DVT prophylaxis orders are present.    CODE STATUS:    Level Of Support Discussed With: Patient  Code Status (Patient has no pulse and is not breathing): CPR (Attempt to Resuscitate)  Medical Interventions (Patient has pulse or is breathing): Full Support    Admission Status:  I believe this patient meets observation status.    I wore an N95 mask, face  shield, and gloves during this patient encounter. Patient also wearing a surgical mask throughout encounter. Hand hygeine performed before and after seeing the patient.    Electronically signed by EDITH Carpio, 11/15/22, 5:56 PM EST.

## 2022-11-15 NOTE — ED PROVIDER NOTES
EMERGENCY DEPARTMENT ENCOUNTER    Room Number:  23/23  Date of encounter:  11/15/2022  PCP: Mechelle Landa APRN  Historian: Patient      HPI:  Chief Complaint: Dizziness        Context: Vicente Delgado is a 89 y.o. male who presents to the ED c/o feeling dizzy with left arm numbness and weakness at approximately 1030.  This is now resolved.  The patient states that he did lose his balance yesterday, fall and hit his head without loss of consciousness.  Patient denies taking blood thinners.  The patient states that his blood pressures been elevated for the past several days.  Currently the patient denies complaints.  He is not sure exactly how long this episode lasted but states it resolved when he took 4 baby aspirin's.  After speaking with the family, they believe the entire episode lasted between 15 to 30 minutes.    PAST MEDICAL HISTORY  Active Ambulatory Problems     Diagnosis Date Noted   • HTN (hypertension) 04/21/2016   • Nonrheumatic mitral valve regurgitation 04/21/2016   • Disorder of right ventricle of heart 04/21/2016   • CAD (coronary artery disease) 07/01/2016   • Hyperlipidemia 07/01/2016   • Calculus of gallbladder without cholecystitis without obstruction 09/28/2020   • S/P cholecystectomy 01/09/2021   • Nonrheumatic aortic valve insufficiency 02/04/2021   • Non-rheumatic mitral regurgitation 02/04/2021   • Trigger middle finger of left hand 07/11/2021   • Gastroesophageal reflux disease with esophagitis 07/11/2021   • Carpal tunnel syndrome of left wrist 07/11/2021   • Amaurosis fugax of left eye 07/11/2021   • 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 ([facility]) 11/26/2021   • Atherosclerosis of native artery of left lower extremity with intermittent claudication ([facility]) 11/26/2021   • Anxiety 02/08/2022   • Dermatitis 02/08/2022   • Chronic stable angina ([facility]) 06/16/2022   • Aortic stenosis, moderate 07/01/2022   • Chronic right shoulder pain 10/17/2022   • Controlled substance agreement signed  11/09/2022     Resolved Ambulatory Problems     Diagnosis Date Noted   • Leg pain, anterior, left 05/13/2021   • Atypical chest pain 09/26/2021     Past Medical History:   Diagnosis Date   • Allergic rhinitis    • Arthritis    • Cancer (HCC)    • Cataract June 2018” and   • Cholelithiasis 2020   • Depression    • Diverticulosis    • Esophagitis    • Gastritis    • GERD (gastroesophageal reflux disease)    • Heart disease    • History of anxiety    • History of medical problems Right shoulder replacemd   • History of prostate cancer 06/1990   • HL (hearing loss) Partial   • Hypertension    • Insomnia    • Low back pain Yes  from hworking   • Lupus (HCC)    • TAM (nonalcoholic steatohepatitis)    • Sciatica of right side    • Visual impairment Ocular mygraine         PAST SURGICAL HISTORY  Past Surgical History:   Procedure Laterality Date   • ABDOMINAL SURGERY     • CARDIAC CATHETERIZATION  11/16/1995   • CARDIAC SURGERY  11/16/1995   • CATARACT EXTRACTION Left 07/2018   • CHOLECYSTECTOMY  2020   • CHOLECYSTECTOMY WITH INTRAOPERATIVE CHOLANGIOGRAM N/A 9/29/2020    Procedure: Laparoscopic cholecystectomy;  Surgeon: Javi Peralta MD;  Location: [facility];  Service: General;  Laterality: N/A;   • COLONOSCOPY  01/09/2002   • ELBOW PROCEDURE     • JOINT REPLACEMENT     • LUNG BIOPSY     • LYMPH NODE BIOPSY  Yes    1992   • NASAL POLYP SURGERY     • PACEMAKER IMPLANTATION     • PROSTATE SURGERY  06/1990   • SHOULDER ROTATOR CUFF REPAIR Right 08/24/2011    Dr. Bach   • SIGMOIDOSCOPY     • TONSILLECTOMY  Yes 1943   • UPPER GASTROINTESTINAL ENDOSCOPY  09/12/1997    Gastritis, Duodenitis, hiatal hernia (Pathology: Gastric antrum minimal chronic inflammation)   • UPPER GASTROINTESTINAL ENDOSCOPY  04/11/2005    Mild esophagitis, Small hiatal hernia, Mild gastritis and mild duodenitis         FAMILY HISTORY  Family History   Problem Relation Age of Onset   • Heart failure Mother    • Hearing loss Mother    • Heart  disease Mother         Mother had congestive heart failure   • Hyperlipidemia Mother    • Alcohol abuse Father    • Diabetes Father          SOCIAL HISTORY  Social History     Socioeconomic History   • Marital status:    Tobacco Use   • Smoking status: Never     Passive exposure: Yes   • Smokeless tobacco: Never   • Tobacco comments:     Smoke a cigar ocassionally   Substance and Sexual Activity   • Alcohol use: No     Comment: Daily caffeine use   • Drug use: No   • Sexual activity: Not Currently     Partners: Female         ALLERGIES  Lidocaine, Lovastatin, Pravastatin, Gabapentin, Procaine, and Simvastatin        REVIEW OF SYSTEMS  Review of Systems     Patient has headache, neck pain, back pain, chest pain, abdominal pain, fevers, chills, cough, shortness of breath, vomiting, diarrhea or swelling  All systems reviewed and negative except for those discussed in HPI.     PHYSICAL EXAM    I have reviewed the triage vital signs and nursing notes.    ED Triage Vitals [11/15/22 1055]   Temp Heart Rate Resp BP SpO2   97.6 °F (36.4 °C) 58 16 -- 98 %      Temp src Heart Rate Source Patient Position BP Location FiO2 (%)   Tympanic Monitor -- -- --       GENERAL: 89-year-old well developed, well nourished in no acute distress  HENT: Abrasion to posterior head, neck supple, trachea midline: No C-spine tenderness  EYES: no scleral icterus, PERRL, normal conjunctivae  CV: regular rhythm, regular rate, no murmur: Frequent extrasystoles  RESPIRATORY: unlabored effort, CTAB  ABDOMEN: soft, nontender, nondistended, bowel sounds present  MUSCULOSKELETAL: no gross deformity, no pedal edema, no calf tenderness  NEURO: alert,  sensory and motor function of extremities intact, speech clear, mental status normal: NIHSS of 0  SKIN: warm, dry, no rash  PSYCH:  Appropriate mood and affect      PPE  Pt does not present with symptoms for COVID19; however, I was wearing a mask and goggles throughout all patient interaction.    Vital  signs and nursing notes reviewed.      LAB RESULTS  Recent Results (from the past 24 hour(s))   ECG 12 Lead Rhythm Change    Collection Time: 11/15/22 11:09 AM   Result Value Ref Range    QT Interval 459 ms   Comprehensive Metabolic Panel    Collection Time: 11/15/22 11:22 AM    Specimen: Blood   Result Value Ref Range    Glucose 83 65 - 99 mg/dL    BUN 20 8 - 23 mg/dL    Creatinine 0.86 0.76 - 1.27 mg/dL    Sodium 137 136 - 145 mmol/L    Potassium 4.6 3.5 - 5.2 mmol/L    Chloride 101 98 - 107 mmol/L    CO2 23.7 22.0 - 29.0 mmol/L    Calcium 9.2 8.2 - 9.6 mg/dL    Total Protein 7.1 6.0 - 8.5 g/dL    Albumin 3.90 3.50 - 5.20 g/dL    ALT (SGPT) 14 1 - 41 U/L    AST (SGOT) 24 1 - 40 U/L    Alkaline Phosphatase 68 39 - 117 U/L    Total Bilirubin 0.5 0.0 - 1.2 mg/dL    Globulin 3.2 gm/dL    A/G Ratio 1.2 g/dL    BUN/Creatinine Ratio 23.3 7.0 - 25.0    Anion Gap 12.3 5.0 - 15.0 mmol/L    eGFR 80.2 >60.0 mL/min/1.73   Protime-INR    Collection Time: 11/15/22 11:22 AM    Specimen: Blood   Result Value Ref Range    Protime 14.2 11.7 - 14.2 Seconds    INR 1.09 0.90 - 1.10   Troponin    Collection Time: 11/15/22 11:22 AM    Specimen: Blood   Result Value Ref Range    Troponin T <0.010 0.000 - 0.030 ng/mL   Magnesium    Collection Time: 11/15/22 11:22 AM    Specimen: Blood   Result Value Ref Range    Magnesium 2.1 1.7 - 2.3 mg/dL   TSH    Collection Time: 11/15/22 11:22 AM    Specimen: Blood   Result Value Ref Range    TSH 2.190 0.270 - 4.200 uIU/mL   T4, Free    Collection Time: 11/15/22 11:22 AM    Specimen: Blood   Result Value Ref Range    Free T4 1.15 0.93 - 1.70 ng/dL   CBC Auto Differential    Collection Time: 11/15/22 11:22 AM    Specimen: Blood   Result Value Ref Range    WBC 5.93 3.40 - 10.80 10*3/mm3    RBC 4.57 4.14 - 5.80 10*6/mm3    Hemoglobin 14.3 13.0 - 17.7 g/dL    Hematocrit 41.0 37.5 - 51.0 %    MCV 89.7 79.0 - 97.0 fL    MCH 31.3 26.6 - 33.0 pg    MCHC 34.9 31.5 - 35.7 g/dL    RDW 11.9 (L) 12.3 - 15.4 %     RDW-SD 39.3 37.0 - 54.0 fl    MPV 10.0 6.0 - 12.0 fL    Platelets 202 140 - 450 10*3/mm3    Neutrophil % 52.3 42.7 - 76.0 %    Lymphocyte % 27.8 19.6 - 45.3 %    Monocyte % 18.7 (H) 5.0 - 12.0 %    Eosinophil % 0.7 0.3 - 6.2 %    Basophil % 0.3 0.0 - 1.5 %    Immature Grans % 0.2 0.0 - 0.5 %    Neutrophils, Absolute 3.10 1.70 - 7.00 10*3/mm3    Lymphocytes, Absolute 1.65 0.70 - 3.10 10*3/mm3    Monocytes, Absolute 1.11 (H) 0.10 - 0.90 10*3/mm3    Eosinophils, Absolute 0.04 0.00 - 0.40 10*3/mm3    Basophils, Absolute 0.02 0.00 - 0.20 10*3/mm3    Immature Grans, Absolute 0.01 0.00 - 0.05 10*3/mm3    nRBC 0.0 0.0 - 0.2 /100 WBC   Lipid Panel    Collection Time: 11/15/22 11:22 AM    Specimen: Blood   Result Value Ref Range    Total Cholesterol 132 0 - 200 mg/dL    Triglycerides 146 0 - 150 mg/dL    HDL Cholesterol 41 40 - 60 mg/dL    LDL Cholesterol  66 0 - 100 mg/dL    VLDL Cholesterol 25 5 - 40 mg/dL    LDL/HDL Ratio 1.51        Ordered the above labs and independently reviewed the results.        RADIOLOGY  CT Head Without Contrast    Result Date: 11/15/2022  CT OF THE BRAIN WITHOUT CONTRAST 11/15/2022  HISTORY: Left arm weakness.  Axial images were obtained through the brain without intravenous contrast.  There is moderate diffuse atrophy. There is no evidence of acute infarction, hemorrhage, midline shift or mass effect.  No bony abnormalities are seen.      1. No acute process identified.  Radiation dose reduction techniques were utilized, including automated exposure control and exposure modulation based on body size.  This report was finalized on 11/15/2022 1:03 PM by Dr. Quinten Willard M.D.        I ordered the above noted radiological studies. Independently reviewed by me and discussed with radiologist.  See dictation above for official radiology interpretation.      PROCEDURES    Procedures        MEDICATIONS GIVEN IN ER    Medications   sodium chloride 0.9 % flush 10 mL (has no administration in time  range)   nitroglycerin (NITROSTAT) SL tablet 0.4 mg (has no administration in time range)   sodium chloride 0.9 % flush 10 mL (has no administration in time range)   sodium chloride 0.9 % flush 10 mL (has no administration in time range)   ondansetron (ZOFRAN) tablet 4 mg (has no administration in time range)     Or   ondansetron (ZOFRAN) injection 4 mg (has no administration in time range)   acetaminophen (TYLENOL) tablet 650 mg (has no administration in time range)   melatonin tablet 5 mg (has no administration in time range)         PROGRESS, DATA ANALYSIS, CONSULTS, AND MEDICAL DECISION MAKING    All labs have been independently reviewed by me.  All radiology studies have been reviewed by me and discussed with radiologist dictating report.   EKG's independently reviewed by me.  Discussion below represents my analysis of pertinent findings related to patient's condition, differential diagnosis, treatment plan and final disposition.      ED Course as of 11/15/22 1431   Tu Nov 15, 2022   1116 NIHSS:  0-->Alert: keenly responsive  0-->Answers both questions correctly  0-->Performs both tasks correctly  0=normal  0=No visual loss  0=Normal symmetric movement  0-->No drift: limb holds 90 (or 45) degrees for full 10 secs  0-->No drift: limb holds 90 (or 45) degrees for full 10 secs  0-->No drift: limb holds 90 (or 45) degrees for full 10 secs  0-->No drift: limb holds 90 (or 45) degrees for full 10 secs  0=Absent  0=Normal; no sensory loss  0=No aphasia, normal  0=Normal  0=No abnormality  Total score: 0 [GP]   1119 I discussed the case with Dr. Santana from the stroke team.  We agree the patient is not a team D or TNK candidate with a normal stroke score at this time.  We will obtain a CT head without contrast in the emergency room and then admit the patient for MRI, CTAs and echocardiogram. [GP]   1119 EKG    EKG time: 1109  Rhythm/Rate: Normal sinus rhythm 59  Frequent PVCs  No Acute Ischemia  Non-Specific ST-T  changes    Similar compared to prior on 6/5/2022 except the PVCs are new    Interpreted Contemporaneously by me.  Independently viewed by me     [GP]   1332 Patient CT head read as negative acute.  On repeat evaluation the patient has a nonfocal exam.  The family now tells me that his symptoms lasted for approximately 15 to 30 minutes. [GP]   3759 I discussed the case with Lauren Corey and will admit the patient to the observation unit.  The patient is already taking 4 baby aspirin at home today. [GP]      ED Course User Index  [GP] Ovi Hernandez MD           The differential diagnosis includes but is not limited to stroke, encephalopathy, toxic / metabolic, hypoglycemia, toxin-induced, psychogenic, medication-induced, or infectious.        AS OF 14:31 EST VITALS:    BP - 169/77  HR - 53  TEMP - 97.6 °F (36.4 °C) (Tympanic)  02 SATS - 97%        DIAGNOSIS  Final diagnoses:   TIA (transient ischemic attack)         DISPOSITION  ADMISSION    Discussed treatment plan and reason for admission with pt/family and admitting physician.  Pt/family voiced understanding of the plan for admission for further testing/treatment as needed.          Note Disclaimer: At [facility], we believe that sharing information builds trust and better relationships. You are receiving this note because you recently visited [facility]. It is possible you will see health information before a provider has talked with you about it. This kind of information can be easy to misunderstand. To help you fully understand what it means for your health, we urge you to discuss this note with your provider.                  EMR Dragon/Transcription disclaimer:          Part of this note may be an electronic transcription/translation of spoken language to printed text using the Dragon dictation system.         Ovi Hernandez MD  11/15/22 1707

## 2022-11-15 NOTE — CONSULTS
Neurology Consult Note    Consult Date: 11/15/2022    Referring MD: Nabor Torres MD    Reason for Consult I have been asked to see the patient in neurological consultation to render advice and opinion regarding possible TIA    Vicente Delgado is a 89 y.o. male with retinal migraine, CAD, no prior history of atrial fibrillation, recent diagnosis of macular degeneration.  He does report that he was started on medication for macular degeneration in July and developed intermittent palpitations and then decrease the medication after which the palpitations seem to have improved.    Today he was sitting at the breakfast table with his family when he developed acute onset of unprovoked room spinning vertigo.  He attempted to stand up and noticed he was unable to walk and that his left arm fell limply to the side and was completely paralyzed.  This lasted about 10 to 15 minutes and then resolved.  He is currently back to his neurologic baseline and denies any new neurologic symptoms since presentation to the ED.  He denies any prior episodic positional vertigo and as mentioned his vertigo today appears to have been completely unprovoked.    Past Medical History:   Diagnosis Date   • Allergic rhinitis    • Anxiety    • Arthritis    • CAD (coronary artery disease)    • Cancer (HCC)    • Cataract June 2018” and    Ocular mygraine   • Cholelithiasis 2020   • Depression    • Diverticulosis    • Esophagitis    • Gastritis    • GERD (gastroesophageal reflux disease)    • Heart disease    • History of anxiety    • History of medical problems Right shoulder replacemd    2008   • History of prostate cancer 06/1990   • HL (hearing loss) Partial   • Hyperlipidemia    • Hypertension    • Insomnia    • Low back pain Yes  from hworking   • Lupus (HCC)    • TAM (nonalcoholic steatohepatitis)    • Sciatica of right side    • Visual impairment Ocular mygraine       ROS:  No fevers, chills  No weakness, numbness    Exam  /71 (BP  "Location: Right arm, Patient Position: Lying)   Pulse 53   Temp 97.7 °F (36.5 °C) (Oral)   Resp 18   Ht 170.2 cm (67\")   Wt 78.5 kg (173 lb)   SpO2 98%   BMI 27.10 kg/m²   Gen: NAD, vitals reviewed  CVS: Regular rate and rhythm  MS: oriented x3, recent/remote memory intact, normal attention/concentration, language intact, no neglect.  CN: visual acuity grossly normal, PERRL, EOMI with no nystagmus, negative head impulse test bilaterally, negative test of skew, no facial droop, no dysarthria  Motor: 5/5 throughout upper and lower extremities, normal tone  Coordination: No dysmetria or overshoot  Sensory: Diminished vibratory sensation distal lower extremities    DATA:    Lab Results   Component Value Date    GLUCOSE 83 11/15/2022    CALCIUM 9.2 11/15/2022     11/15/2022    K 4.6 11/15/2022    CO2 23.7 11/15/2022     11/15/2022    BUN 20 11/15/2022    CREATININE 0.86 11/15/2022    EGFRIFAFRI 82 02/07/2022    EGFRIFNONA 71 02/07/2022    BCR 23.3 11/15/2022    ANIONGAP 12.3 11/15/2022     Lab Results   Component Value Date    WBC 5.93 11/15/2022    HGB 14.3 11/15/2022    HCT 41.0 11/15/2022    MCV 89.7 11/15/2022     11/15/2022       Lab review: CBC, BMP unremarkable    Imaging review: MRI brain, CTA head and neck pending.  CT of the head performed in the emergency department showed no acute intracranial abnormality.  Radiology report reviewed.    Diagnoses:  Transient ischemic attack  Palpitations    Comment: Suspect posterior circulation TIA on the basis of his presenting symptoms.  No persistent neurologic deficit.  His recent palpitations raise the question of underlying atrial fibrillation.    PLAN:  Aspirin, statin  MRI brain  CTA head neck  2D echo complete    Likely home tomorrow Zio patch if above work-up negative.    Discussed with patient, family      "

## 2022-11-16 ENCOUNTER — APPOINTMENT (OUTPATIENT)
Dept: CARDIOLOGY | Facility: HOSPITAL | Age: 87
End: 2022-11-16

## 2022-11-16 ENCOUNTER — READMISSION MANAGEMENT (OUTPATIENT)
Dept: CALL CENTER | Facility: HOSPITAL | Age: 87
End: 2022-11-16

## 2022-11-16 VITALS
WEIGHT: 173 LBS | HEART RATE: 52 BPM | DIASTOLIC BLOOD PRESSURE: 59 MMHG | TEMPERATURE: 98 F | RESPIRATION RATE: 16 BRPM | SYSTOLIC BLOOD PRESSURE: 121 MMHG | HEIGHT: 67 IN | OXYGEN SATURATION: 96 % | BODY MASS INDEX: 27.15 KG/M2

## 2022-11-16 LAB
ANION GAP SERPL CALCULATED.3IONS-SCNC: 10 MMOL/L (ref 5–15)
BUN SERPL-MCNC: 18 MG/DL (ref 8–23)
BUN/CREAT SERPL: 22 (ref 7–25)
CALCIUM SPEC-SCNC: 9 MG/DL (ref 8.2–9.6)
CHLORIDE SERPL-SCNC: 102 MMOL/L (ref 98–107)
CO2 SERPL-SCNC: 24 MMOL/L (ref 22–29)
CREAT SERPL-MCNC: 0.82 MG/DL (ref 0.76–1.27)
DEPRECATED RDW RBC AUTO: 41.1 FL (ref 37–54)
EGFRCR SERPLBLD CKD-EPI 2021: 81.4 ML/MIN/1.73
ERYTHROCYTE [DISTWIDTH] IN BLOOD BY AUTOMATED COUNT: 11.9 % (ref 12.3–15.4)
GLUCOSE SERPL-MCNC: 83 MG/DL (ref 65–99)
HCT VFR BLD AUTO: 41.1 % (ref 37.5–51)
HGB BLD-MCNC: 13.6 G/DL (ref 13–17.7)
MCH RBC QN AUTO: 31 PG (ref 26.6–33)
MCHC RBC AUTO-ENTMCNC: 33.1 G/DL (ref 31.5–35.7)
MCV RBC AUTO: 93.6 FL (ref 79–97)
PLATELET # BLD AUTO: 187 10*3/MM3 (ref 140–450)
PMV BLD AUTO: 10.3 FL (ref 6–12)
POTASSIUM SERPL-SCNC: 4.1 MMOL/L (ref 3.5–5.2)
RBC # BLD AUTO: 4.39 10*6/MM3 (ref 4.14–5.8)
SODIUM SERPL-SCNC: 136 MMOL/L (ref 136–145)
WBC NRBC COR # BLD: 4.54 10*3/MM3 (ref 3.4–10.8)

## 2022-11-16 PROCEDURE — G0378 HOSPITAL OBSERVATION PER HR: HCPCS

## 2022-11-16 PROCEDURE — 80048 BASIC METABOLIC PNL TOTAL CA: CPT | Performed by: PHYSICIAN ASSISTANT

## 2022-11-16 PROCEDURE — 97161 PT EVAL LOW COMPLEX 20 MIN: CPT

## 2022-11-16 PROCEDURE — 93246 EXT ECG>7D<15D RECORDING: CPT

## 2022-11-16 PROCEDURE — 99214 OFFICE O/P EST MOD 30 MIN: CPT | Performed by: NURSE PRACTITIONER

## 2022-11-16 PROCEDURE — 85027 COMPLETE CBC AUTOMATED: CPT | Performed by: PHYSICIAN ASSISTANT

## 2022-11-16 RX ORDER — CLOPIDOGREL BISULFATE 75 MG/1
300 TABLET ORAL ONCE
Status: COMPLETED | OUTPATIENT
Start: 2022-11-16 | End: 2022-11-16

## 2022-11-16 RX ORDER — CLOPIDOGREL BISULFATE 75 MG/1
75 TABLET ORAL DAILY
Status: DISCONTINUED | OUTPATIENT
Start: 2022-11-17 | End: 2022-11-16 | Stop reason: HOSPADM

## 2022-11-16 RX ORDER — CLOPIDOGREL BISULFATE 75 MG/1
75 TABLET ORAL DAILY
Qty: 30 TABLET | Refills: 2 | Status: SHIPPED | OUTPATIENT
Start: 2022-11-17 | End: 2022-12-22 | Stop reason: HOSPADM

## 2022-11-16 RX ORDER — CLOPIDOGREL BISULFATE 75 MG/1
75 TABLET ORAL DAILY
Qty: 30 TABLET | Refills: 2 | Status: SHIPPED | OUTPATIENT
Start: 2022-11-17 | End: 2022-11-16 | Stop reason: SDUPTHER

## 2022-11-16 RX ADMIN — ATENOLOL 25 MG: 25 TABLET ORAL at 08:49

## 2022-11-16 RX ADMIN — CLOPIDOGREL 300 MG: 75 TABLET, FILM COATED ORAL at 11:43

## 2022-11-16 RX ADMIN — Medication 10 ML: at 08:51

## 2022-11-16 RX ADMIN — LISINOPRIL 10 MG: 10 TABLET ORAL at 08:51

## 2022-11-16 RX ADMIN — ASPIRIN 81 MG: 81 TABLET, CHEWABLE ORAL at 08:51

## 2022-11-16 RX ADMIN — PANTOPRAZOLE SODIUM 40 MG: 40 TABLET, DELAYED RELEASE ORAL at 08:49

## 2022-11-16 NOTE — PROGRESS NOTES
ED OBSERVATION PROGRESS/DISCHARGE SUMMARY    Date of Admission: 11/15/2022   LOS: 0 days   PCP: Mechelle Landa, GE      Subjective    No acute events overnight.     Hospital Outcome:   89-year-old male admitted the observation unit for further evaluation of dizziness and left leg weakness. In the ER head CT and CTA of the head and neck were negative for acute findings.  Neurology saw and evaluated the patient recommending further evaluation with MRI and echo.  Echocardiogram reveals an EF of 56 to 60% with moderate LVH, grade 1 diastolic dysfunction, negative saline test, moderate aortic valve stenosis that cannot exclude paradoxical low gradient severe aortic stenosis and mild dilation of the ascending aorta at 3.9 cm.  MRI of the brain without reveals no acute infarct with mild to moderate white matter disease.      Patient will be loaded with 300mg Plavix this morning and then discharged on aspirin and Plavix for 3 weeks.  After 3 weeks patient will stop aspirin and continue Plavix only.  Patient will be discharged with a Holter monitor.  Usual return to ER precautions discussed with patient and family who expressed understanding and are in agreement with plan.    ROS:  General: no fevers, chills  Respiratory: no cough, dyspnea  Cardiovascular: no chest pain, palpitations  Abdomen: No abdominal pain, nausea, vomiting, or diarrhea  Neurologic: No focal weakness    Objective   Physical Exam:  I have reviewed the vital signs.  Temp:  [97.6 °F (36.4 °C)-98.3 °F (36.8 °C)] 98.3 °F (36.8 °C)  Heart Rate:  [49-58] 54  Resp:  [16-18] 18  BP: (133-169)/(62-91) 133/62  General Appearance:    Alert, cooperative, no distress  Head:    Normocephalic, atraumatic  Eyes:    Sclerae anicteric  Neck:   Supple, no mass  Lungs: Clear to auscultation bilaterally, respirations unlabored  Heart: Regular rate and rhythm, S1 and S2 normal, no murmur, rub or gallop  Abdomen:  Soft, non-tender, bowel sounds active,  nondistended  Extremities: No clubbing, cyanosis, or edema to lower extremities  Pulses:  2+ and symmetric in distal lower extremities  Skin: No rashes   Neurologic: Oriented x3, Normal strength to extremities    Results Review:    I have reviewed the labs, radiology results and diagnostic studies.    Results from last 7 days   Lab Units 11/15/22  1122   WBC 10*3/mm3 5.93   HEMOGLOBIN g/dL 14.3   HEMATOCRIT % 41.0   PLATELETS 10*3/mm3 202     Results from last 7 days   Lab Units 11/15/22  1122   SODIUM mmol/L 137   POTASSIUM mmol/L 4.6   CHLORIDE mmol/L 101   CO2 mmol/L 23.7   BUN mg/dL 20   CREATININE mg/dL 0.86   CALCIUM mg/dL 9.2   BILIRUBIN mg/dL 0.5   ALK PHOS U/L 68   ALT (SGPT) U/L 14   AST (SGOT) U/L 24   GLUCOSE mg/dL 83     Imaging Results (Last 24 Hours)     Procedure Component Value Units Date/Time    MRI Brain Without Contrast [[id number]] Collected: 11/15/22 1908     Updated: 11/15/22 1913    Narrative:      MRI BRAIN WO CONTRAST-     INDICATIONS: Transient ischemic attack     TECHNIQUE: Multiplanar multisequence noncontrast magnetic resonance  imaging of the brain.     COMPARISON: CT from same date     FINDINGS:     The diffusion-weighted images show no restricted diffusion to suggest  acute infarct.     The midline structures appear unremarkable.     The brain parenchyma shows mild to moderate periventricular white matter  T2 FLAIR signal hyperintensities, compatible with chronic small vessel  ischemic change in a patient this age.     Flow voids in the major arteries at the base of the brain appear  unremarkable.     Mucous retention cysts are apparent in the maxillary sinuses. The  paranasal sinuses, mastoid air cells, and orbits appear otherwise  unremarkable.       Impression:      No acute infarct. Mild to moderate periventricular white matter chronic  small vessel ischemic change.        This report was finalized on 11/15/2022 7:10 PM by Dr. Selvin Brown M.D.       CT Angiogram Head  [[id number]] Collected: 11/15/22 1605     Updated: 11/15/22 1619    Narrative:      CT ANGIOGRAM HEAD-, CT ANGIOGRAM NECK-     INDICATIONS: Neurologic deficit, stroke suspected     TECHNIQUE: Radiation dose reduction techniques were utilized, including  automated exposure control and exposure modulation based on body  size.Noncontrast head CT was performed, followed by enhanced CT  angiography of the head and neck. Three-dimensional reconstructions were  created, reviewed, and assessed according to NASCET criteria.     COMPARISON: Correlated with head CT from same date           FINDINGS:     No acute intracranial hemorrhage, midline shift or mass effect. No acute  territorial infarct is identified.     Mild periventricular hypodensity suggests chronic small vessel ischemic  change in a patient this age.     Arterial calcifications are seen at the base of the brain.     Ventricles, cisterns, cerebral sulci are unremarkable for patient age.     The visualized paranasal sinuses, orbits, mastoid air cells are  unremarkable.      The CT angiography images show no hemodynamically significant focal  stenosis, aneurysm, or dissection in the cervical carotid or vertebral  arteries, or in the arteries at the base of the brain. The right P1  segment is diminutive or incomplete, the right PCA being at least  predominantly supplied by the anterior circulation. Scattered  calcifications are seen in the bilateral carotid arteries and right  vertebral artery without high-grade stenosis (0-49% stenosis).        Facet and uncovertebral joint hypertrophy contribute to neuroforaminal  narrowing, more prominent on the right at C2/3, C4/5, C5/6, C6/7, and on  the left at C3/4, C4/5, C5/6, C6/7. Disc osteophyte complex appears to  result in mild central stenosis at C3/4, C4/5, C5/6, C6/7.     Relative prominence of caliber of the central pulmonary arteries may  reflect pulmonary arterial hypertension. A 9 mm short axis right  paratracheal  lymph node is apparent on axial image 39, stable from  06/06/2022          Impression:      1. No hemodynamically significant focal stenosis, aneurysm, or  dissection in the cervical carotid or vertebral arteries or in the  arteries at the base of the brain.     2. No acute intracranial hemorrhage or hydrocephalus. No enhancing  lesion in brain. If there is further clinical concern, MRI could be  considered for further evaluation.     This report was finalized on 11/15/2022 4:16 PM by Dr. Selvin Brown M.D.       CT Angiogram Neck [[id number]] Collected: 11/15/22 1605     Updated: 11/15/22 1619    Narrative:      CT ANGIOGRAM HEAD-, CT ANGIOGRAM NECK-     INDICATIONS: Neurologic deficit, stroke suspected     TECHNIQUE: Radiation dose reduction techniques were utilized, including  automated exposure control and exposure modulation based on body  size.Noncontrast head CT was performed, followed by enhanced CT  angiography of the head and neck. Three-dimensional reconstructions were  created, reviewed, and assessed according to NASCET criteria.     COMPARISON: Correlated with head CT from same date           FINDINGS:     No acute intracranial hemorrhage, midline shift or mass effect. No acute  territorial infarct is identified.     Mild periventricular hypodensity suggests chronic small vessel ischemic  change in a patient this age.     Arterial calcifications are seen at the base of the brain.     Ventricles, cisterns, cerebral sulci are unremarkable for patient age.     The visualized paranasal sinuses, orbits, mastoid air cells are  unremarkable.      The CT angiography images show no hemodynamically significant focal  stenosis, aneurysm, or dissection in the cervical carotid or vertebral  arteries, or in the arteries at the base of the brain. The right P1  segment is diminutive or incomplete, the right PCA being at least  predominantly supplied by the anterior circulation. Scattered  calcifications are seen  in the bilateral carotid arteries and right  vertebral artery without high-grade stenosis (0-49% stenosis).        Facet and uncovertebral joint hypertrophy contribute to neuroforaminal  narrowing, more prominent on the right at C2/3, C4/5, C5/6, C6/7, and on  the left at C3/4, C4/5, C5/6, C6/7. Disc osteophyte complex appears to  result in mild central stenosis at C3/4, C4/5, C5/6, C6/7.     Relative prominence of caliber of the central pulmonary arteries may  reflect pulmonary arterial hypertension. A 9 mm short axis right  paratracheal lymph node is apparent on axial image 39, stable from  06/06/2022          Impression:      1. No hemodynamically significant focal stenosis, aneurysm, or  dissection in the cervical carotid or vertebral arteries or in the  arteries at the base of the brain.     2. No acute intracranial hemorrhage or hydrocephalus. No enhancing  lesion in brain. If there is further clinical concern, MRI could be  considered for further evaluation.     This report was finalized on 11/15/2022 4:16 PM by Dr. Selvin Brown M.D.       CT Head Without Contrast [[id number]] Collected: 11/15/22 1257     Updated: 11/15/22 1306    Narrative:      CT OF THE BRAIN WITHOUT CONTRAST 11/15/2022     HISTORY: Left arm weakness.     Axial images were obtained through the brain without intravenous  contrast.     There is moderate diffuse atrophy. There is no evidence of acute  infarction, hemorrhage, midline shift or mass effect.     No bony abnormalities are seen.       Impression:      1. No acute process identified.     Radiation dose reduction techniques were utilized, including automated  exposure control and exposure modulation based on body size.     This report was finalized on 11/15/2022 1:03 PM by Dr. Quinten Willard M.D.           Echocardiogram 11/16/2022  Interpretation Summary       •  Left ventricular systolic function is normal. Left ventricular ejection fraction appears to be 56 - 60%.  •   Left ventricular wall thickness is consistent with moderate concentric hypertrophy.  •  Left ventricular diastolic function is consistent with (grade Ia w/high LAP) impaired relaxation.  •  The right ventricular cavity is mildly dilated.  •  Saline test results are negative.  •  Moderate aortic valve stenosis is present. Aortic valve area is 0.6 cm2.  Very low stroke-volume index.  Cannot exclude paradoxical low gradient severe aortic stenosis.  •  Peak velocity of the flow distal to the aortic valve is 284.7 cm/s. Aortic valve mean pressure gradient is 20 mmHg. Aortic valve dimensionless index is 0.2 .  •  Estimated right ventricular systolic pressure from tricuspid regurgitation is normal (<35 mmHg).  •  Mild dilation of the ascending aorta is present.  3.9 cm.      I have reviewed the medications.  ---------------------------------------------------------------------------------------------  Assessment & Plan   Assessment/Problem List    TIA (transient ischemic attack)      Plan:    Left-sided weakness/TIA  -CTA head and neck reviewed  -Echocardiogram notable for grade 1 diastolic dysfunction, negative saline test, moderate aortic valve stenosis with a very low stroke-volume index and cannot exclude paradoxical low gradient severe aortic stenosis  -MRI brain without negative for acute things with note of mild to moderate white matter disease  -Neurology consulted, please see their note for further details  -Aspirin/statin  -A1c 6.0, lipid panel unremarkable     Palpitations  -Telemetry monitoring  -Home with Zio patch  -Follow up with cardiology outpatient     Hypertension/hyperlipidemia  -Chronic conditions  -Continue home medications as prescribed    Disposition: home    Follow-up after Discharge: PCP, neurology, cardiology    This note will serve as discharge summary.    I wore an face mask, eye protection, and gloves during this patient encounter. Patient also wearing a surgical mask. Hand hygeine performed  before and after seeing the patient.    Suzanna Mills PA-C 11/16/22 04:49 EST

## 2022-11-16 NOTE — THERAPY EVALUATION
Patient Name: Vicente Delgado  : 1928    MRN: [id number]                              Today's Date: 2022       Admit Date: 11/15/2022    Visit Dx:     ICD-10-CM ICD-9-CM   1. 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)   • Atherosclerosis of native artery of left lower extremity with intermittent claudication (HCC)   • Anxiety   • Dermatitis   • Chronic stable angina (HCC)   • Aortic stenosis, moderate   • Chronic right shoulder pain   • Controlled substance agreement signed   • TIA (transient ischemic attack)     Past Medical History:   Diagnosis Date   • Allergic rhinitis    • Anxiety    • Arthritis    • CAD (coronary artery disease)    • Cancer (HCC)    • Cataract 2018” and    Ocular mygraine   • Cholelithiasis    • Depression    • Diverticulosis    • Esophagitis    • Gastritis    • GERD (gastroesophageal reflux disease)    • Heart disease    • History of anxiety    • History of medical problems Right shoulder replacemd       • History of prostate cancer 1990   • HL (hearing loss) Partial   • Hyperlipidemia    • Hypertension    • Insomnia    • Low back pain Yes  from hworking   • Lupus (HCC)    • TAM (nonalcoholic steatohepatitis)    • Sciatica of right side    • Visual impairment Ocular mygraine     Past Surgical History:   Procedure Laterality Date   • ABDOMINAL SURGERY     • CARDIAC  CATHETERIZATION  11/16/1995   • CARDIAC SURGERY  11/16/1995   • CATARACT EXTRACTION Left 07/2018   • CHOLECYSTECTOMY  2020   • CHOLECYSTECTOMY WITH INTRAOPERATIVE CHOLANGIOGRAM N/A 9/29/2020    Procedure: Laparoscopic cholecystectomy;  Surgeon: Javi Peralta MD;  Location: [facility];  Service: General;  Laterality: N/A;   • COLONOSCOPY  01/09/2002   • ELBOW PROCEDURE     • JOINT REPLACEMENT     • LUNG BIOPSY     • LYMPH NODE BIOPSY  Yes    1992   • NASAL POLYP SURGERY     • PACEMAKER IMPLANTATION     • PROSTATE SURGERY  06/1990   • SHOULDER ROTATOR CUFF REPAIR Right 08/24/2011    Dr. Bach   • SIGMOIDOSCOPY     • TONSILLECTOMY  Yes 1943   • UPPER GASTROINTESTINAL ENDOSCOPY  09/12/1997    Gastritis, Duodenitis, hiatal hernia (Pathology: Gastric antrum minimal chronic inflammation)   • UPPER GASTROINTESTINAL ENDOSCOPY  04/11/2005    Mild esophagitis, Small hiatal hernia, Mild gastritis and mild duodenitis      General Information     Row Name 11/16/22 1023          Physical Therapy Time and Intention    Document Type discharge evaluation/summary  -     Mode of Treatment individual therapy;physical therapy  -     Row Name 11/16/22 1023          General Information    Patient Profile Reviewed yes  -SM     Prior Level of Function independent:  -     Existing Precautions/Restrictions fall  -     Barriers to Rehab none identified  -     Row Name 11/16/22 1023          Living Environment    People in Home alone  -     Row Name 11/16/22 1023          Cognition    Orientation Status (Cognition) oriented x 4  -           User Key  (r) = Recorded By, (t) = Taken By, (c) = Cosigned By    Initials Name Provider Type     Jeniffer Phillips PT Physical Therapist               Mobility     Row Name 11/16/22 1023          Bed Mobility    Bed Mobility supine-sit  -     Supine-Sit [place] (Bed Mobility) modified independence  -     Comment, (Bed Mobility) Sitting EOB at end of session  -     Row  Name 11/16/22 1023          Sit-Stand Transfer    Sit-Stand [place] (Transfers) standby assist  -     Assistive Device (Sit-Stand Transfers) other (see comments)  no AD  -SM     [facility] Name 11/16/22 1023          Gait/Stairs (Locomotion)    [place] Level (Gait) standby assist;supervision  -     Assistive Device (Gait) other (see comments)  No AD  -SM     Distance in Feet (Gait) 150ft  -     Comment, (Gait/Stairs) Gait appeared normal with no deviations or LOB noted.  -           User Key  (r) = Recorded By, (t) = Taken By, (c) = Cosigned By    Initials Name Provider Type     Jeniffer Phillips PT Physical Therapist               Obj/Interventions     [facility] Name 11/16/22 1024          Range of Motion Comprehensive    General Range of Motion no range of motion deficits identified  -SM     Row Name 11/16/22 1024          Strength Comprehensive (MMT)    General Manual Muscle Testing (MMT) Assessment no strength deficits identified  -[facility] Name 11/16/22 1024          Balance    Balance Assessment sitting static balance;sitting dynamic balance;sit to stand dynamic balance;standing static balance;standing dynamic balance  -     Static Sitting Balance independent  -     Dynamic Sitting Balance independent  Patient able to don socks independently  -     Position, Sitting Balance sitting edge of bed  -     Sit to Stand Dynamic Balance supervision  -     Static Standing Balance modified independence  -     Dynamic Standing Balance supervision  -     Position/Device Used, Standing Balance unsupported  -     Balance Interventions sitting;standing;sit to stand;static;dynamic  -           User Key  (r) = Recorded By, (t) = Taken By, (c) = Cosigned By    Initials Name Provider Type     Jeniffer Phillips PT Physical Therapist               Goals/Plan    No documentation.                Clinical Impression     [facility] Name 11/16/22 1025          Pain    Pretreatment Pain Rating 0/10 - no pain  -      Posttreatment Pain Rating 0/10 - no pain  -[facility] Name 11/16/22 1025          Plan of Care Review    Plan of Care Reviewed With patient  -SM     Outcome Evaluation Patient is a 89 y.o male who presents to [facility] with reports of numbness in L UE, dizziness, and a fall hitting his head. Patient reports he fell when he was at water aerobics when he slipped on the wet floor. Patient AOx4 supine in bed upon arrival. Patient reports he lives alone, is independent at baseline, and does not use an AD for ambulation. Patient reports he is very active and rides a bike or swims multiple timers per week. Today patient completed all bed mobility Mod(I). Patient ambulated 150ft in hallway with supervision. Gait appeard normal with no deviations or LOB noted. Patient appears at baseline function at this time. Acute PT to sign off.  -[facility] Name 11/16/22 1025          Therapy Assessment/Plan (PT)    Criteria for Skilled Interventions Met (PT) no;no problems identified which require skilled intervention  -[facility] Name 11/16/22 1025          Vital Signs    O2 Delivery Pre Treatment room air  -SM     O2 Delivery Intra Treatment room air  -SM     O2 Delivery Post Treatment room air  -SM     Pre Patient Position Supine  -SM     Post Patient Position Sitting  -[facility] Name 11/16/22 1025          Positioning and Restraints    Pre-Treatment Position in bed  -SM     Post Treatment Position bed  -SM     In Bed notified nsg;sitting EOB;sitting;with family/caregiver;call light within reach;encouraged to call for assist  -           User Key  (r) = Recorded By, (t) = Taken By, (c) = Cosigned By    Initials Name Provider Type     Jeniffer Phillips, PT Physical Therapist               Outcome Measures     Row Name 11/16/22 1028          How much help from another person do you currently need...    Turning from your back to your side while in flat bed without using bedrails? 4  -SM     Moving from lying on back to sitting on  the side of a flat bed without bedrails? 4  -SM     Moving to and from a bed to a chair (including a wheelchair)? 4  -SM     Standing up from a chair using your arms (e.g., wheelchair, bedside chair)? 4  -SM     Climbing 3-5 steps with a railing? 4  -SM     To walk in hospital room? 4  -SM     AM-PAC 6 Clicks Score (PT) 24  -     Highest level of mobility 8 --> Walked 250 feet or more  -     Row Name 11/16/22 1028          Functional Assessment    Outcome Measure Options AM-PAC 6 Clicks Basic Mobility (PT)  -           User Key  (r) = Recorded By, (t) = Taken By, (c) = Cosigned By    Initials Name Provider Type     Jeniffer Phillips PT Physical Therapist                             Physical Therapy Education     Title: PT OT SLP Therapies (In Progress)     Topic: Physical Therapy (In Progress)     Point: Mobility training (Not Started)     Learner Progress:  Not documented in this visit.          Point: Home exercise program (Not Started)     Learner Progress:  Not documented in this visit.          Point: Body mechanics (Not Started)     Learner Progress:  Not documented in this visit.          Point: Precautions (Done)     Learning Progress Summary           Patient Acceptance, E, VU by  at 11/16/2022 1029                               User Key     Initials Effective Dates Name Provider Type Discipline     05/02/22 -  Jeniffer Phillips PT Physical Therapist PT              PT Recommendation and Plan     Plan of Care Reviewed With: patient  Outcome Evaluation: Patient is a 89 y.o male who presents to [facility] with reports of numbness in L UE, dizziness, and a fall hitting his head. Patient reports he fell when he was at water aerobics when he slipped on the wet floor. Patient AOx4 supine in bed upon arrival. Patient reports he lives alone, is independent at baseline, and does not use an AD for ambulation. Patient reports he is very active and rides a bike or swims multiple timers per week. Today  patient completed all bed mobility Mod(I). Patient ambulated 150ft in hallway with supervision. Gait appeard normal with no deviations or LOB noted. Patient appears at baseline function at this time. Acute PT to sign off.     Time Calculation:    PT Charges     Row Name 11/16/22 1030             Time Calculation    Start Time 1012  -SM      Stop Time 1020  -      Time Calculation (min) 8 min  -SM      PT Received On 11/16/22  -            User Key  (r) = Recorded By, (t) = Taken By, (c) = Cosigned By    Initials Name Provider Type     Jeniffer Phillips PT Physical Therapist              Therapy Charges for Today     Code Description Service Date Service Provider Modifiers Qty    [id number] HC PT EVAL LOW COMPLEXITY 3 11/16/2022 Jeniffer Phillips PT GP 1          PT G-Codes  Outcome Measure Options: AM-PAC 6 Clicks Basic Mobility (PT)  AM-PAC 6 Clicks Score (PT): 24  PT Discharge Summary  Anticipated Discharge Disposition (PT): home  Patient was intermittently wearing a face mask during this therapy encounter. Therapist used appropriate personal protective equipment including mask and gloves.  Mask used was standard procedure mask. Appropriate PPE was worn during the entire therapy session. Hand hygiene was completed before and after therapy session. Patient is not in enhanced droplet precautions.     Jeniffer Phillips PT  11/16/2022

## 2022-11-16 NOTE — PROGRESS NOTES
DOS: 2022  NAME: Vicente Delgado   : 1928  PCP: Mechelle Landa APRN    Chief Complaint   Patient presents with   • Numbness   • Dizziness        Stroke    Subjective: Pt seen in follow up, however the problem is new to me.  Sitting up in bed resting comfortably with his 2 family members at bedside.  Denies any new weakness, numbness, speech or visual disturbances, or headaches.  Has not had any recurrence of symptoms since being in the hospital.  States he had some lightheadedness today  And some GERD symptoms this morning.    Objective:  Vital signs:      Vitals:    22 0356 22 0730 22 0849 22 1137   BP: 133/62 119/75 154/67 121/59   BP Location: Left arm Right arm Right arm Right arm   Patient Position: Lying Lying Lying Sitting   Pulse: 54 56 58 52   Resp:   16   Temp: 98.3 °F (36.8 °C) 97.8 °F (36.6 °C)  98 °F (36.7 °C)   TempSrc: Oral Oral  Oral   SpO2: 94% 96%  96%   Weight:       Height:           Current Facility-Administered Medications:   •  acetaminophen (TYLENOL) tablet 650 mg, 650 mg, Oral, Q4H PRN, Lauren Corey PA  •  aspirin chewable tablet 81 mg, 81 mg, Oral, Daily, Nabor Torres MD, 81 mg at 22 0851  •  atenolol (TENORMIN) tablet 25 mg, 25 mg, Oral, BID, Nabor Torres MD, 25 mg at 22 0849  •  [START ON 2022] clopidogrel (PLAVIX) tablet 75 mg, 75 mg, Oral, Daily, Roma Odom APRN  •  lisinopril (PRINIVIL,ZESTRIL) tablet 10 mg, 10 mg, Oral, Q24H, Nabor Torres MD, 10 mg at 22 0851  •  LORazepam (ATIVAN) tablet 0.5 mg, 0.5 mg, Oral, Daily PRN, Nabor Torres MD  •  melatonin tablet 5 mg, 5 mg, Oral, Nightly PRN, Corey, Lauren F, PA  •  nitroglycerin (NITROSTAT) SL tablet 0.4 mg, 0.4 mg, Sublingual, Q5 Min PRN, Lauren Corey PA  •  ondansetron (ZOFRAN) tablet 4 mg, 4 mg, Oral, Q6H PRN **OR** ondansetron (ZOFRAN) injection 4 mg, 4 mg, Intravenous, Q6H PRN, Lauren Corey PA  •  pantoprazole  (PROTONIX) EC tablet 40 mg, 40 mg, Oral, QAM, Nabor Torres MD, 40 mg at 11/16/22 0849  •  rosuvastatin (CRESTOR) tablet 10 mg, 10 mg, Oral, Nightly, Nabor Torres MD, 10 mg at 11/15/22 2014  •  [COMPLETED] Insert Peripheral IV, , , Once **AND** sodium chloride 0.9 % flush 10 mL, 10 mL, Intravenous, PRN, Ovi Hernandez MD  •  sodium chloride 0.9 % flush 10 mL, 10 mL, Intravenous, Q12H, Lauren Corey PA, 10 mL at 11/16/22 0851  •  sodium chloride 0.9 % flush 10 mL, 10 mL, Intravenous, PRN, Lauren Corey PA    Current Outpatient Medications:   •  aspirin 81 MG chewable tablet, Chew 81 mg Daily., Disp: , Rfl:   •  atenolol (TENORMIN) 25 MG tablet, Take 1 tablet by mouth 2 (Two) Times a Day., Disp: 180 tablet, Rfl: 3  •  benazepril (LOTENSIN) 20 MG tablet, Take 1 tablet by mouth 2 (Two) Times a Day. Call if blood pressure running above 160/90., Disp: 180 tablet, Rfl: 3  •  [START ON 11/17/2022] clopidogrel (PLAVIX) 75 MG tablet, Take 1 tablet by mouth Daily for 90 days., Disp: 30 tablet, Rfl: 2  •  ezetimibe (ZETIA) 10 MG tablet, Take 1 tablet by mouth Daily., Disp: 90 tablet, Rfl: 3  •  pantoprazole (PROTONIX) 40 MG EC tablet, TAKE ONE TABLET BY MOUTH DAILY, Disp: 90 tablet, Rfl: 1  •  rosuvastatin (CRESTOR) 10 MG tablet, TAKE 1 TABLET BY MOUTH EVERY NIGHT, Disp: 90 tablet, Rfl: 3  •  LORazepam (ATIVAN) 0.5 MG tablet, Take 1 tablet by mouth Daily As Needed for Anxiety. for anxiety, Disp: 30 tablet, Rfl: 0    PRN meds  •  acetaminophen  •  LORazepam  •  melatonin  •  nitroglycerin  •  ondansetron **OR** ondansetron  •  [COMPLETED] Insert Peripheral IV **AND** sodium chloride  •  sodium chloride    No current facility-administered medications on file prior to encounter.     Current Outpatient Medications on File Prior to Encounter   Medication Sig   • aspirin 81 MG chewable tablet Chew 81 mg Daily.   • atenolol (TENORMIN) 25 MG tablet Take 1 tablet by mouth 2 (Two) Times a Day.   • benazepril  (LOTENSIN) 20 MG tablet Take 1 tablet by mouth 2 (Two) Times a Day. Call if blood pressure running above 160/90.   • ezetimibe (ZETIA) 10 MG tablet Take 1 tablet by mouth Daily.   • pantoprazole (PROTONIX) 40 MG EC tablet TAKE ONE TABLET BY MOUTH DAILY   • rosuvastatin (CRESTOR) 10 MG tablet TAKE 1 TABLET BY MOUTH EVERY NIGHT   • LORazepam (ATIVAN) 0.5 MG tablet Take 1 tablet by mouth Daily As Needed for Anxiety. for anxiety       General appearance: Elderly male, pleasant, NAD, alert and cooperative, well groomed  HEENT: Normocephalic, atraumatic, no masses or tenderness  Resp: Even and unlabored  Extremities: No obvious edema  Skin: warm, dry    Neurological:   MS: oriented x3, recent/remote memory intact, normal attention/concentration, language intact, no neglect, normal fund of knowledge  CN: visual acuity grossly normal, visual fields full, EOMI, facial sensation equal, no facial droop, [place] bilaterally, palate elevates symmetrically, tongue midline  Motor: 5/5 in all 4 ext., normal tone  Sensory: Decreased vibratory sensation of lower extremities  Coordination: Normal finger to nose test  Gait and station: Deferred  Rapid alternating movements: normal finger to thumb tap    Laboratory results:  Lab Results   Component Value Date    TSH 2.190 11/15/2022     Lab Results   Component Value Date    HGBA1C 6.00 (H) 11/15/2022     No results found for: [id number]  Lab Results   Component Value Date    CHOL 132 11/15/2022    CHOL 140 06/05/2022    CHOL 153 07/31/2019    CHLPL 140 10/17/2022    CHLPL 156 02/07/2022    CHLPL 138 03/10/2021     Lab Results   Component Value Date    TRIG 146 11/15/2022    TRIG 161 (H) 10/17/2022    TRIG 210 (H) 06/05/2022     Lab Results   Component Value Date    HDL 41 11/15/2022    HDL 41 10/17/2022    HDL 37 (L) 06/05/2022     Lab Results   Component Value Date    LDL 66 11/15/2022    LDL 71 10/17/2022    LDL 68 06/05/2022     Lab Results   Component Value Date    WBC 4.54 11/16/2022     HGB 13.6 11/16/2022    HCT 41.1 11/16/2022    MCV 93.6 11/16/2022     11/16/2022     Lab Results   Component Value Date    GLUCOSE 83 11/16/2022    BUN 18 11/16/2022    CREATININE 0.82 11/16/2022    EGFRIFNONA 71 02/07/2022    EGFRIFAFRI 82 02/07/2022    BCR 22.0 11/16/2022    K 4.1 11/16/2022    CO2 24.0 11/16/2022    CALCIUM 9.0 11/16/2022    PROTENTOTREF 6.6 10/17/2022    ALBUMIN 3.90 11/15/2022    LABIL2 2.0 10/17/2022    AST 24 11/15/2022    ALT 14 11/15/2022     Lab Results   Component Value Date    PTT 35.3 06/05/2022     Lab Results   Component Value Date    INR 1.09 11/15/2022    INR 1.08 06/05/2022    INR 1.09 05/13/2021    PROTIME 14.2 11/15/2022    PROTIME 13.9 06/05/2022    PROTIME 13.9 05/13/2021     Brief Urine Lab Results     None          Review and interpretation of imaging:  CT Head Without Contrast    Result Date: 11/15/2022  1. No acute process identified.  Radiation dose reduction techniques were utilized, including automated exposure control and exposure modulation based on body size.  This report was finalized on 11/15/2022 1:03 PM by Dr. Quinten Willard M.D.      CT Angiogram Neck    Result Date: 11/15/2022  1. No hemodynamically significant focal stenosis, aneurysm, or dissection in the cervical carotid or vertebral arteries or in the arteries at the base of the brain.  2. No acute intracranial hemorrhage or hydrocephalus. No enhancing lesion in brain. If there is further clinical concern, MRI could be considered for further evaluation.  This report was finalized on 11/15/2022 4:16 PM by Dr. Selvin Brown M.D.      MRI Brain Without Contrast    Result Date: 11/15/2022  No acute infarct. Mild to moderate periventricular white matter chronic small vessel ischemic change.   This report was finalized on 11/15/2022 7:10 PM by Dr. Selvin A. Kevin, M.D.      CT Angiogram Head    Result Date: 11/15/2022  1. No hemodynamically significant focal stenosis, aneurysm, or dissection in  the cervical carotid or vertebral arteries or in the arteries at the base of the brain.  2. No acute intracranial hemorrhage or hydrocephalus. No enhancing lesion in brain. If there is further clinical concern, MRI could be considered for further evaluation.  This report was finalized on 11/15/2022 4:16 PM by Dr. Selvin Brown M.D.      Results for orders placed during the hospital encounter of 11/15/22    Adult Transthoracic Echo Complete W/ Cont if Necessary Per Protocol    Interpretation Summary  •  Left ventricular systolic function is normal. Left ventricular ejection fraction appears to be 56 - 60%.  •  Left ventricular wall thickness is consistent with moderate concentric hypertrophy.  •  Left ventricular diastolic function is consistent with (grade Ia w/high LAP) impaired relaxation.  •  The right ventricular cavity is mildly dilated.  •  Saline test results are negative.  •  Moderate aortic valve stenosis is present. Aortic valve area is 0.6 cm2.  Very low stroke-volume index.  Cannot exclude paradoxical low gradient severe aortic stenosis.  •  Peak velocity of the flow distal to the aortic valve is 284.7 cm/s. Aortic valve mean pressure gradient is 20 mmHg. Aortic valve dimensionless index is 0.2 .  •  Estimated right ventricular systolic pressure from tricuspid regurgitation is normal (<35 mmHg).  •  Mild dilation of the ascending aorta is present.  3.9 cm.      Impression/Assessment:  This is a 89-year-old male with past medical history of anxiety, ocular migraine, CAD, GERD, hyperlipidemia, hypertension, recent diagnosis of macular degeneration who presented to the hospital on 11/15/2022 with complaints of developing acute onset of unprovoked room spinning vertigo followed by left arm weakness.  He states he attempted to stand up from the breakfast table once he noticed that the vertigo started and he was unable to walk and his left arm felt very heavy.  The symptoms lasted around 10 to 15 minutes  and then resolved.    BP on arrival 163/91, HR 58.  EKG with predominantly NSR.  Temp 97.6.  BS 83 his initial noncontrast CT head did not reveal any acute intracranial abnormalities MRI brain without any evidence of acute stroke.  Mild to moderate small vessel disease noted.  CTA H/N with no evidence of significant stenosis, LVO, dissection, or aneurysm.    Diagnosis:  Transient ischemic attack, suspect posterior circulation  Essential hypertension  Intermittent palpitations    Additional work up to date:  Labs: A1c 6.00, free T4 1.15  2D echo: EF 56 to 60%, all LV wall segments contract normally, LA cavity moderately dilated, saline test negative, moderate AV stenosis present.  Trace MVR.    Plan:  MRI and vessel imaging unremarkable.  His echo revealed a moderately dilated LA cavity size, suspicious for A. fib.  Given his complaints of intermittent palpitations will discharge him home with a Zio patch to look for evidence of paroxysmal A. fib/flutter.  He was on ASA 81 mg PTA therefore we will treat him with DAPT and load him with 300 mg of Plavix today and continue him on Plavix 75 mg with ASA 81 mg x 21 days and then Plavix 75 mg monotherapy.  He should continue his home dose Crestor 10 mg  Neurochecks per stroke protocol  Normalize BP, goal BP <130/80  Stroke Education  KORI/SCDs  PT/OT/ST  I will arrange follow-up with me in 3 months for stroke follow-up.  Therapies as written. CCP for discharge planning. Call RRT for any acute neurological changes. We will sign off, will see again per request.    Case discussed with patient, 2 family members at bedside, EDITH Cristobal, and Dr. Santana, and he agrees with plan above.     GE Jacobo

## 2022-11-16 NOTE — CASE MANAGEMENT/SOCIAL WORK
Discharge Planning Assessment  [facility]     Patient Name: Vicente Delgado  MRN: [id number]  Today's Date: 11/16/2022    Admit Date: 11/15/2022    Plan: Plans to return to patio home at d/c and daughter Sandra will transport-JENNIFER Denson MSN, RN   Discharge Needs Assessment     Row Name 11/16/22 0928       Living Environment    People in Home alone    Current Living Arrangements other (see comments)  patio home    Primary Care Provided by self    Provides Primary Care For no one    Family Caregiver if Needed child(tracie), adult    Family Caregiver Names Sandra Thornton, daughter/POA    Quality of Family Relationships supportive    Able to Return to Prior Arrangements yes       Resource/Environmental Concerns    Resource/Environmental Concerns none       Transition Planning    Patient/Family Anticipates Transition to home    Patient/Family Anticipated Services at Transition none    Transportation Anticipated family or friend will provide       Discharge Needs Assessment    Equipment Currently Used at Home bp cuff    Concerns to be Addressed no discharge needs identified    Anticipated Changes Related to Illness none    Equipment Needed After Discharge none    Provided Post Acute Provider List? N/A    Provided Post Acute Provider Quality & Resource List? N/A               Discharge Plan     Row Name 11/16/22 0953       Plan    Plan Comments Prior to screen, introduced self and explained role of CCP. Info on facesheet verified. PPE used-JENNIFER Denson MSN, RN    Row Name 11/16/22 4477       Plan    Plan Plans to return to patio home at d/c and daughter Sandra will transport-JENNIFER Denson MSN, RN    Provided Post Acute Provider List? N/A    Provided Post Acute Provider Quality & Resource List? N/A    Plan Comments Lives alone in patio home and plans to return at d/c. Daughter Sandra guillermo transport. Independent w/ ADLs. No assistive devices used. Has B/P machine at home. denies any d/c needs or difficulty paying for meds-JENNIFER Denson  MSN, RN              Continued Care and Services - Admitted Since 11/15/2022    Coordination has not been started for this encounter.          Demographic Summary     Row Name 11/16/22 0926       General Information    Admission Type observation    Arrived From emergency department    Required Notices Provided Observation Status Notice    Referral Source admission list    Reason for Consult discharge planning    Preferred Language English               Functional Status     Row Name 11/16/22 0927       Functional Status    Usual Activity Tolerance good    Current Activity Tolerance good       Functional Status, IADL    Medications independent    Meal Preparation independent       Mental Status    General Appearance WDL WDL       Mental Status Summary    Recent Changes in Mental Status/Cognitive Functioning no changes               Psychosocial    No documentation.                Abuse/Neglect     Row Name 11/16/22 0927       Personal Safety    Feels Unsafe at Home or Work/School no    Feels Threatened by Someone no    Does Anyone Try to Keep You From Having Contact with Others or Doing Things Outside Your Home? no    Physical Signs of Abuse Present no               Legal    No documentation.                Substance Abuse    No documentation.                Patient Forms    No documentation.                   Carol Denson RN

## 2022-11-16 NOTE — DISCHARGE INSTRUCTIONS
You are being discharged with a heart monitor, follow directions for wearing and returning.  You will be discharged on Plavix daily.  Take aspirin and Plavix daily for 3 weeks and then stop aspirin and take only Plavix.  Follow-up with your primary care provider.    Return to the emergency department with worsening symptoms, uncontrolled pain, inability to tolerate oral liquids, fever greater than 101°F not controlled by Tylenol or as needed with emergent concerns.

## 2022-11-16 NOTE — OUTREACH NOTE
Prep Survey    Flowsheet Row Responses   [facility] patient discharged from? [place]   Is LACE score < 7 ? Yes   Emergency Room discharge w/ pulse ox? No   Eligibility [facility]   Date of Admission 11/15/22   Date of Discharge 11/16/22   Discharge Disposition Home or Self Care   Discharge diagnosis Transient ischemic attack   Does the patient have one of the following disease processes/diagnoses(primary or secondary)? Stroke   Does the patient have Home health ordered? No   Is there a DME ordered? No   Prep survey completed? Yes          CARLOS GARCIA - Registered Nurse

## 2022-11-16 NOTE — PROGRESS NOTES
MD ATTESTATION NOTE    The JESS and I have discussed this patient's history, physical exam, and treatment plan.  I have reviewed the documentation and personally had a face to face interaction with the patient. I affirm the documentation and agree with the treatment and plan.  The attached note describes my personal findings.      I provided a substantive portion of the care of the patient.  I personally performed the physical exam in its entirety, and below are my findings.  For this patient encounter, the patient wore surgical mask, I wore full protective PPE including N95 and eye protection.      Brief HPI: This patient is a 89-year-old male admitted to the observation unit yesterday for evaluation of dizziness with associated left arm weakness.  He describes the dizziness as a spinning sensation.  He currently reports that the symptoms have drastically improved and he only has mild symptoms with head movement only.  He denies any associated nausea/vomiting, headache, slurred speech, or extremity weakness or numbness.    PHYSICAL EXAM  ED Triage Vitals   Temp Heart Rate Resp BP SpO2   11/15/22 1055 11/15/22 1055 11/15/22 1055 11/15/22 1117 11/15/22 1055   97.6 °F (36.4 °C) 58 16 163/91 98 %      Temp src Heart Rate Source Patient Position BP Location FiO2 (%)   11/15/22 1055 11/15/22 1055 11/15/22 1441 11/15/22 1441 --   Tympanic Monitor Lying Right arm          GENERAL: Resting comfortably and in no acute distress, nontoxic in appearance  HENT: nares patent  EYES: no scleral icterus  CV: regular rhythm, normal rate, no M/R/G  RESPIRATORY: normal effort, lungs clear bilaterally  ABDOMEN: soft, nontender, no rebound or guarding  MUSCULOSKELETAL: no deformity, no edema  NEURO: alert, moves all extremities, follows commands  PSYCH:  calm, cooperative  SKIN: warm, dry    Vital signs and nursing notes reviewed.        Plan: The patient's CTAs as well as MRI are negative for acute ischemic changes.  We are waiting  echocardiogram results and we will asked neurology to see in consultation.  Will monitor closely and reassess following.

## 2022-11-16 NOTE — PLAN OF CARE
Goal Outcome Evaluation:  Plan of Care Reviewed With: patient           Outcome Evaluation: Patient is a 89 y.o male who presents to [facility] with reports of numbness in L UE, dizziness, and a fall hitting his head. Patient reports he fell when he was at water aerobics when he slipped on the wet floor. Patient AOx4 supine in bed upon arrival. Patient reports he lives alone, is independent at baseline, and does not use an AD for ambulation. Patient reports he is very active and rides a bike or swims multiple timers per week. Today patient completed all bed mobility Mod(I). Patient ambulated 150ft in hallway with supervision. Gait appeard normal with no deviations or LOB noted. Patient appears at baseline function at this time. Acute PT to sign off.

## 2022-11-16 NOTE — PLAN OF CARE
Goal Outcome Evaluation:               Patient has been calm and peaceful in his room overnight, he has not slept but maybe an hour or 2 at most. Vss. Will continue to monitor for any changes

## 2022-11-16 NOTE — CASE MANAGEMENT/SOCIAL WORK
Discharge Planning Assessment  [facility]     Patient Name: Vicente Delgado  MRN: [id number]  Today's Date: 11/16/2022    Admit Date: 11/15/2022    Plan: Plans to return to patio home at d/c and daughter Sandra will transport-JENNIFER Denson MSN, RN   Discharge Needs Assessment     Row Name 11/16/22 0928       Living Environment    People in Home alone    Current Living Arrangements other (see comments)  patio home    Primary Care Provided by self    Provides Primary Care For no one    Family Caregiver if Needed child(tracie), adult    Family Caregiver Names Sandra Thornton, daughter/POA    Quality of Family Relationships supportive    Able to Return to Prior Arrangements yes       Resource/Environmental Concerns    Resource/Environmental Concerns none       Transition Planning    Patient/Family Anticipates Transition to home    Patient/Family Anticipated Services at Transition none    Transportation Anticipated family or friend will provide       Discharge Needs Assessment    Equipment Currently Used at Home bp cuff    Concerns to be Addressed no discharge needs identified    Anticipated Changes Related to Illness none    Equipment Needed After Discharge none    Provided Post Acute Provider List? N/A    Provided Post Acute Provider Quality & Resource List? N/A               Discharge Plan     Row Name 11/16/22 0929       Plan    Plan Plans to return to patio home at d/c and dc Flores will transport-JENNIFER Denson MSN, RN    Provided Post Acute Provider List? N/A    Provided Post Acute Provider Quality & Resource List? N/A    Plan Comments Lives alone in patio home and plans to return at d/c. Daughter Sandra guillermo transport. Independent w/ ADLs. No assistive devices used. Has B/P machine at home. denies any d/c needs or difficulty paying for meds-JENNIFER Denson MSN, RN              Continued Care and Services - Admitted Since 11/15/2022    Coordination has not been started for this encounter.          Demographic Summary     Row  Name 11/16/22 0926       General Information    Admission Type observation    Arrived From emergency department    Required Notices Provided Observation Status Notice    Referral Source admission list    Reason for Consult discharge planning    Preferred Language English               Functional Status     Row Name 11/16/22 0927       Functional Status    Usual Activity Tolerance good    Current Activity Tolerance good       Functional Status, IADL    Medications independent    Meal Preparation independent       Mental Status    General Appearance WDL WDL       Mental Status Summary    Recent Changes in Mental Status/Cognitive Functioning no changes               Psychosocial    No documentation.                Abuse/Neglect     Row Name 11/16/22 0927       Personal Safety    Feels Unsafe at Home or Work/School no    Feels Threatened by Someone no    Does Anyone Try to Keep You From Having Contact with Others or Doing Things Outside Your Home? no    Physical Signs of Abuse Present no               Legal    No documentation.                Substance Abuse    No documentation.                Patient Forms    No documentation.                   Carol Denson RN

## 2022-11-17 ENCOUNTER — TRANSITIONAL CARE MANAGEMENT TELEPHONE ENCOUNTER (OUTPATIENT)
Dept: CALL CENTER | Facility: HOSPITAL | Age: 87
End: 2022-11-17

## 2022-11-17 NOTE — OUTREACH NOTE
Call Center TCM Note    Flowsheet Row Responses   [facility] patient discharged from? [place]   Does the patient have one of the following disease processes/diagnoses(primary or secondary)? Stroke   TCM attempt successful? No   Unsuccessful attempts Attempt 2          Yoly Duagherty RN    11/17/2022, 15:38 EST

## 2022-11-17 NOTE — OUTREACH NOTE
Call Center TCM Note    Flowsheet Row Responses   [facility] patient discharged from? [place]   Does the patient have one of the following disease processes/diagnoses(primary or secondary)? Stroke   TCM attempt successful? No   Unsuccessful attempts Attempt 1  [no recent verbal release]          Yoly Daugherty RN    11/17/2022, 13:04 EST

## 2022-11-18 ENCOUNTER — TRANSITIONAL CARE MANAGEMENT TELEPHONE ENCOUNTER (OUTPATIENT)
Dept: CALL CENTER | Facility: HOSPITAL | Age: 87
End: 2022-11-18

## 2022-11-18 NOTE — OUTREACH NOTE
Call Center TCM Note    Flowsheet Row Responses   [facility] patient discharged from? [place]   Does the patient have one of the following disease processes/diagnoses(primary or secondary)? Stroke   TCM attempt successful? No   Unsuccessful attempts Attempt 3          Rodolfo Lin RN    11/18/2022, 09:11 EST

## 2022-11-21 DIAGNOSIS — I10 PRIMARY HYPERTENSION: Chronic | ICD-10-CM

## 2022-11-21 RX ORDER — ATENOLOL 25 MG/1
TABLET ORAL
Qty: 180 TABLET | Refills: 1 | Status: SHIPPED | OUTPATIENT
Start: 2022-11-21 | End: 2022-12-22 | Stop reason: HOSPADM

## 2022-11-21 NOTE — TELEPHONE ENCOUNTER
Rx Refill Note  Requested Prescriptions     Pending Prescriptions Disp Refills   • atenolol (TENORMIN) 25 MG tablet [Pharmacy Med Name: ATENOLOL 25MG TABLETS] 180 tablet 3     Sig: TAKE 1 TABLET BY MOUTH TWICE DAILY      Last office visit with prescribing clinician: 11/9/2022      Next office visit with prescribing clinician: 4/17/2023            Aby Fish  11/21/22, 12:49 EST

## 2022-11-24 PROBLEM — I74.3 THROMBOSIS OF ARTERY IN LOWER EXTREMITY (HCC): Chronic | Status: ACTIVE | Noted: 2021-11-26

## 2022-11-25 NOTE — ASSESSMENT & PLAN NOTE
History of mild disease.  Stress test 2017 with no ischemia.  He is on aspirin statin and Zetia.  No angina pectoris. EF normal 1/2017 echo.  He is managed on statin therapy, asa and beta blocker.

## 2022-11-25 NOTE — ASSESSMENT & PLAN NOTE
Hypertension is unchanged.  Continue current treatment regimen.  Dietary sodium restriction.  Blood pressure will be reassessed at the next regular appointment.  He will continue benazepril and atenolol which he is tolerating well.

## 2022-12-05 ENCOUNTER — TELEPHONE (OUTPATIENT)
Dept: INTERNAL MEDICINE | Facility: CLINIC | Age: 87
End: 2022-12-05

## 2022-12-05 NOTE — TELEPHONE ENCOUNTER
Dr Little's office called --    Patient had a TIA two weeks ago and told this office he wasn't sure if you were aware of it or not. Patient is bringing surgical clearance and Ely wanted us to know it was regarding patient's use of Plavix.     Can contact regarding this:  Ely Poe and [facility]   [phone], ask to speak to Reza

## 2022-12-05 NOTE — TELEPHONE ENCOUNTER
Will I review the surgical clearance and additional history with patient at his 12/8 appointment. Thanks.

## 2022-12-07 LAB
MAXIMAL PREDICTED HEART RATE: 126 BPM
STRESS TARGET HR: 107 BPM

## 2022-12-07 PROCEDURE — 93248 EXT ECG>7D<15D REV&INTERPJ: CPT | Performed by: INTERNAL MEDICINE

## 2022-12-08 ENCOUNTER — OFFICE VISIT (OUTPATIENT)
Dept: INTERNAL MEDICINE | Facility: CLINIC | Age: 87
End: 2022-12-08

## 2022-12-08 VITALS
TEMPERATURE: 97.7 F | WEIGHT: 172.2 LBS | BODY MASS INDEX: 27.03 KG/M2 | SYSTOLIC BLOOD PRESSURE: 112 MMHG | HEIGHT: 67 IN | HEART RATE: 59 BPM | DIASTOLIC BLOOD PRESSURE: 60 MMHG | OXYGEN SATURATION: 99 %

## 2022-12-08 DIAGNOSIS — G45.9 TIA (TRANSIENT ISCHEMIC ATTACK): Primary | ICD-10-CM

## 2022-12-08 DIAGNOSIS — G56.02 CARPAL TUNNEL SYNDROME OF LEFT WRIST: Chronic | ICD-10-CM

## 2022-12-08 DIAGNOSIS — F41.9 ANXIETY: Chronic | ICD-10-CM

## 2022-12-08 PROCEDURE — 99214 OFFICE O/P EST MOD 30 MIN: CPT | Performed by: NURSE PRACTITIONER

## 2022-12-08 NOTE — PROGRESS NOTES
"Chief Complaint  Dizziness, Transient Ischemic Attack, and Hospital Follow Up Visit  Subjective        Vicente Delgado presents to [facility]  History of Present Illness   The patient is accompanied by his daughter.    The patient was admitted to the hospital 11/15 due to feeling dizzy with left arm numbness and weakness.He attempted to stand up from the breakfast tablet and was unable to walk with left arm heaviness. The symptoms lasted florina 10-15 minutes and then resolved.     His MRI of brain did not show any evidence of an acute stroke. Mild to moderate small vessel disease noted. CTA head and neck without evidence of significant stenosis. He has completed wearing a Zio patch which was placed at discharge. His echo showed a moderated dilated LA cavity size.     Neurology recommended Plavix 75 mg with ASA 81 mg x21 days and then Plavix 75 mg monotherapy.    He reports feeling well overall, still with increased anxiety. He will be moving to assisted living in January.    He does c/o increased tingling and discomfort in his right wrist/hand, planning for CTS surgery in February with Dr. Marquez. He received steroid injections on Monday which was helpful      Objective   Vital Signs:  /60 (BP Location: Left arm, Patient Position: Sitting, Cuff Size: Adult)   Pulse 59   Temp 97.7 °F (36.5 °C) (Temporal)   Ht 170.2 cm (67\")   Wt 78.1 kg (172 lb 3.2 oz)   SpO2 99%   BMI 26.97 kg/m²   Estimated body mass index is 26.97 kg/m² as calculated from the following:    Height as of this encounter: 170.2 cm (67\").    Weight as of this encounter: 78.1 kg (172 lb 3.2 oz).    BMI is >= 25 and <30. (Overweight) The following options were offered after discussion;: nutrition counseling/recommendations      Physical Exam  Constitutional:       Appearance: He is well-developed. He is not ill-appearing.   HENT:      Head: Normocephalic.      Right Ear: Decreased hearing noted.      Left Ear: " Decreased hearing noted.      Nose: Nose normal. No nasal deformity, mucosal edema or rhinorrhea.      Right Sinus: No maxillary sinus tenderness or frontal sinus tenderness.      Left Sinus: No maxillary sinus tenderness or frontal sinus tenderness.      Mouth/Throat:      Dentition: Normal dentition.   Eyes:      General: Lids are normal.         Right eye: No discharge.         Left eye: No discharge.      Conjunctiva/sclera: Conjunctivae normal.      Right eye: No exudate.     Left eye: No exudate.  Neck:      Thyroid: No thyroid mass or thyromegaly.      Vascular: No carotid bruit.      Trachea: Trachea normal.   Cardiovascular:      Rate and Rhythm: Regular rhythm.      Pulses: Normal pulses.      Heart sounds: Murmur heard.    Systolic murmur is present with a grade of 2/6.  Pulmonary:      Effort: No respiratory distress.      Breath sounds: Normal breath sounds. No decreased breath sounds, wheezing, rhonchi or rales.   Abdominal:      General: Bowel sounds are normal.      Palpations: Abdomen is soft.      Tenderness: There is no abdominal tenderness.   Musculoskeletal:      Cervical back: Normal range of motion. No edema.   Lymphadenopathy:      Head:      Right side of head: No submental, submandibular, tonsillar, preauricular, posterior auricular or occipital adenopathy.      Left side of head: No submental, submandibular, tonsillar, preauricular, posterior auricular or occipital adenopathy.   Skin:     General: Skin is warm and dry.      Nails: There is no clubbing.   Neurological:      Mental Status: He is alert.   Psychiatric:         Behavior: Behavior is cooperative.        Result Review :  The following data was reviewed by: GE Bustos on 12/08/2022:  Common labs    Common Labs 10/17/22 10/17/22 10/17/22 11/15/22 11/15/22 11/15/22 11/15/22 11/16/22 11/16/22    0837 0837 0837 1122 1122 1122 1122 0405 0405   Glucose  83   83    83   BUN  16   20    18   Creatinine  0.91   0.86    0.82    Sodium  140   137    136   Potassium  4.7   4.6    4.1   Chloride  102   101    102   Calcium  9.4   9.2    9.0   Total Protein  6.6          Albumin  4.40   3.90       Total Bilirubin  0.6   0.5       Alkaline Phosphatase  58   68       AST (SGOT)  26   24       ALT (SGPT)  17   14       WBC 6.23   5.93    4.54    Hemoglobin 14.7   14.3    13.6    Hematocrit 43.9   41.0    41.1    Platelets 201   202    187    Total Cholesterol      132      Total Cholesterol   140         Triglycerides   161 (A)   146      HDL Cholesterol   41   41      LDL Cholesterol    71   66      Hemoglobin A1C       6.00 (A)     (A) Abnormal value       Comments are available for some flowsheets but are not being displayed.           Data reviewed: Cardiology studies Zio patch 11/16/22 and Recent hospitalization notes 11/15/2022            Assessment and Plan   Diagnoses and all orders for this visit:    1. TIA (transient ischemic attack) (Primary)  Assessment & Plan:  MRI of brain did not show any evidence of an acute stroke. Mild to moderate small vessel disease noted. CTA head and neck without evidence of significant stenosis. He has completed wearing a Zio patch which was placed at discharge (benign study). His echo showed a moderated dilated LA cavity size.       He will take Plavix and aspirin for 21 days and then just Plavix. He will follow up with his neurologist.      2. Carpal tunnel syndrome of left wrist  Assessment & Plan:  He is planning left carpal tunnel released 2/8/23 with Dr. Marquez.    Will completed surgical clearance pending normal labs.      3. Anxiety  Assessment & Plan:  He has had significant anxiety over past several months, his daughters are no longer staying with him (have returned home) and he will be moving into an assisted living in January.    Current outpatient and discharge medications have been reconciled for the patient.  Reviewed by: GE Munguia       Follow Up   Return if symptoms worsen or  fail to improve, for Next scheduled follow up.  Patient was given instructions and counseling regarding his condition or for health maintenance advice. Please see specific information pulled into the AVS if appropriate.     Transcribed from ambient dictation for GE Bustos by Ely Palumbo.  12/08/22   16:09 EST    Patient or patient representative verbalized consent to the visit recording.  I have personally performed the services described in this document as transcribed by the above individual, and it is both accurate and complete.

## 2022-12-10 NOTE — ASSESSMENT & PLAN NOTE
He is planning left carpal tunnel released 2/8/23 with Dr. Marquez.    Will completed surgical clearance pending normal labs.

## 2022-12-10 NOTE — ASSESSMENT & PLAN NOTE
He has had significant anxiety over past several months, his daughters are no longer staying with him (have returned home) and he will be moving into an assisted living in January.

## 2022-12-10 NOTE — ASSESSMENT & PLAN NOTE
MRI of brain did not show any evidence of an acute stroke. Mild to moderate small vessel disease noted. CTA head and neck without evidence of significant stenosis. He has completed wearing a Zio patch which was placed at discharge (benign study). His echo showed a moderated dilated LA cavity size.       He will take Plavix and aspirin for 21 days and then just Plavix. He will follow up with his neurologist.

## 2022-12-15 ENCOUNTER — TELEPHONE (OUTPATIENT)
Dept: NEUROLOGY | Facility: CLINIC | Age: 87
End: 2022-12-15

## 2022-12-15 ENCOUNTER — TELEPHONE (OUTPATIENT)
Dept: INTERNAL MEDICINE | Facility: CLINIC | Age: 87
End: 2022-12-15

## 2022-12-15 NOTE — TELEPHONE ENCOUNTER
Caller: KLEINERT KUTZ    Relationship: Other    Best call back number: [phone]: JARAD    What was the call regarding: PLEASE CALL TO DISCUSS THE PATIENTS PLAVIX MEDICATION. THEY NEED TO FIGURE OUT WHO IS MANAGING THIS MEDICATION AND THE NEXT STEPS FOR THE PATIENT.     Do you require a callback: YES

## 2022-12-15 NOTE — TELEPHONE ENCOUNTER
----- Message from GE Kelly sent at 12/12/2022  9:44 AM EST -----  No evidence of A. fib or a flutter.  Predominant rhythm was NSR.  4 beat run of SVT and second-degree AV block was noted.  He can follow-up with his PCP or cardiologist about the above abnormal rhythms.  I will see him in 3 months for stroke follow-up.

## 2022-12-16 NOTE — TELEPHONE ENCOUNTER
Per Roma Odom, GE:    He needs to see me in follow up before he undergoes that surgery. His 3 months would be after 2/16. He will need to push it back.     Roma     Called patient to discuss.  Left message to call back.  Also sending message via [email or web address].

## 2022-12-21 ENCOUNTER — APPOINTMENT (OUTPATIENT)
Dept: GENERAL RADIOLOGY | Facility: HOSPITAL | Age: 87
End: 2022-12-21

## 2022-12-21 ENCOUNTER — HOSPITAL ENCOUNTER (OUTPATIENT)
Facility: HOSPITAL | Age: 87
Setting detail: OBSERVATION
Discharge: HOME | End: 2022-12-22
Attending: EMERGENCY MEDICINE | Admitting: HOSPITALIST

## 2022-12-21 DIAGNOSIS — R00.1 BRADYCARDIA: ICD-10-CM

## 2022-12-21 DIAGNOSIS — R42 DIZZY: ICD-10-CM

## 2022-12-21 DIAGNOSIS — I10 PRIMARY HYPERTENSION: Chronic | ICD-10-CM

## 2022-12-21 DIAGNOSIS — I48.91 NEW ONSET A-FIB: Primary | ICD-10-CM

## 2022-12-21 LAB
ALBUMIN SERPL-MCNC: 4 G/DL (ref 3.5–5.2)
ALBUMIN/GLOB SERPL: 1.3 G/DL
ALP SERPL-CCNC: 59 U/L (ref 39–117)
ALT SERPL W P-5'-P-CCNC: 17 U/L (ref 1–41)
ANION GAP SERPL CALCULATED.3IONS-SCNC: 8.7 MMOL/L (ref 5–15)
APTT PPP: 35.3 SECONDS (ref 22.7–35.4)
APTT PPP: 74.8 SECONDS (ref 22.7–35.4)
APTT PPP: 80.4 SECONDS (ref 22.7–35.4)
AST SERPL-CCNC: 25 U/L (ref 1–40)
BASOPHILS # BLD AUTO: 0.02 10*3/MM3 (ref 0–0.2)
BASOPHILS # BLD AUTO: 0.03 10*3/MM3 (ref 0–0.2)
BASOPHILS NFR BLD AUTO: 0.3 % (ref 0–1.5)
BASOPHILS NFR BLD AUTO: 0.4 % (ref 0–1.5)
BILIRUB SERPL-MCNC: 0.3 MG/DL (ref 0–1.2)
BUN SERPL-MCNC: 18 MG/DL (ref 8–23)
BUN/CREAT SERPL: 20.2 (ref 7–25)
CALCIUM SPEC-SCNC: 9.2 MG/DL (ref 8.2–9.6)
CHLORIDE SERPL-SCNC: 99 MMOL/L (ref 98–107)
CO2 SERPL-SCNC: 26.3 MMOL/L (ref 22–29)
CREAT SERPL-MCNC: 0.89 MG/DL (ref 0.76–1.27)
DEPRECATED RDW RBC AUTO: 40.7 FL (ref 37–54)
DEPRECATED RDW RBC AUTO: 43.3 FL (ref 37–54)
EGFRCR SERPLBLD CKD-EPI 2021: 79.4 ML/MIN/1.73
EOSINOPHIL # BLD AUTO: 0.06 10*3/MM3 (ref 0–0.4)
EOSINOPHIL # BLD AUTO: 0.08 10*3/MM3 (ref 0–0.4)
EOSINOPHIL NFR BLD AUTO: 0.9 % (ref 0.3–6.2)
EOSINOPHIL NFR BLD AUTO: 1.2 % (ref 0.3–6.2)
ERYTHROCYTE [DISTWIDTH] IN BLOOD BY AUTOMATED COUNT: 12.1 % (ref 12.3–15.4)
ERYTHROCYTE [DISTWIDTH] IN BLOOD BY AUTOMATED COUNT: 12.5 % (ref 12.3–15.4)
GLOBULIN UR ELPH-MCNC: 3 GM/DL
GLUCOSE SERPL-MCNC: 92 MG/DL (ref 65–99)
HCT VFR BLD AUTO: 39.2 % (ref 37.5–51)
HCT VFR BLD AUTO: 41.8 % (ref 37.5–51)
HGB BLD-MCNC: 13 G/DL (ref 13–17.7)
HGB BLD-MCNC: 14.1 G/DL (ref 13–17.7)
IMM GRANULOCYTES # BLD AUTO: 0.02 10*3/MM3 (ref 0–0.05)
IMM GRANULOCYTES # BLD AUTO: 0.02 10*3/MM3 (ref 0–0.05)
IMM GRANULOCYTES NFR BLD AUTO: 0.3 % (ref 0–0.5)
IMM GRANULOCYTES NFR BLD AUTO: 0.3 % (ref 0–0.5)
INR PPP: 0.98 (ref 0.9–1.1)
LYMPHOCYTES # BLD AUTO: 2.07 10*3/MM3 (ref 0.7–3.1)
LYMPHOCYTES # BLD AUTO: 2.13 10*3/MM3 (ref 0.7–3.1)
LYMPHOCYTES NFR BLD AUTO: 30.6 % (ref 19.6–45.3)
LYMPHOCYTES NFR BLD AUTO: 31.7 % (ref 19.6–45.3)
MAGNESIUM SERPL-MCNC: 2.1 MG/DL (ref 1.7–2.3)
MCH RBC QN AUTO: 30.7 PG (ref 26.6–33)
MCH RBC QN AUTO: 31.2 PG (ref 26.6–33)
MCHC RBC AUTO-ENTMCNC: 33.2 G/DL (ref 31.5–35.7)
MCHC RBC AUTO-ENTMCNC: 33.7 G/DL (ref 31.5–35.7)
MCV RBC AUTO: 90.9 FL (ref 79–97)
MCV RBC AUTO: 94 FL (ref 79–97)
MONOCYTES # BLD AUTO: 1.05 10*3/MM3 (ref 0.1–0.9)
MONOCYTES # BLD AUTO: 1.13 10*3/MM3 (ref 0.1–0.9)
MONOCYTES NFR BLD AUTO: 16.1 % (ref 5–12)
MONOCYTES NFR BLD AUTO: 16.3 % (ref 5–12)
NEUTROPHILS NFR BLD AUTO: 3.31 10*3/MM3 (ref 1.7–7)
NEUTROPHILS NFR BLD AUTO: 3.56 10*3/MM3 (ref 1.7–7)
NEUTROPHILS NFR BLD AUTO: 50.7 % (ref 42.7–76)
NEUTROPHILS NFR BLD AUTO: 51.2 % (ref 42.7–76)
NRBC BLD AUTO-RTO: 0 /100 WBC (ref 0–0.2)
NRBC BLD AUTO-RTO: 0 /100 WBC (ref 0–0.2)
PLATELET # BLD AUTO: 184 10*3/MM3 (ref 140–450)
PLATELET # BLD AUTO: 201 10*3/MM3 (ref 140–450)
PMV BLD AUTO: 9.6 FL (ref 6–12)
PMV BLD AUTO: 9.9 FL (ref 6–12)
POTASSIUM SERPL-SCNC: 4.4 MMOL/L (ref 3.5–5.2)
PROT SERPL-MCNC: 7 G/DL (ref 6–8.5)
PROTHROMBIN TIME: 13.1 SECONDS (ref 11.7–14.2)
QT INTERVAL: 462 MS
QT INTERVAL: 484 MS
RBC # BLD AUTO: 4.17 10*6/MM3 (ref 4.14–5.8)
RBC # BLD AUTO: 4.6 10*6/MM3 (ref 4.14–5.8)
SODIUM SERPL-SCNC: 134 MMOL/L (ref 136–145)
T4 FREE SERPL-MCNC: 1.18 NG/DL (ref 0.93–1.7)
TROPONIN T SERPL-MCNC: 0.01 NG/ML (ref 0–0.03)
TROPONIN T SERPL-MCNC: 0.01 NG/ML (ref 0–0.03)
TSH SERPL DL<=0.05 MIU/L-ACNC: 3.68 UIU/ML (ref 0.27–4.2)
WBC NRBC COR # BLD: 6.53 10*3/MM3 (ref 3.4–10.8)
WBC NRBC COR # BLD: 6.95 10*3/MM3 (ref 3.4–10.8)

## 2022-12-21 PROCEDURE — 36415 COLL VENOUS BLD VENIPUNCTURE: CPT | Performed by: HOSPITALIST

## 2022-12-21 PROCEDURE — 93010 ELECTROCARDIOGRAM REPORT: CPT | Performed by: INTERNAL MEDICINE

## 2022-12-21 PROCEDURE — 84484 ASSAY OF TROPONIN QUANT: CPT | Performed by: EMERGENCY MEDICINE

## 2022-12-21 PROCEDURE — 99285 EMERGENCY DEPT VISIT HI MDM: CPT

## 2022-12-21 PROCEDURE — 85730 THROMBOPLASTIN TIME PARTIAL: CPT | Performed by: INTERNAL MEDICINE

## 2022-12-21 PROCEDURE — 84439 ASSAY OF FREE THYROXINE: CPT | Performed by: EMERGENCY MEDICINE

## 2022-12-21 PROCEDURE — 96366 THER/PROPH/DIAG IV INF ADDON: CPT

## 2022-12-21 PROCEDURE — 93005 ELECTROCARDIOGRAM TRACING: CPT | Performed by: EMERGENCY MEDICINE

## 2022-12-21 PROCEDURE — 80053 COMPREHEN METABOLIC PANEL: CPT | Performed by: EMERGENCY MEDICINE

## 2022-12-21 PROCEDURE — 85025 COMPLETE CBC W/AUTO DIFF WBC: CPT | Performed by: EMERGENCY MEDICINE

## 2022-12-21 PROCEDURE — 99214 OFFICE O/P EST MOD 30 MIN: CPT | Performed by: INTERNAL MEDICINE

## 2022-12-21 PROCEDURE — 85610 PROTHROMBIN TIME: CPT | Performed by: EMERGENCY MEDICINE

## 2022-12-21 PROCEDURE — 25010000002 HEPARIN (PORCINE) PER 1000 UNITS: Performed by: EMERGENCY MEDICINE

## 2022-12-21 PROCEDURE — G0378 HOSPITAL OBSERVATION PER HR: HCPCS

## 2022-12-21 PROCEDURE — 96375 TX/PRO/DX INJ NEW DRUG ADDON: CPT

## 2022-12-21 PROCEDURE — 85730 THROMBOPLASTIN TIME PARTIAL: CPT | Performed by: EMERGENCY MEDICINE

## 2022-12-21 PROCEDURE — 84484 ASSAY OF TROPONIN QUANT: CPT | Performed by: INTERNAL MEDICINE

## 2022-12-21 PROCEDURE — 85730 THROMBOPLASTIN TIME PARTIAL: CPT | Performed by: HOSPITALIST

## 2022-12-21 PROCEDURE — 25010000002 HEPARIN (PORCINE) 25000-0.45 UT/250ML-% SOLUTION: Performed by: EMERGENCY MEDICINE

## 2022-12-21 PROCEDURE — 84443 ASSAY THYROID STIM HORMONE: CPT | Performed by: EMERGENCY MEDICINE

## 2022-12-21 PROCEDURE — 96365 THER/PROPH/DIAG IV INF INIT: CPT

## 2022-12-21 PROCEDURE — 93005 ELECTROCARDIOGRAM TRACING: CPT

## 2022-12-21 PROCEDURE — 71045 X-RAY EXAM CHEST 1 VIEW: CPT

## 2022-12-21 PROCEDURE — 83735 ASSAY OF MAGNESIUM: CPT | Performed by: EMERGENCY MEDICINE

## 2022-12-21 RX ORDER — ROSUVASTATIN CALCIUM 10 MG/1
10 TABLET, COATED ORAL NIGHTLY
Status: DISCONTINUED | OUTPATIENT
Start: 2022-12-21 | End: 2022-12-22 | Stop reason: HOSPADM

## 2022-12-21 RX ORDER — ACETAMINOPHEN 325 MG/1
650 TABLET ORAL EVERY 4 HOURS PRN
Status: DISCONTINUED | OUTPATIENT
Start: 2022-12-21 | End: 2022-12-22 | Stop reason: HOSPADM

## 2022-12-21 RX ORDER — SODIUM CHLORIDE 0.9 % (FLUSH) 0.9 %
10 SYRINGE (ML) INJECTION AS NEEDED
Status: DISCONTINUED | OUTPATIENT
Start: 2022-12-21 | End: 2022-12-22 | Stop reason: HOSPADM

## 2022-12-21 RX ORDER — HEPARIN SODIUM 5000 [USP'U]/ML
30-51.3 INJECTION, SOLUTION INTRAVENOUS; SUBCUTANEOUS EVERY 6 HOURS PRN
Status: DISCONTINUED | OUTPATIENT
Start: 2022-12-21 | End: 2022-12-22 | Stop reason: HOSPADM

## 2022-12-21 RX ORDER — PANTOPRAZOLE SODIUM 40 MG/1
40 TABLET, DELAYED RELEASE ORAL DAILY
Status: DISCONTINUED | OUTPATIENT
Start: 2022-12-21 | End: 2022-12-22 | Stop reason: HOSPADM

## 2022-12-21 RX ORDER — SODIUM CHLORIDE 9 MG/ML
40 INJECTION, SOLUTION INTRAVENOUS AS NEEDED
Status: DISCONTINUED | OUTPATIENT
Start: 2022-12-21 | End: 2022-12-22 | Stop reason: HOSPADM

## 2022-12-21 RX ORDER — ONDANSETRON 2 MG/ML
4 INJECTION INTRAMUSCULAR; INTRAVENOUS EVERY 6 HOURS PRN
Status: DISCONTINUED | OUTPATIENT
Start: 2022-12-21 | End: 2022-12-22 | Stop reason: HOSPADM

## 2022-12-21 RX ORDER — HEPARIN SODIUM 5000 [USP'U]/ML
51.3 INJECTION, SOLUTION INTRAVENOUS; SUBCUTANEOUS ONCE
Status: COMPLETED | OUTPATIENT
Start: 2022-12-21 | End: 2022-12-21

## 2022-12-21 RX ORDER — ACETAMINOPHEN 650 MG/1
650 SUPPOSITORY RECTAL EVERY 4 HOURS PRN
Status: DISCONTINUED | OUTPATIENT
Start: 2022-12-21 | End: 2022-12-22 | Stop reason: HOSPADM

## 2022-12-21 RX ORDER — HEPARIN SODIUM 10000 [USP'U]/100ML
12 INJECTION, SOLUTION INTRAVENOUS
Status: DISCONTINUED | OUTPATIENT
Start: 2022-12-21 | End: 2022-12-22

## 2022-12-21 RX ORDER — LORAZEPAM 0.5 MG/1
0.5 TABLET ORAL DAILY PRN
Status: DISCONTINUED | OUTPATIENT
Start: 2022-12-21 | End: 2022-12-22 | Stop reason: HOSPADM

## 2022-12-21 RX ORDER — SODIUM CHLORIDE 0.9 % (FLUSH) 0.9 %
10 SYRINGE (ML) INJECTION EVERY 12 HOURS SCHEDULED
Status: DISCONTINUED | OUTPATIENT
Start: 2022-12-21 | End: 2022-12-22 | Stop reason: HOSPADM

## 2022-12-21 RX ORDER — NITROGLYCERIN 0.4 MG/1
0.4 TABLET SUBLINGUAL
Status: DISCONTINUED | OUTPATIENT
Start: 2022-12-21 | End: 2022-12-22 | Stop reason: HOSPADM

## 2022-12-21 RX ORDER — ACETAMINOPHEN 160 MG/5ML
650 SOLUTION ORAL EVERY 4 HOURS PRN
Status: DISCONTINUED | OUTPATIENT
Start: 2022-12-21 | End: 2022-12-22 | Stop reason: HOSPADM

## 2022-12-21 RX ADMIN — Medication 10 ML: at 22:25

## 2022-12-21 RX ADMIN — PANTOPRAZOLE SODIUM 40 MG: 40 TABLET, DELAYED RELEASE ORAL at 14:54

## 2022-12-21 RX ADMIN — ROSUVASTATIN CALCIUM 10 MG: 10 TABLET, FILM COATED ORAL at 22:25

## 2022-12-21 RX ADMIN — HEPARIN SODIUM 12 UNITS/KG/HR: 10000 INJECTION, SOLUTION INTRAVENOUS at 06:57

## 2022-12-21 RX ADMIN — HEPARIN SODIUM 4000 UNITS: 5000 INJECTION INTRAVENOUS; SUBCUTANEOUS at 06:54

## 2022-12-21 NOTE — PLAN OF CARE
Goal Outcome Evaluation:           Progress: improving  Outcome Evaluation: Patient going in-between SR and afib from the 40s to 60s. No complaints of dizziness but does complain of pain. Cardiology aware. Troponins negative Heparin gtt continued.

## 2022-12-21 NOTE — ED TRIAGE NOTES
"Pt states at approx 2200 12/20/22 he started having chest pain and felt his heart skipping a beat. When pt arrived at triage he stated \"I think I'm having a heart attack\" Pt states he has a hx of afib  "

## 2022-12-21 NOTE — TELEPHONE ENCOUNTER
Today was Ely's last day. Spoke with Tia, advised of Plavix. Tia said that surgery is not until February and since patient is in hospital, they will wait to get clearance from us.

## 2022-12-21 NOTE — H&P
Patient Name:  Vicente Delgado  YOB: 1928  MRN:  [id number]  Admit Date:  12/21/2022  Patient Care Team:  Mechelle Landa APRN as PCP - General (Internal Medicine)  Reginaldo Palacio MD (Dermatology)  Janki Elias MD as Consulting Physician (Ophthalmology)  Kristopher Machado DPM as Consulting Physician (Podiatry)  Aby Marquez MD as Consulting Physician (Hand Surgery)  Wilton Ramos MD as Consulting Physician (Orthopedic Surgery)  Destinee Snider MD as Consulting Physician (Pain Medicine)  Breezy Frausto MD as Consulting Physician (Cardiology)      Subjective   History Present Illness     Chief Complaint   Patient presents with   • Chest Pain   • Palpitations       Mr. Delgado is a 89 y.o. male with a history of CAD, GERD, prostate cancer, hearing loss, HTN, HLD, TAM, TIA that presents to [facility] complaining of palpitations. Reports symptoms began yesterday and were persistent. He felt as though his heart was skipping. He has had similar symptoms in the past that resolved without intervention. He had an observation stay in November for TIA. Denies recent HA, fever, chest pain, shortness of air, n/v/d. He has intermittent dizziness. He is hard of hearing at baseline. He has been started on heparin gtt.    Afebrile. HR 40s-50s. SBP 90s-120s. On room air. INR 0.98. Mg 2.1. TSH 3.680. FT4 1.18. Trop 0.012. WBC 6.95, Hgb 14.1. Na 134; remaining chem panel wnl. CXR: no active disease. EKG x 2 afib; interpretation pending    Review of Systems   Constitutional: Negative for chills and fever.   HENT: Positive for hearing loss. Negative for congestion.    Respiratory: Negative for shortness of breath.    Cardiovascular: Positive for palpitations. Negative for chest pain.   Gastrointestinal: Negative for diarrhea, nausea and vomiting.   Genitourinary: Negative for difficulty urinating and dysuria.   Musculoskeletal: Negative for arthralgias.   Skin:  Negative for rash.   Neurological: Negative for dizziness and weakness.   Psychiatric/Behavioral: Negative for sleep disturbance.        Personal History     Past Medical History:   Diagnosis Date   • Allergic rhinitis    • Anxiety    • Arthritis    • CAD (coronary artery disease)    • Cancer (HCC)    • Cataract June 2018” and    Ocular mygraine   • Cholelithiasis 2020   • Depression    • Diverticulosis    • Esophagitis    • Gastritis    • GERD (gastroesophageal reflux disease)    • Heart disease    • History of anxiety    • History of medical problems Right shoulder replacemd    2008   • History of prostate cancer 06/1990   • HL (hearing loss) Partial   • Hyperlipidemia    • Hypertension    • Insomnia    • Low back pain Yes  from hworking   • Lupus (HCC)    • TAM (nonalcoholic steatohepatitis)    • Sciatica of right side    • Visual impairment Ocular mygraine     Past Surgical History:   Procedure Laterality Date   • ABDOMINAL SURGERY     • CARDIAC CATHETERIZATION  11/16/1995   • CARDIAC SURGERY  11/16/1995   • CATARACT EXTRACTION Left 07/2018   • CHOLECYSTECTOMY  2020   • CHOLECYSTECTOMY WITH INTRAOPERATIVE CHOLANGIOGRAM N/A 9/29/2020    Procedure: Laparoscopic cholecystectomy;  Surgeon: Javi Peralta MD;  Location: [facility];  Service: General;  Laterality: N/A;   • COLONOSCOPY  01/09/2002   • ELBOW PROCEDURE     • JOINT REPLACEMENT     • LUNG BIOPSY     • LYMPH NODE BIOPSY  Yes    1992   • NASAL POLYP SURGERY     • PACEMAKER IMPLANTATION     • PROSTATE SURGERY  06/1990   • SHOULDER ROTATOR CUFF REPAIR Right 08/24/2011    Dr. Bach   • SIGMOIDOSCOPY     • TONSILLECTOMY  Yes 1943   • UPPER GASTROINTESTINAL ENDOSCOPY  09/12/1997    Gastritis, Duodenitis, hiatal hernia (Pathology: Gastric antrum minimal chronic inflammation)   • UPPER GASTROINTESTINAL ENDOSCOPY  04/11/2005    Mild esophagitis, Small hiatal hernia, Mild gastritis and mild duodenitis     Family History   Problem Relation Age of Onset   •  Heart failure Mother    • Hearing loss Mother    • Heart disease Mother         Mother had congestive heart failure   • Hyperlipidemia Mother    • Alcohol abuse Father    • Diabetes Father      Social History     Tobacco Use   • Smoking status: Passive Smoke Exposure - Never Smoker     Passive exposure: Yes   • Smokeless tobacco: Never   • Tobacco comments:     Smoke a cigar ocassionally   Substance Use Topics   • Alcohol use: No     Comment: Daily caffeine use   • Drug use: No     No current facility-administered medications on file prior to encounter.     Current Outpatient Medications on File Prior to Encounter   Medication Sig Dispense Refill   • atenolol (TENORMIN) 25 MG tablet TAKE 1 TABLET BY MOUTH TWICE DAILY 180 tablet 1   • benazepril (LOTENSIN) 20 MG tablet Take 1 tablet by mouth 2 (Two) Times a Day. Call if blood pressure running above 160/90. 180 tablet 3   • clopidogrel (PLAVIX) 75 MG tablet Take 1 tablet by mouth Daily for 90 days. 30 tablet 2   • ezetimibe (ZETIA) 10 MG tablet Take 1 tablet by mouth Daily. 90 tablet 3   • LORazepam (ATIVAN) 0.5 MG tablet Take 1 tablet by mouth Daily As Needed for Anxiety. for anxiety 30 tablet 0   • pantoprazole (PROTONIX) 40 MG EC tablet TAKE ONE TABLET BY MOUTH DAILY 90 tablet 1   • rosuvastatin (CRESTOR) 10 MG tablet TAKE 1 TABLET BY MOUTH EVERY NIGHT 90 tablet 3   • aspirin 81 MG chewable tablet Chew 81 mg Daily.       Allergies   Allergen Reactions   • Lidocaine Dizziness     Reaction to novacaine   • Lovastatin Other (See Comments)     Liver enzymes elevated   • Pravastatin Other (See Comments)     Elevated liver enzymes    • Gabapentin GI Intolerance     Bloating, incontinence    • Procaine    • Simvastatin        Objective    Objective     Vital Signs  Temp:  [97.7 °F (36.5 °C)] 97.7 °F (36.5 °C)  Heart Rate:  [44-63] 56  Resp:  [16-18] 18  BP: ()/(54-94) 95/63  SpO2:  [95 %-98 %] 97 %  on   ;   Device (Oxygen Therapy): room air  Body mass index is  26.94 kg/m².    Physical Exam  Vitals and nursing note reviewed.   Constitutional:       General: He is not in acute distress.  HENT:      Head: Normocephalic.      Mouth/Throat:      Mouth: Mucous membranes are moist.   Eyes:      Conjunctiva/sclera: Conjunctivae normal.   Cardiovascular:      Rate and Rhythm: Normal rate. Rhythm irregular.      Comments: Rate 60s  Pulmonary:      Effort: Pulmonary effort is normal. No respiratory distress.      Breath sounds: Normal breath sounds.   Abdominal:      General: Bowel sounds are normal.      Palpations: Abdomen is soft.   Musculoskeletal:      Cervical back: Neck supple.      Right lower leg: No edema.      Left lower leg: No edema.   Skin:     General: Skin is warm and dry.   Neurological:      Mental Status: He is alert and oriented to person, place, and time.   Psychiatric:         Mood and Affect: Mood normal.         Behavior: Behavior normal.         Results Review:  I reviewed the patient's new clinical results.  I reviewed the patient's new imaging results and agree with the interpretation.  I reviewed the patient's other test results and agree with the interpretation  I personally viewed and interpreted the patient's EKG/Telemetry data    Lab Results (last 24 hours)     Procedure Component Value Units Date/Time    CBC & Differential [[id number]]  (Abnormal) Collected: 12/21/22 0515    Specimen: Blood Updated: 12/21/22 0525    Narrative:      The following orders were created for panel order CBC & Differential.  Procedure                               Abnormality         Status                     ---------                               -----------         ------                     CBC Auto Differential[[id number]]        Abnormal            Final result                 Please view results for these tests on the individual orders.    Comprehensive Metabolic Panel [[id number]]  (Abnormal) Collected: 12/21/22 0515    Specimen: Blood Updated: 12/21/22 0546      Glucose 92 mg/dL      BUN 18 mg/dL      Creatinine 0.89 mg/dL      Sodium 134 mmol/L      Potassium 4.4 mmol/L      Chloride 99 mmol/L      CO2 26.3 mmol/L      Calcium 9.2 mg/dL      Total Protein 7.0 g/dL      Albumin 4.00 g/dL      ALT (SGPT) 17 U/L      AST (SGOT) 25 U/L      Alkaline Phosphatase 59 U/L      Total Bilirubin 0.3 mg/dL      Globulin 3.0 gm/dL      A/G Ratio 1.3 g/dL      BUN/Creatinine Ratio 20.2     Anion Gap 8.7 mmol/L      eGFR 79.4 mL/min/1.73      Comment: National Kidney Foundation and American Society of Nephrology (ASN) Task Force recommended calculation based on the Chronic Kidney Disease Epidemiology Collaboration (CKD-EPI) equation refit without adjustment for race.       Narrative:      GFR Normal >60  Chronic Kidney Disease <60  Kidney Failure <15    The GFR formula is only valid for adults with stable renal function between ages 18 and 70.    Troponin [[id number]]  (Normal) Collected: 12/21/22 0515    Specimen: Blood Updated: 12/21/22 0553     Troponin T 0.012 ng/mL     Narrative:      Troponin T Reference Range:  <= 0.03 ng/mL-   Negative for AMI  >0.03 ng/mL-     Abnormal for myocardial necrosis.  Clinicians would have to utilize clinical acumen, EKG, Troponin and serial changes to determine if it is an Acute Myocardial Infarction or myocardial injury due to an underlying chronic condition.       Results may be falsely decreased if patient taking Biotin.      T4, Free [[id number]]  (Normal) Collected: 12/21/22 0515    Specimen: Blood Updated: 12/21/22 0553     Free T4 1.18 ng/dL     Narrative:      Results may be falsely increased if patient taking Biotin.      TSH [[id number]]  (Normal) Collected: 12/21/22 0515    Specimen: Blood Updated: 12/21/22 0553     TSH 3.680 uIU/mL     Magnesium [[id number]]  (Normal) Collected: 12/21/22 0515    Specimen: Blood Updated: 12/21/22 0546     Magnesium 2.1 mg/dL     CBC Auto Differential [[id number]]  (Abnormal) Collected: 12/21/22 0515    Specimen:  Blood Updated: 12/21/22 0525     WBC 6.95 10*3/mm3      RBC 4.60 10*6/mm3      Hemoglobin 14.1 g/dL      Hematocrit 41.8 %      MCV 90.9 fL      MCH 30.7 pg      MCHC 33.7 g/dL      RDW 12.1 %      RDW-SD 40.7 fl      MPV 9.6 fL      Platelets 201 10*3/mm3      Neutrophil % 51.2 %      Lymphocyte % 30.6 %      Monocyte % 16.3 %      Eosinophil % 1.2 %      Basophil % 0.4 %      Immature Grans % 0.3 %      Neutrophils, Absolute 3.56 10*3/mm3      Lymphocytes, Absolute 2.13 10*3/mm3      Monocytes, Absolute 1.13 10*3/mm3      Eosinophils, Absolute 0.08 10*3/mm3      Basophils, Absolute 0.03 10*3/mm3      Immature Grans, Absolute 0.02 10*3/mm3      nRBC 0.0 /100 WBC     Protime-INR [[id number]]  (Normal) Collected: 12/21/22 0515    Specimen: Blood Updated: 12/21/22 0537     Protime 13.1 Seconds      INR 0.98    aPTT [[id number]]  (Normal) Collected: 12/21/22 0515    Specimen: Blood Updated: 12/21/22 0537     PTT 35.3 seconds           Imaging Results (Last 24 Hours)     Procedure Component Value Units Date/Time    XR Chest 1 View [[id number]] Collected: 12/21/22 0718     Updated: 12/21/22 0723    Narrative:      XR CHEST 1 VW-     Clinical: Palpitation     COMPARISON CT of the chest 6/6/2022, 6/5/2022 chest radiograph     FINDINGS: The projection is apical lordotic as before. The  cardiomediastinal silhouette is stable. No pleural effusion, vascular  congestion or acute airspace disease has developed.     CONCLUSION: No active disease of the chest     This report was finalized on 12/21/2022 7:20 AM by Dr. Hector Hicks M.D.             Results for orders placed during the hospital encounter of 11/15/22    Adult Transthoracic Echo Complete W/ Cont if Necessary Per Protocol    Interpretation Summary  •  Left ventricular systolic function is normal. Left ventricular ejection fraction appears to be 56 - 60%.  •  Left ventricular wall thickness is consistent with moderate concentric hypertrophy.  •  Left ventricular  diastolic function is consistent with (grade Ia w/high LAP) impaired relaxation.  •  The right ventricular cavity is mildly dilated.  •  Saline test results are negative.  •  Moderate aortic valve stenosis is present. Aortic valve area is 0.6 cm2.  Very low stroke-volume index.  Cannot exclude paradoxical low gradient severe aortic stenosis.  •  Peak velocity of the flow distal to the aortic valve is 284.7 cm/s. Aortic valve mean pressure gradient is 20 mmHg. Aortic valve dimensionless index is 0.2 .  •  Estimated right ventricular systolic pressure from tricuspid regurgitation is normal (<35 mmHg).  •  Mild dilation of the ascending aorta is present.  3.9 cm.      ECG 12 Lead Bradycardia   Preliminary Result   HEART RATE= 44  bpm   RR Interval= 1364  ms   IA Interval=   ms   P Horizontal Axis=   deg   P Front Axis=   deg   QRSD Interval= 103  ms   QT Interval= 484  ms   QRS Axis= -34  deg   T Wave Axis= -12  deg   - ABNORMAL ECG -   Atrial fibrillation   Inferior infarct, age indeterminate   Anterior infarct, old   Electronically Signed By:    Date and Time of Study: 2022-12-21 07:02:14      ECG 12 Lead Chest Pain   Preliminary Result   HEART RATE= 64  bpm   RR Interval= 938  ms   IA Interval=   ms   P Horizontal Axis=   deg   P Front Axis=   deg   QRSD Interval= 100  ms   QT Interval= 462  ms   QRS Axis= -37  deg   T Wave Axis= -1  deg   - ABNORMAL ECG -   Atrial fibrillation   Ventricular premature complex   Inferior infarct, old   Anterior infarct, old   Electronically Signed By:    Date and Time of Study: 2022-12-21 04:58:19           Assessment/Plan     Active Hospital Problems    Diagnosis  POA   • **New onset a-fib (HCC) [I48.91]  Yes   • TIA (transient ischemic attack) [G45.9]  Yes   • Aortic stenosis, moderate [I35.0]  Yes   • CAD (coronary artery disease) [I25.10]  Yes   • HTN (hypertension) [I10]  Yes      Resolved Hospital Problems   No resolved problems to display.       Mr. Delgado is a 89 y.o. male  with a history of CAD, GERD, prostate cancer, hearing loss, HTN, HLD, TAM, TIA who is admitted for new onset afib    New onset afib/Aortic stenosis/CAD:  -Cardiology consulted. Currently on heparin gtt. Rates have ranged 40s-60s. Monitor on telemetry. Recent TIA, will likely need anticoagulation. Denies chest pain,CHF symptoms. Echo 11/2022. Trop 0.012. TSH wnl. Atenolol listed as home med; defer to Cardiology to continue vs     TIA:  -On plavix, statin per home med Iist. Await recommendations from Cardiology re: AC, continued need for plavix    HTN:  -BP lower. Hold antihypertensive meds for now    · I discussed the patient's findings and my recommendations with patient and family.    VTE Prophylaxis - heparin gtt.  Code Status - Full code.       GE Abrams  [facility]  12/21/22  12:41 EST

## 2022-12-21 NOTE — ED PROVIDER NOTES
EMERGENCY DEPARTMENT ENCOUNTER  I wore full protective equipment throughout this patient encounter including a N95 mask, eye shield, gown and gloves. Hand hygiene was performed before donning protective equipment and after removal when leaving the room.    Room Number:  15/15  Date of encounter:  12/21/2022  PCP: Mechelle Landa APRN    HPI:  Context: Vicente Delgado is a 89 y.o. male who presents to the ED c/o chief complaint of palpitations.  Patient is extremely hard of hearing and somewhat of a poor historian, history somewhat limited.  Patient reports intermittent palpitations since yesterday evening.  Patient reports that he will feel his heart skip beats, occasionally skips several beats, patient reports he has had some lightheadedness associated with this.  Patient denies any syncope.  Patient reports that he was having some chest pain last night but denies any chest pain today.  No shortness of breath, no vomiting, no fevers.    MEDICAL HISTORY REVIEW  Reviewed in EPIC    PAST MEDICAL HISTORY  Active Ambulatory Problems     Diagnosis Date Noted   • HTN (hypertension) 04/21/2016   • Nonrheumatic mitral valve regurgitation 04/21/2016   • Disorder of right ventricle of heart 04/21/2016   • CAD (coronary artery disease) 07/01/2016   • Hyperlipidemia 07/01/2016   • Calculus of gallbladder without cholecystitis without obstruction 09/28/2020   • S/P cholecystectomy 01/09/2021   • Nonrheumatic aortic valve insufficiency 02/04/2021   • Non-rheumatic mitral regurgitation 02/04/2021   • Trigger middle finger of left hand 07/11/2021   • Gastroesophageal reflux disease with esophagitis 07/11/2021   • Carpal tunnel syndrome of left wrist 07/11/2021   • Amaurosis fugax of left eye 07/11/2021   • 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) 11/26/2021   • Atherosclerosis of native artery of left lower extremity with intermittent claudication (HCC) 11/26/2021   • Anxiety 02/08/2022   • Dermatitis 02/08/2022   • Chronic  stable angina (HCC) 06/16/2022   • Aortic stenosis, moderate 07/01/2022   • Chronic right shoulder pain 10/17/2022   • Controlled substance agreement signed 11/09/2022   • TIA (transient ischemic attack) 11/15/2022     Resolved Ambulatory Problems     Diagnosis Date Noted   • Leg pain, anterior, left 05/13/2021   • Atypical chest pain 09/26/2021     Past Medical History:   Diagnosis Date   • Allergic rhinitis    • Arthritis    • Cancer (HCC)    • Cataract June 2018” and   • Cholelithiasis 2020   • Depression    • Diverticulosis    • Esophagitis    • Gastritis    • GERD (gastroesophageal reflux disease)    • Heart disease    • History of anxiety    • History of medical problems Right shoulder replacemd   • History of prostate cancer 06/1990   • HL (hearing loss) Partial   • Hypertension    • Insomnia    • Low back pain Yes  from hworking   • Lupus (HCC)    • TAM (nonalcoholic steatohepatitis)    • Sciatica of right side    • Visual impairment Ocular mygraine       PAST SURGICAL HISTORY  Past Surgical History:   Procedure Laterality Date   • ABDOMINAL SURGERY     • CARDIAC CATHETERIZATION  11/16/1995   • CARDIAC SURGERY  11/16/1995   • CATARACT EXTRACTION Left 07/2018   • CHOLECYSTECTOMY  2020   • CHOLECYSTECTOMY WITH INTRAOPERATIVE CHOLANGIOGRAM N/A 9/29/2020    Procedure: Laparoscopic cholecystectomy;  Surgeon: Javi Peralta MD;  Location: [facility];  Service: General;  Laterality: N/A;   • COLONOSCOPY  01/09/2002   • ELBOW PROCEDURE     • JOINT REPLACEMENT     • LUNG BIOPSY     • LYMPH NODE BIOPSY  Yes    1992   • NASAL POLYP SURGERY     • PACEMAKER IMPLANTATION     • PROSTATE SURGERY  06/1990   • SHOULDER ROTATOR CUFF REPAIR Right 08/24/2011    Dr. Bach   • SIGMOIDOSCOPY     • TONSILLECTOMY  Yes 1943   • UPPER GASTROINTESTINAL ENDOSCOPY  09/12/1997    Gastritis, Duodenitis, hiatal hernia (Pathology: Gastric antrum minimal chronic inflammation)   • UPPER GASTROINTESTINAL ENDOSCOPY  04/11/2005    Mild  esophagitis, Small hiatal hernia, Mild gastritis and mild duodenitis       FAMILY HISTORY  Family History   Problem Relation Age of Onset   • Heart failure Mother    • Hearing loss Mother    • Heart disease Mother         Mother had congestive heart failure   • Hyperlipidemia Mother    • Alcohol abuse Father    • Diabetes Father        SOCIAL HISTORY  Social History     Socioeconomic History   • Marital status:    Tobacco Use   • Smoking status: Passive Smoke Exposure - Never Smoker     Passive exposure: Yes   • Smokeless tobacco: Never   • Tobacco comments:     Smoke a cigar ocassionally   Substance and Sexual Activity   • Alcohol use: No     Comment: Daily caffeine use   • Drug use: No   • Sexual activity: Not Currently     Partners: Female       ALLERGIES  Lidocaine, Lovastatin, Pravastatin, Gabapentin, Procaine, and Simvastatin    The patient's allergies have been reviewed    REVIEW OF SYSTEMS  All systems reviewed and negative except for those discussed in HPI.     PHYSICAL EXAM  I have reviewed the triage vital signs and nursing notes.  ED Triage Vitals   Temp Heart Rate Resp BP SpO2   12/21/22 0513 12/21/22 0511 12/21/22 0511 12/21/22 0511 12/21/22 0511   97.7 °F (36.5 °C) (!) 49 16 122/76 98 %      Temp src Heart Rate Source Patient Position BP Location FiO2 (%)   12/21/22 0513 12/21/22 0511 -- -- --   Oral Monitor          General: No acute distress.  HENT: NCAT, PERRL, Nares patent.  Eyes: no scleral icterus.  Neck: trachea midline, no ROM limitations.  CV: Irregularly irregular, bradycardic  Respiratory: normal effort, CTAB.  Abdomen: soft, nondistended, NTTP, no rebound tenderness, no guarding or rigidity.  Musculoskeletal: no deformity.  Neuro: alert, moves all extremities, follows commands.  Skin: warm, dry.    LAB RESULTS  Recent Results (from the past 24 hour(s))   ECG 12 Lead Chest Pain    Collection Time: 12/21/22  4:58 AM   Result Value Ref Range    QT Interval 462 ms   Comprehensive  Metabolic Panel    Collection Time: 12/21/22  5:15 AM    Specimen: Blood   Result Value Ref Range    Glucose 92 65 - 99 mg/dL    BUN 18 8 - 23 mg/dL    Creatinine 0.89 0.76 - 1.27 mg/dL    Sodium 134 (L) 136 - 145 mmol/L    Potassium 4.4 3.5 - 5.2 mmol/L    Chloride 99 98 - 107 mmol/L    CO2 26.3 22.0 - 29.0 mmol/L    Calcium 9.2 8.2 - 9.6 mg/dL    Total Protein 7.0 6.0 - 8.5 g/dL    Albumin 4.00 3.50 - 5.20 g/dL    ALT (SGPT) 17 1 - 41 U/L    AST (SGOT) 25 1 - 40 U/L    Alkaline Phosphatase 59 39 - 117 U/L    Total Bilirubin 0.3 0.0 - 1.2 mg/dL    Globulin 3.0 gm/dL    A/G Ratio 1.3 g/dL    BUN/Creatinine Ratio 20.2 7.0 - 25.0    Anion Gap 8.7 5.0 - 15.0 mmol/L    eGFR 79.4 >60.0 mL/min/1.73   Troponin    Collection Time: 12/21/22  5:15 AM    Specimen: Blood   Result Value Ref Range    Troponin T 0.012 0.000 - 0.030 ng/mL   T4, Free    Collection Time: 12/21/22  5:15 AM    Specimen: Blood   Result Value Ref Range    Free T4 1.18 0.93 - 1.70 ng/dL   TSH    Collection Time: 12/21/22  5:15 AM    Specimen: Blood   Result Value Ref Range    TSH 3.680 0.270 - 4.200 uIU/mL   Magnesium    Collection Time: 12/21/22  5:15 AM    Specimen: Blood   Result Value Ref Range    Magnesium 2.1 1.7 - 2.3 mg/dL   CBC Auto Differential    Collection Time: 12/21/22  5:15 AM    Specimen: Blood   Result Value Ref Range    WBC 6.95 3.40 - 10.80 10*3/mm3    RBC 4.60 4.14 - 5.80 10*6/mm3    Hemoglobin 14.1 13.0 - 17.7 g/dL    Hematocrit 41.8 37.5 - 51.0 %    MCV 90.9 79.0 - 97.0 fL    MCH 30.7 26.6 - 33.0 pg    MCHC 33.7 31.5 - 35.7 g/dL    RDW 12.1 (L) 12.3 - 15.4 %    RDW-SD 40.7 37.0 - 54.0 fl    MPV 9.6 6.0 - 12.0 fL    Platelets 201 140 - 450 10*3/mm3    Neutrophil % 51.2 42.7 - 76.0 %    Lymphocyte % 30.6 19.6 - 45.3 %    Monocyte % 16.3 (H) 5.0 - 12.0 %    Eosinophil % 1.2 0.3 - 6.2 %    Basophil % 0.4 0.0 - 1.5 %    Immature Grans % 0.3 0.0 - 0.5 %    Neutrophils, Absolute 3.56 1.70 - 7.00 10*3/mm3    Lymphocytes, Absolute 2.13 0.70 -  3.10 10*3/mm3    Monocytes, Absolute 1.13 (H) 0.10 - 0.90 10*3/mm3    Eosinophils, Absolute 0.08 0.00 - 0.40 10*3/mm3    Basophils, Absolute 0.03 0.00 - 0.20 10*3/mm3    Immature Grans, Absolute 0.02 0.00 - 0.05 10*3/mm3    nRBC 0.0 0.0 - 0.2 /100 WBC   Protime-INR    Collection Time: 12/21/22  5:15 AM    Specimen: Blood   Result Value Ref Range    Protime 13.1 11.7 - 14.2 Seconds    INR 0.98 0.90 - 1.10   aPTT    Collection Time: 12/21/22  5:15 AM    Specimen: Blood   Result Value Ref Range    PTT 35.3 22.7 - 35.4 seconds       I ordered the above labs and reviewed the results.    RADIOLOGY  No Radiology Exams Resulted Within Past 24 Hours    I ordered the above noted radiological studies. I reviewed the images and results. I agree with the radiologist interpretation.    PROCEDURES  Procedures    MEDICATIONS GIVEN IN ER  Medications   heparin (porcine) 5000 UNIT/ML injection 4,000 Units (has no administration in time range)   heparin 29214 units/250 mL (100 units/mL) in 0.45 % NaCl infusion (has no administration in time range)   heparin (porcine) 5000 UNIT/ML injection 2,300-4,000 Units (has no administration in time range)       PROGRESS, DATA ANALYSIS, CONSULTS, AND MEDICAL DECISION MAKING  A complete history and physical exam have been performed.  All available laboratory and imaging results have been reviewed by myself prior to disposition.    MDM  After the initial H&P, I discussed pertinent information from history and physical exam with patient/family.  Discussed differential diagnosis.  Discussed plan for ED evaluation/workup/treatment.  All questions answered.  Patient/family is agreeable with plan.  ED Course as of 12/21/22 0636   Wed Dec 21, 2022   0502 My differential diagnosis for palpitations includes but is not limited to    Arrhythmias  Atrial fibrillation/flutter  Bradycardia caused by advanced arteriovenous  block or sinus node dysfunction  Bradycardia-tachycardia syndrome (sick sinus  syndrome)  Multifocal atrial tachycardia  Premature supraventricular or ventricular contractions  Sinus tachycardia or arrhythmia  Supraventricular tachycardia  Ventricular tachycardia  Travis-Parkinson-White syndrome     Psychiatric causes  Anxiety disorder  Panic attacks  Drugs and medications  Alcohol  Caffeine  Certain prescription and over-the-counter agents (e.g., digitalis, phenothiazine, theophylline, beta agonists)  Street drugs (e.g., cocaine)  Tobacco    Nonarrhythmic cardiac causes  Atrial or ventricular septal defect  Cardiomyopathy  Congenital heart disease  Congestive heart failure  Mitral valve prolapse  Pacemaker-mediated tachycardia  Pericarditis  Valvular disease (e.g., aortic insufficiency, stenosis)    Extracardiac causes  Anemia  Electrolyte imbalance  Fever  Hyperthyroidism  Hypoglycemia  Hypovolemia  Pheochromocytoma  Pulmonary disease  Vasovagal syndrome          [JG]   0502 EKG independently viewed and contemporaneously interpreted by ED physician. Time: 4:58 AM.  Rate 64.  Interpretation: Atrial fibrillation and, left axis deviation, anterior and inferior Q waves, delayed R wave progression, no acute ST changes, single PVC present. [JG]   0512 Patient's heart rate is predominantly in the 50s and 60s although triage reports that they witnessed heart rate decreased to 27, patient had associated lightheadedness, patient did have several episodes of heart rate decreased into the 40s while was in the room. [JG]   0517 Patient reassessed.  Discussed ED findings, differential diagnosis, and the need for admission for evaluation/treatment.  They are agreeable to admission and all questions were answered.     [JG]   8145 Phone call with Dr. Palomo, cardiology.  Discussed the patient, relevant history, exam, diagnostics, ED findings/progress, and concerns.  She request patient to be started on heparin for A. fib protocol.  They agree to consult.    [JG]   9269 Phone call with Dr. Bailey [facility].   Discussed the patient, relevant history, exam, diagnostics, ED findings/progress, and concerns. They agree to admit the patient to telemetry. Care assumed by the admitting physician at this time.     [JG]      ED Course User Index  [JG] Cesar Boudreaux MD       AS OF 06:36 EST VITALS:    BP - 111/94  HR - (!) 48  TEMP - 97.7 °F (36.5 °C) (Oral)  O2 SATS - 95%    DIAGNOSIS  Final diagnoses:   New onset a-fib (HCC)   Bradycardia   Dizzy         DISPOSITION  ADMISSION    Discussed treatment plan and reason for admission with pt/family and admitting physician.  Pt/family voiced understanding of the plan for admission for further testing/treatment as needed.          Cesar Boudreaux MD  12/21/22 8007

## 2022-12-21 NOTE — ED NOTES
.Nursing report ED to floor  Vicente Delgado  89 y.o.  male    HPI :   Chief Complaint   Patient presents with    Chest Pain    Palpitations       Admitting doctor:   Kashmir Bailey MD    Admitting diagnosis:   The primary encounter diagnosis was New onset a-fib (HCC). Diagnoses of Bradycardia and Dizzy were also pertinent to this visit.    Code status:   Current Code Status       Date Active Code Status Order ID Comments User Context       Prior            Allergies:   Lidocaine, Lovastatin, Pravastatin, Gabapentin, Procaine, and Simvastatin    Isolation:   No active isolations    Intake and Output  No intake or output data in the 24 hours ending 12/21/22 0702    Weight:       12/21/22  0511   Weight: 78 kg (172 lb)       Most recent vitals:   Vitals:    12/21/22 0531 12/21/22 0601 12/21/22 0630 12/21/22 0631   BP: 111/94 93/54  102/57   Pulse: (!) 48 (!) 44 51    Resp:       Temp:       TempSrc:       SpO2:  97%  98%   Weight:       Height:           Active LDAs/IV Access:   Lines, Drains & Airways       Active LDAs       Name Placement date Placement time Site Days    Peripheral IV 12/21/22 Anterior;Right Forearm 12/21/22  --  Forearm  less than 1    Peripheral IV 12/21/22 0652 Anterior;Left Forearm 12/21/22 0652  Forearm  less than 1                    Labs (abnormal labs have a star):   Labs Reviewed   COMPREHENSIVE METABOLIC PANEL - Abnormal; Notable for the following components:       Result Value    Sodium 134 (*)     All other components within normal limits    Narrative:     GFR Normal >60  Chronic Kidney Disease <60  Kidney Failure <15    The GFR formula is only valid for adults with stable renal function between ages 18 and 70.   CBC WITH AUTO DIFFERENTIAL - Abnormal; Notable for the following components:    RDW 12.1 (*)     Monocyte % 16.3 (*)     Monocytes, Absolute 1.13 (*)     All other components within normal limits   TROPONIN (IN-HOUSE) - Normal    Narrative:     Troponin T Reference  Range:  <= 0.03 ng/mL-   Negative for AMI  >0.03 ng/mL-     Abnormal for myocardial necrosis.  Clinicians would have to utilize clinical acumen, EKG, Troponin and serial changes to determine if it is an Acute Myocardial Infarction or myocardial injury due to an underlying chronic condition.       Results may be falsely decreased if patient taking Biotin.     T4, FREE - Normal    Narrative:     Results may be falsely increased if patient taking Biotin.     TSH - Normal   MAGNESIUM - Normal   PROTIME-INR - Normal   APTT - Normal   APTT   CBC AND DIFFERENTIAL    Narrative:     The following orders were created for panel order CBC & Differential.  Procedure                               Abnormality         Status                     ---------                               -----------         ------                     CBC Auto Differential[[id number]]        Abnormal            Final result                 Please view results for these tests on the individual orders.       EKG:   ECG 12 Lead Chest Pain   Preliminary Result   HEART RATE= 64  bpm   RR Interval= 938  ms   DE Interval=   ms   P Horizontal Axis=   deg   P Front Axis=   deg   QRSD Interval= 100  ms   QT Interval= 462  ms   QRS Axis= -37  deg   T Wave Axis= -1  deg   - ABNORMAL ECG -   Atrial fibrillation   Ventricular premature complex   Inferior infarct, old   Anterior infarct, old   Electronically Signed By:    Date and Time of Study: 2022-12-21 04:58:19      ECG 12 Lead    (Results Pending)   ECG 12 Lead Bradycardia    (Results Pending)       Meds given in ED:   Medications   heparin 00688 units/250 mL (100 units/mL) in 0.45 % NaCl infusion (12 Units/kg/hr × 78 kg Intravenous New Bag 12/21/22 0657)   heparin (porcine) 5000 UNIT/ML injection 2,300-4,000 Units (has no administration in time range)   heparin (porcine) 5000 UNIT/ML injection 4,000 Units (4,000 Units Intravenous Given 12/21/22 0654)       Imaging results:  No radiology results for the last  day    Ambulatory status:   - standby assist    Social issues:   Social History     Socioeconomic History    Marital status:    Tobacco Use    Smoking status: Passive Smoke Exposure - Never Smoker     Passive exposure: Yes    Smokeless tobacco: Never    Tobacco comments:     Smoke a cigar ocassionally   Substance and Sexual Activity    Alcohol use: No     Comment: Daily caffeine use    Drug use: No    Sexual activity: Not Currently     Partners: Female       NIH Stroke Scale:         Uyen Alonzo RN  12/21/22 07:02 EST

## 2022-12-21 NOTE — TELEPHONE ENCOUNTER
He is currently in the hospital for new onset atrial fibrillation so this medication may be changed, are they requesting surgical clearance? Please clarify with Ely.

## 2022-12-21 NOTE — CONSULTS
Referring Provider:       Patient Care Team:  Mechelle Landa APRN as PCP - General (Internal Medicine)  Reginaldo Palacio MD (Dermatology)  Janki Elias MD as Consulting Physician (Ophthalmology)  Kristopher Machado DPM as Consulting Physician (Podiatry)  Aby Marquez MD as Consulting Physician (Hand Surgery)  Wilton Ramos MD as Consulting Physician (Orthopedic Surgery)  Destinee Snider MD as Consulting Physician (Pain Medicine)  Breezy Frausto MD as Consulting Physician (Cardiology)      Reason for Consultation:   Chest pain  Palpitations  Atrial fibrillation with slow ventricular rate    History of present illness:    89-year-old male with a medical history of recent TIA, esophagitis, gastroesophageal reflux disease, mitral valve regurgitation and mild coronary artery calcification who presented with report of palpitations and intermittent chest discomfort.  Chest pain was moderate in intensity and central and resolved on its own.  Cardiac biomarkers remain negative.  EKG showed atrial fibrillation which was new with a slow ventricular rate.  Currently is resting comfortably.  On a heparin drip.    Review of Systems  All other systems reviewed and negative.     Past Medical History:   Past Medical History:   Diagnosis Date   • Allergic rhinitis    • Anxiety    • Arthritis    • CAD (coronary artery disease)    • Cancer (HCC)    • Cataract June 2018” and    Ocular mygraine   • Cholelithiasis 2020   • Depression    • Diverticulosis    • Esophagitis    • Gastritis    • GERD (gastroesophageal reflux disease)    • Heart disease    • History of anxiety    • History of medical problems Right shoulder replacemd    2008   • History of prostate cancer 06/1990   • HL (hearing loss) Partial   • Hyperlipidemia    • Hypertension    • Insomnia    • Low back pain Yes  from hworking   • Lupus (HCC)    • TAM (nonalcoholic steatohepatitis)    • Sciatica of right side    • Visual impairment  Ocular mygraine       Past Surgical History:   Past Surgical History:   Procedure Laterality Date   • ABDOMINAL SURGERY     • CARDIAC CATHETERIZATION  11/16/1995   • CARDIAC SURGERY  11/16/1995   • CATARACT EXTRACTION Left 07/2018   • CHOLECYSTECTOMY  2020   • CHOLECYSTECTOMY WITH INTRAOPERATIVE CHOLANGIOGRAM N/A 9/29/2020    Procedure: Laparoscopic cholecystectomy;  Surgeon: Javi Peralta MD;  Location: [facility] MAIN OR;  Service: General;  Laterality: N/A;   • COLONOSCOPY  01/09/2002   • ELBOW PROCEDURE     • JOINT REPLACEMENT     • LUNG BIOPSY     • LYMPH NODE BIOPSY  Yes    1992   • NASAL POLYP SURGERY     • PACEMAKER IMPLANTATION     • PROSTATE SURGERY  06/1990   • SHOULDER ROTATOR CUFF REPAIR Right 08/24/2011    Dr. Bach   • SIGMOIDOSCOPY     • TONSILLECTOMY  Yes 1943   • UPPER GASTROINTESTINAL ENDOSCOPY  09/12/1997    Gastritis, Duodenitis, hiatal hernia (Pathology: Gastric antrum minimal chronic inflammation)   • UPPER GASTROINTESTINAL ENDOSCOPY  04/11/2005    Mild esophagitis, Small hiatal hernia, Mild gastritis and mild duodenitis       Family History:   Family History   Problem Relation Age of Onset   • Heart failure Mother    • Hearing loss Mother    • Heart disease Mother         Mother had congestive heart failure   • Hyperlipidemia Mother    • Alcohol abuse Father    • Diabetes Father        Social History:   Social History     Tobacco Use   • Smoking status: Passive Smoke Exposure - Never Smoker     Passive exposure: Yes   • Smokeless tobacco: Never   • Tobacco comments:     Smoke a cigar ocassionally   Substance Use Topics   • Alcohol use: No     Comment: Daily caffeine use   • Drug use: No       Home Medications:   Medications Prior to Admission   Medication Sig Dispense Refill Last Dose   • atenolol (TENORMIN) 25 MG tablet TAKE 1 TABLET BY MOUTH TWICE DAILY 180 tablet 1 12/20/2022 at 2000   • benazepril (LOTENSIN) 20 MG tablet Take 1 tablet by mouth 2 (Two) Times a Day. Call if blood pressure  running above 160/90. 180 tablet 3 12/20/2022 at 2000   • clopidogrel (PLAVIX) 75 MG tablet Take 1 tablet by mouth Daily for 90 days. 30 tablet 2 12/20/2022 at 0800   • ezetimibe (ZETIA) 10 MG tablet Take 1 tablet by mouth Daily. 90 tablet 3 12/20/2022 at 0800   • LORazepam (ATIVAN) 0.5 MG tablet Take 1 tablet by mouth Daily As Needed for Anxiety. for anxiety 30 tablet 0 Past Month   • pantoprazole (PROTONIX) 40 MG EC tablet TAKE ONE TABLET BY MOUTH DAILY 90 tablet 1 12/20/2022 at 2000   • rosuvastatin (CRESTOR) 10 MG tablet TAKE 1 TABLET BY MOUTH EVERY NIGHT 90 tablet 3 12/20/2022 at 2000   • aspirin 81 MG chewable tablet Chew 81 mg Daily.          Current Medications:   Current Facility-Administered Medications:   •  acetaminophen (TYLENOL) tablet 650 mg, 650 mg, Oral, Q4H PRN **OR** acetaminophen (TYLENOL) 160 MG/5ML solution 650 mg, 650 mg, Oral, Q4H PRN **OR** acetaminophen (TYLENOL) suppository 650 mg, 650 mg, Rectal, Q4H PRN, Kashmir Bailey MD  •  heparin (porcine) 5000 UNIT/ML injection 2,300-4,000 Units, 30-51.3 Units/kg, Intravenous, Q6H PRN, Cesar Boudreaux MD  •  heparin 90762 units/250 mL (100 units/mL) in 0.45 % NaCl infusion, 12 Units/kg/hr, Intravenous, Titrated, Cesar Boudreaux MD, Last Rate: 9.36 mL/hr at 12/21/22 0657, 12 Units/kg/hr at 12/21/22 0657  •  LORazepam (ATIVAN) tablet 0.5 mg, 0.5 mg, Oral, Daily PRN, Kashmir Bailey MD  •  nitroglycerin (NITROSTAT) SL tablet 0.4 mg, 0.4 mg, Sublingual, Q5 Min PRN, Kashmir Bailey MD  •  ondansetron (ZOFRAN) injection 4 mg, 4 mg, Intravenous, Q6H PRN, Kashmir Bailey MD  •  pantoprazole (PROTONIX) EC tablet 40 mg, 40 mg, Oral, Daily, Amber Harding APRN, 40 mg at 12/21/22 1454  •  rosuvastatin (CRESTOR) tablet 10 mg, 10 mg, Oral, Nightly, Amber Harding APRN  •  sodium chloride 0.9 % flush 10 mL, 10 mL, Intravenous, Q12H, Kashmir Bailey MD  •  sodium chloride 0.9 % flush 10 mL, 10 mL, Intravenous, PRN,  "Kashmir Bailey MD  •  sodium chloride 0.9 % infusion 40 mL, 40 mL, Intravenous, PRN, Kashmir Bailey MD     Allergies: Lidocaine, Lovastatin, Pravastatin, Gabapentin, Procaine, and Simvastatin      Vital Signs   Temp:  [97.7 °F (36.5 °C)] 97.7 °F (36.5 °C)  Heart Rate:  [44-63] 45  Resp:  [16-18] 18  BP: ()/(53-94) 94/53  Flowsheet Rows    Flowsheet Row First Filed Value   Admission Height 170.2 cm (67\") Documented at 12/21/2022 0511   Admission Weight 78 kg (172 lb) Documented at 12/21/2022 0511          General Appearance:    Alert, cooperative, in no acute distress   Head:    Normocephalic, without obvious abnormality, atraumatic       Neck:   No adenopathy, supple, no thyromegaly, no carotid bruit, no    JVD   Lungs:     Clear to auscultation bilaterally, no wheezes, rales, or     rhonchi    Heart:   Bradycardic, irregularly irregular, no murmur, no rub, no gallop   Chest Wall:    No abnormalities observed   Abdomen:     Normal bowel sounds, soft, nontender, nondistended,            no rebound tenderness   Extremities:   No cyanosis, clubbing, or edema   Pulses:   Pulses palpable and equal bilaterally   Skin:   No bleeding or rash   Lymph nodes:   No cervical adenopathy     Results Review: I personally viewed and interpreted the patient's EKG/Telemetry data    Results from last 7 days   Lab Units 12/21/22  0515   WBC 10*3/mm3 6.95   HEMOGLOBIN g/dL 14.1   HEMATOCRIT % 41.8   PLATELETS 10*3/mm3 201     Results from last 7 days   Lab Units 12/21/22  0515   SODIUM mmol/L 134*   POTASSIUM mmol/L 4.4   CHLORIDE mmol/L 99   CO2 mmol/L 26.3   BUN mg/dL 18   CREATININE mg/dL 0.89   GLUCOSE mg/dL 92   CALCIUM mg/dL 9.2     Lab Results   Lab Value Date/Time    TROPONINT 0.011 12/21/2022 1322    TROPONINT 0.012 12/21/2022 0515    TROPONINT <0.010 11/15/2022 1122    TROPONINT <0.010 06/06/2022 0453    TROPONINT <0.010 06/05/2022 2118    TROPONINT <0.010 06/05/2022 1853    TROPONINT <0.010 09/28/2020 1015    " TROPONINT <0.010 03/05/2018 0744    TROPONINT <0.010 03/05/2018 0629           Assessment & Plan   1. Chest pain: Cardiac biomarkers negative  2. New onset atrial fibrillation with controlled ventricular rate  3. History of TIA  4. Bradycardia    -Patient presented with chest discomfort and infrequent palpitations.  Noted to be in atrial fibrillation with controlled ventricular rate.  Unlikely to be etiology of patient's chest discomfort.  - Cardiac biomarkers are negative  - No significant blood pressure to allow for other medical management including Imdur.  Hold beta-blockade in setting of bradycardia.  -Repeat troponin ordered.   Conservative management would be preferable in the setting of advanced age  -Continue heparin for now.  Plan on moving to direct oral anticoagulant depending on what troponins do today along with patient's symptoms of chest discomfort.      I discussed the patient's findings and my recommendations with the patient

## 2022-12-22 ENCOUNTER — READMISSION MANAGEMENT (OUTPATIENT)
Dept: CALL CENTER | Facility: HOSPITAL | Age: 87
End: 2022-12-22

## 2022-12-22 VITALS
HEIGHT: 67 IN | OXYGEN SATURATION: 92 % | TEMPERATURE: 97.5 F | RESPIRATION RATE: 18 BRPM | DIASTOLIC BLOOD PRESSURE: 67 MMHG | SYSTOLIC BLOOD PRESSURE: 124 MMHG | BODY MASS INDEX: 26.68 KG/M2 | HEART RATE: 62 BPM | WEIGHT: 170 LBS

## 2022-12-22 LAB
ANION GAP SERPL CALCULATED.3IONS-SCNC: 8 MMOL/L (ref 5–15)
APTT PPP: 79.8 SECONDS (ref 22.7–35.4)
BASOPHILS # BLD AUTO: 0.02 10*3/MM3 (ref 0–0.2)
BASOPHILS NFR BLD AUTO: 0.3 % (ref 0–1.5)
BUN SERPL-MCNC: 16 MG/DL (ref 8–23)
BUN/CREAT SERPL: 19.3 (ref 7–25)
CALCIUM SPEC-SCNC: 8.7 MG/DL (ref 8.2–9.6)
CHLORIDE SERPL-SCNC: 102 MMOL/L (ref 98–107)
CO2 SERPL-SCNC: 25 MMOL/L (ref 22–29)
CREAT SERPL-MCNC: 0.83 MG/DL (ref 0.76–1.27)
DEPRECATED RDW RBC AUTO: 43.8 FL (ref 37–54)
EGFRCR SERPLBLD CKD-EPI 2021: 81.1 ML/MIN/1.73
EOSINOPHIL # BLD AUTO: 0.07 10*3/MM3 (ref 0–0.4)
EOSINOPHIL NFR BLD AUTO: 1.1 % (ref 0.3–6.2)
ERYTHROCYTE [DISTWIDTH] IN BLOOD BY AUTOMATED COUNT: 12.5 % (ref 12.3–15.4)
GLUCOSE SERPL-MCNC: 141 MG/DL (ref 65–99)
HCT VFR BLD AUTO: 39 % (ref 37.5–51)
HGB BLD-MCNC: 12.7 G/DL (ref 13–17.7)
IMM GRANULOCYTES # BLD AUTO: 0.02 10*3/MM3 (ref 0–0.05)
IMM GRANULOCYTES NFR BLD AUTO: 0.3 % (ref 0–0.5)
LYMPHOCYTES # BLD AUTO: 1.87 10*3/MM3 (ref 0.7–3.1)
LYMPHOCYTES NFR BLD AUTO: 30.5 % (ref 19.6–45.3)
MAGNESIUM SERPL-MCNC: 2.1 MG/DL (ref 1.7–2.3)
MCH RBC QN AUTO: 30.6 PG (ref 26.6–33)
MCHC RBC AUTO-ENTMCNC: 32.6 G/DL (ref 31.5–35.7)
MCV RBC AUTO: 94 FL (ref 79–97)
MONOCYTES # BLD AUTO: 0.98 10*3/MM3 (ref 0.1–0.9)
MONOCYTES NFR BLD AUTO: 16 % (ref 5–12)
NEUTROPHILS NFR BLD AUTO: 3.18 10*3/MM3 (ref 1.7–7)
NEUTROPHILS NFR BLD AUTO: 51.8 % (ref 42.7–76)
NRBC BLD AUTO-RTO: 0 /100 WBC (ref 0–0.2)
PHOSPHATE SERPL-MCNC: 2.3 MG/DL (ref 2.5–4.5)
PLATELET # BLD AUTO: 201 10*3/MM3 (ref 140–450)
PMV BLD AUTO: 10 FL (ref 6–12)
POTASSIUM SERPL-SCNC: 3.7 MMOL/L (ref 3.5–5.2)
RBC # BLD AUTO: 4.15 10*6/MM3 (ref 4.14–5.8)
SODIUM SERPL-SCNC: 135 MMOL/L (ref 136–145)
WBC NRBC COR # BLD: 6.14 10*3/MM3 (ref 3.4–10.8)

## 2022-12-22 PROCEDURE — G0378 HOSPITAL OBSERVATION PER HR: HCPCS

## 2022-12-22 PROCEDURE — 25010000002 HEPARIN (PORCINE) 25000-0.45 UT/250ML-% SOLUTION

## 2022-12-22 PROCEDURE — 85025 COMPLETE CBC W/AUTO DIFF WBC: CPT | Performed by: EMERGENCY MEDICINE

## 2022-12-22 PROCEDURE — 96366 THER/PROPH/DIAG IV INF ADDON: CPT

## 2022-12-22 PROCEDURE — 83735 ASSAY OF MAGNESIUM: CPT | Performed by: HOSPITALIST

## 2022-12-22 PROCEDURE — 84100 ASSAY OF PHOSPHORUS: CPT | Performed by: HOSPITALIST

## 2022-12-22 PROCEDURE — 80048 BASIC METABOLIC PNL TOTAL CA: CPT | Performed by: HOSPITALIST

## 2022-12-22 PROCEDURE — 99214 OFFICE O/P EST MOD 30 MIN: CPT | Performed by: NURSE PRACTITIONER

## 2022-12-22 PROCEDURE — 85730 THROMBOPLASTIN TIME PARTIAL: CPT | Performed by: EMERGENCY MEDICINE

## 2022-12-22 RX ORDER — BENAZEPRIL HYDROCHLORIDE 20 MG/1
10 TABLET ORAL 2 TIMES DAILY
Qty: 180 TABLET | Refills: 3 | Status: SHIPPED | OUTPATIENT
Start: 2022-12-22

## 2022-12-22 RX ORDER — BENAZEPRIL HYDROCHLORIDE 20 MG/1
10 TABLET ORAL 2 TIMES DAILY
Qty: 180 TABLET | Refills: 3 | Status: SHIPPED | OUTPATIENT
Start: 2022-12-22 | End: 2022-12-22 | Stop reason: SDUPTHER

## 2022-12-22 RX ADMIN — APIXABAN 5 MG: 5 TABLET, FILM COATED ORAL at 11:09

## 2022-12-22 RX ADMIN — PANTOPRAZOLE SODIUM 40 MG: 40 TABLET, DELAYED RELEASE ORAL at 10:06

## 2022-12-22 NOTE — CASE MANAGEMENT/SOCIAL WORK
Discharge Planning Assessment  [facility]     Patient Name: Vicente Delgado  MRN: [id number]  Today's Date: 12/22/2022    Admit Date: 12/21/2022    Plan: Home; Denies needs.   Discharge Needs Assessment     Row Name 12/22/22 1051       Living Environment    People in Home alone    Current Living Arrangements home    Primary Care Provided by self    Provides Primary Care For no one    Family Caregiver if Needed child(tracie), adult    Family Caregiver Names Constance, daughter    Quality of Family Relationships helpful;involved;supportive    Able to Return to Prior Arrangements yes       Resource/Environmental Concerns    Resource/Environmental Concerns none       Transition Planning    Patient/Family Anticipates Transition to home    Patient/Family Anticipated Services at Transition none    Transportation Anticipated family or friend will provide       Discharge Needs Assessment    Readmission Within the Last 30 Days no previous admission in last 30 days    Equipment Currently Used at Home grab bar    Concerns to be Addressed no discharge needs identified;denies needs/concerns at this time    Anticipated Changes Related to Illness none    Equipment Needed After Discharge none    Provided Post Acute Provider List? N/A    N/A Provider List Comment no need for list identified               Discharge Plan     Row Name 12/22/22 1052       Plan    Plan Home; Denies needs.    Plan Comments CCP spoke with patient and his daughter, Constance Cota ([phone]) at bedside to complete his discharge screening assessment.  CCP introduced self and role.  Pt confirmed information on his face sheet.  Pt reports he is IADL’s, retired and drives.  Pt lives alone in a patio home with no entrance stair steps.  Pt has the following DME- grab bars and built in bath bench.  Pt denies use of an assistive device.  Pt denies past home health or going to a sub-acute rehab.  Pt plans to return home at discharge.  Pt denies current discharge  needs.  CCP will continue to follow - Becki HOFFMANN /ALEXA CM.              Continued Care and Services - Admitted Since 12/21/2022    Coordination has not been started for this encounter.       Expected Discharge Date and Time     Expected Discharge Date Expected Discharge Time    Dec 22, 2022          Demographic Summary     Row Name 12/22/22 1049       General Information    Admission Type observation    Arrived From emergency department    Required Notices Provided Observation Status Notice    Referral Source admission list    Reason for Consult discharge planning    Preferred Language English       Contact Information    Permission Granted to Share Info With family/designee               Functional Status     Row Name 12/22/22 1049       Functional Status    Usual Activity Tolerance good    Current Activity Tolerance good       Functional Status, IADL    Medications independent    Meal Preparation independent    Housekeeping independent    Laundry independent    Shopping independent       Mental Status    General Appearance WDL WDL       Mental Status Summary    Recent Changes in Mental Status/Cognitive Functioning no changes       Employment/    Employment Status retired               Psychosocial    No documentation.                Abuse/Neglect    No documentation.                Legal    No documentation.                Substance Abuse    No documentation.                Patient Forms    No documentation.                   Becki Antunez RN

## 2022-12-22 NOTE — PROGRESS NOTES
[facility] Services: Pharmacy Education - Direct Oral Anticoagulant - Eliquis    Vicente Delgado has been ordered Eliquis for atrial fibrillation.     Counseling points included the following:  Eliquis' indication, patient's need for the medication, and dosing/frequency of this medication.  Enforced the importance of taking their medication as instructed every day and the reason why the medication is dosed that way.  Explained possible side effects of anticoagulation therapy, including increased risk of bleeding, and s/sx of bleeding. Also talked about ways to control bleeding for minor cuts and scrapes.  Emphasized the importance of going to the emergency room if any of the following occur: Falling and hitting your head; noticing bright red blood in urine or dark/tarry stools; vomiting up blood or vomit has a coffee-ground like texture; coughing up blood.  Discussed the importance of informing any physician or dentist that they have been started on a DOAC, in case they need to be taken off for a procedure.  Discussed all important drug interactions, including over-the-counter medications and supplements.  Instructed the patient not to begin or discontinue any medications without informing his/her physician/pharmacist.     I also explained to the patient that this medication may have a high copay associated with it and to let the provider know if it is unaffordable.     Patient expressed understanding and had no further questions.      Anabel Peña, Pharm.D., [facility]   Clinical Pharmacist   Phone Extension #[phone]

## 2022-12-22 NOTE — OUTREACH NOTE
Prep Survey    Flowsheet Row Responses   [facility] facility patient discharged from? [place]   Is LACE score < 7 ? Yes   Eligibility [facility]   Date of Admission 12/21/22   Date of Discharge 12/22/22   Discharge Disposition Home or Self Care   Discharge diagnosis New onset a-fib TIA Aortic stenosis, moderate    Does the patient have one of the following disease processes/diagnoses(primary or secondary)? Stroke   Does the patient have Home health ordered? No   Is there a DME ordered? No   Prep survey completed? Yes          PIO SHAH - Registered Nurse

## 2022-12-22 NOTE — DISCHARGE SUMMARY
Patient Name: Vicente Delgado  : 1928  MRN: [id number]    Date of Admission: 2022  Date of Discharge:  2022  Primary Care Physician: Mechelle Landa APRN      Chief Complaint:   Chest Pain and Palpitations      Discharge Diagnoses     Active Hospital Problems    Diagnosis  POA   • **New onset a-fib (HCC) [I48.91]  Yes   • TIA (transient ischemic attack) [G45.9]  Yes   • Aortic stenosis, moderate [I35.0]  Yes   • CAD (coronary artery disease) [I25.10]  Yes   • HTN (hypertension) [I10]  Yes      Resolved Hospital Problems   No resolved problems to display.        Hospital Course     Mr. Delgado is a 89 y.o. male with a history of coronary artery disease, hypertension, aortic stenosis and recent TIA who presented to [facility] initially complaining of dizziness palpitations and fatigue.  Please see the admitting history and physical for further details.  He was found to have new onset atrial fibrillation and bradycardia and was admitted to the hospital for further evaluation and treatment.  He was bradycardic and found to be in atrial fibrillation in the emergency room on presentation.  He had a Holter monitor done following his TIA work-up that showed no A. fib.  He is a was bradycardic on that Holter monitor though periodically with symptoms noted.  Cardiology was consulted and he was evaluated.  His beta-blocker was discontinued and he was started on anticoagulation.  His heart rate improved some with discontinuation of the beta-blocker and is stable at this point to discharge home.  He is having no further chest symptoms no further palpitations and denies new symptoms or neurologic changes.  He is tolerated a heparin drip for the last 24 hours and plan is to transition to Eliquis.  He was counseled extensively on the risks and benefits of anticoagulation for his condition and he acknowledged understanding and wishes to proceed.  He will follow-up with cardiology in a few  weeks to further discuss his symptoms and management of his condition.  The plan was discussed with the patient and his daughter at the bedside acknowledged understanding and plan is to discharge home today        Day of Discharge     Subjective:  Overall feeling well denies new issues or complaints today.  Rested well overnight.    Physical Exam:  Temp:  [97.5 °F (36.4 °C)-97.9 °F (36.6 °C)] 97.5 °F (36.4 °C)  Heart Rate:  [52-62] 62  Resp:  [18] 18  BP: (104-124)/(55-67) 124/67  Body mass index is 26.63 kg/m².  Physical Exam  Sitting up on the edge of the bed no distress respirations are even and nonlabored heart is bradycardic and irregular abdomen is soft and nontender clear to auscultation bilaterally  Consultants     Consult Orders (all) (From admission, onward)     Start     Ordered    12/21/22 0847  Inpatient Cardiology Consult  Once        Specialty:  Cardiology  Provider:  Wanda Palomo MD    12/21/22 0850    12/21/22 0702  Cardiology (on-call MD unless specified)  IN AM,   Status:  Canceled        Specialty:  Cardiology  Provider:  Breezy Frausto MD    12/21/22 0551    12/21/22 0550  [facility] (on-call MD unless specified) Details  Once,   Status:  Canceled        Specialty:  Hospitalist  Provider:  Kashmir Bailey MD    12/21/22 0551              Procedures     * Surgery not found *      Imaging Results (All)     Procedure Component Value Units Date/Time    XR Chest 1 View [[id number]] Collected: 12/21/22 0718     Updated: 12/21/22 0723    Narrative:      XR CHEST 1 VW-     Clinical: Palpitation     COMPARISON CT of the chest 6/6/2022, 6/5/2022 chest radiograph     FINDINGS: The projection is apical lordotic as before. The  cardiomediastinal silhouette is stable. No pleural effusion, vascular  congestion or acute airspace disease has developed.     CONCLUSION: No active disease of the chest     This report was finalized on 12/21/2022 7:20 AM by Dr. Hector Hicks M.D.           Results for  orders placed during the hospital encounter of 05/13/21    Doppler Ankle Brachial Index Single Level CAR    Interpretation Summary  · Right Conclusion: The right SEEMA is normal. Normal digital pressures.  · Left Conclusion: The left SEEMA is normal. Normal digital pressures.    Results for orders placed during the hospital encounter of 11/15/22    Adult Transthoracic Echo Complete W/ Cont if Necessary Per Protocol    Interpretation Summary  •  Left ventricular systolic function is normal. Left ventricular ejection fraction appears to be 56 - 60%.  •  Left ventricular wall thickness is consistent with moderate concentric hypertrophy.  •  Left ventricular diastolic function is consistent with (grade Ia w/high LAP) impaired relaxation.  •  The right ventricular cavity is mildly dilated.  •  Saline test results are negative.  •  Moderate aortic valve stenosis is present. Aortic valve area is 0.6 cm2.  Very low stroke-volume index.  Cannot exclude paradoxical low gradient severe aortic stenosis.  •  Peak velocity of the flow distal to the aortic valve is 284.7 cm/s. Aortic valve mean pressure gradient is 20 mmHg. Aortic valve dimensionless index is 0.2 .  •  Estimated right ventricular systolic pressure from tricuspid regurgitation is normal (<35 mmHg).  •  Mild dilation of the ascending aorta is present.  3.9 cm.    Pertinent Labs     Results from last 7 days   Lab Units 12/22/22  0302 12/21/22  2112 12/21/22  0515   WBC 10*3/mm3 6.14 6.53 6.95   HEMOGLOBIN g/dL 12.7* 13.0 14.1   PLATELETS 10*3/mm3 201 184 201     Results from last 7 days   Lab Units 12/22/22  0302 12/21/22  0515   SODIUM mmol/L 135* 134*   POTASSIUM mmol/L 3.7 4.4   CHLORIDE mmol/L 102 99   CO2 mmol/L 25.0 26.3   BUN mg/dL 16 18   CREATININE mg/dL 0.83 0.89   GLUCOSE mg/dL 141* 92   EGFR mL/min/1.73 81.1 79.4     Results from last 7 days   Lab Units 12/21/22  0515   ALBUMIN g/dL 4.00   BILIRUBIN mg/dL 0.3   ALK PHOS U/L 59   AST (SGOT) U/L 25   ALT (SGPT)  U/L 17     Results from last 7 days   Lab Units 12/22/22  0302 12/21/22  0515   CALCIUM mg/dL 8.7 9.2   ALBUMIN g/dL  --  4.00   MAGNESIUM mg/dL 2.1 2.1   PHOSPHORUS mg/dL 2.3*  --        Results from last 7 days   Lab Units 12/21/22  1322 12/21/22  0515   TROPONIN T ng/mL 0.011 0.012           Invalid input(s): LDLCALC          Test Results Pending at Discharge       Discharge Details        Discharge Medications      New Medications      Instructions Start Date   Eliquis 5 MG tablet tablet  Generic drug: apixaban  Notes to patient: Next dose due at bedtime.    5 mg, Oral, Every 12 Hours Scheduled         Changes to Medications      Instructions Start Date   benazepril 20 MG tablet  Commonly known as: LOTENSIN  What changed: how much to take   10 mg, Oral, 2 Times Daily, Call if blood pressure running above 160/90.         Continue These Medications      Instructions Start Date   aspirin 81 MG chewable tablet  Notes to patient: Next dose due today. Have not received today.    81 mg, Oral, Daily      ezetimibe 10 MG tablet  Commonly known as: ZETIA  Notes to patient: Next dose due today. Have not received this medication today.    10 mg, Oral, Daily      LORazepam 0.5 MG tablet  Commonly known as: ATIVAN  Notes to patient: Next dose due as needed.    0.5 mg, Oral, Daily PRN, for anxiety      pantoprazole 40 MG EC tablet  Commonly known as: PROTONIX  Notes to patient: Next dose due tomorrow.   TAKE ONE TABLET BY MOUTH DAILY      rosuvastatin 10 MG tablet  Commonly known as: CRESTOR  Notes to patient: Next dose due tonight at bedtime.    10 mg, Oral, Nightly         Stop These Medications    atenolol 25 MG tablet  Commonly known as: TENORMIN     clopidogrel 75 MG tablet  Commonly known as: PLAVIX            Allergies   Allergen Reactions   • Lidocaine Dizziness     Reaction to novacaine   • Lovastatin Other (See Comments)     Liver enzymes elevated   • Pravastatin Other (See Comments)     Elevated liver enzymes    •  Gabapentin GI Intolerance     Bloating, incontinence    • Procaine    • Simvastatin        Discharge Disposition:  Home or Self Care      Discharge Diet:  No active diet order      Discharge Activity:   Activity Instructions     Activity as Tolerated            CODE STATUS:    Code Status and Medical Interventions:   Ordered at: 12/21/22 0850     Code Status (Patient has no pulse and is not breathing):    CPR (Attempt to Resuscitate)     Medical Interventions (Patient has pulse or is breathing):    Full Support       Future Appointments   Date Time Provider Department Center   1/4/2023  1:30 PM Mechelle Landa APRN [facility]   1/16/2023  1:40 PM Olaf Sales MD [facility]   2/16/2023  3:30 PM Roma Odom APRN [facility]   4/17/2023  9:30 AM Mechelle Landa APRN [facility]     Additional Instructions for the Follow-ups that You Need to Schedule     Discharge Follow-up with PCP   As directed       Currently Documented PCP:    Mechelle Landa APRN    PCP Phone Number:    [phone]     Follow Up Details: 1-2 weeks         Discharge Follow-up with Specified Provider: [facility]; 1 Month   As directed      To: [facility]    Follow Up: 1 Month            Follow-up Information     Mechelle Landa APRN. Go on 1/4/2023.    Specialty: Internal Medicine  Why: Please arrive on 1/4/2023 at 1:30pm.  Contact information:  [address]  [place] [address]  [phone]             Olaf Sales MD .    Specialty: Cardiology  Contact information:  [address]  [place] [address]  [phone]                         Additional Instructions for the Follow-ups that You Need to Schedule     Discharge Follow-up with PCP   As directed       Currently Documented PCP:    Mechelle Landa APRN    PCP Phone Number:    [phone]     Follow Up Details: 1-2 weeks         Discharge Follow-up with Specified Provider: [facility]; 1 Month   As directed      To: [facility]    Follow Up: 1 Month           Time Spent on Discharge:  Greater than 30 minutes      Kashmir Bailey MD  [facility]  12/22/22  17:07 EST

## 2022-12-22 NOTE — PLAN OF CARE
Goal Outcome Evaluation:  Plan of Care Reviewed With: patient, daughter        Progress: improving  Outcome Evaluation: Pt continues to remain in NSR, PTT therapeutic at first check & morning labs. Family remains at bedside to assist with ADLs. Pt is alert & oriented, VSS, will continue to monitor

## 2022-12-22 NOTE — PROGRESS NOTES
HOSPITAL FOLLOW UP NOTE    Patient Name: Vicente Delgado  Patient : 1928        Date of Service:22  Provider of Service: GE Rizo  Place of Service: [facility]  Referral Provider: Kashmir Bailey MD          Follow Up: new onset afib    Interval Hx: SB/ SR on tele,  BP controlled.  No chest pain, lightheadedness or dyspnea    OBJECTIVE  Temp:  [97.5 °F (36.4 °C)-97.9 °F (36.6 °C)] 97.5 °F (36.4 °C)  Heart Rate:  [45-63] 62  Resp:  [16-18] 18  BP: ()/(53-74) 124/67     Intake/Output Summary (Last 24 hours) at 2022 0800  Last data filed at 2022 0400  Gross per 24 hour   Intake 720 ml   Output 1350 ml   Net -630 ml     Body mass index is 26.63 kg/m².      22  0511 22  0600   Weight: 78 kg (172 lb) 77.1 kg (170 lb)         Physical Exam:     General Appearance:    Alert, cooperative, in no acute distress   Head:    Normocephalic, without obvious abnormality, atraumatic   Eyes:            Conjunctivae and sclerae normal, no   icterus, no pallor, corneas clear, PERRLA   Neck:   No adenopathy, supple, trachea midline, no thyromegaly, no   carotid bruit, no JVD   Lungs:     Clear to auscultation,respirations regular, even and unlabored    Heart:    Regular rhythm and normal rate, normal S1 and S2, no murmur, no gallop, no rub, no click   Chest Wall:    No abnormalities observed   Abdomen:     Normal bowel sounds, no masses, no organomegaly, soft, nontender, nondistended, no guarding, no rebound  tenderness   Extremities:   Moves all extremities well, no edema, no cyanosis, no redness   Pulses:   Pulses palpable and equal bilaterally.          CURRENT MEDS    Scheduled Meds:pantoprazole, 40 mg, Oral, Daily  rosuvastatin, 10 mg, Oral, Nightly  sodium chloride, 10 mL, Intravenous, Q12H      Continuous Infusions:heparin, 12 Units/kg/hr, Last Rate: 12 Units/kg/hr (22 0657)          Lab Review:   Results from last 7 days   Lab Units  12/22/22 0302 12/21/22  0515   SODIUM mmol/L 135* 134*   POTASSIUM mmol/L 3.7 4.4   CHLORIDE mmol/L 102 99   CO2 mmol/L 25.0 26.3   BUN mg/dL 16 18   CREATININE mg/dL 0.83 0.89   GLUCOSE mg/dL 141* 92   CALCIUM mg/dL 8.7 9.2   AST (SGOT) U/L  --  25   ALT (SGPT) U/L  --  17         Results from last 7 days   Lab Units 12/22/22 0302 12/21/22 2112   WBC 10*3/mm3 6.14 6.53   HEMOGLOBIN g/dL 12.7* 13.0   HEMATOCRIT % 39.0 39.2   PLATELETS 10*3/mm3 201 184     Results from last 7 days   Lab Units 12/22/22 0302 12/21/22 2112 12/21/22 1322 12/21/22  0515   INR   --   --   --  0.98   APTT seconds 79.8* 74.8*   < > 35.3    < > = values in this interval not displayed.     Results from last 7 days   Lab Units 12/22/22 0302 12/21/22  0515   MAGNESIUM mg/dL 2.1 2.1             Results from last 7 days   Lab Units 12/21/22  0515   TSH uIU/mL 3.680     2D Echo 11/15/2022:  •  Left ventricular systolic function is normal. Left ventricular ejection fraction appears to be 56 - 60%.  •  Left ventricular wall thickness is consistent with moderate concentric hypertrophy.  •  Left ventricular diastolic function is consistent with (grade Ia w/high LAP) impaired relaxation.  •  The right ventricular cavity is mildly dilated.  •  Saline test results are negative.  •  Moderate aortic valve stenosis is present. Aortic valve area is 0.6 cm2.  Very low stroke-volume index.  Cannot exclude paradoxical low gradient severe aortic stenosis.  •  Peak velocity of the flow distal to the aortic valve is 284.7 cm/s. Aortic valve mean pressure gradient is 20 mmHg. Aortic valve dimensionless index is 0.2 .  •  Estimated right ventricular systolic pressure from tricuspid regurgitation is normal (<35 mmHg).  •  Mild dilation of the ascending aorta is present.  3.9 cm.    ASSESSMENT & PLAN    New onset a-fib (HCC)    HTN (hypertension)    CAD (coronary artery disease)    Aortic stenosis, moderate    TIA (transient ischemic attack)    1.  Chest  discomfort: Resolved on own. Negative cardiac biomarkers, EKG A. fib with slow ventricular rate (new onset).   2.  New onset A. fib, slow V rate:  Started on heparin drip.  Recent 14-day Holter monitor 11/16/2022 showed no evidence of atrial fibrillation,  2nd degree Mobitz 1 AV block.  Avoid AV pierre blockers.  Start ac with Eliquis 5mg BID. Stop hep gtt.     No pacemaker warranted at this time.  Will stop beta blocker. Patient will start taking Eliquis 5mg BID.  Due to advanced age, stop plavix and start taking aspirin 81mg instead.  He has follow up apptmt with me in office in January.     Maki Mcmanus, APRN  12/22/22

## 2022-12-22 NOTE — SIGNIFICANT NOTE
12/22/22 0817   OTHER   Discipline physical therapist   Therapy Assessment/Plan (PT)   Criteria for Skilled Interventions Met (PT) no problems identified which require skilled intervention  (Nsg documenting pt sba with mobility as well as ampac of 23.  No indication for acute PT needs.  Consider OPPT if therapy needed post DC.)

## 2022-12-23 ENCOUNTER — TRANSITIONAL CARE MANAGEMENT TELEPHONE ENCOUNTER (OUTPATIENT)
Dept: CALL CENTER | Facility: HOSPITAL | Age: 87
End: 2022-12-23

## 2022-12-23 NOTE — OUTREACH NOTE
Call Center TCM Note    Flowsheet Row Responses   [facility] patient discharged from? [facility]   Does the patient have one of the following disease processes/diagnoses(primary or secondary)? Stroke  [recent TIA]   TCM attempt successful? Yes   Call start time 1107   Call end time 1113   Discharge diagnosis New onset a-fib, bradycardia   Person spoke with today (if not patient) and relationship Patient   Meds reviewed with patient/caregiver? Yes  [Patient reports that he has good understanding of medications. ]   Does the patient have all medications ordered at discharge? Yes   Is the patient taking all medications as directed (includes completed medication regime)? Yes   Comments Hospital Follow Up with GE Munguia Wednesday Jan 4, 2023 1:30 PM   Does the patient have an appointment with their PCP within 7 days of discharge? Yes   Has home health visited the patient within 72 hours of discharge? N/A   Psychosocial issues? No   Psychosocial comments Patient reports daughter is RN and with patient this morning.    Does the patient have any residual symptoms from stroke/TIA? No   Did the patient receive a copy of their discharge instructions? Yes   Nursing interventions Reviewed instructions with patient   What is the patient's perception of their health status since discharge? Improving  [Denies any of the symptoms that brought him to hospital. ]   Is the patient/caregiver able to teach back the risk factors for a stroke? History of TIAs, History of Afib   Is the patient/caregiver able to teach back signs and symptoms related to disease process for when to call PCP? Yes   Is the patient/caregiver able to teach back signs and symptoms related to disease process for when to call 911? Yes   If the patient is a current smoker, are they able to teach back resources for cessation? Not a smoker   Is the patient/caregiver able to teach back the hierarchy of who to call/visit for symptoms/problems? PCP,  Specialist, Home health nurse, Urgent Care, ED, 911 Yes   TCM call completed? Yes   Wrap up additional comments Patient will be following up with Dr Sales 1/16/23  140pm   Call end time 1113   Would this patient benefit from a Referral to [facility] Social Work? No   Is the patient interested in additional calls from an ambulatory ?  NOTE:  applies to high risk patients requiring additional follow-up. No          Vandana Huang RN    12/23/2022, 11:15 EST

## 2023-01-04 ENCOUNTER — OFFICE VISIT (OUTPATIENT)
Dept: INTERNAL MEDICINE | Facility: CLINIC | Age: 88
End: 2023-01-04
Payer: MEDICARE

## 2023-01-04 VITALS
DIASTOLIC BLOOD PRESSURE: 64 MMHG | TEMPERATURE: 98 F | HEIGHT: 67 IN | HEART RATE: 80 BPM | SYSTOLIC BLOOD PRESSURE: 104 MMHG | BODY MASS INDEX: 27.12 KG/M2 | WEIGHT: 172.8 LBS | OXYGEN SATURATION: 98 %

## 2023-01-04 DIAGNOSIS — G56.02 CARPAL TUNNEL SYNDROME OF LEFT WRIST: Chronic | ICD-10-CM

## 2023-01-04 DIAGNOSIS — I10 PRIMARY HYPERTENSION: Chronic | ICD-10-CM

## 2023-01-04 DIAGNOSIS — I48.91 NEW ONSET A-FIB: Primary | ICD-10-CM

## 2023-01-04 PROCEDURE — 1111F DSCHRG MED/CURRENT MED MERGE: CPT | Performed by: NURSE PRACTITIONER

## 2023-01-04 PROCEDURE — 99495 TRANSJ CARE MGMT MOD F2F 14D: CPT | Performed by: NURSE PRACTITIONER

## 2023-01-11 NOTE — PROGRESS NOTES
CLINICAL NUTRITION SERVICES - DISCHARGE NOTE    Patient s discharge needs assessed and discharge planning has been conducted with the multidisciplinary transplant care team including physicians, pharmacy, social work and transplant coordinator.    Follow up/Monitoring:  Once discharged, place outpatient nutrition consult via the transplant team if nutrition concerns arise.    Moriah Garcia, MS, RD, LD, [facility]   6C Pgr: [phone]     Tolerated exercise well, no complaints voiced.

## 2023-01-16 ENCOUNTER — OFFICE VISIT (OUTPATIENT)
Dept: CARDIOLOGY | Facility: CLINIC | Age: 88
End: 2023-01-16
Payer: MEDICARE

## 2023-01-16 VITALS
SYSTOLIC BLOOD PRESSURE: 112 MMHG | HEART RATE: 69 BPM | BODY MASS INDEX: 27.31 KG/M2 | DIASTOLIC BLOOD PRESSURE: 70 MMHG | WEIGHT: 174 LBS | HEIGHT: 67 IN

## 2023-01-16 DIAGNOSIS — I35.0 AORTIC STENOSIS, MODERATE: Primary | ICD-10-CM

## 2023-01-16 DIAGNOSIS — I10 ESSENTIAL HYPERTENSION: ICD-10-CM

## 2023-01-16 DIAGNOSIS — I25.10 CORONARY ARTERY DISEASE INVOLVING NATIVE CORONARY ARTERY OF NATIVE HEART WITHOUT ANGINA PECTORIS: ICD-10-CM

## 2023-01-16 DIAGNOSIS — E78.2 MIXED HYPERLIPIDEMIA: ICD-10-CM

## 2023-01-16 PROCEDURE — 93000 ELECTROCARDIOGRAM COMPLETE: CPT | Performed by: INTERNAL MEDICINE

## 2023-01-16 PROCEDURE — 99214 OFFICE O/P EST MOD 30 MIN: CPT | Performed by: INTERNAL MEDICINE

## 2023-01-16 NOTE — PROGRESS NOTES
Date of Office Visit: 23    Encounter Provider: Olaf Sales MD  Place of Service: [facility]  Patient Name: Vicente Delgado  :1928    Chief Complaint   Patient presents with   • Coronary Artery Disease   • Follow-up     6 month   Paroxysmal atrial fibrillation       History of present illness:    89-year-old male with a medical history of recent TIA, esophagitis, gastroesophageal reflux disease, mild mitral valve regurgitation, moderate aortic valve stenosis, and mild coronary artery calcification who presented in 2022 with report of palpitations and intermittent chest discomfort.  Chest pain was moderate in intensity and central and resolved on its own.  Cardiac biomarkers remained negative.  EKG showed atrial fibrillation which was new with a slow ventricular rate.  Currently is resting comfortably.    Patient was started on a heparin drip.  He was transitioned to Eliquis and his beta-blocker was stopped secondary to slow ventricular rate.  He presents back in follow-up and is doing very well.  He is tolerating the Eliquis therapy without any bleeding complications.  His ventricular rate has improved to 69 bpm and he is currently in sinus rhythm with a first-degree AV block.    Previous testing and notes have been reviewed by me.   Past Medical History:   Diagnosis Date   • Allergic rhinitis    • Anxiety    • Arthritis    • Atrial fibrillation (HCC)    • CAD (coronary artery disease)    • Cancer (HCC)    • Cataract 2018” and    Ocular mygraine   • Cholelithiasis    • Depression    • Diverticulosis    • Esophagitis    • Gastritis    • GERD (gastroesophageal reflux disease)    • Heart disease    • History of anxiety    • History of medical problems Right shoulder replacemd       • History of prostate cancer 1990   • HL (hearing loss) Partial   • Hyperlipidemia    • Hypertension    • Insomnia    • Low back pain Yes  from  hworking   • Lupus (HCC)    • TAM (nonalcoholic steatohepatitis)    • Sciatica of right side    • Visual impairment Ocular mygraine       Past Surgical History:   Procedure Laterality Date   • ABDOMINAL SURGERY     • CARDIAC CATHETERIZATION  11/16/1995   • CARDIAC SURGERY  11/16/1995   • CATARACT EXTRACTION Left 07/2018   • CHOLECYSTECTOMY  2020   • CHOLECYSTECTOMY WITH INTRAOPERATIVE CHOLANGIOGRAM N/A 9/29/2020    Procedure: Laparoscopic cholecystectomy;  Surgeon: Javi Peralta MD;  Location: [facility] OR;  Service: General;  Laterality: N/A;   • COLONOSCOPY  01/09/2002   • ELBOW PROCEDURE     • JOINT REPLACEMENT     • LUNG BIOPSY     • LYMPH NODE BIOPSY  Yes    1992   • NASAL POLYP SURGERY     • PACEMAKER IMPLANTATION     • PROSTATE SURGERY  06/1990   • SHOULDER ROTATOR CUFF REPAIR Right 08/24/2011    Dr. Bach   • SIGMOIDOSCOPY     • TONSILLECTOMY  Yes 1943   • UPPER GASTROINTESTINAL ENDOSCOPY  09/12/1997    Gastritis, Duodenitis, hiatal hernia (Pathology: Gastric antrum minimal chronic inflammation)   • UPPER GASTROINTESTINAL ENDOSCOPY  04/11/2005    Mild esophagitis, Small hiatal hernia, Mild gastritis and mild duodenitis       Social History     Socioeconomic History   • Marital status:    Tobacco Use   • Smoking status: Passive Smoke Exposure - Never Smoker     Passive exposure: Yes   • Smokeless tobacco: Never   • Tobacco comments:     Smoke a cigar ocassionally   Substance and Sexual Activity   • Alcohol use: No     Comment: Daily caffeine use   • Drug use: No   • Sexual activity: Not Currently     Partners: Female       Family History   Problem Relation Age of Onset   • Heart failure Mother    • Hearing loss Mother    • Heart disease Mother         Mother had congestive heart failure   • Hyperlipidemia Mother    • Alcohol abuse Father    • Diabetes Father        Review of Systems   Constitutional: Negative.   HENT: Negative.    Eyes: Negative.    Cardiovascular: Negative.    Respiratory:  "Negative.    Endocrine: Negative.    Skin: Negative.    Musculoskeletal: Negative.    Gastrointestinal: Negative.    Genitourinary: Negative.    Neurological: Negative.    Psychiatric/Behavioral: Negative.    Allergic/Immunologic: Negative.        Allergies   Allergen Reactions   • Lidocaine Dizziness     Reaction to novacaine   • Lovastatin Other (See Comments)     Liver enzymes elevated   • Pravastatin Other (See Comments)     Elevated liver enzymes    • Gabapentin GI Intolerance     Bloating, incontinence    • Procaine    • Simvastatin          Current Outpatient Medications:   •  apixaban (ELIQUIS) 5 MG tablet tablet, Take 1 tablet by mouth Every 12 (Twelve) Hours. Indications: Atrial Fibrillation, Disp: 180 tablet, Rfl: 1  •  aspirin 81 MG chewable tablet, Chew 81 mg Daily., Disp: , Rfl:   •  benazepril (LOTENSIN) 20 MG tablet, Take 0.5 tablets by mouth 2 (Two) Times a Day. Call if blood pressure running above 160/90., Disp: 180 tablet, Rfl: 3  •  ezetimibe (ZETIA) 10 MG tablet, Take 1 tablet by mouth Daily., Disp: 90 tablet, Rfl: 3  •  pantoprazole (PROTONIX) 40 MG EC tablet, TAKE ONE TABLET BY MOUTH DAILY, Disp: 90 tablet, Rfl: 1  •  rosuvastatin (CRESTOR) 10 MG tablet, TAKE 1 TABLET BY MOUTH EVERY NIGHT, Disp: 90 tablet, Rfl: 3      Objective:     Vitals:    01/16/23 1358   Height: 170.2 cm (67\")     Body mass index is 27.06 kg/m².   PHYSICAL EXAM:    Constitutional:       Appearance: Healthy appearance. Not in distress.   Neck:      Vascular: No JVR. JVD normal.   Pulmonary:      Effort: Pulmonary effort is normal.      Breath sounds: Normal breath sounds. No wheezing. No rhonchi. No rales.   Chest:      Chest wall: Not tender to palpatation.   Cardiovascular:      PMI at left midclavicular line. Normal rate. Regular rhythm. Normal S1. Normal S2.      Murmurs: There is a systolic murmur.      No gallop. No click. No rub.   Pulses:     Intact distal pulses.   Edema:     Peripheral edema absent.   Abdominal: "      General: Bowel sounds are normal.      Palpations: Abdomen is soft.      Tenderness: There is no abdominal tenderness.   Musculoskeletal: Normal range of motion.         General: No tenderness. Skin:     General: Skin is warm and dry.   Neurological:      General: No focal deficit present.      Mental Status: Alert and oriented to person, place and time.           ECG 12 Lead    Date/Time: 1/16/2023 2:27 PM  Performed by: Olaf Sales MD  Authorized by: Olaf Sales MD   Comparison: compared with previous ECG from 12/21/2022  Comparison to previous ECG: No longer in afib  Rhythm: sinus rhythm  Rate: normal  Conduction: 1st degree AV block                Cardiac studies:  2D Echocardiogram 6/6/2022:  LVEF 60%, moderate LVH, grade 1 diastolic dysfunction  Normal RV size and systolic function  Moderately dilated LA  Moderate calcification of AV, trace AI, moderate to severe AAS, HELIO 0.94 cm², max pressure gradient 57.9 mmHg, mean pressure gradient 31.7 mmHg, DI 0.20  Mild MR, no MS  No evidence of pulmonary hypertension  No dilation of aortic root      Assessment:     Plan:       1.  Paroxysmal atrial fibrillation: Currently sinus.  Not on beta-blocker secondary to slow resting heart rate while taking.  -Continue Eliquis 5 mg p.o. every 12 hours.  No bleeding complications.  Labs been reviewed.    2.  Moderate to severe aortic stenosis: HELIO 0.94 cm², max pressure gradient 57.9 mmHg, mean pressure gradient 31.7 mmHg, DI 0.2.   -Repeat TTE in 6mo.     3   Nonrheumatic mitral regurgitation: Mild    4.  Hypertension: Well-controlled on current medication no changes indicated.           Your medication list          Accurate as of January 16, 2023  2:10 PM. If you have any questions, ask your nurse or doctor.            CONTINUE taking these medications      Instructions Last Dose Given Next Dose Due   apixaban 5 MG tablet tablet  Commonly known as: ELIQUIS      Take 1 tablet by mouth Every 12  (Twelve) Hours. Indications: Atrial Fibrillation       aspirin 81 MG chewable tablet      Chew 81 mg Daily.       benazepril 20 MG tablet  Commonly known as: LOTENSIN      Take 0.5 tablets by mouth 2 (Two) Times a Day. Call if blood pressure running above 160/90.       ezetimibe 10 MG tablet  Commonly known as: ZETIA      Take 1 tablet by mouth Daily.       pantoprazole 40 MG EC tablet  Commonly known as: PROTONIX      TAKE ONE TABLET BY MOUTH DAILY       rosuvastatin 10 MG tablet  Commonly known as: CRESTOR      TAKE 1 TABLET BY MOUTH EVERY NIGHT

## 2023-01-29 NOTE — ASSESSMENT & PLAN NOTE
He had a TIA late last year, advised to postpone carpal tunnel surgery for now and continue to monitor.

## 2023-01-29 NOTE — PROGRESS NOTES
Transitional Care Follow Up Visit  Subjective     Vicente Delgado is a 89 y.o. male who presents for a transitional care management visit.    Within 48 business hours after discharge our office contacted him via telephone to coordinate his care and needs.      I reviewed and discussed the details of that call along with the discharge summary, hospital problems, inpatient lab results, inpatient diagnostic studies, and consultation reports with Vicente.     Current outpatient and discharge medications have been reconciled for the patient.  Reviewed by: GE Munguia      Date of TCM Phone Call 12/22/2022   [facility]   Date of Admission 12/21/2022   Date of Discharge 12/22/2022   Discharge Disposition Home or Self Care     Risk for Readmission (LACE) No data recorded    History of Present Illness   Course During Hospital Stay:      He presented to the ER 12/21/22 with dizziness and palpitations, found to be in atrial fibrillation and was admitted for further evaluation. His recent Holter monitor did not show atrial fibrillation but did reveal bradycardia. His beta blocker was discontinued, stable at discharge and today in the office. He was also started on anticoagulation (Eliquis) which he is tolerating well without bleeding..    He denies any additional episodes of chest discomfort or tightness.    He is scheduled for carpal tunnel surgery 2/2023.     The following portions of the patient's history were reviewed and updated as appropriate: allergies, current medications, past family history, past medical history, past social history, past surgical history and problem list.    Review of Systems   Respiratory: Negative for cough and shortness of breath.    Cardiovascular: Negative for chest pain, palpitations and leg swelling.   Gastrointestinal: Negative for abdominal pain.   Musculoskeletal: Positive for arthralgias.   Neurological: Positive for numbness.       Objective   Physical  Exam  Constitutional:       Appearance: He is well-developed. He is not ill-appearing.   HENT:      Head: Normocephalic.      Right Ear: Hearing, tympanic membrane and external ear normal.      Left Ear: Hearing, tympanic membrane and external ear normal.      Nose: Nose normal. No nasal deformity, mucosal edema or rhinorrhea.      Right Sinus: No maxillary sinus tenderness or frontal sinus tenderness.      Left Sinus: No maxillary sinus tenderness or frontal sinus tenderness.      Mouth/Throat:      Dentition: Normal dentition.   Eyes:      General: Lids are normal.         Right eye: No discharge.         Left eye: No discharge.      Conjunctiva/sclera: Conjunctivae normal.      Right eye: No exudate.     Left eye: No exudate.  Neck:      Thyroid: No thyroid mass or thyromegaly.      Vascular: No carotid bruit.      Trachea: Trachea normal.   Cardiovascular:      Rate and Rhythm: Regular rhythm.      Pulses: Normal pulses.      Heart sounds: Normal heart sounds. No murmur heard.  Pulmonary:      Effort: No respiratory distress.      Breath sounds: Normal breath sounds. No decreased breath sounds, wheezing, rhonchi or rales.   Abdominal:      General: Bowel sounds are normal.      Palpations: Abdomen is soft.      Tenderness: There is no abdominal tenderness.   Musculoskeletal:      Cervical back: Normal range of motion. No edema.   Lymphadenopathy:      Head:      Right side of head: No submental, submandibular, tonsillar, preauricular, posterior auricular or occipital adenopathy.      Left side of head: No submental, submandibular, tonsillar, preauricular, posterior auricular or occipital adenopathy.   Skin:     General: Skin is warm and dry.      Nails: There is no clubbing.   Neurological:      Mental Status: He is alert.   Psychiatric:         Behavior: Behavior is cooperative.         Assessment & Plan   Diagnoses and all orders for this visit:    1. New onset a-fib (HCC) (Primary)  Assessment & Plan:  He  denies additional palpitations an/do chest tightness, managed on Eliquis daily. He is not on a beta blocker du to bradycardia.    Orders:  -     apixaban (ELIQUIS) 5 MG tablet tablet; Take 1 tablet by mouth Every 12 (Twelve) Hours. Indications: Atrial Fibrillation  Dispense: 180 tablet; Refill: 1    2. Primary hypertension  Assessment & Plan:  BP is well-controlled on benazepril which he will continue along with a low sodium diet.      3. Carpal tunnel syndrome of left wrist  Assessment & Plan:  He had a TIA late last year, advised to postpone carpal tunnel surgery for now and continue to monitor.      Current outpatient and discharge medications have been reconciled for the patient.  Reviewed by: GE Munguia

## 2023-02-06 RX ORDER — PANTOPRAZOLE SODIUM 40 MG/1
TABLET, DELAYED RELEASE ORAL
Qty: 90 TABLET | Refills: 1 | Status: SHIPPED | OUTPATIENT
Start: 2023-02-06

## 2023-02-13 ENCOUNTER — TELEPHONE (OUTPATIENT)
Dept: CARDIOLOGY | Facility: CLINIC | Age: 88
End: 2023-02-13
Payer: MEDICARE

## 2023-02-13 NOTE — TELEPHONE ENCOUNTER
I tried calling Sandra and unable to reach her. Her phone voicemail if full and can't leave a message.   Russell elliott

## 2023-02-13 NOTE — TELEPHONE ENCOUNTER
MESSAGE FROM Friday 2/10/23 left on Voicemail:    Daughter Sandra called to see if OK for patient to take Benadryl for allergies.   She states he is having an allergy attack.   Please advise   Sandra call back   # [phone]    Thanks Russell DASH

## 2023-02-28 ENCOUNTER — TELEPHONE (OUTPATIENT)
Dept: CARDIOLOGY | Facility: HOSPITAL | Age: 88
End: 2023-02-28
Payer: MEDICARE

## 2023-03-02 DIAGNOSIS — R04.0 EPISTAXIS: Primary | ICD-10-CM

## 2023-03-02 NOTE — TELEPHONE ENCOUNTER
He has history of TIA and recently with new onset of a-fib.  Would like for him to stay on anticoagulation unless there is no other choice.

## 2023-03-02 NOTE — TELEPHONE ENCOUNTER
Patient needs referral to ENT if you would please.    He is requesting Dr. Jorge Jorge.    Thank you!

## 2023-03-02 NOTE — TELEPHONE ENCOUNTER
The primary care physician will be able to do anything for the nosebleed.  He needs to see ear nose and throat.  Please also tell him to hold his anticoagulation for now.  Please let me know if we need to make a referral for ENT.

## 2023-03-02 NOTE — TELEPHONE ENCOUNTER
I spoke with Gordon and he had recommended that patient follow up with PCP.  It sounded as though this could be something that may be able to be cauterized.

## 2023-03-02 NOTE — TELEPHONE ENCOUNTER
Spoke with patient.  Informed him of recommendation.    He states that he is getting conflicting information from our office.    He states that he spoke with Tori yesterday and was told to hold a-ban for 2 days.    So he didn't take PM dose of a-ban last night or AM dose yet this morning.    What should the patient be doing?

## 2023-03-02 NOTE — TELEPHONE ENCOUNTER
Sounds good.  Please call his office and see if you can expedite the consultation.  I have placed the order.  Thanks

## 2023-03-02 NOTE — TELEPHONE ENCOUNTER
Patient calling back about nose bleeds and is concerned because he continues to have them.    He states that he spoke with Gordon Woodruff when he was on call Tuesday night.    GE Tran is actually the one that prescribes eliquis for him.    Can someone please call patient at [phone].

## 2023-03-02 NOTE — TELEPHONE ENCOUNTER
Left voicemail informing patient.    Informed him that if he needed an ENT referral to let us know.

## 2023-03-03 NOTE — TELEPHONE ENCOUNTER
See below. Dr Sales put in a referral for ENT.    Can you guys get him on as an urgent referral when you get a chance.    Thanks,  Marta

## 2023-03-06 NOTE — TELEPHONE ENCOUNTER
Pt and daughter Sandra stopped by this morning. They were wanting to know what to do with his medications since he continues to have nose bleeding. They wanted to know if he needed to go to the ER.     I had him pull the tissue from his nose and his bleeding had stopped. So I told him he didn't need to go to the ER at this time and to see Dr. Jorge tomorrow as planned.    Per conversation with Dr. Sales, he is to stay off of the Eliquis until after his appt with Dr. Jorge. Going back on would depend on the course of his treatment.    I spoke with Sandra and they agree with the plan.      Marta

## 2023-03-07 ENCOUNTER — TELEPHONE (OUTPATIENT)
Dept: CARDIOLOGY | Facility: CLINIC | Age: 88
End: 2023-03-07
Payer: MEDICARE

## 2023-03-07 NOTE — TELEPHONE ENCOUNTER
Pt's daughter Sandra called.     He saw Dr. Jorge today. She said that Dr. Jorge told them to stop the Aspirin 81mg and resume the Eliquis 5mg. And see how that goes.    They will keep us in the loop.    Marta

## 2023-03-10 NOTE — TELEPHONE ENCOUNTER
The patients daughter today regarding nosebleeds. The patient is still having them frequently and he is able to stop the bleeding after 10 minutes. She would like to know if this will be an ongoing issue or what she should do. Please advise

## 2023-03-10 NOTE — TELEPHONE ENCOUNTER
If he has been off his Eliquis for some time now, it is likely not related to his blood thinner.  What did Dr. Jorge say? May be he should see ENT about this so that he feels comfortable restarting Eliquis.

## 2023-03-14 ENCOUNTER — OFFICE VISIT (OUTPATIENT)
Dept: INTERNAL MEDICINE | Facility: CLINIC | Age: 88
End: 2023-03-14
Payer: MEDICARE

## 2023-03-14 VITALS
SYSTOLIC BLOOD PRESSURE: 122 MMHG | BODY MASS INDEX: 27.81 KG/M2 | HEIGHT: 67 IN | HEART RATE: 82 BPM | DIASTOLIC BLOOD PRESSURE: 84 MMHG | OXYGEN SATURATION: 97 % | TEMPERATURE: 97.7 F | WEIGHT: 177.2 LBS

## 2023-03-14 DIAGNOSIS — J06.9 ACUTE URI: Primary | ICD-10-CM

## 2023-03-14 PROCEDURE — 99213 OFFICE O/P EST LOW 20 MIN: CPT | Performed by: NURSE PRACTITIONER

## 2023-03-14 PROCEDURE — 1159F MED LIST DOCD IN RCRD: CPT | Performed by: NURSE PRACTITIONER

## 2023-03-14 PROCEDURE — 1160F RVW MEDS BY RX/DR IN RCRD: CPT | Performed by: NURSE PRACTITIONER

## 2023-03-14 RX ORDER — CEFDINIR 300 MG/1
300 CAPSULE ORAL 2 TIMES DAILY
Qty: 14 CAPSULE | Refills: 0 | Status: SHIPPED | OUTPATIENT
Start: 2023-03-14 | End: 2023-03-21

## 2023-03-14 RX ORDER — GUAIFENESIN 600 MG/1
600 TABLET, EXTENDED RELEASE ORAL EVERY 12 HOURS SCHEDULED
Qty: 14 TABLET
Start: 2023-03-14 | End: 2023-03-21

## 2023-03-21 ENCOUNTER — TELEPHONE (OUTPATIENT)
Dept: CARDIOLOGY | Facility: CLINIC | Age: 88
End: 2023-03-21
Payer: MEDICARE

## 2023-03-21 DIAGNOSIS — I48.91 NEW ONSET A-FIB: ICD-10-CM

## 2023-03-21 NOTE — TELEPHONE ENCOUNTER
See below.   Per previous message Dr. Jorge had him stop the Aspirin 81mg qd. Apparently this hasn't helped.    Please advise.    Thanks,  Marta

## 2023-03-21 NOTE — TELEPHONE ENCOUNTER
"I am on call provider. Patient was previously taken on aspirin. He tried saline to nasal passages and petroleum jelly and still woke up with a nose bleed today. He is frustrated and states he has been told since last Thursday that someone will call him back but \"no one did\".  I discussed that I think that we need to consider 2-3 full days off eliquis, monitor bleeding and then possibly decrease his dose to 2.5 mg twice daily although technically his age, weight and last renal function from 12/2022 indicate he should remain on 5 BID. He is not comfortable to stay on full dose eliquis now given bleeding.       I have now advised patient to hold eliquis for a full 48 hours for nose bleeds intermittently. He appropriately did not take eliquis this am.      Patient states he would be agreeable to try lower dose eliquis. I told him I will first send a message directly to Dr. Sales and that either Dr. Sales or myself would be back in touch with him personally.    Dr. Sales- he is having recurrent nose bleeds. Can we see how he dose after 3 complete days off eliquis and then start lower dose eliquis 2.5 mg twice daily? IF not, what is your recommendation for Vicente? I am happy to call him personally      "

## 2023-03-21 NOTE — TELEPHONE ENCOUNTER
"  Caller: Vicente Delgado \"Ed\"    Relationship: Self    Best call back number: [phone]    What is the best time to reach you: ANYTIME    Who are you requesting to speak with (clinical staff, provider,  specific staff member): CLINICAL        What was the call regarding: PT IS ON ELIQUIS, HE IS STILL HAVING NOSE BLEEDS AND THEY ARE MORE FREQUENT AND MORE BLOOD AND IT TAKES AN HOUR OR SO TO STOP THE BLEEDING.  CAN YOU CHANGE THE MEDICATION OR CHANGE DOSE? PLEASE ADVISE.    Do you require a callback: YES          "

## 2023-03-22 NOTE — TELEPHONE ENCOUNTER
Pt called back. Went over Henny's recommendations. He verbalized understanding. He said he does not need samples right now, but will call us if he wants to pick samples up.    Thank you,    Ramona Hale RN  Triage [facility]  03/22/23 10:59 EDT

## 2023-03-22 NOTE — TELEPHONE ENCOUNTER
Called and left VM. Will continue to try to reach patient.     Jolanta Pena RN  Triage [facility]

## 2023-03-22 NOTE — TELEPHONE ENCOUNTER
Triage- please let him know that I have personally reviewed with Dr. Sales this am who is aware of his nose bleeds and my instruction to hold eliquis 48 hours. Moving forward, We will decrease his Eliquis to 2.5 mg twice daily. He will hold Eliquis all day today again. He will try restarting eliquis tomorrow at half dose. Let him know that he may come  eliquis 2.5 mg samples for 2 weeks and we will also update his script at this pharmacy. To ration his 5 mg tablets, he has capability to split them also.     If he wants samples, please help me by getting him some up front and labeled with his name and  for him to  at his convenience

## 2023-03-28 NOTE — PROGRESS NOTES
"Chief Complaint  Sinus Problem    Subjective        Vicente Delgado presents to [facility] who presents due to respiratory symptoms.    History of Present Illness  He returned from [place] 2/22, c/o increased congestion and postnasal drainage over the past several weeks. He experienced 2 nosebleeds last Thursday, resolved with applying pressure. He c/o a loose, productive cough with increased facial pressure. Denies fever and/or chills. COVID-19 test has been negative. He has used nasal saline due to nosebleeds.    Objective   Vital Signs:  /84 (BP Location: Left arm, Patient Position: Sitting, Cuff Size: Adult)   Pulse 82   Temp 97.7 °F (36.5 °C) (Temporal)   Ht 170.2 cm (67\")   Wt 80.4 kg (177 lb 3.2 oz)   SpO2 97%   BMI 27.75 kg/m²   Estimated body mass index is 27.75 kg/m² as calculated from the following:    Height as of this encounter: 170.2 cm (67\").    Weight as of this encounter: 80.4 kg (177 lb 3.2 oz).       BMI is >= 25 and <30. (Overweight) The following options were offered after discussion;: nutrition counseling/recommendations      Physical Exam  HENT:      Head: Normocephalic.      Right Ear: External ear normal. Tympanic membrane is bulging.      Left Ear: External ear normal. Tympanic membrane is bulging.      Nose: Nose normal.      Mouth/Throat:      Pharynx: Posterior oropharyngeal erythema present.   Eyes:      General:         Right eye: No discharge.         Left eye: No discharge.      Conjunctiva/sclera: Conjunctivae normal.   Cardiovascular:      Pulses: Normal pulses.      Heart sounds: Normal heart sounds. No murmur heard.  Pulmonary:      Effort: No respiratory distress.      Breath sounds: Normal breath sounds. No wheezing, rhonchi or rales.   Musculoskeletal:      Cervical back: Normal range of motion.   Lymphadenopathy:      Cervical: No cervical adenopathy.   Skin:     General: Skin is warm and dry.   Neurological:      Mental Status: He is " alert.        Result Review :  The following data was reviewed by: GE Bustos on 03/14/2023:  Common labs    Common Labs 11/16/22 11/16/22 12/21/22 12/21/22 12/21/22 12/22/22 12/22/22    0405 0405 0515 0515 2112 0302 0302   Glucose  83  92   141 (A)   BUN  18  18   16   Creatinine  0.82  0.89   0.83   Sodium  136  134 (A)   135 (A)   Potassium  4.1  4.4   3.7   Chloride  102  99   102   Calcium  9.0  9.2   8.7   Albumin    4.00      Total Bilirubin    0.3      Alkaline Phosphatase    59      AST (SGOT)    25      ALT (SGPT)    17      WBC 4.54  6.95  6.53 6.14    Hemoglobin 13.6  14.1  13.0 12.7 (A)    Hematocrit 41.1  41.8  39.2 39.0    Platelets 187  201  184 201    (A) Abnormal value                       Assessment and Plan   Diagnoses and all orders for this visit:    1. Acute URI (Primary)  -     cefdinir (OMNICEF) 300 MG capsule; Take 1 capsule by mouth 2 (Two) Times a Day for 7 days.  Dispense: 14 capsule; Refill: 0  -     guaiFENesin (Mucinex) 600 MG 12 hr tablet; Take 1 tablet by mouth Every 12 (Twelve) Hours for 7 days.  Dispense: 14 tablet    He will start Mucinex bid for increased congestion and drainage, continue nasal saline throughout the day. Will begin Cefdinir due to persistence and worsening of symptoms. RTC if sx persist and/or worsen.       Follow Up   Return if symptoms worsen or fail to improve, for Next scheduled follow up.  Patient was given instructions and counseling regarding his condition or for health maintenance advice. Please see specific information pulled into the AVS if appropriate.       Answers for HPI/ROS submitted by the patient on 3/14/2023  What is the primary reason for your visit?: Other  Please describe your symptoms.: Sinusitis bleeding nasty drainage through mouth,an sometimes nose bleeds  Have you had these symptoms before?: No  How long have you been having these symptoms?: Greater than 2 weeks  Please list any medications you are currently taking for this  condition.: Eliquis 5mg twice daily benazqpril 10 mg twice daily [id number] mg pantoprazole 40 mg. [id number]

## 2023-04-17 ENCOUNTER — OFFICE VISIT (OUTPATIENT)
Dept: INTERNAL MEDICINE | Facility: CLINIC | Age: 88
End: 2023-04-17
Payer: MEDICARE

## 2023-04-17 VITALS
DIASTOLIC BLOOD PRESSURE: 76 MMHG | WEIGHT: 173.4 LBS | SYSTOLIC BLOOD PRESSURE: 120 MMHG | HEIGHT: 67 IN | HEART RATE: 71 BPM | OXYGEN SATURATION: 97 % | TEMPERATURE: 97.1 F | BODY MASS INDEX: 27.21 KG/M2

## 2023-04-17 DIAGNOSIS — G56.02 CARPAL TUNNEL SYNDROME OF LEFT WRIST: Chronic | ICD-10-CM

## 2023-04-17 DIAGNOSIS — I48.0 PAROXYSMAL ATRIAL FIBRILLATION: Chronic | ICD-10-CM

## 2023-04-17 DIAGNOSIS — I10 PRIMARY HYPERTENSION: Chronic | ICD-10-CM

## 2023-04-17 DIAGNOSIS — R04.0 EPISTAXIS: Primary | ICD-10-CM

## 2023-04-17 DIAGNOSIS — G45.9 TIA (TRANSIENT ISCHEMIC ATTACK): ICD-10-CM

## 2023-04-17 LAB
ALBUMIN SERPL-MCNC: 4.7 G/DL (ref 3.5–5.2)
ALBUMIN/GLOB SERPL: 1.7 G/DL
ALP SERPL-CCNC: 67 U/L (ref 39–117)
ALT SERPL-CCNC: 19 U/L (ref 1–41)
AST SERPL-CCNC: 27 U/L (ref 1–40)
BASOPHILS # BLD AUTO: 0.02 10*3/MM3 (ref 0–0.2)
BASOPHILS NFR BLD AUTO: 0.3 % (ref 0–1.5)
BILIRUB SERPL-MCNC: 0.5 MG/DL (ref 0–1.2)
BUN SERPL-MCNC: 22 MG/DL (ref 8–23)
BUN/CREAT SERPL: 23.4 (ref 7–25)
CALCIUM SERPL-MCNC: 9.5 MG/DL (ref 8.2–9.6)
CHLORIDE SERPL-SCNC: 102 MMOL/L (ref 98–107)
CO2 SERPL-SCNC: 24.1 MMOL/L (ref 22–29)
CREAT SERPL-MCNC: 0.94 MG/DL (ref 0.76–1.27)
EGFRCR SERPLBLD CKD-EPI 2021: 74.7 ML/MIN/1.73
EOSINOPHIL # BLD AUTO: 0.04 10*3/MM3 (ref 0–0.4)
EOSINOPHIL NFR BLD AUTO: 0.6 % (ref 0.3–6.2)
ERYTHROCYTE [DISTWIDTH] IN BLOOD BY AUTOMATED COUNT: 11.8 % (ref 12.3–15.4)
GLOBULIN SER CALC-MCNC: 2.7 GM/DL
GLUCOSE SERPL-MCNC: 86 MG/DL (ref 65–99)
HCT VFR BLD AUTO: 44 % (ref 37.5–51)
HGB BLD-MCNC: 15 G/DL (ref 13–17.7)
IMM GRANULOCYTES # BLD AUTO: 0.03 10*3/MM3 (ref 0–0.05)
IMM GRANULOCYTES NFR BLD AUTO: 0.5 % (ref 0–0.5)
LYMPHOCYTES # BLD AUTO: 1.37 10*3/MM3 (ref 0.7–3.1)
LYMPHOCYTES NFR BLD AUTO: 20.9 % (ref 19.6–45.3)
MCH RBC QN AUTO: 30.6 PG (ref 26.6–33)
MCHC RBC AUTO-ENTMCNC: 34.1 G/DL (ref 31.5–35.7)
MCV RBC AUTO: 89.8 FL (ref 79–97)
MONOCYTES # BLD AUTO: 1 10*3/MM3 (ref 0.1–0.9)
MONOCYTES NFR BLD AUTO: 15.3 % (ref 5–12)
NEUTROPHILS # BLD AUTO: 4.09 10*3/MM3 (ref 1.7–7)
NEUTROPHILS NFR BLD AUTO: 62.4 % (ref 42.7–76)
NRBC BLD AUTO-RTO: 0 /100 WBC (ref 0–0.2)
PLATELET # BLD AUTO: 240 10*3/MM3 (ref 140–450)
POTASSIUM SERPL-SCNC: 4.3 MMOL/L (ref 3.5–5.2)
PROT SERPL-MCNC: 7.4 G/DL (ref 6–8.5)
RBC # BLD AUTO: 4.9 10*6/MM3 (ref 4.14–5.8)
SODIUM SERPL-SCNC: 139 MMOL/L (ref 136–145)
TSH SERPL DL<=0.005 MIU/L-ACNC: 2.05 UIU/ML (ref 0.27–4.2)
WBC # BLD AUTO: 6.55 10*3/MM3 (ref 3.4–10.8)

## 2023-04-17 RX ORDER — MULTIPLE VITAMINS W/ MINERALS TAB 9MG-400MCG
1 TAB ORAL DAILY
COMMUNITY

## 2023-04-17 RX ORDER — CLOTRIMAZOLE 1 %
1 CREAM (GRAM) TOPICAL 2 TIMES DAILY
COMMUNITY

## 2023-04-17 RX ORDER — FLUOROURACIL 50 MG/G
1 CREAM TOPICAL 2 TIMES DAILY
COMMUNITY

## 2023-04-17 NOTE — ASSESSMENT & PLAN NOTE
He has managed on Eliquis 5 mg twice daily which was recently lowered to 2.5 mg twice daily due to complaints of nosebleeds.  He denies any palpitations, continue to monitor for persistent nosebleeds.

## 2023-04-17 NOTE — ASSESSMENT & PLAN NOTE
His Eliquis dose was lowered from 5 mg to 2 mg twice daily and does note improvement in nosebleeds, continue to monitor.  He has seen ENT in evaluation as well.

## 2023-04-17 NOTE — ASSESSMENT & PLAN NOTE
Hypertension is unchanged.  Continue current treatment regimen.  Dietary sodium restriction.  Blood pressure will be reassessed at the next regular appointment.  He will continue benazepril which he is tolerating well.

## 2023-04-17 NOTE — ASSESSMENT & PLAN NOTE
Coronary artery disease is unchanged.  Continue current treatment regimen.  Cardiac status will be reassessed at next f/u appt, followed by cardiology as well..

## 2023-04-17 NOTE — PROGRESS NOTES
"Chief Complaint  Hypertension (6 month follow up), Atrial Fibrillation, and Transient Ischemic Attack    Subjective        Vicente Delgado presents to [facility] for f/u regarding TIA, HTN and atrial fibrillation.    History of Present Illness  He was recently lowered from Eliquis 5 mg twice daily to 2.5 mg twice daily due to complaints of nosebleeds.  He states his symptoms were doing well until last Thursday when he was outside and noted increased congestion and drainage.  He did have a nosebleed from the right nostril which lasted approximately 3 to 4 hours.  He has seen ENT (Dr. Pretty) for management of epistaxis, cauterization not recommended.  He is also followed by Dr. 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  He reports slowed mental processes since his TIA last year.  He continues to remain independent and lives alone with the assistance of his daughters.  However, he is awaiting completion of his new home and assisted living.        Objective   Vital Signs:  /76 (BP Location: Left arm, Patient Position: Sitting, Cuff Size: Adult)   Pulse 71   Temp 97.1 °F (36.2 °C) (Temporal)   Ht 170.2 cm (67\")   Wt 78.7 kg (173 lb 6.4 oz)   SpO2 97%   BMI 27.16 kg/m²   Estimated body mass index is 27.16 kg/m² as calculated from the following:    Height as of this encounter: 170.2 cm (67\").    Weight as of this encounter: 78.7 kg (173 lb 6.4 oz).             Physical Exam  Constitutional:       Appearance: He is well-developed. He is not ill-appearing.   HENT:      Head: Normocephalic.      Right Ear: Decreased hearing noted.      Left Ear: Decreased hearing noted.      Ears:      Comments: Bilateral hearing aides in place     Nose: Nose normal. No nasal deformity, mucosal edema or rhinorrhea.      Right Sinus: No maxillary sinus tenderness or frontal sinus tenderness.      Left " Sinus: No maxillary sinus tenderness or frontal sinus tenderness.      Mouth/Throat:      Dentition: Normal dentition.   Eyes:      General: Lids are normal.         Right eye: No discharge.         Left eye: No discharge.      Conjunctiva/sclera: Conjunctivae normal.      Right eye: No exudate.     Left eye: No exudate.  Neck:      Thyroid: No thyroid mass or thyromegaly.      Vascular: No carotid bruit.      Trachea: Trachea normal.   Cardiovascular:      Rate and Rhythm: Regular rhythm.      Pulses: Normal pulses.      Heart sounds: Murmur heard.    Systolic murmur is present with a grade of 2/6.  Pulmonary:      Effort: No respiratory distress.      Breath sounds: Normal breath sounds. No decreased breath sounds, wheezing, rhonchi or rales.   Abdominal:      General: Bowel sounds are normal.      Palpations: Abdomen is soft.      Tenderness: There is no abdominal tenderness.   Musculoskeletal:      Cervical back: Normal range of motion. No edema.   Lymphadenopathy:      Head:      Right side of head: No submental, submandibular, tonsillar, preauricular, posterior auricular or occipital adenopathy.      Left side of head: No submental, submandibular, tonsillar, preauricular, posterior auricular or occipital adenopathy.   Skin:     General: Skin is warm and dry.      Nails: There is no clubbing.   Neurological:      Mental Status: He is alert.   Psychiatric:         Behavior: Behavior is cooperative.        Result Review :  The following data was reviewed by: GE Bustos on 04/17/2023:  Common labs        11/16/2022    04:05 12/21/2022    05:15 12/21/2022    21:12 12/22/2022    03:02   Common Labs   Glucose 83   92    141     BUN 18   18    16     Creatinine 0.82   0.89    0.83     Sodium 136   134    135     Potassium 4.1   4.4    3.7     Chloride 102   99    102     Calcium 9.0   9.2    8.7     Albumin  4.00       Total Bilirubin  0.3       Alkaline Phosphatase  59       AST (SGOT)  25       ALT  (SGPT)  17       WBC 4.54   6.95   6.53   6.14     Hemoglobin 13.6   14.1   13.0   12.7     Hematocrit 41.1   41.8   39.2   39.0     Platelets 187   201   184   201                    Assessment and Plan   Diagnoses and all orders for this visit:    1. Epistaxis (Primary)  Assessment & Plan:  His Eliquis dose was lowered from 5 mg to 2 mg twice daily and does note improvement in nosebleeds, continue to monitor.  He has seen ENT in evaluation as well.      2. TIA (transient ischemic attack)    3. Carpal tunnel syndrome of left wrist    4. Primary hypertension  Assessment & Plan:  Hypertension is unchanged.  Continue current treatment regimen.  Dietary sodium restriction.  Blood pressure will be reassessed at the next regular appointment.  He will continue benazepril which he is tolerating well.    Orders:  -     CBC & Differential  -     Comprehensive Metabolic Panel  -     TSH    5. Paroxysmal atrial fibrillation  Assessment & Plan:  He has managed on Eliquis 5 mg twice daily which was recently lowered to 2.5 mg twice daily due to complaints of nosebleeds.  He denies any palpitations, continue to monitor for persistent nosebleeds.             Follow Up   Return in about 6 months (around 10/17/2023).  Patient was given instructions and counseling regarding his condition or for health maintenance advice. Please see specific information pulled into the AVS if appropriate.       Answers for HPI/ROS submitted by the patient on 4/11/2023  What is the primary reason for your visit?: Other  Please describe your symptoms.: Fasting for a blood profil  Have you had these symptoms before?: Yes  How long have you been having these symptoms?: Greater than 2 weeks

## 2023-04-21 ENCOUNTER — TELEPHONE (OUTPATIENT)
Dept: INTERNAL MEDICINE | Facility: CLINIC | Age: 88
End: 2023-04-21
Payer: MEDICARE

## 2023-04-21 NOTE — TELEPHONE ENCOUNTER
Patient states that the nose bleeds have continued last night lasted 11:10-12:20  This morning 8:10-8:40. Concerned.  He also states he has this stomach bug with diarrhea

## 2023-04-21 NOTE — TELEPHONE ENCOUNTER
"    Caller: Vicente Delgado \"Ed\"    Relationship: Self    Best call back number: [phone]    What is the best time to reach you: ANY    Who are you requesting to speak with (clinical staff, provider,  specific staff member): CLINICAL    Do you know the name of the person who called: PATIENT    What was the call regarding: PATIENT CALLING BACK CONCERNED THAT HE IS STILL HAVING NOSE BLEEDS.  PATIENT IS ASKING WHAT HE SHOULD DO AND WOULD LIKE A CALL BACK BEFORE ESMER LEAVES TODAY SO HE DOES NOT GO THROUGH THE WEEKEND WITHOUT DIRECTION.    PATIENT STATED THAT HIS NOSE BLEED ON Thursday LASTED 4 HOURS.  TODAY IT WAS THIRTY MINUTES TODAY.    PATIENT QUESTIONING IF HE SHOULD BE TAKEN OFF   apixaban (ELIQUIS) 2.5 MG tablet tablet     PATIENT STATED THAT HE HAS NOT TAKEN HIS MEDICATION TODAY AND IS REQUESTING A CALL BACK TODAY.    Do you require a callback: YES          "

## 2023-04-21 NOTE — TELEPHONE ENCOUNTER
Spoke with patient-he continues to experience nosebleeds despite lowering Eliquis to 2.5 mg bid, nosebleeds last anywhere from 1-4 hours. He had one last night which last 2 hours. I discussed this with him today and have advised him to discontinue his Eliquis for now. He was started on this 12/2022 for new onset atrial fibrillation. He has also seen Dr. Pretty with ENT who did not feel he was a candidate for cauterization. Are you agreeable with this plan? If not, let me know and I will contact patient. Thanks.

## 2023-04-24 ENCOUNTER — TELEPHONE (OUTPATIENT)
Dept: CARDIOLOGY | Facility: CLINIC | Age: 88
End: 2023-04-24
Payer: MEDICARE

## 2023-04-24 NOTE — TELEPHONE ENCOUNTER
"  Caller: Vicente Delgado \"Ed\"    Relationship: Self    Best call back number: [phone]    PATIENT UNDERSTANDS THAT HE NEEDS TO HOLD THE ELIQUIS, BUT HE IS WANTING CLARIFICATION ON IF HE SHOULD COMPLETELY QUIT TAKING IT, OR IF HE SHOULD TAPER OFF OF IT. HE IS REQUESTING A CALL BACK ASAP.  "

## 2023-04-28 ENCOUNTER — TELEPHONE (OUTPATIENT)
Dept: CARDIOLOGY | Facility: CLINIC | Age: 88
End: 2023-04-28
Payer: MEDICARE

## 2023-04-28 NOTE — TELEPHONE ENCOUNTER
Spoke to pt who verbalized understanding.  The daughter who actually called is not on the verbal release-that is why I called the pt directly./prt

## 2023-04-28 NOTE — TELEPHONE ENCOUNTER
Note.  I would stay off aspirin.  This will not significantly decrease his stroke risk if he were to take

## 2023-04-28 NOTE — TELEPHONE ENCOUNTER
Caller: BRYAN CARRASCO    Relationship: MARCIO Johnson call back number: [phone] - ANY    What medications are you currently taking:   Current Outpatient Medications on File Prior to Visit   Medication Sig Dispense Refill   • apixaban (ELIQUIS) 2.5 MG tablet tablet Take 1 tablet by mouth Every 12 (Twelve) Hours. Indications: Atrial Fibrillation 60 tablet 5   • benazepril (LOTENSIN) 20 MG tablet Take 0.5 tablets by mouth 2 (Two) Times a Day. Call if blood pressure running above 160/90. 180 tablet 3   • clotrimazole (LOTRIMIN) 1 % cream Apply 1 application topically to the appropriate area as directed 2 (Two) Times a Day.     • ezetimibe (ZETIA) 10 MG tablet Take 1 tablet by mouth Daily. 90 tablet 3   • fluorouracil (EFUDEX) 5 % cream Apply 1 application topically to the appropriate area as directed 2 (Two) Times a Day. For 7 days     • multivitamin with minerals (PRESERVISION AREDS PO) Take 1 tablet by mouth Daily.     • pantoprazole (PROTONIX) 40 MG EC tablet TAKE ONE TABLET BY MOUTH DAILY 90 tablet 1   • rosuvastatin (CRESTOR) 10 MG tablet TAKE 1 TABLET BY MOUTH EVERY NIGHT 90 tablet 3     No current facility-administered medications on file prior to visit.            Which medication are you concerned about: ELIQUIS/BABY ASPIRIN/ANOTHER MEDICATION    Who prescribed you this medication: DR. JIMENEZ    What are your concerns: SHOULD THE PATIENT REMAIN OFF OF ELIQUIS SINCE IT CAUSES NOSE BLEEDS.    SHOULD THE PATIENT BE TAKEN BABY ASPIRINS OR IS THERE ANOTHER MEDICATION THE PATIENT SHOULD BE TAKEN?    PLEASE CALL THE PATIENT'S DAUGHTER BRYAN AFTER CONCERN HAS BEEN REVIEWED.    PLEASE REVIEW AND ADVICE. THANK YOU.

## 2023-06-07 ENCOUNTER — TELEPHONE (OUTPATIENT)
Dept: INTERNAL MEDICINE | Facility: CLINIC | Age: 88
End: 2023-06-07

## 2023-06-07 NOTE — TELEPHONE ENCOUNTER
Attempted to call patient for work in appointment, LVM advising call back    HUB    Can you ask patient if he would be able to come in this Friday, 6/9 at 3:15pm?

## 2023-06-07 NOTE — TELEPHONE ENCOUNTER
"Caller: Vicente Delgado \"Ed\"    Relationship: Self    Best call back number: [phone]    What is the medical concern/diagnosis: BACK PAIN FROM LUMBAR DISC COMPRESSION     What specialty or service is being requested: PHYSICAL THERAPY     What is the provider, practice or medical service name: [facility]    What is the office location: [facility]      "

## 2023-06-09 ENCOUNTER — OFFICE VISIT (OUTPATIENT)
Dept: INTERNAL MEDICINE | Facility: CLINIC | Age: 88
End: 2023-06-09
Payer: MEDICARE

## 2023-06-09 VITALS
SYSTOLIC BLOOD PRESSURE: 128 MMHG | HEIGHT: 67 IN | WEIGHT: 170.6 LBS | OXYGEN SATURATION: 96 % | BODY MASS INDEX: 26.78 KG/M2 | DIASTOLIC BLOOD PRESSURE: 74 MMHG | TEMPERATURE: 98.6 F | HEART RATE: 86 BPM

## 2023-06-09 DIAGNOSIS — M48.061 SPINAL STENOSIS OF LUMBAR REGION WITHOUT NEUROGENIC CLAUDICATION: Primary | ICD-10-CM

## 2023-06-09 PROCEDURE — 1159F MED LIST DOCD IN RCRD: CPT | Performed by: NURSE PRACTITIONER

## 2023-06-09 PROCEDURE — 99213 OFFICE O/P EST LOW 20 MIN: CPT | Performed by: NURSE PRACTITIONER

## 2023-06-09 PROCEDURE — 1160F RVW MEDS BY RX/DR IN RCRD: CPT | Performed by: NURSE PRACTITIONER

## 2023-06-10 NOTE — PROGRESS NOTES
"Chief Complaint  Back Pain    Subjective        Vicente Delgado presents to [facility] due to low back pain and stiffness.    History of Present Illness  He has discontinued Eliquis and aspirin and has not had any additional nosebleeds.  He has moved into [place] [facility] and is doing well.  He notes improvement in his stress level.  He presents due to low back pain and stiffness, states symptoms have been chronic but worsened with move approximately 3 weeks ago.  He was doing more lifting but denies acute injury or trauma.  He states pain is worse after prolonged sitting and with first standing, denies any radicular symptoms.  No change in urination.  Back Pain  This is a recurrent problem. The current episode started 1 to 4 weeks ago. The problem occurs intermittently. The problem has been waxing and waning since onset. The pain is present in the gluteal and sacro-iliac. The quality of the pain is described as aching, shooting and stabbing. The pain does not radiate. The pain is at a severity of 7/10. The pain is Worse during the day. The symptoms are aggravated by bending, position, sitting and twisting. Stiffness is present All day. Pertinent negatives include no abdominal pain, bladder incontinence, bowel incontinence, chest pain, dysuria, fever, headaches, leg pain, numbness, paresis, paresthesias, pelvic pain, perianal numbness, tingling, weakness or weight loss. Risk factors include history of cancer.     Objective   Vital Signs:  /74 (BP Location: Left arm, Patient Position: Sitting, Cuff Size: Adult)   Pulse 86   Temp 98.6 °F (37 °C) (Temporal)   Ht 170.2 cm (67\")   Wt 77.4 kg (170 lb 9.6 oz)   SpO2 96%   BMI 26.72 kg/m²   Estimated body mass index is 26.72 kg/m² as calculated from the following:    Height as of this encounter: 170.2 cm (67\").    Weight as of this encounter: 77.4 kg (170 lb 9.6 oz).               Physical Exam  Constitutional:       " Appearance: He is well-developed. He is not ill-appearing.   HENT:      Head: Normocephalic.      Right Ear: Decreased hearing noted.      Left Ear: Decreased hearing noted.      Ears:      Comments: Bilateral hearing aides     Nose: Nose normal. No nasal deformity, mucosal edema or rhinorrhea.      Right Sinus: No maxillary sinus tenderness or frontal sinus tenderness.      Left Sinus: No maxillary sinus tenderness or frontal sinus tenderness.      Mouth/Throat:      Dentition: Normal dentition.   Eyes:      General: Lids are normal.         Right eye: No discharge.         Left eye: No discharge.      Conjunctiva/sclera: Conjunctivae normal.      Right eye: No exudate.     Left eye: No exudate.  Neck:      Thyroid: No thyroid mass or thyromegaly.      Vascular: No carotid bruit.      Trachea: Trachea normal.   Cardiovascular:      Rate and Rhythm: Regular rhythm.      Pulses: Normal pulses.      Heart sounds: Normal heart sounds. No murmur heard.   with a grade of 2/6.   Pulmonary:      Effort: No respiratory distress.      Breath sounds: Normal breath sounds. No decreased breath sounds, wheezing, rhonchi or rales.   Abdominal:      General: Bowel sounds are normal.      Palpations: Abdomen is soft.      Tenderness: There is no abdominal tenderness.   Musculoskeletal:      Cervical back: Normal range of motion. No edema.      Lumbar back: Tenderness present.   Lymphadenopathy:      Head:      Right side of head: No submental, submandibular, tonsillar, preauricular, posterior auricular or occipital adenopathy.      Left side of head: No submental, submandibular, tonsillar, preauricular, posterior auricular or occipital adenopathy.   Skin:     General: Skin is warm and dry.      Nails: There is no clubbing.   Neurological:      Mental Status: He is alert.   Psychiatric:         Behavior: Behavior is cooperative.      Result Review :  The following data was reviewed by: GE Bustos on 06/09/2023:  Common  labs          12/21/2022    05:15 12/21/2022    21:12 12/22/2022    03:02 4/17/2023    10:39   Common Labs   Glucose 92   141  86    BUN 18   16  22    Creatinine 0.89   0.83  0.94    Sodium 134   135  139    Potassium 4.4   3.7  4.3    Chloride 99   102  102    Calcium 9.2   8.7  9.5    Total Protein    7.4    Albumin 4.00    4.7    Total Bilirubin 0.3    0.5    Alkaline Phosphatase 59    67    AST (SGOT) 25    27    ALT (SGPT) 17    19    WBC 6.95  6.53  6.14  6.55    Hemoglobin 14.1  13.0  12.7  15.0    Hematocrit 41.8  39.2  39.0  44.0    Platelets 201  184  201  240                   Assessment and Plan   Diagnoses and all orders for this visit:    1. 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  We discussed the use of muscle relaxers which I am hesitant to use due to increased risk of fall.  However, he will call the office if symptoms do not improve.  He will begin physical therapy for further strengthening and improved range of motion, requests [facility] which is close to his new home.         Follow Up   Return if symptoms worsen or fail to improve, for Next scheduled follow up.  Patient was given instructions and counseling regarding his condition or for health maintenance advice. Please see specific information pulled into the AVS if appropriate.       Answers submitted by the patient for this visit:  Primary Reason for Visit (Submitted on 6/7/2023)  What is the primary reason for your visit?: Back Pain

## 2023-06-12 ENCOUNTER — TELEPHONE (OUTPATIENT)
Dept: INTERNAL MEDICINE | Facility: CLINIC | Age: 88
End: 2023-06-12

## 2023-06-12 NOTE — TELEPHONE ENCOUNTER
Caller: [facility]    Relationship: NIXON    Best call back number: [phone]     What specialty or service is being requested: PHYSICAL THERAPY    What is the provider, practice or medical service name: [facility]    What is the office location: [facility]    What is the office phone number: [phone]     Any additional details: [facility] STATES THAT PATIENT CAME TO THEIR OFFICE TO SCHEDULE FOLLOWING HIS APPOINTMENT WITH HIS PCP. THEY WENT AHEAD AND SCHEDULED HIM, BUT WILL NEED A REFERRAL FOR INSURANCE PURPOSES

## 2023-08-08 DIAGNOSIS — I10 PRIMARY HYPERTENSION: Chronic | ICD-10-CM

## 2023-08-08 DIAGNOSIS — E78.2 MIXED HYPERLIPIDEMIA: Chronic | ICD-10-CM

## 2023-08-08 RX ORDER — ROSUVASTATIN CALCIUM 10 MG/1
TABLET, COATED ORAL
Qty: 90 TABLET | Refills: 3 | Status: SHIPPED | OUTPATIENT
Start: 2023-08-08

## 2023-08-08 RX ORDER — BENAZEPRIL HYDROCHLORIDE 20 MG/1
TABLET ORAL
Qty: 180 TABLET | Refills: 3 | Status: SHIPPED | OUTPATIENT
Start: 2023-08-08

## 2023-09-14 ENCOUNTER — OFFICE VISIT (OUTPATIENT)
Dept: INTERNAL MEDICINE | Facility: CLINIC | Age: 88
End: 2023-09-14
Payer: MEDICARE

## 2023-09-14 VITALS
HEART RATE: 73 BPM | BODY MASS INDEX: 26.37 KG/M2 | SYSTOLIC BLOOD PRESSURE: 136 MMHG | TEMPERATURE: 97.8 F | DIASTOLIC BLOOD PRESSURE: 70 MMHG | OXYGEN SATURATION: 96 % | WEIGHT: 168 LBS | HEIGHT: 67 IN

## 2023-09-14 DIAGNOSIS — K57.92 DIVERTICULITIS: Primary | ICD-10-CM

## 2023-09-14 PROCEDURE — 1159F MED LIST DOCD IN RCRD: CPT

## 2023-09-14 PROCEDURE — 99213 OFFICE O/P EST LOW 20 MIN: CPT

## 2023-09-14 PROCEDURE — 1160F RVW MEDS BY RX/DR IN RCRD: CPT

## 2023-09-14 RX ORDER — AMOXICILLIN AND CLAVULANATE POTASSIUM 875; 125 MG/1; MG/1
1 TABLET, FILM COATED ORAL 2 TIMES DAILY
Qty: 14 TABLET | Refills: 0 | Status: SHIPPED | OUTPATIENT
Start: 2023-09-14 | End: 2023-09-21

## 2023-09-14 NOTE — PROGRESS NOTES
"Chief Complaint  Abdominal Pain (Episodes of diarrrhea, also strong odor of urine), Diarrhea, Fatigue (Weakness, bp has been lower), and Anxiety    Subjective        Vicente Delgado presents to [facility] PRIMARY [facility]  History of Present Illness  89-year-old male presenting with diarrhea.  Patient of [employer].  Symptoms started late last week after eating a fish dish that he believes might  have been bad.  Had a \"blowout \"on Saturday after eating fish on Friday.  Since then has been having fishy smelling urine and small mucousy bowel movements.  Has not had any more diarrhea since Saturday.  Has occasional nausea.  Has been having increased need to have a bowel movement.  He then passes gas and then easily passes small mucousy \"balls\" of stool.  Has been eating a BRAT diet mostly.  Denies fever, abdominal pain, blood in stool.     Abdominal Pain  Associated symptoms include diarrhea.   Diarrhea   Associated symptoms include abdominal pain.   Fatigue  Associated symptoms include abdominal pain and fatigue.   Anxiety          Objective   Vital Signs:  /70   Pulse 73   Temp 97.8 °F (36.6 °C)   Ht 170.2 cm (67\")   Wt 76.2 kg (168 lb)   SpO2 96%   BMI 26.31 kg/m²   Estimated body mass index is 26.31 kg/m² as calculated from the following:    Height as of this encounter: 170.2 cm (67\").    Weight as of this encounter: 76.2 kg (168 lb).               Physical Exam  Vitals reviewed.   Constitutional:       Appearance: Normal appearance.   Abdominal:      General: Bowel sounds are normal.   Musculoskeletal:         General: Normal range of motion.      Cervical back: Normal range of motion.   Skin:     General: Skin is warm and dry.      Capillary Refill: Capillary refill takes less than 2 seconds.   Neurological:      General: No focal deficit present.      Mental Status: He is alert and oriented to person, place, and time.   Psychiatric:         Mood and Affect: Mood normal.         " Behavior: Behavior normal.         Thought Content: Thought content normal.         Judgment: Judgment normal.      Result Review :    Common labs          12/21/2022    05:15 12/21/2022    21:12 12/22/2022    03:02 4/17/2023    10:39   Common Labs   Glucose 92   141  86    BUN 18   16  22    Creatinine 0.89   0.83  0.94    Sodium 134   135  139    Potassium 4.4   3.7  4.3    Chloride 99   102  102    Calcium 9.2   8.7  9.5    Total Protein    7.4    Albumin 4.00    4.7    Total Bilirubin 0.3    0.5    Alkaline Phosphatase 59    67    AST (SGOT) 25    27    ALT (SGPT) 17    19    WBC 6.95  6.53  6.14  6.55    Hemoglobin 14.1  13.0  12.7  15.0    Hematocrit 41.8  39.2  39.0  44.0    Platelets 201  184  201  240        Current Outpatient Medications on File Prior to Visit   Medication Sig Dispense Refill    benazepril (LOTENSIN) 20 MG tablet TAKE ONE TABLET BY MOUTH TWICE A DAY CALL MD IF  BLOOD PRESSURE ABOVE 160/90 180 tablet 3    ezetimibe (ZETIA) 10 MG tablet Take 1 tablet by mouth Daily. 90 tablet 3    multivitamin with minerals tablet tablet Take 1 tablet by mouth Daily.      pantoprazole (PROTONIX) 40 MG EC tablet TAKE ONE TABLET BY MOUTH DAILY 90 tablet 1    rosuvastatin (CRESTOR) 10 MG tablet TAKE ONE TABLET BY MOUTH ONCE NIGHTLY 90 tablet 3     No current facility-administered medications on file prior to visit.                Assessment and Plan   Diagnoses and all orders for this visit:    1. Diverticulitis (Primary)  -     amoxicillin-clavulanate (AUGMENTIN) 875-125 MG per tablet; Take 1 tablet by mouth 2 (Two) Times a Day for 7 days.  Dispense: 14 tablet; Refill: 0      Patient Instructions   Take Augmentin twice a day for 7 days. Recommend active-culture yogurt (Activia) daily. Continue bland diet. Avoid acidic, spicy and fried foods. Stay hydrated with water. Follow-up as needed.          Follow Up   No follow-ups on file.  Patient was given instructions and counseling regarding his condition or for  health maintenance advice. Please see specific information pulled into the AVS if appropriate.

## 2023-09-14 NOTE — PATIENT INSTRUCTIONS
Take Augmentin twice a day for 7 days. Recommend active-culture yogurt (Activia) daily. Continue bland diet. Avoid acidic, spicy and fried foods. Stay hydrated with water. Follow-up as needed.

## 2023-09-20 ENCOUNTER — HOSPITAL ENCOUNTER (INPATIENT)
Facility: HOSPITAL | Age: 88
LOS: 1 days | Discharge: HOME | DRG: 371 | End: 2023-09-22
Attending: EMERGENCY MEDICINE | Admitting: INTERNAL MEDICINE
Payer: MEDICARE

## 2023-09-20 ENCOUNTER — APPOINTMENT (OUTPATIENT)
Dept: CT IMAGING | Facility: HOSPITAL | Age: 88
DRG: 371 | End: 2023-09-20
Payer: MEDICARE

## 2023-09-20 DIAGNOSIS — R63.4 WEIGHT LOSS: ICD-10-CM

## 2023-09-20 DIAGNOSIS — K63.89 MASS OF CECUM: ICD-10-CM

## 2023-09-20 DIAGNOSIS — R19.7 DIARRHEA, UNSPECIFIED TYPE: ICD-10-CM

## 2023-09-20 DIAGNOSIS — K57.92 DIVERTICULITIS: ICD-10-CM

## 2023-09-20 DIAGNOSIS — I10 PRIMARY HYPERTENSION: Chronic | ICD-10-CM

## 2023-09-20 DIAGNOSIS — R10.9 ABDOMINAL PAIN, UNSPECIFIED ABDOMINAL LOCATION: Primary | ICD-10-CM

## 2023-09-20 PROBLEM — M32.9 LUPUS: Status: ACTIVE | Noted: 2023-09-20

## 2023-09-20 PROBLEM — K57.90 DIVERTICULOSIS: Status: ACTIVE | Noted: 2023-09-20

## 2023-09-20 PROBLEM — R53.1 GENERAL WEAKNESS: Status: ACTIVE | Noted: 2023-09-20

## 2023-09-20 PROBLEM — IMO0002 LUPUS: Status: ACTIVE | Noted: 2023-09-20

## 2023-09-20 PROBLEM — I50.32 CHRONIC DIASTOLIC CHF (CONGESTIVE HEART FAILURE): Status: ACTIVE | Noted: 2023-09-20

## 2023-09-20 PROBLEM — A04.72 C. DIFFICILE DIARRHEA: Status: ACTIVE | Noted: 2023-09-20

## 2023-09-20 PROBLEM — K75.81 NASH (NONALCOHOLIC STEATOHEPATITIS): Status: ACTIVE | Noted: 2023-09-20

## 2023-09-20 LAB
ADV 40+41 DNA STL QL NAA+NON-PROBE: NOT DETECTED
ALBUMIN SERPL-MCNC: 4 G/DL (ref 3.5–5.2)
ALBUMIN/GLOB SERPL: 1.4 G/DL
ALP SERPL-CCNC: 88 U/L (ref 39–117)
ALT SERPL W P-5'-P-CCNC: 15 U/L (ref 1–41)
ANION GAP SERPL CALCULATED.3IONS-SCNC: 14 MMOL/L (ref 5–15)
AST SERPL-CCNC: 23 U/L (ref 1–40)
ASTRO TYP 1-8 RNA STL QL NAA+NON-PROBE: NOT DETECTED
BASOPHILS # BLD AUTO: 0.02 10*3/MM3 (ref 0–0.2)
BASOPHILS NFR BLD AUTO: 0.3 % (ref 0–1.5)
BILIRUB SERPL-MCNC: 0.6 MG/DL (ref 0–1.2)
BILIRUB UR QL STRIP: NEGATIVE
BUN SERPL-MCNC: 12 MG/DL (ref 8–23)
BUN/CREAT SERPL: 13 (ref 7–25)
C CAYETANENSIS DNA STL QL NAA+NON-PROBE: NOT DETECTED
C COLI+JEJ+UPSA DNA STL QL NAA+NON-PROBE: NOT DETECTED
C DIFF GDH + TOXINS A+B STL QL IA.RAPID: POSITIVE
C DIFF TOX GENS STL QL NAA+PROBE: POSITIVE
CALCIUM SPEC-SCNC: 9.3 MG/DL (ref 8.2–9.6)
CHLORIDE SERPL-SCNC: 98 MMOL/L (ref 98–107)
CLARITY UR: CLEAR
CO2 SERPL-SCNC: 22 MMOL/L (ref 22–29)
COLOR UR: YELLOW
CREAT SERPL-MCNC: 0.92 MG/DL (ref 0.76–1.27)
CRYPTOSP DNA STL QL NAA+NON-PROBE: NOT DETECTED
DEPRECATED RDW RBC AUTO: 41.5 FL (ref 37–54)
E HISTOLYT DNA STL QL NAA+NON-PROBE: NOT DETECTED
EAEC PAA PLAS AGGR+AATA ST NAA+NON-PRB: NOT DETECTED
EC STX1+STX2 GENES STL QL NAA+NON-PROBE: NOT DETECTED
EGFRCR SERPLBLD CKD-EPI 2021: 76.6 ML/MIN/1.73
EOSINOPHIL # BLD AUTO: 0.02 10*3/MM3 (ref 0–0.4)
EOSINOPHIL NFR BLD AUTO: 0.3 % (ref 0.3–6.2)
EPEC EAE GENE STL QL NAA+NON-PROBE: DETECTED
ERYTHROCYTE [DISTWIDTH] IN BLOOD BY AUTOMATED COUNT: 12.5 % (ref 12.3–15.4)
ETEC LTA+ST1A+ST1B TOX ST NAA+NON-PROBE: NOT DETECTED
G LAMBLIA DNA STL QL NAA+NON-PROBE: NOT DETECTED
GLOBULIN UR ELPH-MCNC: 2.9 GM/DL
GLUCOSE SERPL-MCNC: 115 MG/DL (ref 65–99)
GLUCOSE UR STRIP-MCNC: NEGATIVE MG/DL
HCT VFR BLD AUTO: 43 % (ref 37.5–51)
HGB BLD-MCNC: 14.7 G/DL (ref 13–17.7)
HGB UR QL STRIP.AUTO: NEGATIVE
IMM GRANULOCYTES # BLD AUTO: 0.02 10*3/MM3 (ref 0–0.05)
IMM GRANULOCYTES NFR BLD AUTO: 0.3 % (ref 0–0.5)
KETONES UR QL STRIP: NEGATIVE
LEUKOCYTE ESTERASE UR QL STRIP.AUTO: NEGATIVE
LIPASE SERPL-CCNC: 14 U/L (ref 13–60)
LYMPHOCYTES # BLD AUTO: 1.63 10*3/MM3 (ref 0.7–3.1)
LYMPHOCYTES NFR BLD AUTO: 20.7 % (ref 19.6–45.3)
MAGNESIUM SERPL-MCNC: 2.6 MG/DL (ref 1.7–2.3)
MCH RBC QN AUTO: 30.9 PG (ref 26.6–33)
MCHC RBC AUTO-ENTMCNC: 34.2 G/DL (ref 31.5–35.7)
MCV RBC AUTO: 90.5 FL (ref 79–97)
MONOCYTES # BLD AUTO: 1.23 10*3/MM3 (ref 0.1–0.9)
MONOCYTES NFR BLD AUTO: 15.6 % (ref 5–12)
NEUTROPHILS NFR BLD AUTO: 4.94 10*3/MM3 (ref 1.7–7)
NEUTROPHILS NFR BLD AUTO: 62.8 % (ref 42.7–76)
NITRITE UR QL STRIP: NEGATIVE
NOROVIRUS GI+II RNA STL QL NAA+NON-PROBE: NOT DETECTED
NRBC BLD AUTO-RTO: 0 /100 WBC (ref 0–0.2)
P SHIGELLOIDES DNA STL QL NAA+NON-PROBE: NOT DETECTED
PH UR STRIP.AUTO: 6 [PH] (ref 5–8)
PLATELET # BLD AUTO: 216 10*3/MM3 (ref 140–450)
PMV BLD AUTO: 9.8 FL (ref 6–12)
POTASSIUM SERPL-SCNC: 3.9 MMOL/L (ref 3.5–5.2)
PROT SERPL-MCNC: 6.9 G/DL (ref 6–8.5)
PROT UR QL STRIP: NEGATIVE
QT INTERVAL: 390 MS
QTC INTERVAL: 445 MS
RBC # BLD AUTO: 4.75 10*6/MM3 (ref 4.14–5.8)
RVA RNA STL QL NAA+NON-PROBE: NOT DETECTED
S ENT+BONG DNA STL QL NAA+NON-PROBE: NOT DETECTED
SAPO I+II+IV+V RNA STL QL NAA+NON-PROBE: NOT DETECTED
SHIGELLA SP+EIEC IPAH ST NAA+NON-PROBE: NOT DETECTED
SODIUM SERPL-SCNC: 134 MMOL/L (ref 136–145)
SP GR UR STRIP: 1.01 (ref 1–1.03)
T4 FREE SERPL-MCNC: 1.24 NG/DL (ref 0.93–1.7)
TSH SERPL DL<=0.05 MIU/L-ACNC: 2.44 UIU/ML (ref 0.27–4.2)
UROBILINOGEN UR QL STRIP: NORMAL
V CHOL+PARA+VUL DNA STL QL NAA+NON-PROBE: NOT DETECTED
V CHOLERAE DNA STL QL NAA+NON-PROBE: NOT DETECTED
WBC NRBC COR # BLD: 7.86 10*3/MM3 (ref 3.4–10.8)
Y ENTEROCOL DNA STL QL NAA+NON-PROBE: NOT DETECTED

## 2023-09-20 PROCEDURE — 84443 ASSAY THYROID STIM HORMONE: CPT | Performed by: EMERGENCY MEDICINE

## 2023-09-20 PROCEDURE — 93005 ELECTROCARDIOGRAM TRACING: CPT | Performed by: EMERGENCY MEDICINE

## 2023-09-20 PROCEDURE — 74177 CT ABD & PELVIS W/CONTRAST: CPT

## 2023-09-20 PROCEDURE — 83690 ASSAY OF LIPASE: CPT | Performed by: EMERGENCY MEDICINE

## 2023-09-20 PROCEDURE — 25010000002 HEPARIN (PORCINE) PER 1000 UNITS: Performed by: SURGERY

## 2023-09-20 PROCEDURE — 85025 COMPLETE CBC W/AUTO DIFF WBC: CPT | Performed by: EMERGENCY MEDICINE

## 2023-09-20 PROCEDURE — 25010000002 PIPERACILLIN SOD-TAZOBACTAM PER 1 G: Performed by: INTERNAL MEDICINE

## 2023-09-20 PROCEDURE — 25510000001 IOPAMIDOL 61 % SOLUTION: Performed by: EMERGENCY MEDICINE

## 2023-09-20 PROCEDURE — 99285 EMERGENCY DEPT VISIT HI MDM: CPT

## 2023-09-20 PROCEDURE — G0378 HOSPITAL OBSERVATION PER HR: HCPCS

## 2023-09-20 PROCEDURE — 81003 URINALYSIS AUTO W/O SCOPE: CPT | Performed by: EMERGENCY MEDICINE

## 2023-09-20 PROCEDURE — 87449 NOS EACH ORGANISM AG IA: CPT | Performed by: EMERGENCY MEDICINE

## 2023-09-20 PROCEDURE — 25010000002 PIPERACILLIN SOD-TAZOBACTAM PER 1 G: Performed by: EMERGENCY MEDICINE

## 2023-09-20 PROCEDURE — 80053 COMPREHEN METABOLIC PANEL: CPT | Performed by: EMERGENCY MEDICINE

## 2023-09-20 PROCEDURE — 83735 ASSAY OF MAGNESIUM: CPT | Performed by: EMERGENCY MEDICINE

## 2023-09-20 PROCEDURE — 84439 ASSAY OF FREE THYROXINE: CPT | Performed by: EMERGENCY MEDICINE

## 2023-09-20 PROCEDURE — 87493 C DIFF AMPLIFIED PROBE: CPT | Performed by: EMERGENCY MEDICINE

## 2023-09-20 PROCEDURE — 93010 ELECTROCARDIOGRAM REPORT: CPT | Performed by: INTERNAL MEDICINE

## 2023-09-20 PROCEDURE — 87507 IADNA-DNA/RNA PROBE TQ 12-25: CPT | Performed by: INTERNAL MEDICINE

## 2023-09-20 RX ORDER — LISINOPRIL 20 MG/1
20 TABLET ORAL EVERY 12 HOURS SCHEDULED
Status: DISCONTINUED | OUTPATIENT
Start: 2023-09-20 | End: 2023-09-21

## 2023-09-20 RX ORDER — SODIUM CHLORIDE 9 MG/ML
125 INJECTION, SOLUTION INTRAVENOUS CONTINUOUS
Status: DISCONTINUED | OUTPATIENT
Start: 2023-09-20 | End: 2023-09-21

## 2023-09-20 RX ORDER — ONDANSETRON 4 MG/1
4 TABLET, FILM COATED ORAL EVERY 6 HOURS PRN
Status: DISCONTINUED | OUTPATIENT
Start: 2023-09-20 | End: 2023-09-22 | Stop reason: HOSPADM

## 2023-09-20 RX ORDER — ACETAMINOPHEN 650 MG/1
650 SUPPOSITORY RECTAL EVERY 4 HOURS PRN
Status: DISCONTINUED | OUTPATIENT
Start: 2023-09-20 | End: 2023-09-22 | Stop reason: HOSPADM

## 2023-09-20 RX ORDER — ACETAMINOPHEN 160 MG/5ML
650 SOLUTION ORAL EVERY 4 HOURS PRN
Status: DISCONTINUED | OUTPATIENT
Start: 2023-09-20 | End: 2023-09-22 | Stop reason: HOSPADM

## 2023-09-20 RX ORDER — SIMETHICONE 80 MG
80 TABLET,CHEWABLE ORAL 4 TIMES DAILY PRN
Status: DISCONTINUED | OUTPATIENT
Start: 2023-09-20 | End: 2023-09-22 | Stop reason: HOSPADM

## 2023-09-20 RX ORDER — UREA 10 %
1 LOTION (ML) TOPICAL NIGHTLY PRN
Status: DISCONTINUED | OUTPATIENT
Start: 2023-09-20 | End: 2023-09-22 | Stop reason: HOSPADM

## 2023-09-20 RX ORDER — ACETAMINOPHEN 325 MG/1
650 TABLET ORAL EVERY 4 HOURS PRN
Status: DISCONTINUED | OUTPATIENT
Start: 2023-09-20 | End: 2023-09-22 | Stop reason: HOSPADM

## 2023-09-20 RX ORDER — MORPHINE SULFATE 2 MG/ML
2 INJECTION, SOLUTION INTRAMUSCULAR; INTRAVENOUS EVERY 4 HOURS PRN
Status: DISCONTINUED | OUTPATIENT
Start: 2023-09-20 | End: 2023-09-22 | Stop reason: HOSPADM

## 2023-09-20 RX ORDER — DICYCLOMINE HYDROCHLORIDE 10 MG/1
10 CAPSULE ORAL 4 TIMES DAILY PRN
Status: DISCONTINUED | OUTPATIENT
Start: 2023-09-20 | End: 2023-09-22 | Stop reason: HOSPADM

## 2023-09-20 RX ORDER — SACCHAROMYCES BOULARDII 250 MG
250 CAPSULE ORAL 2 TIMES DAILY
Status: DISCONTINUED | OUTPATIENT
Start: 2023-09-20 | End: 2023-09-22 | Stop reason: HOSPADM

## 2023-09-20 RX ORDER — FAMOTIDINE 20 MG/1
40 TABLET, FILM COATED ORAL DAILY
Status: DISCONTINUED | OUTPATIENT
Start: 2023-09-21 | End: 2023-09-20

## 2023-09-20 RX ORDER — NALOXONE HCL 0.4 MG/ML
0.4 VIAL (ML) INJECTION
Status: DISCONTINUED | OUTPATIENT
Start: 2023-09-20 | End: 2023-09-22 | Stop reason: HOSPADM

## 2023-09-20 RX ORDER — SODIUM CHLORIDE 0.9 % (FLUSH) 0.9 %
10 SYRINGE (ML) INJECTION AS NEEDED
Status: DISCONTINUED | OUTPATIENT
Start: 2023-09-20 | End: 2023-09-22 | Stop reason: HOSPADM

## 2023-09-20 RX ORDER — VANCOMYCIN HYDROCHLORIDE 125 MG/1
125 CAPSULE ORAL EVERY 6 HOURS SCHEDULED
Status: DISCONTINUED | OUTPATIENT
Start: 2023-09-21 | End: 2023-09-22 | Stop reason: HOSPADM

## 2023-09-20 RX ORDER — CALCIUM CARBONATE 500 MG/1
2 TABLET, CHEWABLE ORAL 2 TIMES DAILY PRN
Status: DISCONTINUED | OUTPATIENT
Start: 2023-09-20 | End: 2023-09-22 | Stop reason: HOSPADM

## 2023-09-20 RX ORDER — ROSUVASTATIN CALCIUM 10 MG/1
10 TABLET, COATED ORAL NIGHTLY
Status: DISCONTINUED | OUTPATIENT
Start: 2023-09-20 | End: 2023-09-22 | Stop reason: HOSPADM

## 2023-09-20 RX ORDER — PANTOPRAZOLE SODIUM 40 MG/1
40 TABLET, DELAYED RELEASE ORAL DAILY
Status: DISCONTINUED | OUTPATIENT
Start: 2023-09-21 | End: 2023-09-20 | Stop reason: ALTCHOICE

## 2023-09-20 RX ORDER — ONDANSETRON 2 MG/ML
4 INJECTION INTRAMUSCULAR; INTRAVENOUS EVERY 6 HOURS PRN
Status: DISCONTINUED | OUTPATIENT
Start: 2023-09-20 | End: 2023-09-22 | Stop reason: HOSPADM

## 2023-09-20 RX ORDER — HEPARIN SODIUM 5000 [USP'U]/ML
5000 INJECTION, SOLUTION INTRAVENOUS; SUBCUTANEOUS EVERY 8 HOURS SCHEDULED
Status: DISCONTINUED | OUTPATIENT
Start: 2023-09-20 | End: 2023-09-20

## 2023-09-20 RX ORDER — SODIUM CHLORIDE 9 MG/ML
40 INJECTION, SOLUTION INTRAVENOUS AS NEEDED
Status: DISCONTINUED | OUTPATIENT
Start: 2023-09-20 | End: 2023-09-22 | Stop reason: HOSPADM

## 2023-09-20 RX ORDER — SODIUM CHLORIDE 0.9 % (FLUSH) 0.9 %
10 SYRINGE (ML) INJECTION EVERY 12 HOURS SCHEDULED
Status: DISCONTINUED | OUTPATIENT
Start: 2023-09-20 | End: 2023-09-22 | Stop reason: HOSPADM

## 2023-09-20 RX ADMIN — Medication 10 ML: at 21:15

## 2023-09-20 RX ADMIN — ROSUVASTATIN CALCIUM 10 MG: 10 TABLET, FILM COATED ORAL at 20:56

## 2023-09-20 RX ADMIN — HEPARIN SODIUM 5000 UNITS: 5000 INJECTION INTRAVENOUS; SUBCUTANEOUS at 21:55

## 2023-09-20 RX ADMIN — PIPERACILLIN SODIUM AND TAZOBACTAM SODIUM 3.38 G: 3; .375 INJECTION, SOLUTION INTRAVENOUS at 14:43

## 2023-09-20 RX ADMIN — SODIUM CHLORIDE, POTASSIUM CHLORIDE, SODIUM LACTATE AND CALCIUM CHLORIDE 1000 ML: 600; 310; 30; 20 INJECTION, SOLUTION INTRAVENOUS at 12:10

## 2023-09-20 RX ADMIN — IOPAMIDOL 100 ML: 612 INJECTION, SOLUTION INTRAVENOUS at 13:50

## 2023-09-20 RX ADMIN — LISINOPRIL 20 MG: 20 TABLET ORAL at 20:56

## 2023-09-20 RX ADMIN — PIPERACILLIN SODIUM AND TAZOBACTAM SODIUM 3.38 G: 3; .375 INJECTION, SOLUTION INTRAVENOUS at 20:44

## 2023-09-20 NOTE — ED PROVIDER NOTES
EMERGENCY DEPARTMENT ENCOUNTER    Room Number:  13/13  Date seen:  9/20/2023  PCP: Mechelle Landa APRN  Historian: Patient      HPI:  Chief Complaint: Multiple  Context: Vicente Delgado is a 89 y.o. male who presents to the ED c/o not feeling well for the past several weeks with diarrhea, weight loss, dizziness and palpitations.  The patient was seen by his PCP 6 days ago and diagnosed with diverticulitis and placed on Augmentin.      PAST MEDICAL HISTORY  Active Ambulatory Problems     Diagnosis Date Noted    HTN (hypertension) 04/21/2016    Nonrheumatic mitral valve regurgitation 04/21/2016    Disorder of right ventricle of heart 04/21/2016    CAD (coronary artery disease) 07/01/2016    Hyperlipidemia 07/01/2016    Calculus of gallbladder without cholecystitis without obstruction 09/28/2020    S/P cholecystectomy 01/09/2021    Nonrheumatic aortic valve insufficiency 02/04/2021    Non-rheumatic mitral regurgitation 02/04/2021    Trigger middle finger of left hand 07/11/2021    Gastroesophageal reflux disease with esophagitis 07/11/2021    Carpal tunnel syndrome of left wrist 07/11/2021    Amaurosis fugax of left eye 07/11/2021    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 11/26/2021    Atherosclerosis of native artery of left lower extremity with intermittent claudication 11/26/2021    Anxiety 02/08/2022    Dermatitis 02/08/2022    Chronic stable angina 06/16/2022    Aortic stenosis, moderate 07/01/2022    Chronic right shoulder pain 10/17/2022    Controlled substance agreement signed 11/09/2022    TIA (transient ischemic attack) 11/15/2022    Paroxysmal atrial fibrillation 12/21/2022    Epistaxis 04/17/2023     Resolved Ambulatory Problems     Diagnosis Date Noted    Leg pain, anterior, left 05/13/2021    Atypical chest pain 09/26/2021     Past Medical History:   Diagnosis Date    Allergic rhinitis     Arthritis     Atrial fibrillation     Cancer     Cataract June 2018” and    Cholelithiasis 2020    Depression      Diverticulosis     Esophagitis     Gastritis     GERD (gastroesophageal reflux disease)     Heart disease     History of anxiety     History of medical problems Right shoulder replacemd    History of prostate cancer 06/1990    HL (hearing loss) Partial    Hypertension     Insomnia     Low back pain Yes  from hworking    Lupus     TAM (nonalcoholic steatohepatitis)     Sciatica of right side     Visual impairment Ocular mygraine         REVIEW OF SYSTEMS  All systems reviewed and negative except for those discussed in HPI.       PAST SURGICAL HISTORY  Past Surgical History:   Procedure Laterality Date    ABDOMINAL SURGERY      CARDIAC CATHETERIZATION  11/16/1995    CARDIAC SURGERY  11/16/1995    CATARACT EXTRACTION Left 07/2018    CHOLECYSTECTOMY  2020    CHOLECYSTECTOMY WITH INTRAOPERATIVE CHOLANGIOGRAM N/A 9/29/2020    Procedure: Laparoscopic cholecystectomy;  Surgeon: Javi Peralta MD;  Location: [facility] OR;  Service: General;  Laterality: N/A;    COLONOSCOPY  01/09/2002    ELBOW PROCEDURE      JOINT REPLACEMENT      LUNG BIOPSY      LYMPH NODE BIOPSY  Yes    1992    NASAL POLYP SURGERY      PACEMAKER IMPLANTATION      PROSTATE SURGERY  06/1990    SHOULDER ROTATOR CUFF REPAIR Right 08/24/2011    Dr. Bach    SIGMOIDOSCOPY      TONSILLECTOMY  Yes 1943    UPPER GASTROINTESTINAL ENDOSCOPY  09/12/1997    Gastritis, Duodenitis, hiatal hernia (Pathology: Gastric antrum minimal chronic inflammation)    UPPER GASTROINTESTINAL ENDOSCOPY  04/11/2005    Mild esophagitis, Small hiatal hernia, Mild gastritis and mild duodenitis         FAMILY HISTORY  Family History   Problem Relation Age of Onset    Heart failure Mother     Hearing loss Mother     Heart disease Mother         Mother had congestive heart failure    Hyperlipidemia Mother     Alcohol abuse Father     Diabetes Father          SOCIAL HISTORY  Social History     Socioeconomic History    Marital status:    Tobacco Use    Smoking status: Passive  Smoke Exposure - Never Smoker     Passive exposure: Yes    Smokeless tobacco: Never    Tobacco comments:     Smoke a cigar ocassionally   Substance and Sexual Activity    Alcohol use: No     Comment: Daily caffeine use    Drug use: No    Sexual activity: Not Currently     Partners: Female         ALLERGIES  Lidocaine, Lovastatin, Pravastatin, Gabapentin, Procaine, and Simvastatin      PHYSICAL EXAM  ED Triage Vitals [09/20/23 1147]   Temp Heart Rate Resp BP SpO2   97.6 °F (36.4 °C) 93 18 -- 96 %      Temp src Heart Rate Source Patient Position BP Location FiO2 (%)   Tympanic Monitor -- -- --       Physical Exam      GENERAL: 89-year-old male in mild distress  HENT: NCAT: nares patent: Neck supple  EYES: no scleral icterus  CV: regular rhythm, normal rate  RESPIRATORY: normal effort  ABDOMEN: soft, lower abdominal tenderness without guarding or rebound and diminished bowel sounds  MUSCULOSKELETAL: no deformity  NEURO: alert with nonfocal neuro exam  PSYCH:  calm, cooperative  SKIN: warm, dry    Vital signs and nursing notes reviewed.      LAB RESULTS  Recent Results (from the past 24 hour(s))   Comprehensive Metabolic Panel    Collection Time: 09/20/23 12:08 PM    Specimen: Blood   Result Value Ref Range    Glucose 115 (H) 65 - 99 mg/dL    BUN 12 8 - 23 mg/dL    Creatinine 0.92 0.76 - 1.27 mg/dL    Sodium 134 (L) 136 - 145 mmol/L    Potassium 3.9 3.5 - 5.2 mmol/L    Chloride 98 98 - 107 mmol/L    CO2 22.0 22.0 - 29.0 mmol/L    Calcium 9.3 8.2 - 9.6 mg/dL    Total Protein 6.9 6.0 - 8.5 g/dL    Albumin 4.0 3.5 - 5.2 g/dL    ALT (SGPT) 15 1 - 41 U/L    AST (SGOT) 23 1 - 40 U/L    Alkaline Phosphatase 88 39 - 117 U/L    Total Bilirubin 0.6 0.0 - 1.2 mg/dL    Globulin 2.9 gm/dL    A/G Ratio 1.4 g/dL    BUN/Creatinine Ratio 13.0 7.0 - 25.0    Anion Gap 14.0 5.0 - 15.0 mmol/L    eGFR 76.6 >60.0 mL/min/1.73   Lipase    Collection Time: 09/20/23 12:08 PM    Specimen: Blood   Result Value Ref Range    Lipase 14 13 - 60 U/L    Magnesium    Collection Time: 09/20/23 12:08 PM    Specimen: Blood   Result Value Ref Range    Magnesium 2.6 (H) 1.7 - 2.3 mg/dL   TSH    Collection Time: 09/20/23 12:08 PM    Specimen: Blood   Result Value Ref Range    TSH 2.440 0.270 - 4.200 uIU/mL   T4, Free    Collection Time: 09/20/23 12:08 PM    Specimen: Blood   Result Value Ref Range    Free T4 1.24 0.93 - 1.70 ng/dL   CBC Auto Differential    Collection Time: 09/20/23 12:08 PM    Specimen: Blood   Result Value Ref Range    WBC 7.86 3.40 - 10.80 10*3/mm3    RBC 4.75 4.14 - 5.80 10*6/mm3    Hemoglobin 14.7 13.0 - 17.7 g/dL    Hematocrit 43.0 37.5 - 51.0 %    MCV 90.5 79.0 - 97.0 fL    MCH 30.9 26.6 - 33.0 pg    MCHC 34.2 31.5 - 35.7 g/dL    RDW 12.5 12.3 - 15.4 %    RDW-SD 41.5 37.0 - 54.0 fl    MPV 9.8 6.0 - 12.0 fL    Platelets 216 140 - 450 10*3/mm3    Neutrophil % 62.8 42.7 - 76.0 %    Lymphocyte % 20.7 19.6 - 45.3 %    Monocyte % 15.6 (H) 5.0 - 12.0 %    Eosinophil % 0.3 0.3 - 6.2 %    Basophil % 0.3 0.0 - 1.5 %    Immature Grans % 0.3 0.0 - 0.5 %    Neutrophils, Absolute 4.94 1.70 - 7.00 10*3/mm3    Lymphocytes, Absolute 1.63 0.70 - 3.10 10*3/mm3    Monocytes, Absolute 1.23 (H) 0.10 - 0.90 10*3/mm3    Eosinophils, Absolute 0.02 0.00 - 0.40 10*3/mm3    Basophils, Absolute 0.02 0.00 - 0.20 10*3/mm3    Immature Grans, Absolute 0.02 0.00 - 0.05 10*3/mm3    nRBC 0.0 0.0 - 0.2 /100 WBC   Urinalysis With Microscopic If Indicated (No Culture) - Urine, Clean Catch    Collection Time: 09/20/23  1:34 PM    Specimen: Urine, Clean Catch   Result Value Ref Range    Color, UA Yellow Yellow, Straw    Appearance, UA Clear Clear    pH, UA 6.0 5.0 - 8.0    Specific Gravity, UA 1.007 1.005 - 1.030    Glucose, UA Negative Negative    Ketones, UA Negative Negative    Bilirubin, UA Negative Negative    Blood, UA Negative Negative    Protein, UA Negative Negative    Leuk Esterase, UA Negative Negative    Nitrite, UA Negative Negative    Urobilinogen, UA 0.2 E.U./dL 0.2 - 1.0  E.U./dL       Ordered the above labs and reviewed the results.        RADIOLOGY  CT Abdomen Pelvis With Contrast    Result Date: 9/20/2023  CT ABDOMEN AND PELVIS WITH IV CONTRAST  HISTORY: Lower abdominal pain with diarrhea  TECHNIQUE: Radiation dose reduction techniques were utilized, including automated exposure control and exposure modulation based on body size. Axial images were obtained through the abdomen and pelvis after the administration of IV contrast. Coronal and sagittal reformatted images obtained.  COMPARISON: 5/27/2021  FINDINGS:  ABDOMEN: There is atelectasis/scarring in the lung bases. Cardiomegaly is noted. Cholecystectomy. The liver and spleen are unremarkable. Small renal cysts. There are tiny nonobstructing stones in the left kidney. Adrenal glands are unremarkable. Pancreas unremarkable. There is mild dilation of the infrarenal abdominal aorta measuring about 2.6 cm, slightly increased from prior study.  PELVIS: The bladder is unremarkable. There is mild sigmoid diverticulosis. There is also some mild wall thickening involving the mid sigmoid which may reflect a mild diverticulitis. There is no significant inflammation or any abscess. There are some enlarged lymph nodes adjacent to the cecum measuring up to 1.6 cm. These are new compared with the previous study. There also appears to be an enhancing mass located in the cecum in the region of the ileocecal valve. The mass measures about 3 cm and is concerning for a colon malignancy. Recommend evaluation with colonoscopy. The appendix is normal bladder is unremarkable. No free fluid is seen in the pelvis. Prostatectomy. Degenerative changes of the lumbar spine      1. There is a enhancing mass in the cecum measuring about 3 cm with some adjacent enlarged pericecal lymph nodes measuring up to 1.6 cm. Overall findings are concerning for colon malignancy and recommend evaluation with colonoscopy 2. There is mild sigmoid diverticulosis and also some  mild wall thickening involving the mid sigmoid which may reflect a mild diverticulitis. There is no significant inflammatory change or abscess.  Findings were discussed with Dr. Hernandez in the ER at time of dictation  Radiation dose reduction techniques were utilized, including automated exposure control and exposure modulation based on body size.   This report was finalized on 9/20/2023 2:13 PM by Dr. Justin Ramos M.D.       Ordered the above noted radiological studies. Reviewed by me in PACS.            PROCEDURES  Procedures          MEDICATIONS GIVEN IN ER  Medications   lactated ringers bolus 1,000 mL (0 mL Intravenous Stopped 9/20/23 1400)   iopamidol (ISOVUE-300) 61 % injection 100 mL (100 mL Intravenous Given by Other 9/20/23 1350)   piperacillin-tazobactam (ZOSYN) 3.375 g in iso-osmotic dextrose 50 ml (premix) (3.375 g Intravenous New Bag 9/20/23 1443)             MEDICAL DECISION MAKING, PROGRESS, and CONSULTS    All labs have been independently reviewed by me.  All radiology studies have been reviewed by me and I have also reviewed the radiology report.   EKG's independently viewed and interpreted by me.  Discussion below represents my analysis of pertinent findings related to patient's condition, differential diagnosis, treatment plan and final disposition.      Additional sources:  - Discussed/ obtained information from independent historians: Patient's family is here and states he has been taking his antibiotics as prescribed    - External (non-ED) record review: I reviewed the patient's office note with his PCP on 9/14 where he was diagnosed with diverticulitis and placed on Augmentin.      - Shared decision making: After shared decision-making discussion to myself, the patient and his family we agree he needs to be admitted to the hospital for further evaluation and care      Orders placed during this visit:  Orders Placed This Encounter   Procedures    Gastrointestinal Panel, PCR - Stool, Per  Rectum    Clostridioides difficile Toxin - Stool, Per Rectum    Clostridioides difficile Toxin, PCR - Stool, Per Rectum    CT Abdomen Pelvis With Contrast    Comprehensive Metabolic Panel    Lipase    Urinalysis With Microscopic If Indicated (No Culture) - Urine, Clean Catch    Magnesium    TSH    T4, Free    CBC Auto Differential    Diet: Liquid Diets; Full Liquid; Fluid Consistency: Thin (IDDSI 0)    Monitor Blood Pressure    Pulse Oximetry, Continuous    Orthostatic Vitals    Nursing Swallow Assessment    LHA (on-call MD unless specified) Details    Inpatient Colorectal Surgery Consult    ECG 12 Lead Chest Pain    Initiate Observation Status    CBC & Differential         Differential diagnosis:  My differential diagnosis includes but is not limited to gastritis, pancreatitis, cholecystitis, appendicitis, diverticulitis, urinary tract infection, kidney stone, or bowel obstruction.        Independent interpretation of labs, radiology studies, and discussions with consultants:  ED Course as of 09/20/23 1516   Wed Sep 20, 2023   1159 EKG    EKG time: 1152  Rhythm/Rate: Normal sinus rhythm 78  First-degree AV block  No Acute Ischemia  Non-Specific ST-T changes  Similar to January of this year    Interpreted Contemporaneously by me.  Independently viewed by me     [GP]   1412 I discussed the patient's abdominal CT with Dr. Ramos from radiology.  He states he does believe the patient has mild diverticulitis but he also has a 3 cm cecal mass with lymphadenopathy that needs to be worked up. [GP]   1423 I will treat the patient with Zosyn for his diverticulitis but will admit him to the hospital with his weight loss and persistent diarrhea and possible cecal mass. [GP]   1430 On repeat examination the patient is resting comfortably with stable vital signs.  I advised him and his family of his mild diverticulitis and cecal mass and the need for admission for further evaluation and care.  They understand and agree with the  plan. [GP]   6755 I discussed the case with Shauna Guido from [facility].  She is aware of the patient's diverticulitis and failed outpatient treatment with Augmentin.  She is aware of giving him IV Zosyn.  She is also aware of the patient's cecal mass with lymphadenopathy.  She will admit the patient for further evaluation and care. [GP]      ED Course User Index  [GP] Ovi Hernandez MD               DIAGNOSIS  Final diagnoses:   Abdominal pain, unspecified abdominal location   Diarrhea, unspecified type   Diverticulitis-failed outpatient treatment   Mass of cecum   Weight loss         DISPOSITION  ADMISSION    Discussed treatment plan and reason for admission with pt/family and admitting physician.  Pt/family voiced understanding of the plan for admission for further testing/treatment as needed.            Latest Documented Vital Signs:  As of 15:16 EDT  BP- 101/65 HR- 75 Temp- 97.6 °F (36.4 °C) (Tympanic) O2 sat- 96%--      --------------------  Please note that portions of this were completed with a voice recognition program.       Note Disclaimer: At [facility], we believe that sharing information builds trust and better relationships. You are receiving this note because you are receiving care at [facility] or recently visited. It is possible you will see health information before a provider has talked with you about it. This kind of information can be easy to misunderstand. To help you fully understand what it means for your health, we urge you to discuss this note with your provider.             Ovi Hernandez MD  09/20/23 4916

## 2023-09-20 NOTE — ED NOTES
Nursing report ED to floor  Vicente Delgado  89 y.o.  male    HPI :   Chief Complaint   Patient presents with    Abdominal Pain    Irregular Heart Beat       Admitting doctor:   Shauna Rae MD    Admitting diagnosis:   The primary encounter diagnosis was Abdominal pain, unspecified abdominal location. Diagnoses of Diarrhea, unspecified type, Diverticulitis-failed outpatient treatment, Mass of cecum, and Weight loss were also pertinent to this visit.    Code status:   Current Code Status       Date Active Code Status Order ID Comments User Context       Prior            Allergies:   Lidocaine, Lovastatin, Pravastatin, Gabapentin, Procaine, and Simvastatin    Isolation:   Contact Spore    Intake and Output    Intake/Output Summary (Last 24 hours) at 9/20/2023 1609  Last data filed at 9/20/2023 1530  Gross per 24 hour   Intake 1050 ml   Output --   Net 1050 ml       Weight:       09/20/23  1159   Weight: 76.2 kg (168 lb)       Most recent vitals:   Vitals:    09/20/23 1201 09/20/23 1332 09/20/23 1333 09/20/23 1337   BP: 116/72 116/59 124/58 101/65   BP Location: Right arm      Patient Position: Lying Lying Sitting Standing   Pulse:  57 62 75   Resp:       Temp:       TempSrc:       SpO2:       Weight:       Height:           Active LDAs/IV Access:   Lines, Drains & Airways       Active LDAs       Name Placement date Placement time Site Days    Peripheral IV 09/20/23 1200 Anterior;Right Forearm 09/20/23  1200  Forearm  less than 1                    Labs (abnormal labs have a star):   Labs Reviewed   COMPREHENSIVE METABOLIC PANEL - Abnormal; Notable for the following components:       Result Value    Glucose 115 (*)     Sodium 134 (*)     All other components within normal limits    Narrative:     GFR Normal >60  Chronic Kidney Disease <60  Kidney Failure <15    The GFR formula is only valid for adults with stable renal function between ages 18 and 70.   MAGNESIUM - Abnormal; Notable for the following  components:    Magnesium 2.6 (*)     All other components within normal limits   CBC WITH AUTO DIFFERENTIAL - Abnormal; Notable for the following components:    Monocyte % 15.6 (*)     Monocytes, Absolute 1.23 (*)     All other components within normal limits   LIPASE - Normal   URINALYSIS W/ MICROSCOPIC IF INDICATED (NO CULTURE) - Normal    Narrative:     Urine microscopic not indicated.   TSH - Normal   T4, FREE - Normal    Narrative:     Results may be falsely increased if patient taking Biotin.     GASTROINTESTINAL PANEL, PCR (PREFERRED) DOES NOT INCLUDE CDIFF   CLOSTRIDIOIDES DIFFICILE TOXIN    Narrative:     The following orders were created for panel order Clostridioides difficile Toxin - Stool, Per Rectum.  Procedure                               Abnormality         Status                     ---------                               -----------         ------                     Clostridioides difficile...[[id number]]                                                   Please view results for these tests on the individual orders.   CLOSTRIDIOIDES DIFFICILE TOXIN, PCR   CBC AND DIFFERENTIAL    Narrative:     The following orders were created for panel order CBC & Differential.  Procedure                               Abnormality         Status                     ---------                               -----------         ------                     CBC Auto Differential[[id number]]        Abnormal            Final result                 Please view results for these tests on the individual orders.       EKG:   ECG 12 Lead Chest Pain    (Results Pending)       Meds given in ED:   Medications   lactated ringers bolus 1,000 mL (0 mL Intravenous Stopped 9/20/23 1400)   iopamidol (ISOVUE-300) 61 % injection 100 mL (100 mL Intravenous Given by Other 9/20/23 1350)   piperacillin-tazobactam (ZOSYN) 3.375 g in iso-osmotic dextrose 50 ml (premix) (0 g Intravenous Stopped 9/20/23 1530)       Imaging results:  CT Abdomen  Pelvis With Contrast    Result Date: 9/20/2023  1. There is a enhancing mass in the cecum measuring about 3 cm with some adjacent enlarged pericecal lymph nodes measuring up to 1.6 cm. Overall findings are concerning for colon malignancy and recommend evaluation with colonoscopy 2. There is mild sigmoid diverticulosis and also some mild wall thickening involving the mid sigmoid which may reflect a mild diverticulitis. There is no significant inflammatory change or abscess.  Findings were discussed with Dr. Hernandez in the ER at time of dictation  Radiation dose reduction techniques were utilized, including automated exposure control and exposure modulation based on body size.   This report was finalized on 9/20/2023 2:13 PM by Dr. Justin Ramos M.D.       Ambulatory status:   - with assist    Social issues:   Social History     Socioeconomic History    Marital status:    Tobacco Use    Smoking status: Passive Smoke Exposure - Never Smoker     Passive exposure: Yes    Smokeless tobacco: Never    Tobacco comments:     Smoke a cigar ocassionally   Substance and Sexual Activity    Alcohol use: No     Comment: Daily caffeine use    Drug use: No    Sexual activity: Not Currently     Partners: Female           Becca Mejia RN  09/20/23 16:09 EDT

## 2023-09-20 NOTE — H&P
PCP: Mechelle Landa APRN    Chief complaint   Chief Complaint   Patient presents with    Abdominal Pain    Irregular Heart Beat       HPI  Patient is a 89 y.o. male presents with history of diastolic dysfunction, moderate AS, coronary disease, TAM, lupus, A-fib who presents to the ER secondary to diarrhea, 8 pounds weight loss and generalized weakness.  Patient states 2 weeks ago he was eaten fish that he thought was bad and had some upset stomach.  He had off-and-on diarrhea and abdominal cramping.  He went to his primary care doctors and they thought he had diverticulitis so they placed him on Augmentin since the 14th and should be done tomorrow.  He continued to have issues and therefore came into the ER and was found to have mild diverticulitis but also a cecal mass with lymphadenopathy.    Patient states that its been over 10 years since he had a colonoscopy and at one point Dr. Morales did that.  He states he just moved to a residential community but up until that point he was riding his bike daily.    Patient denies any chest pain and says he never has to use nitro but on his chart it says chronic unstable angina.  He sees Dr. Sales.  He was having significant nosebleeds and therefore he has been off his anticoagulation and aspirin.    Patient is to have a CTA LE tomorrow and they are concerned about getting it done.  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 LANDAVERDE.    PAST MEDICAL HISTORY  Past Medical History:   Diagnosis Date    Allergic rhinitis     Anxiety     Arthritis     Atrial fibrillation     CAD (coronary artery disease)     CAD (coronary artery disease) 07/01/2016    History of mild disease.  Stress test 2017 with no ischemia.  He is on aspirin statin and Zetia.  No angina pectoris. EF normal 1/2017 echo    Cancer     Cataract June 2018” and    Ocular mygraine    Cholelithiasis 2020    Depression     Diverticulosis     Esophagitis     Gastritis     GERD  (gastroesophageal reflux disease)     Heart disease     History of anxiety     History of medical problems Right shoulder replacemd    2008    History of prostate cancer 06/1990    HL (hearing loss) Partial    Hyperlipidemia     Hypertension     Insomnia     Low back pain Yes  from hworking    Lupus     TAM (nonalcoholic steatohepatitis)     Sciatica of right side     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 Rodríguez 10/2021    Visual impairment Ocular mygraine       PAST SURGICAL HISTORY  Past Surgical History:   Procedure Laterality Date    ABDOMINAL SURGERY      CARDIAC CATHETERIZATION  11/16/1995    CARDIAC SURGERY  11/16/1995    CATARACT EXTRACTION Left 07/2018    CHOLECYSTECTOMY  2020    CHOLECYSTECTOMY WITH INTRAOPERATIVE CHOLANGIOGRAM N/A 9/29/2020    Procedure: Laparoscopic cholecystectomy;  Surgeon: Javi Peralta MD;  Location: [facility];  Service: General;  Laterality: N/A;    COLONOSCOPY  01/09/2002    ELBOW PROCEDURE      JOINT REPLACEMENT      LUNG BIOPSY      LYMPH NODE BIOPSY  Yes    1992    NASAL POLYP SURGERY      PACEMAKER IMPLANTATION      PROSTATE SURGERY  06/1990    SHOULDER ROTATOR CUFF REPAIR Right 08/24/2011    Dr. Bach    SIGMOIDOSCOPY      TONSILLECTOMY  Yes 1943    UPPER GASTROINTESTINAL ENDOSCOPY  09/12/1997    Gastritis, Duodenitis, hiatal hernia (Pathology: Gastric antrum minimal chronic inflammation)    UPPER GASTROINTESTINAL ENDOSCOPY  04/11/2005    Mild esophagitis, Small hiatal hernia, Mild gastritis and mild duodenitis       FAMILY HISTORY  Family History   Problem Relation Age of Onset    Heart failure Mother     Hearing loss Mother     Heart disease Mother         Mother had congestive heart failure    Hyperlipidemia Mother     Alcohol abuse Father     Diabetes Father        SOCIAL HISTORY  Social History     Tobacco Use    Smoking status: Passive Smoke Exposure - Never Smoker     Passive exposure: Yes    Smokeless tobacco: Never    Tobacco  "comments:     Smoke a cigar ocassionally   Substance Use Topics    Alcohol use: No     Comment: Daily caffeine use    Drug use: No       MEDICATIONS:  Medications Prior to Admission   Medication Sig Dispense Refill Last Dose    benazepril (LOTENSIN) 20 MG tablet TAKE ONE TABLET BY MOUTH TWICE A DAY CALL MD IF  BLOOD PRESSURE ABOVE 160/90 (Patient taking differently: 0.5 tablets.) 180 tablet 3     ezetimibe (ZETIA) 10 MG tablet Take 1 tablet by mouth Daily. 90 tablet 3     Multiple Vitamins-Minerals (ICAPS AREDS 2 PO) Take 1 tablet by mouth Daily.       multivitamin with minerals tablet tablet Take 1 tablet by mouth Daily.       pantoprazole (PROTONIX) 40 MG EC tablet TAKE ONE TABLET BY MOUTH DAILY 90 tablet 1     rosuvastatin (CRESTOR) 10 MG tablet TAKE ONE TABLET BY MOUTH ONCE NIGHTLY 90 tablet 3        Allergies:  Lidocaine, Lovastatin, Pravastatin, Gabapentin, Procaine, and Simvastatin    Review of Systems:  Neurogenic claudication, diarrhea and generalized weakness and weight loss, PPm  Negative for following (except as per HPI):  Constitution: chills, fevers,   Eyes: change of vision, loss of vision and discharge  ENT: ear drainage, ear ringing and facial trauma  Respiratory: cough, pleuritic pain, shortness of air  Cardiovascular: chest pressure, pain, lower extremity edema, palpitations  Gastrointestinal: constipation, , nausea, vomiting,   Integument: rash and wound  Hematologic / Lymphatic: excessive bleeding and easy bruising  Musculoskeletal: joint pain, joint stiffness, joint swelling and muscle pain  Neurological: headaches, numbness, seizures and tremors  Behavioral / Psych: anxiety, depression and hallucinations         Vital Signs  Temp:  [97.6 °F (36.4 °C)] 97.6 °F (36.4 °C)  Heart Rate:  [57-93] 75  Resp:  [18] 18  BP: (101-124)/(58-72) 101/65  Flowsheet Rows      Flowsheet Row First Filed Value   Admission Height 172.7 cm (68\") Documented at 09/20/2023 1159   Admission Weight 76.2 kg (168 lb) " Documented at 09/20/2023 1159           Body mass index is 25.54 kg/m².    Physical Exam:  General Appearance:    Alert, cooperative, in no acute distress, good for stated age   Head:    Normocephalic, without obvious abnormality, atraumatic   Eyes:         conjunctivae and sclerae normal, no icterus, PERRLA   ENT:    Ears grossly intact, oral mucosa moist,   Neck:   No adenopathy, supple, trachea midline,    Back:     Normal to inspection, range of motion normal   Lungs:     Clear to auscultation,respirations regular, even and                   unlabored    Heart:    Regular rhythm and normal rate,  + high pitched holosys murmur RUSB, normal S1 and S2, PPM   Abdomen:     Normal bowel sounds, no masses,  soft non-tender, non-distended,    Extremities:   Moves all extremities well, no cyanosis, no edema,             Pulses:   Pulses palpable and equal bilaterally   Skin:   No bleeding, rash, bruising    Neurologic:    Psych:   Cranial nerves 2 - 12 grossly intact, sensation grossly intact,     Moves all extremities well, equal bilateral strength    Alert and Oriented x 3, Normal Affect    I washed/sanitized my hands before entering the room and immediately upon leaving the room.    LABS:  Admission on 09/20/2023   Component Date Value Ref Range Status    Glucose 09/20/2023 115 (H)  65 - 99 mg/dL Final    BUN 09/20/2023 12  8 - 23 mg/dL Final    Creatinine 09/20/2023 0.92  0.76 - 1.27 mg/dL Final    Sodium 09/20/2023 134 (L)  136 - 145 mmol/L Final    Potassium 09/20/2023 3.9  3.5 - 5.2 mmol/L Final    Chloride 09/20/2023 98  98 - 107 mmol/L Final    CO2 09/20/2023 22.0  22.0 - 29.0 mmol/L Final    Calcium 09/20/2023 9.3  8.2 - 9.6 mg/dL Final    Total Protein 09/20/2023 6.9  6.0 - 8.5 g/dL Final    Albumin 09/20/2023 4.0  3.5 - 5.2 g/dL Final    ALT (SGPT) 09/20/2023 15  1 - 41 U/L Final    AST (SGOT) 09/20/2023 23  1 - 40 U/L Final    Alkaline Phosphatase 09/20/2023 88  39 - 117 U/L Final    Total Bilirubin  09/20/2023 0.6  0.0 - 1.2 mg/dL Final    Globulin 09/20/2023 2.9  gm/dL Final    A/G Ratio 09/20/2023 1.4  g/dL Final    BUN/Creatinine Ratio 09/20/2023 13.0  7.0 - 25.0 Final    Anion Gap 09/20/2023 14.0  5.0 - 15.0 mmol/L Final    eGFR 09/20/2023 76.6  >60.0 mL/min/1.73 Final    Lipase 09/20/2023 14  13 - 60 U/L Final    Color, UA 09/20/2023 Yellow  Yellow, Straw Final    Appearance, UA 09/20/2023 Clear  Clear Final    pH, UA 09/20/2023 6.0  5.0 - 8.0 Final    Specific Gravity, UA 09/20/2023 1.007  1.005 - 1.030 Final    Glucose, UA 09/20/2023 Negative  Negative Final    Ketones, UA 09/20/2023 Negative  Negative Final    Bilirubin, UA 09/20/2023 Negative  Negative Final    Blood, UA 09/20/2023 Negative  Negative Final    Protein, UA 09/20/2023 Negative  Negative Final    Leuk Esterase, UA 09/20/2023 Negative  Negative Final    Nitrite, UA 09/20/2023 Negative  Negative Final    Urobilinogen, UA 09/20/2023 0.2 E.U./dL  0.2 - 1.0 E.U./dL Final    Magnesium 09/20/2023 2.6 (H)  1.7 - 2.3 mg/dL Final    TSH 09/20/2023 2.440  0.270 - 4.200 uIU/mL Final    Free T4 09/20/2023 1.24  0.93 - 1.70 ng/dL Final    WBC 09/20/2023 7.86  3.40 - 10.80 10*3/mm3 Final    RBC 09/20/2023 4.75  4.14 - 5.80 10*6/mm3 Final    Hemoglobin 09/20/2023 14.7  13.0 - 17.7 g/dL Final    Hematocrit 09/20/2023 43.0  37.5 - 51.0 % Final    MCV 09/20/2023 90.5  79.0 - 97.0 fL Final    MCH 09/20/2023 30.9  26.6 - 33.0 pg Final    MCHC 09/20/2023 34.2  31.5 - 35.7 g/dL Final    RDW 09/20/2023 12.5  12.3 - 15.4 % Final    RDW-SD 09/20/2023 41.5  37.0 - 54.0 fl Final    MPV 09/20/2023 9.8  6.0 - 12.0 fL Final    Platelets 09/20/2023 216  140 - 450 10*3/mm3 Final    Neutrophil % 09/20/2023 62.8  42.7 - 76.0 % Final    Lymphocyte % 09/20/2023 20.7  19.6 - 45.3 % Final    Monocyte % 09/20/2023 15.6 (H)  5.0 - 12.0 % Final    Eosinophil % 09/20/2023 0.3  0.3 - 6.2 % Final    Basophil % 09/20/2023 0.3  0.0 - 1.5 % Final    Immature Grans % 09/20/2023 0.3  0.0  - 0.5 % Final    Neutrophils, Absolute 09/20/2023 4.94  1.70 - 7.00 10*3/mm3 Final    Lymphocytes, Absolute 09/20/2023 1.63  0.70 - 3.10 10*3/mm3 Final    Monocytes, Absolute 09/20/2023 1.23 (H)  0.10 - 0.90 10*3/mm3 Final    Eosinophils, Absolute 09/20/2023 0.02  0.00 - 0.40 10*3/mm3 Final    Basophils, Absolute 09/20/2023 0.02  0.00 - 0.20 10*3/mm3 Final    Immature Grans, Absolute 09/20/2023 0.02  0.00 - 0.05 10*3/mm3 Final    nRBC 09/20/2023 0.0  0.0 - 0.2 /100 WBC Final         DIAGNOSTICS:  CT Abdomen Pelvis With Contrast    Result Date: 9/20/2023  1. There is a enhancing mass in the cecum measuring about 3 cm with some adjacent enlarged pericecal lymph nodes measuring up to 1.6 cm. Overall findings are concerning for colon malignancy and recommend evaluation with colonoscopy 2. There is mild sigmoid diverticulosis and also some mild wall thickening involving the mid sigmoid which may reflect a mild diverticulitis. There is no significant inflammatory change or abscess.  Findings were discussed with Dr. Hernandez in the ER at time of dictation  Radiation dose reduction techniques were utilized, including automated exposure control and exposure modulation based on body size.   This report was finalized on 9/20/2023 2:13 PM by Dr. Justin Ramos M.D.            Results Review:   I reviewed the patient's new clinical results.  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 difficile diarrhea     Abd cramping  and diarrhea 2/2 diverticulitis/C. difficile colitis  -Started on oral vancomycin per protocol  Stopping antacids  -patient on antibiotics recently  -start probiotic  -nutrition eval      enhancing mass in the cecum measuring about 3 cm with some adjacent enlarged pericecal lymph nodes measuring up to 1.6 cm. And weight los  -Consult colorectal surgery for possible C-scope plus minus further intervention  -Per records patient had a 2017 stress test with no ischemia.  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  Please reference all orders placed.    Shauna Rae MD  09/20/23  15:03 EDT      This document is intended for medical expert use only. Reading of this document by patients and/or patient's family without participating in medical staff guidance may result in misinterpretation and unintended morbidity. Any interpretation of such data is the responsibility of the patient and/or family member responsible for the patient in concert with their primary or specialist providers, and NOT to be left for sources of online searches such as [employer], [employer] or similar queries. Relying on these approaches to knowledge may result in  misinterpretation, misguided goals of care and even death should patients or family members try recommendations outside of the realm of professional medical care in a supervised way.    Dictated utilizing Voice dictation:  Parts of this note may be an electronic transcription/translation of spoke language to printed text using Dragon dictation system.

## 2023-09-20 NOTE — ED NOTES
Pt to the ED c/o felling like he was going to pass out and heart beating out of rhythm. Pt states this started 2 weeks ago.

## 2023-09-20 NOTE — PROGRESS NOTES
[facility] Clinical Pharmacy Services: Piperacillin-Tazobactam Consult    Pt Name: Vicente Delgado   : 1928    Indication: Intra-Abdominal Infection    Relevant clinical data and objective history reviewed:    Past Medical History:   Diagnosis Date    Allergic rhinitis     Anxiety     Arthritis     Atrial fibrillation     CAD (coronary artery disease)     CAD (coronary artery disease) 2016    History of mild disease.  Stress test  with no ischemia.  He is on aspirin statin and Zetia.  No angina pectoris. 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 Rodríguez 10/2021    Visual impairment Ocular mygraine     Creatinine   Date Value Ref Range Status   2023 0.92 0.76 - 1.27 mg/dL Final   2023 0.94 0.76 - 1.27 mg/dL Final   2022 0.83 0.76 - 1.27 mg/dL Final   2022 0.89 0.76 - 1.27 mg/dL Final   2021 1.00 0.60 - 1.30 mg/dL Final     Comment:     Serial Number: [id number]:  [id number]   2020 1.10 0.60 - 1.30 mg/dL Final     Comment:     Serial Number: [id number]:  [id number]     BUN   Date Value Ref Range Status   2023 12 8 - 23 mg/dL Final     Estimated Creatinine Clearance: 51.8 mL/min (by C-G formula based on SCr of 0.92 mg/dL).    Lab Results   Component Value Date    WBC 7.86 2023     Temp Readings from Last 3 Encounters:   23 97.5 °F (36.4 °C) (Oral)   23 97.8 °F (36.6 °C)    06/09/23 98.6 °F (37 °C) (Temporal)      Assessment/Plan  Estimated CrCl >20 mL/min at this time; BMI 25 kg/m2  Will start piperacillin-tazobactam 3.375 g IV every 8 hours     Pharmacy will continue to follow daily while on piperacillin-tazobactam and adjust as needed. Thank you for this consult.    Machelle Solorzano, PharmD  Clinical Pharmacist

## 2023-09-21 ENCOUNTER — APPOINTMENT (OUTPATIENT)
Dept: CARDIOLOGY | Facility: HOSPITAL | Age: 88
DRG: 371 | End: 2023-09-21
Payer: MEDICARE

## 2023-09-21 PROBLEM — A04.0: Status: ACTIVE | Noted: 2023-09-21

## 2023-09-21 LAB
ANION GAP SERPL CALCULATED.3IONS-SCNC: 11.8 MMOL/L (ref 5–15)
BASOPHILS # BLD AUTO: 0.02 10*3/MM3 (ref 0–0.2)
BASOPHILS NFR BLD AUTO: 0.3 % (ref 0–1.5)
BUN SERPL-MCNC: 12 MG/DL (ref 8–23)
BUN/CREAT SERPL: 13.8 (ref 7–25)
CALCIUM SPEC-SCNC: 8.6 MG/DL (ref 8.2–9.6)
CHLORIDE SERPL-SCNC: 101 MMOL/L (ref 98–107)
CO2 SERPL-SCNC: 22.2 MMOL/L (ref 22–29)
CREAT SERPL-MCNC: 0.87 MG/DL (ref 0.76–1.27)
DEPRECATED RDW RBC AUTO: 39.9 FL (ref 37–54)
EGFRCR SERPLBLD CKD-EPI 2021: 79.5 ML/MIN/1.73
EOSINOPHIL # BLD AUTO: 0.03 10*3/MM3 (ref 0–0.4)
EOSINOPHIL NFR BLD AUTO: 0.4 % (ref 0.3–6.2)
ERYTHROCYTE [DISTWIDTH] IN BLOOD BY AUTOMATED COUNT: 12.1 % (ref 12.3–15.4)
GLUCOSE SERPL-MCNC: 73 MG/DL (ref 65–99)
HCT VFR BLD AUTO: 36.1 % (ref 37.5–51)
HGB BLD-MCNC: 12.2 G/DL (ref 13–17.7)
IMM GRANULOCYTES # BLD AUTO: 0.02 10*3/MM3 (ref 0–0.05)
IMM GRANULOCYTES NFR BLD AUTO: 0.3 % (ref 0–0.5)
LYMPHOCYTES # BLD AUTO: 1.63 10*3/MM3 (ref 0.7–3.1)
LYMPHOCYTES NFR BLD AUTO: 21.4 % (ref 19.6–45.3)
MCH RBC QN AUTO: 30.6 PG (ref 26.6–33)
MCHC RBC AUTO-ENTMCNC: 33.8 G/DL (ref 31.5–35.7)
MCV RBC AUTO: 90.5 FL (ref 79–97)
MONOCYTES # BLD AUTO: 1.24 10*3/MM3 (ref 0.1–0.9)
MONOCYTES NFR BLD AUTO: 16.3 % (ref 5–12)
NEUTROPHILS NFR BLD AUTO: 4.69 10*3/MM3 (ref 1.7–7)
NEUTROPHILS NFR BLD AUTO: 61.3 % (ref 42.7–76)
NRBC BLD AUTO-RTO: 0 /100 WBC (ref 0–0.2)
PLATELET # BLD AUTO: 204 10*3/MM3 (ref 140–450)
PMV BLD AUTO: 10.1 FL (ref 6–12)
POTASSIUM SERPL-SCNC: 3.9 MMOL/L (ref 3.5–5.2)
RBC # BLD AUTO: 3.99 10*6/MM3 (ref 4.14–5.8)
SODIUM SERPL-SCNC: 135 MMOL/L (ref 136–145)
WBC NRBC COR # BLD: 7.63 10*3/MM3 (ref 3.4–10.8)

## 2023-09-21 PROCEDURE — 63710000001 ONDANSETRON PER 8 MG: Performed by: INTERNAL MEDICINE

## 2023-09-21 PROCEDURE — 99221 1ST HOSP IP/OBS SF/LOW 40: CPT | Performed by: PHYSICIAN ASSISTANT

## 2023-09-21 PROCEDURE — 99222 1ST HOSP IP/OBS MODERATE 55: CPT | Performed by: NURSE PRACTITIONER

## 2023-09-21 PROCEDURE — 97161 PT EVAL LOW COMPLEX 20 MIN: CPT

## 2023-09-21 PROCEDURE — 85025 COMPLETE CBC W/AUTO DIFF WBC: CPT | Performed by: INTERNAL MEDICINE

## 2023-09-21 PROCEDURE — 80048 BASIC METABOLIC PNL TOTAL CA: CPT | Performed by: INTERNAL MEDICINE

## 2023-09-21 PROCEDURE — 25010000002 AZITHROMYCIN PER 500 MG: Performed by: INTERNAL MEDICINE

## 2023-09-21 PROCEDURE — 25510000001 PERFLUTREN (DEFINITY) 8.476 MG IN SODIUM CHLORIDE (PF) 0.9 % 10 ML INJECTION: Performed by: INTERNAL MEDICINE

## 2023-09-21 PROCEDURE — 25010000002 PIPERACILLIN SOD-TAZOBACTAM PER 1 G: Performed by: INTERNAL MEDICINE

## 2023-09-21 PROCEDURE — 93306 TTE W/DOPPLER COMPLETE: CPT

## 2023-09-21 PROCEDURE — 93306 TTE W/DOPPLER COMPLETE: CPT | Performed by: INTERNAL MEDICINE

## 2023-09-21 RX ORDER — LISINOPRIL 5 MG/1
5 TABLET ORAL EVERY 12 HOURS SCHEDULED
Status: DISCONTINUED | OUTPATIENT
Start: 2023-09-21 | End: 2023-09-21

## 2023-09-21 RX ADMIN — SODIUM CHLORIDE 125 ML/HR: 9 INJECTION, SOLUTION INTRAVENOUS at 00:26

## 2023-09-21 RX ADMIN — AZITHROMYCIN MONOHYDRATE 500 MG: 500 INJECTION, POWDER, LYOPHILIZED, FOR SOLUTION INTRAVENOUS at 12:06

## 2023-09-21 RX ADMIN — VANCOMYCIN HYDROCHLORIDE 125 MG: 125 CAPSULE ORAL at 00:26

## 2023-09-21 RX ADMIN — ROSUVASTATIN CALCIUM 10 MG: 10 TABLET, FILM COATED ORAL at 21:05

## 2023-09-21 RX ADMIN — ONDANSETRON HYDROCHLORIDE 4 MG: 4 TABLET, FILM COATED ORAL at 05:17

## 2023-09-21 RX ADMIN — VANCOMYCIN HYDROCHLORIDE 125 MG: 125 CAPSULE ORAL at 13:49

## 2023-09-21 RX ADMIN — Medication 250 MG: at 08:29

## 2023-09-21 RX ADMIN — Medication 10 ML: at 22:32

## 2023-09-21 RX ADMIN — Medication 10 ML: at 08:29

## 2023-09-21 RX ADMIN — PIPERACILLIN SODIUM AND TAZOBACTAM SODIUM 3.38 G: 3; .375 INJECTION, SOLUTION INTRAVENOUS at 05:09

## 2023-09-21 RX ADMIN — Medication 250 MG: at 21:05

## 2023-09-21 RX ADMIN — VANCOMYCIN HYDROCHLORIDE 125 MG: 125 CAPSULE ORAL at 05:15

## 2023-09-21 RX ADMIN — Medication 250 MG: at 00:26

## 2023-09-21 RX ADMIN — PERFLUTREN 2 ML: 6.52 INJECTION, SUSPENSION INTRAVENOUS at 16:36

## 2023-09-21 RX ADMIN — VANCOMYCIN HYDROCHLORIDE 125 MG: 125 CAPSULE ORAL at 18:06

## 2023-09-21 NOTE — THERAPY EVALUATION
Patient Name: Vicente Delgado  : 1928    MRN: [id number]                              Today's Date: 2023       Admit Date: 2023    Visit Dx:     ICD-10-CM ICD-9-CM   1. Abdominal pain, unspecified abdominal location  R10.9 789.00   2. Diarrhea, unspecified type  R19.7 787.91   3. Diverticulitis-failed outpatient treatment  K57.92 562.11   4. Mass of cecum  K63.89 569.89   5. Weight loss  R63.4 783.21     Patient Active Problem List   Diagnosis    HTN (hypertension)    Nonrheumatic mitral valve regurgitation    Disorder of right ventricle of heart    CAD (coronary artery disease)    Hyperlipidemia    Calculus of gallbladder without cholecystitis without obstruction    S/P cholecystectomy    Nonrheumatic aortic valve insufficiency    Non-rheumatic mitral regurgitation    Trigger middle finger of left hand    Gastroesophageal reflux disease with esophagitis    Carpal tunnel syndrome of left wrist    Amaurosis fugax of left eye    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    Atherosclerosis of native artery of left lower extremity with intermittent claudication    Anxiety    Dermatitis    Chronic stable angina    Aortic stenosis, moderate    Chronic right shoulder pain    Controlled substance agreement signed    TIA (transient ischemic attack)    Paroxysmal atrial fibrillation    Epistaxis    Diverticulitis    History of prostate cancer    Lupus    TAM (nonalcoholic steatohepatitis)    Diverticulosis    Mass of colon    Weight loss, unintentional    General weakness    C. difficile diarrhea    Chronic diastolic CHF (congestive heart failure) 1a     Past Medical History:   Diagnosis Date    Allergic rhinitis     Anxiety     Arthritis     Atrial fibrillation     CAD (coronary artery disease)     CAD (coronary artery disease) 2016    History of mild disease.  Stress test  with no ischemia.  He is on aspirin statin and Zetia.  No angina pectoris. EF normal 2017 echo    Cancer     Cataract   2018” and    Ocular mygraine    Cholelithiasis 2020    Depression     Diverticulosis     Esophagitis     Gastritis     GERD (gastroesophageal reflux disease)     Heart disease     History of anxiety     History of medical problems Right shoulder replacemd    2008    History of prostate cancer 06/1990    HL (hearing loss) Partial    Hyperlipidemia     Hypertension     Insomnia     Low back pain Yes  from hworking    Lupus     TAM (nonalcoholic steatohepatitis)     Sciatica of right side     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 Rodríguez 10/2021    Visual impairment Ocular mygraine     Past Surgical History:   Procedure Laterality Date    ABDOMINAL SURGERY      CARDIAC CATHETERIZATION  11/16/1995    CARDIAC SURGERY  11/16/1995    CATARACT EXTRACTION Left 07/2018    CHOLECYSTECTOMY  2020    CHOLECYSTECTOMY WITH INTRAOPERATIVE CHOLANGIOGRAM N/A 9/29/2020    Procedure: Laparoscopic cholecystectomy;  Surgeon: Javi Peralta MD;  Location: [facility];  Service: General;  Laterality: N/A;    COLONOSCOPY  01/09/2002    ELBOW PROCEDURE      JOINT REPLACEMENT      LUNG BIOPSY      LYMPH NODE BIOPSY  Yes    1992    NASAL POLYP SURGERY      PACEMAKER IMPLANTATION      PROSTATE SURGERY  06/1990    SHOULDER ROTATOR CUFF REPAIR Right 08/24/2011    Dr. Bach    SIGMOIDOSCOPY      TONSILLECTOMY  Yes 1943    UPPER GASTROINTESTINAL ENDOSCOPY  09/12/1997    Gastritis, Duodenitis, hiatal hernia (Pathology: Gastric antrum minimal chronic inflammation)    UPPER GASTROINTESTINAL ENDOSCOPY  04/11/2005    Mild esophagitis, Small hiatal hernia, Mild gastritis and mild duodenitis      General Information       Row Name 09/21/23 0942          Physical Therapy Time and Intention    Document Type discharge evaluation/summary  -CB     Mode of Treatment individual therapy;physical therapy  -CB       Row Name 09/21/23 0942          General Information    Patient Profile Reviewed yes  -CB     Prior Level of  Function independent:;gait;transfer;bed mobility;ADL's  -CB     Existing Precautions/Restrictions no known precautions/restrictions  -CB     Barriers to Rehab none identified  -CB       Row Name 09/21/23 0942          Living Environment    People in Home alone  ILF  -CB       Row Name 09/21/23 0942          Cognition    Orientation Status (Cognition) oriented x 4  -CB               User Key  (r) = Recorded By, (t) = Taken By, (c) = Cosigned By      Initials Name Provider Type    CB Aishwarya Patel PT Physical Therapist                   Mobility       Row Name 09/21/23 0943          Bed Mobility    Bed Mobility supine-sit;sit-supine  -CB     Supine-Sit [place] (Bed Mobility) modified independence  -CB     Sit-Supine [place] (Bed Mobility) modified independence  -CB       Row Name 09/21/23 0943          Sit-Stand Transfer    Sit-Stand [place] (Transfers) modified independence  -CB     Assistive Device (Sit-Stand Transfers) --  no AD  -CB       Row Name 09/21/23 0943          Gait/Stairs (Locomotion)    [place] Level (Gait) modified independence  -CB     Assistive Device (Gait) --  no AD  -CB     Distance in Feet (Gait) 200ft  -CB     Deviations/Abnormal Patterns (Gait) gait speed decreased  -CB     Comment, (Gait/Stairs) no LOB or unsteadiness noted; encouraged continues mobility OOB  -CB               User Key  (r) = Recorded By, (t) = Taken By, (c) = Cosigned By      Initials Name Provider Type    Aishwarya Teague PT Physical Therapist                   Obj/Interventions       Row Name 09/21/23 0943          Range of Motion Comprehensive    General Range of Motion bilateral lower extremity ROM WFL  -CB       Row Name 09/21/23 0943          Strength Comprehensive (MMT)    General Manual Muscle Testing (MMT) Assessment no strength deficits identified  -CB       Row Name 09/21/23 0943          Balance    Balance Assessment standing static balance;standing dynamic balance  -CB     Static Standing  Balance modified independence  -CB     Dynamic Standing Balance modified independence  -CB     Position/Device Used, Standing Balance unsupported  -CB       Row Name 09/21/23 0943          Sensory Assessment (Somatosensory)    Sensory Assessment (Somatosensory) sensation intact  -CB               User Key  (r) = Recorded By, (t) = Taken By, (c) = Cosigned By      Initials Name Provider Type    Aishwarya Teague, PT Physical Therapist                   Goals/Plan    No documentation.                  Clinical Impression       Row Name 09/21/23 0944          Pain    Pretreatment Pain Rating 0/10 - no pain  -CB     Posttreatment Pain Rating 0/10 - no pain  -CB       Row Name 09/21/23 0944          Plan of Care Review    Plan of Care Reviewed With patient  -CB     Outcome Evaluation Patient is a 89 yo male who was admitted with diverticulitis and mass of cecum. Pt lives alone in [facility] and uses rollator for longer distances within facility. Today he is Stalin for bed mobility, transfers, and ambulation of 200ft without use of AD. no LOB or unsteadiness noted. No further acute PT needs. PT encouraged continued ambulation OOB.  -CB       Row Name 09/21/23 0944          Therapy Assessment/Plan (PT)    Criteria for Skilled Interventions Met (PT) no problems identified which require skilled intervention  -CB     Therapy Frequency (PT) evaluation only  -CB       Row Name 09/21/23 0944          Positioning and Restraints    Pre-Treatment Position in bed  -CB     Post Treatment Position bed  -CB     In Bed call light within reach;encouraged to call for assist;with family/caregiver;with nsg  -CB               User Key  (r) = Recorded By, (t) = Taken By, (c) = Cosigned By      Initials Name Provider Type    Aishwarya Teague, PT Physical Therapist                   Outcome Measures       Row Name 09/21/23 0946 09/20/23 2210       How much help from another person do you currently need...    Turning from your back to your side while in  flat bed without using bedrails? 4  -CB 4  -AS    Moving from lying on back to sitting on the side of a flat bed without bedrails? 4  -CB 4  -AS    Moving to and from a bed to a chair (including a wheelchair)? 4  -CB 4  -AS    Standing up from a chair using your arms (e.g., wheelchair, bedside chair)? 4  -CB 4  -AS    Climbing 3-5 steps with a railing? 4  -CB 3  -AS    To walk in hospital room? 4  -CB 3  -AS    AM-PAC 6 Clicks Score (PT) 24  -CB 22  -AS    Highest level of mobility 8 --> Walked 250 feet or more  -CB 7 --> Walked 25 feet or more  -AS      Row Name 09/21/23 0946          Functional Assessment    Outcome Measure Options AM-PAC 6 Clicks Basic Mobility (PT)  -CB               User Key  (r) = Recorded By, (t) = Taken By, (c) = Cosigned By      Initials Name Provider Type    Aishwarya Teague, PT Physical Therapist    AS Herve Heaton, RN Registered Nurse                                 Physical Therapy Education       Title: PT OT SLP Therapies (Not Started)       Topic: Physical Therapy (Not Started)       Point: Mobility training (Not Started)       Learner Progress:  Not documented in this visit.              Point: Home exercise program (Not Started)       Learner Progress:  Not documented in this visit.              Point: Body mechanics (Not Started)       Learner Progress:  Not documented in this visit.              Point: Precautions (Not Started)       Learner Progress:  Not documented in this visit.                                  PT Recommendation and Plan     Plan of Care Reviewed With: patient  Outcome Evaluation: Patient is a 89 yo male who was admitted with diverticulitis and mass of cecum. Pt lives alone in [facility] and uses rollator for longer distances within facility. Today he is Stalin for bed mobility, transfers, and ambulation of 200ft without use of AD. no LOB or unsteadiness noted. No further acute PT needs. PT encouraged continued ambulation OOB.     Time Calculation:         PT  Charges       Row Name 09/21/23 0947             Time Calculation    Start Time 0811  -CB      Stop Time 0825  -CB      Time Calculation (min) 14 min  -CB      PT Received On 09/21/23  -CB                User Key  (r) = Recorded By, (t) = Taken By, (c) = Cosigned By      Initials Name Provider Type    CB Aishwarya Patel, PT Physical Therapist                  Therapy Charges for Today       Code Description Service Date Service Provider Modifiers Qty    92616901993 HC PT EVAL LOW COMPLEXITY 4 9/21/2023 Aishwarya Patel, PT GP 1            PT G-Codes  Outcome Measure Options: AM-PAC 6 Clicks Basic Mobility (PT)  AM-PAC 6 Clicks Score (PT): 24  PT Discharge Summary  Anticipated Discharge Disposition (PT): home    Aishwarya Patel PT  9/21/2023

## 2023-09-21 NOTE — PROGRESS NOTES
"Name: Vicente Delgado ADMIT: 2023   : 1928  PCP: Mechelle Landa APRN    MRN: [id number] LOS: 0 days   AGE/SEX: 89 y.o. male  ROOM: [address]    [place], Subsequent Hospital Care    89 y.o. male admitted to hospital with abdominal pain and overall weakness, identified with diverticulitis and a cecal mass.  Asked to see because patient had previously been scheduled several months ago for outpatient aortogram with bilateral lower extremity runoff arteriogram.  Subsequent outpatient records reviewed.  Has exercise-associated fatigability of the left lower extremity especially, despite normal ABIs with multiphasic Doppler waveforms.  Lower arterial stress imaging not performed.  Also has history of lower extremity DVT, with lower extremity venous duplex showing chronic partially occlusive mural changes in the external iliac, common femoral and proximal/mid femoral vein.    Scheduled Medications:   azithromycin, 500 mg, Intravenous, Q24H  rosuvastatin, 10 mg, Oral, Nightly  saccharomyces boulardii, 250 mg, Oral, BID  sodium chloride, 10 mL, Intravenous, Q12H  vancomycin, 125 mg, Oral, Q6H    Active Infusions:  Pharmacy Consult,     Vital Signs  Vital Signs Patient Vitals for the past 24 hrs:   BP Temp Temp src Pulse Resp SpO2 Height Weight   23 0750 121/67 97.7 °F (36.5 °C) Oral 75 18 95 % -- --   23 0635 -- -- -- -- -- -- -- 74.6 kg (164 lb 8 oz)   23 2320 109/79 98.1 °F (36.7 °C) Oral 91 18 96 % -- --   23 1925 119/65 97.9 °F (36.6 °C) Oral 68 18 97 % -- --   23 1717 122/66 97.5 °F (36.4 °C) Oral 63 18 98 % 172.7 cm (67.99\") 76.2 kg (168 lb)   23 1631 115/68 -- -- 61 -- -- -- --   23 1337 101/65 -- -- 75 -- -- -- --   23 1333 124/58 -- -- 62 -- -- -- --   23 1332 116/59 -- -- 57 -- -- -- --   23 1201 116/72 -- -- -- -- -- -- --   23 1159 -- -- -- -- -- -- 172.7 cm (68\") 76.2 kg (168 lb)   23 1147 -- 97.6 °F (36.4 " °C) Tympanic 93 18 96 % -- --     I/O:  I/O last 3 completed shifts:  In: 1650 [I.V.:600; IV Piggyback:1050]  Out: -   Physical Exam: No change when compared to yesterday evening.     CBC    Results from last 7 days   Lab Units 09/21/23  0249 09/20/23  1208   WBC 10*3/mm3 7.63 7.86   HEMOGLOBIN g/dL 12.2* 14.7   PLATELETS 10*3/mm3 204 216     BMP   Results from last 7 days   Lab Units 09/21/23  0249 09/20/23  1208   SODIUM mmol/L 135* 134*   POTASSIUM mmol/L 3.9 3.9   CHLORIDE mmol/L 101 98   CO2 mmol/L 22.2 22.0   BUN mg/dL 12 12   CREATININE mg/dL 0.87 0.92   GLUCOSE mg/dL 73 115*   MAGNESIUM mg/dL  --  2.6*     Cr Clearance Estimated Creatinine Clearance: 53.6 mL/min (by C-G formula based on SCr of 0.87 mg/dL).  Assessment & Plan   Assessment & Plan    Diverticulitis    HTN (hypertension)    Atherosclerosis of native artery of left lower extremity with intermittent claudication    Aortic stenosis, moderate    Paroxysmal atrial fibrillation    History of prostate cancer    Lupus    TAM (nonalcoholic steatohepatitis)    Diverticulosis    Mass of colon    Weight loss, unintentional    General weakness    C. difficile diarrhea    Chronic diastolic CHF (congestive heart failure) 1a    89 y.o. 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  Admitted to the hospital with diverticulitis and cecal mass with paroxysmal atrial fibrillation.  Next step in the management of diverticulitis and cecal mass remains undetermined.  Can certainly cancel CT if needed, but patient should not require oral contrast which might compromise subsequent imaging or even bowel prep.    Elijah Esquivel MD  09/21/23  09:26 EDT    Please call my office with any question: [phone]    Active Hospital Problems    Diagnosis  POA     **Diverticulitis [K57.92]  Yes    Lupus [M32.9]  Yes    TAM (nonalcoholic steatohepatitis) [K75.81]  Yes    Diverticulosis [K57.90]  Yes    Mass of colon [K63.89]  Yes    Weight loss, unintentional [R63.4]  Yes    General weakness [R53.1]  Yes    C. difficile diarrhea [A04.72]  Yes    Chronic diastolic CHF (congestive heart failure) 1a [I50.32]  Yes    Paroxysmal atrial fibrillation [I48.0]  Yes    Aortic stenosis, moderate [I35.0]  Yes    Atherosclerosis of native artery of left lower extremity with intermittent claudication [I70.212]  Yes    HTN (hypertension) [I10]  Yes    History of prostate cancer [Z85.46]  Not Applicable      Resolved Hospital Problems   No resolved problems to display.

## 2023-09-21 NOTE — CASE MANAGEMENT/SOCIAL WORK
Discharge Planning Assessment  [facility]     Patient Name: Vicente Delgado  MRN: [id number]  Today's Date: 9/21/2023    Admit Date: 9/20/2023    Plan: home; from [facility]   Discharge Needs Assessment       Row Name 09/21/23 0938       Living Environment    People in Home alone    Current Living Arrangements apartment;independent living facility    Potentially Unsafe Housing Conditions none    Primary Care Provided by self    Provides Primary Care For no one    Family Caregiver if Needed child(tracie), adult    Family Caregiver Names Sandra, daughter    Quality of Family Relationships helpful;involved    Able to Return to Prior Arrangements yes       Resource/Environmental Concerns    Resource/Environmental Concerns none       Transition Planning    Patient/Family Anticipates Transition to home    Transportation Anticipated family or friend will provide       Discharge Needs Assessment    Readmission Within the Last 30 Days no previous admission in last 30 days    Equipment Currently Used at Home none  has walker doesn't use    Concerns to be Addressed discharge planning    Anticipated Changes Related to Illness none    Equipment Needed After Discharge none    Provided Post Acute Provider List? Refused    Refused Provider List Comment declined; follow for HH/SNF needs                   Discharge Plan       Row Name 09/21/23 0939       Plan    Plan home; from [facility]    Patient/Family in Agreement with Plan yes    Plan Comments Spoke with pt and daughter Sandra bedside. Agreeable to talk in front of daughter. Confirmed facesheet correct. Explained role of CCP. Pt reports he lives in independent living at [facility]. Pt reports he is IADLs has walker he has at home but doesn't use normally. Pt has no HH or SNF history. His PCP is [facility] and uses [facility] with no issues. Daughter is able to assist with  transportation to and from medical appointments. [place] at [place]/c. PT eval pending. CCP to follow for d/c needs.                  Continued Care and Services - Admitted Since 9/20/2023    Coordination has not been started for this encounter.          Demographic Summary    No documentation.                  Functional Status    No documentation.                  Psychosocial    No documentation.                  Abuse/Neglect    No documentation.                  Legal    No documentation.                  Substance Abuse    No documentation.                  Patient Forms    No documentation.                     SONDRA Alexander

## 2023-09-21 NOTE — CONSULTS
Name: Vicente Delgado ADMIT: 2023   : 1928  PCP: Mechelle Landa APRN    MRN: [id number] LOS: 0 days   AGE/SEX: 89 y.o. male  ROOM: [address]    Patient Care Team:  Mechelle Landa APRN as PCP - General (Internal Medicine)  Reginaldo Palacio MD (Dermatology)  Janki Elias MD as Consulting Physician (Ophthalmology)  Kristopher Machado DPM as Consulting Physician (Podiatry)  Aby Marquez MD as Consulting Physician (Hand Surgery)  Wilton Ramos MD as Consulting Physician (Orthopedic Surgery)  Destinee Snider MD as Consulting Physician (Pain Medicine)  Breezy Frausto MD as Consulting Physician (Cardiology)  Chief Complaint   Patient presents with    Abdominal Pain    Irregular Heart Beat     CC: Comment about CAT scan    History of Present Illness  Exceptionally pleasant and lucid 89-year-old gentleman admitted to the hospital with irregular heartbeat and abdominal pain,  Review of Systems  Past Medical History:   Diagnosis Date    Allergic rhinitis     Anxiety     Arthritis     Atrial fibrillation     CAD (coronary artery disease)     CAD (coronary artery disease) 2016    History of mild disease.  Stress test  with no ischemia.  He is on aspirin statin and Zetia.  No angina pectoris. 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 Rodríguez 10/2021    Visual impairment Ocular mygraine      Past Surgical History:   Procedure Laterality Date    ABDOMINAL SURGERY      CARDIAC CATHETERIZATION  11/16/1995    CARDIAC SURGERY  11/16/1995    CATARACT EXTRACTION Left 07/2018    CHOLECYSTECTOMY  2020    CHOLECYSTECTOMY WITH INTRAOPERATIVE CHOLANGIOGRAM N/A 9/29/2020    Procedure: Laparoscopic cholecystectomy;  Surgeon: Javi Peralta MD;  Location: [facility] MAIN OR;  Service: General;  Laterality: N/A;    COLONOSCOPY  01/09/2002    ELBOW PROCEDURE      JOINT REPLACEMENT      LUNG BIOPSY      LYMPH NODE BIOPSY  Yes    1992    NASAL POLYP SURGERY      PACEMAKER IMPLANTATION      PROSTATE SURGERY  06/1990    SHOULDER ROTATOR CUFF REPAIR Right 08/24/2011    Dr. Bach    SIGMOIDOSCOPY      TONSILLECTOMY  Yes 1943    UPPER GASTROINTESTINAL ENDOSCOPY  09/12/1997    Gastritis, Duodenitis, hiatal hernia (Pathology: Gastric antrum minimal chronic inflammation)    UPPER GASTROINTESTINAL ENDOSCOPY  04/11/2005    Mild esophagitis, Small hiatal hernia, Mild gastritis and mild duodenitis     Family History   Problem Relation Age of Onset    Heart failure Mother     Hearing loss Mother     Heart disease Mother         Mother had congestive heart failure    Hyperlipidemia Mother     Alcohol abuse Father     Diabetes Father      Social History     Tobacco Use    Smoking status: Never     Passive exposure: Yes    Smokeless tobacco: Never    Tobacco comments:     Smoke a cigar ocassionally   Vaping Use    Vaping Use: Never used   Substance Use Topics    Alcohol use: No     Comment: Daily caffeine use    Drug use: No     Medications Prior to Admission   Medication Sig Dispense Refill Last Dose    benazepril (LOTENSIN) 20 MG tablet TAKE ONE TABLET BY MOUTH TWICE A DAY CALL MD IF  BLOOD PRESSURE ABOVE 160/90 (Patient taking differently: 0.5 tablets.) 180 tablet 3     ezetimibe (ZETIA) 10 MG tablet Take 1 tablet by mouth Daily. 90 tablet 3     Multiple Vitamins-Minerals (ICAPS AREDS 2 PO) Take 1 tablet by mouth Daily.        "multivitamin with minerals tablet tablet Take 1 tablet by mouth Daily.       pantoprazole (PROTONIX) 40 MG EC tablet TAKE ONE TABLET BY MOUTH DAILY 90 tablet 1     rosuvastatin (CRESTOR) 10 MG tablet TAKE ONE TABLET BY MOUTH ONCE NIGHTLY 90 tablet 3      lisinopril, 20 mg, Oral, Q12H  [START ON 9/21/2023] pantoprazole, 40 mg, Oral, Daily  piperacillin-tazobactam, 3.375 g, Intravenous, Q8H  rosuvastatin, 10 mg, Oral, Nightly  sodium chloride, 10 mL, Intravenous, Q12H    Lidocaine, Lovastatin, Pravastatin, Gabapentin, Procaine, and Simvastatin    Vital Signs and Labs:  Vital Signs Patient Vitals for the past 24 hrs:   BP Temp Temp src Pulse Resp SpO2 Height Weight   09/20/23 1925 119/65 97.9 °F (36.6 °C) Oral 68 18 97 % -- --   09/20/23 1717 122/66 97.5 °F (36.4 °C) Oral 63 18 98 % 172.7 cm (67.99\") 76.2 kg (168 lb)   09/20/23 1631 115/68 -- -- 61 -- -- -- --   09/20/23 1337 101/65 -- -- 75 -- -- -- --   09/20/23 1333 124/58 -- -- 62 -- -- -- --   09/20/23 1332 116/59 -- -- 57 -- -- -- --   09/20/23 1201 116/72 -- -- -- -- -- -- --   09/20/23 1159 -- -- -- -- -- -- 172.7 cm (68\") 76.2 kg (168 lb)   09/20/23 1147 -- 97.6 °F (36.4 °C) Tympanic 93 18 96 % -- --     BMI:  Body mass index is 25.55 kg/m².    Physical Exam:  Physical Exam  Vitals reviewed.   Constitutional:       General: He is not in acute distress.     Appearance: Normal appearance. He is not ill-appearing, toxic-appearing or diaphoretic.   HENT:      Head: Normocephalic and atraumatic.      Right Ear: External ear normal.      Left Ear: External ear normal.      Nose: Nose normal.      Mouth/Throat:      Mouth: Mucous membranes are dry.   Eyes:      General: No scleral icterus.     Extraocular Movements: Extraocular movements intact.      Conjunctiva/sclera: Conjunctivae normal.      Pupils: Pupils are equal, round, and reactive to light.   Cardiovascular:      Rate and Rhythm: Normal rate and regular rhythm.      Pulses: Normal pulses.      Heart sounds: " Normal heart sounds.   Pulmonary:      Effort: Pulmonary effort is normal. No respiratory distress.      Breath sounds: Normal breath sounds. No stridor.   Abdominal:      General: There is no distension.      Comments: Mild mid abdominal tenderness.   Musculoskeletal:         General: No swelling. Normal range of motion.      Cervical back: Normal range of motion and neck supple. No rigidity.      Right lower leg: No edema.      Left lower leg: No edema.   Skin:     General: Skin is warm and dry.      Capillary Refill: Capillary refill takes less than 2 seconds.   Neurological:      General: No focal deficit present.      Mental Status: He is alert and oriented to person, place, and time.      Cranial Nerves: No cranial nerve deficit.   Psychiatric:         Mood and Affect: Mood normal.         Behavior: Behavior normal.         Thought Content: Thought content normal.         Judgment: Judgment normal.      Comments: Exceptionally lucid.      CBC    Results from last 7 days   Lab Units 09/20/23  1208   WBC 10*3/mm3 7.86   HEMOGLOBIN g/dL 14.7   PLATELETS 10*3/mm3 216     BMP   Results from last 7 days   Lab Units 09/20/23  1208   SODIUM mmol/L 134*   POTASSIUM mmol/L 3.9   CHLORIDE mmol/L 98   CO2 mmol/L 22.0   BUN mg/dL 12   CREATININE mg/dL 0.92   GLUCOSE mg/dL 115*   MAGNESIUM mg/dL 2.6*     Cr Clearance Estimated Creatinine Clearance: 51.8 mL/min (by C-G formula based on SCr of 0.92 mg/dL).  [facility] [place]     Radiology(recent) CT Abdomen Pelvis With Contrast    Result Date: 9/20/2023  1. There is a enhancing mass in the cecum measuring about 3 cm with some adjacent enlarged pericecal lymph nodes measuring up to 1.6 cm. Overall findings are concerning for colon malignancy and recommend evaluation with colonoscopy 2. There is mild sigmoid diverticulosis and also some mild wall thickening involving the mid sigmoid which may reflect a mild diverticulitis. There is no significant inflammatory change or abscess.  Findings  were discussed with Dr. Hernandez in the ER at time of dictation  Radiation dose reduction techniques were utilized, including automated exposure control and exposure modulation based on body size.   This report was finalized on 9/20/2023 2:13 PM by Dr. Justin Ramos M.D.       CT scan of abdomen and pelvis with IV contrast performed today reviewed.  Has aortic and iliac artery calcification without apparent occlusive disease.  No aneurysm.    Active Hospital Problems    Diagnosis  POA    **Diverticulitis [K57.92]  Yes    Lupus [M32.9]  Yes    TAM (nonalcoholic steatohepatitis) [K75.81]  Yes    Diverticulosis [K57.90]  Yes    Mass of colon [K63.89]  Yes    Weight loss, unintentional [R63.4]  Yes    General weakness [R53.1]  Yes    Paroxysmal atrial fibrillation [I48.0]  Yes    Aortic stenosis, moderate [I35.0]  Yes    Atherosclerosis of native artery of left lower extremity with intermittent claudication [I70.212]  Yes    HTN (hypertension) [I10]  Yes    History of prostate cancer [Z85.46]  Not Applicable      Resolved Hospital Problems   No resolved problems to display.     Problem Points:  1:  Patient has an established problem, stable or improving  Total problem points:1    Data Points:  1:  I have reviewed or order clinical lab test  1:  I have reviewed or order radiology test (except heart catheterization or echo)  1:  I have personally decided to obtain of records  Total data points:3    Risk Points:  Low:  One stable chronic condition    MDM requires 2/3 (Problem points, Data points and Risk)  MDM Prob point Data point Risk   SF 1 1 Minimal   Low 2 2 Low   Mod 3 3 Moderate   High 4 4 High     Code requires 3/3 (MDM, History and Exam)  Code MDM History Exam Time   77564 SF/Low Detailed Detailed 30   95336 Mod Comprehensive Comprehensive 50   18823 High Comprehensive Comprehensive 70     Detailed history:  4 elements HPI or status of 3 chronic problems; 2-9 system ROS  Comprehensive:  4 elements HPI or status  of 3 chronic problems;  10 system ROS    Detailed Exam:  12 findings from any organ system  Comprehensive Exam:  2 findings from each of 9 systems.   30991    Assessment & Plan       Diverticulitis    HTN (hypertension)    Atherosclerosis of native artery of left lower extremity with intermittent claudication    Aortic stenosis, moderate    Paroxysmal atrial fibrillation    History of prostate cancer    Lupus    TAM (nonalcoholic steatohepatitis)    Diverticulosis    Mass of colon    Weight loss, unintentional    General weakness    89 y.o. male with history of lower extremity discomfort.  I do not have access to recent office records now, but based upon hospital consultation 2021, patient has history of lower extremity pain and degenerative disease of the spine, and was to have a CT angiogram of the aorta with bilateral lower extremity runoff tomorrow.  Instead he was admitted to the hospital with abdominal pain and palpitations, and identified with diverticulitis and cecal mass and associated mesenteric lymphadenopathy, worrisome for colon cancer.  Asked to see to render opinion.  Patient does not appear to have history of lower extremity venous thrombosis and has not recently been anticoagulated.  Instead has been treated only with lower dose aspirin.  On examination has easily palpable pedal artery pulses.  There appears to be no urgency to the patient's problem.  As it turns out, Dr. Rodríguez is out of the [place] and unavailable for review.  I will review available office records tomorrow and follow-up, but I believe it would be appropriate to postpone previously scheduled CT, if that is the question.  Has normal renal function, so concern over stacking contrast studies seems not to be critical.  It would appear that he needs to DVT prophylaxis given age and possible GI malignancy.  I will take the liberty of ordering subcu heparin.  I do not believe that additional vascular testing is indicated at this  time.    I discussed the patients findings and my recommendations with patient and family.    Elijah Esquivel MD  09/20/23  20:24 EDT    Please call my office with any question: [phone]

## 2023-09-21 NOTE — SIGNIFICANT NOTE
09/21/23 1318   OTHER   Discipline occupational therapist   Rehab Time/Intention   Session Not Performed other (see comments)  (Per RN and PT pt is not appropriate for skilled OT services. Pt is observed to be MOD I for bed mob, xfers, fxnl mob and self care. OT will sign off.)   Therapy Assessment/Plan (PT)   Criteria for Skilled Interventions Met (PT) no problems identified which require skilled intervention

## 2023-09-21 NOTE — PLAN OF CARE
Pt. VS WNL. No c/o pain.  Pt. A&O x4.  Pt. Receiving IV and PO abx.  Pt. Up ad sallie/standby gait steady.  Pt. Had echo today, see results.  Pt. To have outpt. Colonoscopy.  Pt. Tolerating full liquid diet. Pt. Resting comfortably at present, will continue to monitor closely.      Problem: Adult Inpatient Plan of Care  Goal: Plan of Care Review  Outcome: Ongoing, Progressing  Flowsheets (Taken 9/21/2023 1820)  Progress: improving  Plan of Care Reviewed With: patient

## 2023-09-21 NOTE — CONSULTS
Vicente Delgado is a 89 y.o. male who is seen as a consult at the request of Shauna Rae, * for Abdominal Pain and Irregular Heart Beat.      HPI:  Pt developed diarrhea and nausea approximately 2 weeks ago. Initially believed symptoms to be related to food poisoning. Was seen by his PCP 09/14/2023 where he was diagnosed with diverticulitis and started on Augmentin. He presented to the [facility] ED yesterday for persistent symptoms. CT of the abdomen/pelvis revealed mild wall thickening within the sigmoid colon concerning for diverticulitis. It also showed a 3 cm mass at the ileocecal valve and enlarged lymph nodes adjacent to this. Findings concerning for malignancy. Pt started on IV Zosyn for treatment of the diverticulitis and admitted for additional workup.     Stools studies performed yesterday were positive for C. diff. Antibiotics changed to PO Vancomycin.     Pt denies any abdominal pain or RB. Does note a 12 lb weight loss within the past 2 weeks, which he contributes to nausea and decreased PO intake. Reports being in his normal state of health prior to this. Pt states he underwent a sigmoidoscopy in 1995 and a colonoscopy approximately 15 years ago. Denies any personal or FHx of colon polyps or colon cancer.     Past Medical History:   Diagnosis Date    Allergic rhinitis     Anxiety     Arthritis     Atrial fibrillation     CAD (coronary artery disease)     CAD (coronary artery disease) 07/01/2016    History of mild disease.  Stress test 2017 with no ischemia.  He is on aspirin statin and Zetia.  No angina pectoris. 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 Rodríguez 10/2021    Visual impairment Ocular mygraine       Past Surgical History:   Procedure Laterality Date    ABDOMINAL SURGERY      CARDIAC CATHETERIZATION  11/16/1995    CARDIAC SURGERY  11/16/1995    CATARACT EXTRACTION Left 07/2018    CHOLECYSTECTOMY  2020    CHOLECYSTECTOMY WITH INTRAOPERATIVE CHOLANGIOGRAM N/A 9/29/2020    Procedure: Laparoscopic cholecystectomy;  Surgeon: Javi Peralta MD;  Location: [facility] OR;  Service: General;  Laterality: N/A;    COLONOSCOPY  01/09/2002    ELBOW PROCEDURE      JOINT REPLACEMENT      LUNG BIOPSY      LYMPH NODE BIOPSY  Yes    1992    NASAL POLYP SURGERY      PACEMAKER IMPLANTATION      PROSTATE SURGERY  06/1990    SHOULDER ROTATOR CUFF REPAIR Right 08/24/2011    Dr. Bach    SIGMOIDOSCOPY      TONSILLECTOMY  Yes 1943    UPPER GASTROINTESTINAL ENDOSCOPY  09/12/1997    Gastritis, Duodenitis, hiatal hernia (Pathology: Gastric antrum minimal chronic inflammation)    UPPER GASTROINTESTINAL ENDOSCOPY  04/11/2005    Mild esophagitis, Small hiatal hernia, Mild gastritis and mild duodenitis       Social History:   reports that he has never smoked. He has been exposed to tobacco smoke. He has never used smokeless tobacco. He reports that he does not drink alcohol and does not use drugs.      Marriage status:     Family History   Problem Relation Age of Onset    Heart failure Mother     Hearing loss Mother     Heart disease Mother         Mother had congestive heart failure    Hyperlipidemia Mother     Alcohol abuse Father     Diabetes Father          Current Facility-Administered Medications:     acetaminophen (TYLENOL) tablet 650 mg, 650 mg, Oral, Q4H PRN **OR** acetaminophen (TYLENOL) 160 MG/5ML oral solution 650 mg, 650 mg, Oral, Q4H  PRN **OR** acetaminophen (TYLENOL) suppository 650 mg, 650 mg, Rectal, Q4H PRN, Shauna Rae MD    azithromycin (ZITHROMAX) 500 mg in sodium chloride 0.9 % 250 mL IVPB-VTB, 500 mg, Intravenous, Q24H, Kashmir Omalley MD, Stopped at 09/21/23 1300    calcium carbonate (TUMS) chewable tablet 500 mg (200 mg elemental), 2 tablet, Oral, BID PRN, Shauna Rae MD    dicyclomine (BENTYL) capsule 10 mg, 10 mg, Oral, 4x Daily PRN, Shauna Rae MD    melatonin tablet 1 mg, 1 mg, Oral, Nightly PRN, Shauna Rae MD    morphine injection 2 mg, 2 mg, Intravenous, Q4H PRN **AND** naloxone (NARCAN) injection 0.4 mg, 0.4 mg, Intravenous, Q5 Min PRN, Shauna Rae MD    ondansetron (ZOFRAN) tablet 4 mg, 4 mg, Oral, Q6H PRN, 4 mg at 09/21/23 0517 **OR** ondansetron (ZOFRAN) injection 4 mg, 4 mg, Intravenous, Q6H PRN, Shauna Rae MD    rosuvastatin (CRESTOR) tablet 10 mg, 10 mg, Oral, Nightly, Shauna Rae MD, 10 mg at 09/20/23 2056    saccharomyces boulardii (FLORASTOR) capsule 250 mg, 250 mg, Oral, BID, Shauna Rae MD, 250 mg at 09/21/23 0829    simethicone (MYLICON) chewable tablet 80 mg, 80 mg, Oral, 4x Daily PRN, Shauna Rae MD    sodium chloride 0.9 % flush 10 mL, 10 mL, Intravenous, Q12H, Shauna Rae MD, 10 mL at 09/21/23 0829    sodium chloride 0.9 % flush 10 mL, 10 mL, Intravenous, PRN, Shauna Rae MD    sodium chloride 0.9 % infusion 40 mL, 40 mL, Intravenous, PRN, Shauna Rae MD    vancomycin (VANCOCIN) capsule 125 mg, 125 mg, Oral, Q6H, Shauna Rae MD, 125 mg at 09/21/23 1349    Allergy  Lidocaine, Lovastatin, Pravastatin, Gabapentin, Procaine, and Simvastatin    Review of Systems   Constitutional: Positive for weight loss. Negative for decreased appetite and weight gain.   HENT:  Negative for congestion, hearing loss and hoarse voice.    Eyes:  Negative for blurred  vision, discharge and visual disturbance.   Cardiovascular:  Negative for chest pain, cyanosis and leg swelling.   Respiratory:  Negative for cough, shortness of breath, sleep disturbances due to breathing and snoring.    Endocrine: Negative for cold intolerance and heat intolerance.   Hematologic/Lymphatic: Does not bruise/bleed easily.   Skin:  Negative for itching, poor wound healing and skin cancer.   Musculoskeletal:  Negative for arthritis, back pain, joint pain and joint swelling.   Gastrointestinal:  Positive for diarrhea and nausea. Negative for abdominal pain, change in bowel habit, bowel incontinence and constipation.   Genitourinary:  Negative for bladder incontinence, dysuria and hematuria.   Neurological:  Negative for brief paralysis, excessive daytime sleepiness, dizziness, focal weakness, headaches, light-headedness and weakness.   Psychiatric/Behavioral:  Negative for altered mental status and hallucinations. The patient does not have insomnia.    Allergic/Immunologic: Negative for HIV exposure and persistent infections.     Vitals:    09/21/23 1325   BP: 101/77   Pulse: 75   Resp: 18   Temp: 97.5 °F (36.4 °C)   SpO2: 95%     Body mass index is 25.02 kg/m².    Physical Exam  Exam conducted with a chaperone present.   Constitutional:       General: He is not in acute distress.     Appearance: He is well-developed.   HENT:      Head: Normocephalic and atraumatic.      Nose: Nose normal.   Eyes:      Conjunctiva/sclera: Conjunctivae normal.      Pupils: Pupils are equal, round, and reactive to light.   Neck:      Trachea: No tracheal deviation.   Pulmonary:      Effort: Pulmonary effort is normal. No respiratory distress.      Breath sounds: Normal breath sounds.   Abdominal:      General: Bowel sounds are normal. There is no distension.      Palpations: Abdomen is soft.      Comments: Abdomen soft, non-distended. No tenderness to palpation, guarding, or rigidity.    Musculoskeletal:         General:  No deformity. Normal range of motion.      Cervical back: Normal range of motion.   Skin:     General: Skin is warm and dry.   Neurological:      Mental Status: He is alert and oriented to person, place, and time.      Cranial Nerves: No cranial nerve deficit.      Coordination: Coordination normal.      Gait: Gait normal.   Psychiatric:         Behavior: Behavior normal.         Judgment: Judgment normal.       Review of Medical Record:  I reviewed medical records as detailed in HPI.     CT Abdomen/Pelvis W/ IV Contrast 09/20/2022:  - Mild wall thickening involving the mid sigmoid, concerning for diverticulitis.   - 3 cm cecal mass with adjacent lymphadenopathy   - No evidence of abscess or free air/fluid.     09/21/2023:  WBC 7.63  Hg 12.2    Assessment:  - Cecal mass  - Diverticulitis/C. Diff colitis    Plan:  - Stool studies positive for C. diff. Currently being treated with PO Vancomycin.  - Planned to schedule a colonoscopy for tomorrow for further evaluation of the cecal mass. However, given his current C. diff infection, will schedule this outpatient once recovered.   - Will continue to follow.

## 2023-09-21 NOTE — CONSULTS
Nutrition Services    Patient Name:  Vicente Dlegado  YOB: 1928  MRN: [id number]  Admit Date:  9/20/2023Assessment Date:  09/21/23    Summary: Nutrition Consult for unintentional wt loss  Dx: cdiff, mild diverticulitis, and a cecal mass with lymphadenopathy.   Per family pt with wt loss 8-10lb x 2 weeks--5%    Based on ASPEN/AND criteria, patient meets a Nutrition Diagnosis of Severe Malnutrition of Acute Disease based on wt loss and decrease po intake.     Pt on Full liquid diet now  Good po intake encouraged  He agrees to supplements--will start with boost breeze for now    Will continue to follow clinical course and monitor nutritional needs.     CLINICAL NUTRITION ASSESSMENT      Reason for Assessment Unintentional Weight Loss      Diagnosis/Problem   cdiff, mild diverticulitis, and a cecal mass with lymphadenopathy.    Medical/Surgical History Past Medical History:   Diagnosis Date    Allergic rhinitis     Anxiety     Arthritis     Atrial fibrillation     CAD (coronary artery disease)     CAD (coronary artery disease) 07/01/2016    History of mild disease.  Stress test 2017 with no ischemia.  He is on aspirin statin and Zetia.  No angina pectoris. 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 Rodríguez 10/2021    Visual impairment Ocular mygraine       Past Surgical History:   Procedure Laterality Date    ABDOMINAL SURGERY      CARDIAC  "CATHETERIZATION  11/16/1995    CARDIAC SURGERY  11/16/1995    CATARACT EXTRACTION Left 07/2018    CHOLECYSTECTOMY  2020    CHOLECYSTECTOMY WITH INTRAOPERATIVE CHOLANGIOGRAM N/A 9/29/2020    Procedure: Laparoscopic cholecystectomy;  Surgeon: Javi Peralta MD;  Location: [facility] OR;  Service: General;  Laterality: N/A;    COLONOSCOPY  01/09/2002    ELBOW PROCEDURE      JOINT REPLACEMENT      LUNG BIOPSY      LYMPH NODE BIOPSY  Yes    1992    NASAL POLYP SURGERY      PACEMAKER IMPLANTATION      PROSTATE SURGERY  06/1990    SHOULDER ROTATOR CUFF REPAIR Right 08/24/2011    Dr. Bach    SIGMOIDOSCOPY      TONSILLECTOMY  Yes 1943    UPPER GASTROINTESTINAL ENDOSCOPY  09/12/1997    Gastritis, Duodenitis, hiatal hernia (Pathology: Gastric antrum minimal chronic inflammation)    UPPER GASTROINTESTINAL ENDOSCOPY  04/11/2005    Mild esophagitis, Small hiatal hernia, Mild gastritis and mild duodenitis        Encounter Information        Nutrition History:  Pt only drinks decaf coffee   Factors Affecting Intake: decreased appetite, diarrhea     Anthropometrics        Current Height  Current Weight  BMI kg/m2 Height: 172.7 cm (67.99\")  Weight: 74.6 kg (164 lb 8 oz) (09/21/23 0635)  Body mass index is 25.02 kg/m².   Adjusted BMI (if applicable)    BMI Category Overweight (25 - 29.9)       Admission Weight 164lb       Ideal Body Weight (IBW) 150lb       Usual Body Weight (UBW) 175lb   Weight Trend Loss       Weight History Wt Readings from Last 30 Encounters:   09/21/23 0635 74.6 kg (164 lb 8 oz)   09/20/23 1717 76.2 kg (168 lb)   09/20/23 1159 76.2 kg (168 lb)   09/14/23 1152 76.2 kg (168 lb)   06/09/23 1457 77.4 kg (170 lb 9.6 oz)   04/17/23 0934 78.7 kg (173 lb 6.4 oz)   03/14/23 1408 80.4 kg (177 lb 3.2 oz)   01/16/23 1358 78.9 kg (174 lb)   01/04/23 1332 78.4 kg (172 lb 12.8 oz)   12/22/22 0600 77.1 kg (170 lb)   12/21/22 0511 78 kg (172 lb)   12/08/22 1202 78.1 kg (172 lb 3.2 oz)   11/15/22 1655 78.5 kg (173 lb) "   11/15/22 1626 78.5 kg (173 lb)   11/15/22 1123 78.5 kg (173 lb)   11/09/22 1359 80.1 kg (176 lb 9.6 oz)   10/17/22 0747 79.4 kg (175 lb)   07/01/22 1344 78.5 kg (173 lb)   06/16/22 0815 79.5 kg (175 lb 3.2 oz)   06/06/22 0958 79 kg (174 lb 2.6 oz)   06/05/22 1850 79.4 kg (175 lb)   02/09/22 1049 79.6 kg (175 lb 6.6 oz)   02/07/22 0857 79.4 kg (175 lb)   09/22/21 1059 77.7 kg (171 lb 6.4 oz)   08/26/21 1252 77.1 kg (170 lb)   08/04/21 0941 78.8 kg (173 lb 12.8 oz)   07/07/21 0959 77.1 kg (170 lb)   05/13/21 1739 78 kg (172 lb)   04/21/21 0928 79.4 kg (175 lb)   03/10/21 0957 78 kg (172 lb)   02/04/21 1020 76.4 kg (168 lb 6.4 oz)   01/12/21 0833 76.7 kg (169 lb)   01/05/21 1003 76.2 kg (168 lb)   10/09/20 0956 77.6 kg (171 lb)   10/01/20 0615 100 kg (220 lb 10.9 oz)   09/28/20 1014 78.9 kg (174 lb)   09/10/20 1032 79.3 kg (174 lb 12.8 oz)      --  Tests/Procedures        Tests/Procedures No new tests/procedures     Labs       Pertinent Labs    Results from last 7 days   Lab Units 09/21/23  0249 09/20/23  1208   SODIUM mmol/L 135* 134*   POTASSIUM mmol/L 3.9 3.9   CHLORIDE mmol/L 101 98   CO2 mmol/L 22.2 22.0   BUN mg/dL 12 12   CREATININE mg/dL 0.87 0.92   CALCIUM mg/dL 8.6 9.3   BILIRUBIN mg/dL  --  0.6   ALK PHOS U/L  --  88   ALT (SGPT) U/L  --  15   AST (SGOT) U/L  --  23   GLUCOSE mg/dL 73 115*     Results from last 7 days   Lab Units 09/21/23  0249 09/20/23  1208   MAGNESIUM mg/dL  --  2.6*   HEMOGLOBIN g/dL 12.2* 14.7   HEMATOCRIT % 36.1* 43.0   WBC 10*3/mm3 7.63 7.86   ALBUMIN g/dL  --  4.0     Results from last 7 days   Lab Units 09/21/23  0249 09/20/23  1208   PLATELETS 10*3/mm3 204 216     COVID19   Date Value Ref Range Status   06/05/2022 Not Detected Not Detected - Ref. Range Final     Lab Results   Component Value Date    HGBA1C 6.00 (H) 11/15/2022          Medications           Scheduled Medications azithromycin, 500 mg, Intravenous, Q24H  rosuvastatin, 10 mg, Oral, Nightly  saccharomyces boulardii,  250 mg, Oral, BID  sodium chloride, 10 mL, Intravenous, Q12H  vancomycin, 125 mg, Oral, Q6H       Infusions Pharmacy Consult,        PRN Medications   acetaminophen **OR** acetaminophen **OR** acetaminophen    calcium carbonate    dicyclomine    melatonin    Morphine **AND** naloxone    ondansetron **OR** ondansetron    Pharmacy Consult    simethicone    sodium chloride    sodium chloride     Physical Findings          General Appearance alert   Oral/Mouth Cavity    Edema  no edema   Gastrointestinal diarrhea   Skin  skin intact   Tubes/Drains/Lines none   NFPE See Malnutrition Severity Assessment   --  Malnutrition Severity Assessment      Patient meets criteria for : Severe Malnutrition  Malnutrition Type (last 8 hours)       Malnutrition Severity Assessment       Row Name 09/21/23 1025       Malnutrition Severity Assessment    Malnutrition Type Acute Disease or Injury - Related Malnutrition      Row Name 09/21/23 1024       Malnutrition Severity Assessment    Malnutrition Type Acute Disease or Injury - Related Malnutrition      Row Name 09/21/23 1025       Insufficient Energy Intake     Insufficient Energy Intake Findings Severe    Insufficient Energy Intake  < or equal to 50% of est. energy requirement for > or equal to 5d)      Row Name 09/21/23 1025       Unintentional Weight Loss     Unintentional Weight Loss Findings Severe    Unintentional Weight Loss  Weight loss greater than 2% in one week      Row Name 09/21/23 1025       Muscle Loss    Temple Region Moderate - slight depression    Dorsal Hand Region Moderate - slight depression      Row Name 09/21/23 1025       Fat Loss    Upper Arm Region Moderate - some fat tissue, not ample      Row Name 09/21/23 1025       Criteria Met (Must meet criteria for severity in at least 2 of these categories: M Wasting, Fat Loss, Fluid, Secondary Signs, Wt. Status, Intake)    Patient meets criteria for  Severe Malnutrition                       Estimated/Assessed Needs         Current Weight  Weight: 74.6 kg (164 lb 8 oz) (09/21/23 0635)       Energy Requirements    Weight for Calculation 74.6 kg   Method for Estimation  30 kcal/kg   EST Needs (kcal/day) 2238       Protein Requirements    Weight for Calculation 74.6 kg   EST Protein Needs (g/kg) 1.0 - 1.2 gm/kg   EST Daily Needs (g/day) 74-89       Fluid Requirements     Method for Estimation 1 mL/kcal    EST Needs (mL/day)      Current Nutrition Orders & Evaluation of Intake       Oral Nutrition     Food Allergies NKFA   Current PO Diet Diet: Liquid Diets; Full Liquid; Fluid Consistency: Thin (IDDSI 0)   Supplement    PO Evaluation     % PO Intake/# of Days Not recorded   --  PES STATEMENT / NUTRITION DIAGNOSIS      Nutrition Dx Problem  Problem: Unintentional Weight Loss, Malnutrition (severe), and Inadequate Oral Intake  Etiology: Factors Affecting Nutrition - decrease appetite,diarrhes    Signs/Symptoms: PO intake, NFPE Results, Unintended Weight Change, and Report/Observation     --  NUTRITION INTERVENTION / PLAN OF CARE      Intervention Goal(s) Maintain nutrition status, Reduce/improve symptoms, Meet estimated needs, Disease management/therapy, Tolerate PO , Increase intake, Advance diet, Maintain weight, and PO intake goal %: 75+         RD Intervention/Action Interview for preferences, Supplement provided, Encourage intake, Continue to monitor, and Care plan reviewed         Prescription/Orders:       PO Diet       Supplements Boost breeze      Snacks       Enteral Nutrition       Parenteral Nutrition    New Prescription Ordered? Yes   --      Monitor/Evaluation Per protocol   Discharge Plan/Needs Pending clinical course   --    RD to follow per protocol.      Electronically signed by:  Brittany Blood RD  09/21/23 10:28 EDT

## 2023-09-21 NOTE — CONSULTS
I was requested to see patient to provide spiritual and emotional support.  He is has a strong kyle in God.  He is a part of the [employer].  He shared stories of his life and kyle.  He has  wonderful support from his family.  We concluded out time with prayer.

## 2023-09-21 NOTE — PLAN OF CARE
Goal Outcome Evaluation:  Plan of Care Reviewed With: patient           Outcome Evaluation: Patient is a 89 yo male who was admitted with diverticulitis and mass of cecum. Pt lives alone in ILF and uses rollator for longer distances within facility. Today he is Stalin for bed mobility, transfers, and ambulation of 200ft without use of AD. no LOB or unsteadiness noted. No further acute PT needs. PT encouraged continued ambulation OOB.      Anticipated Discharge Disposition (PT): home

## 2023-09-21 NOTE — CONSULTS
Patient Name: Vicente Delgado  :1928  89 y.o.    Date of Admission: 2023  Date of Consultation:  23  Encounter Provider: GE Navarrete  Place of Service: [facility] CARDIOLOGY  Referring Provider: Shauna Rae, *  Patient Care Team:  Mechelle Landa APRN as PCP - General (Internal Medicine)  Reginaldo Palacio MD (Dermatology)  Janki Elias MD as Consulting Physician (Ophthalmology)  Kristopher Machado DPM as Consulting Physician (Podiatry)  Aby Marquez MD as Consulting Physician (Hand Surgery)  Wilton Ramos MD as Consulting Physician (Orthopedic Surgery)  Destinee Snider MD as Consulting Physician (Pain Medicine)  Breezy Frausto MD as Consulting Physician (Cardiology)      Chief complaint: abdominal pain    History of Present Illness: Mr. Delgado is a 89 year old gentleman followed by Dr. Sales for paroxysmal atrial fibrillation (anticoagulation stopped due to recurrent epistaxis) and aortic valve stenosis. Last echocardiogram was in November. Aortic valve with 0.6 HELIO cm2, mean 20 mmHg, peak 32 mmHg. DI 0.2. he has a history of TIA and peripheral vascular disease (follows with Dr. Rodríguez).     He presented to the emergency room yesterday afternoon with complaints of abdominal pain and heart racing. EKG was SR HR 70. CT abd showed possible diverticulitis as well as cecal mass that is being evaluated.     We have been asked to see for perioperative risk assessment.      He has been doing well from a cardiac standpoint. He denies SOA, PND, orthopnea, chest pain or pressure, syncope or near syncope.       Echo 11/15/2022    Left ventricular systolic function is normal. Left ventricular ejection fraction appears to be 56 - 60%.    Left ventricular wall thickness is consistent with moderate concentric hypertrophy.    Left ventricular diastolic function is consistent with (grade Ia w/high LAP) impaired  relaxation.    The right ventricular cavity is mildly dilated.    Saline test results are negative.    Moderate aortic valve stenosis is present. Aortic valve area is 0.6 cm2.  Very low stroke-volume index.  Cannot exclude paradoxical low gradient severe aortic stenosis.    Peak velocity of the flow distal to the aortic valve is 284.7 cm/s. Aortic valve mean pressure gradient is 20 mmHg. Aortic valve dimensionless index is 0.2 .    Estimated right ventricular systolic pressure from tricuspid regurgitation is normal (<35 mmHg).    Mild dilation of the ascending aorta is present.  3.9 cm.    Past Medical History:   Diagnosis Date    Allergic rhinitis     Anxiety     Arthritis     Atrial fibrillation     CAD (coronary artery disease)     CAD (coronary artery disease) 07/01/2016    History of mild disease.  Stress test 2017 with no ischemia.  He is on aspirin statin and Zetia.  No angina pectoris. 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 Rodríguez 10/2021    Visual impairment Ocular mygraine       Past Surgical History:   Procedure Laterality Date    ABDOMINAL SURGERY      CARDIAC CATHETERIZATION  11/16/1995    CARDIAC SURGERY  11/16/1995    CATARACT EXTRACTION Left 07/2018    CHOLECYSTECTOMY  2020    CHOLECYSTECTOMY WITH INTRAOPERATIVE CHOLANGIOGRAM N/A 9/29/2020    Procedure: Laparoscopic cholecystectomy;  Surgeon: Javi Peralta MD;  [facility]  MAIN OR;  Service: General;  Laterality: N/A;    COLONOSCOPY  01/09/2002    ELBOW PROCEDURE      JOINT REPLACEMENT      LUNG BIOPSY      LYMPH NODE BIOPSY  Yes    1992    NASAL POLYP SURGERY      PACEMAKER IMPLANTATION      PROSTATE SURGERY  06/1990    SHOULDER ROTATOR CUFF REPAIR Right 08/24/2011    Dr. Bach    SIGMOIDOSCOPY      TONSILLECTOMY  Yes 1943    UPPER GASTROINTESTINAL ENDOSCOPY  09/12/1997    Gastritis, Duodenitis, hiatal hernia (Pathology: Gastric antrum minimal chronic inflammation)    UPPER GASTROINTESTINAL ENDOSCOPY  04/11/2005    Mild esophagitis, Small hiatal hernia, Mild gastritis and mild duodenitis         Prior to Admission medications    Medication Sig Start Date End Date Taking? Authorizing Provider   benazepril (LOTENSIN) 20 MG tablet TAKE ONE TABLET BY MOUTH TWICE A DAY CALL MD IF  BLOOD PRESSURE ABOVE 160/90  Patient taking differently: 0.5 tablets. 8/8/23  Yes Olaf Sales MD   ezetimibe (ZETIA) 10 MG tablet Take 1 tablet by mouth Daily. 10/27/22  Yes Mechelle Landa APRN   Multiple Vitamins-Minerals (ICAPS AREDS 2 PO) Take 1 tablet by mouth Daily.   Yes Rudy Kwon MD   multivitamin with minerals tablet tablet Take 1 tablet by mouth Daily.   Yes Rudy Kwon MD   pantoprazole (PROTONIX) 40 MG EC tablet TAKE ONE TABLET BY MOUTH DAILY 2/6/23  Yes Mechelle Landa APRN   rosuvastatin (CRESTOR) 10 MG tablet TAKE ONE TABLET BY MOUTH ONCE NIGHTLY 8/8/23  Yes Olaf Sales MD       Allergies   Allergen Reactions    Lidocaine Dizziness     Reaction to novacaine    Lovastatin Other (See Comments)     Liver enzymes elevated    Pravastatin Other (See Comments)     Elevated liver enzymes     Gabapentin GI Intolerance     Bloating, incontinence     Procaine     Simvastatin        Social History     Socioeconomic History    Marital status:    Tobacco Use    Smoking status: Never     Passive exposure: Yes    Smokeless tobacco: Never    Tobacco comments:      Smoke a cigar ocassionally   Vaping Use    Vaping Use: Never used   Substance and Sexual Activity    Alcohol use: No     Comment: Daily caffeine use    Drug use: No    Sexual activity: Not Currently     Partners: Female       Family History   Problem Relation Age of Onset    Heart failure Mother     Hearing loss Mother     Heart disease Mother         Mother had congestive heart failure    Hyperlipidemia Mother     Alcohol abuse Father     Diabetes Father        REVIEW OF SYSTEMS:   All systems reviewed.  Pertinent positives identified in HPI.  All other systems are negative.      Objective:     Vitals:    09/20/23 2320 09/21/23 0635 09/21/23 0750 09/21/23 1325   BP: 109/79  121/67 101/77   BP Location: Left arm  Right arm Left arm   Patient Position: Lying  Sitting Lying   Pulse: 91  75 75   Resp: 18  18 18   Temp: 98.1 °F (36.7 °C)  97.7 °F (36.5 °C) 97.5 °F (36.4 °C)   TempSrc: Oral  Oral Oral   SpO2: 96%  95% 95%   Weight:  74.6 kg (164 lb 8 oz)     Height:         Body mass index is 25.02 kg/m².    Physical Exam:  Constitutional: He is oriented to person, place, and time. He appears well-developed. He does not appear ill.   HENT:   Head: Normocephalic and atraumatic. Head is without contusion.   Right Ear: Hearing normal. No drainage.   Left Ear: Hearing normal. No drainage.   Nose: No nasal deformity. No epistaxis.   Eyes: Lids are normal. Right eye exhibits no exudate. Left eye exhibits no exudate.  Neck: No JVD present.   Cardiovascular: Normal rate, regular rhythm and 2/6 systolic murmur   Pulses:       Posterior tibial pulses are 2+ on the right side, and 2+ on the left side.   Pulmonary/Chest: Effort normal and breath sounds normal.   Abdominal: Soft. Normal appearance and bowel sounds are normal. There is no tenderness.   Musculoskeletal: Normal range of motion.        Right shoulder: He exhibits no deformity.        Left shoulder: He exhibits no deformity.   Neurological: He is alert and oriented to  person, place, and time. He has normal strength.   Skin: Skin is warm, dry and intact. No rash noted.   Psychiatric: He has a normal mood and affect. His behavior is normal. Thought content normal.   Vitals reviewed      Lab Review:     Results from last 7 days   Lab Units 09/21/23  0249 09/20/23  1208   SODIUM mmol/L 135* 134*   POTASSIUM mmol/L 3.9 3.9   CHLORIDE mmol/L 101 98   CO2 mmol/L 22.2 22.0   BUN mg/dL 12 12   CREATININE mg/dL 0.87 0.92   CALCIUM mg/dL 8.6 9.3   BILIRUBIN mg/dL  --  0.6   ALK PHOS U/L  --  88   ALT (SGPT) U/L  --  15   AST (SGOT) U/L  --  23   GLUCOSE mg/dL 73 115*         Results from last 7 days   Lab Units 09/21/23  0249   WBC 10*3/mm3 7.63   HEMOGLOBIN g/dL 12.2*   HEMATOCRIT % 36.1*   PLATELETS 10*3/mm3 204         Results from last 7 days   Lab Units 09/20/23  1208   MAGNESIUM mg/dL 2.6*           Current Facility-Administered Medications:     acetaminophen (TYLENOL) tablet 650 mg, 650 mg, Oral, Q4H PRN **OR** acetaminophen (TYLENOL) 160 MG/5ML oral solution 650 mg, 650 mg, Oral, Q4H PRN **OR** acetaminophen (TYLENOL) suppository 650 mg, 650 mg, Rectal, Q4H PRN, Shauna Rae MD    azithromycin (ZITHROMAX) 500 mg in sodium chloride 0.9 % 250 mL IVPB-VTB, 500 mg, Intravenous, Q24H, Kashmir Omalley MD, 500 mg at 09/21/23 1206    calcium carbonate (TUMS) chewable tablet 500 mg (200 mg elemental), 2 tablet, Oral, BID PRN, Shauna Rae MD    dicyclomine (BENTYL) capsule 10 mg, 10 mg, Oral, 4x Daily PRN, Shauna Rae MD    melatonin tablet 1 mg, 1 mg, Oral, Nightly PRN, Shauna Rae MD    morphine injection 2 mg, 2 mg, Intravenous, Q4H PRN **AND** naloxone (NARCAN) injection 0.4 mg, 0.4 mg, Intravenous, Q5 Min PRN, Shauna Rae MD    ondansetron (ZOFRAN) tablet 4 mg, 4 mg, Oral, Q6H PRN, 4 mg at 09/21/23 0517 **OR** ondansetron (ZOFRAN) injection 4 mg, 4 mg, Intravenous, Q6H PRN, Shauna Rae MD    Pharmacy Consult,  , Does not apply, Continuous PRN, Shauna Rae MD    rosuvastatin (CRESTOR) tablet 10 mg, 10 mg, Oral, Nightly, Shauna Rae MD, 10 mg at 09/20/23 2056    saccharomyces boulardii (FLORASTOR) capsule 250 mg, 250 mg, Oral, BID, Shauna Rae MD, 250 mg at 09/21/23 0829    simethicone (MYLICON) chewable tablet 80 mg, 80 mg, Oral, 4x Daily PRN, Shauna Rae MD    sodium chloride 0.9 % flush 10 mL, 10 mL, Intravenous, Q12H, Shauna Rae MD, 10 mL at 09/21/23 0829    sodium chloride 0.9 % flush 10 mL, 10 mL, Intravenous, PRN, Shauna Rae MD    sodium chloride 0.9 % infusion 40 mL, 40 mL, Intravenous, PRN, Shauna Rae MD    vancomycin (VANCOCIN) capsule 125 mg, 125 mg, Oral, Q6H, Shauna Rae MD, 125 mg at 09/21/23 0515    Assessment and Plan:       Abdominal pain, possible diverticulitis  Cecal mass, concerning for malignancy, evaluation underway  Paroxysmal atrial fibrillation, currently in SR. Anticoagulation stopped in the past due to recurrent epistaxis. Beta blocker stopped due to resting bradycardia while on it.   Aortic valve stenosis, last evaluation November 2022. HELIO 0.6 cm2, peak 32 mmHg, mean 20 mmHg. Will need repeat echo.   Coronary artery calcification   Peripheral vascular disease, vascular surgery saw. Nothing acute requiring attention.     He appears asymptomatic from an aortic valve stenosis standpoint but he will need a repeat echocardiogram before determining risk. This has been ordered. Will follow.     Hope Velázquez, GE  09/21/23  13:39 EDT

## 2023-09-21 NOTE — PROGRESS NOTES
[facility] Clinical Pharmacy Services: C. Difficile Medication Changes       Pharmacy has been consulted to look over Vicente Delgado's profile to check patient's medications for changes due to C. Difficile diagnosis per Dr. Rae's request.       Current C. Diff Regimen: Vanc capsules 125 mg PO every 6 hrs    Assessment/Plan/Changes       1. Proton pump inhibitor: Pantoprazole 40 mg daily changed to Famotidine 40 mg daily    2. Antiperistalic agents or stool softeners/laxatives: none       Thank you for allowing me to participate in your patient's care.  Please call pharmacy with any questions or concerns.     Delisa Rios, [facility]  Clinical Pharmacist

## 2023-09-22 ENCOUNTER — READMISSION MANAGEMENT (OUTPATIENT)
Dept: CALL CENTER | Facility: HOSPITAL | Age: 88
End: 2023-09-22
Payer: MEDICARE

## 2023-09-22 VITALS
RESPIRATION RATE: 16 BRPM | OXYGEN SATURATION: 96 % | WEIGHT: 165.2 LBS | DIASTOLIC BLOOD PRESSURE: 61 MMHG | SYSTOLIC BLOOD PRESSURE: 126 MMHG | BODY MASS INDEX: 25.04 KG/M2 | HEART RATE: 68 BPM | TEMPERATURE: 97.5 F | HEIGHT: 68 IN

## 2023-09-22 PROBLEM — I50.32 CHRONIC HEART FAILURE WITH PRESERVED EJECTION FRACTION (HFPEF): Status: ACTIVE | Noted: 2023-09-22

## 2023-09-22 PROBLEM — E43 SEVERE MALNUTRITION: Status: ACTIVE | Noted: 2023-09-22

## 2023-09-22 LAB
ANION GAP SERPL CALCULATED.3IONS-SCNC: 10.1 MMOL/L (ref 5–15)
AORTIC DIMENSIONLESS INDEX: 0.2 (DI)
BASOPHILS # BLD AUTO: 0.02 10*3/MM3 (ref 0–0.2)
BASOPHILS NFR BLD AUTO: 0.3 % (ref 0–1.5)
BH CV ECHO MEAS - ACS: 1.37 CM
BH CV ECHO MEAS - AI P1/2T: 500.7 MSEC
BH CV ECHO MEAS - AO MAX PG: 43.4 MMHG
BH CV ECHO MEAS - AO MEAN PG: 25 MMHG
BH CV ECHO MEAS - AO ROOT DIAM: 2.8 CM
BH CV ECHO MEAS - AO V2 MAX: 329.4 CM/SEC
BH CV ECHO MEAS - AO V2 VTI: 79.6 CM
BH CV ECHO MEAS - AVA(I,D): 0.99 CM2
BH CV ECHO MEAS - EDV(CUBED): 72.3 ML
BH CV ECHO MEAS - EDV(MOD-SP2): 88 ML
BH CV ECHO MEAS - EDV(MOD-SP4): 125 ML
BH CV ECHO MEAS - EF(MOD-BP): 65.1 %
BH CV ECHO MEAS - EF(MOD-SP2): 67 %
BH CV ECHO MEAS - EF(MOD-SP4): 65.6 %
BH CV ECHO MEAS - ESV(CUBED): 19.7 ML
BH CV ECHO MEAS - ESV(MOD-SP2): 29 ML
BH CV ECHO MEAS - ESV(MOD-SP4): 43 ML
BH CV ECHO MEAS - FS: 35.1 %
BH CV ECHO MEAS - IVS/LVPW: 1.36 CM
BH CV ECHO MEAS - IVSD: 1.91 CM
BH CV ECHO MEAS - LA DIMENSION: 4.9 CM
BH CV ECHO MEAS - LAT PEAK E' VEL: 10.4 CM/SEC
BH CV ECHO MEAS - LV DIASTOLIC VOL/BSA (35-75): 67.3 CM2
BH CV ECHO MEAS - LV MASS(C)D: 289.7 GRAMS
BH CV ECHO MEAS - LV MAX PG: 2.38 MMHG
BH CV ECHO MEAS - LV MEAN PG: 1.16 MMHG
BH CV ECHO MEAS - LV SYSTOLIC VOL/BSA (12-30): 23.1 CM2
BH CV ECHO MEAS - LV V1 MAX: 77.1 CM/SEC
BH CV ECHO MEAS - LV V1 VTI: 17.2 CM
BH CV ECHO MEAS - LVIDD: 4.2 CM
BH CV ECHO MEAS - LVIDS: 2.7 CM
BH CV ECHO MEAS - LVOT AREA: 4.6 CM2
BH CV ECHO MEAS - LVOT DIAM: 2.41 CM
BH CV ECHO MEAS - LVPWD: 1.41 CM
BH CV ECHO MEAS - MED PEAK E' VEL: 4.4 CM/SEC
BH CV ECHO MEAS - MV A MAX VEL: 105.8 CM/SEC
BH CV ECHO MEAS - MV DEC SLOPE: 387.5 CM/SEC2
BH CV ECHO MEAS - MV DEC TIME: 291 SEC
BH CV ECHO MEAS - MV E MAX VEL: 63 CM/SEC
BH CV ECHO MEAS - MV E/A: 0.6
BH CV ECHO MEAS - MV MAX PG: 5.8 MMHG
BH CV ECHO MEAS - MV MEAN PG: 2.31 MMHG
BH CV ECHO MEAS - MV P1/2T: 98.5 MSEC
BH CV ECHO MEAS - MV V2 VTI: 37.4 CM
BH CV ECHO MEAS - MVA(P1/2T): 2.23 CM2
BH CV ECHO MEAS - MVA(VTI): 2.1 CM2
BH CV ECHO MEAS - PA ACC TIME: 0.13 SEC
BH CV ECHO MEAS - PA V2 MAX: 171 CM/SEC
BH CV ECHO MEAS - QP/QS: 1.07
BH CV ECHO MEAS - RAP SYSTOLE: 3 MMHG
BH CV ECHO MEAS - RV MAX PG: 1.11 MMHG
BH CV ECHO MEAS - RV V1 MAX: 52.7 CM/SEC
BH CV ECHO MEAS - RV V1 VTI: 10.8 CM
BH CV ECHO MEAS - RVDD: 3.1 CM
BH CV ECHO MEAS - RVOT DIAM: 3.2 CM
BH CV ECHO MEAS - RVSP: 30.5 MMHG
BH CV ECHO MEAS - SI(MOD-SP2): 31.7 ML/M2
BH CV ECHO MEAS - SI(MOD-SP4): 44.1 ML/M2
BH CV ECHO MEAS - SV(LVOT): 78.6 ML
BH CV ECHO MEAS - SV(MOD-SP2): 59 ML
BH CV ECHO MEAS - SV(MOD-SP4): 82 ML
BH CV ECHO MEAS - SV(RVOT): 84.3 ML
BH CV ECHO MEAS - TAPSE (>1.6): 3.2 CM
BH CV ECHO MEAS - TR MAX PG: 27.5 MMHG
BH CV ECHO MEAS - TR MAX VEL: 262 CM/SEC
BH CV ECHO MEASUREMENTS AVERAGE E/E' RATIO: 8.51
BH CV XLRA - RV BASE: 3.6 CM
BH CV XLRA - RV LENGTH: 6.3 CM
BH CV XLRA - RV MID: 2.9 CM
BH CV XLRA - TDI S': 17.8 CM/SEC
BUN SERPL-MCNC: 11 MG/DL (ref 8–23)
BUN/CREAT SERPL: 14.9 (ref 7–25)
CALCIUM SPEC-SCNC: 8.5 MG/DL (ref 8.2–9.6)
CHLORIDE SERPL-SCNC: 102 MMOL/L (ref 98–107)
CO2 SERPL-SCNC: 24.9 MMOL/L (ref 22–29)
CREAT SERPL-MCNC: 0.74 MG/DL (ref 0.76–1.27)
DEPRECATED RDW RBC AUTO: 42 FL (ref 37–54)
EGFRCR SERPLBLD CKD-EPI 2021: 83.4 ML/MIN/1.73
EOSINOPHIL # BLD AUTO: 0.06 10*3/MM3 (ref 0–0.4)
EOSINOPHIL NFR BLD AUTO: 0.8 % (ref 0.3–6.2)
ERYTHROCYTE [DISTWIDTH] IN BLOOD BY AUTOMATED COUNT: 12.4 % (ref 12.3–15.4)
GLUCOSE SERPL-MCNC: 83 MG/DL (ref 65–99)
HCT VFR BLD AUTO: 36.2 % (ref 37.5–51)
HGB BLD-MCNC: 12 G/DL (ref 13–17.7)
IMM GRANULOCYTES # BLD AUTO: 0.03 10*3/MM3 (ref 0–0.05)
IMM GRANULOCYTES NFR BLD AUTO: 0.4 % (ref 0–0.5)
LEFT ATRIUM VOLUME INDEX: 16.3 ML/M2
LYMPHOCYTES # BLD AUTO: 1.92 10*3/MM3 (ref 0.7–3.1)
LYMPHOCYTES NFR BLD AUTO: 24.2 % (ref 19.6–45.3)
MCH RBC QN AUTO: 30.7 PG (ref 26.6–33)
MCHC RBC AUTO-ENTMCNC: 33.1 G/DL (ref 31.5–35.7)
MCV RBC AUTO: 92.6 FL (ref 79–97)
MONOCYTES # BLD AUTO: 1.36 10*3/MM3 (ref 0.1–0.9)
MONOCYTES NFR BLD AUTO: 17.2 % (ref 5–12)
NEUTROPHILS NFR BLD AUTO: 4.54 10*3/MM3 (ref 1.7–7)
NEUTROPHILS NFR BLD AUTO: 57.1 % (ref 42.7–76)
NRBC BLD AUTO-RTO: 0 /100 WBC (ref 0–0.2)
PLATELET # BLD AUTO: 193 10*3/MM3 (ref 140–450)
PMV BLD AUTO: 10.3 FL (ref 6–12)
POTASSIUM SERPL-SCNC: 3.9 MMOL/L (ref 3.5–5.2)
RBC # BLD AUTO: 3.91 10*6/MM3 (ref 4.14–5.8)
SINUS: 3.7 CM
SODIUM SERPL-SCNC: 137 MMOL/L (ref 136–145)
STJ: 2.9 CM
WBC NRBC COR # BLD: 7.93 10*3/MM3 (ref 3.4–10.8)

## 2023-09-22 PROCEDURE — 85025 COMPLETE CBC W/AUTO DIFF WBC: CPT | Performed by: INTERNAL MEDICINE

## 2023-09-22 PROCEDURE — 25010000002 AZITHROMYCIN PER 500 MG: Performed by: INTERNAL MEDICINE

## 2023-09-22 PROCEDURE — 80048 BASIC METABOLIC PNL TOTAL CA: CPT | Performed by: INTERNAL MEDICINE

## 2023-09-22 PROCEDURE — 99232 SBSQ HOSP IP/OBS MODERATE 35: CPT | Performed by: PHYSICIAN ASSISTANT

## 2023-09-22 RX ORDER — DICYCLOMINE HYDROCHLORIDE 10 MG/1
10 CAPSULE ORAL 4 TIMES DAILY PRN
Qty: 30 CAPSULE | Refills: 0 | Status: SHIPPED | OUTPATIENT
Start: 2023-09-22 | End: 2023-10-02

## 2023-09-22 RX ORDER — AZITHROMYCIN 500 MG/1
500 TABLET, FILM COATED ORAL ONCE
Qty: 1 TABLET | Refills: 0 | Status: SHIPPED | OUTPATIENT
Start: 2023-09-23 | End: 2023-09-23

## 2023-09-22 RX ORDER — BENAZEPRIL HYDROCHLORIDE 20 MG/1
10 TABLET ORAL DAILY
Start: 2023-09-23

## 2023-09-22 RX ORDER — ACETAMINOPHEN 325 MG/1
650 TABLET ORAL EVERY 6 HOURS PRN
Start: 2023-09-22

## 2023-09-22 RX ORDER — VANCOMYCIN HYDROCHLORIDE 125 MG/1
125 CAPSULE ORAL EVERY 6 HOURS SCHEDULED
Qty: 34 CAPSULE | Refills: 0 | Status: SHIPPED | OUTPATIENT
Start: 2023-09-22 | End: 2023-10-01

## 2023-09-22 RX ADMIN — VANCOMYCIN HYDROCHLORIDE 125 MG: 125 CAPSULE ORAL at 06:04

## 2023-09-22 RX ADMIN — VANCOMYCIN HYDROCHLORIDE 125 MG: 125 CAPSULE ORAL at 00:46

## 2023-09-22 RX ADMIN — VANCOMYCIN HYDROCHLORIDE 125 MG: 125 CAPSULE ORAL at 12:12

## 2023-09-22 RX ADMIN — AZITHROMYCIN MONOHYDRATE 500 MG: 500 INJECTION, POWDER, LYOPHILIZED, FOR SOLUTION INTRAVENOUS at 09:57

## 2023-09-22 RX ADMIN — Medication 250 MG: at 08:12

## 2023-09-22 RX ADMIN — Medication 10 ML: at 08:12

## 2023-09-22 NOTE — PROGRESS NOTES
[facility] Medicine Services  PROGRESS NOTE    Patient Name: Vicente Delgado  : 1928  MRN: [id number]    Date of Admission: 2023  Primary Care Physician: Mechelle Landa APRN    Subjective   Subjective     CC:  Abdominal pain and diarrhea    Subjective: Patient is still having frequent stools.  We discussed his diarrhea.  We discussed his PCR results including positive E. coli and C. difficile.  We discussed antibiotic treatment plans.  I reviewed personally all of the CT scan findings with patient and his daughter at the bedside.  Patient would like to to be evaluated by surgery to try and get a more definitive diagnosis.    Review of Systems  No current fevers or chills  No current shortness of breath or cough  No current chest pain or palpitations      Objective   Objective     Vital Signs:   Temp:  [97.5 °F (36.4 °C)-98.1 °F (36.7 °C)] 97.5 °F (36.4 °C)  Heart Rate:  [75-91] 75  Resp:  [18] 18  BP: (101-121)/(67-79) 101/77        Physical Exam:  Constitutional:Awake, alert  HENT: Hearing is poor, NCAT, mucous membranes moist, neck supple  Respiratory: No cough or wheezing, nonlabored breathing  Cardiovascular: Pulse rate is normal, normal radial pulses  Gastrointestinal: Soft, nondistended, mild tenderness without guarding  Musculoskeletal: BMI is 25, elderly frail and chronically debilitated appearance, minimal lower extremity edema  Psychiatric: Mildly anxious affect, cooperative, conversational  Neurologic: No slurred speech or facial droop, follows commands  Skin: No rashes or jaundice, warm      Results Reviewed:  Results from last 7 days   Lab Units 23  0249 23  1208   WBC 10*3/mm3 7.63 7.86   HEMOGLOBIN g/dL 12.2* 14.7   HEMATOCRIT % 36.1* 43.0   PLATELETS 10*3/mm3 204 216     Results from last 7 days   Lab Units 23  0249 23  1208   SODIUM mmol/L 135* 134*   POTASSIUM mmol/L 3.9 3.9   CHLORIDE mmol/L 101 98   CO2 mmol/L 22.2 22.0   BUN mg/dL 12 12   CREATININE  mg/dL 0.87 0.92   GLUCOSE mg/dL 73 115*   CALCIUM mg/dL 8.6 9.3   ALT (SGPT) U/L  --  15   AST (SGOT) U/L  --  23     Estimated Creatinine Clearance: 53.6 mL/min (by C-G formula based on SCr of 0.87 mg/dL).        Imaging Results (Last 24 Hours)       ** No results found for the last 24 hours. **            Results for orders placed during the hospital encounter of 11/15/22    Adult Transthoracic Echo Complete W/ Cont if Necessary Per Protocol    Interpretation Summary    Left ventricular systolic function is normal. Left ventricular ejection fraction appears to be 56 - 60%.    Left ventricular wall thickness is consistent with moderate concentric hypertrophy.    Left ventricular diastolic function is consistent with (grade Ia w/high LAP) impaired relaxation.    The right ventricular cavity is mildly dilated.    Saline test results are negative.    Moderate aortic valve stenosis is present. Aortic valve area is 0.6 cm2.  Very low stroke-volume index.  Cannot exclude paradoxical low gradient severe aortic stenosis.    Peak velocity of the flow distal to the aortic valve is 284.7 cm/s. Aortic valve mean pressure gradient is 20 mmHg. Aortic valve dimensionless index is 0.2 .    Estimated right ventricular systolic pressure from tricuspid regurgitation is normal (<35 mmHg).    Mild dilation of the ascending aorta is present.  3.9 cm.      I have reviewed the medications:  Scheduled Meds:azithromycin, 500 mg, Intravenous, Q24H  rosuvastatin, 10 mg, Oral, Nightly  saccharomyces boulardii, 250 mg, Oral, BID  sodium chloride, 10 mL, Intravenous, Q12H  vancomycin, 125 mg, Oral, Q6H      Continuous Infusions:   PRN Meds:.  acetaminophen **OR** acetaminophen **OR** acetaminophen    calcium carbonate    dicyclomine    melatonin    Morphine **AND** naloxone    ondansetron **OR** ondansetron    simethicone    sodium chloride    sodium chloride    Assessment & Plan   Assessment & Plan     Active Hospital Problems    Diagnosis  POA     **Diverticulitis [K57.92]  Yes    Colitis due to enteropathogenic Escherichia coli [A04.0]  Yes    Lupus [M32.9]  Yes    TAM (nonalcoholic steatohepatitis) [K75.81]  Yes    Diverticulosis [K57.90]  Yes    Mass of colon [K63.89]  Yes    Weight loss, unintentional [R63.4]  Yes    General weakness [R53.1]  Yes    C. difficile diarrhea [A04.72]  Yes    Chronic diastolic CHF (congestive heart failure) 1a [I50.32]  Yes    Paroxysmal atrial fibrillation [I48.0]  Yes    Aortic stenosis, moderate [I35.0]  Yes    Atherosclerosis of native artery of left lower extremity with intermittent claudication [I70.212]  Yes    HTN (hypertension) [I10]  Yes    History of prostate cancer [Z85.46]  Not Applicable      Resolved Hospital Problems   No resolved problems to display.        Brief Hospital Course to date:  Vicente Delgado is a 89 y.o. male presents the hospital with C. difficile colitis, E. coli infection, and general weakness.  He was found to have a mass of the colon concerning for primary colon cancer.    Discussion/plan for today:  CT scan images reviewed.  Findings discussed with patient and the family.  Consult colorectal surgery for evaluation.  Patient will need colonoscopy for biopsy and diagnosis.  Patient and family understand this is probable primary colon cancer.    Case discussed with pharmacist regarding dosing of medications.  Discontinue Zosyn.  Continue oral vancomycin for C. difficile dosing.  Transition to azithromycin for 3 days for E. coli colitis.  It is difficult to say how much E. coli colitis is contributing to the symptoms but I feel due to his advanced age and illness it warrants treatment.  EKG personally reviewed and QT is acceptable.    Continue Tawana statin for hyperlipidemia.    Probiotic added for stools.    Symptomatic treatment and supportive care.  Monitor carefully while pyometrium may be requiring IV morphine    Continue other appropriate treatment for other chronic medical issues.   Heart failure appears adequately compensated.  Cardiology consulted to assist with heart failure.  Most recent echocardiogram reviewed and aortic stenosis is noted.    Treatment plan discussed with the patient and his daughter who are in agreement.    DVT Prophylaxis: Mechanical    Disposition: Anticipate discharge home when appropriately stabilized    CODE STATUS:   Code Status and Medical Interventions:   Ordered at: 09/20/23 1659     Code Status (Patient has no pulse and is not breathing):    CPR (Attempt to Resuscitate)     Medical Interventions (Patient has pulse or is breathing):    Full       Kashmir Omalley MD  09/21/23

## 2023-09-22 NOTE — PROGRESS NOTES
Progress Note      S: Pt feeling better today and states his energy is returning. Stool starting to firm up a bit. C/O perianal skin irritation due to frequent wiping.     O:  Temp:  [97.5 °F (36.4 °C)-98.4 °F (36.9 °C)] 97.5 °F (36.4 °C)  Heart Rate:  [67-75] 68  Resp:  [16-18] 16  BP: (101-126)/(58-77) 126/61    No intake or output data in the 24 hours ending 09/22/23 0835    Abd:   soft, non-distended      Results from last 7 days   Lab Units 09/22/23  0242   WBC 10*3/mm3 7.93   HEMOGLOBIN g/dL 12.0*   HEMATOCRIT % 36.2*   PLATELETS 10*3/mm3 193     Results from last 7 days   Lab Units 09/22/23  0242   SODIUM mmol/L 137   POTASSIUM mmol/L 3.9   CHLORIDE mmol/L 102   CO2 mmol/L 24.9   BUN mg/dL 11   CREATININE mg/dL 0.74*   EGFR mL/min/1.73 83.4   GLUCOSE mg/dL 83   CALCIUM mg/dL 8.5     A/P: 89 y.o. male with cecal mass and diverticulitis/C. Diff colitis     -Pt receiving PO Vancomycin for treatment of C. diff infection  - Will plan for outpatient colonoscopy once recovered for further evaluation of cecal mass  - Desitin PRN for perianal skin irritation

## 2023-09-22 NOTE — OUTREACH NOTE
Prep Survey      Flowsheet Row Responses   [facility] facility patient discharged from? [facility]   Is LACE score < 7 ? No   Eligibility [facility]   Date of Admission 09/20/23   Date of Discharge 09/22/23   Discharge Disposition Home or Self Care   Discharge diagnosis diverticulitis, difficile colitis   Does the patient have one of the following disease processes/diagnoses(primary or secondary)? Other   Does the patient have Home health ordered? No   Is there a DME ordered? No   General alerts for this patient [place] at [place] [facility]   Prep survey completed? Yes            Kristy JEFF - Registered Nurse

## 2023-09-22 NOTE — PLAN OF CARE
Goal Outcome Evaluation:           Progress: improving  Outcome Evaluation: plan to discharge back to independent living to follow up with [facility] outpatient

## 2023-09-22 NOTE — DISCHARGE SUMMARY
[facility] Medicine Services  DISCHARGE SUMMARY    Patient Name: Vicente Delgado  : 1928  MRN: [id number]    Date of Admission: 2023 11:47 AM  Date of Discharge: 2023  Primary Care Physician: Mechelle Landa APRN    Consults       Date and Time Order Name Status Description    2023  5:38 PM Inpatient Vascular Surgery Consult Completed     2023  5:38 PM Inpatient Cardiology Consult Completed     2023  2:58 PM Inpatient Colorectal Surgery Consult Completed             Hospital Course       Active Hospital Problems    Diagnosis  POA    **Diverticulitis [K57.92]  Yes    Severe malnutrition [E43]  Yes    Chronic heart failure with preserved ejection fraction (HFpEF) [I50.32]  Yes    Colitis due to enteropathogenic Escherichia coli [A04.0]  Yes    Lupus [M32.9]  Yes    TAM (nonalcoholic steatohepatitis) [K75.81]  Yes    Diverticulosis [K57.90]  Yes    Mass of colon [K63.89]  Yes    Weight loss, unintentional [R63.4]  Yes    General weakness [R53.1]  Yes    C. difficile diarrhea [A04.72]  Yes    Chronic diastolic CHF (congestive heart failure) 1a [I50.32]  Yes    Paroxysmal atrial fibrillation [I48.0]  Yes    Aortic stenosis, moderate [I35.0]  Yes    Atherosclerosis of native artery of left lower extremity with intermittent claudication [I70.212]  Yes    HTN (hypertension) [I10]  Yes    History of prostate cancer [Z85.46]  Not Applicable      Resolved Hospital Problems   No resolved problems to display.          Hospital Course:  Vicente Delgado is a 89 y.o. male presents the hospital with GI symptoms.  CT imaging was concerning for possible diverticulitis and colitis.  He was having profuse diarrhea.  Stool studies were positive for both C. difficile colitis and enteropathogenic E. coli.  He was treated with oral vancomycin for C. difficile colitis and azithromycin for E. coli.  He had excellent response to treatment and improved much quicker than expected.  He is now ambulating  very well and feels that he is mostly back to his baseline.  He is tolerating a bland diet without any issue and is very eager and adamant to discharge back to his independent living facility.  His daughter is at bedside today and is very pleased with his progress and feels comfortable with discharge situation.  I offered him an additional day in the hospital if he did not feel comfortable discharging today but ultimately he decided he would prefer to go home.  He agrees to follow-up very closely with primary care provider.  He also has follow-up scheduled with several of his other outpatient specialists and plans to keep this appointment.  He was seen by cardiology consult team who repeated his echocardiogram.  Please see full note below.  He is a stable from a cardiac standpoint and cardiology was comfortable with outpatient follow-up per my conversation with their service,      Also of note patient was found to have cecal mass on imaging.  This is strongly concerning for probable malignancy such as colon adenocarcinoma.  Colorectal surgery evaluated and felt it was best to wait until he had completed treatment for C. difficile colitis and will plan for outpatient colonoscopy.  They are arranging for this.  They have also recommended he use Desitin for his perineal skin irritation.  They are arranging for follow-up for this testing.  Patient would like to get a diagnosis and will discuss further plans for treatment once diagnosis is available with colorectal team.  He understands probable diagnosis.    Essential hypertension:  Blood pressure has been softer with his infection.  ACE inhibitor was initially held at admission while he was fighting his acute illness.  Plan to restart ACE inhibitor tomorrow at half dose and primary care follow-up for blood pressure check.  He can likely return to full dose if needed.  This was discussed with patient and his daughter who agree    Patient also plans to follow-up with  vascular surgery team that follows him for his lower extremity vascular surgery clinic.  They evaluated while he was in the hospital.    Per my conversation with all consultant treatment teams patient is stable for discharge today and is very eager to do so.  At the time of discharge patient was told to take all medications as prescribed, keep all follow-up appointments, and call their doctor or return to the hospital with any worsening or concerning symptoms.    Please note that this note was made using Dragon voice recognition software            Day of Discharge     Subjective:  Feels well.  Says he is pretty much back to his baseline.  Tolerating diet.  Diarrhea completely resolved.  Abdominal pain are completely resolved.  Agrees to follow-up with primary care and colorectal surgery and all of his other outpatient treatment teams.  His daughter is at the bedside and agree with plan for discharge.  She agrees to keep a close eye on him for the next few days and call his physician or return the hospital if he has any issues.    Vital Signs:   Temp:  [97.5 °F (36.4 °C)-98.4 °F (36.9 °C)] 97.5 °F (36.4 °C)  Heart Rate:  [67-68] 68  Resp:  [16-18] 16  BP: (101-126)/(58-61) 126/61     Physical Exam:  Constitutional:Awake, alert  HENT: NCAT, mucous membranes moist, neck supple  Respiratory: No cough or wheezing, nonlabored breathing  Cardiovascular: Pulse rate is normal, normal radial pulses  Gastrointestinal: Soft benign  Musculoskeletal: Elderly but relatively normal musculature for 89 years old, BMI is 25, minimal to no lower extremity edema  Psychiatric: Calm pleasant affect, cooperative, conversational  Neurologic: No slurred speech or facial droop, follows commands  Skin: No rashes or jaundice, warm      Pertinent  and/or Most Recent Results     Results from last 7 days   Lab Units 09/22/23  0242 09/21/23  0249 09/20/23  1208   WBC 10*3/mm3 7.93 7.63 7.86   HEMOGLOBIN g/dL 12.0* 12.2* 14.7   HEMATOCRIT % 36.2*  36.1* 43.0   PLATELETS 10*3/mm3 193 204 216   SODIUM mmol/L 137 135* 134*   POTASSIUM mmol/L 3.9 3.9 3.9   CHLORIDE mmol/L 102 101 98   CO2 mmol/L 24.9 22.2 22.0   BUN mg/dL 11 12 12   CREATININE mg/dL 0.74* 0.87 0.92   GLUCOSE mg/dL 83 73 115*   CALCIUM mg/dL 8.5 8.6 9.3     Results from last 7 days   Lab Units 09/20/23  1208   BILIRUBIN mg/dL 0.6   ALK PHOS U/L 88   ALT (SGPT) U/L 15   AST (SGOT) U/L 23           Invalid input(s): TG, LDLCALC, LDLREALC  Results from last 7 days   Lab Units 09/20/23  1208   TSH uIU/mL 2.440       Enteropathogenic E. coli (EPEC) Detected Abnormal      C.diff Toxin Ag Positive     Imaging Results (All)       Procedure Component Value Units Date/Time    CT Abdomen Pelvis With Contrast [[id number]] Collected: 09/20/23 1401     Updated: 09/20/23 1416    Narrative:      CT ABDOMEN AND PELVIS WITH IV CONTRAST     HISTORY: Lower abdominal pain with diarrhea     TECHNIQUE: Radiation dose reduction techniques were utilized, including  automated exposure control and exposure modulation based on body size.  Axial images were obtained through the abdomen and pelvis after the  administration of IV contrast. Coronal and sagittal reformatted images  obtained.     COMPARISON: 5/27/2021     FINDINGS:      ABDOMEN:  There is atelectasis/scarring in the lung bases. Cardiomegaly is noted.  Cholecystectomy. The liver and spleen are unremarkable. Small renal  cysts. There are tiny nonobstructing stones in the left kidney. Adrenal  glands are unremarkable. Pancreas unremarkable. There is mild dilation  of the infrarenal abdominal aorta measuring about 2.6 cm, slightly  increased from prior study.     PELVIS:  The bladder is unremarkable. There is mild sigmoid diverticulosis. There  is also some mild wall thickening involving the mid sigmoid which may  reflect a mild diverticulitis. There is no significant inflammation or  any abscess. There are some enlarged lymph nodes adjacent to the cecum  measuring up  to 1.6 cm. These are new compared with the previous study.  There also appears to be an enhancing mass located in the cecum in the  region of the ileocecal valve. The mass measures about 3 cm and is  concerning for a colon malignancy. Recommend evaluation with  colonoscopy. The appendix is normal bladder is unremarkable. No free  fluid is seen in the pelvis. Prostatectomy. Degenerative changes of the  lumbar spine       Impression:      1. There is a enhancing mass in the cecum measuring about 3 cm with some  adjacent enlarged pericecal lymph nodes measuring up to 1.6 cm. Overall  findings are concerning for colon malignancy and recommend evaluation  with colonoscopy  2. There is mild sigmoid diverticulosis and also some mild wall  thickening involving the mid sigmoid which may reflect a mild  diverticulitis. There is no significant inflammatory change or abscess.     Findings were discussed with Dr. Hernandez in the ER at time of dictation     Radiation dose reduction techniques were utilized, including automated  exposure control and exposure modulation based on body size.        This report was finalized on 9/20/2023 2:13 PM by Dr. Justin Ramos M.D.               Results for orders placed during the hospital encounter of 05/13/21    Doppler Ankle Brachial Index Single Level CAR    Interpretation Summary  · Right Conclusion: The right SEEMA is normal. Normal digital pressures.  · Left Conclusion: The left SEEMA is normal. Normal digital pressures.      Results for orders placed during the hospital encounter of 05/13/21    Doppler Ankle Brachial Index Single Level CAR    Interpretation Summary  · Right Conclusion: The right SEEMA is normal. Normal digital pressures.  · Left Conclusion: The left SEEMA is normal. Normal digital pressures.      Results for orders placed during the hospital encounter of 09/20/23    Adult Transthoracic Echo Complete W/ Cont if Necessary Per Protocol    Interpretation Summary    Left  ventricular systolic function is normal. Calculated left ventricular EF = 65.1%    Left ventricular wall thickness is consistent with moderate concentric hypertrophy.    Left ventricular diastolic function is consistent with (grade I) impaired relaxation.    Moderate to severe aortic valve stenosis is present.    Estimated right ventricular systolic pressure from tricuspid regurgitation is normal (<35 mmHg).      Discharge Details        Discharge Medications        New Medications        Instructions Start Date   acetaminophen 325 MG tablet  Commonly known as: TYLENOL   650 mg, Oral, Every 6 Hours PRN      azithromycin 500 MG tablet  Commonly known as: Zithromax   500 mg, Oral, Once   Start Date: September 23, 2023     dicyclomine 10 MG capsule  Commonly known as: BENTYL   Take 1 capsule by mouth 4 (Four) Times a Day As Needed (gas, abdominal cramping) for up to 10 days.      vancomycin 125 MG capsule  Commonly known as: VANCOCIN   Take 1 capsule by mouth Every 6 (Six) Hours for 34 doses.             Changes to Medications        Instructions Start Date   benazepril 20 MG tablet  Commonly known as: LOTENSIN  What changed: See the new instructions.   10 mg, Oral, Daily   Start Date: September 23, 2023            Continue These Medications        Instructions Start Date   ezetimibe 10 MG tablet  Commonly known as: ZETIA   10 mg, Oral, Daily      multivitamin with minerals tablet tablet   1 tablet, Oral, Daily      ICAPS AREDS 2 PO   1 tablet, Oral, Daily      rosuvastatin 10 MG tablet  Commonly known as: CRESTOR   TAKE ONE TABLET BY MOUTH ONCE NIGHTLY             Stop These Medications      pantoprazole 40 MG EC tablet  Commonly known as: PROTONIX              Allergies   Allergen Reactions    Lidocaine Dizziness     Reaction to novacaine    Lovastatin Other (See Comments)     Liver enzymes elevated    Pravastatin Other (See Comments)     Elevated liver enzymes     Gabapentin GI Intolerance     Bloating, incontinence      Procaine     Simvastatin          Discharge Disposition:  Home or Self Care    Diet:  Hospital:No active diet order      Activity:  Activity Instructions       Activity as Tolerated                   CODE STATUS:    Code Status and Medical Interventions:   Ordered at: 09/20/23 1653     Code Status (Patient has no pulse and is not breathing):    CPR (Attempt to Resuscitate)     Medical Interventions (Patient has pulse or is breathing):    Full       Future Appointments   Date Time Provider Department Center   9/28/2023  9:45 AM Elijah Ocampo APRN [facility]   10/13/2023  9:45 AM Hafsa Conway MD [facility]   10/18/2023 10:30 AM Mechelle Landa APRN [facility]   1/16/2024 10:00 AM Maki Mcmanus APRN [facility]           Additional Instructions for the Follow-ups that You Need to Schedule       Discharge Follow-up with PCP   As directed       Currently Documented PCP:    Mechelle Landa APRN    PCP Phone Number:    [phone]     Follow Up Details: Recommend primary care provider follow-up within 1 week for general hospital follow-up.  Please call to schedule               Follow-up Information       Mechelle Landa APRN .    Specialty: Internal Medicine  Why: Recommend primary care provider follow-up within 1 week for general hospital follow-up.  Please call to schedule  Contact information:  [address]  [facility] [address]  [place] [id number] [phone]               Hafsa Conway MD. Schedule an appointment as soon as possible for a visit.    Specialty: Colon and Rectal Surgery  Contact information:  [address]  [facility] [address]  [place] [address]  [phone]                                 Kashmir Omalley MD  09/22/23      Time Spent on Discharge:  I spent greater than 35 minutes on this discharge activity which included: face-to-face encounter with the patient, reviewing the data in the system, coordination of the care with the nursing staff as well as  consultants, documentation, and entering orders.

## 2023-09-22 NOTE — CASE MANAGEMENT/SOCIAL WORK
Case Management Discharge Note      Final Note: Independent Living at [place] at [facility], no additional CCP needs.    Provided Post Acute Provider List?: Refused  Refused Provider List Comment: declined; follow for HH/SNF needs    Selected Continued Care - Admitted Since 9/20/2023       Destination    No services have been selected for the patient.                Durable Medical Equipment    No services have been selected for the patient.                Dialysis/Infusion    No services have been selected for the patient.                Home Medical Care    No services have been selected for the patient.                Therapy    No services have been selected for the patient.                Community Resources    No services have been selected for the patient.                Community & DME    No services have been selected for the patient.                         Final Discharge Disposition Code: 01 - home or self-care

## 2023-09-25 ENCOUNTER — TRANSITIONAL CARE MANAGEMENT TELEPHONE ENCOUNTER (OUTPATIENT)
Dept: CALL CENTER | Facility: HOSPITAL | Age: 88
End: 2023-09-25

## 2023-09-25 NOTE — OUTREACH NOTE
Call Center TCM Note      Flowsheet Row Responses   [facility] patient discharged from? [place]   Does the patient have one of the following disease processes/diagnoses(primary or secondary)? Other   TCM attempt successful? Yes   Call start time 1319   Call end time 1326   General alerts for this patient [place] at [place] [facility] [place]   Discharge diagnosis diverticulitis, difficile colitis   Meds reviewed with patient/caregiver? Yes   Is the patient having any side effects they believe may be caused by any medication additions or changes? Yes   Side effects comments  States he is having a lot of indigestion from stopping pantoprazole.  Has been taking Tums.   Does the patient have all medications ordered at discharge? Yes   Is the patient taking all medications as directed (includes completed medication regime)? Yes   Comments Has appt with Elijah CAROLINA 9/28 @ 9:45   Does the patient have an appointment with their PCP within 7-14 days of discharge? Yes   Psychosocial issues? No   Did the patient receive a copy of their discharge instructions? Yes   Nursing interventions Reviewed instructions with patient   What is the patient's perception of their health status since discharge? Improving   Is the patient/caregiver able to teach back signs and symptoms related to disease process for when to call PCP? Yes   Is the patient/caregiver able to teach back signs and symptoms related to disease process for when to call 911? Yes   Is the patient/caregiver able to teach back the hierarchy of who to call/visit for symptoms/problems? PCP, Specialist, Home health nurse, Urgent Care, ED, 911 Yes   If the patient is a current smoker, are they able to teach back resources for cessation? Not a smoker   TCM call completed? Yes   Call end time 1326            Amita Coles LPN    9/25/2023, 13:27 EDT

## 2023-09-28 ENCOUNTER — OFFICE VISIT (OUTPATIENT)
Dept: INTERNAL MEDICINE | Facility: CLINIC | Age: 88
End: 2023-09-28
Payer: MEDICARE

## 2023-09-28 VITALS
OXYGEN SATURATION: 98 % | DIASTOLIC BLOOD PRESSURE: 84 MMHG | BODY MASS INDEX: 25.4 KG/M2 | SYSTOLIC BLOOD PRESSURE: 134 MMHG | HEIGHT: 68 IN | HEART RATE: 73 BPM | WEIGHT: 167.6 LBS

## 2023-09-28 DIAGNOSIS — K57.92 DIVERTICULITIS: Primary | ICD-10-CM

## 2023-09-28 DIAGNOSIS — K21.00 GASTROESOPHAGEAL REFLUX DISEASE WITH ESOPHAGITIS WITHOUT HEMORRHAGE: Chronic | ICD-10-CM

## 2023-09-28 DIAGNOSIS — K63.89 MASS OF COLON: ICD-10-CM

## 2023-09-28 DIAGNOSIS — A04.72 C. DIFFICILE DIARRHEA: ICD-10-CM

## 2023-09-28 RX ORDER — PANTOPRAZOLE SODIUM 40 MG/1
TABLET, DELAYED RELEASE ORAL
Qty: 90 TABLET | OUTPATIENT
Start: 2023-09-28

## 2023-09-28 NOTE — PROGRESS NOTES
"Chief Complaint  Transitional Care Management, C diff colitis, Diverticulitis, and Colon mass  Subjective        Vicente Delgado presents to [facility]  History of Present Illness    Vicente Delgado is a 89-year-old male who presents today for a follow-up of transitional care management. He is accompanied by his daughter.     He was treated for diverticulitis 9/20/23 with Augmentin. However, he presented to the ER 9/20/23 due to persistent loose, watery stools. His stool studies were positive for C. Difficile and enteropathogenic E. Coli. He was given oral vancomycin (completing course) for C difficile and azithromycin for E. Coli. Pantoprazole was discontinued due to increased risk of C difficile. However, he does note increased dyspepsia and heartburn since discharge, taking Tums as needed. Bowel movements have been formed without abdominal pain.    His CT abd/pelvis performed in the hospital noted an enhancing mass in the cecum about 3 cm with some adjacent enlarged pericecal lymph nodes.He has seen colorectal surgery in consult during his hospitalization and has an appt with Dr. Conway October 13 to determine plan.    The patient moved to the skilled nursing community about 4 months ago. He does not enjoy being there because of the employees and the environment. He has lost weight because he was always maintaining between 178 pounds to 180 pounds.     The patient was advised to receive an influenza and COVID-19 vaccine, but his daughters do not want him to receive the vaccines now.    Objective   Vital Signs:  /84 (BP Location: Left arm, Patient Position: Sitting, Cuff Size: Adult)   Pulse 73   Ht 172.7 cm (67.99\")   Wt 76 kg (167 lb 9.6 oz)   SpO2 98%   BMI 25.49 kg/m²   Estimated body mass index is 25.49 kg/m² as calculated from the following:    Height as of this encounter: 172.7 cm (67.99\").    Weight as of this encounter: 76 kg (167 lb 9.6 oz).            Physical " Exam  Constitutional:       General: He is not in acute distress.     Appearance: Normal appearance. He is not diaphoretic.   HENT:      Head: Normocephalic and atraumatic.      Right Ear: Tympanic membrane, ear canal and external ear normal.      Left Ear: Tympanic membrane, ear canal and external ear normal.      Nose: Nose normal. No rhinorrhea.      Mouth/Throat:      Mouth: Mucous membranes are moist.      Pharynx: Oropharynx is clear.   Eyes:      General:         Right eye: No discharge.         Left eye: No discharge.      Conjunctiva/sclera: Conjunctivae normal.   Cardiovascular:      Rate and Rhythm: Normal rate and regular rhythm.      Pulses: Normal pulses.      Heart sounds: Normal heart sounds.    with a grade of 2/6.   Pulmonary:      Effort: Pulmonary effort is normal.      Breath sounds: Normal breath sounds.   Abdominal:      General: Bowel sounds are normal.      Tenderness: There is no abdominal tenderness.   Musculoskeletal:         General: No swelling or tenderness.      Cervical back: Normal range of motion.   Skin:     General: Skin is warm and dry.   Neurological:      General: No focal deficit present.      Mental Status: He is alert and oriented to person, place, and time.   Psychiatric:         Mood and Affect: Mood normal.         Behavior: Behavior normal.         Judgment: Judgment normal.      Result Review :  The following data was reviewed by: GE Bustos on 09/28/2023:  Common labs          9/20/2023    12:08 9/21/2023    02:49 9/22/2023    02:42   Common Labs   Glucose 115  73  83    BUN 12  12  11    Creatinine 0.92  0.87  0.74    Sodium 134  135  137    Potassium 3.9  3.9  3.9    Chloride 98  101  102    Calcium 9.3  8.6  8.5    Albumin 4.0      Total Bilirubin 0.6      Alkaline Phosphatase 88      AST (SGOT) 23      ALT (SGPT) 15      WBC 7.86  7.63  7.93    Hemoglobin 14.7  12.2  12.0    Hematocrit 43.0  36.1  36.2    Platelets 216  204  193      Data reviewed :  Radiologic studies CT abd/pelvis 9/20/23 and Recent hospitalization notes 9/20/23             Assessment and Plan   Diagnoses and all orders for this visit:    1. Diverticulitis (Primary)  Assessment & Plan:  Sx improved with course of Augmentin, continue to monitor.      2. C. difficile diarrhea  Assessment & Plan:  He is completing a course of vancomycin with gradual improvement in sx (bowel movements more formed), continue to monitor.      3. Mass of colon  Assessment & Plan:  Reviewed results of CT abd/pelvis with patient, he will f/u with colorectal surgery in October as scheduled.      4. Gastroesophageal reflux disease with esophagitis without hemorrhage  Assessment & Plan:  He c/o increased indigestion since stopping pantoprazole, will begin Pepcid for mgmt of sx and continue to monitor. Discussed triggering foods.             Follow Up   Return if symptoms worsen or fail to improve, for Next scheduled follow up.  Patient was given instructions and counseling regarding his condition or for health maintenance advice. Please see specific information pulled into the AVS if appropriate.

## 2023-09-28 NOTE — ASSESSMENT & PLAN NOTE
He is completing a course of vancomycin with gradual improvement in sx (bowel movements more formed), continue to monitor.

## 2023-09-30 NOTE — ASSESSMENT & PLAN NOTE
He c/o increased indigestion since stopping pantoprazole, will begin Pepcid for mgmt of sx and continue to monitor. Discussed triggering foods.

## 2023-09-30 NOTE — ASSESSMENT & PLAN NOTE
Reviewed results of CT abd/pelvis with patient, he will f/u with colorectal surgery in October as scheduled.

## 2023-10-02 ENCOUNTER — READMISSION MANAGEMENT (OUTPATIENT)
Dept: CALL CENTER | Facility: HOSPITAL | Age: 88
End: 2023-10-02
Payer: MEDICARE

## 2023-10-02 NOTE — OUTREACH NOTE
Medical Week 2 Survey      Flowsheet Row Responses   [facility] patient discharged from? [place]   Does the patient have one of the following disease processes/diagnoses(primary or secondary)? Other   Week 2 attempt successful? Yes   Discharge diagnosis diverticulitis, difficile colitis   Call end time 1440   Person spoke with today (if not patient) and relationship Daughter- Sandra   Does the patient have a primary care provider?  Yes   Comments regarding PCP Appt 10/13 colon specialty   Has home health visited the patient within 72 hours of discharge? N/A   Psychosocial issues? No   What is the patient's perception of their health status since discharge? Improving   Is the patient/caregiver able to teach back signs and symptoms related to disease process for when to call PCP? Yes   Is the patient/caregiver able to teach back signs and symptoms related to disease process for when to call 911? Yes   Is the patient/caregiver able to teach back the hierarchy of who to call/visit for symptoms/problems? PCP, Specialist, Home health nurse, Urgent Care, ED, 911 Yes   Week 2 Call Completed? Yes   Graduated Yes   Is the patient interested in additional calls from an ambulatory ? No   Would this patient benefit from a Referral to Amb Social Work? No   Wrap up additional comments Daughter reports patient doing well no concerns or questions noted.   Call end time 1440            Brittany JARVIS - Registered Nurse

## 2023-10-13 ENCOUNTER — OFFICE VISIT (OUTPATIENT)
Dept: SURGERY | Facility: CLINIC | Age: 88
End: 2023-10-13
Payer: MEDICARE

## 2023-10-13 VITALS
TEMPERATURE: 97.8 F | HEART RATE: 78 BPM | SYSTOLIC BLOOD PRESSURE: 128 MMHG | BODY MASS INDEX: 26.06 KG/M2 | WEIGHT: 166 LBS | HEIGHT: 67 IN | DIASTOLIC BLOOD PRESSURE: 64 MMHG | OXYGEN SATURATION: 98 %

## 2023-10-13 DIAGNOSIS — R93.5 ABNORMAL CT OF THE ABDOMEN: Primary | ICD-10-CM

## 2023-10-13 DIAGNOSIS — A04.72 C. DIFFICILE COLITIS: ICD-10-CM

## 2023-10-13 RX ORDER — SODIUM CHLORIDE, SODIUM LACTATE, POTASSIUM CHLORIDE, CALCIUM CHLORIDE 600; 310; 30; 20 MG/100ML; MG/100ML; MG/100ML; MG/100ML
30 INJECTION, SOLUTION INTRAVENOUS CONTINUOUS
Status: CANCELLED | OUTPATIENT
Start: 2023-10-13
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
## 2023-10-17 ENCOUNTER — TELEPHONE (OUTPATIENT)
Dept: SURGERY | Facility: CLINIC | Age: 88
End: 2023-10-17
Payer: MEDICARE

## 2023-10-18 ENCOUNTER — HOSPITAL ENCOUNTER (OUTPATIENT)
Facility: HOSPITAL | Age: 88
Setting detail: HOSPITAL OUTPATIENT SURGERY
Discharge: HOME | End: 2023-10-18
Attending: COLON & RECTAL SURGERY | Admitting: COLON & RECTAL SURGERY
Payer: MEDICARE

## 2023-10-18 ENCOUNTER — ANESTHESIA EVENT (OUTPATIENT)
Dept: GASTROENTEROLOGY | Facility: HOSPITAL | Age: 88
End: 2023-10-18
Payer: MEDICARE

## 2023-10-18 ENCOUNTER — ANESTHESIA (OUTPATIENT)
Dept: GASTROENTEROLOGY | Facility: HOSPITAL | Age: 88
End: 2023-10-18
Payer: MEDICARE

## 2023-10-18 VITALS
OXYGEN SATURATION: 93 % | BODY MASS INDEX: 25.43 KG/M2 | HEIGHT: 67 IN | RESPIRATION RATE: 16 BRPM | DIASTOLIC BLOOD PRESSURE: 65 MMHG | HEART RATE: 53 BPM | WEIGHT: 162 LBS | SYSTOLIC BLOOD PRESSURE: 135 MMHG

## 2023-10-18 DIAGNOSIS — R93.5 ABNORMAL CT OF THE ABDOMEN: ICD-10-CM

## 2023-10-18 PROCEDURE — 25010000002 PROPOFOL 10 MG/ML EMULSION: Performed by: ANESTHESIOLOGY

## 2023-10-18 PROCEDURE — 88305 TISSUE EXAM BY PATHOLOGIST: CPT | Performed by: COLON & RECTAL SURGERY

## 2023-10-18 PROCEDURE — 88342 IMHCHEM/IMCYTCHM 1ST ANTB: CPT | Performed by: COLON & RECTAL SURGERY

## 2023-10-18 PROCEDURE — 25810000003 LACTATED RINGERS PER 1000 ML: Performed by: COLON & RECTAL SURGERY

## 2023-10-18 PROCEDURE — 25810000003 LACTATED RINGERS PER 1000 ML: Performed by: ANESTHESIOLOGY

## 2023-10-18 PROCEDURE — 25010000002 PHENYLEPHRINE 10 MG/ML SOLUTION: Performed by: ANESTHESIOLOGY

## 2023-10-18 PROCEDURE — 88341 IMHCHEM/IMCYTCHM EA ADD ANTB: CPT | Performed by: COLON & RECTAL SURGERY

## 2023-10-18 PROCEDURE — 88360 TUMOR IMMUNOHISTOCHEM/MANUAL: CPT | Performed by: COLON & RECTAL SURGERY

## 2023-10-18 RX ORDER — PHENYLEPHRINE HYDROCHLORIDE 10 MG/ML
INJECTION INTRAVENOUS AS NEEDED
Status: DISCONTINUED | OUTPATIENT
Start: 2023-10-18 | End: 2023-10-18 | Stop reason: SURG

## 2023-10-18 RX ORDER — SODIUM CHLORIDE, SODIUM LACTATE, POTASSIUM CHLORIDE, CALCIUM CHLORIDE 600; 310; 30; 20 MG/100ML; MG/100ML; MG/100ML; MG/100ML
30 INJECTION, SOLUTION INTRAVENOUS CONTINUOUS
Status: DISCONTINUED | OUTPATIENT
Start: 2023-10-18 | End: 2023-10-18 | Stop reason: HOSPADM

## 2023-10-18 RX ORDER — SODIUM CHLORIDE, SODIUM LACTATE, POTASSIUM CHLORIDE, CALCIUM CHLORIDE 600; 310; 30; 20 MG/100ML; MG/100ML; MG/100ML; MG/100ML
INJECTION, SOLUTION INTRAVENOUS CONTINUOUS PRN
Status: DISCONTINUED | OUTPATIENT
Start: 2023-10-18 | End: 2023-10-18 | Stop reason: SURG

## 2023-10-18 RX ADMIN — PHENYLEPHRINE HYDROCHLORIDE 100 MCG: 10 INJECTION INTRAVENOUS at 09:14

## 2023-10-18 RX ADMIN — PROPOFOL 160 MCG/KG/MIN: 10 INJECTION, EMULSION INTRAVENOUS at 09:16

## 2023-10-18 RX ADMIN — PHENYLEPHRINE HYDROCHLORIDE 100 MCG: 10 INJECTION INTRAVENOUS at 09:21

## 2023-10-18 RX ADMIN — SODIUM CHLORIDE, POTASSIUM CHLORIDE, SODIUM LACTATE AND CALCIUM CHLORIDE: 600; 310; 30; 20 INJECTION, SOLUTION INTRAVENOUS at 09:10

## 2023-10-18 RX ADMIN — PHENYLEPHRINE HYDROCHLORIDE 100 MCG: 10 INJECTION INTRAVENOUS at 09:36

## 2023-10-18 RX ADMIN — PHENYLEPHRINE HYDROCHLORIDE 100 MCG: 10 INJECTION INTRAVENOUS at 09:18

## 2023-10-18 RX ADMIN — SODIUM CHLORIDE, POTASSIUM CHLORIDE, SODIUM LACTATE AND CALCIUM CHLORIDE 30 ML/HR: 600; 310; 30; 20 INJECTION, SOLUTION INTRAVENOUS at 08:53

## 2023-10-18 NOTE — ANESTHESIA PREPROCEDURE EVALUATION
Anesthesia Evaluation                  Airway   Mallampati: II  TM distance: >3 FB  Neck ROM: full  Dental      Pulmonary    (-) wheezes  Cardiovascular     ECG reviewed  Rhythm: regular    (+) hypertension, valvular problems/murmurs, past MI , CAD, dysrhythmias, angina, murmur, DVT, hyperlipidemia    ROS comment: Seen by cardio at last visit, asymptomatic from valve,  PE comment: Mod/severe AS    Neuro/Psych  GI/Hepatic/Renal/Endo    (+) GERD, hepatitis    Musculoskeletal     Abdominal    Substance History      OB/GYN          Other                      Anesthesia Plan    ASA 4     MAC     (D/W pt. MAC and possible awareness intra op.  Pt understands MAC and GA are not the same and the possibility of GA being required for failed MAC  Very slow MAC)    Anesthetic plan, risks, benefits, and alternatives have been provided, discussed and informed consent has been obtained with: patient.    CODE STATUS:

## 2023-10-18 NOTE — ANESTHESIA POSTPROCEDURE EVALUATION
"Patient: Vicente Delgado    Procedure Summary       Date: 10/18/23 Room / Location: [facility] ENDOSCOPY 8 /  [facility]    Anesthesia Start: 0910 Anesthesia Stop: 0959    Procedure: COLONOSCOPY to cecum with cold biopsy polypectomy Diagnosis:       Abnormal CT of the abdomen      (Abnormal CT of the abdomen [R93.5])    Surgeons: Hafsa Conway MD Provider: Raymond Minor MD    Anesthesia Type: MAC ASA Status: 4            Anesthesia Type: MAC    Vitals  Vitals Value Taken Time   /65 10/18/23 1022   Temp     Pulse 57 10/18/23 1024   Resp 16 10/18/23 1021   SpO2 92 % 10/18/23 1024   Vitals shown include unfiled device data.        Post Anesthesia Care and Evaluation    Patient location during evaluation: bedside  Patient participation: complete - patient participated  Level of consciousness: awake and alert  Pain management: adequate    Airway patency: patent  Anesthetic complications: No anesthetic complications    Cardiovascular status: acceptable  Respiratory status: acceptable  Hydration status: acceptable    Comments: /66   Pulse 53   Resp 16   Ht 170.2 cm (67\")   Wt 73.5 kg (162 lb)   SpO2 93%   BMI 25.37 kg/m²     "

## 2023-10-18 NOTE — DISCHARGE INSTRUCTIONS
For the next 24 hours patient needs to be with a responsible adult.    For 24 hours DO NOT drive, operate machinery, appliances, drink alcohol, make important decisions or sign legal documents.    Start with a light or bland diet if you are feeling sick to your stomach otherwise advance to regular diet as tolerated.    Follow recommendations on procedure report if provided by your doctor.    Call Dr Conway for problems [phone]    Problems may include but not limited to: large amounts of bleeding, trouble breathing, repeated vomiting, severe unrelieved pain, fever or chills.

## 2023-10-19 LAB
DX PRELIMINARY: NORMAL
LAB AP CASE REPORT: NORMAL
LAB AP DIAGNOSIS COMMENT: NORMAL
PATH REPORT.GROSS SPEC: NORMAL

## 2023-10-23 ENCOUNTER — TELEPHONE (OUTPATIENT)
Dept: SURGERY | Facility: CLINIC | Age: 88
End: 2023-10-23
Payer: MEDICARE

## 2023-10-23 NOTE — TELEPHONE ENCOUNTER
"Relay     \"Called pt to let him know that we can do testing for cdiff. Asked pt to return my yumiko. Just need to know if he would like to do this and can  testing supplies at our office.\"                "

## 2023-10-23 NOTE — PROGRESS NOTES
Spoke with pt via telephone. Discussed with him that we are awaiting additional testing of the specimen from the colonoscopy biopsy. He verbalizes understanding.

## 2023-10-23 NOTE — TELEPHONE ENCOUNTER
RELAYED MESSAGE, PT WOULD LIKE TO HAVE CDIFF TESTING DONE. WILL COME TO  [facility]. IS STILL REQUESTING TO SPEAK WITH SOMEONE REGARDING HIS PATHOLOGY

## 2023-10-23 NOTE — TELEPHONE ENCOUNTER
Pt called wanting pathology results. Also wanted you to know that he woke up at 4am this morning and from 4-8 am he had 4 Bms, 2 of them were mushy/liquid and he is concerned cdiff may have come back.

## 2023-10-24 ENCOUNTER — TELEPHONE (OUTPATIENT)
Dept: SURGERY | Facility: CLINIC | Age: 88
End: 2023-10-24
Payer: MEDICARE

## 2023-10-24 DIAGNOSIS — R19.7 DIARRHEA OF PRESUMED INFECTIOUS ORIGIN: Primary | ICD-10-CM

## 2023-10-24 DIAGNOSIS — R19.7 DIARRHEA OF PRESUMED INFECTIOUS ORIGIN: ICD-10-CM

## 2023-10-24 LAB
C DIFF GDH + TOXINS A+B STL QL IA.RAPID: POSITIVE
C DIFF TOX GENS STL QL NAA+PROBE: POSITIVE

## 2023-10-24 PROCEDURE — 87177 OVA AND PARASITES SMEARS: CPT | Performed by: PHYSICIAN ASSISTANT

## 2023-10-24 PROCEDURE — 87328 CRYPTOSPORIDIUM AG IA: CPT | Performed by: PHYSICIAN ASSISTANT

## 2023-10-24 PROCEDURE — 87449 NOS EACH ORGANISM AG IA: CPT | Performed by: PHYSICIAN ASSISTANT

## 2023-10-24 PROCEDURE — 87493 C DIFF AMPLIFIED PROBE: CPT | Performed by: PHYSICIAN ASSISTANT

## 2023-10-24 PROCEDURE — 87045 FECES CULTURE AEROBIC BACT: CPT | Performed by: PHYSICIAN ASSISTANT

## 2023-10-24 PROCEDURE — 87209 SMEAR COMPLEX STAIN: CPT | Performed by: PHYSICIAN ASSISTANT

## 2023-10-24 PROCEDURE — 87427 SHIGA-LIKE TOXIN AG IA: CPT | Performed by: PHYSICIAN ASSISTANT

## 2023-10-24 PROCEDURE — 87329 GIARDIA AG IA: CPT | Performed by: PHYSICIAN ASSISTANT

## 2023-10-24 PROCEDURE — 87046 STOOL CULTR AEROBIC BACT EA: CPT | Performed by: PHYSICIAN ASSISTANT

## 2023-10-24 NOTE — TELEPHONE ENCOUNTER
Pt dropped off stool sample. Do not see orders in EPIC. Also pt said ever since the cy he has had a stinging and soreness to the area that  biopsied. Wants to know what to do about this?

## 2023-10-25 ENCOUNTER — OFFICE VISIT (OUTPATIENT)
Dept: SURGERY | Facility: CLINIC | Age: 88
End: 2023-10-25
Payer: MEDICARE

## 2023-10-25 VITALS
BODY MASS INDEX: 25.77 KG/M2 | OXYGEN SATURATION: 96 % | DIASTOLIC BLOOD PRESSURE: 68 MMHG | WEIGHT: 164.2 LBS | HEART RATE: 76 BPM | SYSTOLIC BLOOD PRESSURE: 118 MMHG | HEIGHT: 67 IN

## 2023-10-25 DIAGNOSIS — R19.09 MASS OF ILEUM: ICD-10-CM

## 2023-10-25 DIAGNOSIS — A04.72 C. DIFFICILE COLITIS: Primary | ICD-10-CM

## 2023-10-25 LAB
CRYPTOSP AG STL QL IA: NORMAL
G LAMBLIA AG STL QL IA: NORMAL
SPECIMEN STATUS: NORMAL

## 2023-10-25 PROCEDURE — 1159F MED LIST DOCD IN RCRD: CPT | Performed by: PHYSICIAN ASSISTANT

## 2023-10-25 PROCEDURE — 1160F RVW MEDS BY RX/DR IN RCRD: CPT | Performed by: PHYSICIAN ASSISTANT

## 2023-10-25 PROCEDURE — 99215 OFFICE O/P EST HI 40 MIN: CPT | Performed by: PHYSICIAN ASSISTANT

## 2023-10-25 RX ORDER — VANCOMYCIN HYDROCHLORIDE 125 MG/1
125 CAPSULE ORAL 4 TIMES DAILY
Qty: 40 CAPSULE | Refills: 0 | Status: SHIPPED | OUTPATIENT
Start: 2023-10-25 | End: 2023-11-04

## 2023-10-25 NOTE — PROGRESS NOTES
"Vicente Deglado is a 89 y.o. male in for follow up of abdominal pain.    The patient was seen in the hospital by Elise Paul PA-C on 9/21/2023 and 9/22/2023.  He was being treated for diverticulitis and C. difficile at that time. CT revealed a mass at the ileocecal valve concerning for malignancy.     I reviewed the colonoscopy report by Dr. Conway performed on 10/18/2023, significant for diverticulosis and a 15 mm polyp in the distal ileum that was biopsied.  Pathology results were inconclusive, and additional tests were requested.    2 days ago he called the office reporting that frequent loose/liquid bowel movements had restarted.  He brought a stool sample that was positive for C. difficile.  He has been started on oral vancomycin.  He also reported worsening right lower quadrant pain, as well as some bruising, and was therefore scheduled to be evaluated in the office.    The patient states that yesterday his right lower quadrant pain was a 2 out of 10 stinging sensation, and then overnight it was a 7 out of 10 stabbing sensation that stopped abruptly.  His abdominal pain has completely resolved at this time.  He feels well.  He noticed a small bruise in his upper groin region this morning.  He denies fever or chills.    /68 (BP Location: Left arm, Patient Position: Sitting, Cuff Size: Adult)   Pulse 76   Ht 170.2 cm (67\")   Wt 74.5 kg (164 lb 3.2 oz)   SpO2 96%   BMI 25.72 kg/m²   Body mass index is 25.72 kg/m².  -  Physical Exam  No acute distress  Chaperone present  Abdomen: soft, nontender, nondistended. 2cm oval bruise in right groin.    Assessment:   1. C. difficile colitis    2. Mass of ileum       The patient's abdominal pain has resolved, and his bruise is small and minor.  He feels well.    Plan:  We will continue oral vancomycin, and the patient was advised to call if his symptoms should return or worsen    Time Spent: I spent 48 minutes caring for Vicente on this date of service. This " time includes time spent by me in the following activities: preparing for the visit, reviewing tests, performing a medically appropriate examination and/or evaluation, counseling and educating the patient/family/caregiver, documenting information in the medical record, and care coordination.     Velia Watkins PA-C  Physician Assistant  Colorectal Surgery

## 2023-10-25 NOTE — TELEPHONE ENCOUNTER
"Pt aware of positive cdiff, will start med today. Pt says he has a pulsating and stabbing pain in RLQ. This morning he a BM that was 'very oily\". Also this morning he noticed a bruise the size of a quarter in the RLQ.  "

## 2023-10-27 LAB
DX PRELIMINARY: NORMAL
LAB AP CASE REPORT: NORMAL
LAB AP DIAGNOSIS COMMENT: NORMAL
PATH REPORT.FINAL DX SPEC: NORMAL
PATH REPORT.GROSS SPEC: NORMAL

## 2023-10-29 LAB
BACTERIA SPEC CULT: NORMAL
BACTERIA SPEC CULT: NORMAL
CAMPYLOBACTER STL CULT: NORMAL
E COLI SXT STL QL IA: NEGATIVE
SALM + SHIG STL CULT: NORMAL

## 2023-10-31 DIAGNOSIS — C84.90 MATURE NK/T-CELL LYMPHOMA, UNSPECIFIED BODY REGION, UNSPECIFIED MATURE NK/T-CELL LYMPHOMA TYPE: Primary | ICD-10-CM

## 2023-11-01 ENCOUNTER — TELEPHONE (OUTPATIENT)
Dept: SURGERY | Facility: CLINIC | Age: 88
End: 2023-11-01
Payer: MEDICARE

## 2023-11-01 DIAGNOSIS — C84.90 MATURE NK/T-CELL LYMPHOMA, UNSPECIFIED BODY REGION, UNSPECIFIED MATURE NK/T-CELL LYMPHOMA TYPE: Primary | ICD-10-CM

## 2023-11-01 NOTE — TELEPHONE ENCOUNTER
Discussed pathology results with patient and daughter last night.  We discussed that the pathology was concerning.  We discussed that if lymphoma may be that surgery is not indicated.  I would like to refer him to medical oncology.
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
## 2023-11-03 LAB
O+P SPEC MICRO: NORMAL
O+P STL CONC: NORMAL

## 2023-11-06 ENCOUNTER — LAB (OUTPATIENT)
Dept: LAB | Facility: HOSPITAL | Age: 88
End: 2023-11-06
Payer: MEDICARE

## 2023-11-06 ENCOUNTER — CONSULT (OUTPATIENT)
Dept: ONCOLOGY | Facility: CLINIC | Age: 88
End: 2023-11-06
Payer: MEDICARE

## 2023-11-06 VITALS
HEIGHT: 67 IN | DIASTOLIC BLOOD PRESSURE: 76 MMHG | BODY MASS INDEX: 25.69 KG/M2 | HEART RATE: 65 BPM | WEIGHT: 163.7 LBS | SYSTOLIC BLOOD PRESSURE: 162 MMHG | TEMPERATURE: 98.4 F | RESPIRATION RATE: 18 BRPM | OXYGEN SATURATION: 98 %

## 2023-11-06 DIAGNOSIS — C85.90 LYMPHOMA, UNSPECIFIED BODY REGION, UNSPECIFIED LYMPHOMA TYPE: ICD-10-CM

## 2023-11-06 DIAGNOSIS — R93.3 ABNORMAL FINDINGS ON DIAGNOSTIC IMAGING OF OTHER PARTS OF DIGESTIVE TRACT: ICD-10-CM

## 2023-11-06 DIAGNOSIS — C85.90 LYMPHOMA, UNSPECIFIED BODY REGION, UNSPECIFIED LYMPHOMA TYPE: Primary | ICD-10-CM

## 2023-11-06 LAB
ALBUMIN SERPL-MCNC: 4 G/DL (ref 3.5–5.2)
ALBUMIN/GLOB SERPL: 1.5 G/DL
ALP SERPL-CCNC: 70 U/L (ref 39–117)
ALT SERPL W P-5'-P-CCNC: 32 U/L (ref 1–41)
ANION GAP SERPL CALCULATED.3IONS-SCNC: 13.7 MMOL/L (ref 5–15)
AST SERPL-CCNC: 40 U/L (ref 1–40)
BASOPHILS # BLD AUTO: 0.02 10*3/MM3 (ref 0–0.2)
BASOPHILS NFR BLD AUTO: 0.3 % (ref 0–1.5)
BILIRUB SERPL-MCNC: 0.5 MG/DL (ref 0–1.2)
BUN SERPL-MCNC: 18 MG/DL (ref 8–23)
BUN/CREAT SERPL: 22.8 (ref 7–25)
CALCIUM SPEC-SCNC: 9.1 MG/DL (ref 8.2–9.6)
CHLORIDE SERPL-SCNC: 103 MMOL/L (ref 98–107)
CO2 SERPL-SCNC: 25.3 MMOL/L (ref 22–29)
CREAT SERPL-MCNC: 0.79 MG/DL (ref 0.7–1.3)
DEPRECATED RDW RBC AUTO: 44.7 FL (ref 37–54)
EGFRCR SERPLBLD CKD-EPI 2021: 81.8 ML/MIN/1.73
EOSINOPHIL # BLD AUTO: 0.03 10*3/MM3 (ref 0–0.4)
EOSINOPHIL NFR BLD AUTO: 0.5 % (ref 0.3–6.2)
ERYTHROCYTE [DISTWIDTH] IN BLOOD BY AUTOMATED COUNT: 13.2 % (ref 12.3–15.4)
GLOBULIN UR ELPH-MCNC: 2.6 GM/DL
GLUCOSE SERPL-MCNC: 84 MG/DL (ref 65–99)
HCT VFR BLD AUTO: 42 % (ref 37.5–51)
HGB BLD-MCNC: 14.2 G/DL (ref 13–17.7)
IMM GRANULOCYTES # BLD AUTO: 0.02 10*3/MM3 (ref 0–0.05)
IMM GRANULOCYTES NFR BLD AUTO: 0.3 % (ref 0–0.5)
LDH SERPL-CCNC: 173 U/L (ref 135–225)
LYMPHOCYTES # BLD AUTO: 2.3 10*3/MM3 (ref 0.7–3.1)
LYMPHOCYTES NFR BLD AUTO: 34.9 % (ref 19.6–45.3)
MCH RBC QN AUTO: 30.9 PG (ref 26.6–33)
MCHC RBC AUTO-ENTMCNC: 33.8 G/DL (ref 31.5–35.7)
MCV RBC AUTO: 91.3 FL (ref 79–97)
MONOCYTES # BLD AUTO: 1.04 10*3/MM3 (ref 0.1–0.9)
MONOCYTES NFR BLD AUTO: 15.8 % (ref 5–12)
NEUTROPHILS NFR BLD AUTO: 3.18 10*3/MM3 (ref 1.7–7)
NEUTROPHILS NFR BLD AUTO: 48.2 % (ref 42.7–76)
NRBC BLD AUTO-RTO: 0 /100 WBC (ref 0–0.2)
PLATELET # BLD AUTO: 187 10*3/MM3 (ref 140–450)
PMV BLD AUTO: 10.1 FL (ref 6–12)
POTASSIUM SERPL-SCNC: 4.3 MMOL/L (ref 3.5–5.2)
PROT SERPL-MCNC: 6.6 G/DL (ref 6–8.5)
RBC # BLD AUTO: 4.6 10*6/MM3 (ref 4.14–5.8)
SODIUM SERPL-SCNC: 142 MMOL/L (ref 136–145)
URATE SERPL-MCNC: 4.8 MG/DL (ref 3.4–7)
WBC NRBC COR # BLD: 6.59 10*3/MM3 (ref 3.4–10.8)

## 2023-11-06 PROCEDURE — 83615 LACTATE (LD) (LDH) ENZYME: CPT

## 2023-11-06 PROCEDURE — 80053 COMPREHEN METABOLIC PANEL: CPT

## 2023-11-06 PROCEDURE — 85025 COMPLETE CBC W/AUTO DIFF WBC: CPT

## 2023-11-06 PROCEDURE — 36415 COLL VENOUS BLD VENIPUNCTURE: CPT

## 2023-11-06 PROCEDURE — 84550 ASSAY OF BLOOD/URIC ACID: CPT
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
## 2023-11-07 DIAGNOSIS — E78.2 MIXED HYPERLIPIDEMIA: Chronic | ICD-10-CM

## 2023-11-07 RX ORDER — EZETIMIBE 10 MG/1
10 TABLET ORAL DAILY
Qty: 90 TABLET | Refills: 3 | Status: ON HOLD | OUTPATIENT
Start: 2023-11-07

## 2023-11-08 ENCOUNTER — HOSPITAL ENCOUNTER (OUTPATIENT)
Dept: PET IMAGING | Facility: HOSPITAL | Age: 88
Discharge: HOME | End: 2023-11-08
Payer: MEDICARE

## 2023-11-08 DIAGNOSIS — R93.3 ABNORMAL FINDINGS ON DIAGNOSTIC IMAGING OF OTHER PARTS OF DIGESTIVE TRACT: ICD-10-CM

## 2023-11-08 DIAGNOSIS — C85.90 LYMPHOMA, UNSPECIFIED BODY REGION, UNSPECIFIED LYMPHOMA TYPE: ICD-10-CM

## 2023-11-08 LAB — GLUCOSE BLDC GLUCOMTR-MCNC: 85 MG/DL (ref 70–130)

## 2023-11-08 PROCEDURE — 0 FLUDEOXYGLUCOSE F18 SOLUTION: Performed by: INTERNAL MEDICINE

## 2023-11-08 PROCEDURE — 78815 PET IMAGE W/CT SKULL-THIGH: CPT

## 2023-11-08 PROCEDURE — A9552 F18 FDG: HCPCS | Performed by: INTERNAL MEDICINE

## 2023-11-08 PROCEDURE — 82948 REAGENT STRIP/BLOOD GLUCOSE: CPT

## 2023-11-08 RX ADMIN — FLUDEOXYGLUCOSE F 18 1 DOSE: 200 INJECTION, SOLUTION INTRAVENOUS at 09:53

## 2023-11-12 ENCOUNTER — APPOINTMENT (OUTPATIENT)
Dept: CT IMAGING | Facility: HOSPITAL | Age: 88
End: 2023-11-12
Payer: MEDICARE

## 2023-11-12 ENCOUNTER — HOSPITAL ENCOUNTER (INPATIENT)
Facility: HOSPITAL | Age: 88
LOS: 3 days | Discharge: HOME | End: 2023-11-16
Attending: EMERGENCY MEDICINE | Admitting: INTERNAL MEDICINE
Payer: MEDICARE

## 2023-11-12 ENCOUNTER — APPOINTMENT (OUTPATIENT)
Dept: GENERAL RADIOLOGY | Facility: HOSPITAL | Age: 88
End: 2023-11-12
Payer: MEDICARE

## 2023-11-12 DIAGNOSIS — R07.89 ATYPICAL CHEST PAIN: ICD-10-CM

## 2023-11-12 DIAGNOSIS — A04.72 CLOSTRIDIUM DIFFICILE COLITIS: Primary | ICD-10-CM

## 2023-11-12 DIAGNOSIS — M54.50 ACUTE BILATERAL LOW BACK PAIN WITHOUT SCIATICA: ICD-10-CM

## 2023-11-12 PROBLEM — K52.9 COLITIS: Status: ACTIVE | Noted: 2023-11-12

## 2023-11-12 LAB
ADV 40+41 DNA STL QL NAA+NON-PROBE: NOT DETECTED
ALBUMIN SERPL-MCNC: 4.1 G/DL (ref 3.5–5.2)
ALBUMIN/GLOB SERPL: 1.4 G/DL
ALP SERPL-CCNC: 78 U/L (ref 39–117)
ALT SERPL W P-5'-P-CCNC: 32 U/L (ref 1–41)
ANION GAP SERPL CALCULATED.3IONS-SCNC: 11 MMOL/L (ref 5–15)
ANION GAP SERPL CALCULATED.3IONS-SCNC: 13.1 MMOL/L (ref 5–15)
AST SERPL-CCNC: 28 U/L (ref 1–40)
ASTRO TYP 1-8 RNA STL QL NAA+NON-PROBE: NOT DETECTED
B PARAPERT DNA SPEC QL NAA+PROBE: NOT DETECTED
B PERT DNA SPEC QL NAA+PROBE: NOT DETECTED
BACTERIA UR QL AUTO: NORMAL /HPF
BASOPHILS # BLD AUTO: 0.02 10*3/MM3 (ref 0–0.2)
BASOPHILS # BLD AUTO: 0.03 10*3/MM3 (ref 0–0.2)
BASOPHILS NFR BLD AUTO: 0.1 % (ref 0–1.5)
BASOPHILS NFR BLD AUTO: 0.2 % (ref 0–1.5)
BILIRUB SERPL-MCNC: 0.8 MG/DL (ref 0–1.2)
BILIRUB UR QL STRIP: NEGATIVE
BUN SERPL-MCNC: 17 MG/DL (ref 8–23)
BUN SERPL-MCNC: 18 MG/DL (ref 8–23)
BUN/CREAT SERPL: 20.7 (ref 7–25)
BUN/CREAT SERPL: 22.2 (ref 7–25)
C CAYETANENSIS DNA STL QL NAA+NON-PROBE: NOT DETECTED
C COLI+JEJ+UPSA DNA STL QL NAA+NON-PROBE: NOT DETECTED
C DIFF GDH + TOXINS A+B STL QL IA.RAPID: POSITIVE
C DIFF TOX GENS STL QL NAA+PROBE: POSITIVE
C PNEUM DNA NPH QL NAA+NON-PROBE: NOT DETECTED
CALCIUM SPEC-SCNC: 8.4 MG/DL (ref 8.2–9.6)
CALCIUM SPEC-SCNC: 9 MG/DL (ref 8.2–9.6)
CHLORIDE SERPL-SCNC: 101 MMOL/L (ref 98–107)
CHLORIDE SERPL-SCNC: 104 MMOL/L (ref 98–107)
CLARITY UR: CLEAR
CO2 SERPL-SCNC: 21.9 MMOL/L (ref 22–29)
CO2 SERPL-SCNC: 23 MMOL/L (ref 22–29)
COLOR UR: ABNORMAL
CREAT SERPL-MCNC: 0.81 MG/DL (ref 0.76–1.27)
CREAT SERPL-MCNC: 0.82 MG/DL (ref 0.76–1.27)
CRYPTOSP DNA STL QL NAA+NON-PROBE: NOT DETECTED
D-LACTATE SERPL-SCNC: 1.7 MMOL/L (ref 0.5–2)
DEPRECATED RDW RBC AUTO: 43.4 FL (ref 37–54)
DEPRECATED RDW RBC AUTO: 44.7 FL (ref 37–54)
E HISTOLYT DNA STL QL NAA+NON-PROBE: NOT DETECTED
EAEC PAA PLAS AGGR+AATA ST NAA+NON-PRB: NOT DETECTED
EC STX1+STX2 GENES STL QL NAA+NON-PROBE: NOT DETECTED
EGFRCR SERPLBLD CKD-EPI 2021: 80.9 ML/MIN/1.73
EGFRCR SERPLBLD CKD-EPI 2021: 81.2 ML/MIN/1.73
EOSINOPHIL # BLD AUTO: 0.01 10*3/MM3 (ref 0–0.4)
EOSINOPHIL # BLD AUTO: 0.01 10*3/MM3 (ref 0–0.4)
EOSINOPHIL NFR BLD AUTO: 0.1 % (ref 0.3–6.2)
EOSINOPHIL NFR BLD AUTO: 0.1 % (ref 0.3–6.2)
EPEC EAE GENE STL QL NAA+NON-PROBE: NOT DETECTED
ERYTHROCYTE [DISTWIDTH] IN BLOOD BY AUTOMATED COUNT: 12.8 % (ref 12.3–15.4)
ERYTHROCYTE [DISTWIDTH] IN BLOOD BY AUTOMATED COUNT: 13.1 % (ref 12.3–15.4)
ETEC LTA+ST1A+ST1B TOX ST NAA+NON-PROBE: NOT DETECTED
FLUAV SUBTYP SPEC NAA+PROBE: NOT DETECTED
FLUBV RNA ISLT QL NAA+PROBE: NOT DETECTED
G LAMBLIA DNA STL QL NAA+NON-PROBE: NOT DETECTED
GLOBULIN UR ELPH-MCNC: 2.9 GM/DL
GLUCOSE SERPL-MCNC: 95 MG/DL (ref 65–99)
GLUCOSE SERPL-MCNC: 99 MG/DL (ref 65–99)
GLUCOSE UR STRIP-MCNC: NEGATIVE MG/DL
HADV DNA SPEC NAA+PROBE: NOT DETECTED
HCOV 229E RNA SPEC QL NAA+PROBE: NOT DETECTED
HCOV HKU1 RNA SPEC QL NAA+PROBE: NOT DETECTED
HCOV NL63 RNA SPEC QL NAA+PROBE: NOT DETECTED
HCOV OC43 RNA SPEC QL NAA+PROBE: NOT DETECTED
HCT VFR BLD AUTO: 39.7 % (ref 37.5–51)
HCT VFR BLD AUTO: 42.8 % (ref 37.5–51)
HGB BLD-MCNC: 13.3 G/DL (ref 13–17.7)
HGB BLD-MCNC: 14.5 G/DL (ref 13–17.7)
HGB UR QL STRIP.AUTO: ABNORMAL
HMPV RNA NPH QL NAA+NON-PROBE: NOT DETECTED
HOLD SPECIMEN: NORMAL
HOLD SPECIMEN: NORMAL
HPIV1 RNA ISLT QL NAA+PROBE: NOT DETECTED
HPIV2 RNA SPEC QL NAA+PROBE: NOT DETECTED
HPIV3 RNA NPH QL NAA+PROBE: NOT DETECTED
HPIV4 P GENE NPH QL NAA+PROBE: NOT DETECTED
HYALINE CASTS UR QL AUTO: NORMAL /LPF
IMM GRANULOCYTES # BLD AUTO: 0.03 10*3/MM3 (ref 0–0.05)
IMM GRANULOCYTES # BLD AUTO: 0.04 10*3/MM3 (ref 0–0.05)
IMM GRANULOCYTES NFR BLD AUTO: 0.2 % (ref 0–0.5)
IMM GRANULOCYTES NFR BLD AUTO: 0.3 % (ref 0–0.5)
INR PPP: 1.13 (ref 0.9–1.1)
KETONES UR QL STRIP: ABNORMAL
LEUKOCYTE ESTERASE UR QL STRIP.AUTO: NEGATIVE
LIPASE SERPL-CCNC: 21 U/L (ref 13–60)
LYMPHOCYTES # BLD AUTO: 1.24 10*3/MM3 (ref 0.7–3.1)
LYMPHOCYTES # BLD AUTO: 1.34 10*3/MM3 (ref 0.7–3.1)
LYMPHOCYTES NFR BLD AUTO: 9 % (ref 19.6–45.3)
LYMPHOCYTES NFR BLD AUTO: 9.1 % (ref 19.6–45.3)
M PNEUMO IGG SER IA-ACNC: NOT DETECTED
MCH RBC QN AUTO: 31 PG (ref 26.6–33)
MCH RBC QN AUTO: 31.1 PG (ref 26.6–33)
MCHC RBC AUTO-ENTMCNC: 33.5 G/DL (ref 31.5–35.7)
MCHC RBC AUTO-ENTMCNC: 33.9 G/DL (ref 31.5–35.7)
MCV RBC AUTO: 91.6 FL (ref 79–97)
MCV RBC AUTO: 92.8 FL (ref 79–97)
MONOCYTES # BLD AUTO: 1.95 10*3/MM3 (ref 0.1–0.9)
MONOCYTES # BLD AUTO: 2.25 10*3/MM3 (ref 0.1–0.9)
MONOCYTES NFR BLD AUTO: 14.2 % (ref 5–12)
MONOCYTES NFR BLD AUTO: 15.3 % (ref 5–12)
NEUTROPHILS NFR BLD AUTO: 10.49 10*3/MM3 (ref 1.7–7)
NEUTROPHILS NFR BLD AUTO: 11.09 10*3/MM3 (ref 1.7–7)
NEUTROPHILS NFR BLD AUTO: 75.1 % (ref 42.7–76)
NEUTROPHILS NFR BLD AUTO: 76.3 % (ref 42.7–76)
NITRITE UR QL STRIP: NEGATIVE
NOROVIRUS GI+II RNA STL QL NAA+NON-PROBE: NOT DETECTED
NRBC BLD AUTO-RTO: 0 /100 WBC (ref 0–0.2)
NRBC BLD AUTO-RTO: 0 /100 WBC (ref 0–0.2)
P SHIGELLOIDES DNA STL QL NAA+NON-PROBE: NOT DETECTED
PH UR STRIP.AUTO: <=5 [PH] (ref 5–8)
PLATELET # BLD AUTO: 184 10*3/MM3 (ref 140–450)
PLATELET # BLD AUTO: 208 10*3/MM3 (ref 140–450)
PMV BLD AUTO: 10 FL (ref 6–12)
PMV BLD AUTO: 10.1 FL (ref 6–12)
POTASSIUM SERPL-SCNC: 4 MMOL/L (ref 3.5–5.2)
POTASSIUM SERPL-SCNC: 4.1 MMOL/L (ref 3.5–5.2)
PROT SERPL-MCNC: 7 G/DL (ref 6–8.5)
PROT UR QL STRIP: NEGATIVE
PROTHROMBIN TIME: 14.6 SECONDS (ref 11.7–14.2)
RBC # BLD AUTO: 4.28 10*6/MM3 (ref 4.14–5.8)
RBC # BLD AUTO: 4.67 10*6/MM3 (ref 4.14–5.8)
RBC # UR STRIP: NORMAL /HPF
REF LAB TEST METHOD: NORMAL
RHINOVIRUS RNA SPEC NAA+PROBE: NOT DETECTED
RSV RNA NPH QL NAA+NON-PROBE: NOT DETECTED
RVA RNA STL QL NAA+NON-PROBE: NOT DETECTED
S ENT+BONG DNA STL QL NAA+NON-PROBE: NOT DETECTED
SAPO I+II+IV+V RNA STL QL NAA+NON-PROBE: NOT DETECTED
SARS-COV-2 RNA NPH QL NAA+NON-PROBE: NOT DETECTED
SHIGELLA SP+EIEC IPAH ST NAA+NON-PROBE: NOT DETECTED
SODIUM SERPL-SCNC: 136 MMOL/L (ref 136–145)
SODIUM SERPL-SCNC: 138 MMOL/L (ref 136–145)
SP GR UR STRIP: 1.02 (ref 1–1.03)
SQUAMOUS #/AREA URNS HPF: NORMAL /HPF
TROPONIN T SERPL HS-MCNC: 28 NG/L
TROPONIN T SERPL HS-MCNC: 36 NG/L
UROBILINOGEN UR QL STRIP: ABNORMAL
V CHOL+PARA+VUL DNA STL QL NAA+NON-PROBE: NOT DETECTED
V CHOLERAE DNA STL QL NAA+NON-PROBE: NOT DETECTED
WBC # UR STRIP: NORMAL /HPF
WBC NRBC COR # BLD: 13.75 10*3/MM3 (ref 3.4–10.8)
WBC NRBC COR # BLD: 14.75 10*3/MM3 (ref 3.4–10.8)
WHOLE BLOOD HOLD COAG: NORMAL
WHOLE BLOOD HOLD SPECIMEN: NORMAL
Y ENTEROCOL DNA STL QL NAA+NON-PROBE: NOT DETECTED

## 2023-11-12 PROCEDURE — G0378 HOSPITAL OBSERVATION PER HR: HCPCS

## 2023-11-12 PROCEDURE — 0202U NFCT DS 22 TRGT SARS-COV-2: CPT | Performed by: EMERGENCY MEDICINE

## 2023-11-12 PROCEDURE — 80053 COMPREHEN METABOLIC PANEL: CPT | Performed by: EMERGENCY MEDICINE

## 2023-11-12 PROCEDURE — 87507 IADNA-DNA/RNA PROBE TQ 12-25: CPT | Performed by: EMERGENCY MEDICINE

## 2023-11-12 PROCEDURE — 85025 COMPLETE CBC W/AUTO DIFF WBC: CPT

## 2023-11-12 PROCEDURE — 25510000001 IOPAMIDOL 61 % SOLUTION: Performed by: EMERGENCY MEDICINE

## 2023-11-12 PROCEDURE — 87493 C DIFF AMPLIFIED PROBE: CPT | Performed by: EMERGENCY MEDICINE

## 2023-11-12 PROCEDURE — 93005 ELECTROCARDIOGRAM TRACING: CPT

## 2023-11-12 PROCEDURE — 36415 COLL VENOUS BLD VENIPUNCTURE: CPT

## 2023-11-12 PROCEDURE — 85025 COMPLETE CBC W/AUTO DIFF WBC: CPT | Performed by: NURSE PRACTITIONER

## 2023-11-12 PROCEDURE — 71045 X-RAY EXAM CHEST 1 VIEW: CPT

## 2023-11-12 PROCEDURE — 25010000002 MORPHINE PER 10 MG: Performed by: EMERGENCY MEDICINE

## 2023-11-12 PROCEDURE — 83605 ASSAY OF LACTIC ACID: CPT | Performed by: EMERGENCY MEDICINE

## 2023-11-12 PROCEDURE — 93010 ELECTROCARDIOGRAM REPORT: CPT | Performed by: INTERNAL MEDICINE

## 2023-11-12 PROCEDURE — 85610 PROTHROMBIN TIME: CPT | Performed by: NURSE PRACTITIONER

## 2023-11-12 PROCEDURE — 83690 ASSAY OF LIPASE: CPT | Performed by: EMERGENCY MEDICINE

## 2023-11-12 PROCEDURE — 81001 URINALYSIS AUTO W/SCOPE: CPT | Performed by: EMERGENCY MEDICINE

## 2023-11-12 PROCEDURE — 74177 CT ABD & PELVIS W/CONTRAST: CPT

## 2023-11-12 PROCEDURE — 93005 ELECTROCARDIOGRAM TRACING: CPT | Performed by: EMERGENCY MEDICINE

## 2023-11-12 PROCEDURE — 25810000003 SODIUM CHLORIDE 0.9 % SOLUTION: Performed by: EMERGENCY MEDICINE

## 2023-11-12 PROCEDURE — 87449 NOS EACH ORGANISM AG IA: CPT | Performed by: EMERGENCY MEDICINE

## 2023-11-12 PROCEDURE — 99285 EMERGENCY DEPT VISIT HI MDM: CPT

## 2023-11-12 PROCEDURE — 84484 ASSAY OF TROPONIN QUANT: CPT | Performed by: EMERGENCY MEDICINE

## 2023-11-12 PROCEDURE — 25810000003 SODIUM CHLORIDE 0.9 % SOLUTION: Performed by: INTERNAL MEDICINE

## 2023-11-12 PROCEDURE — 99205 OFFICE O/P NEW HI 60 MIN: CPT | Performed by: STUDENT IN AN ORGANIZED HEALTH CARE EDUCATION/TRAINING PROGRAM

## 2023-11-12 RX ORDER — FAMOTIDINE 10 MG
10 TABLET ORAL DAILY PRN
COMMUNITY

## 2023-11-12 RX ORDER — VANCOMYCIN HYDROCHLORIDE 125 MG/1
125 CAPSULE ORAL EVERY 6 HOURS SCHEDULED
Status: DISCONTINUED | OUTPATIENT
Start: 2023-11-12 | End: 2023-11-16 | Stop reason: HOSPADM

## 2023-11-12 RX ORDER — AMOXICILLIN 250 MG
2 CAPSULE ORAL 2 TIMES DAILY
Status: DISCONTINUED | OUTPATIENT
Start: 2023-11-12 | End: 2023-11-12

## 2023-11-12 RX ORDER — SODIUM CHLORIDE 0.9 % (FLUSH) 0.9 %
10 SYRINGE (ML) INJECTION AS NEEDED
Status: DISCONTINUED | OUTPATIENT
Start: 2023-11-12 | End: 2023-11-16 | Stop reason: HOSPADM

## 2023-11-12 RX ORDER — ACETAMINOPHEN 650 MG/1
650 SUPPOSITORY RECTAL EVERY 4 HOURS PRN
Status: DISCONTINUED | OUTPATIENT
Start: 2023-11-12 | End: 2023-11-16 | Stop reason: HOSPADM

## 2023-11-12 RX ORDER — ROSUVASTATIN CALCIUM 10 MG/1
10 TABLET, COATED ORAL NIGHTLY
Status: DISCONTINUED | OUTPATIENT
Start: 2023-11-12 | End: 2023-11-16 | Stop reason: HOSPADM

## 2023-11-12 RX ORDER — SODIUM CHLORIDE 9 MG/ML
125 INJECTION, SOLUTION INTRAVENOUS CONTINUOUS
Status: DISCONTINUED | OUTPATIENT
Start: 2023-11-12 | End: 2023-11-14

## 2023-11-12 RX ORDER — SODIUM CHLORIDE 9 MG/ML
40 INJECTION, SOLUTION INTRAVENOUS AS NEEDED
Status: DISCONTINUED | OUTPATIENT
Start: 2023-11-12 | End: 2023-11-16 | Stop reason: HOSPADM

## 2023-11-12 RX ORDER — ENOXAPARIN SODIUM 100 MG/ML
40 INJECTION SUBCUTANEOUS NIGHTLY
Status: DISCONTINUED | OUTPATIENT
Start: 2023-11-12 | End: 2023-11-16 | Stop reason: HOSPADM

## 2023-11-12 RX ORDER — LISINOPRIL 10 MG/1
10 TABLET ORAL
Status: DISCONTINUED | OUTPATIENT
Start: 2023-11-12 | End: 2023-11-16 | Stop reason: HOSPADM

## 2023-11-12 RX ORDER — VANCOMYCIN HYDROCHLORIDE 125 MG/1
125 CAPSULE ORAL EVERY OTHER DAY
Status: DISCONTINUED | OUTPATIENT
Start: 2023-12-06 | End: 2023-11-12

## 2023-11-12 RX ORDER — ONDANSETRON 2 MG/ML
4 INJECTION INTRAMUSCULAR; INTRAVENOUS EVERY 6 HOURS PRN
Status: DISCONTINUED | OUTPATIENT
Start: 2023-11-12 | End: 2023-11-16 | Stop reason: HOSPADM

## 2023-11-12 RX ORDER — BISACODYL 5 MG/1
5 TABLET, DELAYED RELEASE ORAL DAILY PRN
Status: DISCONTINUED | OUTPATIENT
Start: 2023-11-12 | End: 2023-11-12

## 2023-11-12 RX ORDER — ASPIRIN 81 MG/1
81 TABLET ORAL DAILY
Status: DISCONTINUED | OUTPATIENT
Start: 2023-11-12 | End: 2023-11-16 | Stop reason: HOSPADM

## 2023-11-12 RX ORDER — SODIUM CHLORIDE 0.9 % (FLUSH) 0.9 %
10 SYRINGE (ML) INJECTION EVERY 12 HOURS SCHEDULED
Status: DISCONTINUED | OUTPATIENT
Start: 2023-11-12 | End: 2023-11-16 | Stop reason: HOSPADM

## 2023-11-12 RX ORDER — VANCOMYCIN HYDROCHLORIDE 125 MG/1
125 CAPSULE ORAL EVERY 24 HOURS
Status: DISCONTINUED | OUTPATIENT
Start: 2023-11-29 | End: 2023-11-16 | Stop reason: HOSPADM

## 2023-11-12 RX ORDER — ACETAMINOPHEN 325 MG/1
650 TABLET ORAL EVERY 4 HOURS PRN
Status: DISCONTINUED | OUTPATIENT
Start: 2023-11-12 | End: 2023-11-16 | Stop reason: HOSPADM

## 2023-11-12 RX ORDER — POLYETHYLENE GLYCOL 3350 17 G/17G
17 POWDER, FOR SOLUTION ORAL DAILY PRN
Status: DISCONTINUED | OUTPATIENT
Start: 2023-11-12 | End: 2023-11-12

## 2023-11-12 RX ORDER — VANCOMYCIN HYDROCHLORIDE 125 MG/1
125 CAPSULE ORAL EVERY 12 HOURS
Status: DISCONTINUED | OUTPATIENT
Start: 2023-11-22 | End: 2023-11-16 | Stop reason: HOSPADM

## 2023-11-12 RX ORDER — ACETAMINOPHEN 160 MG/5ML
650 SOLUTION ORAL EVERY 4 HOURS PRN
Status: DISCONTINUED | OUTPATIENT
Start: 2023-11-12 | End: 2023-11-16 | Stop reason: HOSPADM

## 2023-11-12 RX ORDER — BISACODYL 10 MG
10 SUPPOSITORY, RECTAL RECTAL DAILY PRN
Status: DISCONTINUED | OUTPATIENT
Start: 2023-11-12 | End: 2023-11-12

## 2023-11-12 RX ORDER — MORPHINE SULFATE 2 MG/ML
2 INJECTION, SOLUTION INTRAMUSCULAR; INTRAVENOUS ONCE
Status: COMPLETED | OUTPATIENT
Start: 2023-11-12 | End: 2023-11-12

## 2023-11-12 RX ORDER — VANCOMYCIN HYDROCHLORIDE 125 MG/1
125 CAPSULE ORAL EVERY OTHER DAY
Status: DISCONTINUED | OUTPATIENT
Start: 2023-12-06 | End: 2023-11-16 | Stop reason: HOSPADM

## 2023-11-12 RX ADMIN — IOPAMIDOL 85 ML: 612 INJECTION, SOLUTION INTRAVENOUS at 03:55

## 2023-11-12 RX ADMIN — SODIUM CHLORIDE 125 ML/HR: 9 INJECTION, SOLUTION INTRAVENOUS at 12:39

## 2023-11-12 RX ADMIN — VANCOMYCIN HYDROCHLORIDE 125 MG: 125 CAPSULE ORAL at 05:39

## 2023-11-12 RX ADMIN — VANCOMYCIN HYDROCHLORIDE 125 MG: 125 CAPSULE ORAL at 23:41

## 2023-11-12 RX ADMIN — VANCOMYCIN HYDROCHLORIDE 125 MG: 125 CAPSULE ORAL at 11:57

## 2023-11-12 RX ADMIN — Medication 10 ML: at 20:06

## 2023-11-12 RX ADMIN — SODIUM CHLORIDE 500 ML: 9 INJECTION, SOLUTION INTRAVENOUS at 03:25

## 2023-11-12 RX ADMIN — MORPHINE SULFATE 2 MG: 2 INJECTION, SOLUTION INTRAMUSCULAR; INTRAVENOUS at 04:15

## 2023-11-12 RX ADMIN — Medication 10 ML: at 11:59

## 2023-11-12 RX ADMIN — SODIUM CHLORIDE 125 ML/HR: 9 INJECTION, SOLUTION INTRAVENOUS at 20:09

## 2023-11-12 RX ADMIN — VANCOMYCIN HYDROCHLORIDE 125 MG: 125 CAPSULE ORAL at 18:05

## 2023-11-12 NOTE — PROGRESS NOTES
[facility] Clinical Pharmacy Services: C. Difficile Medication Changes       Pharmacy has been consulted to look over Vicente Delgado's profile to check patient's medications for changes due to C. Difficile diagnosis per Dr. Summers's request.       Current C. Diff Regimen: Oral vancomycin 125 mg tapering over 34 days    Assessment/Plan/Changes       1. Proton pump inhibitor: none    2. Antiperistalic agents or stool softeners/laxatives: Stool Softener admission orders (Senna/docusate, Miralax, Bisacodyl PO/PA) discontinued       Thank you for allowing me to participate in your patient's care.  Please call pharmacy with any questions or concerns.     Rasheeda Lunsford, [facility]  Clinical Pharmacist
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
## 2023-11-12 NOTE — ED NOTES
"Pt states when he breaths \"it feels like someone is sticking a knife in my chest\"   Pt states his stomach feels like he has \"ate a lot of apples\"   "

## 2023-11-12 NOTE — PLAN OF CARE
Goal Outcome Evaluation:                      Patient admitted to unit at 1345 from the ED department via stretcher. Patient was able to roll from the stretcher to the bed. No skin issues identified. Patient alert and oriented, [place], with bilateral hearing aids. Daughter and son in law at bedside on admission. Data base and initial assessment completed. Patient and family oriented to room. No acute distress noted, will continue to monitor.

## 2023-11-12 NOTE — ED NOTES
.Nursing report ED to floor  Vicente Delgado  89 y.o.  male    HPI :   Chief Complaint   Patient presents with    Diarrhea    Abdominal Pain    Chest Pain       Admitting doctor:   Bernard Carrillo MD    Admitting diagnosis:   The primary encounter diagnosis was Clostridium difficile colitis. Diagnoses of Atypical chest pain and Acute bilateral low back pain without sciatica were also pertinent to this visit.    Code status:   Current Code Status       Date Active Code Status Order ID Comments User Context       11/12/2023 0526 CPR (Attempt to Resuscitate) [id number]  [place], [facility]        Question Answer    Code Status (Patient has no pulse and is not breathing) CPR (Attempt to Resuscitate)    Medical Interventions (Patient has pulse or is breathing) Full Support                    Allergies:   Lidocaine, Lovastatin, Pravastatin, Gabapentin, Procaine, and Simvastatin    Isolation:   Contact Spore, Contact Spore    Intake and Output  No intake or output data in the 24 hours ending 11/12/23 1047    Weight:       11/12/23  0152   Weight: 73.5 kg (162 lb)       Most recent vitals:   Vitals:    11/12/23 0546 11/12/23 0716 11/12/23 0746 11/12/23 0816   BP: 90/75 107/58 95/63 92/56   Pulse: 84 80 87 80   Resp: 18      Temp:       TempSrc:       SpO2: 94% 96% 95% 94%   Weight:       Height:           Active LDAs/IV Access:   Lines, Drains & Airways       Active LDAs       Name Placement date Placement time Site Days    Peripheral IV 11/12/23 0312 Anterior;Proximal;Right Forearm 11/12/23 0312  Forearm  less than 1                    Labs (abnormal labs have a star):   Labs Reviewed   CLOSTRIDIOIDES DIFFICILE TOXIN, PCR - Abnormal; Notable for the following components:       Result Value    Toxigenic C. difficile by PCR Positive (*)     All other components within normal limits    Narrative:     DNA from a toxigenic strain of C.difficile has been detected. Antigen testing for the presence of free  C.difficile toxin is currently in progress, to help determine the clinical significance of this PCR result.    CLOSTRIDIOIDES DIFFICILE TOXIN AG (REFLEX ONLY) - Abnormal; Notable for the following components:    C.diff Toxin Ag Positive (*)     All other components within normal limits    Narrative:     DNA from a toxigenic strain of C.difficile was detected, along with the presence of free toxin. These results are suggestive of C.difficile infection.   COMPREHENSIVE METABOLIC PANEL - Abnormal; Notable for the following components:    CO2 21.9 (*)     All other components within normal limits    Narrative:     GFR Normal >60  Chronic Kidney Disease <60  Kidney Failure <15    The GFR formula is only valid for adults with stable renal function between ages 18 and 70.   SINGLE HSTROPONIN T - Abnormal; Notable for the following components:    HS Troponin T 36 (*)     All other components within normal limits    Narrative:     High Sensitive Troponin T Reference Range:  <14.0 ng/L- Negative Female for AMI  <22.0 ng/L- Negative Male for AMI  >=14 - Abnormal Female indicating possible myocardial injury.  >=22 - Abnormal Male indicating possible myocardial injury.   Clinicians would have to utilize clinical acumen, EKG, Troponin, and serial changes to determine if it is an Acute Myocardial Infarction or myocardial injury due to an underlying chronic condition.        URINALYSIS W/ MICROSCOPIC IF INDICATED (NO CULTURE) - Abnormal; Notable for the following components:    Color, UA Dark Yellow (*)     Ketones, UA Trace (*)     Blood, UA Trace (*)     All other components within normal limits   CBC WITH AUTO DIFFERENTIAL - Abnormal; Notable for the following components:    WBC 13.75 (*)     Neutrophil % 76.3 (*)     Lymphocyte % 9.0 (*)     Monocyte % 14.2 (*)     Eosinophil % 0.1 (*)     Neutrophils, Absolute 10.49 (*)     Monocytes, Absolute 1.95 (*)     All other components within normal limits   SINGLE HSTROPONIN T -  Abnormal; Notable for the following components:    HS Troponin T 28 (*)     All other components within normal limits    Narrative:     High Sensitive Troponin T Reference Range:  <14.0 ng/L- Negative Female for AMI  <22.0 ng/L- Negative Male for AMI  >=14 - Abnormal Female indicating possible myocardial injury.  >=22 - Abnormal Male indicating possible myocardial injury.   Clinicians would have to utilize clinical acumen, EKG, Troponin, and serial changes to determine if it is an Acute Myocardial Infarction or myocardial injury due to an underlying chronic condition.        CBC WITH AUTO DIFFERENTIAL - Abnormal; Notable for the following components:    WBC 14.75 (*)     Lymphocyte % 9.1 (*)     Monocyte % 15.3 (*)     Eosinophil % 0.1 (*)     Neutrophils, Absolute 11.09 (*)     Monocytes, Absolute 2.25 (*)     All other components within normal limits   PROTIME-INR - Abnormal; Notable for the following components:    Protime 14.6 (*)     INR 1.13 (*)     All other components within normal limits   GASTROINTESTINAL PANEL, PCR (PREFERRED) DOES NOT INCLUDE CDIFF - Normal    Narrative:     If Aeromonas, Staphylococcus aureus or Bacillus cereus are suspected, please order [id number]: Stool Culture, Aeromonas, S aureus, B Cereus.   RESPIRATORY PANEL PCR W/ COVID-19 (SARS-COV-2), NP SWAB IN UTM/VTP, 2 HR TAT - Normal    Narrative:     In the setting of a positive respiratory panel with a viral infection PLUS a negative procalcitonin without other underlying concern for bacterial infection, consider observing off antibiotics or discontinuation of antibiotics and continue supportive care. If the respiratory panel is positive for atypical bacterial infection (Bordetella pertussis, Chlamydophila pneumoniae, or Mycoplasma pneumoniae), consider antibiotic de-escalation to target atypical bacterial infection.   LIPASE - Normal   LACTIC ACID, PLASMA - Normal   BASIC METABOLIC PANEL - Normal    Narrative:     GFR Normal >60  Chronic  Kidney Disease <60  Kidney Failure <15    The GFR formula is only valid for adults with stable renal function between ages 18 and 70.   CLOSTRIDIOIDES DIFFICILE TOXIN    Narrative:     The following orders were created for panel order Clostridioides difficile Toxin - Stool, Per Rectum.  Procedure                               Abnormality         Status                     ---------                               -----------         ------                     Clostridioides difficile...[[id number]]  Abnormal            Final result                 Please view results for these tests on the individual orders.   RAINBOW DRAW    Narrative:     The following orders were created for panel order [place] Draw.  Procedure                               Abnormality         Status                     ---------                               -----------         ------                     Green Top (Gel)[[id number]]                                  Final result               Lavender Top[[id number]]                                     Final result               Gold Top - SST[[id number]]                                   Final result               Light Blue Top[[id number]]                                   Final result                 Please view results for these tests on the individual orders.   URINALYSIS, MICROSCOPIC ONLY   CBC AND DIFFERENTIAL    Narrative:     The following orders were created for panel order CBC & Differential.  Procedure                               Abnormality         Status                     ---------                               -----------         ------                     CBC Auto Differential[[id number]]        Abnormal            Final result                 Please view results for these tests on the individual orders.   GREEN TOP   LAVENDER TOP   GOLD TOP - SST   LIGHT BLUE TOP       EKG:   ECG 12 Lead ED Triage Standing Order; Abdominal Pain (Upper)   Preliminary Result   HEART RATE= 91  bpm   RR  Interval= 659  ms   IL Interval= 244  ms   P Horizontal Axis= -22  deg   P Front Axis= 43  deg   QRSD Interval= 100  ms   QT Interval= 365  ms   QTcB= 450  ms   QRS Axis= -39  deg   T Wave Axis= 11  deg   - ABNORMAL ECG -   Sinus rhythm   Prolonged IL interval   Probable left atrial enlargement   Inferior infarct, old   Anterior infarct, old   Electronically Signed By:    Date and Time of Study: 2023-11-12 02:08:14          Meds given in ED:   Medications   sodium chloride 0.9 % flush 10 mL (has no administration in time range)   Pharmacy Consult (has no administration in time range)   vancomycin (VANCOCIN) capsule 125 mg (125 mg Oral Given 11/12/23 0539)   vancomycin (VANCOCIN) capsule 125 mg (has no administration in time range)   vancomycin (VANCOCIN) capsule 125 mg (has no administration in time range)   sodium chloride 0.9 % flush 10 mL (has no administration in time range)   sodium chloride 0.9 % flush 10 mL (has no administration in time range)   sodium chloride 0.9 % infusion 40 mL (has no administration in time range)   acetaminophen (TYLENOL) tablet 650 mg (has no administration in time range)     Or   acetaminophen (TYLENOL) 160 MG/5ML oral solution 650 mg (has no administration in time range)     Or   acetaminophen (TYLENOL) suppository 650 mg (has no administration in time range)   ondansetron (ZOFRAN) injection 4 mg (has no administration in time range)   vancomycin (VANCOCIN) capsule 125 mg (has no administration in time range)   sodium chloride 0.9 % bolus 500 mL (0 mL Intravenous Stopped 11/12/23 0355)   iopamidol (ISOVUE-300) 61 % injection 100 mL (85 mL Intravenous Given by Other 11/12/23 4850)   morphine injection 2 mg (2 mg Intravenous Given 11/12/23 0967)       Imaging results:  CT Abdomen Pelvis With Contrast    Result Date: 11/12/2023   1. Thick-walled appearance to the colon, extending from the splenic flexure to the rectum, with the most significant involvement noted within the  rectosigmoid. There is adjacent pericolonic soft tissue stranding. Appearance is concerning for colitis. 2. Prom mesenteric lymph nodes within the right lower quadrant appear slightly smaller on today's exam, and a mass like area within the cecum at the ileocecal valve has become less apparent.  Radiation dose reduction techniques were utilized, including automated exposure control and exposure modulation based on body size.   This report was finalized on 11/12/2023 4:21 AM by Dr. Monica Villa M.D on Workstation: [employer]      XR Chest 1 View    Result Date: 11/12/2023  No acute findings.  This report was finalized on 11/12/2023 3:10 AM by Dr. Monica Villa M.D on Workstation: [employer]       Ambulatory status:   - assist stand by    Social issues:   Social History     Socioeconomic History    Marital status:    Tobacco Use    Smoking status: Some Days     Types: Cigars     Passive exposure: Yes    Smokeless tobacco: Never    Tobacco comments:     Smoke a cigar ocassionally   Vaping Use    Vaping Use: Never used   Substance and Sexual Activity    Alcohol use: No     Comment: Daily caffeine use    Drug use: No    Sexual activity: Not Currently     Partners: Female       NIH Stroke Scale:       Promise Hill RN  11/12/23 10:47 EST

## 2023-11-13 DIAGNOSIS — C85.90 LYMPHOMA, UNSPECIFIED BODY REGION, UNSPECIFIED LYMPHOMA TYPE: Primary | ICD-10-CM

## 2023-11-13 PROBLEM — A04.71 RECURRENT CLOSTRIDIUM DIFFICILE DIARRHEA: Status: ACTIVE | Noted: 2023-11-13

## 2023-11-13 LAB
ANION GAP SERPL CALCULATED.3IONS-SCNC: 11.3 MMOL/L (ref 5–15)
BUN SERPL-MCNC: 14 MG/DL (ref 8–23)
BUN/CREAT SERPL: 19.4 (ref 7–25)
CALCIUM SPEC-SCNC: 8.1 MG/DL (ref 8.2–9.6)
CHLORIDE SERPL-SCNC: 105 MMOL/L (ref 98–107)
CO2 SERPL-SCNC: 20.7 MMOL/L (ref 22–29)
CREAT SERPL-MCNC: 0.72 MG/DL (ref 0.76–1.27)
DEPRECATED RDW RBC AUTO: 41.7 FL (ref 37–54)
EGFRCR SERPLBLD CKD-EPI 2021: 84.1 ML/MIN/1.73
EOSINOPHIL # BLD MANUAL: 0.15 10*3/MM3 (ref 0–0.4)
EOSINOPHIL NFR BLD MANUAL: 2 % (ref 0.3–6.2)
ERYTHROCYTE [DISTWIDTH] IN BLOOD BY AUTOMATED COUNT: 12.8 % (ref 12.3–15.4)
GLUCOSE SERPL-MCNC: 89 MG/DL (ref 65–99)
HCT VFR BLD AUTO: 36.1 % (ref 37.5–51)
HGB BLD-MCNC: 12.2 G/DL (ref 13–17.7)
LYMPHOCYTES # BLD MANUAL: 1.44 10*3/MM3 (ref 0.7–3.1)
LYMPHOCYTES NFR BLD MANUAL: 17.3 % (ref 5–12)
MCH RBC QN AUTO: 30.7 PG (ref 26.6–33)
MCHC RBC AUTO-ENTMCNC: 33.8 G/DL (ref 31.5–35.7)
MCV RBC AUTO: 90.9 FL (ref 79–97)
MONOCYTES # BLD: 1.28 10*3/MM3 (ref 0.1–0.9)
NEUTROPHILS # BLD AUTO: 4.53 10*3/MM3 (ref 1.7–7)
NEUTROPHILS NFR BLD MANUAL: 61.2 % (ref 42.7–76)
PLAT MORPH BLD: NORMAL
PLATELET # BLD AUTO: 166 10*3/MM3 (ref 140–450)
PMV BLD AUTO: 10.2 FL (ref 6–12)
POTASSIUM SERPL-SCNC: 3.8 MMOL/L (ref 3.5–5.2)
QT INTERVAL: 365 MS
QTC INTERVAL: 450 MS
RBC # BLD AUTO: 3.97 10*6/MM3 (ref 4.14–5.8)
RBC MORPH BLD: NORMAL
SMUDGE CELLS BLD QL SMEAR: ABNORMAL
SODIUM SERPL-SCNC: 137 MMOL/L (ref 136–145)
VARIANT LYMPHS NFR BLD MANUAL: 19.4 % (ref 19.6–45.3)
WBC NRBC COR # BLD: 7.4 10*3/MM3 (ref 3.4–10.8)

## 2023-11-13 PROCEDURE — 25010000002 ENOXAPARIN PER 10 MG: Performed by: INTERNAL MEDICINE

## 2023-11-13 PROCEDURE — 36415 COLL VENOUS BLD VENIPUNCTURE: CPT | Performed by: INTERNAL MEDICINE

## 2023-11-13 PROCEDURE — 99222 1ST HOSP IP/OBS MODERATE 55: CPT | Performed by: INTERNAL MEDICINE

## 2023-11-13 PROCEDURE — 25810000003 SODIUM CHLORIDE 0.9 % SOLUTION: Performed by: INTERNAL MEDICINE

## 2023-11-13 PROCEDURE — 85007 BL SMEAR W/DIFF WBC COUNT: CPT | Performed by: INTERNAL MEDICINE

## 2023-11-13 PROCEDURE — 80048 BASIC METABOLIC PNL TOTAL CA: CPT | Performed by: INTERNAL MEDICINE

## 2023-11-13 PROCEDURE — 85025 COMPLETE CBC W/AUTO DIFF WBC: CPT | Performed by: INTERNAL MEDICINE

## 2023-11-13 RX ORDER — FAMOTIDINE 20 MG/1
20 TABLET, FILM COATED ORAL
Status: DISCONTINUED | OUTPATIENT
Start: 2023-11-13 | End: 2023-11-16 | Stop reason: HOSPADM

## 2023-11-13 RX ORDER — SUCRALFATE 1 G/1
1 TABLET ORAL
Status: DISCONTINUED | OUTPATIENT
Start: 2023-11-13 | End: 2023-11-16 | Stop reason: HOSPADM

## 2023-11-13 RX ORDER — FAMOTIDINE 20 MG/1
20 TABLET, FILM COATED ORAL
Status: DISCONTINUED | OUTPATIENT
Start: 2023-11-13 | End: 2023-11-13

## 2023-11-13 RX ADMIN — VANCOMYCIN HYDROCHLORIDE 125 MG: 125 CAPSULE ORAL at 17:57

## 2023-11-13 RX ADMIN — SODIUM CHLORIDE 125 ML/HR: 9 INJECTION, SOLUTION INTRAVENOUS at 06:16

## 2023-11-13 RX ADMIN — Medication 10 ML: at 20:01

## 2023-11-13 RX ADMIN — ROSUVASTATIN CALCIUM 10 MG: 10 TABLET, FILM COATED ORAL at 20:01

## 2023-11-13 RX ADMIN — VANCOMYCIN HYDROCHLORIDE 125 MG: 125 CAPSULE ORAL at 06:15

## 2023-11-13 RX ADMIN — LISINOPRIL 10 MG: 10 TABLET ORAL at 08:23

## 2023-11-13 RX ADMIN — Medication 10 ML: at 08:23

## 2023-11-13 RX ADMIN — FAMOTIDINE 20 MG: 20 TABLET ORAL at 15:05

## 2023-11-13 RX ADMIN — VANCOMYCIN HYDROCHLORIDE 125 MG: 125 CAPSULE ORAL at 12:15

## 2023-11-13 RX ADMIN — SODIUM CHLORIDE 125 ML/HR: 9 INJECTION, SOLUTION INTRAVENOUS at 12:15

## 2023-11-13 RX ADMIN — ENOXAPARIN SODIUM 40 MG: 100 INJECTION SUBCUTANEOUS at 20:01

## 2023-11-13 NOTE — PROGRESS NOTES
Name: Vicente Delgado ADMIT: 2023   : 1928  PCP: Mechelle Landa APRN    MRN: [id number] LOS: 0 days   AGE/SEX: 89 y.o. male  ROOM: [facility]     Subjective   Subjective     Patient is lying in bed and does not appear to be any major distress.  Diarrhea is better today.  Denies nausea, vomiting abdominal pain, chest pain.       Objective   Objective   Vital Signs  Temp:  [98.2 °F (36.8 °C)-98.6 °F (37 °C)] 98.2 °F (36.8 °C)  Heart Rate:  [70-83] 70  Resp:  [8-18] 18  BP: (100-112)/(53-61) 104/61  SpO2:  [95 %-97 %] 97 %  on   ;   Device (Oxygen Therapy): room air  Body mass index is 25.28 kg/m².  Physical Exam  HEENT: PERRLA, extraocular movements intact, Scleras no icterus  Neck: Supple, no JVD  Cardiovascular: Regular rate and rhythm with normal S1 and S2  Respiratory: Fairly clear to auscultation bilaterally with no wheezes  GI: Soft, nontender, bowel sounds present , mildly hyperactive  Neurologic: Grossly nonfocal, no facial asymmetry    Results Review     I reviewed the patient's new clinical results.  Results from last 7 days   Lab Units 23  0625 23  0541 23  0215   WBC 10*3/mm3 7.40 14.75* 13.75*   HEMOGLOBIN g/dL 12.2* 13.3 14.5   PLATELETS 10*3/mm3 166 184 208     Results from last 7 days   Lab Units 23  0625 23  0541 23  0215   SODIUM mmol/L 137 138 136   POTASSIUM mmol/L 3.8 4.1 4.0   CHLORIDE mmol/L 105 104 101   CO2 mmol/L 20.7* 23.0 21.9*   BUN mg/dL 14 18 17   CREATININE mg/dL 0.72* 0.81 0.82   GLUCOSE mg/dL 89 95 99   EGFR mL/min/1.73 84.1 81.2 80.9     Results from last 7 days   Lab Units 23  0215   ALBUMIN g/dL 4.1   BILIRUBIN mg/dL 0.8   ALK PHOS U/L 78   AST (SGOT) U/L 28   ALT (SGPT) U/L 32     Results from last 7 days   Lab Units 23  0625 23  0541 23  0215   CALCIUM mg/dL 8.1* 8.4 9.0   ALBUMIN g/dL  --   --  4.1     Results from last 7 days   Lab Units 23  0325   LACTATE mmol/L 1.7     No results found for:  "\"HGBA1C\", \"POCGLU\"    CT Abdomen Pelvis With Contrast    Result Date: 11/12/2023   1. Thick-walled appearance to the colon, extending from the splenic flexure to the rectum, with the most significant involvement noted within the rectosigmoid. There is adjacent pericolonic soft tissue stranding. Appearance is concerning for colitis. 2. Prom mesenteric lymph nodes within the right lower quadrant appear slightly smaller on today's exam, and a mass like area within the cecum at the ileocecal valve has become less apparent.  Radiation dose reduction techniques were utilized, including automated exposure control and exposure modulation based on body size.   This report was finalized on 11/12/2023 4:21 AM by Dr. Monica Villa M.D on Workstation: [employer]      XR Chest 1 View    Result Date: 11/12/2023  No acute findings.  This report was finalized on 11/12/2023 3:10 AM by Dr. Monica Villa M.D on Workstation: [employer]       I have personally reviewed all medications:  Scheduled Medications  aspirin, 81 mg, Oral, Daily  enoxaparin, 40 mg, Subcutaneous, Nightly  famotidine, 20 mg, Oral, BID AC  lisinopril, 10 mg, Oral, Q24H  rosuvastatin, 10 mg, Oral, Nightly  sodium chloride, 10 mL, Intravenous, Q12H  sucralfate, 1 g, Oral, TID AC  vancomycin, 125 mg, Oral, Q6H  [START ON 11/22/2023] vancomycin, 125 mg, Oral, Q12H  [START ON 11/29/2023] vancomycin, 125 mg, Oral, Q24H  [START ON 12/6/2023] vancomycin, 125 mg, Oral, Every Other Day    Infusions  Pharmacy Consult,   sodium chloride, 125 mL/hr, Last Rate: 125 mL/hr (11/13/23 1455)    Diet  Diet: Cardiac Diets; Healthy Heart (2-3 Na+); Texture: Regular Texture (IDDSI 7); Fluid Consistency: Thin (IDDSI 0)    I have personally reviewed:  [x]  Laboratory   [x]  Microbiology   [x]  Radiology   [x]  EKG/Telemetry  [x]  Cardiology/Vascular   []  Pathology    []  Records       Assessment/Plan     Active Hospital Problems    Diagnosis  POA    **Colitis [K52.9]  Yes    " Chronic heart failure with preserved ejection fraction (HFpEF) [I50.32]  Yes    Colitis due to Clostridium difficile [A04.72]  Yes    TAM (nonalcoholic steatohepatitis) [K75.81]  Yes    Lupus [M32.9]  Yes    Mass of colon [K63.89]  Yes    Paroxysmal atrial fibrillation [I48.0]  Yes    Gastroesophageal reflux disease with esophagitis [K21.00]  Yes    CAD (coronary artery disease) [I25.10]  Yes    HTN (hypertension) [I10]  Yes      Resolved Hospital Problems   No resolved problems to display.       89 y.o. male admitted with Colitis.    1.Acute recurrent C. difficile colitis, patient is being initiated on vancomycin and will be on a prolonged taper  and infectious disease   Is following along.  Diarrhea is starting to improve.    2.  Non-Hodgkin's lymphoma, oncology consult will be obtained per patient's [id number] with Dr. Berry.    3.  Coronary artery disease/paroxysmal atrial fibrillation/hypertension, continue with lisinopril and aspirin.    4.  Hyperlipidemia, on Crestor at home which will be resumed.    5.  On Lovenox for DVT prophylaxis.    6.  CODE STATUS is  DNR/DNI.       Copied text on this note has been reviewed by me on 11/13/2023    Bernard Carrillo MD  [facility]  11/13/23  15:57 EST

## 2023-11-13 NOTE — PLAN OF CARE
Goal Outcome Evaluation:            Pt resting in bed quietly. Denies pain. Ambulates with family. Room air.

## 2023-11-13 NOTE — PLAN OF CARE
Goal Outcome Evaluation:  Plan of Care Reviewed With: patient, family, daughter           Outcome Evaluation: Cont with PO Vanc.  Pt had Acid and Pepcid, his home med, was ordered along with Carafate.  Daughter at bedside caring for her father.  Very sweet famioy.  VSS. RA. NSR.

## 2023-11-13 NOTE — CONSULTS
I was requested to see patient to provide spiritual support.  Patient has a strong kyle and is a member of the [employer].  He also has strong family support.  Even though he is doing well with his health he feels comfortable that he has lived a good life and is ready to go to his heavenly home.  We concluded with prayer.

## 2023-11-13 NOTE — CONSULTS
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    The patient presented with worsening fatigue that started on Saturday and then worsening diarrhea.    Oral vancomycin has been restarted and he's feeling better with less diarrhea.        Past Medical History:   Diagnosis Date    Allergic rhinitis     Anxiety     Arthritis     Atrial fibrillation     Burn injury     CAD (coronary artery disease) 07/01/2016    History of mild disease.  Stress test 2017 with no ischemia.  He is on aspirin statin and Zetia.  No angina pectoris. EF normal 1/2017 echo    Cancer     Cataract June 2018” and    Ocular mygraine    Cholelithiasis 2020    Clotting disorder 2023. January    Wrong medicine    Colon polyps     FOLLOWED BY DR. ROBERT SOTELO    Deep vein thrombosis January2023 dr rodríguez    Depression     Diverticulosis     Esophagitis     Gastritis     GERD (gastroesophageal reflux disease)     Heart disease     History of anxiety     History of medical problems Right shoulder replacemd    2008    History of prostate cancer 06/1990    History of transfusion 1990    4 pints    HL (hearing loss) Partial    Hypercholesterolemia     Hyperlipidemia     Hypertension     Insomnia     Low back pain Yes  from hworking    Lupus     Myocardial infarction     TAM (nonalcoholic steatohepatitis)     Sciatica of right side     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 Rodríguez 10/2021    Visual impairment Ocular mygraine       Past Surgical History:   Procedure Laterality Date    ABDOMINAL SURGERY      CARDIAC CATHETERIZATION  11/16/1995    CARDIAC SURGERY   11/16/1995    CATARACT EXTRACTION Left 07/2018    CHOLECYSTECTOMY  2020    CHOLECYSTECTOMY WITH INTRAOPERATIVE CHOLANGIOGRAM N/A 09/29/2020    Procedure: Laparoscopic cholecystectomy;  Surgeon: Javi Peralta MD;  Location: [facility] OR;  Service: General;  Laterality: N/A;    COLONOSCOPY  01/09/2002    COLONOSCOPY N/A 10/18/2023    15 MM POLYP IN DISTAL ILEUM, PATH: LYMPHOMA VERSUS NEUROENDOCRINE TUMOR, RESCOPE IN 1 YR, DR. ROBERT SOTELO AT [facility]    ELBOW PROCEDURE      FRACTURE SURGERY      JOINT REPLACEMENT      LUNG BIOPSY      LYMPH NODE BIOPSY  Yes    1992    NASAL POLYP SURGERY      PACEMAKER IMPLANTATION      PROSTATE SURGERY  06/1990    SHOULDER ROTATOR CUFF REPAIR Right 08/24/2011    Dr. Bach    SIGMOIDOSCOPY      TONSILLECTOMY  Yes 1943    UPPER GASTROINTESTINAL ENDOSCOPY  09/12/1997    Gastritis, Duodenitis, hiatal hernia (Pathology: Gastric antrum minimal chronic inflammation)    UPPER GASTROINTESTINAL ENDOSCOPY  04/11/2005    Mild esophagitis, Small hiatal hernia, Mild gastritis and mild duodenitis       SOCIAL HISTORY:   reports that he has been smoking cigars. He has been exposed to tobacco smoke. He has never used smokeless tobacco. He reports that he does not drink alcohol and does not use drugs.    FAMILY HISTORY:  family history includes Alcohol abuse in his father; Diabetes in his father; Hearing loss in his mother; Heart disease in his mother; Heart failure in his mother; Hyperlipidemia in his mother.    ALLERGIES:  Allergies   Allergen Reactions    Lidocaine Dizziness     Reaction to novacaine    Lovastatin Other (See Comments)     Liver enzymes elevated    Pravastatin Other (See Comments)     Elevated liver enzymes     Gabapentin GI Intolerance     Bloating, incontinence     Procaine     Simvastatin        MEDICATIONS:  As listed in the electronic medical record.    Review of Systems   Constitutional:  Positive for fatigue.   Gastrointestinal:  Positive for diarrhea.   Neurological:   Positive for weakness.   All other systems reviewed and are negative.      Vitals:    11/12/23 1803 11/12/23 1913 11/12/23 2340 11/13/23 0737   BP: 102/59 100/53 112/55 107/58   BP Location: Left arm Left arm Right arm Left arm   Patient Position: Lying Lying Lying Lying   Pulse: 81 83 70    Resp:  8 18 18   Temp:  98.6 °F (37 °C) 98.6 °F (37 °C) 98.6 °F (37 °C)   TempSrc:  Oral Oral Oral   SpO2:  95% 97%    Weight:       Height:           General:  No acute distress, awake, alert and oriented  Skin:  Warm and dry, no visible rash  HEENT:  Normocephalic/atraumatic.   Chest:  Normal respiratory effort  Extremities:  No visible clubbing, cyanosis, or edema  Neuro/psych:  Grossly nonfocal.  Normal mood and affect.       DIAGNOSTIC DATA:  Results from last 7 days   Lab Units 11/12/23  0541   WBC 10*3/mm3 14.75*   HEMOGLOBIN g/dL 13.3   HEMATOCRIT % 39.7   PLATELETS 10*3/mm3 184     Lab Results   Component Value Date    NEUTROABS 11.09 (H) 11/12/2023     Results from last 7 days   Lab Units 11/13/23  0625   SODIUM mmol/L 137   POTASSIUM mmol/L 3.8   CHLORIDE mmol/L 105   CO2 mmol/L 20.7*   BUN mg/dL 14   CREATININE mg/dL 0.72*   GLUCOSE mg/dL 89   CALCIUM mg/dL 8.1*     Results from last 7 days   Lab Units 11/12/23  0541   INR  1.13*           IMAGING:      CT Abdomen Pelvis With Contrast (11/12/2023 03:55)  NM PET/CT Skull Base to Mid Thigh (11/08/2023 11:14)    Images reviewed.     PET with some subtle hypermetabolic activity at the terminal ileum, maximal SUV 3.8 and an area measuring approximately 3 cm.  Adjacent mesenteric nodes are not significantly hypermetabolic and are either photopenic or have blood pool level activity.  Nonspecific low-level activity at the distal esophagus and GE junction with a maximal SUV of 4.3.  No obvious mass on CT imaging.  Spleen size normal.  No hypermetabolic cavity there.  Blood pool activity mediastinal lymphadenopathy    CT imaging 11/12/2023 with thick-walled appearance of the  colon from the splenic flexure to the rectum with most significant involvement in the rectosigmoid colon, adjacent pericolonic soft tissue stranding consistent with colitis.  Mass in the cecum less apparent compared to 9/20/2023.    ASSESSMENT:  This is a 89 y.o. male with:    *Suspected B-cell non-Hodgkin lymphoma, CD20 positive involving the distal ileum  The patient began experiencing abdominal discomfort and diarrhea in early September after eating what he felt was some bad fish.    He presented to the emergency department on 9/20/2023.    CT imaging of the abdomen and pelvis was performed showing an enhancing mass in the cecum measuring about 3 cm with adjacent enlarged pericecal lymph nodes measuring up to 1.6 cm.  Mild sigmoid diverticulosis was noted as well as mild wall thickening involving the mid sigmoid colon.  He was referred for colonoscopy which was performed by Dr. Ary Conway with colorectal surgery on 10/18/2023.  This showed multiple diverticula in sigmoid colon, 1 nonbleeding polyp in the distal ileum at 15 mm.    Biopsies show involvement by atypical CD20 positive B-cell non-Hodgkin lymphoma with extensive crush artifact and a Ki-67 that is estimated greater than 80%.  However, definitive morphology was not able to be visualized and rebiopsy has been suggested.  The initial biopsy was quite difficult per Dr. Conway given the location  A PET scan was performed on 11/8/2023.  There is some subtle hypermetabolic activity at the terminal ileum, maximal SUV 3.8 and an area measuring approximately 3 cm.  Adjacent mesenteric nodes are not significantly hypermetabolic and are either photopenic or have blood pool level activity.  Nonspecific low-level activity at the distal esophagus and GE junction with a maximal SUV of 4.3.  No obvious mass on CT imaging.  Spleen size normal.  No hypermetabolic cavity there.  Blood pool activity mediastinal lymphadenopathy     *C. difficile diarrhea  Symptoms initially  resolved after 2 courses of oral vancomycin  Now with recurrent symptoms  CT imaging 11/12/2023 with thick-walled appearance of the colon from the splenic flexure to the rectum with most significant involvement in the rectosigmoid colon, adjacent pericolonic soft tissue stranding consistent with colitis.  Mass in the cecum less apparent compared to 9/20/2023.  Dr. Pretty with ID evaluated him.  He recommends pursuing a longer vancomycin taper for approximately 5 weeks of therapy.     *Aortic stenosis     *History of prostate cancer  Status post radical prostatectomy at approximately age 60     PLAN:   No treatment for GI lymphoma at this time until his C. difficile diarrhea has resolved  He has minimal disease on PET imaging.  It would be very difficult to obtain more tissue for more accurate diagnosis.  Surgery probably is not in his best interest.  I'll discuss with Dr. Conway. We can consider rituximab alone.  Risk of GI perforation treating aggressive lymphomas of the GI tract but the activity on the PET scan doesn't suggest that it's extremely aggressive.  I'll arrange f/u in the office with me in a few weeks    Discussed with the patient and his daughter at the bedside. Will sign off and arrange f/u with me in a few weeks.     Justin Berry MD

## 2023-11-14 LAB
ANION GAP SERPL CALCULATED.3IONS-SCNC: 6.6 MMOL/L (ref 5–15)
BASOPHILS # BLD MANUAL: 0.05 10*3/MM3 (ref 0–0.2)
BASOPHILS NFR BLD MANUAL: 1 % (ref 0–1.5)
BUN SERPL-MCNC: 12 MG/DL (ref 8–23)
BUN/CREAT SERPL: 19.4 (ref 7–25)
CALCIUM SPEC-SCNC: 7.8 MG/DL (ref 8.2–9.6)
CHLORIDE SERPL-SCNC: 108 MMOL/L (ref 98–107)
CO2 SERPL-SCNC: 21.4 MMOL/L (ref 22–29)
CREAT SERPL-MCNC: 0.62 MG/DL (ref 0.76–1.27)
DEPRECATED RDW RBC AUTO: 43.1 FL (ref 37–54)
EGFRCR SERPLBLD CKD-EPI 2021: 88 ML/MIN/1.73
ERYTHROCYTE [DISTWIDTH] IN BLOOD BY AUTOMATED COUNT: 12.6 % (ref 12.3–15.4)
GEN 5 2HR TROPONIN T REFLEX: 33 NG/L
GLUCOSE BLDC GLUCOMTR-MCNC: 81 MG/DL (ref 70–130)
GLUCOSE BLDC GLUCOMTR-MCNC: 85 MG/DL (ref 70–130)
GLUCOSE SERPL-MCNC: 90 MG/DL (ref 65–99)
HCT VFR BLD AUTO: 32.9 % (ref 37.5–51)
HGB BLD-MCNC: 10.9 G/DL (ref 13–17.7)
LYMPHOCYTES # BLD MANUAL: 1.21 10*3/MM3 (ref 0.7–3.1)
LYMPHOCYTES NFR BLD MANUAL: 23.7 % (ref 5–12)
MAGNESIUM SERPL-MCNC: 2.1 MG/DL (ref 1.7–2.3)
MCH RBC QN AUTO: 30.6 PG (ref 26.6–33)
MCHC RBC AUTO-ENTMCNC: 33.1 G/DL (ref 31.5–35.7)
MCV RBC AUTO: 92.4 FL (ref 79–97)
MONOCYTES # BLD: 1.16 10*3/MM3 (ref 0.1–0.9)
NEUTROPHILS # BLD AUTO: 2.47 10*3/MM3 (ref 1.7–7)
NEUTROPHILS NFR BLD MANUAL: 50.5 % (ref 42.7–76)
NRBC BLD AUTO-RTO: 0 /100 WBC (ref 0–0.2)
PLAT MORPH BLD: NORMAL
PLATELET # BLD AUTO: 155 10*3/MM3 (ref 140–450)
PMV BLD AUTO: 10.5 FL (ref 6–12)
POTASSIUM SERPL-SCNC: 3.8 MMOL/L (ref 3.5–5.2)
QT INTERVAL: 475 MS
QTC INTERVAL: 459 MS
RBC # BLD AUTO: 3.56 10*6/MM3 (ref 4.14–5.8)
RBC MORPH BLD: NORMAL
SMUDGE CELLS BLD QL SMEAR: ABNORMAL
SODIUM SERPL-SCNC: 136 MMOL/L (ref 136–145)
TROPONIN T DELTA: 0 NG/L
TROPONIN T SERPL HS-MCNC: 33 NG/L
VARIANT LYMPHS NFR BLD MANUAL: 24.7 % (ref 19.6–45.3)
WBC NRBC COR # BLD: 4.89 10*3/MM3 (ref 3.4–10.8)

## 2023-11-14 PROCEDURE — 85025 COMPLETE CBC W/AUTO DIFF WBC: CPT | Performed by: INTERNAL MEDICINE

## 2023-11-14 PROCEDURE — 84484 ASSAY OF TROPONIN QUANT: CPT | Performed by: STUDENT IN AN ORGANIZED HEALTH CARE EDUCATION/TRAINING PROGRAM

## 2023-11-14 PROCEDURE — 80048 BASIC METABOLIC PNL TOTAL CA: CPT | Performed by: INTERNAL MEDICINE

## 2023-11-14 PROCEDURE — 94799 UNLISTED PULMONARY SVC/PX: CPT

## 2023-11-14 PROCEDURE — 85007 BL SMEAR W/DIFF WBC COUNT: CPT | Performed by: INTERNAL MEDICINE

## 2023-11-14 PROCEDURE — 99214 OFFICE O/P EST MOD 30 MIN: CPT | Performed by: INTERNAL MEDICINE

## 2023-11-14 PROCEDURE — 25010000002 MORPHINE PER 10 MG: Performed by: NURSE PRACTITIONER

## 2023-11-14 PROCEDURE — 82948 REAGENT STRIP/BLOOD GLUCOSE: CPT

## 2023-11-14 PROCEDURE — 93010 ELECTROCARDIOGRAM REPORT: CPT | Performed by: INTERNAL MEDICINE

## 2023-11-14 PROCEDURE — 93005 ELECTROCARDIOGRAM TRACING: CPT | Performed by: STUDENT IN AN ORGANIZED HEALTH CARE EDUCATION/TRAINING PROGRAM

## 2023-11-14 PROCEDURE — 83735 ASSAY OF MAGNESIUM: CPT | Performed by: NURSE PRACTITIONER

## 2023-11-14 PROCEDURE — 25010000002 ENOXAPARIN PER 10 MG: Performed by: INTERNAL MEDICINE

## 2023-11-14 RX ORDER — MORPHINE SULFATE 2 MG/ML
2 INJECTION, SOLUTION INTRAMUSCULAR; INTRAVENOUS EVERY 4 HOURS PRN
Status: DISCONTINUED | OUTPATIENT
Start: 2023-11-14 | End: 2023-11-15

## 2023-11-14 RX ORDER — NITROGLYCERIN 0.4 MG/1
0.4 TABLET SUBLINGUAL
Status: DISCONTINUED | OUTPATIENT
Start: 2023-11-14 | End: 2023-11-16 | Stop reason: HOSPADM

## 2023-11-14 RX ORDER — MORPHINE SULFATE 2 MG/ML
2 INJECTION, SOLUTION INTRAMUSCULAR; INTRAVENOUS ONCE
Status: COMPLETED | OUTPATIENT
Start: 2023-11-14 | End: 2023-11-14

## 2023-11-14 RX ORDER — MORPHINE SULFATE 2 MG/ML
INJECTION, SOLUTION INTRAMUSCULAR; INTRAVENOUS
Status: ACTIVE
Start: 2023-11-14 | End: 2023-11-14

## 2023-11-14 RX ADMIN — ACETAMINOPHEN 650 MG: 325 TABLET, FILM COATED ORAL at 03:36

## 2023-11-14 RX ADMIN — ENOXAPARIN SODIUM 40 MG: 100 INJECTION SUBCUTANEOUS at 20:48

## 2023-11-14 RX ADMIN — Medication 10 ML: at 20:48

## 2023-11-14 RX ADMIN — MORPHINE SULFATE 2 MG: 2 INJECTION, SOLUTION INTRAMUSCULAR; INTRAVENOUS at 06:05

## 2023-11-14 RX ADMIN — Medication 10 ML: at 10:14

## 2023-11-14 RX ADMIN — SUCRALFATE 1 G: 1 TABLET ORAL at 10:13

## 2023-11-14 RX ADMIN — ROSUVASTATIN CALCIUM 10 MG: 10 TABLET, FILM COATED ORAL at 20:48

## 2023-11-14 RX ADMIN — VANCOMYCIN HYDROCHLORIDE 125 MG: 125 CAPSULE ORAL at 18:41

## 2023-11-14 RX ADMIN — NITROGLYCERIN 0.4 MG: 0.4 TABLET SUBLINGUAL at 03:25

## 2023-11-14 RX ADMIN — FAMOTIDINE 20 MG: 20 TABLET ORAL at 10:13

## 2023-11-14 RX ADMIN — VANCOMYCIN HYDROCHLORIDE 125 MG: 125 CAPSULE ORAL at 13:43

## 2023-11-14 RX ADMIN — FAMOTIDINE 20 MG: 20 TABLET ORAL at 18:42

## 2023-11-14 RX ADMIN — VANCOMYCIN HYDROCHLORIDE 125 MG: 125 CAPSULE ORAL at 00:17

## 2023-11-14 RX ADMIN — NITROGLYCERIN 0.4 MG: 0.4 TABLET SUBLINGUAL at 05:36

## 2023-11-14 NOTE — PLAN OF CARE
Goal Outcome Evaluation:         Cardiology consult in. No pain. Weaned to room air. Pall. Consult in and saw patient. Up to bathroom. No complaints. Family at bedside. Safety maintained. Order in that patient can shower if someone with him.  Brittany boyd

## 2023-11-14 NOTE — PLAN OF CARE
"Goal Outcome Evaluation:      Patient resting in bed at this time after receiving Morphine and sublinqual nitro for c/o severe chest pain. VSS, oxygen placed at 3ltrs. for comfort and cardiac protocol. His family is aware of the change in condition overnight and are currently at his bedside. He remains alert, oriented, and able to make his needs known at this time. Patient and family \"do not want any surgical intervention\", \"just pain management\" for him.              "

## 2023-11-14 NOTE — CASE MANAGEMENT/SOCIAL WORK
Discharge Planning Assessment  [facility]     Patient Name: Vicente Delgado  MRN: [id number]  Today's Date: 11/14/2023    Admit Date: 11/12/2023    Plan: Return back to [facility]   Discharge Needs Assessment       Row Name 11/14/23 1441       Living Environment    People in Home alone    Current Living Arrangements independent living facility;apartment    Potentially Unsafe Housing Conditions none    Primary Care Provided by self    Provides Primary Care For no one    Family Caregiver if Needed child(tracie), adult    Quality of Family Relationships helpful;involved;supportive    Able to Return to Prior Arrangements yes       Resource/Environmental Concerns    Resource/Environmental Concerns none    Transportation Concerns none       Transition Planning    Patient/Family Anticipates Transition to home    Patient/Family Anticipated Services at Transition none    Transportation Anticipated family or friend will provide       Discharge Needs Assessment    Readmission Within the Last 30 Days current reason for admission unrelated to previous admission    Equipment Currently Used at Home walker, rolling;other (see comments)  hearing aids    Concerns to be Addressed no discharge needs identified;denies needs/concerns at this time    Anticipated Changes Related to Illness none    Equipment Needed After Discharge none    Provided Post Acute Provider List? N/A    Provided Post Acute Provider Quality & Resource List? N/A                   Discharge Plan       Row Name 11/14/23 1442       Plan    Plan Return back to [facility]    Patient/Family in Agreement with Plan yes    Plan Comments IMM delivered. [facility] met with the patient and his daughters Sobeida and Sandra with the patient’s verbal permission. The patient confirmed the information on his face sheet was accurate. The patient states that he lives in his 1st floor apartment alone. The patient confirmed his PCP is Mechelle CAROLINA. The  patient denies any history of HH/SNF. The patient states he uses a rollator for long distances and hearing aids. The patient is enrolled in the [facility] M2[facility] program. The patient denies any steps to enter his apartment or within his apartment. The patient denies any needs and states his family can transport him at discharge. CCP to follow. CD, CSW.                  Continued Care and Services - Admitted Since 11/12/2023    Coordination has not been started for this encounter.       Expected Discharge Date and Time       Expected Discharge Date Expected Discharge Time    Nov 16, 2023            Demographic Summary       Row Name 11/14/23 1437       General Information    Admission Type inpatient    Arrived From emergency department    Required Notices Provided Important Message from Medicare    Referral Source admission list    Reason for Consult discharge planning    Preferred Language English                   Functional Status       Row Name 11/14/23 1441       Functional Status    Usual Activity Tolerance good    Current Activity Tolerance good       Functional Status, IADL    Medications independent    Meal Preparation independent    Housekeeping independent    Laundry independent    Shopping independent       Mental Status    General Appearance WDL WDL       Mental Status Summary    Recent Changes in Mental Status/Cognitive Functioning no changes       Employment/    Employment Status retired                   Psychosocial    No documentation.                  Abuse/Neglect    No documentation.                  Legal    No documentation.                  Substance Abuse    No documentation.                  Patient Forms    No documentation.

## 2023-11-14 NOTE — PLAN OF CARE
Problem: Malnutrition  Goal: Improved Nutritional Intake  Outcome: Ongoing, Progressing   Goal Outcome Evaluation:  Encouraged PO intake  RD to follow

## 2023-11-14 NOTE — PROGRESS NOTES
"Nutrition Services    Patient Name:  Vicente Delgado  YOB: 1928  MRN: [id number]  Admit Date:  11/12/2023    Assessment Date:  11/14/23    Summary: Nutrition follow up/verbal consult from [facility]    Patient's daughters with questions about diet.  Discussed in depth with them and patient and answered questions.    Patient recently diagnosed with lymphoma.  Patient reports poor appetite currently.  Doesn't eat as much as he used to.  He reports a 16 lb (9%) weight loss since 9/5 (2 months).  0-75% at meals per chart PO data.    Patient meets ASPEN/AND criteria for nutrition diagnosis of moderate malnutrition of acute illness based on: nutrition focused physical exam, weight loss, decreased PO intake.    RD encouraged PO intake.  Will continue to follow closely.    CLINICAL NUTRITION ASSESSMENT      Reason for Assessment Follow-up Protocol, Other: verbal consult from [facility]     Diagnosis/Problem Cdiff colitis      Anthropometrics        Current Height  Current Weight  BMI kg/m2 Height: 170.2 cm (67\")  Weight: 73.2 kg (161 lb 6 oz) (11/12/23 1345)  Body mass index is 25.28 kg/m².   Adjusted BMI (if applicable)    BMI Category Overweight (25 - 29.9)   Ideal Body Weight (IBW) 148 lb (67.3 kg)   Usual Body Weight (UBW) 161-177 lb   Weight Trend Loss, Amount/Timeframe: 16 lb (9%) x 2 months   --  Estimated/Assessed Needs        Current Weight  Weight: 73.2 kg (161 lb 6 oz) (11/12/23 1345)       Energy Requirements    Weight for Calculation 161 lb (73.2 kg)   Method for Estimation  30-35 kcal/kg   EST Needs (kcal/day) 8361-4409       Protein Requirements    Weight for Calculation 161 lb (73.2 kg)   EST Protein Needs (g/kg) 1.2 - 1.5 gm/kg   EST Daily Needs (g/day)        Fluid Requirements     Method for Estimation 1 mL/kcal    EST Needs (mL/day)      Labs       Pertinent Labs    Results from last 7 days   Lab Units 11/14/23  0340 11/13/23  0625 11/12/23  0541 11/12/23  0215   SODIUM mmol/L 136 137 138 136 "   POTASSIUM mmol/L 3.8 3.8 4.1 4.0   CHLORIDE mmol/L 108* 105 104 101   CO2 mmol/L 21.4* 20.7* 23.0 21.9*   BUN mg/dL 12 14 18 17   CREATININE mg/dL 0.62* 0.72* 0.81 0.82   CALCIUM mg/dL 7.8* 8.1* 8.4 9.0   BILIRUBIN mg/dL  --   --   --  0.8   ALK PHOS U/L  --   --   --  78   ALT (SGPT) U/L  --   --   --  32   AST (SGOT) U/L  --   --   --  28   GLUCOSE mg/dL 90 89 95 99     Results from last 7 days   Lab Units 11/14/23 0340 11/12/23 0541 11/12/23 0215   MAGNESIUM mg/dL 2.1  --   --    HEMOGLOBIN g/dL 10.9*   < > 14.5   HEMATOCRIT % 32.9*   < > 42.8   WBC 10*3/mm3 4.89   < > 13.75*   ALBUMIN g/dL  --   --  4.1    < > = values in this interval not displayed.     Results from last 7 days   Lab Units 11/14/23  0340 11/13/23  0625 11/12/23  0541 11/12/23 0215   INR   --   --  1.13*  --    PLATELETS 10*3/mm3 155 166 184 208     COVID19   Date Value Ref Range Status   11/12/2023 Not Detected Not Detected - Ref. Range Final     Lab Results   Component Value Date    HGBA1C 6.00 (H) 11/15/2022          Medications           Scheduled Medications aspirin, 81 mg, Oral, Daily  enoxaparin, 40 mg, Subcutaneous, Nightly  famotidine, 20 mg, Oral, BID AC  lisinopril, 10 mg, Oral, Q24H  rosuvastatin, 10 mg, Oral, Nightly  sodium chloride, 10 mL, Intravenous, Q12H  sucralfate, 1 g, Oral, TID AC  vancomycin, 125 mg, Oral, Q6H  [START ON 11/22/2023] vancomycin, 125 mg, Oral, Q12H  [START ON 11/29/2023] vancomycin, 125 mg, Oral, Q24H  [START ON 12/6/2023] vancomycin, 125 mg, Oral, Every Other Day       Infusions Pharmacy Consult,        PRN Medications   acetaminophen **OR** acetaminophen **OR** acetaminophen    Morphine    nitroglycerin    ondansetron    Pharmacy Consult    sodium chloride    sodium chloride    sodium chloride     Physical Findings          General Findings alert, loss of muscle mass, loss of subcutaneous fat, oriented, overweight, room air   Oral/Mouth Cavity dental appliance   Edema  no edema   Gastrointestinal  abdominal pain, diarrhea, last bowel movement: 11/14   Skin  skin intact   Tubes/Drains/Lines none   NFPE See Malnutrition Severity Assessment, Date Completed: 11/14   --  Malnutrition Severity Assessment      Patient meets criteria for : Moderate (non-severe) Malnutrition  Malnutrition Type (last 8 hours)       Malnutrition Severity Assessment       Row Name 11/14/23 1554       Malnutrition Severity Assessment    Malnutrition Type Acute Disease or Injury - Related Malnutrition      Row Name 11/14/23 1554       Insufficient Energy Intake     Insufficient Energy Intake Findings Moderate    Insufficient Energy Intake  <75% of est. energy requirement for > or equal to 3 months      Row Name 11/14/23 1554       Unintentional Weight Loss     Unintentional Weight Loss Findings Severe    Unintentional Weight Loss  Weight loss greater than 7.5% in three months      Row Name 11/14/23 1554       Muscle Loss    Loss of Muscle Mass Findings Moderate    [place] Region Moderate - slight depression    Clavicle Bone Region Moderate - some protrusion in females, visible in males    Dorsal Hand Region Moderate - slight depression      Row Name 11/14/23 4842       Fat Loss    Subcutaneous Fat Loss Findings Severe    Orbital Region  Severe - pronounced hollowness/depression, dark circles, loose saggy skin      Row Name 11/14/23 0778       Criteria Met (Must meet criteria for severity in at least 2 of these categories: M Wasting, Fat Loss, Fluid, Secondary Signs, Wt. Status, Intake)    Patient meets criteria for  Moderate (non-severe) Malnutrition                     Current Nutrition Orders & Evaluation of Intake       Oral Nutrition     Food Allergies NKFA   Current PO Diet Diet: Cardiac Diets; Healthy Heart (2-3 Na+); Texture: Regular Texture (IDDSI 7); Fluid Consistency: Thin (IDDSI 0)   Supplement n/a   PO Evaluation     % PO Intake 0-75%    Factors Affecting Intake: abdominal pain, diarrhea   --  PES STATEMENT / NUTRITION  DIAGNOSIS      Nutrition Dx Problem  Problem: Malnutrition (moderate)  Etiology: Medical Diagnosis - lymphoma, Cdiff colitis and Factors Affecting Nutrition - decreased appetite    Signs/Symptoms: PO intake, NFPE Results, Unintended Weight Change, and Report/Observation     NUTRITION INTERVENTION / PLAN OF CARE      Intervention Goal(s) Maintain nutrition status, Reduce/improve symptoms, Disease management/therapy, Tolerate PO , Increase intake, and No significant weight loss         RD Intervention/Action Encourage intake, Continue to monitor, Care plan reviewed, and Education regarding: Cdiff, heart healthy, GERD   --      Prescription/Orders:       PO Diet       Supplements       Enteral Nutrition       Parenteral Nutrition    New Prescription Ordered? No changes at this time   --      Monitor/Evaluation Per protocol, PO intake, Pertinent labs, Weight, GI status, Symptoms   Discharge Plan/Needs Pending clinical course   --    RD to follow per protocol.      Electronically signed by:  Altagracia Jenkins RD  11/14/23 15:52 EST
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
## 2023-11-14 NOTE — PROGRESS NOTES
Name: Vicente Delgado ADMIT: 2023   : 1928  PCP: Mechelle Landa APRN    MRN: [id number] LOS: 1 days   AGE/SEX: 89 y.o. male  ROOM: [facility]     Subjective   Subjective   Rapid response called overnight due to chest pain.  EKG without evidence of ischemic changes.  Troponin 33 and flat.  At time of evaluation today, patient states that chest pain has resolved.  He feels like it may have been reflux related.  Has had diarrhea x3 this morning.  No other complaints today.     Objective   Objective   Vital Signs  Temp:  [97.5 °F (36.4 °C)-98.1 °F (36.7 °C)] 98.1 °F (36.7 °C)  Heart Rate:  [55-75] 75  Resp:  [18-20] 18  BP: (103-114)/(51-65) 114/62  SpO2:  [94 %-98 %] 97 %  on  Flow (L/min):  [2-3] 3;   Device (Oxygen Therapy): room air  Body mass index is 25.28 kg/m².  Physical Exam  HEENT: Extraocular movements intact, Scleras no icterus  Neck: Supple, no JVD  Cardiovascular: Regular rate and rhythm with normal S1 and S2  Respiratory: Fairly clear to auscultation bilaterally with no wheezes  GI: Soft, nontender, bowel sounds present , mildly hyperactive  Neurologic: Grossly nonfocal, no facial asymmetry    Results Review     I reviewed the patient's new clinical results.  Results from last 7 days   Lab Units 23   WBC 10*3/mm3 4.89 7.40 14.75* 13.75*   HEMOGLOBIN g/dL 10.9* 12.2* 13.3 14.5   PLATELETS 10*3/mm3 155 166 184 208     Results from last 7 days   Lab Units 23   SODIUM mmol/L 136 137 138 136   POTASSIUM mmol/L 3.8 3.8 4.1 4.0   CHLORIDE mmol/L 108* 105 104 101   CO2 mmol/L 21.4* 20.7* 23.0 21.9*   BUN mg/dL 12 14 18 17   CREATININE mg/dL 0.62* 0.72* 0.81 0.82   GLUCOSE mg/dL 90 89 95 99   EGFR mL/min/1.73 88.0 84.1 81.2 80.9     Results from last 7 days   Lab Units 23  0215   ALBUMIN g/dL 4.1   BILIRUBIN mg/dL 0.8   ALK PHOS U/L 78   AST (SGOT) U/L 28   ALT (SGPT) U/L 32      Results from last 7 days   Lab Units 11/14/23  0340 11/13/23  0625 11/12/23  0541 11/12/23  0215   CALCIUM mg/dL 7.8* 8.1* 8.4 9.0   ALBUMIN g/dL  --   --   --  4.1   MAGNESIUM mg/dL 2.1  --   --   --      Results from last 7 days   Lab Units 11/12/23  0325   LACTATE mmol/L 1.7     Glucose   Date/Time Value Ref Range Status   11/14/2023 0537 85 70 - 130 mg/dL Final   11/14/2023 0325 81 70 - 130 mg/dL Final       No radiology results for the last day    I have personally reviewed all medications:  Scheduled Medications  aspirin, 81 mg, Oral, Daily  enoxaparin, 40 mg, Subcutaneous, Nightly  famotidine, 20 mg, Oral, BID AC  lisinopril, 10 mg, Oral, Q24H  rosuvastatin, 10 mg, Oral, Nightly  sodium chloride, 10 mL, Intravenous, Q12H  sucralfate, 1 g, Oral, TID AC  vancomycin, 125 mg, Oral, Q6H  [START ON 11/22/2023] vancomycin, 125 mg, Oral, Q12H  [START ON 11/29/2023] vancomycin, 125 mg, Oral, Q24H  [START ON 12/6/2023] vancomycin, 125 mg, Oral, Every Other Day    Infusions  Pharmacy Consult,   sodium chloride, 125 mL/hr, Last Rate: 125 mL/hr (11/13/23 1455)    Diet  Diet: Cardiac Diets; Healthy Heart (2-3 Na+); Texture: Regular Texture (IDDSI 7); Fluid Consistency: Thin (IDDSI 0)       Assessment/Plan     Active Hospital Problems    Diagnosis  POA    **Colitis [K52.9]  Yes    Recurrent Clostridium difficile diarrhea [A04.71]  Yes    Chronic heart failure with preserved ejection fraction (HFpEF) [I50.32]  Yes    Colitis due to Clostridium difficile [A04.72]  Yes    TAM (nonalcoholic steatohepatitis) [K75.81]  Yes    Lupus [M32.9]  Yes    Mass of colon [K63.89]  Yes    Paroxysmal atrial fibrillation [I48.0]  Yes    Gastroesophageal reflux disease with esophagitis [K21.00]  Yes    CAD (coronary artery disease) [I25.10]  Yes    HTN (hypertension) [I10]  Yes      Resolved Hospital Problems   No resolved problems to display.       89 y.o. male admitted with Colitis.    1.Acute recurrent C. difficile colitis, infectious  disease consulted on admission.  Plan is for 5-week taper of oral vancomycin.  ID has now signed off.  Closely monitor intake and output.  Oral intake has improved, so stopped IV fluids today.  Diarrhea seems to be improving.    2.  Non-Hodgkin's lymphoma, oncology consulted, patient to follow-up with Dr. Berry as an outpatient.  No treatment until C. difficile diarrhea has resolved.    3.  Coronary artery disease/paroxysmal atrial fibrillation/hypertension, continue with lisinopril and aspirin.  CP overnight, EKG without acute ischemic changes, troponin elevated but flat.  Given episode of chest pain last night and cardiac history, going to consult cardiology per family request.  Appreciate their recommendations.    4.  Hyperlipidemia, on Crestor at home which will be resumed.    5.  Anemia, Hgb 10.9 today.  Decreased from yesterday.  Labs seem somewhat diluted, as he also had drops in WBC and electrolytes.  No signs of active bleeding.  Repeat CBC with morning labs.  Monitor closely.    6.  HTN, BP well controlled.  Continue lisinopril.    7.  On Lovenox for DVT prophylaxis.    8.  CODE STATUS is  DNR/DNI.         Velia Aguilar MD  [facility]  11/14/23  14:45 EST

## 2023-11-14 NOTE — CODE DOCUMENTATION
"Patient Name:  Vicente Delgado  YOB: 1928  MRN:  [id number]  Admit Date:  11/12/2023    Visit Diagnoses:     ICD-10-CM ICD-9-CM   1. Clostridium difficile colitis  A04.72 008.45   2. Atypical chest pain  R07.89 786.59   3. Acute bilateral low back pain without sciatica  M54.50 724.2     338.19       Reason For Rapid: chest pain    RN Communicated With: primary rn      Rapid Outcome: remain on unit    Communication From Rapid Team: Primary rn spoke with daughter. Family and pt wish for medical management only and pain meds. Primary rn to update [facility]    Most Recent Vital Signs  Temp:  [97.8 °F (36.6 °C)-98.6 °F (37 °C)] 97.8 °F (36.6 °C)  Heart Rate:  [55-62] 55  Resp:  [18-20] 20  BP: (103-112)/(51-65) 110/51  SpO2:  [94 %-97 %] 94 %  on  Flow (L/min):  [2] 2;   Device (Oxygen Therapy): nasal cannula    Labs:  Results from last 7 days   Lab Units 11/12/23  0412   COVID19  Not Detected     Glucose   Date/Time Value Ref Range Status   11/14/2023 0537 85 70 - 130 mg/dL Final   11/14/2023 0325 81 70 - 130 mg/dL Final     No results found for: \"SITE\", \"ALLENTEST\", \"PHART\", \"JSA4VPK\", \"PO2ART\", \"ZRA5TNU\", \"BASEEXCESS\", \"W9CUPOHG\", \"HGBBG\", \"HCTABG\", \"OXYHEMOGLOBI\", \"METHHGBN\", \"CARBOXYHGB\", \"CO2CT\", \"BAROMETRIC\", \"MODALITY\", \"FIO2\"  Results from last 7 days   Lab Units 11/14/23  0340 11/13/23  0625 11/12/23  0541 11/12/23  0215   WBC 10*3/mm3 4.89 7.40 14.75* 13.75*   HEMOGLOBIN g/dL 10.9* 12.2* 13.3 14.5   PLATELETS 10*3/mm3 155 166 184 208     Results from last 7 days   Lab Units 11/14/23  0340 11/13/23  0625 11/12/23  0541 11/12/23  0215   SODIUM mmol/L 136 137 138 136   POTASSIUM mmol/L 3.8 3.8 4.1 4.0   CHLORIDE mmol/L 108* 105 104 101   CO2 mmol/L 21.4* 20.7* 23.0 21.9*   BUN mg/dL 12 14 18 17   CREATININE mg/dL 0.62* 0.72* 0.81 0.82   GLUCOSE mg/dL 90 89 95 99   ALBUMIN g/dL  --   --   --  4.1   BILIRUBIN mg/dL  --   --   --  0.8   ALK PHOS U/L  --   --   --  78   AST (SGOT) U/L  --   --   --  28 " "  ALT (SGPT) U/L  --   --   --  32   Estimated Creatinine Clearance: 73.8 mL/min (A) (by C-G formula based on SCr of 0.62 mg/dL (L)).  Results from last 7 days   Lab Units 11/14/23  0340 11/12/23  0412 11/12/23  0215   HSTROP T ng/L 33* 28* 36*     Results from last 7 days   Lab Units 11/12/23  0325   LACTATE mmol/L 1.7     No results found for: \"STREPPNEUAG\", \"LEGANTIGENUR\"    Results from last 7 days   Lab Units 11/12/23  0412   ADENOVIRUS DETECTION BY PCR  Not Detected   CORONAVIRUS 229E  Not Detected   CORONAVIRUS HKU1  Not Detected   CORONAVIRUS NL63  Not Detected   CORONAVIRUS OC43  Not Detected   HUMAN METAPNEUMOVIRUS  Not Detected   HUMAN RHINOVIRUS/ENTEROVIRUS  Not Detected   INFLUENZA B PCR  Not Detected   PARAINFLUENZA 1  Not Detected   PARAINFLUENZA VIRUS 2  Not Detected   PARAINFLUENZA VIRUS 3  Not Detected   PARAINFLUENZA VIRUS 4  Not Detected   BORDETELLA PERTUSSIS PCR  Not Detected   CHLAMYDOPHILA PNEUMONIAE PCR  Not Detected   MYCOPLAMA PNEUMO PCR  Not Detected   INFLUENZA A PCR  Not Detected   RSV, PCR  Not Detected       NIH Stroke Scale:                                                         Please refer to full rapid documentation on summary page under Index / Code Timeline   "

## 2023-11-15 LAB
ALBUMIN SERPL-MCNC: 3.2 G/DL (ref 3.5–5.2)
ALBUMIN/GLOB SERPL: 1.3 G/DL
ALP SERPL-CCNC: 57 U/L (ref 39–117)
ALT SERPL W P-5'-P-CCNC: 24 U/L (ref 1–41)
ANION GAP SERPL CALCULATED.3IONS-SCNC: 5.4 MMOL/L (ref 5–15)
AST SERPL-CCNC: 25 U/L (ref 1–40)
BASOPHILS # BLD AUTO: 0.02 10*3/MM3 (ref 0–0.2)
BASOPHILS NFR BLD AUTO: 0.4 % (ref 0–1.5)
BILIRUB SERPL-MCNC: 0.3 MG/DL (ref 0–1.2)
BUN SERPL-MCNC: 10 MG/DL (ref 8–23)
BUN/CREAT SERPL: 14.5 (ref 7–25)
CALCIUM SPEC-SCNC: 8.4 MG/DL (ref 8.2–9.6)
CHLORIDE SERPL-SCNC: 107 MMOL/L (ref 98–107)
CO2 SERPL-SCNC: 25.6 MMOL/L (ref 22–29)
CREAT SERPL-MCNC: 0.69 MG/DL (ref 0.76–1.27)
DEPRECATED RDW RBC AUTO: 41 FL (ref 37–54)
EGFRCR SERPLBLD CKD-EPI 2021: 85.2 ML/MIN/1.73
EOSINOPHIL # BLD AUTO: 0.03 10*3/MM3 (ref 0–0.4)
EOSINOPHIL NFR BLD AUTO: 0.7 % (ref 0.3–6.2)
ERYTHROCYTE [DISTWIDTH] IN BLOOD BY AUTOMATED COUNT: 12.5 % (ref 12.3–15.4)
GLOBULIN UR ELPH-MCNC: 2.4 GM/DL
GLUCOSE SERPL-MCNC: 86 MG/DL (ref 65–99)
HCT VFR BLD AUTO: 35.3 % (ref 37.5–51)
HGB BLD-MCNC: 11.8 G/DL (ref 13–17.7)
IMM GRANULOCYTES # BLD AUTO: 0.01 10*3/MM3 (ref 0–0.05)
IMM GRANULOCYTES NFR BLD AUTO: 0.2 % (ref 0–0.5)
LYMPHOCYTES # BLD AUTO: 1.82 10*3/MM3 (ref 0.7–3.1)
LYMPHOCYTES NFR BLD AUTO: 40.9 % (ref 19.6–45.3)
MCH RBC QN AUTO: 30.6 PG (ref 26.6–33)
MCHC RBC AUTO-ENTMCNC: 33.4 G/DL (ref 31.5–35.7)
MCV RBC AUTO: 91.5 FL (ref 79–97)
MONOCYTES # BLD AUTO: 0.87 10*3/MM3 (ref 0.1–0.9)
MONOCYTES NFR BLD AUTO: 19.6 % (ref 5–12)
NEUTROPHILS NFR BLD AUTO: 1.7 10*3/MM3 (ref 1.7–7)
NEUTROPHILS NFR BLD AUTO: 38.2 % (ref 42.7–76)
NRBC BLD AUTO-RTO: 0 /100 WBC (ref 0–0.2)
PLATELET # BLD AUTO: 169 10*3/MM3 (ref 140–450)
PMV BLD AUTO: 10 FL (ref 6–12)
POTASSIUM SERPL-SCNC: 3.8 MMOL/L (ref 3.5–5.2)
PROT SERPL-MCNC: 5.6 G/DL (ref 6–8.5)
RBC # BLD AUTO: 3.86 10*6/MM3 (ref 4.14–5.8)
SODIUM SERPL-SCNC: 138 MMOL/L (ref 136–145)
WBC NRBC COR # BLD: 4.45 10*3/MM3 (ref 3.4–10.8)

## 2023-11-15 PROCEDURE — 80053 COMPREHEN METABOLIC PANEL: CPT | Performed by: STUDENT IN AN ORGANIZED HEALTH CARE EDUCATION/TRAINING PROGRAM

## 2023-11-15 PROCEDURE — 99214 OFFICE O/P EST MOD 30 MIN: CPT | Performed by: NURSE PRACTITIONER

## 2023-11-15 PROCEDURE — 25010000002 ENOXAPARIN PER 10 MG: Performed by: INTERNAL MEDICINE

## 2023-11-15 PROCEDURE — 85025 COMPLETE CBC W/AUTO DIFF WBC: CPT | Performed by: INTERNAL MEDICINE

## 2023-11-15 RX ADMIN — ENOXAPARIN SODIUM 40 MG: 100 INJECTION SUBCUTANEOUS at 21:14

## 2023-11-15 RX ADMIN — VANCOMYCIN HYDROCHLORIDE 125 MG: 125 CAPSULE ORAL at 23:37

## 2023-11-15 RX ADMIN — SUCRALFATE 1 G: 1 TABLET ORAL at 06:58

## 2023-11-15 RX ADMIN — Medication 10 ML: at 09:08

## 2023-11-15 RX ADMIN — FAMOTIDINE 20 MG: 20 TABLET ORAL at 17:05

## 2023-11-15 RX ADMIN — VANCOMYCIN HYDROCHLORIDE 125 MG: 125 CAPSULE ORAL at 00:28

## 2023-11-15 RX ADMIN — VANCOMYCIN HYDROCHLORIDE 125 MG: 125 CAPSULE ORAL at 17:05

## 2023-11-15 RX ADMIN — VANCOMYCIN HYDROCHLORIDE 125 MG: 125 CAPSULE ORAL at 12:08

## 2023-11-15 RX ADMIN — LISINOPRIL 10 MG: 10 TABLET ORAL at 09:08

## 2023-11-15 RX ADMIN — VANCOMYCIN HYDROCHLORIDE 125 MG: 125 CAPSULE ORAL at 06:58

## 2023-11-15 RX ADMIN — SUCRALFATE 1 G: 1 TABLET ORAL at 12:08

## 2023-11-15 RX ADMIN — FAMOTIDINE 20 MG: 20 TABLET ORAL at 06:58

## 2023-11-15 RX ADMIN — ROSUVASTATIN CALCIUM 10 MG: 10 TABLET, FILM COATED ORAL at 21:14

## 2023-11-15 RX ADMIN — Medication 10 ML: at 21:15

## 2023-11-15 NOTE — PROGRESS NOTES
HOSPITAL FOLLOW UP NOTE    Patient Name: Vicente Delgado  Patient : 1928        Date of Service:11/15/23  Provider of Service: GE Rizo  Place of Service: [facility]  Referral Provider: Kristopher Middleton*          Follow Up: chest pain    Interval Hx: no further chest pain, VSS        OBJECTIVE  Temp:  [97 °F (36.1 °C)-98.1 °F (36.7 °C)] 97.3 °F (36.3 °C)  Heart Rate:  [62-75] 62  Resp:  [16-18] 16  BP: (114-138)/(53-79) 122/53   No intake or output data in the 24 hours ending 11/15/23 0820  Body mass index is 25.28 kg/m².      23  0152 23  1345   Weight: 73.5 kg (162 lb) 73.2 kg (161 lb 6 oz)         Physical Exam:     General Appearance:    Alert, cooperative, in no acute distress   Head:    Normocephalic, without obvious abnormality, atraumatic   Eyes:            Conjunctivae and sclerae normal, no   icterus, no pallor, corneas clear, PERRLA   Neck:   No adenopathy, supple, trachea midline, no thyromegaly, no   carotid bruit, no JVD   Lungs:     Clear to auscultation,respirations regular, even and unlabored    Heart:    Regular rhythm and normal rate, normal S1 and S2, no murmur, no gallop, no rub, no click   Chest Wall:    No abnormalities observed   Abdomen:     Normal bowel sounds, no masses, no organomegaly, soft, nontender, nondistended, no guarding, no rebound  tenderness   Extremities:   Moves all extremities well, no edema, no cyanosis, no redness   Pulses:   Pulses palpable and equal bilaterally.          CURRENT MEDS    Scheduled Meds:aspirin, 81 mg, Oral, Daily  enoxaparin, 40 mg, Subcutaneous, Nightly  famotidine, 20 mg, Oral, BID AC  lisinopril, 10 mg, Oral, Q24H  rosuvastatin, 10 mg, Oral, Nightly  sodium chloride, 10 mL, Intravenous, Q12H  sucralfate, 1 g, Oral, TID AC  vancomycin, 125 mg, Oral, Q6H  [START ON 2023] vancomycin, 125 mg, Oral, Q12H  [START ON 2023] vancomycin, 125 mg, Oral, Q24H  [START ON 2023] vancomycin,  125 mg, Oral, Every Other Day      Continuous Infusions:Pharmacy Consult,           Lab Review:   Results from last 7 days   Lab Units 11/15/23  0547 11/14/23  0340 11/12/23  0541 11/12/23  0215   SODIUM mmol/L 138 136   < > 136   POTASSIUM mmol/L 3.8 3.8   < > 4.0   CHLORIDE mmol/L 107 108*   < > 101   CO2 mmol/L 25.6 21.4*   < > 21.9*   BUN mg/dL 10 12   < > 17   CREATININE mg/dL 0.69* 0.62*   < > 0.82   GLUCOSE mg/dL 86 90   < > 99   CALCIUM mg/dL 8.4 7.8*   < > 9.0   AST (SGOT) U/L 25  --   --  28   ALT (SGPT) U/L 24  --   --  32    < > = values in this interval not displayed.         Results from last 7 days   Lab Units 11/15/23  0547 11/14/23  0340   WBC 10*3/mm3 4.45 4.89   HEMOGLOBIN g/dL 11.8* 10.9*   HEMATOCRIT % 35.3* 32.9*   PLATELETS 10*3/mm3 169 155     Results from last 7 days   Lab Units 11/12/23  0541   INR  1.13*     Results from last 7 days   Lab Units 11/14/23  0340   MAGNESIUM mg/dL 2.1     2D Echocardiogram 09/212023:    Left ventricular systolic function is normal. Calculated left ventricular EF = 65.1%    Left ventricular wall thickness is consistent with moderate concentric hypertrophy.    Left ventricular diastolic function is consistent with (grade I) impaired relaxation.    Moderate to severe aortic valve stenosis is present.    Estimated right ventricular systolic pressure from tricuspid regurgitation is normal (<35 mmHg).      ASSESSMENT & PLAN    Colitis    HTN (hypertension)    CAD (coronary artery disease)    Gastroesophageal reflux disease with esophagitis    Paroxysmal atrial fibrillation    Lupus    TAM (nonalcoholic steatohepatitis)    Mass of colon    Colitis due to Clostridium difficile    Chronic heart failure with preserved ejection fraction (HFpEF)    Recurrent Clostridium difficile diarrhea    1.  C. Difficile  2.  Chest pain: resolved  3.  Non-Hodgkin's lymphoma  4.  Aortic valve stenosis: Moderate to severe  5.  Hypertension: controlled    Stable from a cardiac  standpoint.  He has had no further chest pain.  We will sign off at this time.    Maki Mcmanus, APRN  11/15/23

## 2023-11-15 NOTE — PLAN OF CARE
Goal Outcome Evaluation:            Pt is alert and oriented. Pt received meds per orders. Pt on RA. Son remained at bedside throughout the night. VSS. Pt resting. NAD. Plan of care ongoing.

## 2023-11-15 NOTE — PROGRESS NOTES
Name: Vicente Delgado ADMIT: 2023   : 1928  PCP: Mechelle Landa APRN    MRN: [id number] LOS: 2 days   AGE/SEX: 89 y.o. male  ROOM: [facility]     Subjective   Subjective   Patient appears comfortable and in no apparent distress.  Son at bedside.  Feeling much better today.  Still with mucus in stool.  Tolerating intake.  Denies chest pain or shortness of breath.  Discussed with RN.       Objective   Objective   Vital Signs  Temp:  [97 °F (36.1 °C)-98.1 °F (36.7 °C)] 97.3 °F (36.3 °C)  Heart Rate:  [62-75] 73  Resp:  [16-18] 16  BP: (114-152)/(53-79) 152/79  SpO2:  [96 %-98 %] 98 %  on   ;   Device (Oxygen Therapy): room air  Body mass index is 25.28 kg/m².    Physical Exam  Constitutional:       General: He is not in acute distress.     Appearance: He is not toxic-appearing.   Cardiovascular:      Rate and Rhythm: Normal rate.      Heart sounds: Murmur heard.   Pulmonary:      Effort: Pulmonary effort is normal.      Breath sounds: Normal breath sounds.   Abdominal:      General: Bowel sounds are normal. There is no distension.      Palpations: Abdomen is soft.      Tenderness: There is no abdominal tenderness. There is no guarding.   Musculoskeletal:      Right lower leg: No edema.      Left lower leg: No edema.   Skin:     General: Skin is warm and dry.   Neurological:      Mental Status: He is alert.       Results Review     I reviewed the patient's new clinical results.  Results from last 7 days   Lab Units 11/15/23  0547 11/14/23  0340 11/13/23  0625 11/12/23  0541   WBC 10*3/mm3 4.45 4.89 7.40 14.75*   HEMOGLOBIN g/dL 11.8* 10.9* 12.2* 13.3   PLATELETS 10*3/mm3 169 155 166 184     Results from last 7 days   Lab Units 11/15/23  0547 11/14/23  0340 11/13/23  0625 11/12/23  0541   SODIUM mmol/L 138 136 137 138   POTASSIUM mmol/L 3.8 3.8 3.8 4.1   CHLORIDE mmol/L 107 108* 105 104   CO2 mmol/L 25.6 21.4* 20.7* 23.0   BUN mg/dL 10 12 14 18   CREATININE mg/dL 0.69* 0.62* 0.72* 0.81   GLUCOSE mg/dL 86  90 89 95   EGFR mL/min/1.73 85.2 88.0 84.1 81.2     Results from last 7 days   Lab Units 11/15/23  0547 11/12/23  0215   ALBUMIN g/dL 3.2* 4.1   BILIRUBIN mg/dL 0.3 0.8   ALK PHOS U/L 57 78   AST (SGOT) U/L 25 28   ALT (SGPT) U/L 24 32     Results from last 7 days   Lab Units 11/15/23  0547 11/14/23  0340 11/13/23  0625 11/12/23  0541 11/12/23  0215   CALCIUM mg/dL 8.4 7.8* 8.1* 8.4 9.0   ALBUMIN g/dL 3.2*  --   --   --  4.1   MAGNESIUM mg/dL  --  2.1  --   --   --      Results from last 7 days   Lab Units 11/12/23  0325   LACTATE mmol/L 1.7     Glucose   Date/Time Value Ref Range Status   11/14/2023 0537 85 70 - 130 mg/dL Final   11/14/2023 0325 81 70 - 130 mg/dL Final       No radiology results for the last day    I have personally reviewed all medications:  Scheduled Medications  aspirin, 81 mg, Oral, Daily  enoxaparin, 40 mg, Subcutaneous, Nightly  famotidine, 20 mg, Oral, BID AC  lisinopril, 10 mg, Oral, Q24H  rosuvastatin, 10 mg, Oral, Nightly  sodium chloride, 10 mL, Intravenous, Q12H  sucralfate, 1 g, Oral, TID AC  vancomycin, 125 mg, Oral, Q6H  [START ON 11/22/2023] vancomycin, 125 mg, Oral, Q12H  [START ON 11/29/2023] vancomycin, 125 mg, Oral, Q24H  [START ON 12/6/2023] vancomycin, 125 mg, Oral, Every Other Day    Infusions  Pharmacy Consult,     Diet  Diet: Cardiac Diets; Healthy Heart (2-3 Na+); Texture: Regular Texture (IDDSI 7); Fluid Consistency: Thin (IDDSI 0)    I have personally reviewed:  [x]  Laboratory   []  Microbiology   []  Radiology   [x]  EKG/Telemetry  []  Cardiology/Vascular   []  Pathology    []  Records       Assessment/Plan     Active Hospital Problems    Diagnosis  POA    **Colitis [K52.9]  Yes    Recurrent Clostridium difficile diarrhea [A04.71]  Yes    Chronic heart failure with preserved ejection fraction (HFpEF) [I50.32]  Yes    Colitis due to Clostridium difficile [A04.72]  Yes    TAM (nonalcoholic steatohepatitis) [K75.81]  Yes    Lupus [M32.9]  Yes    Mass of colon [K63.89]   Yes    Paroxysmal atrial fibrillation [I48.0]  Yes    Gastroesophageal reflux disease with esophagitis [K21.00]  Yes    CAD (coronary artery disease) [I25.10]  Yes    HTN (hypertension) [I10]  Yes      Resolved Hospital Problems   No resolved problems to display.       89 y.o. male admitted with Colitis.    Acute recurrent C. difficile colitis.  Patient disease evaluated and signed off recommending slow taper 5-week oral Vancocin.  Tolerating intake.  No diarrhea today.  Still with complaints of mucus per rectum.  Electrolytes unremarkable.    Non-Hodgkin's lymphoma.  Oncology recommend no treatment until C. difficile diarrhea resolved.    CAD /paroxysmal atrial fibrillation /hypertension: Denies chest pain today.  Cardiology evaluated and signed off with no concern for ischemic changes on EKG and no cardiac work-up necessary at this time.  Suspect noncardiac chest pain--most likely GI source.  Continue Carafate and famotidine for now.  Blood pressure acceptable acutely.  Plan to continue lisinopril given appropriate renal function.    Crestor continued for HLD.    Anemia: Serum hemoglobin improved with trend.  Suspect recent hemodilution following IV fluids for above (discontinued).  Denies melena or hematochezia.      Pharmacy to dose Lovenox for DVT prophylaxis.  Limited code (no CPR, no intubation).  Discussed with patient and family & RN.  Anticipate discharge home with family in 1-2 days.      GE Arellano  [facility]  11/15/23  11:12 EST

## 2023-11-15 NOTE — PLAN OF CARE
Goal Outcome Evaluation:  Plan of Care Reviewed With: patient, family           Outcome Evaluation: Cont with PO Vanc.  Pt is feeling better but told [facility] that he did not want to go home too soon and come right back.  [facility] anticipates DC home in 1-2 days.  Stools are less often and more formed.  IV S/L. RA, VSS and NSR.

## 2023-11-16 ENCOUNTER — READMISSION MANAGEMENT (OUTPATIENT)
Dept: CALL CENTER | Facility: HOSPITAL | Age: 88
End: 2023-11-16
Payer: MEDICARE

## 2023-11-16 VITALS
RESPIRATION RATE: 16 BRPM | SYSTOLIC BLOOD PRESSURE: 118 MMHG | TEMPERATURE: 98.4 F | DIASTOLIC BLOOD PRESSURE: 57 MMHG | WEIGHT: 161.38 LBS | OXYGEN SATURATION: 97 % | BODY MASS INDEX: 25.33 KG/M2 | HEART RATE: 66 BPM | HEIGHT: 67 IN

## 2023-11-16 LAB
ALBUMIN SERPL-MCNC: 3.5 G/DL (ref 3.5–5.2)
ALBUMIN/GLOB SERPL: 1.3 G/DL
ALP SERPL-CCNC: 61 U/L (ref 39–117)
ALT SERPL W P-5'-P-CCNC: 22 U/L (ref 1–41)
ANION GAP SERPL CALCULATED.3IONS-SCNC: 9.9 MMOL/L (ref 5–15)
AST SERPL-CCNC: 28 U/L (ref 1–40)
BASOPHILS # BLD AUTO: 0.02 10*3/MM3 (ref 0–0.2)
BASOPHILS NFR BLD AUTO: 0.5 % (ref 0–1.5)
BILIRUB SERPL-MCNC: 0.3 MG/DL (ref 0–1.2)
BUN SERPL-MCNC: 11 MG/DL (ref 8–23)
BUN/CREAT SERPL: 15.7 (ref 7–25)
CALCIUM SPEC-SCNC: 8.8 MG/DL (ref 8.2–9.6)
CHLORIDE SERPL-SCNC: 105 MMOL/L (ref 98–107)
CO2 SERPL-SCNC: 25.1 MMOL/L (ref 22–29)
CREAT SERPL-MCNC: 0.7 MG/DL (ref 0.76–1.27)
DEPRECATED RDW RBC AUTO: 44.8 FL (ref 37–54)
EGFRCR SERPLBLD CKD-EPI 2021: 84.9 ML/MIN/1.73
EOSINOPHIL # BLD AUTO: 0.03 10*3/MM3 (ref 0–0.4)
EOSINOPHIL NFR BLD AUTO: 0.7 % (ref 0.3–6.2)
ERYTHROCYTE [DISTWIDTH] IN BLOOD BY AUTOMATED COUNT: 13 % (ref 12.3–15.4)
GLOBULIN UR ELPH-MCNC: 2.6 GM/DL
GLUCOSE SERPL-MCNC: 91 MG/DL (ref 65–99)
HCT VFR BLD AUTO: 37.8 % (ref 37.5–51)
HGB BLD-MCNC: 12.4 G/DL (ref 13–17.7)
IMM GRANULOCYTES # BLD AUTO: 0.01 10*3/MM3 (ref 0–0.05)
IMM GRANULOCYTES NFR BLD AUTO: 0.2 % (ref 0–0.5)
LYMPHOCYTES # BLD AUTO: 1.73 10*3/MM3 (ref 0.7–3.1)
LYMPHOCYTES NFR BLD AUTO: 42.7 % (ref 19.6–45.3)
MCH RBC QN AUTO: 30.6 PG (ref 26.6–33)
MCHC RBC AUTO-ENTMCNC: 32.8 G/DL (ref 31.5–35.7)
MCV RBC AUTO: 93.3 FL (ref 79–97)
MONOCYTES # BLD AUTO: 0.81 10*3/MM3 (ref 0.1–0.9)
MONOCYTES NFR BLD AUTO: 20 % (ref 5–12)
NEUTROPHILS NFR BLD AUTO: 1.45 10*3/MM3 (ref 1.7–7)
NEUTROPHILS NFR BLD AUTO: 35.9 % (ref 42.7–76)
NRBC BLD AUTO-RTO: 0 /100 WBC (ref 0–0.2)
PLATELET # BLD AUTO: 186 10*3/MM3 (ref 140–450)
PMV BLD AUTO: 10.6 FL (ref 6–12)
POTASSIUM SERPL-SCNC: 3.7 MMOL/L (ref 3.5–5.2)
PROT SERPL-MCNC: 6.1 G/DL (ref 6–8.5)
RBC # BLD AUTO: 4.05 10*6/MM3 (ref 4.14–5.8)
SODIUM SERPL-SCNC: 140 MMOL/L (ref 136–145)
WBC NRBC COR # BLD: 4.05 10*3/MM3 (ref 3.4–10.8)

## 2023-11-16 PROCEDURE — 80053 COMPREHEN METABOLIC PANEL: CPT | Performed by: STUDENT IN AN ORGANIZED HEALTH CARE EDUCATION/TRAINING PROGRAM

## 2023-11-16 PROCEDURE — 85025 COMPLETE CBC W/AUTO DIFF WBC: CPT | Performed by: INTERNAL MEDICINE

## 2023-11-16 RX ORDER — VANCOMYCIN HYDROCHLORIDE 125 MG/1
CAPSULE ORAL
Qty: 51 CAPSULE | Refills: 0 | Status: SHIPPED | OUTPATIENT
Start: 2023-11-16 | End: 2023-12-13

## 2023-11-16 RX ORDER — VANCOMYCIN HYDROCHLORIDE 125 MG/1
125 CAPSULE ORAL EVERY 12 HOURS
Qty: 14 CAPSULE | Refills: 0 | Status: SHIPPED | OUTPATIENT
Start: 2023-11-22 | End: 2023-11-16

## 2023-11-16 RX ORDER — VANCOMYCIN HYDROCHLORIDE 125 MG/1
125 CAPSULE ORAL EVERY 24 HOURS
Qty: 7 CAPSULE | Refills: 0 | Status: SHIPPED | OUTPATIENT
Start: 2023-11-29 | End: 2023-11-16

## 2023-11-16 RX ORDER — VANCOMYCIN HYDROCHLORIDE 125 MG/1
125 CAPSULE ORAL EVERY OTHER DAY
Qty: 7 CAPSULE | Refills: 0 | Status: SHIPPED | OUTPATIENT
Start: 2023-12-06 | End: 2023-11-16

## 2023-11-16 RX ADMIN — LISINOPRIL 10 MG: 10 TABLET ORAL at 08:23

## 2023-11-16 RX ADMIN — SUCRALFATE 1 G: 1 TABLET ORAL at 11:04

## 2023-11-16 RX ADMIN — SUCRALFATE 1 G: 1 TABLET ORAL at 06:48

## 2023-11-16 RX ADMIN — Medication 10 ML: at 08:24

## 2023-11-16 RX ADMIN — VANCOMYCIN HYDROCHLORIDE 125 MG: 125 CAPSULE ORAL at 11:04

## 2023-11-16 RX ADMIN — VANCOMYCIN HYDROCHLORIDE 125 MG: 125 CAPSULE ORAL at 06:48

## 2023-11-16 RX ADMIN — FAMOTIDINE 20 MG: 20 TABLET ORAL at 06:48

## 2023-11-16 NOTE — PLAN OF CARE
Goal Outcome Evaluation:         Pt is alert and oriented. Isolation precaution still in place. Pt made no c/o pain. PT daughter was at bedside. Pt resting comfortably at this time. Pt on RA. VSS. NAD.

## 2023-11-16 NOTE — DISCHARGE SUMMARY
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 shigelloides Not Detected     Salmonella Not Detected     Vibrio Not Detected     Vibrio cholerae Not Detected     Yersinia enterocolitica Not Detected     Enteroaggregative E. coli (EAEC) Not Detected     Enteropathogenic E. coli (EPEC) Not Detected     Enterotoxigenic E. coli (ETEC) lt/st Not Detected     Shiga-like toxin-producing E. coli (STEC) stx1/stx2 Not Detected     Shigella/Enteroinvasive E. coli (EIEC) Not Detected     Cryptosporidium Not Detected     Cyclospora cayetanensis Not Detected     Entamoeba histolytica Not Detected     Giardia lamblia Not Detected     Adenovirus F40/41 Not Detected     Astrovirus Not Detected     Norovirus GI/GII Not Detected     Rotavirus A Not Detected     Sapovirus (I, II, IV or V) Not Detected    Narrative:      If Aeromonas, Staphylococcus aureus or Bacillus cereus are suspected, please order [id number]: Stool Culture, Aeromonas, S aureus, B Cereus.    Single High Sensitivity Troponin T [[id number]]  (Abnormal) Collected: 11/12/23 0412    Specimen: Blood Updated: 11/12/23 0438     HS Troponin T 28 ng/L     Narrative:      High Sensitive Troponin T Reference Range:  <14.0 ng/L- Negative Female for AMI  <22.0 ng/L- Negative Male for AMI  >=14 - Abnormal Female indicating possible myocardial injury.  >=22 - Abnormal  Male indicating possible myocardial injury.   Clinicians would have to utilize clinical acumen, EKG, Troponin, and serial changes to determine if it is an Acute Myocardial Infarction or myocardial injury due to an underlying chronic condition.         Lactic Acid, Plasma [[id number]]  (Normal) Collected: 11/12/23 0325    Specimen: Blood Updated: 11/12/23 0352     Lactate 1.7 mmol/L     Urinalysis, Microscopic Only - Urine, Clean Catch [[id number]] Collected: 11/12/23 0323    Specimen: Urine, Clean Catch Updated: 11/12/23 0342     RBC, UA 0-2 /HPF      WBC, UA 0-2 /HPF      Bacteria, UA None Seen /HPF      Squamous Epithelial Cells, UA 0-2 /HPF      Hyaline Casts, UA None Seen /LPF      Methodology Automated Microscopy    Urinalysis With Microscopic If Indicated (No Culture) - Urine, Clean Catch [[id number]]  (Abnormal) Collected: 11/12/23 0323    Specimen: Urine, Clean Catch Updated: 11/12/23 0342     Color, UA Dark Yellow     Appearance, UA Clear     pH, UA <=5.0     Specific Gravity, UA 1.024     Glucose, UA Negative     Ketones, UA Trace     Bilirubin, UA Negative     Blood, UA Trace     Protein, UA Negative     Leuk Esterase, UA Negative     Nitrite, UA Negative     Urobilinogen, UA 0.2 E.U./dL    [place] Draw [[id number]] Collected: 11/12/23 0215    Specimen: Blood Updated: 11/12/23 0330    Narrative:      The following orders were created for panel order [place] Draw.  Procedure                               Abnormality         Status                     ---------                               -----------         ------                     Green Top (Gel)[[id number]]                                  Final result               Lavender Top[[id number]]                                     Final result               Gold Top - SST[[id number]]                                   Final result               Light Blue Top[[id number]]                                   Final result                 Please view results for these  tests on the individual orders.    Green Top (Gel) [[id number]] Collected: 11/12/23 0215    Specimen: Blood Updated: 11/12/23 0330     Extra Tube Hold for add-ons.     Comment: Auto resulted.       Lavender Top [[id number]] Collected: 11/12/23 0215    Specimen: Blood Updated: 11/12/23 0330     Extra Tube hold for add-on     Comment: Auto resulted       Gold Top - SST [[id number]] Collected: 11/12/23 0215    Specimen: Blood Updated: 11/12/23 0330     Extra Tube Hold for add-ons.     Comment: Auto resulted.       Light Blue Top [[id number]] Collected: 11/12/23 0215    Specimen: Blood Updated: 11/12/23 0330     Extra Tube Hold for add-ons.     Comment: Auto resulted       Comprehensive Metabolic Panel [[id number]]  (Abnormal) Collected: 11/12/23 0215    Specimen: Blood Updated: 11/12/23 0245     Glucose 99 mg/dL      BUN 17 mg/dL      Creatinine 0.82 mg/dL      Sodium 136 mmol/L      Potassium 4.0 mmol/L      Chloride 101 mmol/L      CO2 21.9 mmol/L      Calcium 9.0 mg/dL      Total Protein 7.0 g/dL      Albumin 4.1 g/dL      ALT (SGPT) 32 U/L      AST (SGOT) 28 U/L      Alkaline Phosphatase 78 U/L      Total Bilirubin 0.8 mg/dL      Globulin 2.9 gm/dL      A/G Ratio 1.4 g/dL      BUN/Creatinine Ratio 20.7     Anion Gap 13.1 mmol/L      eGFR 80.9 mL/min/1.73     Narrative:      GFR Normal >60  Chronic Kidney Disease <60  Kidney Failure <15    The GFR formula is only valid for adults with stable renal function between ages 18 and 70.    Lipase [[id number]]  (Normal) Collected: 11/12/23 0215    Specimen: Blood Updated: 11/12/23 0245     Lipase 21 U/L     Single High Sensitivity Troponin T [[id number]]  (Abnormal) Collected: 11/12/23 0215    Specimen: Blood Updated: 11/12/23 0245     HS Troponin T 36 ng/L     Narrative:      High Sensitive Troponin T Reference Range:  <14.0 ng/L- Negative Female for AMI  <22.0 ng/L- Negative Male for AMI  >=14 - Abnormal Female indicating possible myocardial injury.  >=22 - Abnormal Male  "indicating possible myocardial injury.   Clinicians would have to utilize clinical acumen, EKG, Troponin, and serial changes to determine if it is an Acute Myocardial Infarction or myocardial injury due to an underlying chronic condition.         CBC & Differential [[id number]]  (Abnormal) Collected: 11/12/23 0215    Specimen: Blood Updated: 11/12/23 0226    Narrative:      The following orders were created for panel order CBC & Differential.  Procedure                               Abnormality         Status                     ---------                               -----------         ------                     CBC Auto Differential[[id number]]        Abnormal            Final result                 Please view results for these tests on the individual orders.    CBC Auto Differential [[id number]]  (Abnormal) Collected: 11/12/23 0215    Specimen: Blood Updated: 11/12/23 0226     WBC 13.75 10*3/mm3      RBC 4.67 10*6/mm3      Hemoglobin 14.5 g/dL      Hematocrit 42.8 %      MCV 91.6 fL      MCH 31.0 pg      MCHC 33.9 g/dL      RDW 12.8 %      RDW-SD 43.4 fl      MPV 10.0 fL      Platelets 208 10*3/mm3      Neutrophil % 76.3 %      Lymphocyte % 9.0 %      Monocyte % 14.2 %      Eosinophil % 0.1 %      Basophil % 0.1 %      Immature Grans % 0.3 %      Neutrophils, Absolute 10.49 10*3/mm3      Lymphocytes, Absolute 1.24 10*3/mm3      Monocytes, Absolute 1.95 10*3/mm3      Eosinophils, Absolute 0.01 10*3/mm3      Basophils, Absolute 0.02 10*3/mm3      Immature Grans, Absolute 0.04 10*3/mm3      nRBC 0.0 /100 WBC           /72 (BP Location: Right arm, Patient Position: Lying)   Pulse 66   Temp 97.3 °F (36.3 °C) (Oral)   Resp 16   Ht 170.2 cm (67\")   Wt 73.2 kg (161 lb 6 oz)   SpO2 99%   BMI 25.28 kg/m²     Discharge Exam:  General Appearance:    Alert, cooperative, no distress                          Head:    Normocephalic, without obvious abnormality, atraumatic                          Eyes:                "             Throat:   Lips, tongue, gums normal                          Neck:   Supple, symmetrical, trachea midline, no JVD                        Lungs:     Clear to auscultation bilaterally, respirations unlabored                Chest Wall:    No tenderness or deformity                        Heart:    Regular rate and rhythm, S1 and S2 normal, no murmur,no  Rub  or gallop                  Abdomen:     Soft, non-tender, bowel sounds active, no masses, no organomegaly                  Extremities:   Extremities normal, atraumatic, no cyanosis or edema                             Skin:   Skin is warm and dry,  no rashes or palpable lesions                  Neurologic:   no focal deficits noted     Disposition:  Home    Activity as tolerated    Diet as tolerated  Diet Order   Procedures    Diet: Cardiac Diets; Healthy Heart (2-3 Na+); Texture: Regular Texture (IDDSI 7); Fluid Consistency: Thin (IDDSI 0)       Patient Instructions:      Discharge Medications        New Medications        Instructions Start Date   vancomycin 125 MG capsule  Commonly known as: VANCOCIN   125 mg, Oral, Every 6 Hours Scheduled      vancomycin 125 MG capsule  Commonly known as: VANCOCIN   125 mg, Oral, Every 12 Hours   Start Date: November 22, 2023     vancomycin 125 MG capsule  Commonly known as: VANCOCIN   125 mg, Oral, Every 24 Hours   Start Date: November 29, 2023     vancomycin 125 MG capsule  Commonly known as: VANCOCIN   125 mg, Oral, Every Other Day   Start Date: December 6, 2023            Continue These Medications        Instructions Start Date   benazepril 20 MG tablet  Commonly known as: LOTENSIN   10 mg, Oral, Daily      ezetimibe 10 MG tablet  Commonly known as: ZETIA   10 mg, Oral, Daily      famotidine 10 MG tablet  Commonly known as: PEPCID   10 mg, Oral, Daily PRN      multivitamin with minerals tablet tablet   1 tablet, Oral, Daily      ICAPS AREDS 2 PO   1 tablet, Daily      rosuvastatin 10 MG tablet  Commonly known  as: CRESTOR   TAKE ONE TABLET BY MOUTH ONCE NIGHTLY             Future Appointments   Date Time Provider Department Center   12/4/2023  3:45 PM [facility] CT 2 [facility]   12/6/2023  9:20 AM LAB CHAIR 6 [facility]   12/6/2023  9:40 AM Justin Berry MD [facility]   1/16/2024 10:00 AM Maki Mcmanus APRN [facility]   1/16/2024  2:00 PM Mechelle Landa APRN [facility]      Follow-up Information       Mechelle Landa APRN Follow up in 1 week(s).    Specialty: Internal Medicine  Contact information:  [address] [place] [address]  [phone]                           Discharge Order (From admission, onward)       Start     Ordered    11/16/23 1254  Discharge patient  Once        Expected Discharge Date: 11/16/23   Discharge Disposition: Home or Self Care   Physician of Record for Attribution - Please select from Treatment Team: MEET TAN [[id number]]   Review needed by CMO to determine Physician of Record: No      Question Answer Comment   Physician of Record for Attribution - Please select from Treatment Team MEET TAN    Review needed by CMO to determine Physician of Record No        11/16/23 1257                    Total time spent discharging patient including evaluation,post hospitalization follow up,  medication and post hospitalization instructions and education total time exceeds 30 minutes.    Signed:  Meet Tan MD  11/16/2023  12:58 EST

## 2023-11-16 NOTE — OUTREACH NOTE
Prep Survey      Flowsheet Row Responses   [facility] patient discharged from? [place]   Is LACE score < 7 ? No   Eligibility [facility]   Date of Admission 11/12/23   Date of Discharge 11/16/23   Discharge Disposition Home or Self Care   Discharge diagnosis Colitis   Does the patient have one of the following disease processes/diagnoses(primary or secondary)? Other   Does the patient have Home health ordered? No   Is there a DME ordered? No   Prep survey completed? Yes            Brittany JARVIS - Registered Nurse

## 2023-11-17 ENCOUNTER — TELEPHONE (OUTPATIENT)
Dept: ONCOLOGY | Facility: CLINIC | Age: 88
End: 2023-11-17
Payer: MEDICARE

## 2023-11-17 ENCOUNTER — TRANSITIONAL CARE MANAGEMENT TELEPHONE ENCOUNTER (OUTPATIENT)
Dept: CALL CENTER | Facility: HOSPITAL | Age: 88
End: 2023-11-17
Payer: MEDICARE

## 2023-11-17 ENCOUNTER — TELEPHONE (OUTPATIENT)
Dept: SURGERY | Facility: CLINIC | Age: 88
End: 2023-11-17
Payer: MEDICARE

## 2023-11-17 NOTE — TELEPHONE ENCOUNTER
Pt called because he is unclear on how to take his ABX moving forward. I gave him the following instructions.  New Medications         Instructions Start Date   vancomycin 125 MG capsule  Commonly known as: VANCOCIN    125 mg, Oral, Every 6 Hours Scheduled        vancomycin 125 MG capsule  Commonly known as: VANCOCIN    125 mg, Oral, Every 12 Hours    Start Date: November 22, 2023      vancomycin 125 MG capsule  Commonly known as: VANCOCIN    125 mg, Oral, Every 24 Hours    Start Date: November 29, 2023      vancomycin 125 MG capsule  Commonly known as: VANCOCIN    125 mg, Oral, Every Other Day    Start Date: December 6, 2023   He still is unclear if his start date was while he was in the hospital. I advised him to contact his PCP for better direction. Dr. Berry did not prescribe the ABX and I do not want to give him incorrect information. Pt V/U.

## 2023-11-17 NOTE — OUTREACH NOTE
Call Center TCM Note      Flowsheet Row Responses   [facility] patient discharged from? [place]   Does the patient have one of the following disease processes/diagnoses(primary or secondary)? Other   TCM attempt successful? Yes   Call start time 1538   Call end time 1542   Discharge diagnosis Colitis   Meds reviewed with patient/caregiver? Yes   Is the patient having any side effects they believe may be caused by any medication additions or changes? No   Does the patient have all medications ordered at discharge? Yes   Is the patient taking all medications as directed (includes completed medication regime)? Yes   Does the patient have an appointment with their PCP within 7-14 days of discharge? Yes   Has home health visited the patient within 72 hours of discharge? N/A   Psychosocial issues? No   Did the patient receive a copy of their discharge instructions? Yes   Nursing interventions Reviewed instructions with patient   What is the patient's perception of their health status since discharge? Improving   Is the patient/caregiver able to teach back signs and symptoms related to disease process for when to call PCP? Yes   Is the patient/caregiver able to teach back signs and symptoms related to disease process for when to call 911? Yes   Is the patient/caregiver able to teach back the hierarchy of who to call/visit for symptoms/problems? PCP, Specialist, Home health nurse, Urgent Care, ED, 911 Yes   TCM call completed? Yes   Call end time 1542   Would this patient benefit from a Referral to Amb Social Work? No   Is the patient interested in additional calls from an ambulatory ? No            Flor Woo LPN    11/17/2023, 15:42 EST

## 2023-11-17 NOTE — OUTREACH NOTE
Call Center TCM Note      Flowsheet Row Responses   [facility] patient discharged from? [place]   Does the patient have one of the following disease processes/diagnoses(primary or secondary)? Other   TCM attempt successful? No   Unsuccessful attempts Attempt 1            Flor Woo LPN    11/17/2023, 13:02 EST

## 2023-11-17 NOTE — TELEPHONE ENCOUNTER
Spoke with pt's son and pt, they will consult with oncology as well. Explained that pt will need to retest stool after course of treatment to make sure cdiff is gone.

## 2023-11-17 NOTE — TELEPHONE ENCOUNTER
Provider: Dr Berry  Caller: Vicente Delgado  Relationship to Patient: Self  Call Back Phone Number: [phone]  Reason for Call: Pt has confusion about is antibiotics for C Diff

## 2023-11-17 NOTE — TELEPHONE ENCOUNTER
Pt is confused about Vancomycin that he was prescribed at D/C. Pt had 23 doses while in the hospital and wants to make sure he should start another 23 doses today. Vanc was prescribed by Hospitalist and he has no way of contacting them to verify.  Rx:  vancomycin (VANCOCIN) 125 MG capsule [[id number]]    Order Details  Dose, Route, Frequency: As Directed   Dispense Quantity: 51 capsule Refills: 0 Fills remainin   Indications of Use: Clostridioides Difficile Colitis         Sig: Take 1 capsule by mouth Every 6 (Six) Hours for 23 DOSES, THEN 1 capsule Every 12 (Twelve) Hours for 7 days, THEN 1 capsule Daily for 7 days, THEN 1 capsule Every Other Day for 7 days. Indications: Colon Inflammation due to Clostridium Bacteria Overgrowth

## 2023-11-17 NOTE — CASE MANAGEMENT/SOCIAL WORK
Case Management Discharge Note      Final Note: Pt discharged home on 11/16.  AMARILIS Philippe RN    Provided Post Acute Provider List?: N/A  Provided Post Acute Provider Quality & Resource List?: N/A    Selected Continued Care - Discharged on 11/16/2023 Admission date: 11/12/2023 - Discharge disposition: Home or Self Care      Destination    No services have been selected for the patient.                Durable Medical Equipment    No services have been selected for the patient.                Dialysis/Infusion    No services have been selected for the patient.                Home Medical Care    No services have been selected for the patient.                Therapy    No services have been selected for the patient.                Community Resources    No services have been selected for the patient.                Community & DME    No services have been selected for the patient.                    Transportation Services  Private: Car    Final Discharge Disposition Code: 01 - home or self-care

## 2023-11-21 ENCOUNTER — PATIENT OUTREACH (OUTPATIENT)
Dept: CASE MANAGEMENT | Facility: OTHER | Age: 88
End: 2023-11-21
Payer: MEDICARE

## 2023-11-21 DIAGNOSIS — I10 PRIMARY HYPERTENSION: Chronic | ICD-10-CM

## 2023-11-21 DIAGNOSIS — A04.71 RECURRENT CLOSTRIDIUM DIFFICILE DIARRHEA: Primary | ICD-10-CM

## 2023-11-21 NOTE — OUTREACH NOTE
"AMBULATORY CASE MANAGEMENT NOTE    Name and Relationship of Patient/Support Person: Vicente Delgado \"Ed\" - Self    CCM Interim Update    Spoke with patient at this time regarding recent hospitalization, identified self and role.  Patient states he is doing better with his C Diff.  He reports that he is taking his medication as prescribed for treatment of C Diff.  Patient also reports that he has a rash on his buttocks that he believes is from moisture.  He states that he is using an ointment on his bottom that his RN daughter provided.  Advised patient to ensure area stays dry throughout the day, and to dry off well after showers as well, then apply ointment.  Also advised to bring rash up at upcoming PCP appt if it has not improved.  Patient verbalizes understanding.    Patient has a TCM appt with PCP next Friday 12/1.  Offered assistance with chronic disease management through CCM, patient declines, states he seems to be doing ok other than this recurring C Diff.  Patient has AWV scheduled and ACP/LW on file.  Patient denies any further needs, will close program.          Jannette CONTE  Ambulatory Case Management    11/21/2023, 11:30 EST  "

## 2023-12-01 ENCOUNTER — OFFICE VISIT (OUTPATIENT)
Dept: INTERNAL MEDICINE | Facility: CLINIC | Age: 88
End: 2023-12-01
Payer: MEDICARE

## 2023-12-01 VITALS
OXYGEN SATURATION: 100 % | BODY MASS INDEX: 25.27 KG/M2 | DIASTOLIC BLOOD PRESSURE: 84 MMHG | HEART RATE: 81 BPM | SYSTOLIC BLOOD PRESSURE: 134 MMHG | WEIGHT: 161 LBS | HEIGHT: 67 IN

## 2023-12-01 DIAGNOSIS — A04.72 C. DIFFICILE DIARRHEA: Primary | ICD-10-CM

## 2023-12-01 DIAGNOSIS — C83.30 DIFFUSE LARGE B-CELL LYMPHOMA, UNSPECIFIED BODY REGION: ICD-10-CM

## 2023-12-01 DIAGNOSIS — K21.00 GASTROESOPHAGEAL REFLUX DISEASE WITH ESOPHAGITIS WITHOUT HEMORRHAGE: ICD-10-CM

## 2023-12-04 ENCOUNTER — HOSPITAL ENCOUNTER (OUTPATIENT)
Dept: CT IMAGING | Facility: HOSPITAL | Age: 88
Discharge: HOME | End: 2023-12-04
Admitting: SURGERY
Payer: MEDICARE

## 2023-12-04 DIAGNOSIS — I70.213 ATHEROSCLEROSIS OF NATIVE ARTERY OF BOTH LOWER EXTREMITIES WITH INTERMITTENT CLAUDICATION: ICD-10-CM

## 2023-12-04 LAB — CREAT BLDA-MCNC: 0.9 MG/DL (ref 0.6–1.3)

## 2023-12-04 PROCEDURE — 25510000001 IOPAMIDOL PER 1 ML: Performed by: SURGERY

## 2023-12-04 PROCEDURE — 75635 CT ANGIO ABDOMINAL ARTERIES: CPT

## 2023-12-04 PROCEDURE — 82565 ASSAY OF CREATININE: CPT

## 2023-12-04 RX ADMIN — IOPAMIDOL 95 ML: 755 INJECTION, SOLUTION INTRAVENOUS at 16:15

## 2023-12-05 NOTE — PROGRESS NOTES
"[facility] GROUP OUTPATIENT FOLLOW UP CLINIC VISIT    REASON FOR FOLLOW-UP:    B-cell non-Hodgkin lymphoma, CD20 positive involving the distal ileum    HISTORY OF PRESENT ILLNESS:  Vicente Delgado is a 89 y.o. male who returns today for follow up of the above issue.      He has nearly completed a course of oral vancomycin for C. difficile infection.  He is very upset that he missed read some of the instructions but nonetheless he is almost done with his 5-week course.  He has occasional abdominal discomfort but overall his stool is much better and he denies any diarrhea at this point.  Energy level is otherwise excellent.    REVIEW OF SYSTEMS:  As per the HPI    PHYSICAL EXAMINATION:  Vitals:    12/06/23 1013   BP: 142/88   Pulse: 60   Resp: 16   Temp: 98.2 °F (36.8 °C)   TempSrc: Temporal   SpO2: 97%   Weight: 73.1 kg (161 lb 1.6 oz)   Height: 170.2 cm (67.01\")   PainSc: 0-No pain       General:  No acute distress, awake, alert and oriented  Skin:  Warm and dry, no visible rash  HEENT:  Normocephalic/atraumatic.  Hard of hearing.  Chest:  Normal respiratory effort  Extremities:  No visible clubbing, cyanosis, or edema  Neuro/psych:  Grossly nonfocal.  Normal mood and affect.       DIAGNOSTIC DATA:  Results for orders placed or performed in visit on 12/06/23   Comprehensive Metabolic Panel    Specimen: Blood   Result Value Ref Range    Glucose 81 65 - 99 mg/dL    BUN 18 8 - 23 mg/dL    Creatinine 0.82 0.76 - 1.27 mg/dL    Sodium 140 136 - 145 mmol/L    Potassium 4.4 3.5 - 5.2 mmol/L    Chloride 103 98 - 107 mmol/L    CO2 26.3 22.0 - 29.0 mmol/L    Calcium 8.9 8.2 - 9.6 mg/dL    Total Protein 6.8 6.0 - 8.5 g/dL    Albumin 4.0 3.5 - 5.2 g/dL    ALT (SGPT) 41 1 - 41 U/L    AST (SGOT) 53 (H) 1 - 40 U/L    Alkaline Phosphatase 63 39 - 117 U/L    Total Bilirubin 0.4 0.0 - 1.2 mg/dL    Globulin 2.8 gm/dL    A/G Ratio 1.4 g/dL    BUN/Creatinine Ratio 22.0 7.0 - 25.0    Anion Gap 10.7 5.0 - 15.0 mmol/L    eGFR 80.9 " >60.0 mL/min/1.73   Lactate Dehydrogenase    Specimen: Blood   Result Value Ref Range     135 - 225 U/L   CBC Auto Differential    Specimen: Blood   Result Value Ref Range    WBC 5.60 3.40 - 10.80 10*3/mm3    RBC 4.43 4.14 - 5.80 10*6/mm3    Hemoglobin 13.8 13.0 - 17.7 g/dL    Hematocrit 41.4 37.5 - 51.0 %    MCV 93.5 79.0 - 97.0 fL    MCH 31.2 26.6 - 33.0 pg    MCHC 33.3 31.5 - 35.7 g/dL    RDW 13.7 12.3 - 15.4 %    RDW-SD 47.2 37.0 - 54.0 fl    MPV 10.2 6.0 - 12.0 fL    Platelets 196 140 - 450 10*3/mm3    Neutrophil % 54.3 42.7 - 76.0 %    Lymphocyte % 27.7 19.6 - 45.3 %    Monocyte % 16.8 (H) 5.0 - 12.0 %    Eosinophil % 0.4 0.3 - 6.2 %    Basophil % 0.4 0.0 - 1.5 %    Immature Grans % 0.4 0.0 - 0.5 %    Neutrophils, Absolute 3.05 1.70 - 7.00 10*3/mm3    Lymphocytes, Absolute 1.55 0.70 - 3.10 10*3/mm3    Monocytes, Absolute 0.94 (H) 0.10 - 0.90 10*3/mm3    Eosinophils, Absolute 0.02 0.00 - 0.40 10*3/mm3    Basophils, Absolute 0.02 0.00 - 0.20 10*3/mm3    Immature Grans, Absolute 0.02 0.00 - 0.05 10*3/mm3    nRBC 0.0 0.0 - 0.2 /100 WBC     *Note: Due to a large number of results and/or encounters for the requested time period, some results have not been displayed. A complete set of results can be found in Results Review.       IMAGING:    NM PET/CT Skull Base to Mid Thigh (11/08/2023 11:14)     ASSESSMENT:    This is a 89 y.o. male with:    *B-cell non-Hodgkin lymphoma, CD20 positive involving the distal ileum  The patient began experiencing abdominal discomfort and diarrhea in early September after eating what he felt was some bad fish.    He presented to the emergency department on 9/20/2023.    CT imaging of the abdomen and pelvis was performed showing an enhancing mass in the cecum measuring about 3 cm with adjacent enlarged pericecal lymph nodes measuring up to 1.6 cm.  Mild sigmoid diverticulosis was noted as well as mild wall thickening involving the mid sigmoid colon.  He was referred for  colonoscopy which was performed by Dr. Ary Conway with colorectal surgery on 10/18/2023.  This showed multiple diverticula in sigmoid colon, 1 nonbleeding polyp in the distal ileum at 15 mm.    Biopsies show involvement by atypical CD20 positive B-cell non-Hodgkin lymphoma with extensive crush artifact and a Ki-67 that is estimated greater than 80%.  However, definitive morphology was not able to be visualized and rebiopsy has been suggested.  The initial biopsy was quite difficult per Dr. Conway given the location  A PET scan was performed on 11/8/2023.  There is some subtle hypermetabolic activity at the terminal ileum, maximal SUV 3.8 and an area measuring approximately 3 cm.  Adjacent mesenteric nodes are not significantly hypermetabolic and are either photopenic or have blood pool level activity.  Nonspecific low-level activity at the distal esophagus and GE junction with a maximal SUV of 4.3.  No obvious mass on CT imaging.  Spleen size normal.  No hypermetabolic cavity there.  Blood pool activity mediastinal lymphadenopathy     *C. difficile diarrhea  Symptoms initially resolved after 2 courses of oral vancomycin  Now with recurrent symptoms  CT imaging 11/12/2023 with thick-walled appearance of the colon from the splenic flexure to the rectum with most significant involvement in the rectosigmoid colon, adjacent pericolonic soft tissue stranding consistent with colitis.  Mass in the cecum less apparent compared to 9/20/2023.  Dr. Pretty with ID evaluated him.  He recommended pursuing a longer vancomycin taper for approximately 5 weeks of therapy.  Therapy nearly complete.  Symptoms essentially resolved.     *Aortic stenosis     *History of prostate cancer  Status post radical prostatectomy at approximately age 60     *Hearing loss    *Anxiety    PLAN:   He has minimal disease on PET imaging.  It would be very difficult to obtain more tissue for more accurate diagnosis.  Surgery probably is not in his best  interest.   Therefore, we have elected to treat with weekly rituximab 375 mg/m² x 4 doses.  I briefly outlined potential adverse effects for him and his daughter today including the possibility of infusion reactions and immunosuppression.  He and his daughter agree to proceed.  He will have an education session and we will obtain necessary baseline labs.  We will work to begin therapy in the next couple of weeks.  Follow-up with Dr. Pretty with ID should his C. difficile diarrhea return

## 2023-12-06 ENCOUNTER — OFFICE VISIT (OUTPATIENT)
Dept: ONCOLOGY | Facility: CLINIC | Age: 88
End: 2023-12-06
Payer: MEDICARE

## 2023-12-06 ENCOUNTER — LAB (OUTPATIENT)
Dept: LAB | Facility: HOSPITAL | Age: 88
End: 2023-12-06
Payer: MEDICARE

## 2023-12-06 VITALS
WEIGHT: 161.1 LBS | SYSTOLIC BLOOD PRESSURE: 142 MMHG | RESPIRATION RATE: 16 BRPM | OXYGEN SATURATION: 97 % | TEMPERATURE: 98.2 F | DIASTOLIC BLOOD PRESSURE: 88 MMHG | BODY MASS INDEX: 25.28 KG/M2 | HEIGHT: 67 IN | HEART RATE: 60 BPM

## 2023-12-06 DIAGNOSIS — C85.90 LYMPHOMA, UNSPECIFIED BODY REGION, UNSPECIFIED LYMPHOMA TYPE: ICD-10-CM

## 2023-12-06 DIAGNOSIS — C85.90 LYMPHOMA, UNSPECIFIED BODY REGION, UNSPECIFIED LYMPHOMA TYPE: Primary | ICD-10-CM

## 2023-12-06 PROBLEM — Z11.59 ENCOUNTER FOR SCREENING FOR OTHER VIRAL DISEASES: Status: ACTIVE | Noted: 2023-12-06

## 2023-12-06 LAB
ALBUMIN SERPL-MCNC: 4 G/DL (ref 3.5–5.2)
ALBUMIN/GLOB SERPL: 1.4 G/DL
ALP SERPL-CCNC: 63 U/L (ref 39–117)
ALT SERPL W P-5'-P-CCNC: 41 U/L (ref 1–41)
ANION GAP SERPL CALCULATED.3IONS-SCNC: 10.7 MMOL/L (ref 5–15)
AST SERPL-CCNC: 53 U/L (ref 1–40)
BASOPHILS # BLD AUTO: 0.02 10*3/MM3 (ref 0–0.2)
BASOPHILS NFR BLD AUTO: 0.4 % (ref 0–1.5)
BILIRUB SERPL-MCNC: 0.4 MG/DL (ref 0–1.2)
BUN SERPL-MCNC: 18 MG/DL (ref 8–23)
BUN/CREAT SERPL: 22 (ref 7–25)
CALCIUM SPEC-SCNC: 8.9 MG/DL (ref 8.2–9.6)
CHLORIDE SERPL-SCNC: 103 MMOL/L (ref 98–107)
CO2 SERPL-SCNC: 26.3 MMOL/L (ref 22–29)
CREAT SERPL-MCNC: 0.82 MG/DL (ref 0.76–1.27)
DEPRECATED RDW RBC AUTO: 47.2 FL (ref 37–54)
EGFRCR SERPLBLD CKD-EPI 2021: 80.9 ML/MIN/1.73
EOSINOPHIL # BLD AUTO: 0.02 10*3/MM3 (ref 0–0.4)
EOSINOPHIL NFR BLD AUTO: 0.4 % (ref 0.3–6.2)
ERYTHROCYTE [DISTWIDTH] IN BLOOD BY AUTOMATED COUNT: 13.7 % (ref 12.3–15.4)
GLOBULIN UR ELPH-MCNC: 2.8 GM/DL
GLUCOSE SERPL-MCNC: 81 MG/DL (ref 65–99)
HCT VFR BLD AUTO: 41.4 % (ref 37.5–51)
HGB BLD-MCNC: 13.8 G/DL (ref 13–17.7)
IMM GRANULOCYTES # BLD AUTO: 0.02 10*3/MM3 (ref 0–0.05)
IMM GRANULOCYTES NFR BLD AUTO: 0.4 % (ref 0–0.5)
LDH SERPL-CCNC: 175 U/L (ref 135–225)
LYMPHOCYTES # BLD AUTO: 1.55 10*3/MM3 (ref 0.7–3.1)
LYMPHOCYTES NFR BLD AUTO: 27.7 % (ref 19.6–45.3)
MCH RBC QN AUTO: 31.2 PG (ref 26.6–33)
MCHC RBC AUTO-ENTMCNC: 33.3 G/DL (ref 31.5–35.7)
MCV RBC AUTO: 93.5 FL (ref 79–97)
MONOCYTES # BLD AUTO: 0.94 10*3/MM3 (ref 0.1–0.9)
MONOCYTES NFR BLD AUTO: 16.8 % (ref 5–12)
NEUTROPHILS NFR BLD AUTO: 3.05 10*3/MM3 (ref 1.7–7)
NEUTROPHILS NFR BLD AUTO: 54.3 % (ref 42.7–76)
NRBC BLD AUTO-RTO: 0 /100 WBC (ref 0–0.2)
PLATELET # BLD AUTO: 196 10*3/MM3 (ref 140–450)
PMV BLD AUTO: 10.2 FL (ref 6–12)
POTASSIUM SERPL-SCNC: 4.4 MMOL/L (ref 3.5–5.2)
PROT SERPL-MCNC: 6.8 G/DL (ref 6–8.5)
RBC # BLD AUTO: 4.43 10*6/MM3 (ref 4.14–5.8)
SODIUM SERPL-SCNC: 140 MMOL/L (ref 136–145)
WBC NRBC COR # BLD AUTO: 5.6 10*3/MM3 (ref 3.4–10.8)

## 2023-12-06 PROCEDURE — 36415 COLL VENOUS BLD VENIPUNCTURE: CPT

## 2023-12-06 PROCEDURE — 80053 COMPREHEN METABOLIC PANEL: CPT

## 2023-12-06 PROCEDURE — 85025 COMPLETE CBC W/AUTO DIFF WBC: CPT

## 2023-12-06 PROCEDURE — 83615 LACTATE (LD) (LDH) ENZYME: CPT

## 2023-12-11 ENCOUNTER — OFFICE VISIT (OUTPATIENT)
Dept: ONCOLOGY | Facility: CLINIC | Age: 88
End: 2023-12-11
Payer: MEDICARE

## 2023-12-11 VITALS
SYSTOLIC BLOOD PRESSURE: 145 MMHG | RESPIRATION RATE: 16 BRPM | BODY MASS INDEX: 25.02 KG/M2 | DIASTOLIC BLOOD PRESSURE: 82 MMHG | WEIGHT: 159.4 LBS | HEIGHT: 67 IN | TEMPERATURE: 98.4 F | OXYGEN SATURATION: 97 % | HEART RATE: 65 BPM

## 2023-12-11 DIAGNOSIS — C85.90 LYMPHOMA, UNSPECIFIED BODY REGION, UNSPECIFIED LYMPHOMA TYPE: ICD-10-CM

## 2023-12-11 DIAGNOSIS — D47.Z1 LYMPHOPROLIFERATIVE DISORDER AFTER STEM CELL TRANSPLANTATION: ICD-10-CM

## 2023-12-11 DIAGNOSIS — Z11.59 ENCOUNTER FOR SCREENING FOR OTHER VIRAL DISEASES: ICD-10-CM

## 2023-12-11 DIAGNOSIS — T86.5 LYMPHOPROLIFERATIVE DISORDER AFTER STEM CELL TRANSPLANTATION: ICD-10-CM

## 2023-12-11 DIAGNOSIS — C83.00 SMALL B-CELL LYMPHOMA, UNSPECIFIED BODY REGION: Primary | ICD-10-CM

## 2023-12-11 PROCEDURE — 99214 OFFICE O/P EST MOD 30 MIN: CPT | Performed by: NURSE PRACTITIONER

## 2023-12-11 PROCEDURE — 1126F AMNT PAIN NOTED NONE PRSNT: CPT | Performed by: NURSE PRACTITIONER

## 2023-12-11 RX ORDER — ONDANSETRON 4 MG/1
4 TABLET, FILM COATED ORAL 3 TIMES DAILY PRN
Qty: 30 TABLET | Refills: 2 | Status: SHIPPED | OUTPATIENT
Start: 2023-12-11

## 2023-12-11 NOTE — PROGRESS NOTES
TREATMENT  PREPARATION    Vicente Delgado  [id number]  1/11/1928    Chief Complaint: Treatment preparation and needs assessment    History of present illness:  Vicente Delgado is a 89 y.o. year old male who is here today for treatment preparation and needs assessment.  The patient has been diagnosed with   Encounter Diagnosis   Name Primary?    Small B-cell lymphoma, unspecified body region Yes    and is scheduled to begin treatment with:     Oncology History:    Oncology/Hematology History   Lymphoma   12/6/2023 Initial Diagnosis    Lymphoma     12/15/2023 -  Chemotherapy    OP LYMPHOMA (CLL) RiTUXimab (Weekly X 4)         The current medication list and allergy list were reviewed and reconciled.     Past Medical History, Past Surgical History, Social History, Family History have been reviewed and are without significant changes except as mentioned.    Physical Exam:    Vitals:    12/11/23 1311   BP: 145/82   Pulse: 65   Resp: 16   Temp: 98.4 °F (36.9 °C)   SpO2: 97%     Vitals:    12/11/23 1311   PainSc: 0-No pain        ECOG score: 0             Physical Exam  HENT:      Head: Normocephalic and atraumatic.   Eyes:      Extraocular Movements: Extraocular movements intact.      Conjunctiva/sclera: Conjunctivae normal.   Pulmonary:      Effort: Pulmonary effort is normal. No respiratory distress.   Neurological:      General: No focal deficit present.      Mental Status: He is alert and oriented to person, place, and time.   Psychiatric:         Mood and Affect: Mood normal.         Behavior: Behavior normal.           NEEDS ASSESSMENTS    Genetics  The patient's new diagnosis and family history have been reviewed for genetic counseling needs. The patient will not be referred..     Psychosocial and Barriers to care  The patient has completed a PHQ-9 Depression Screening and the Distress Thermometer (DT) today.  PHQ-9 results show PHQ-2 Total Score: 0 PHQ-9 Total Score: PHQ-9 Total Score: 0     The patient scored  their distress today as Distress Level: 1 on a scale of 0-10 with 0 being no distress and 10 being extreme distress. Problems marked by the patient as being an issue for them within the last week include   .      Results were reviewed along with psychosocial resources offered by our cancer center.  Our Supportive Oncology team will be flagged for a score of 4 or above, and a same day call will be made for a score of 9 or 10.  A mental health referral is offered at that time. Patients who score less than 4 have been educated on our support services and can be referred to our  upon request.  The patient will not be referred to our .       Nutrition  The patient has completed the malnutrition screening today. They scored Malnutrition Screening Tool  Have you recently lost weight without trying?  If yes, how much weight have you lost?: 0--> No  Have you been eating poorly because of a decreased appetite?: 0--> No  MST score: 0   with a score of 0-1 meaning not at risk in a score of 2 or greater meaning at risk.  Patients with a score of 3 or higher will be referred to our oncology dietitian for support. Patients beginning at risk treatment regimens or who have dietary concerns will also be referred to our oncology dietitian. The patient will not be referred.    Functional Assessment  Persons who are age 70 or greater will be screened for qualification of a comprehensive geriatric assessment by our survivorship nurse practitioner.  Older adults with cancer face unique challenges. These may include an increased risk of drug reactions, financial burdens, and caregiver stress. The patient scored G8 Questionnaire  Has food intake declined over the past 3 months due to loss of appetite, digestive problems, chewing or swallowing difficulties?: No decrease in food intake  Weight loss during the last 3 months: No weight loss  Mobility: Goes out  Neuropsychological Problems: No psychological  "problems  Body Mass Index (BMI (weight in kg) / (height in m2)): BMI 23 and > 23  Takes more than 3 medications a day: Yes, takes more than 3 prescription drugs per day  Age: > 85 . Patients scoring 14 or lower will referred for an older adult functional assessment with the survivorship advanced practice registered nurse to ensure all needed support is provided as patients plan for their treatments. The patient will not be referred.    Intravenous Access Assessment  The patient and I discussed planned intravenous chemo/biotherapy as well as other IV treatments that are often needed throughout the course of treatment. These may include, but are not limited to blood transfusions, antibiotics, and IV hydration. Discussed that depending on selected treatment and vein assessment, patient may require venous access device (VAD) which could include but not limited to a Mediport or PICC line. Risks and benefits of VADs reviewed. The patient will be treated via PIV.    Reproductive/Sexual Activity   People should avoid becoming pregnant and should not get a partner pregnant while undergoing chemo/biotherapy.  People of childbearing age should use effective contraception during active therapy. The best recommendation for all people is to use a barrier method for a minimum of 1 week after the last infusion of chemo/biotherapy to prevent your partner being exposed to byproducts from treatment medications in bodily fluids. Effective contraception should be discussed with your oncology team to make sure it is safe to take based on your diagnosis. Possible options include oral contraceptives, barrier methods. Chemo/biotherapy can change your ability to reproduce children in the future.  There are options for fertility preservation. NOT APPLICABLE    Advanced Care Planning  Advance Care Planning   The patient and I discussed advanced care planning, \"Conversations that Matter\".   This service is offered for development of advance " directives with a certified ACP facilitator.  The patient does have an up-to-date advanced directive. This document is on file with our office. The patient is not interested in an appointment with one of our facilitators to create or update their advanced directives.    Have you reviewed your Advance Directive and is it valid for this stay?: No          Smoking cessation  Tobacco Use: High Risk (12/11/2023)    Patient History     Smoking Tobacco Use: Some Days     Smokeless Tobacco Use: Never     Passive Exposure: Yes       Patient and I discussed their tobacco use history. Referral will not be made for smoking cessation.      Palliative Care  When appropriate, the patient and I discussed the availability palliative care services and when appropriate Hospice care. Palliative care is not the same as Hospice care which was explained to the patient.The patient is not interested in additional information from our  on these services.    Survivorship   When appropriate, we discussed that we will refer the patient to survivorship clinic to discuss next steps following completion of planned treatment.  Reviewed this visit will include assessment of your physical, psychological, functional, and spiritual needs as a survivor and the need at attend this visit when scheduled.    TREATMENT EDUCATION    Today I met with the patient to discuss the chemo/biotherapy regimen recommended for treatment of Small B-cell lymphoma, unspecified body region  .  The patient was given explanation of treatment premed side effects including office policy that prohibits patients to drive if sedating medications are administered, MD explanation given regarding benefits, side effects, toxicities and goals of treatment.  The patient received a Chemotherapy/Biotherapy Plan Summary including diagnosis and explanation of specific treatment plan.    SIDE EFFECTS:  Common side effects were discussed with the patient and/or significant other.   Discussion included where applicable hair loss/discoloration, anemia/fatigue, infection/chills/fever, appetite, bleeding risk/precautions, constipation, diarrhea, mouth sores, taste alteration, loss of appetite, nausea/vomiting, peripheral neuropathy, skin/nail changes, rash, muscle aches/weakness, photosensitivity, weight gain/loss, hearing loss, dizziness, menopausal symptoms, menstrual irregularity, sterility, high blood pressure, heart damage, liver damage, lung damage, kidney damage, DVT/PE risk, fluid retention, pleural/pericardial effusion, somnolence, electrolyte/LFT imbalance, vein exercises and/or the possible need for vascular access/port placement.  The patient was advised that although uncommon, leakage of an infused medication from the vein or venous access device may lead to skin breakdown and/or other tissue damage.  The patient was advised that he/she may have pain, bleeding, and/or bruising from the insertion of a needle in their vein or venous access device (port).  The patient was further advised that, in spite of proper technique, infection with redness and irritation may rarely occur at the site where the needle was inserted.  The patient was advised that if complications occur, additional medical treatment is available.  Finally, where applicable we have reviewed rare but potential immune mediated side effects including shortness of breath, cough, chest pain (pneumonitis), abdominal pain, diarrhea (colitis), thyroiditis (hypothyroid or hyperthyroid), hepatitis and liver dysfunction, nephritis and renal dysfunction.    Discussion also included side effects specific to drugs in the treatment plan, specifically:    Treatment Plans       Name Type Plan Dates Plan Provider         Active    OP LYMPHOMA (CLL) RiTUXimab (Weekly X 4) ONCOLOGY TREATMENT  12/12/2023 - Present Justin Berry MD                      Questions answered and additional information discussed on topics including:  Organ  toxicities, Home care, and hypersensitivity reaction potential        Assessment and Plan:    Diagnoses and all orders for this visit:    1. Small B-cell lymphoma, unspecified body region (Primary)      No orders of the defined types were placed in this encounter.        The patient and I have reviewed their diagnosis and scheduled treatment plan. Needs assessment was completed where applicable including genetics, psychosocial needs, barriers to care, VAD evaluation, advanced care planning, survivorship, and palliative care services where indicated. Referrals have been ordered as appropriate based upon evaluation today and patient desires.   Chemo/biotherapy teaching was completed today and consent obtained. See separate documentation for further details.  Adequate time was given to answer questions.  Patient made aware of their care team members and contact information if they have questions or problems throughout the treatment course.  Discussion held and written information provided describing frequency of office visits and ongoing monitoring throughout the treatment plan.     Reviewed with patient any prescribed medication sent to pharmacy.  Education provided regarding proper storage, safe handling, and proper disposal of unused medication.  Proper handling of body fluids and waste discussed and written information provided.  If appropriate, patient had pretreatment labs drawn today.    Learning assessment completed at initial patient encounter. See separate flowsheet. Chemo/biotherapy education comprehension assessed at today's visit.    I spent 34 minutes caring for Vicente on this date of service. This time includes time spent by me in the following activities: preparing for the visit, reviewing tests, obtaining and/or reviewing a separately obtained history, counseling and educating the patient/family/caregiver, ordering medications, tests, or procedures, documenting information in the medical record, and  care coordination.     Leslie Lewis, APRN   12/11/23

## 2023-12-11 NOTE — PROGRESS NOTES
[facility] Hematology/Oncology Treatment Plan Summary    Name: Vicente Delgado  [facility]# [id number]  MD: Dr. Berry    Diagnosis:     ICD-10-CM ICD-9-CM   1. Small B-cell lymphoma, unspecified body region  C83.00 200.80         Goal of treatment: disease control    Treatment Medication(s):   Rituxan    Frequency: once weekly    Number of cycles: 4    Starting on: 12/15/2023    Items for home use: Senokot-S (for constipation), Milk of Magnesia (for constipation), Imodium AD (for diarrhea), Tylenol (for fever and/or pain), and Thermometer    Rx written for: [x] Nausea    [] Pre-Treatment   ondansetron 8 mg by mouth every 8 hours as needed for nausea      Completing Provider: GE Koehler           Date/time: 12/11/2023    Please note: You will be seen by a provider frequently with your treatment plan. This plan may change depending on many factors, if so, this will be discussed with you by your physician.  Last update 03/2022.

## 2023-12-14 PROBLEM — D47.Z1: Status: ACTIVE | Noted: 2023-12-14

## 2023-12-14 PROBLEM — T86.5: Status: ACTIVE | Noted: 2023-12-14

## 2023-12-14 NOTE — PROGRESS NOTES
"[facility] GROUP OUTPATIENT FOLLOW UP CLINIC VISIT    REASON FOR FOLLOW-UP:    B-cell non-Hodgkin lymphoma, CD20 positive involving the distal ileum    HISTORY OF PRESENT ILLNESS:  Vicente Delgado is a 89 y.o. male who returns today for follow up of the above issue.     He returns today anticipating his first dose of rituximab which is planned weekly for 4 weeks.    He remains anxious but is generally doing well.  He reports a little bit of GI upset this morning because he thinks he ate too much but this has not been a consistent problem for him and he otherwise is doing well.    REVIEW OF SYSTEMS:  As per the HPI    PHYSICAL EXAMINATION:  Vitals:    12/15/23 0813   BP: 123/66   Pulse: 57   Resp: 18   Temp: 98.2 °F (36.8 °C)   TempSrc: Temporal   SpO2: 98%   Weight: 72.5 kg (159 lb 12.8 oz)   Height: 170 cm (66.93\")   PainSc: 0-No pain         General:  No acute distress, awake, alert and oriented  Skin:  Warm and dry, no visible rash  HEENT:  Normocephalic/atraumatic.  Hard of hearing.  Chest:  Normal respiratory effort.  Lungs clear to auscultation bilaterally.  Heart: Regular rate and rhythm  Extremities:  No visible clubbing, cyanosis, or edema  Neuro/psych:  Grossly nonfocal.  Normal mood and affect.       DIAGNOSTIC DATA:  Results for orders placed or performed in visit on 12/15/23   Comprehensive metabolic panel    Specimen: Blood   Result Value Ref Range    Glucose 87 65 - 99 mg/dL    BUN 19 8 - 23 mg/dL    Creatinine 0.79 0.76 - 1.27 mg/dL    Sodium 141 136 - 145 mmol/L    Potassium 4.1 3.5 - 5.2 mmol/L    Chloride 105 98 - 107 mmol/L    CO2 27.7 22.0 - 29.0 mmol/L    Calcium 9.3 8.2 - 9.6 mg/dL    Total Protein 7.0 6.0 - 8.5 g/dL    Albumin 4.3 3.5 - 5.2 g/dL    ALT (SGPT) 52 (H) 1 - 41 U/L    AST (SGOT) 53 (H) 1 - 40 U/L    Alkaline Phosphatase 70 39 - 117 U/L    Total Bilirubin 0.3 0.0 - 1.2 mg/dL    Globulin 2.7 gm/dL    A/G Ratio 1.6 g/dL    BUN/Creatinine Ratio 24.1 7.0 - 25.0    Anion Gap 8.3 " 5.0 - 15.0 mmol/L    eGFR 81.8 >60.0 mL/min/1.73   CBC Auto Differential    Specimen: Blood   Result Value Ref Range    WBC 4.84 3.40 - 10.80 10*3/mm3    RBC 4.43 4.14 - 5.80 10*6/mm3    Hemoglobin 13.4 13.0 - 17.7 g/dL    Hematocrit 41.3 37.5 - 51.0 %    MCV 93.2 79.0 - 97.0 fL    MCH 30.2 26.6 - 33.0 pg    MCHC 32.4 31.5 - 35.7 g/dL    RDW 13.9 12.3 - 15.4 %    RDW-SD 47.9 37.0 - 54.0 fl    MPV 10.3 6.0 - 12.0 fL    Platelets 175 140 - 450 10*3/mm3    Neutrophil % 52.3 42.7 - 76.0 %    Lymphocyte % 27.5 19.6 - 45.3 %    Monocyte % 19.2 (H) 5.0 - 12.0 %    Eosinophil % 0.6 0.3 - 6.2 %    Basophil % 0.2 0.0 - 1.5 %    Immature Grans % 0.2 0.0 - 0.5 %    Neutrophils, Absolute 2.53 1.70 - 7.00 10*3/mm3    Lymphocytes, Absolute 1.33 0.70 - 3.10 10*3/mm3    Monocytes, Absolute 0.93 (H) 0.10 - 0.90 10*3/mm3    Eosinophils, Absolute 0.03 0.00 - 0.40 10*3/mm3    Basophils, Absolute 0.01 0.00 - 0.20 10*3/mm3    Immature Grans, Absolute 0.01 0.00 - 0.05 10*3/mm3    nRBC 0.0 0.0 - 0.2 /100 WBC     *Note: Due to a large number of results and/or encounters for the requested time period, some results have not been displayed. A complete set of results can be found in Results Review.       IMAGING:    NM PET/CT Skull Base to Mid Thigh (11/08/2023 11:14)     ASSESSMENT:    This is a 89 y.o. male with:    *B-cell non-Hodgkin lymphoma, CD20 positive involving the distal ileum  The patient began experiencing abdominal discomfort and diarrhea in early September after eating what he felt was some bad fish.    He presented to the emergency department on 9/20/2023.    CT imaging of the abdomen and pelvis was performed showing an enhancing mass in the cecum measuring about 3 cm with adjacent enlarged pericecal lymph nodes measuring up to 1.6 cm.  Mild sigmoid diverticulosis was noted as well as mild wall thickening involving the mid sigmoid colon.  He was referred for colonoscopy which was performed by Dr. Ary Conway with colorectal  surgery on 10/18/2023.  This showed multiple diverticula in sigmoid colon, 1 nonbleeding polyp in the distal ileum at 15 mm.    Biopsies show involvement by atypical CD20 positive B-cell non-Hodgkin lymphoma with extensive crush artifact and a Ki-67 that is estimated greater than 80%.  However, definitive morphology was not able to be visualized and rebiopsy has been suggested.  The initial biopsy was quite difficult per Dr. Conway given the location  A PET scan was performed on 11/8/2023.  There is some subtle hypermetabolic activity at the terminal ileum, maximal SUV 3.8 and an area measuring approximately 3 cm.  Adjacent mesenteric nodes are not significantly hypermetabolic and are either photopenic or have blood pool level activity.  Nonspecific low-level activity at the distal esophagus and GE junction with a maximal SUV of 4.3.  No obvious mass on CT imaging.  Spleen size normal.  No hypermetabolic cavity there.  Blood pool activity mediastinal lymphadenopathy  12/15/2023: 4 weeks of rituximab anticipated.  Cycle number 1 day 1 today.     *C. difficile diarrhea  Symptoms initially resolved after 2 courses of oral vancomycin  Now with recurrent symptoms  CT imaging 11/12/2023 with thick-walled appearance of the colon from the splenic flexure to the rectum with most significant involvement in the rectosigmoid colon, adjacent pericolonic soft tissue stranding consistent with colitis.  Mass in the cecum less apparent compared to 9/20/2023.  Dr. Pretty with ID evaluated him.  He recommended pursuing a longer vancomycin taper for approximately 5 weeks of therapy.  Therapy complete.  Symptoms essentially resolved.     *Aortic stenosis     *History of prostate cancer  Status post radical prostatectomy at approximately age 60     *Hearing loss    *Anxiety    PLAN:   Cycle 1 day 1 weekly rituximab 375 mg/m² x 4 doses.  He has had a formal education session.  He and his daughter agree to proceed.  He will have an  education session and we will obtain necessary baseline labs.  Follow-up with Dr. Pretty with ID should his C. difficile diarrhea return  I will have him see nurse practitioner weekly for the next 2 weeks with weeks 2 and 3 of rituximab.  I will see him back in 4 weeks.  I encouraged him and his daughter to call us with any issues following his treatment today and we will monitor him very closely for infusion reactions here today as well.    High risk medication requiring intensive monitoring.

## 2023-12-15 ENCOUNTER — INFUSION (OUTPATIENT)
Dept: ONCOLOGY | Facility: HOSPITAL | Age: 88
End: 2023-12-15
Payer: MEDICARE

## 2023-12-15 ENCOUNTER — OFFICE VISIT (OUTPATIENT)
Dept: ONCOLOGY | Facility: CLINIC | Age: 88
End: 2023-12-15
Payer: MEDICARE

## 2023-12-15 VITALS — HEART RATE: 69 BPM | DIASTOLIC BLOOD PRESSURE: 78 MMHG | SYSTOLIC BLOOD PRESSURE: 132 MMHG

## 2023-12-15 VITALS
HEART RATE: 57 BPM | HEIGHT: 67 IN | RESPIRATION RATE: 18 BRPM | WEIGHT: 159.8 LBS | OXYGEN SATURATION: 98 % | SYSTOLIC BLOOD PRESSURE: 123 MMHG | BODY MASS INDEX: 25.08 KG/M2 | DIASTOLIC BLOOD PRESSURE: 66 MMHG | TEMPERATURE: 98.2 F

## 2023-12-15 DIAGNOSIS — Z11.59 ENCOUNTER FOR SCREENING FOR OTHER VIRAL DISEASES: ICD-10-CM

## 2023-12-15 DIAGNOSIS — T86.5 LYMPHOPROLIFERATIVE DISORDER AFTER STEM CELL TRANSPLANTATION: ICD-10-CM

## 2023-12-15 DIAGNOSIS — Z11.59 ENCOUNTER FOR SCREENING FOR OTHER VIRAL DISEASES: Primary | ICD-10-CM

## 2023-12-15 DIAGNOSIS — D47.Z1 LYMPHOPROLIFERATIVE DISORDER AFTER STEM CELL TRANSPLANTATION: ICD-10-CM

## 2023-12-15 DIAGNOSIS — C85.90 LYMPHOMA, UNSPECIFIED BODY REGION, UNSPECIFIED LYMPHOMA TYPE: ICD-10-CM

## 2023-12-15 DIAGNOSIS — C85.90 LYMPHOMA, UNSPECIFIED BODY REGION, UNSPECIFIED LYMPHOMA TYPE: Primary | ICD-10-CM

## 2023-12-15 LAB
ALBUMIN SERPL-MCNC: 4.3 G/DL (ref 3.5–5.2)
ALBUMIN/GLOB SERPL: 1.6 G/DL
ALP SERPL-CCNC: 70 U/L (ref 39–117)
ALT SERPL W P-5'-P-CCNC: 52 U/L (ref 1–41)
ANION GAP SERPL CALCULATED.3IONS-SCNC: 8.3 MMOL/L (ref 5–15)
AST SERPL-CCNC: 53 U/L (ref 1–40)
BASOPHILS # BLD AUTO: 0.01 10*3/MM3 (ref 0–0.2)
BASOPHILS NFR BLD AUTO: 0.2 % (ref 0–1.5)
BILIRUB SERPL-MCNC: 0.3 MG/DL (ref 0–1.2)
BUN SERPL-MCNC: 19 MG/DL (ref 8–23)
BUN/CREAT SERPL: 24.1 (ref 7–25)
CALCIUM SPEC-SCNC: 9.3 MG/DL (ref 8.2–9.6)
CHLORIDE SERPL-SCNC: 105 MMOL/L (ref 98–107)
CO2 SERPL-SCNC: 27.7 MMOL/L (ref 22–29)
CREAT SERPL-MCNC: 0.79 MG/DL (ref 0.76–1.27)
DEPRECATED RDW RBC AUTO: 47.9 FL (ref 37–54)
EGFRCR SERPLBLD CKD-EPI 2021: 81.8 ML/MIN/1.73
EOSINOPHIL # BLD AUTO: 0.03 10*3/MM3 (ref 0–0.4)
EOSINOPHIL NFR BLD AUTO: 0.6 % (ref 0.3–6.2)
ERYTHROCYTE [DISTWIDTH] IN BLOOD BY AUTOMATED COUNT: 13.9 % (ref 12.3–15.4)
GLOBULIN UR ELPH-MCNC: 2.7 GM/DL
GLUCOSE SERPL-MCNC: 87 MG/DL (ref 65–99)
HBV SURFACE AB SER RIA-ACNC: NORMAL
HBV SURFACE AG SERPL QL IA: NORMAL
HCT VFR BLD AUTO: 41.3 % (ref 37.5–51)
HGB BLD-MCNC: 13.4 G/DL (ref 13–17.7)
IMM GRANULOCYTES # BLD AUTO: 0.01 10*3/MM3 (ref 0–0.05)
IMM GRANULOCYTES NFR BLD AUTO: 0.2 % (ref 0–0.5)
LYMPHOCYTES # BLD AUTO: 1.33 10*3/MM3 (ref 0.7–3.1)
LYMPHOCYTES NFR BLD AUTO: 27.5 % (ref 19.6–45.3)
MCH RBC QN AUTO: 30.2 PG (ref 26.6–33)
MCHC RBC AUTO-ENTMCNC: 32.4 G/DL (ref 31.5–35.7)
MCV RBC AUTO: 93.2 FL (ref 79–97)
MONOCYTES # BLD AUTO: 0.93 10*3/MM3 (ref 0.1–0.9)
MONOCYTES NFR BLD AUTO: 19.2 % (ref 5–12)
NEUTROPHILS NFR BLD AUTO: 2.53 10*3/MM3 (ref 1.7–7)
NEUTROPHILS NFR BLD AUTO: 52.3 % (ref 42.7–76)
NRBC BLD AUTO-RTO: 0 /100 WBC (ref 0–0.2)
PLATELET # BLD AUTO: 175 10*3/MM3 (ref 140–450)
PMV BLD AUTO: 10.3 FL (ref 6–12)
POTASSIUM SERPL-SCNC: 4.1 MMOL/L (ref 3.5–5.2)
PROT SERPL-MCNC: 7 G/DL (ref 6–8.5)
RBC # BLD AUTO: 4.43 10*6/MM3 (ref 4.14–5.8)
SODIUM SERPL-SCNC: 141 MMOL/L (ref 136–145)
WBC NRBC COR # BLD AUTO: 4.84 10*3/MM3 (ref 3.4–10.8)

## 2023-12-15 PROCEDURE — 25010000002 RITUXIMAB 10 MG/ML SOLUTION 50 ML VIAL: Performed by: INTERNAL MEDICINE

## 2023-12-15 PROCEDURE — 86704 HEP B CORE ANTIBODY TOTAL: CPT | Performed by: NURSE PRACTITIONER

## 2023-12-15 PROCEDURE — 85025 COMPLETE CBC W/AUTO DIFF WBC: CPT | Performed by: INTERNAL MEDICINE

## 2023-12-15 PROCEDURE — 86706 HEP B SURFACE ANTIBODY: CPT | Performed by: NURSE PRACTITIONER

## 2023-12-15 PROCEDURE — 96415 CHEMO IV INFUSION ADDL HR: CPT

## 2023-12-15 PROCEDURE — 80053 COMPREHEN METABOLIC PANEL: CPT | Performed by: INTERNAL MEDICINE

## 2023-12-15 PROCEDURE — 87340 HEPATITIS B SURFACE AG IA: CPT | Performed by: NURSE PRACTITIONER

## 2023-12-15 PROCEDURE — 25810000003 SODIUM CHLORIDE 0.9 % SOLUTION 250 ML FLEX CONT: Performed by: INTERNAL MEDICINE

## 2023-12-15 PROCEDURE — 25010000002 RITUXIMAB 10 MG/ML SOLUTION 10 ML VIAL: Performed by: INTERNAL MEDICINE

## 2023-12-15 PROCEDURE — 96375 TX/PRO/DX INJ NEW DRUG ADDON: CPT

## 2023-12-15 PROCEDURE — 25810000003 SODIUM CHLORIDE 0.9 % SOLUTION: Performed by: INTERNAL MEDICINE

## 2023-12-15 PROCEDURE — 25010000002 DIPHENHYDRAMINE PER 50 MG: Performed by: INTERNAL MEDICINE

## 2023-12-15 PROCEDURE — 96413 CHEMO IV INFUSION 1 HR: CPT

## 2023-12-15 RX ORDER — SODIUM CHLORIDE 9 MG/ML
20 INJECTION, SOLUTION INTRAVENOUS ONCE
Status: CANCELLED | OUTPATIENT
Start: 2023-12-15

## 2023-12-15 RX ORDER — FAMOTIDINE 10 MG/ML
20 INJECTION, SOLUTION INTRAVENOUS ONCE
Status: CANCELLED | OUTPATIENT
Start: 2023-12-15

## 2023-12-15 RX ORDER — ACETAMINOPHEN 325 MG/1
650 TABLET ORAL ONCE
Status: CANCELLED | OUTPATIENT
Start: 2023-12-15

## 2023-12-15 RX ORDER — FAMOTIDINE 10 MG/ML
20 INJECTION, SOLUTION INTRAVENOUS AS NEEDED
Status: CANCELLED | OUTPATIENT
Start: 2023-12-15

## 2023-12-15 RX ORDER — MEPERIDINE HYDROCHLORIDE 25 MG/ML
25 INJECTION INTRAMUSCULAR; INTRAVENOUS; SUBCUTANEOUS
Status: CANCELLED | OUTPATIENT
Start: 2023-12-15

## 2023-12-15 RX ORDER — DIPHENHYDRAMINE HYDROCHLORIDE 50 MG/ML
50 INJECTION INTRAMUSCULAR; INTRAVENOUS AS NEEDED
Status: CANCELLED | OUTPATIENT
Start: 2023-12-15

## 2023-12-15 RX ORDER — ACETAMINOPHEN 325 MG/1
650 TABLET ORAL ONCE
Status: COMPLETED | OUTPATIENT
Start: 2023-12-15 | End: 2023-12-15

## 2023-12-15 RX ORDER — FAMOTIDINE 10 MG/ML
20 INJECTION, SOLUTION INTRAVENOUS ONCE
Status: COMPLETED | OUTPATIENT
Start: 2023-12-15 | End: 2023-12-15

## 2023-12-15 RX ORDER — SODIUM CHLORIDE 9 MG/ML
20 INJECTION, SOLUTION INTRAVENOUS ONCE
Status: COMPLETED | OUTPATIENT
Start: 2023-12-15 | End: 2023-12-15

## 2023-12-15 RX ADMIN — RITUXIMAB 690 MG: 10 INJECTION, SOLUTION INTRAVENOUS at 09:55

## 2023-12-15 RX ADMIN — DIPHENHYDRAMINE HYDROCHLORIDE 50 MG: 50 INJECTION, SOLUTION INTRAMUSCULAR; INTRAVENOUS at 08:55

## 2023-12-15 RX ADMIN — ACETAMINOPHEN 650 MG: 325 TABLET ORAL at 08:52

## 2023-12-15 RX ADMIN — SODIUM CHLORIDE 20 ML/HR: 9 INJECTION, SOLUTION INTRAVENOUS at 08:46

## 2023-12-15 RX ADMIN — FAMOTIDINE 20 MG: 10 INJECTION INTRAVENOUS at 08:54

## 2023-12-16 LAB — HBV CORE AB SERPL QL IA: NEGATIVE

## 2023-12-22 ENCOUNTER — INFUSION (OUTPATIENT)
Dept: ONCOLOGY | Facility: HOSPITAL | Age: 88
End: 2023-12-22
Payer: MEDICARE

## 2023-12-22 ENCOUNTER — OFFICE VISIT (OUTPATIENT)
Dept: ONCOLOGY | Facility: CLINIC | Age: 88
End: 2023-12-22
Payer: MEDICARE

## 2023-12-22 VITALS
DIASTOLIC BLOOD PRESSURE: 65 MMHG | BODY MASS INDEX: 24.91 KG/M2 | OXYGEN SATURATION: 95 % | SYSTOLIC BLOOD PRESSURE: 134 MMHG | RESPIRATION RATE: 16 BRPM | TEMPERATURE: 97.7 F | HEART RATE: 72 BPM | WEIGHT: 158.7 LBS | HEIGHT: 67 IN

## 2023-12-22 DIAGNOSIS — T86.5 LYMPHOPROLIFERATIVE DISORDER AFTER STEM CELL TRANSPLANTATION: ICD-10-CM

## 2023-12-22 DIAGNOSIS — C85.90 LYMPHOMA, UNSPECIFIED BODY REGION, UNSPECIFIED LYMPHOMA TYPE: ICD-10-CM

## 2023-12-22 DIAGNOSIS — Z11.59 ENCOUNTER FOR SCREENING FOR OTHER VIRAL DISEASES: ICD-10-CM

## 2023-12-22 DIAGNOSIS — D47.Z1 LYMPHOPROLIFERATIVE DISORDER AFTER STEM CELL TRANSPLANTATION: ICD-10-CM

## 2023-12-22 DIAGNOSIS — C85.90 LYMPHOMA, UNSPECIFIED BODY REGION, UNSPECIFIED LYMPHOMA TYPE: Primary | ICD-10-CM

## 2023-12-22 DIAGNOSIS — C83.00 SMALL B-CELL LYMPHOMA, UNSPECIFIED BODY REGION: Primary | ICD-10-CM

## 2023-12-22 LAB
BASOPHILS # BLD AUTO: 0.02 10*3/MM3 (ref 0–0.2)
BASOPHILS NFR BLD AUTO: 0.3 % (ref 0–1.5)
DEPRECATED RDW RBC AUTO: 48.1 FL (ref 37–54)
EOSINOPHIL # BLD AUTO: 0.01 10*3/MM3 (ref 0–0.4)
EOSINOPHIL NFR BLD AUTO: 0.2 % (ref 0.3–6.2)
ERYTHROCYTE [DISTWIDTH] IN BLOOD BY AUTOMATED COUNT: 14 % (ref 12.3–15.4)
HCT VFR BLD AUTO: 44.5 % (ref 37.5–51)
HGB BLD-MCNC: 14.5 G/DL (ref 13–17.7)
IMM GRANULOCYTES # BLD AUTO: 0.02 10*3/MM3 (ref 0–0.05)
IMM GRANULOCYTES NFR BLD AUTO: 0.3 % (ref 0–0.5)
LYMPHOCYTES # BLD AUTO: 1.87 10*3/MM3 (ref 0.7–3.1)
LYMPHOCYTES NFR BLD AUTO: 32.3 % (ref 19.6–45.3)
MCH RBC QN AUTO: 30 PG (ref 26.6–33)
MCHC RBC AUTO-ENTMCNC: 32.6 G/DL (ref 31.5–35.7)
MCV RBC AUTO: 91.9 FL (ref 79–97)
MONOCYTES # BLD AUTO: 0.85 10*3/MM3 (ref 0.1–0.9)
MONOCYTES NFR BLD AUTO: 14.7 % (ref 5–12)
NEUTROPHILS NFR BLD AUTO: 3.02 10*3/MM3 (ref 1.7–7)
NEUTROPHILS NFR BLD AUTO: 52.2 % (ref 42.7–76)
NRBC BLD AUTO-RTO: 0 /100 WBC (ref 0–0.2)
PLATELET # BLD AUTO: 198 10*3/MM3 (ref 140–450)
PMV BLD AUTO: 10.7 FL (ref 6–12)
RBC # BLD AUTO: 4.84 10*6/MM3 (ref 4.14–5.8)
WBC NRBC COR # BLD AUTO: 5.79 10*3/MM3 (ref 3.4–10.8)

## 2023-12-22 PROCEDURE — 25810000003 SODIUM CHLORIDE 0.9 % SOLUTION 250 ML FLEX CONT: Performed by: NURSE PRACTITIONER

## 2023-12-22 PROCEDURE — 25010000002 RITUXIMAB 10 MG/ML SOLUTION 50 ML VIAL: Performed by: NURSE PRACTITIONER

## 2023-12-22 PROCEDURE — 25810000003 SODIUM CHLORIDE 0.9 % SOLUTION: Performed by: NURSE PRACTITIONER

## 2023-12-22 PROCEDURE — 25010000002 RITUXIMAB 10 MG/ML SOLUTION 10 ML VIAL: Performed by: NURSE PRACTITIONER

## 2023-12-22 PROCEDURE — 25010000002 DIPHENHYDRAMINE PER 50 MG: Performed by: NURSE PRACTITIONER

## 2023-12-22 PROCEDURE — 85025 COMPLETE CBC W/AUTO DIFF WBC: CPT | Performed by: INTERNAL MEDICINE

## 2023-12-22 PROCEDURE — 96375 TX/PRO/DX INJ NEW DRUG ADDON: CPT

## 2023-12-22 PROCEDURE — 96415 CHEMO IV INFUSION ADDL HR: CPT

## 2023-12-22 PROCEDURE — 96413 CHEMO IV INFUSION 1 HR: CPT

## 2023-12-22 RX ORDER — DIPHENHYDRAMINE HYDROCHLORIDE 50 MG/ML
50 INJECTION INTRAMUSCULAR; INTRAVENOUS AS NEEDED
Status: CANCELLED | OUTPATIENT
Start: 2023-12-22

## 2023-12-22 RX ORDER — ACETAMINOPHEN 325 MG/1
650 TABLET ORAL ONCE
Status: COMPLETED | OUTPATIENT
Start: 2023-12-22 | End: 2023-12-22

## 2023-12-22 RX ORDER — FAMOTIDINE 10 MG/ML
20 INJECTION, SOLUTION INTRAVENOUS ONCE
Status: CANCELLED | OUTPATIENT
Start: 2023-12-22

## 2023-12-22 RX ORDER — SODIUM CHLORIDE 9 MG/ML
20 INJECTION, SOLUTION INTRAVENOUS ONCE
Status: CANCELLED | OUTPATIENT
Start: 2023-12-22

## 2023-12-22 RX ORDER — FAMOTIDINE 10 MG/ML
20 INJECTION, SOLUTION INTRAVENOUS ONCE
Status: COMPLETED | OUTPATIENT
Start: 2023-12-22 | End: 2023-12-22

## 2023-12-22 RX ORDER — SODIUM CHLORIDE 9 MG/ML
20 INJECTION, SOLUTION INTRAVENOUS ONCE
Status: COMPLETED | OUTPATIENT
Start: 2023-12-22 | End: 2023-12-22

## 2023-12-22 RX ORDER — ACETAMINOPHEN 325 MG/1
650 TABLET ORAL ONCE
Status: CANCELLED | OUTPATIENT
Start: 2023-12-22

## 2023-12-22 RX ORDER — FAMOTIDINE 10 MG/ML
20 INJECTION, SOLUTION INTRAVENOUS AS NEEDED
Status: CANCELLED | OUTPATIENT
Start: 2023-12-22

## 2023-12-22 RX ADMIN — FAMOTIDINE 20 MG: 10 INJECTION INTRAVENOUS at 09:35

## 2023-12-22 RX ADMIN — SODIUM CHLORIDE 20 ML/HR: 9 INJECTION, SOLUTION INTRAVENOUS at 09:36

## 2023-12-22 RX ADMIN — RITUXIMAB 690 MG: 10 INJECTION, SOLUTION INTRAVENOUS at 10:26

## 2023-12-22 RX ADMIN — ACETAMINOPHEN 650 MG: 325 TABLET ORAL at 09:33

## 2023-12-22 RX ADMIN — DIPHENHYDRAMINE HYDROCHLORIDE 25 MG: 50 INJECTION, SOLUTION INTRAMUSCULAR; INTRAVENOUS at 09:36

## 2023-12-22 NOTE — PROGRESS NOTES
"[facility] GROUP OUTPATIENT FOLLOW UP CLINIC VISIT    REASON FOR FOLLOW-UP:    B-cell non-Hodgkin lymphoma, CD20 positive involving the distal ileum    HISTORY OF PRESENT ILLNESS:  Vicente Delgado is a 89 y.o. male who returns today for follow up of the above issue.     He returns today anticipating his second dose of rituximab which is planned weekly for 4 weeks.    He reports his first treatment went extremely well and he is feeling great.  He notes improvement in his energy.  He has 1 more day remaining of his vancomycin treatment for his recurrent C. difficile and is worried about recurrence.  He does continue to have intermittent heartburn or the sensation of pending heartburn.  He has been taking Pepcid 10 mg daily as needed with noted benefit.      REVIEW OF SYSTEMS:  As per the HPI    PHYSICAL EXAMINATION:  Vitals:    12/22/23 0835   BP: 134/65   Pulse: 72   Resp: 16   Temp: 97.7 °F (36.5 °C)   TempSrc: Temporal   SpO2: 95%   Weight: 72 kg (158 lb 11.2 oz)   Height: 170 cm (66.93\")   PainSc: 0-No pain           General:  No acute distress, awake, alert and oriented  Skin:  Warm and dry, no visible rash  HEENT:  Normocephalic/atraumatic.  Hard of hearing.  Chest:  Normal respiratory effort.  Lungs clear to auscultation bilaterally.  Heart: Regular rate and rhythm  Extremities:  No visible clubbing, cyanosis, or edema  Neuro/psych:  Grossly nonfocal.  Normal mood and affect.       DIAGNOSTIC DATA:  Results for orders placed or performed in visit on 12/22/23   CBC Auto Differential    Specimen: Blood   Result Value Ref Range    WBC 5.79 3.40 - 10.80 10*3/mm3    RBC 4.84 4.14 - 5.80 10*6/mm3    Hemoglobin 14.5 13.0 - 17.7 g/dL    Hematocrit 44.5 37.5 - 51.0 %    MCV 91.9 79.0 - 97.0 fL    MCH 30.0 26.6 - 33.0 pg    MCHC 32.6 31.5 - 35.7 g/dL    RDW 14.0 12.3 - 15.4 %    RDW-SD 48.1 37.0 - 54.0 fl    MPV 10.7 6.0 - 12.0 fL    Platelets 198 140 - 450 10*3/mm3    Neutrophil % 52.2 42.7 - 76.0 %    " Lymphocyte % 32.3 19.6 - 45.3 %    Monocyte % 14.7 (H) 5.0 - 12.0 %    Eosinophil % 0.2 (L) 0.3 - 6.2 %    Basophil % 0.3 0.0 - 1.5 %    Immature Grans % 0.3 0.0 - 0.5 %    Neutrophils, Absolute 3.02 1.70 - 7.00 10*3/mm3    Lymphocytes, Absolute 1.87 0.70 - 3.10 10*3/mm3    Monocytes, Absolute 0.85 0.10 - 0.90 10*3/mm3    Eosinophils, Absolute 0.01 0.00 - 0.40 10*3/mm3    Basophils, Absolute 0.02 0.00 - 0.20 10*3/mm3    Immature Grans, Absolute 0.02 0.00 - 0.05 10*3/mm3    nRBC 0.0 0.0 - 0.2 /100 WBC     *Note: Due to a large number of results and/or encounters for the requested time period, some results have not been displayed. A complete set of results can be found in Results Review.       IMAGING:    NM PET/CT Skull Base to Mid Thigh (11/08/2023 11:14)     ASSESSMENT:    This is a 89 y.o. male with:    *B-cell non-Hodgkin lymphoma, CD20 positive involving the distal ileum  The patient began experiencing abdominal discomfort and diarrhea in early September after eating what he felt was some bad fish.    He presented to the emergency department on 9/20/2023.    CT imaging of the abdomen and pelvis was performed showing an enhancing mass in the cecum measuring about 3 cm with adjacent enlarged pericecal lymph nodes measuring up to 1.6 cm.  Mild sigmoid diverticulosis was noted as well as mild wall thickening involving the mid sigmoid colon.  He was referred for colonoscopy which was performed by Dr. Ary Conway with colorectal surgery on 10/18/2023.  This showed multiple diverticula in sigmoid colon, 1 nonbleeding polyp in the distal ileum at 15 mm.    Biopsies show involvement by atypical CD20 positive B-cell non-Hodgkin lymphoma with extensive crush artifact and a Ki-67 that is estimated greater than 80%.  However, definitive morphology was not able to be visualized and rebiopsy has been suggested.  The initial biopsy was quite difficult per Dr. Conway given the location  A PET scan was performed on 11/8/2023.   There is some subtle hypermetabolic activity at the terminal ileum, maximal SUV 3.8 and an area measuring approximately 3 cm.  Adjacent mesenteric nodes are not significantly hypermetabolic and are either photopenic or have blood pool level activity.  Nonspecific low-level activity at the distal esophagus and GE junction with a maximal SUV of 4.3.  No obvious mass on CT imaging.  Spleen size normal.  No hypermetabolic cavity there.  Blood pool activity mediastinal lymphadenopathy  12/15/2023: 4 weeks of rituximab anticipated.  Cycle number 1 day 1 today.  12/22/2023: Proceed with week 2 rituximab.  Patient tolerated week 1 well and is overall feeling improvement in his energy.     *C. difficile diarrhea  Symptoms initially resolved after 2 courses of oral vancomycin  Now with recurrent symptoms  CT imaging 11/12/2023 with thick-walled appearance of the colon from the splenic flexure to the rectum with most significant involvement in the rectosigmoid colon, adjacent pericolonic soft tissue stranding consistent with colitis.  Mass in the cecum less apparent compared to 9/20/2023.  Dr. Pretty with ID evaluated him.  He recommended pursuing a longer vancomycin taper for approximately 5 weeks of therapy.  Therapy complete.  Symptoms essentially resolved.  12/22/2023: Patient reports he has 1 more day remaining of his vancomycin.  His doctor has had him slowly tapering off of his vancomycin.  He continues to incorporate yogurt into his diet daily.  He is anxious about symptoms returning after he remains off of his medication.    *Aortic stenosis     *History of prostate cancer  Status post radical prostatectomy at approximately age 60     *Hearing loss    *Anxiety    PLAN:   Weekly rituximab 375 mg/m² x 4 doses.   Proceed with week 2 rituximab today  Follow-up with Dr. Pretty with ID should his C. difficile diarrhea return  Return in 1 week for NP follow-up, labs, and week 3 rituximab.    Return in 2 weeks for MD  follow-up, labs, and week for rituximab.     High risk medication requiring intensive monitoring.

## 2023-12-25 PROBLEM — R53.1 GENERAL WEAKNESS: Status: RESOLVED | Noted: 2023-09-20 | Resolved: 2023-12-25

## 2023-12-25 PROBLEM — A04.72 COLITIS DUE TO CLOSTRIDIUM DIFFICILE: Status: RESOLVED | Noted: 2023-09-20 | Resolved: 2023-12-25

## 2023-12-25 PROBLEM — A04.72 C. DIFFICILE DIARRHEA: Status: ACTIVE | Noted: 2023-11-13

## 2023-12-25 PROBLEM — K52.9 COLITIS: Status: RESOLVED | Noted: 2023-11-12 | Resolved: 2023-12-25

## 2023-12-25 PROBLEM — R63.4 WEIGHT LOSS, UNINTENTIONAL: Status: RESOLVED | Noted: 2023-09-20 | Resolved: 2023-12-25

## 2023-12-25 PROBLEM — K57.90 DIVERTICULOSIS: Status: RESOLVED | Noted: 2023-09-20 | Resolved: 2023-12-25

## 2023-12-25 PROBLEM — K57.92 DIVERTICULITIS: Status: RESOLVED | Noted: 2023-09-20 | Resolved: 2023-12-25

## 2023-12-25 PROBLEM — E43 SEVERE MALNUTRITION: Status: RESOLVED | Noted: 2023-09-22 | Resolved: 2023-12-25

## 2023-12-25 PROBLEM — L30.9 DERMATITIS: Status: RESOLVED | Noted: 2022-02-08 | Resolved: 2023-12-25

## 2023-12-25 PROBLEM — R93.5 ABNORMAL CT OF THE ABDOMEN: Status: RESOLVED | Noted: 2023-10-13 | Resolved: 2023-12-25

## 2023-12-25 PROBLEM — K63.89 MASS OF COLON: Status: RESOLVED | Noted: 2023-09-20 | Resolved: 2023-12-25

## 2023-12-25 NOTE — PROGRESS NOTES
"Chief Complaint  Transitional Care Management (D/c 11.16)    Subjective        Vicente Delgado presents to [facility] for f/u regarding recent hospitalization for colitis and recurrent Clostridium difficile diarrhea.    History of Present Illness    He presented to [facility] ER 11/12/23 due to profuse diarrhea, C difficile toxin was positive and he was admitted for further evaluation. He was seen by infectious disease and was placed on a tapering course of vancomycin. His diarrhea resolved and he notes improvement in sx since discharge.     He does c/o epigastric burning with dyspepsia since stopping PPI earlier this year due to C diff colitis. He is instead taking Pepcid and has tried to modify his diet but continues to c/o indigestion with lying down at night.    He did have an episode of chest pain during his hospitalization and a rapid response was called. His troponin was elevated and cardiology was consulted, levels were not increasing and no further workup was recommended. He denies additional episodes of chest pain.    He has seen oncology regarding B-cell non-Hodgkin's lymphoma involving the distal ileum. Treatment has been placed on hold until C difficile diarrhea has resolved. He has a f/u appt with oncology to discuss next steps (surgery not recommended due to advanced age).    Objective   Vital Signs:  /84 (BP Location: Left arm, Patient Position: Sitting, Cuff Size: Adult)   Pulse 81   Ht 170.2 cm (67\")   Wt 73 kg (161 lb)   SpO2 100%   BMI 25.22 kg/m²   Estimated body mass index is 25.22 kg/m² as calculated from the following:    Height as of this encounter: 170.2 cm (67\").    Weight as of this encounter: 73 kg (161 lb).       BMI is within normal parameters. No other follow-up for BMI required.      Physical Exam  Constitutional:       Appearance: He is well-developed. He is not ill-appearing.   HENT:      Head: Normocephalic.      Right Ear: Hearing, tympanic " "Howard Garduno Patient Age: 67year old  MESSAGE: Interpreting service used: No      IM/FP- Question About an Upcoming Appointment-       Date of visit: 5. 1.20    Question regarding: prolia    Additional information: patient was due for a Prolia injection in March and had to reschedule to 5. 1.20 for her annual and would like to know if the injection will be done on 5. 1.20 or if an appt is needed sooner.     Provider's home site- [facility] [place] call to [address] [place] [address] message to provider's clinical support pool         WEIGHT AND HEIGHT:   Wt Readings from Last 1 Encounters:   08/01/19 47.2 kg (104 lb)     Ht Readings from Last 1 Encounters:   08/01/19 5' 0.5"" (1.537 m)     BMI Readings from Last 1 Encounters:   08/01/19 19.98 kg/mÂ²       ALLERGIES:  Sertraline; Erythromycin; and Hydrocodone  Current Outpatient Medications   Medication   â¢ ALPRAZolam (XANAX) 1 MG tablet   â¢ omeprazole (PRILOSEC) 40 MG capsule   â¢ metoPROLOL succinate (TOPROL-XL) 25 MG 24 hr tablet   â¢ citalopram (CELEXA) 10 MG tablet   â¢ atorvastatin (LIPITOR) 20 MG tablet   â¢ irbesartan (AVAPRO) 300 MG tablet   â¢ ipratropium (ATROVENT) 0.06 % nasal spray   â¢ azelastine (ASTELIN) 0.1 % nasal spray   â¢ estradiol (ESTRACE) 0.1 MG/GM vaginal cream   â¢ amoxicillin-clavulanate (AUGMENTIN) 875-125 MG per tablet   â¢ nabumetone (RELAFEN) 500 MG tablet   â¢ IRON PO   â¢ fluticasone (FLONASE) 50 MCG/ACT nasal spray   â¢ Cholecalciferol (VITAMIN D) 2000 units capsule   â¢ Calcium Carb-Cholecalciferol 1000-800 MG-UNIT Tab     Current Facility-Administered Medications   Medication   â¢ DENOSumab (PROLIA) SubQ injection 60 mg     PHARMACY to use: see below          Pharmacy preference(s) on file:   [place] [address] [address] [address]  Phone: [phone] Fax: [phone]      CALL BACK INFO: Bibiana Griffin to leave response (including medical information) on answering " machine  ROUTING: Patient's physician/staff        PCP: Elsie Aden DO         INS: Payor: Nasima Andrews / Plan: [employer] [place] / Product Type: MEDICARE   PATIENT ADDRESS:  [address] [place] [address] membrane and external ear normal.      Left Ear: Hearing, tympanic membrane and external ear normal.      Nose: Nose normal. No nasal deformity, mucosal edema or rhinorrhea.      Right Sinus: No maxillary sinus tenderness or frontal sinus tenderness.      Left Sinus: No maxillary sinus tenderness or frontal sinus tenderness.      Mouth/Throat:      Dentition: Normal dentition.   Eyes:      General: Lids are normal.         Right eye: No discharge.         Left eye: No discharge.      Conjunctiva/sclera: Conjunctivae normal.      Right eye: No exudate.     Left eye: No exudate.  Neck:      Thyroid: No thyroid mass or thyromegaly.      Vascular: No carotid bruit.      Trachea: Trachea normal.   Cardiovascular:      Rate and Rhythm: Regular rhythm.      Pulses: Normal pulses.      Heart sounds: Normal heart sounds. No murmur heard.  Pulmonary:      Effort: No respiratory distress.      Breath sounds: Normal breath sounds. No decreased breath sounds, wheezing, rhonchi or rales.   Abdominal:      General: Bowel sounds are normal.      Palpations: Abdomen is soft.      Tenderness: There is no abdominal tenderness.   Musculoskeletal:      Cervical back: Normal range of motion. No edema.   Lymphadenopathy:      Head:      Right side of head: No submental, submandibular, tonsillar, preauricular, posterior auricular or occipital adenopathy.      Left side of head: No submental, submandibular, tonsillar, preauricular, posterior auricular or occipital adenopathy.   Skin:     General: Skin is warm and dry.      Nails: There is no clubbing.   Neurological:      Mental Status: He is alert.   Psychiatric:         Behavior: Behavior is cooperative.        Result Review :  The following data was reviewed by: GE Bustos on 12/01/2023:  Common labs          12/6/2023    09:24 12/15/2023    07:54 12/22/2023    08:22   Common Labs   Glucose 81  87     BUN 18  19     Creatinine 0.82  0.79     Sodium 140  141     Potassium 4.4   4.1     Chloride 103  105     Calcium 8.9  9.3     Albumin 4.0  4.3     Total Bilirubin 0.4  0.3     Alkaline Phosphatase 63  70     AST (SGOT) 53  53     ALT (SGPT) 41  52     WBC 5.60  4.84  5.79    Hemoglobin 13.8  13.4  14.5    Hematocrit 41.4  41.3  44.5    Platelets 196  175  198      Data reviewed : Recent hospitalization notes 11/12/23             Assessment and Plan   Diagnoses and all orders for this visit:    1. C. difficile diarrhea (Primary)  Assessment & Plan:  He is on an extended course of Vancomycin with improvement in sx, continue to monitor      2. Gastroesophageal reflux disease with esophagitis without hemorrhage  Assessment & Plan:  He c/o intermittent dyspepsia (wore with lying down at night) despite taking Pepcid, discussed triggering factors and dietary recommendations. Consider Carafate if sx do not improve (declined Rx today).      3. Diffuse large B-cell lymphoma, unspecified body region  Assessment & Plan:  He has a f/u appt with oncology to discuss next steps, PET scan showed minimal activity.               Follow Up   Return if symptoms worsen or fail to improve, for Next scheduled follow up.  Patient was given instructions and counseling regarding his condition or for health maintenance advice. Please see specific information pulled into the AVS if appropriate.

## 2023-12-25 NOTE — ASSESSMENT & PLAN NOTE
He c/o intermittent dyspepsia (wore with lying down at night) despite taking Pepcid, discussed triggering factors and dietary recommendations. Consider Carafate if sx do not improve (declined Rx today).  
He has a f/u appt with oncology to discuss next steps, PET scan showed minimal activity.  
He is on an extended course of Vancomycin with improvement in sx, continue to monitor  
Statement Selected

## 2023-12-28 DIAGNOSIS — C85.90 LYMPHOMA, UNSPECIFIED BODY REGION, UNSPECIFIED LYMPHOMA TYPE: ICD-10-CM

## 2023-12-28 DIAGNOSIS — T86.5 LYMPHOPROLIFERATIVE DISORDER AFTER STEM CELL TRANSPLANTATION: ICD-10-CM

## 2023-12-28 DIAGNOSIS — Z11.59 ENCOUNTER FOR SCREENING FOR OTHER VIRAL DISEASES: Primary | ICD-10-CM

## 2023-12-28 DIAGNOSIS — D47.Z1 LYMPHOPROLIFERATIVE DISORDER AFTER STEM CELL TRANSPLANTATION: ICD-10-CM

## 2023-12-29 ENCOUNTER — OFFICE VISIT (OUTPATIENT)
Dept: ONCOLOGY | Facility: CLINIC | Age: 88
End: 2023-12-29
Payer: MEDICARE

## 2023-12-29 ENCOUNTER — INFUSION (OUTPATIENT)
Dept: ONCOLOGY | Facility: HOSPITAL | Age: 88
End: 2023-12-29
Payer: MEDICARE

## 2023-12-29 VITALS
RESPIRATION RATE: 16 BRPM | DIASTOLIC BLOOD PRESSURE: 70 MMHG | HEIGHT: 67 IN | TEMPERATURE: 97.7 F | BODY MASS INDEX: 25.11 KG/M2 | HEART RATE: 64 BPM | SYSTOLIC BLOOD PRESSURE: 126 MMHG | WEIGHT: 160 LBS | OXYGEN SATURATION: 97 %

## 2023-12-29 VITALS — SYSTOLIC BLOOD PRESSURE: 125 MMHG | HEART RATE: 58 BPM | DIASTOLIC BLOOD PRESSURE: 72 MMHG

## 2023-12-29 DIAGNOSIS — C85.90 LYMPHOMA, UNSPECIFIED BODY REGION, UNSPECIFIED LYMPHOMA TYPE: Primary | ICD-10-CM

## 2023-12-29 DIAGNOSIS — D47.Z1 LYMPHOPROLIFERATIVE DISORDER AFTER STEM CELL TRANSPLANTATION: ICD-10-CM

## 2023-12-29 DIAGNOSIS — Z11.59 ENCOUNTER FOR SCREENING FOR OTHER VIRAL DISEASES: ICD-10-CM

## 2023-12-29 DIAGNOSIS — C83.00 SMALL B-CELL LYMPHOMA, UNSPECIFIED BODY REGION: Primary | ICD-10-CM

## 2023-12-29 DIAGNOSIS — C85.90 LYMPHOMA, UNSPECIFIED BODY REGION, UNSPECIFIED LYMPHOMA TYPE: ICD-10-CM

## 2023-12-29 DIAGNOSIS — T86.5 LYMPHOPROLIFERATIVE DISORDER AFTER STEM CELL TRANSPLANTATION: ICD-10-CM

## 2023-12-29 LAB
BASOPHILS # BLD AUTO: 0.01 10*3/MM3 (ref 0–0.2)
BASOPHILS NFR BLD AUTO: 0.2 % (ref 0–1.5)
DEPRECATED RDW RBC AUTO: 47.8 FL (ref 37–54)
EOSINOPHIL # BLD AUTO: 0.02 10*3/MM3 (ref 0–0.4)
EOSINOPHIL NFR BLD AUTO: 0.4 % (ref 0.3–6.2)
ERYTHROCYTE [DISTWIDTH] IN BLOOD BY AUTOMATED COUNT: 14 % (ref 12.3–15.4)
HCT VFR BLD AUTO: 43.4 % (ref 37.5–51)
HGB BLD-MCNC: 14.1 G/DL (ref 13–17.7)
IMM GRANULOCYTES # BLD AUTO: 0.01 10*3/MM3 (ref 0–0.05)
IMM GRANULOCYTES NFR BLD AUTO: 0.2 % (ref 0–0.5)
LYMPHOCYTES # BLD AUTO: 1.58 10*3/MM3 (ref 0.7–3.1)
LYMPHOCYTES NFR BLD AUTO: 29.4 % (ref 19.6–45.3)
MCH RBC QN AUTO: 29.8 PG (ref 26.6–33)
MCHC RBC AUTO-ENTMCNC: 32.5 G/DL (ref 31.5–35.7)
MCV RBC AUTO: 91.8 FL (ref 79–97)
MONOCYTES # BLD AUTO: 0.89 10*3/MM3 (ref 0.1–0.9)
MONOCYTES NFR BLD AUTO: 16.6 % (ref 5–12)
NEUTROPHILS NFR BLD AUTO: 2.86 10*3/MM3 (ref 1.7–7)
NEUTROPHILS NFR BLD AUTO: 53.2 % (ref 42.7–76)
NRBC BLD AUTO-RTO: 0 /100 WBC (ref 0–0.2)
PLATELET # BLD AUTO: 194 10*3/MM3 (ref 140–450)
PMV BLD AUTO: 11 FL (ref 6–12)
RBC # BLD AUTO: 4.73 10*6/MM3 (ref 4.14–5.8)
WBC NRBC COR # BLD AUTO: 5.37 10*3/MM3 (ref 3.4–10.8)

## 2023-12-29 PROCEDURE — 25010000002 DIPHENHYDRAMINE PER 50 MG: Performed by: NURSE PRACTITIONER

## 2023-12-29 PROCEDURE — 96375 TX/PRO/DX INJ NEW DRUG ADDON: CPT

## 2023-12-29 PROCEDURE — 85025 COMPLETE CBC W/AUTO DIFF WBC: CPT | Performed by: NURSE PRACTITIONER

## 2023-12-29 PROCEDURE — 96415 CHEMO IV INFUSION ADDL HR: CPT

## 2023-12-29 PROCEDURE — 25010000002 RITUXIMAB 10 MG/ML SOLUTION 10 ML VIAL: Performed by: NURSE PRACTITIONER

## 2023-12-29 PROCEDURE — 25010000002 RITUXIMAB 10 MG/ML SOLUTION 50 ML VIAL: Performed by: NURSE PRACTITIONER

## 2023-12-29 PROCEDURE — 25810000003 SODIUM CHLORIDE 0.9 % SOLUTION 250 ML FLEX CONT: Performed by: NURSE PRACTITIONER

## 2023-12-29 PROCEDURE — 96413 CHEMO IV INFUSION 1 HR: CPT

## 2023-12-29 PROCEDURE — 25810000003 SODIUM CHLORIDE 0.9 % SOLUTION: Performed by: NURSE PRACTITIONER

## 2023-12-29 RX ORDER — SODIUM CHLORIDE 9 MG/ML
20 INJECTION, SOLUTION INTRAVENOUS ONCE
Status: CANCELLED | OUTPATIENT
Start: 2023-12-29

## 2023-12-29 RX ORDER — DIPHENHYDRAMINE HYDROCHLORIDE 50 MG/ML
50 INJECTION INTRAMUSCULAR; INTRAVENOUS AS NEEDED
Status: CANCELLED | OUTPATIENT
Start: 2023-12-29

## 2023-12-29 RX ORDER — ACETAMINOPHEN 325 MG/1
650 TABLET ORAL ONCE
Status: COMPLETED | OUTPATIENT
Start: 2023-12-29 | End: 2023-12-29

## 2023-12-29 RX ORDER — FAMOTIDINE 10 MG/ML
20 INJECTION, SOLUTION INTRAVENOUS ONCE
Status: CANCELLED | OUTPATIENT
Start: 2023-12-29

## 2023-12-29 RX ORDER — ACETAMINOPHEN 325 MG/1
650 TABLET ORAL ONCE
Status: CANCELLED | OUTPATIENT
Start: 2023-12-29

## 2023-12-29 RX ORDER — SODIUM CHLORIDE 9 MG/ML
20 INJECTION, SOLUTION INTRAVENOUS ONCE
Status: COMPLETED | OUTPATIENT
Start: 2023-12-29 | End: 2023-12-29

## 2023-12-29 RX ORDER — FAMOTIDINE 10 MG/ML
20 INJECTION, SOLUTION INTRAVENOUS AS NEEDED
Status: CANCELLED | OUTPATIENT
Start: 2023-12-29

## 2023-12-29 RX ORDER — FAMOTIDINE 10 MG/ML
20 INJECTION, SOLUTION INTRAVENOUS ONCE
Status: COMPLETED | OUTPATIENT
Start: 2023-12-29 | End: 2023-12-29

## 2023-12-29 RX ADMIN — DIPHENHYDRAMINE HYDROCHLORIDE 25 MG: 50 INJECTION, SOLUTION INTRAMUSCULAR; INTRAVENOUS at 09:45

## 2023-12-29 RX ADMIN — SODIUM CHLORIDE 20 ML/HR: 9 INJECTION, SOLUTION INTRAVENOUS at 09:40

## 2023-12-29 RX ADMIN — ACETAMINOPHEN 650 MG: 325 TABLET ORAL at 09:41

## 2023-12-29 RX ADMIN — RITUXIMAB 690 MG: 10 INJECTION, SOLUTION INTRAVENOUS at 10:29

## 2023-12-29 RX ADMIN — FAMOTIDINE 20 MG: 10 INJECTION INTRAVENOUS at 09:42

## 2023-12-29 NOTE — PROGRESS NOTES
"[facility] GROUP OUTPATIENT FOLLOW UP CLINIC VISIT    REASON FOR FOLLOW-UP:    B-cell non-Hodgkin lymphoma, CD20 positive involving the distal ileum    HISTORY OF PRESENT ILLNESS:  Vicente Delgado is a 89 y.o. male who returns today for follow up of the above issue.     He returns today anticipating his second dose of rituximab which is planned weekly for 4 weeks.    He continues to tolerate his treatment well and continues to feel great.  He remarks again today that \"if he was not here, he would not even know he was sick.\"    He remains off of his vancomycin for approximately 1 week now that was tapered for recurrent C. difficile.  He is feeling well and denies any GI symptoms.  He does report he remains quite anxious another recurrence of C. difficile but is hopeful he can stay away well.  No other concerns noted at this time.    REVIEW OF SYSTEMS:  As per the HPI    PHYSICAL EXAMINATION:  Vitals:    12/29/23 0844   BP: 126/70   Pulse: 64   Resp: 16   Temp: 97.7 °F (36.5 °C)   TempSrc: Temporal   SpO2: 97%   Weight: 72.6 kg (160 lb)   Height: 170 cm (66.93\")   PainSc: 0-No pain           General:  No acute distress, awake, alert and oriented  Skin:  Warm and dry, no visible rash  HEENT:  Normocephalic/atraumatic.  Hard of hearing.  Chest:  Normal respiratory effort.  Lungs clear to auscultation bilaterally.  Heart: Regular rate and rhythm  Extremities:  No visible clubbing, cyanosis, or edema  Neuro/psych:  Grossly nonfocal.  Normal mood and affect.       DIAGNOSTIC DATA:  Results for orders placed or performed in visit on 12/29/23   CBC Auto Differential    Specimen: Blood   Result Value Ref Range    WBC 5.37 3.40 - 10.80 10*3/mm3    RBC 4.73 4.14 - 5.80 10*6/mm3    Hemoglobin 14.1 13.0 - 17.7 g/dL    Hematocrit 43.4 37.5 - 51.0 %    MCV 91.8 79.0 - 97.0 fL    MCH 29.8 26.6 - 33.0 pg    MCHC 32.5 31.5 - 35.7 g/dL    RDW 14.0 12.3 - 15.4 %    RDW-SD 47.8 37.0 - 54.0 fl    MPV 11.0 6.0 - 12.0 fL    " Platelets 194 140 - 450 10*3/mm3    Neutrophil % 53.2 42.7 - 76.0 %    Lymphocyte % 29.4 19.6 - 45.3 %    Monocyte % 16.6 (H) 5.0 - 12.0 %    Eosinophil % 0.4 0.3 - 6.2 %    Basophil % 0.2 0.0 - 1.5 %    Immature Grans % 0.2 0.0 - 0.5 %    Neutrophils, Absolute 2.86 1.70 - 7.00 10*3/mm3    Lymphocytes, Absolute 1.58 0.70 - 3.10 10*3/mm3    Monocytes, Absolute 0.89 0.10 - 0.90 10*3/mm3    Eosinophils, Absolute 0.02 0.00 - 0.40 10*3/mm3    Basophils, Absolute 0.01 0.00 - 0.20 10*3/mm3    Immature Grans, Absolute 0.01 0.00 - 0.05 10*3/mm3    nRBC 0.0 0.0 - 0.2 /100 WBC     *Note: Due to a large number of results and/or encounters for the requested time period, some results have not been displayed. A complete set of results can be found in Results Review.       IMAGING:    NM PET/CT Skull Base to Mid Thigh (11/08/2023 11:14)     ASSESSMENT:    This is a 89 y.o. male with:    *B-cell non-Hodgkin lymphoma, CD20 positive involving the distal ileum  The patient began experiencing abdominal discomfort and diarrhea in early September after eating what he felt was some bad fish.    He presented to the emergency department on 9/20/2023.    CT imaging of the abdomen and pelvis was performed showing an enhancing mass in the cecum measuring about 3 cm with adjacent enlarged pericecal lymph nodes measuring up to 1.6 cm.  Mild sigmoid diverticulosis was noted as well as mild wall thickening involving the mid sigmoid colon.  He was referred for colonoscopy which was performed by Dr. Ary Conway with colorectal surgery on 10/18/2023.  This showed multiple diverticula in sigmoid colon, 1 nonbleeding polyp in the distal ileum at 15 mm.    Biopsies show involvement by atypical CD20 positive B-cell non-Hodgkin lymphoma with extensive crush artifact and a Ki-67 that is estimated greater than 80%.  However, definitive morphology was not able to be visualized and rebiopsy has been suggested.  The initial biopsy was quite difficult per   Man given the location  A PET scan was performed on 11/8/2023.  There is some subtle hypermetabolic activity at the terminal ileum, maximal SUV 3.8 and an area measuring approximately 3 cm.  Adjacent mesenteric nodes are not significantly hypermetabolic and are either photopenic or have blood pool level activity.  Nonspecific low-level activity at the distal esophagus and GE junction with a maximal SUV of 4.3.  No obvious mass on CT imaging.  Spleen size normal.  No hypermetabolic cavity there.  Blood pool activity mediastinal lymphadenopathy  12/15/2023: 4 weeks of rituximab anticipated.  Cycle number 1 day 1 today.  12/22/2023: Proceed with week 2 rituximab.  Patient tolerated week 1 well and is overall feeling improvement in his energy.  12/29/2023: Proceed with week 3 rituximab.  Patient continues to have excellent tolerance to treatment and is feeling well.     *C. difficile diarrhea  Symptoms initially resolved after 2 courses of oral vancomycin  Now with recurrent symptoms  CT imaging 11/12/2023 with thick-walled appearance of the colon from the splenic flexure to the rectum with most significant involvement in the rectosigmoid colon, adjacent pericolonic soft tissue stranding consistent with colitis.  Mass in the cecum less apparent compared to 9/20/2023.  Dr. Pretty with ID evaluated him.  He recommended pursuing a longer vancomycin taper for approximately 5 weeks of therapy.  Therapy complete.  Symptoms essentially resolved.  12/22/2023: Patient reports he has 1 more day remaining of his vancomycin.  His doctor has had him slowly tapering off of his vancomycin.  He continues to incorporate yogurt into his diet daily.  He is anxious about symptoms returning after he remains off of his medication.  12/29/2023: Patient has remained off antibiotics for a week now and remains free of any GI symptoms.  He continues to have some anxiety related to possible recurrence.    *Aortic stenosis     *History of  prostate cancer  Status post radical prostatectomy at approximately age 60     *Hearing loss    *Anxiety    PLAN:   Weekly rituximab 375 mg/m² x 4 doses.   Proceed with week 3 rituximab today  Follow-up with Dr. Pretty with ID should his C. difficile diarrhea return  Return in 1 week for MD follow-up, labs, and week 4 rituximab.     High risk medication requiring intensive monitoring.

## 2024-01-04 NOTE — PROGRESS NOTES
"[facility] GROUP OUTPATIENT FOLLOW UP CLINIC VISIT    REASON FOR FOLLOW-UP:    B-cell non-Hodgkin lymphoma, CD20 positive involving the distal ileum    HISTORY OF PRESENT ILLNESS:  Vicente Delgado is a 89 y.o. male who returns today for follow up of the above issue.     Week #4 rituximab today    He has tolerated therapy very well and denies any new issues today. He continues Iván and yogurt to try to prevent recurrence of the c dif. Bowel habits are not entirely normal but he denies any diarrhea or other symptoms concerning for c dif.       REVIEW OF SYSTEMS:  As per the HPI    PHYSICAL EXAMINATION:  Vitals:    01/05/24 0750   BP: 111/64   Pulse: 65   Resp: 18   Temp: 97.8 °F (36.6 °C)   TempSrc: Temporal   SpO2: 96%   Weight: 72.3 kg (159 lb 6.4 oz)   Height: 170 cm (66.93\")   PainSc: 0-No pain       General:  No acute distress, awake, alert and oriented  Skin:  Warm and dry, no visible rash  HEENT:  Normocephalic/atraumatic.  Remains hard of hearing.  Chest:  Normal respiratory effort.  Lungs clear to auscultation bilaterally.  Heart: Regular rate and rhythm, grade II/VI systolic murmur.  Extremities:  No visible clubbing, cyanosis, or edema  Neuro/psych:  Grossly nonfocal.  Normal mood and affect.       DIAGNOSTIC DATA:  CBC and Differential (01/05/2024 07:28)     IMAGING:    None reviewed    ASSESSMENT:    This is a 89 y.o. male with:    *B-cell non-Hodgkin lymphoma, CD20 positive involving the distal ileum  The patient began experiencing abdominal discomfort and diarrhea in early September after eating what he felt was some bad fish.    He presented to the emergency department on 9/20/2023.    CT imaging of the abdomen and pelvis was performed showing an enhancing mass in the cecum measuring about 3 cm with adjacent enlarged pericecal lymph nodes measuring up to 1.6 cm.  Mild sigmoid diverticulosis was noted as well as mild wall thickening involving the mid sigmoid colon.  He was referred for " colonoscopy which was performed by Dr. Ary Conway with colorectal surgery on 10/18/2023.  This showed multiple diverticula in sigmoid colon, 1 nonbleeding polyp in the distal ileum at 15 mm.    Biopsies show involvement by atypical CD20 positive B-cell non-Hodgkin lymphoma with extensive crush artifact and a Ki-67 that is estimated greater than 80%.  However, definitive morphology was not able to be visualized and rebiopsy has been suggested.  The initial biopsy was quite difficult per Dr. Conway given the location  A PET scan was performed on 11/8/2023.  There is some subtle hypermetabolic activity at the terminal ileum, maximal SUV 3.8 and an area measuring approximately 3 cm.  Adjacent mesenteric nodes are not significantly hypermetabolic and are either photopenic or have blood pool level activity.  Nonspecific low-level activity at the distal esophagus and GE junction with a maximal SUV of 4.3.  No obvious mass on CT imaging.  Spleen size normal.  No hypermetabolic cavity there.  Blood pool activity mediastinal lymphadenopathy  12/15/2023: 4 weeks of rituximab anticipated.  Cycle number 1 day 1 today.  12/22/2023: Proceed with week 2 rituximab.  Patient tolerated week 1 well and is overall feeling improvement in his energy.  12/29/2023: Proceed with week 3 rituximab.  Patient continues to have excellent tolerance to treatment and is feeling well.  1/5/24: week 4 ritiximab. Excellent tolerance thus far.      *C. difficile diarrhea  Symptoms initially resolved after 2 courses of oral vancomycin  Now with recurrent symptoms  CT imaging 11/12/2023 with thick-walled appearance of the colon from the splenic flexure to the rectum with most significant involvement in the rectosigmoid colon, adjacent pericolonic soft tissue stranding consistent with colitis.  Mass in the cecum less apparent compared to 9/20/2023.  Dr. Pretty with ID evaluated him.  He recommended pursuing a longer vancomycin taper for approximately 5  weeks of therapy.  Therapy complete.  Symptoms essentially resolved.  12/22/2023: Patient reports he has 1 more day remaining of his vancomycin.  His doctor has had him slowly tapering off of his vancomycin.  He continues to incorporate yogurt into his diet daily.  He is anxious about symptoms returning after he remains off of his medication.  12/29/2023: Patient has remained off antibiotics for a week now and remains free of any GI symptoms.  He continues to have some anxiety related to possible recurrence.  Remains anxious about recurrence but no evidence to support this. Consuming Bonnyman and yogurt.    *Aortic stenosis     *History of prostate cancer  Status post radical prostatectomy at approximately age 60     *Hearing loss    *Anxiety    PLAN:   Weekly rituximab 375 mg/m² x 4 doses. Week 4 today  Follow-up with Dr. Pretty with ID should his C. difficile diarrhea return  PET scan in about 6 weeks and I will see him in 7 with labs for review. Assuming a positive response on imaging, surveillance after that.    High risk medication requiring intensive monitoring.

## 2024-01-05 ENCOUNTER — INFUSION (OUTPATIENT)
Dept: ONCOLOGY | Facility: HOSPITAL | Age: 89
End: 2024-01-05
Payer: MEDICARE

## 2024-01-05 ENCOUNTER — OFFICE VISIT (OUTPATIENT)
Dept: ONCOLOGY | Facility: CLINIC | Age: 89
End: 2024-01-05
Payer: MEDICARE

## 2024-01-05 VITALS
SYSTOLIC BLOOD PRESSURE: 111 MMHG | DIASTOLIC BLOOD PRESSURE: 64 MMHG | OXYGEN SATURATION: 96 % | HEIGHT: 67 IN | WEIGHT: 159.4 LBS | TEMPERATURE: 97.8 F | HEART RATE: 65 BPM | RESPIRATION RATE: 18 BRPM | BODY MASS INDEX: 25.02 KG/M2

## 2024-01-05 VITALS — SYSTOLIC BLOOD PRESSURE: 154 MMHG | HEART RATE: 64 BPM | DIASTOLIC BLOOD PRESSURE: 71 MMHG

## 2024-01-05 DIAGNOSIS — Z11.59 ENCOUNTER FOR SCREENING FOR OTHER VIRAL DISEASES: ICD-10-CM

## 2024-01-05 DIAGNOSIS — D47.Z1 LYMPHOPROLIFERATIVE DISORDER AFTER STEM CELL TRANSPLANTATION: ICD-10-CM

## 2024-01-05 DIAGNOSIS — C85.90 LYMPHOMA, UNSPECIFIED BODY REGION, UNSPECIFIED LYMPHOMA TYPE: ICD-10-CM

## 2024-01-05 DIAGNOSIS — C85.90 LYMPHOMA, UNSPECIFIED BODY REGION, UNSPECIFIED LYMPHOMA TYPE: Primary | ICD-10-CM

## 2024-01-05 DIAGNOSIS — T86.5 LYMPHOPROLIFERATIVE DISORDER AFTER STEM CELL TRANSPLANTATION: ICD-10-CM

## 2024-01-05 DIAGNOSIS — C18.0 MALIGNANT NEOPLASM OF CECUM: ICD-10-CM

## 2024-01-05 LAB
BASOPHILS # BLD AUTO: 0.01 10*3/MM3 (ref 0–0.2)
BASOPHILS NFR BLD AUTO: 0.2 % (ref 0–1.5)
DEPRECATED RDW RBC AUTO: 47.3 FL (ref 37–54)
EOSINOPHIL # BLD AUTO: 0.02 10*3/MM3 (ref 0–0.4)
EOSINOPHIL NFR BLD AUTO: 0.3 % (ref 0.3–6.2)
ERYTHROCYTE [DISTWIDTH] IN BLOOD BY AUTOMATED COUNT: 14 % (ref 12.3–15.4)
HCT VFR BLD AUTO: 42.2 % (ref 37.5–51)
HGB BLD-MCNC: 13.9 G/DL (ref 13–17.7)
IMM GRANULOCYTES # BLD AUTO: 0.02 10*3/MM3 (ref 0–0.05)
IMM GRANULOCYTES NFR BLD AUTO: 0.3 % (ref 0–0.5)
LYMPHOCYTES # BLD AUTO: 1.52 10*3/MM3 (ref 0.7–3.1)
LYMPHOCYTES NFR BLD AUTO: 24.7 % (ref 19.6–45.3)
MCH RBC QN AUTO: 30.2 PG (ref 26.6–33)
MCHC RBC AUTO-ENTMCNC: 32.9 G/DL (ref 31.5–35.7)
MCV RBC AUTO: 91.5 FL (ref 79–97)
MONOCYTES # BLD AUTO: 1.18 10*3/MM3 (ref 0.1–0.9)
MONOCYTES NFR BLD AUTO: 19.2 % (ref 5–12)
NEUTROPHILS NFR BLD AUTO: 3.41 10*3/MM3 (ref 1.7–7)
NEUTROPHILS NFR BLD AUTO: 55.3 % (ref 42.7–76)
NRBC BLD AUTO-RTO: 0 /100 WBC (ref 0–0.2)
PLATELET # BLD AUTO: 183 10*3/MM3 (ref 140–450)
PMV BLD AUTO: 10.7 FL (ref 6–12)
RBC # BLD AUTO: 4.61 10*6/MM3 (ref 4.14–5.8)
WBC NRBC COR # BLD AUTO: 6.16 10*3/MM3 (ref 3.4–10.8)

## 2024-01-05 PROCEDURE — 25010000002 RITUXIMAB 10 MG/ML SOLUTION 10 ML VIAL: Performed by: INTERNAL MEDICINE

## 2024-01-05 PROCEDURE — 25010000002 RITUXIMAB 10 MG/ML SOLUTION 50 ML VIAL: Performed by: INTERNAL MEDICINE

## 2024-01-05 PROCEDURE — 25810000003 SODIUM CHLORIDE 0.9 % SOLUTION 250 ML FLEX CONT: Performed by: INTERNAL MEDICINE

## 2024-01-05 PROCEDURE — 96413 CHEMO IV INFUSION 1 HR: CPT

## 2024-01-05 PROCEDURE — 96415 CHEMO IV INFUSION ADDL HR: CPT

## 2024-01-05 PROCEDURE — 25010000002 DIPHENHYDRAMINE PER 50 MG: Performed by: INTERNAL MEDICINE

## 2024-01-05 PROCEDURE — 25810000003 SODIUM CHLORIDE 0.9 % SOLUTION: Performed by: INTERNAL MEDICINE

## 2024-01-05 PROCEDURE — 1126F AMNT PAIN NOTED NONE PRSNT: CPT | Performed by: INTERNAL MEDICINE

## 2024-01-05 PROCEDURE — 99214 OFFICE O/P EST MOD 30 MIN: CPT | Performed by: INTERNAL MEDICINE

## 2024-01-05 PROCEDURE — 1159F MED LIST DOCD IN RCRD: CPT | Performed by: INTERNAL MEDICINE

## 2024-01-05 PROCEDURE — 96375 TX/PRO/DX INJ NEW DRUG ADDON: CPT

## 2024-01-05 PROCEDURE — 85025 COMPLETE CBC W/AUTO DIFF WBC: CPT | Performed by: INTERNAL MEDICINE

## 2024-01-05 PROCEDURE — 1160F RVW MEDS BY RX/DR IN RCRD: CPT | Performed by: INTERNAL MEDICINE

## 2024-01-05 RX ORDER — FAMOTIDINE 10 MG/ML
20 INJECTION, SOLUTION INTRAVENOUS ONCE
Status: CANCELLED | OUTPATIENT
Start: 2024-01-05

## 2024-01-05 RX ORDER — FAMOTIDINE 10 MG/ML
20 INJECTION, SOLUTION INTRAVENOUS AS NEEDED
Status: CANCELLED | OUTPATIENT
Start: 2024-01-05

## 2024-01-05 RX ORDER — ACETAMINOPHEN 325 MG/1
650 TABLET ORAL ONCE
Status: CANCELLED | OUTPATIENT
Start: 2024-01-05

## 2024-01-05 RX ORDER — SODIUM CHLORIDE 9 MG/ML
20 INJECTION, SOLUTION INTRAVENOUS ONCE
Status: CANCELLED | OUTPATIENT
Start: 2024-01-05

## 2024-01-05 RX ORDER — DIPHENHYDRAMINE HYDROCHLORIDE 50 MG/ML
50 INJECTION INTRAMUSCULAR; INTRAVENOUS AS NEEDED
Status: CANCELLED | OUTPATIENT
Start: 2024-01-05

## 2024-01-05 RX ORDER — ACETAMINOPHEN 325 MG/1
650 TABLET ORAL ONCE
Status: COMPLETED | OUTPATIENT
Start: 2024-01-05 | End: 2024-01-05

## 2024-01-05 RX ORDER — FAMOTIDINE 10 MG/ML
20 INJECTION, SOLUTION INTRAVENOUS ONCE
Status: COMPLETED | OUTPATIENT
Start: 2024-01-05 | End: 2024-01-05

## 2024-01-05 RX ORDER — SODIUM CHLORIDE 9 MG/ML
20 INJECTION, SOLUTION INTRAVENOUS ONCE
Status: COMPLETED | OUTPATIENT
Start: 2024-01-05 | End: 2024-01-05

## 2024-01-05 RX ORDER — MEPERIDINE HYDROCHLORIDE 25 MG/ML
25 INJECTION INTRAMUSCULAR; INTRAVENOUS; SUBCUTANEOUS
Status: CANCELLED | OUTPATIENT
Start: 2024-01-05

## 2024-01-05 RX ADMIN — SODIUM CHLORIDE 20 ML/HR: 9 INJECTION, SOLUTION INTRAVENOUS at 08:51

## 2024-01-05 RX ADMIN — ACETAMINOPHEN 650 MG: 325 TABLET ORAL at 08:51

## 2024-01-05 RX ADMIN — FAMOTIDINE 20 MG: 10 INJECTION INTRAVENOUS at 08:52

## 2024-01-05 RX ADMIN — DIPHENHYDRAMINE HYDROCHLORIDE 25 MG: 50 INJECTION INTRAMUSCULAR; INTRAVENOUS at 08:55

## 2024-01-05 RX ADMIN — RITUXIMAB 690 MG: 10 INJECTION, SOLUTION INTRAVENOUS at 09:42

## 2024-01-16 ENCOUNTER — OFFICE VISIT (OUTPATIENT)
Dept: CARDIOLOGY | Facility: CLINIC | Age: 89
End: 2024-01-16
Payer: MEDICARE

## 2024-01-16 VITALS
HEART RATE: 66 BPM | SYSTOLIC BLOOD PRESSURE: 118 MMHG | HEIGHT: 67 IN | DIASTOLIC BLOOD PRESSURE: 68 MMHG | BODY MASS INDEX: 25.05 KG/M2 | WEIGHT: 159.6 LBS

## 2024-01-16 DIAGNOSIS — I10 PRIMARY HYPERTENSION: Primary | Chronic | ICD-10-CM

## 2024-01-16 DIAGNOSIS — I50.32 CHRONIC HEART FAILURE WITH PRESERVED EJECTION FRACTION (HFPEF): ICD-10-CM

## 2024-01-16 DIAGNOSIS — I35.0 AORTIC STENOSIS, MODERATE: Chronic | ICD-10-CM

## 2024-01-16 DIAGNOSIS — I49.3 PVC (PREMATURE VENTRICULAR CONTRACTION): ICD-10-CM

## 2024-01-16 DIAGNOSIS — E78.2 MIXED HYPERLIPIDEMIA: Chronic | ICD-10-CM

## 2024-01-16 RX ORDER — ROSUVASTATIN CALCIUM 10 MG/1
10 TABLET, COATED ORAL NIGHTLY
Qty: 90 TABLET | Refills: 3 | Status: SHIPPED | OUTPATIENT
Start: 2024-01-16

## 2024-01-16 RX ORDER — BENAZEPRIL HYDROCHLORIDE 20 MG/1
10 TABLET ORAL 2 TIMES DAILY
Qty: 90 TABLET | Refills: 3
Start: 2024-01-16
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
## 2024-02-12 ENCOUNTER — OFFICE VISIT (OUTPATIENT)
Dept: INTERNAL MEDICINE | Facility: CLINIC | Age: 89
End: 2024-02-12
Payer: MEDICARE

## 2024-02-12 VITALS
SYSTOLIC BLOOD PRESSURE: 132 MMHG | HEIGHT: 67 IN | DIASTOLIC BLOOD PRESSURE: 78 MMHG | OXYGEN SATURATION: 96 % | HEART RATE: 62 BPM | WEIGHT: 163.6 LBS | BODY MASS INDEX: 25.68 KG/M2

## 2024-02-12 DIAGNOSIS — E78.2 MIXED HYPERLIPIDEMIA: Chronic | ICD-10-CM

## 2024-02-12 DIAGNOSIS — I10 PRIMARY HYPERTENSION: Chronic | ICD-10-CM

## 2024-02-12 DIAGNOSIS — I25.10 CORONARY ARTERY DISEASE INVOLVING NATIVE CORONARY ARTERY OF NATIVE HEART WITHOUT ANGINA PECTORIS: Chronic | ICD-10-CM

## 2024-02-12 DIAGNOSIS — Z00.00 MEDICARE ANNUAL WELLNESS VISIT, SUBSEQUENT: Primary | ICD-10-CM

## 2024-02-12 DIAGNOSIS — I48.0 PAROXYSMAL ATRIAL FIBRILLATION: Chronic | ICD-10-CM

## 2024-02-12 DIAGNOSIS — K21.00 GASTROESOPHAGEAL REFLUX DISEASE WITH ESOPHAGITIS WITHOUT HEMORRHAGE: Chronic | ICD-10-CM

## 2024-02-12 DIAGNOSIS — C85.90 LYMPHOMA, UNSPECIFIED BODY REGION, UNSPECIFIED LYMPHOMA TYPE: Chronic | ICD-10-CM

## 2024-02-12 NOTE — PROGRESS NOTES
"The ABCs of the Annual Wellness Visit  Subsequent Medicare Wellness Visit    Subjective    Vicente Delgado is a 89 y.o. male who presents for a Subsequent Medicare Wellness Visit.    The following portions of the patient's history were reviewed and   updated as appropriate: {history reviewed:20406::\"allergies\",\"current medications\",\"past family history\",\"past medical history\",\"past social history\",\"past surgical history\",\"problem list\"}.    Compared to one year ago, the patient feels his physical   health is {better worse same:24627}.    Compared to one year ago, the patient feels his mental   health is {better worse same:74134}.    Recent Hospitalizations:  This patient has had a [facility] [place] admission record on file within the last 365 days.    Current Medical Providers:  Patient Care Team:  Mechelle Landa APRN as PCP - General (Internal Medicine)  Reginaldo Palacio MD (Dermatology)  Janki Elias MD as Consulting Physician (Ophthalmology)  Kristopher Machado DPM as Consulting Physician (Podiatry)  Aby Marquez MD as Consulting Physician (Hand Surgery)  Wilton Ramos MD as Consulting Physician (Orthopedic Surgery)  Destinee Snider MD as Consulting Physician (Pain Medicine)  Breezy Frausto MD as Consulting Physician (Cardiology)  Velia Watkins PA-C as Physician Assistant (Physician Assistant)  Hafsa Conway MD as Referring Physician (Colon and Rectal Surgery)  Justin Berry MD as Consulting Physician (Hematology and Oncology)    Outpatient Medications Prior to Visit   Medication Sig Dispense Refill    benazepril (LOTENSIN) 20 MG tablet Take 0.5 tablets by mouth 2 (Two) Times a Day. 90 tablet 3    ezetimibe (ZETIA) 10 MG tablet TAKE ONE TABLET BY MOUTH DAILY 90 tablet 3    rosuvastatin (CRESTOR) 10 MG tablet Take 1 tablet by mouth Every Night. 90 tablet 3     No facility-administered medications prior to visit.       No opioid medication identified on active medication " "list. I have reviewed chart for other potential  high risk medication/s and harmful drug interactions in the elderly.        Aspirin is not on active medication list.  {ASPIRIN NOT ON MEDICATION LIST INDICATED/NOT INDICATED:58934}.    Patient Active Problem List   Diagnosis    HTN (hypertension)    Nonrheumatic mitral valve regurgitation    CAD (coronary artery disease)    Hyperlipidemia    Calculus of gallbladder without cholecystitis without obstruction    S/P cholecystectomy    Nonrheumatic aortic valve insufficiency    Non-rheumatic mitral regurgitation    Trigger middle finger of left hand    Gastroesophageal reflux disease with esophagitis    Carpal tunnel syndrome of left wrist    Amaurosis fugax of left eye    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    Atherosclerosis of native artery of left lower extremity with intermittent claudication    Anxiety    Chronic stable angina    Aortic stenosis, moderate    Chronic right shoulder pain    Controlled substance agreement signed    TIA (transient ischemic attack)    Paroxysmal atrial fibrillation    Epistaxis    History of prostate cancer    Lupus    TAM (nonalcoholic steatohepatitis)    Chronic diastolic CHF (congestive heart failure) 1a    Colitis due to enteropathogenic Escherichia coli    Chronic heart failure with preserved ejection fraction (HFpEF)    C. 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  {ACP Discussion, Advance Directive in EMR:76807}     Objective    Vitals:    02/12/24 1248   BP: 132/78   BP Location: Left arm   Patient Position: Sitting   Cuff Size: Adult   Pulse: 62   SpO2: 96%   Weight: 74.2 kg (163 lb 9.6 oz)   Height: 170 cm (66.93\")   PainSc: 0-No pain     Estimated body mass index is 25.68 kg/m² as calculated from the following:    Height as of this " "encounter: 170 cm (66.93\").    Weight as of this encounter: 74.2 kg (163 lb 9.6 oz).           Does the patient have evidence of cognitive impairment? {Yes/No:05660}          HEALTH RISK ASSESSMENT    Smoking Status:  Social History     Tobacco Use   Smoking Status Some Days    Types: Cigars    Passive exposure: Yes   Smokeless Tobacco Never   Tobacco Comments    Smoke a cigar ocassionally in warm weather.       Alcohol Consumption:  Social History     Substance and Sexual Activity   Alcohol Use No    Comment: Daily caffeine use     Fall Risk Screen:    EZRA Fall Risk Assessment was completed, and patient is at LOW risk for falls.Assessment completed on:2024    Depression Screenin/12/2024    12:57 PM   PHQ-2/PHQ-9 Depression Screening   Little Interest or Pleasure in Doing Things 0-->not at all   Feeling Down, Depressed or Hopeless 0-->not at all   PHQ-9: Brief Depression Severity Measure Score 0       Health Habits and Functional and Cognitive Screenin/12/2024    12:52 PM   Functional & Cognitive Status   Do you have difficulty preparing food and eating? No   Do you have difficulty bathing yourself, getting dressed or grooming yourself? No   Do you have difficulty using the toilet? No   Do you have difficulty moving around from place to place? No   Do you have trouble with steps or getting out of a bed or a chair? No   Current Diet Other   Dental Exam Up to date   Eye Exam Up to date   Exercise (times per week) 7 times per week   Current Exercises Include Aerobics   Do you need help using the phone?  No   Are you deaf or do you have serious difficulty hearing?  Yes   Do you need help to go to places out of walking distance? No   Do you need help shopping? No   Do you need help preparing meals?  No   Do you need help with housework?  No   Do you need help with laundry? No   Do you need help taking your medications? No   Do you need help managing money? No   Do you ever drive or ride in a car " without wearing a seat belt? Yes   Have you felt unusual stress, anger or loneliness in the last month? No   Who do you live with? Alone   If you need help, do you have trouble finding someone available to you? No   Have you been bothered in the last four weeks by sexual problems? No   Do you have difficulty concentrating, remembering or making decisions? No       Age-appropriate Screening Schedule:  Refer to the list below for future screening recommendations based on patient's age, sex and/or medical conditions. Orders for these recommended tests are listed in the plan section. The patient has been provided with a written plan.    Health Maintenance   Topic Date Due    ZOSTER VACCINE (1 of 2) Never done    RSV Vaccine - Adults (1 - 1-dose 60+ series) Never done    TDAP/TD VACCINES (2 - Tdap) 08/10/2015    ANNUAL WELLNESS VISIT  10/17/2023    LIPID PANEL  11/15/2023    COVID-19 Vaccine (10 - 2023-24 season) 12/08/2023    BMI FOLLOWUP  06/09/2024    INFLUENZA VACCINE  Completed    Pneumococcal Vaccine 65+  Completed                  CMS Preventative Services Quick Reference  Risk Factors Identified During Encounter  {Medicare Wellness Risk Factors:04353}  The above risks/problems have been discussed with the patient.  Pertinent information has been shared with the patient in the After Visit Summary.  An After Visit Summary and PPPS were made available to the patient.    Follow Up:   Next Medicare Wellness visit to be scheduled in 1 year.   {Wrapup  Review (Popup)  Advance Care Planning  Labs  CC  Problem List  Visit Diagnosis  Medications  Result Review  Imaging  Health Maintenance  Quality  BestPractice  SmartSets  SnapShot  Encounters  Notes  Media  Procedures :23}    Additional E&M Note during same encounter follows:  Patient has multiple medical problems which are significant and separately identifiable that require additional work above and beyond the Medicare Wellness Visit.      Chief  "Complaint  Medicare Wellness-subsequent, Anxiety (A lot of appointments coming up this week), and Follow-up (Raised bump on arm)    Subjective    {Problem List  Visit Diagnosis   Encounters  Notes  Medications  Labs  Result Review Imaging  Media :23}    HPI  Vicente Delgado is also being seen today for subsequent wellness visit.             Objective   Vital Signs:  /78 (BP Location: Left arm, Patient Position: Sitting, Cuff Size: Adult)   Pulse 62   Ht 170 cm (66.93\")   Wt 74.2 kg (163 lb 9.6 oz)   SpO2 96%   BMI 25.68 kg/m²     Physical Exam     {The following data was reviewed by (Optional):99871}  {Ambulatory Labs (Optional):84623}  {Data reviewed (Optional):00960:::1}           Assessment and Plan {CC Problem List  Visit Diagnosis   ROS  Review (Popup)  [facility] Maintenance  Quality  BestPractice  Medications  SmartSets  SnapShot Encounters  Media :23}  Diagnoses and all orders for this visit:    1. Medicare annual wellness visit, subsequent (Primary)    2. Paroxysmal atrial fibrillation    3. Mixed hyperlipidemia    4. Primary hypertension    5. Coronary artery disease involving native coronary artery of native heart without angina pectoris    6. Gastroesophageal reflux disease with esophagitis without hemorrhage    1. Subsequent wellness visit.  He has been tolerating his chemotherapy treatment well. He is scheduled for a PET scan on 02/16/2024.    2. Heartburn.  He no longer needs Pepcid for his heartburn. He is controlling his heartburn symtpoms by managing his diet.    3. C. difficile.  He is no longer having symptoms and feels as if the C. difficile has resolved.    4. Carpal tunnel syndrome.  He has an appointment on 02/19/2024 to test for him for carpel tunnel on his left wrist and then surgery in 03/2024.     5. 1. Advanced directives.  He is doing well. We discussed about advanced directives.    Follow-up  As needed.    Risk assessment:   The patient has a family history " "***   [BMI ***] The patient ***       {Time Spent (Optional):11379}  Follow Up {Instructions Charge Capture  Follow-up Communications :23}  Return in about 6 months (around 8/12/2024).  Patient was given instructions and counseling regarding his condition or for health maintenance advice. Please see specific information pulled into the AVS if appropriate.       Transcribed from ambient dictation for Mechelle Landa, APRN by Molly Patle.  02/12/24   14:50 EST    {BRENNAN Provider Statement:10851::\"Patient or patient representative verbalized consent to the visit recording.\",\"I have personally performed the services described in this document as transcribed by the above individual, and it is both accurate and complete.\"}    "

## 2024-02-12 NOTE — PROGRESS NOTES
The ABCs of the Annual Wellness Visit  Subsequent Medicare Wellness Visit    Subjective    Vicente Delgado is a 89 y.o. male who presents for a Subsequent Medicare Wellness Visit.    The following portions of the patient's history were reviewed and   updated as appropriate: allergies, current medications, past family history, past medical history, past social history, past surgical history, and problem list.    Compared to one year ago, the patient feels his physical   health is the same.    Compared to one year ago, the patient feels his mental   health is the same.    Recent Hospitalizations:  This patient has had a [facility] admission record on file within the last 365 days.    Current Medical Providers:  Patient Care Team:  Mechelle Landa APRN as PCP - General (Internal Medicine)  Reginaldo Palacio MD (Dermatology)  Janki Elias MD as Consulting Physician (Ophthalmology)  Kristopher Machado DPM as Consulting Physician (Podiatry)  Aby Marquez MD as Consulting Physician (Hand Surgery)  Wilton Ramos MD as Consulting Physician (Orthopedic Surgery)  Destinee Snider MD as Consulting Physician (Pain Medicine)  Breezy Frausto MD as Consulting Physician (Cardiology)  Velia Watkins PA-C as Physician Assistant (Physician Assistant)  Hafsa Conway MD as Referring Physician (Colon and Rectal Surgery)  Justin Berry MD as Consulting Physician (Hematology and Oncology)    Outpatient Medications Prior to Visit   Medication Sig Dispense Refill    benazepril (LOTENSIN) 20 MG tablet Take 0.5 tablets by mouth 2 (Two) Times a Day. 90 tablet 3    ezetimibe (ZETIA) 10 MG tablet TAKE ONE TABLET BY MOUTH DAILY 90 tablet 3    rosuvastatin (CRESTOR) 10 MG tablet Take 1 tablet by mouth Every Night. 90 tablet 3     No facility-administered medications prior to visit.       No opioid medication identified on active medication list. I have reviewed chart for other potential  high risk  "medication/s and harmful drug interactions in the elderly.        Aspirin is not on active medication list.  Aspirin use is not indicated based on review of current medical condition/s. Risk of harm outweighs potential benefits.  .    Patient Active Problem List   Diagnosis    HTN (hypertension)    Nonrheumatic mitral valve regurgitation    CAD (coronary artery disease)    Hyperlipidemia    Calculus of gallbladder without cholecystitis without obstruction    S/P cholecystectomy    Nonrheumatic aortic valve insufficiency    Non-rheumatic mitral regurgitation    Trigger middle finger of left hand    Gastroesophageal reflux disease with esophagitis    Carpal tunnel syndrome of left wrist    Amaurosis fugax of left eye    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    Atherosclerosis of native artery of left lower extremity with intermittent claudication    Anxiety    Chronic stable angina    Aortic stenosis, moderate    Chronic right shoulder pain    Controlled substance agreement signed    TIA (transient ischemic attack)    Paroxysmal atrial fibrillation    Epistaxis    History of prostate cancer    Lupus    TAM (nonalcoholic steatohepatitis)    Chronic diastolic CHF (congestive heart failure) 1a    Colitis due to enteropathogenic Escherichia coli    Chronic heart failure with preserved ejection fraction (HFpEF)    C. difficile diarrhea    Lymphoma    Exudative age-related macular degeneration, bilateral, with active choroidal neovascularization     Advance Care Planning   Advance Care Planning     Advance Directive is on file.  ACP discussion was held with the patient during this visit. Patient has an advance directive in EMR which is still valid.      Objective    Vitals:    02/12/24 1248   BP: 132/78   BP Location: Left arm   Patient Position: Sitting   Cuff Size: Adult   Pulse: 62   SpO2: 96%   Weight: 74.2 kg (163 lb 9.6 oz)   Height: 170 cm (66.93\")   PainSc: 0-No pain     Estimated body mass index is 25.68 kg/m² " "as calculated from the following:    Height as of this encounter: 170 cm (66.93\").    Weight as of this encounter: 74.2 kg (163 lb 9.6 oz).           Does the patient have evidence of cognitive impairment? No          HEALTH RISK ASSESSMENT    Smoking Status:  Social History     Tobacco Use   Smoking Status Some Days    Types: Cigars    Passive exposure: Yes   Smokeless Tobacco Never   Tobacco Comments    Smoke a cigar ocassionally in warm weather.       Alcohol Consumption:  Social History     Substance and Sexual Activity   Alcohol Use No    Comment: Daily caffeine use     Fall Risk Screen:    STEADI Fall Risk Assessment was completed, and patient is at LOW risk for falls.Assessment completed on:2024    Depression Screenin/12/2024    12:57 PM   PHQ-2/PHQ-9 Depression Screening   Little Interest or Pleasure in Doing Things 0-->not at all   Feeling Down, Depressed or Hopeless 0-->not at all   PHQ-9: Brief Depression Severity Measure Score 0       Health Habits and Functional and Cognitive Screenin/12/2024    12:52 PM   Functional & Cognitive Status   Do you have difficulty preparing food and eating? No   Do you have difficulty bathing yourself, getting dressed or grooming yourself? No   Do you have difficulty using the toilet? No   Do you have difficulty moving around from place to place? No   Do you have trouble with steps or getting out of a bed or a chair? No   Current Diet Other   Dental Exam Up to date   Eye Exam Up to date   Exercise (times per week) 7 times per week   Current Exercises Include Aerobics   Do you need help using the phone?  No   Are you deaf or do you have serious difficulty hearing?  Yes   Do you need help to go to places out of walking distance? No   Do you need help shopping? No   Do you need help preparing meals?  No   Do you need help with housework?  No   Do you need help with laundry? No   Do you need help taking your medications? No   Do you need help managing " money? No   Do you ever drive or ride in a car without wearing a seat belt? Yes   Have you felt unusual stress, anger or loneliness in the last month? No   Who do you live with? Alone   If you need help, do you have trouble finding someone available to you? No   Have you been bothered in the last four weeks by sexual problems? No   Do you have difficulty concentrating, remembering or making decisions? No       Age-appropriate Screening Schedule:  Refer to the list below for future screening recommendations based on patient's age, sex and/or medical conditions. Orders for these recommended tests are listed in the plan section. The patient has been provided with a written plan.    Health Maintenance   Topic Date Due    ZOSTER VACCINE (1 of 2) Never done    RSV Vaccine - Adults (1 - 1-dose 60+ series) Never done    TDAP/TD VACCINES (2 - Tdap) 08/10/2015    LIPID PANEL  11/15/2023    COVID-19 Vaccine (10 - 2023-24 season) 12/08/2023    BMI FOLLOWUP  06/09/2024    ANNUAL WELLNESS VISIT  02/12/2025    INFLUENZA VACCINE  Completed    Pneumococcal Vaccine 65+  Completed                  CMS Preventative Services Quick Reference  Risk Factors Identified During Encounter  Immunizations Discussed/Encouraged: Tdap, Shingrix, COVID19, and RSV (Respiratory Syncytial Virus)  The above risks/problems have been discussed with the patient.  Pertinent information has been shared with the patient in the After Visit Summary.  An After Visit Summary and PPPS were made available to the patient.    Follow Up:   Next Medicare Wellness visit to be scheduled in 1 year.       Additional E&M Note during same encounter follows:  Patient has multiple medical problems which are significant and separately identifiable that require additional work above and beyond the Medicare Wellness Visit.      Chief Complaint  Medicare Wellness-subsequent, Follow-up (Raised bump on arm), Hypertension, Heartburn, and Diarrhea    Subjective        HPI  Vicente PORTER  "Danny is also being seen today for f/u regarding HTN, GERD and recent C diff infection.    He has completed treatment for C diff which has been effective, states his bowel movements are regular. His heartburn continues to improve if he monitors his diet closely. He no longer needs Pepcid in the morning if he manages his diet properly.    He saw Dr. Ladi Mcmanus in 01/2024. He completed 4 chemotherapy infusions for lymphoma and is scheduled for a PET scan on 02/16/2024.     He is seeing a retinal specialist tomorrow, referred by his opth Dr. Elias.    His grandson told him to check about receiving a DNR.  He expresses some confusion between himself and his family on his DNR wishes. He does have a living will in place. He saw cardiology in 01/2024.     He has an appointment on 02/19/2024 to test for him for carpel tunnel on his left wrist.     He has 2 daughters who help him with finances.  Review of Systems   Constitutional:  Negative for fatigue and fever.   HENT:  Positive for congestion, postnasal drip and rhinorrhea.    Respiratory:  Negative for cough, chest tightness and shortness of breath.    Cardiovascular:  Negative for chest pain, palpitations and leg swelling.   Gastrointestinal:  Negative for abdominal pain.        Dyspepsia improving   Musculoskeletal:  Positive for arthralgias.       Objective   Vital Signs:  /78 (BP Location: Left arm, Patient Position: Sitting, Cuff Size: Adult)   Pulse 62   Ht 170 cm (66.93\")   Wt 74.2 kg (163 lb 9.6 oz)   SpO2 96%   BMI 25.68 kg/m²     Physical Exam  Constitutional:       Appearance: He is well-developed. He is not ill-appearing.   HENT:      Head: Normocephalic.      Right Ear: Hearing, tympanic membrane and external ear normal.      Left Ear: Hearing, tympanic membrane and external ear normal.      Nose: Nose normal. No nasal deformity, mucosal edema or rhinorrhea.      Right Sinus: No maxillary sinus tenderness or frontal sinus tenderness.      Left " Sinus: No maxillary sinus tenderness or frontal sinus tenderness.      Mouth/Throat:      Dentition: Normal dentition.   Eyes:      General: Lids are normal.         Right eye: No discharge.         Left eye: No discharge.      Conjunctiva/sclera: Conjunctivae normal.      Right eye: No exudate.     Left eye: No exudate.  Neck:      Thyroid: No thyroid mass or thyromegaly.      Vascular: No carotid bruit.      Trachea: Trachea normal.   Cardiovascular:      Rate and Rhythm: Regular rhythm.      Pulses: Normal pulses.      Heart sounds: Normal heart sounds. No murmur heard.  Pulmonary:      Effort: No respiratory distress.      Breath sounds: Normal breath sounds. No decreased breath sounds, wheezing, rhonchi or rales.   Abdominal:      General: Bowel sounds are normal.      Palpations: Abdomen is soft.      Tenderness: There is no abdominal tenderness.   Musculoskeletal:      Cervical back: Normal range of motion. No edema.   Lymphadenopathy:      Head:      Right side of head: No submental, submandibular, tonsillar, preauricular, posterior auricular or occipital adenopathy.      Left side of head: No submental, submandibular, tonsillar, preauricular, posterior auricular or occipital adenopathy.   Skin:     General: Skin is warm and dry.      Nails: There is no clubbing.   Neurological:      Mental Status: He is alert.   Psychiatric:         Behavior: Behavior is cooperative.          The following data was reviewed by: GE Bustos on 02/12/2024:  Common labs          12/29/2023    08:47 1/5/2024    07:28 2/23/2024    13:44   Common Labs   Glucose   69    BUN   27    Creatinine   0.83    Sodium   138    Potassium   4.4    Chloride   101    Calcium   9.5    Albumin   4.4    Total Bilirubin   0.4    Alkaline Phosphatase   61    AST (SGOT)   28    ALT (SGPT)   20    WBC 5.37  6.16  7.50    Hemoglobin 14.1  13.9  14.4    Hematocrit 43.4  42.2  44.0    Platelets 194  183  190                 Assessment and  Plan   Diagnoses and all orders for this visit:    1. Medicare annual wellness visit, subsequent (Primary)    2. Paroxysmal atrial fibrillation  Assessment & Plan:  He denies palpitations; did not tolerate anticoagulants due to frequent nosebleeds      3. Mixed hyperlipidemia  Assessment & Plan:  He continues ezetimibe and rosuvastatin without myalgias      4. Primary hypertension  Assessment & Plan:  Hypertension is stable and controlled  Continue current treatment regimen.  Blood pressure will be reassessed in 6 months.  He will continue benazepril along with a low sodium diet.      5. Coronary artery disease involving native coronary artery of native heart without angina pectoris  Assessment & Plan:  History of mild disease.  Stress test 2017 with no ischemia.  He is on  statin and Zetia.  No angina pectoris. EF normal 1/2017 echo      6. Gastroesophageal reflux disease with esophagitis without hemorrhage  Assessment & Plan:  Sx improved with dietary modification, takes Pepcid as needed (trying to avoid PPI due to hx of C diff).      7. Lymphoma, unspecified body region, unspecified lymphoma type  Assessment & Plan:  He saw Dr. Ladi Mcmanus in 01/2024. He completed 4 chemotherapy infusions for lymphoma and is scheduled for a PET scan on 02/16/2024.       Carpal tunnel syndrome.  He has an appointment on 02/19/2024 to test for him for carpel tunnel on his left wrist and then surgery in 03/2024.          Follow Up   Return in about 6 months (around 8/12/2024).  Patient was given instructions and counseling regarding his condition or for health maintenance advice. Please see specific information pulled into the AVS if appropriate.

## 2024-02-16 ENCOUNTER — HOSPITAL ENCOUNTER (OUTPATIENT)
Dept: PET IMAGING | Facility: HOSPITAL | Age: 89
Discharge: HOME | End: 2024-02-16
Payer: MEDICARE

## 2024-02-16 DIAGNOSIS — C85.90 LYMPHOMA, UNSPECIFIED BODY REGION, UNSPECIFIED LYMPHOMA TYPE: ICD-10-CM

## 2024-02-16 DIAGNOSIS — C18.0 MALIGNANT NEOPLASM OF CECUM: ICD-10-CM

## 2024-02-16 LAB — GLUCOSE BLDC GLUCOMTR-MCNC: 68 MG/DL (ref 70–130)

## 2024-02-16 PROCEDURE — A9552 F18 FDG: HCPCS | Performed by: INTERNAL MEDICINE

## 2024-02-16 PROCEDURE — 0 FLUDEOXYGLUCOSE F18 SOLUTION: Performed by: INTERNAL MEDICINE

## 2024-02-16 PROCEDURE — 78815 PET IMAGE W/CT SKULL-THIGH: CPT

## 2024-02-16 PROCEDURE — 82948 REAGENT STRIP/BLOOD GLUCOSE: CPT

## 2024-02-16 RX ADMIN — FLUDEOXYGLUCOSE F 18 1 DOSE: 200 INJECTION, SOLUTION INTRAVENOUS at 09:58

## 2024-02-20 ENCOUNTER — TELEPHONE (OUTPATIENT)
Dept: CARDIOLOGY | Facility: CLINIC | Age: 89
End: 2024-02-20
Payer: MEDICARE

## 2024-02-20 NOTE — TELEPHONE ENCOUNTER
We received a fax from Dr. Aby Marquez at [facility] wanting to know if the pt is clear to have L CTR w/possible synovectomy, L 1st CMC arthroplasty with 1/2 FCR ligament reconstruction. This will be done with Axillary anesthesia.    He is not on anti-coag or anti-platelet medication.  We saw him last on 1/16/24.    Please advise.    Thanks,  Marta RICHARD#[phone]  F#[phone]

## 2024-02-22 NOTE — PROGRESS NOTES
"[facility] GROUP OUTPATIENT FOLLOW UP CLINIC VISIT    REASON FOR FOLLOW-UP:    B-cell non-Hodgkin lymphoma, CD20 positive involving the distal ileum  4 doses of weekly rituximab complete 1/5/2024    HISTORY OF PRESENT ILLNESS:  Vicente Delgado is a 89 y.o. male who returns today for follow up of the above issue.     He states he is feeling well.  Occasionally he has some dyspepsia.  He denies any abdominal pain.  No diarrhea.  Energy level is excellent.    REVIEW OF SYSTEMS:  As per the HPI    PHYSICAL EXAMINATION:  Vitals:    02/23/24 1353   BP: 139/81   Pulse: 89   Resp: 18   Temp: 97.7 °F (36.5 °C)   TempSrc: Temporal   SpO2: 100%   Weight: 72.4 kg (159 lb 11.2 oz)   Height: 170 cm (66.93\")   PainSc: 0-No pain         General:  No acute distress, awake, alert and oriented  Skin:  Warm and dry, no visible rash  HEENT:  Normocephalic/atraumatic.  Hearing loss persists.  Chest:  Normal respiratory effort.  Lungs clear to auscultation bilaterally.  Heart: Regular rate and rhythm, grade III/VI holosystolic murmur.  Extremities:  No visible clubbing, cyanosis, or edema  Neuro/psych:  Grossly nonfocal other than hearing loss.  Normal mood and affect.       DIAGNOSTIC DATA:  Lactate Dehydrogenase (02/23/2024 13:44)  Comprehensive Metabolic Panel (02/23/2024 13:44)  CBC & Differential (02/23/2024 13:44)    IMAGING:      NM PET/CT Skull Base to Mid Thigh (02/16/2024 11:28)     Pet images personally reviewed.  There is some activity at the cecum suspected to be physiologic.  Prior activity at the terminal ileum resolved.    ASSESSMENT:    This is a 89 y.o. male with:    *B-cell non-Hodgkin lymphoma, CD20 positive involving the distal ileum  The patient began experiencing abdominal discomfort and diarrhea in early September after eating what he felt was some bad fish.    He presented to the emergency department on 9/20/2023.    CT imaging of the abdomen and pelvis was performed showing an enhancing mass in the cecum " measuring about 3 cm with adjacent enlarged pericecal lymph nodes measuring up to 1.6 cm.  Mild sigmoid diverticulosis was noted as well as mild wall thickening involving the mid sigmoid colon.  He was referred for colonoscopy which was performed by Dr. Ary Conway with colorectal surgery on 10/18/2023.  This showed multiple diverticula in sigmoid colon, 1 nonbleeding polyp in the distal ileum at 15 mm.    Biopsies show involvement by atypical CD20 positive B-cell non-Hodgkin lymphoma with extensive crush artifact and a Ki-67 that is estimated greater than 80%.  However, definitive morphology was not able to be visualized and rebiopsy has been suggested.  The initial biopsy was quite difficult per Dr. Conway given the location  A PET scan was performed on 11/8/2023.  There is some subtle hypermetabolic activity at the terminal ileum, maximal SUV 3.8 and an area measuring approximately 3 cm.  Adjacent mesenteric nodes are not significantly hypermetabolic and are either photopenic or have blood pool level activity.  Nonspecific low-level activity at the distal esophagus and GE junction with a maximal SUV of 4.3.  No obvious mass on CT imaging.  Spleen size normal.  No hypermetabolic cavity there.  Blood pool activity mediastinal lymphadenopathy  12/15/2023: 4 weeks of rituximab anticipated.  Cycle number 1 day 1 today.  12/22/2023: Proceed with week 2 rituximab.  Patient tolerated week 1 well and is overall feeling improvement in his energy.  12/29/2023: Proceed with week 3 rituximab.  Patient continues to have excellent tolerance to treatment and is feeling well.  1/5/24: week 4 ritiximab. Excellent tolerance thus far.   PET scan 2/16/2024 with a complete response in the distal ileum, likely physiologic activity in the cecum.     *History of C. difficile diarrhea  Symptoms initially resolved after 2 courses of oral vancomycin  Now with recurrent symptoms  CT imaging 11/12/2023 with thick-walled appearance of the  colon from the splenic flexure to the rectum with most significant involvement in the rectosigmoid colon, adjacent pericolonic soft tissue stranding consistent with colitis.  Mass in the cecum less apparent compared to 9/20/2023.  Dr. Pretty with ID evaluated him.  He recommended pursuing a longer vancomycin taper for approximately 5 weeks of therapy.  Therapy complete.  Symptoms essentially resolved.  12/22/2023: Patient reports he has 1 more day remaining of his vancomycin.  His doctor has had him slowly tapering off of his vancomycin.  He continues to incorporate yogurt into his diet daily.  He is anxious about symptoms returning after he remains off of his medication.  12/29/2023: Patient has remained off antibiotics for a week now and remains free of any GI symptoms.  He continues to have some anxiety related to possible recurrence.  Remains anxious about recurrence but no evidence to support this. Consuming yogurt.  Did not tolerate Joby due to indigestion and acid reflux.    *Aortic stenosis     *History of prostate cancer  Status post radical prostatectomy at approximately age 60     *Hearing loss    *Anxiety    PLAN:   I will plan to see him back  in 3 months for follow-up with labs.  Repeat CT imaging in 6 months.    I spent 32 minutes in this visit today reviewing his record, communicating with him and his family, examining him, placing orders, documenting the encounter.

## 2024-02-23 ENCOUNTER — OFFICE VISIT (OUTPATIENT)
Dept: ONCOLOGY | Facility: CLINIC | Age: 89
End: 2024-02-23
Payer: MEDICARE

## 2024-02-23 ENCOUNTER — LAB (OUTPATIENT)
Dept: OTHER | Facility: HOSPITAL | Age: 89
End: 2024-02-23
Payer: MEDICARE

## 2024-02-23 VITALS
BODY MASS INDEX: 25.07 KG/M2 | TEMPERATURE: 97.7 F | HEIGHT: 67 IN | OXYGEN SATURATION: 100 % | HEART RATE: 89 BPM | SYSTOLIC BLOOD PRESSURE: 139 MMHG | WEIGHT: 159.7 LBS | RESPIRATION RATE: 18 BRPM | DIASTOLIC BLOOD PRESSURE: 81 MMHG

## 2024-02-23 DIAGNOSIS — C85.90 LYMPHOMA, UNSPECIFIED BODY REGION, UNSPECIFIED LYMPHOMA TYPE: Primary | ICD-10-CM

## 2024-02-23 DIAGNOSIS — C85.90 LYMPHOMA, UNSPECIFIED BODY REGION, UNSPECIFIED LYMPHOMA TYPE: ICD-10-CM

## 2024-02-23 PROBLEM — D47.Z1: Status: RESOLVED | Noted: 2023-12-14 | Resolved: 2024-02-23

## 2024-02-23 PROBLEM — T86.5: Status: RESOLVED | Noted: 2023-12-14 | Resolved: 2024-02-23

## 2024-02-23 LAB
ALBUMIN SERPL-MCNC: 4.4 G/DL (ref 3.5–5.2)
ALBUMIN/GLOB SERPL: 1.5 G/DL
ALP SERPL-CCNC: 61 U/L (ref 39–117)
ALT SERPL W P-5'-P-CCNC: 20 U/L (ref 1–41)
ANION GAP SERPL CALCULATED.3IONS-SCNC: 10.1 MMOL/L (ref 5–15)
AST SERPL-CCNC: 28 U/L (ref 1–40)
BASOPHILS # BLD AUTO: 0.02 10*3/MM3 (ref 0–0.2)
BASOPHILS NFR BLD AUTO: 0.3 % (ref 0–1.5)
BILIRUB SERPL-MCNC: 0.4 MG/DL (ref 0–1.2)
BUN SERPL-MCNC: 27 MG/DL (ref 8–23)
BUN/CREAT SERPL: 32.5 (ref 7–25)
CALCIUM SPEC-SCNC: 9.5 MG/DL (ref 8.2–9.6)
CHLORIDE SERPL-SCNC: 101 MMOL/L (ref 98–107)
CO2 SERPL-SCNC: 26.9 MMOL/L (ref 22–29)
CREAT SERPL-MCNC: 0.83 MG/DL (ref 0.76–1.27)
DEPRECATED RDW RBC AUTO: 47.1 FL (ref 37–54)
EGFRCR SERPLBLD CKD-EPI 2021: 80.1 ML/MIN/1.73
EOSINOPHIL # BLD AUTO: 0.02 10*3/MM3 (ref 0–0.4)
EOSINOPHIL NFR BLD AUTO: 0.3 % (ref 0.3–6.2)
ERYTHROCYTE [DISTWIDTH] IN BLOOD BY AUTOMATED COUNT: 13.8 % (ref 12.3–15.4)
GLOBULIN UR ELPH-MCNC: 2.9 GM/DL
GLUCOSE SERPL-MCNC: 69 MG/DL (ref 65–99)
HCT VFR BLD AUTO: 44 % (ref 37.5–51)
HGB BLD-MCNC: 14.4 G/DL (ref 13–17.7)
IMM GRANULOCYTES # BLD AUTO: 0.09 10*3/MM3 (ref 0–0.05)
IMM GRANULOCYTES NFR BLD AUTO: 1.2 % (ref 0–0.5)
LDH SERPL-CCNC: 174 U/L (ref 135–225)
LYMPHOCYTES # BLD AUTO: 1.96 10*3/MM3 (ref 0.7–3.1)
LYMPHOCYTES NFR BLD AUTO: 26.1 % (ref 19.6–45.3)
MCH RBC QN AUTO: 30.1 PG (ref 26.6–33)
MCHC RBC AUTO-ENTMCNC: 32.7 G/DL (ref 31.5–35.7)
MCV RBC AUTO: 92.1 FL (ref 79–97)
MONOCYTES # BLD AUTO: 1.45 10*3/MM3 (ref 0.1–0.9)
MONOCYTES NFR BLD AUTO: 19.3 % (ref 5–12)
NEUTROPHILS NFR BLD AUTO: 3.96 10*3/MM3 (ref 1.7–7)
NEUTROPHILS NFR BLD AUTO: 52.8 % (ref 42.7–76)
NRBC BLD AUTO-RTO: 0 /100 WBC (ref 0–0.2)
PLATELET # BLD AUTO: 190 10*3/MM3 (ref 140–450)
PMV BLD AUTO: 10.3 FL (ref 6–12)
POTASSIUM SERPL-SCNC: 4.4 MMOL/L (ref 3.5–5.2)
PROT SERPL-MCNC: 7.3 G/DL (ref 6–8.5)
RBC # BLD AUTO: 4.78 10*6/MM3 (ref 4.14–5.8)
SODIUM SERPL-SCNC: 138 MMOL/L (ref 136–145)
WBC NRBC COR # BLD AUTO: 7.5 10*3/MM3 (ref 3.4–10.8)

## 2024-02-23 PROCEDURE — 80053 COMPREHEN METABOLIC PANEL: CPT | Performed by: INTERNAL MEDICINE

## 2024-02-23 PROCEDURE — 1159F MED LIST DOCD IN RCRD: CPT | Performed by: INTERNAL MEDICINE

## 2024-02-23 PROCEDURE — 1160F RVW MEDS BY RX/DR IN RCRD: CPT | Performed by: INTERNAL MEDICINE

## 2024-02-23 PROCEDURE — 1126F AMNT PAIN NOTED NONE PRSNT: CPT | Performed by: INTERNAL MEDICINE

## 2024-02-23 PROCEDURE — 99214 OFFICE O/P EST MOD 30 MIN: CPT | Performed by: INTERNAL MEDICINE

## 2024-02-23 PROCEDURE — 85025 COMPLETE CBC W/AUTO DIFF WBC: CPT | Performed by: INTERNAL MEDICINE

## 2024-02-23 PROCEDURE — 36415 COLL VENOUS BLD VENIPUNCTURE: CPT

## 2024-02-23 PROCEDURE — 83615 LACTATE (LD) (LDH) ENZYME: CPT | Performed by: INTERNAL MEDICINE

## 2024-02-25 PROBLEM — Z11.59 ENCOUNTER FOR SCREENING FOR OTHER VIRAL DISEASES: Status: RESOLVED | Noted: 2023-12-06 | Resolved: 2024-02-25

## 2024-02-25 PROBLEM — C85.90 LYMPHOMA: Chronic | Status: ACTIVE | Noted: 2023-12-06

## 2024-02-25 PROBLEM — H35.3231 EXUDATIVE AGE-RELATED MACULAR DEGENERATION, BILATERAL, WITH ACTIVE CHOROIDAL NEOVASCULARIZATION: Status: ACTIVE | Noted: 2024-02-25

## 2024-02-25 NOTE — ASSESSMENT & PLAN NOTE
He saw Dr. Ladi Mcmanus in 01/2024. He completed 4 chemotherapy infusions for lymphoma and is scheduled for a PET scan on 02/16/2024.

## 2024-02-25 NOTE — PATIENT INSTRUCTIONS
Medicare Wellness  Personal Prevention Plan of Service     Date of Office Visit:    Encounter Provider:  GE Bustos  Place of Service:  [facility] PRIMARY [facility]ARE  Patient Name: Vicente Delgado  :  1928    As part of the Medicare Wellness portion of your visit today, we are providing you with this personalized preventive plan of services (PPPS). This plan is based upon recommendations of the [place] Preventive Services Task Force (USPSTF) and the Advisory Committee on Immunization Practices (ACIP).    This lists the preventive care services that should be considered, and provides dates of when you are due. Items listed as completed are up-to-date and do not require any further intervention.    Health Maintenance   Topic Date Due    ZOSTER VACCINE (1 of 2) Never done    RSV Vaccine - Adults (1 - 1-dose 60+ series) Never done    TDAP/TD VACCINES (2 - Tdap) 08/10/2015    ANNUAL WELLNESS VISIT  10/17/2023    LIPID PANEL  11/15/2023    COVID-19 Vaccine (10 - 2023-24 season) 2023    BMI FOLLOWUP  2024    INFLUENZA VACCINE  Completed    Pneumococcal Vaccine 65+  Completed       No orders of the defined types were placed in this encounter.      Return in about 6 months (around 2024).

## 2024-02-25 NOTE — ASSESSMENT & PLAN NOTE
Sx improved with dietary modification, takes Pepcid as needed (trying to avoid PPI due to hx of C diff).

## 2024-02-25 NOTE — ASSESSMENT & PLAN NOTE
History of mild disease.  Stress test 2017 with no ischemia.  He is on  statin and Zetia.  No angina pectoris. EF normal 1/2017 echo

## 2024-02-25 NOTE — ASSESSMENT & PLAN NOTE
Hypertension is stable and controlled  Continue current treatment regimen.  Blood pressure will be reassessed in 6 months.  He will continue benazepril along with a low sodium diet.

## 2024-03-11 ENCOUNTER — HOSPITAL ENCOUNTER (EMERGENCY)
Facility: HOSPITAL | Age: 89
Discharge: HOME | End: 2024-03-11
Attending: EMERGENCY MEDICINE | Admitting: EMERGENCY MEDICINE
Payer: MEDICARE

## 2024-03-11 ENCOUNTER — APPOINTMENT (OUTPATIENT)
Dept: GENERAL RADIOLOGY | Facility: HOSPITAL | Age: 89
End: 2024-03-11
Payer: MEDICARE

## 2024-03-11 VITALS
RESPIRATION RATE: 16 BRPM | DIASTOLIC BLOOD PRESSURE: 83 MMHG | HEART RATE: 62 BPM | SYSTOLIC BLOOD PRESSURE: 158 MMHG | TEMPERATURE: 97.5 F | HEIGHT: 67 IN | BODY MASS INDEX: 25.92 KG/M2 | WEIGHT: 165.12 LBS | OXYGEN SATURATION: 99 %

## 2024-03-11 DIAGNOSIS — R00.2 PALPITATIONS: Primary | ICD-10-CM

## 2024-03-11 DIAGNOSIS — R55 NEAR SYNCOPE: ICD-10-CM

## 2024-03-11 LAB
ALBUMIN SERPL-MCNC: 4.1 G/DL (ref 3.5–5.2)
ALBUMIN/GLOB SERPL: 1.6 G/DL
ALP SERPL-CCNC: 67 U/L (ref 39–117)
ALT SERPL W P-5'-P-CCNC: 16 U/L (ref 1–41)
ANION GAP SERPL CALCULATED.3IONS-SCNC: 11.8 MMOL/L (ref 5–15)
AST SERPL-CCNC: 26 U/L (ref 1–40)
BASOPHILS # BLD AUTO: 0.01 10*3/MM3 (ref 0–0.2)
BASOPHILS NFR BLD AUTO: 0.3 % (ref 0–1.5)
BILIRUB SERPL-MCNC: 0.5 MG/DL (ref 0–1.2)
BUN SERPL-MCNC: 21 MG/DL (ref 8–23)
BUN/CREAT SERPL: 26.9 (ref 7–25)
CALCIUM SPEC-SCNC: 8.7 MG/DL (ref 8.2–9.6)
CHLORIDE SERPL-SCNC: 103 MMOL/L (ref 98–107)
CK SERPL-CCNC: 68 U/L (ref 20–200)
CO2 SERPL-SCNC: 24.2 MMOL/L (ref 22–29)
CREAT SERPL-MCNC: 0.78 MG/DL (ref 0.76–1.27)
DEPRECATED RDW RBC AUTO: 43.4 FL (ref 37–54)
EGFRCR SERPLBLD CKD-EPI 2021: 81.6 ML/MIN/1.73
EOSINOPHIL # BLD AUTO: 0.01 10*3/MM3 (ref 0–0.4)
EOSINOPHIL NFR BLD AUTO: 0.3 % (ref 0.3–6.2)
ERYTHROCYTE [DISTWIDTH] IN BLOOD BY AUTOMATED COUNT: 12.9 % (ref 12.3–15.4)
GEN 5 2HR TROPONIN T REFLEX: 33 NG/L
GLOBULIN UR ELPH-MCNC: 2.5 GM/DL
GLUCOSE SERPL-MCNC: 102 MG/DL (ref 65–99)
HCT VFR BLD AUTO: 40.7 % (ref 37.5–51)
HGB BLD-MCNC: 13.2 G/DL (ref 13–17.7)
HOLD SPECIMEN: NORMAL
HOLD SPECIMEN: NORMAL
IMM GRANULOCYTES # BLD AUTO: 0.03 10*3/MM3 (ref 0–0.05)
IMM GRANULOCYTES NFR BLD AUTO: 0.8 % (ref 0–0.5)
LYMPHOCYTES # BLD AUTO: 1.42 10*3/MM3 (ref 0.7–3.1)
LYMPHOCYTES NFR BLD AUTO: 38.4 % (ref 19.6–45.3)
MAGNESIUM SERPL-MCNC: 2.1 MG/DL (ref 1.7–2.3)
MCH RBC QN AUTO: 29.7 PG (ref 26.6–33)
MCHC RBC AUTO-ENTMCNC: 32.4 G/DL (ref 31.5–35.7)
MCV RBC AUTO: 91.7 FL (ref 79–97)
MONOCYTES # BLD AUTO: 0.63 10*3/MM3 (ref 0.1–0.9)
MONOCYTES NFR BLD AUTO: 17 % (ref 5–12)
NEUTROPHILS NFR BLD AUTO: 1.6 10*3/MM3 (ref 1.7–7)
NEUTROPHILS NFR BLD AUTO: 43.2 % (ref 42.7–76)
NRBC BLD AUTO-RTO: 0 /100 WBC (ref 0–0.2)
PHOSPHATE SERPL-MCNC: 3.3 MG/DL (ref 2.5–4.5)
PLATELET # BLD AUTO: 135 10*3/MM3 (ref 140–450)
PMV BLD AUTO: 10.4 FL (ref 6–12)
POTASSIUM SERPL-SCNC: 4.2 MMOL/L (ref 3.5–5.2)
PROT SERPL-MCNC: 6.6 G/DL (ref 6–8.5)
QT INTERVAL: 440 MS
QTC INTERVAL: 469 MS
RBC # BLD AUTO: 4.44 10*6/MM3 (ref 4.14–5.8)
SODIUM SERPL-SCNC: 139 MMOL/L (ref 136–145)
T4 FREE SERPL-MCNC: 1.06 NG/DL (ref 0.93–1.7)
TROPONIN T DELTA: -3 NG/L
TROPONIN T SERPL HS-MCNC: 36 NG/L
TSH SERPL DL<=0.05 MIU/L-ACNC: 1.95 UIU/ML (ref 0.27–4.2)
WBC NRBC COR # BLD AUTO: 3.7 10*3/MM3 (ref 3.4–10.8)
WHOLE BLOOD HOLD COAG: NORMAL
WHOLE BLOOD HOLD SPECIMEN: NORMAL

## 2024-03-11 PROCEDURE — 93005 ELECTROCARDIOGRAM TRACING: CPT | Performed by: EMERGENCY MEDICINE

## 2024-03-11 PROCEDURE — 82550 ASSAY OF CK (CPK): CPT | Performed by: EMERGENCY MEDICINE

## 2024-03-11 PROCEDURE — 93005 ELECTROCARDIOGRAM TRACING: CPT

## 2024-03-11 PROCEDURE — 83735 ASSAY OF MAGNESIUM: CPT | Performed by: EMERGENCY MEDICINE

## 2024-03-11 PROCEDURE — 93010 ELECTROCARDIOGRAM REPORT: CPT | Performed by: INTERNAL MEDICINE

## 2024-03-11 PROCEDURE — 84484 ASSAY OF TROPONIN QUANT: CPT

## 2024-03-11 PROCEDURE — 84100 ASSAY OF PHOSPHORUS: CPT | Performed by: EMERGENCY MEDICINE

## 2024-03-11 PROCEDURE — 36415 COLL VENOUS BLD VENIPUNCTURE: CPT

## 2024-03-11 PROCEDURE — 80053 COMPREHEN METABOLIC PANEL: CPT

## 2024-03-11 PROCEDURE — 25810000003 SODIUM CHLORIDE 0.9 % SOLUTION: Performed by: EMERGENCY MEDICINE

## 2024-03-11 PROCEDURE — 84439 ASSAY OF FREE THYROXINE: CPT | Performed by: EMERGENCY MEDICINE

## 2024-03-11 PROCEDURE — 84443 ASSAY THYROID STIM HORMONE: CPT | Performed by: EMERGENCY MEDICINE

## 2024-03-11 PROCEDURE — 84484 ASSAY OF TROPONIN QUANT: CPT | Performed by: EMERGENCY MEDICINE

## 2024-03-11 PROCEDURE — 85025 COMPLETE CBC W/AUTO DIFF WBC: CPT

## 2024-03-11 PROCEDURE — 99284 EMERGENCY DEPT VISIT MOD MDM: CPT

## 2024-03-11 PROCEDURE — 71045 X-RAY EXAM CHEST 1 VIEW: CPT

## 2024-03-11 RX ORDER — ASPIRIN 325 MG
325 TABLET ORAL ONCE
Status: DISCONTINUED | OUTPATIENT
Start: 2024-03-11 | End: 2024-03-11 | Stop reason: HOSPADM

## 2024-03-11 RX ORDER — SODIUM CHLORIDE 0.9 % (FLUSH) 0.9 %
10 SYRINGE (ML) INJECTION AS NEEDED
Status: DISCONTINUED | OUTPATIENT
Start: 2024-03-11 | End: 2024-03-11 | Stop reason: HOSPADM

## 2024-03-11 RX ADMIN — SODIUM CHLORIDE 500 ML: 9 INJECTION, SOLUTION INTRAVENOUS at 17:16
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
## 2024-03-11 NOTE — ED NOTES
Pt says he feels his heart is racing then skips beats.  He is dizzy.  He has afib hx    Pt's daughter wants it noted that he is DNR and he has had cdiff 3X so she doesn't want him to have abx

## 2024-03-13 ENCOUNTER — OFFICE VISIT (OUTPATIENT)
Dept: INTERNAL MEDICINE | Facility: CLINIC | Age: 89
End: 2024-03-13
Payer: MEDICARE

## 2024-03-13 VITALS
OXYGEN SATURATION: 99 % | WEIGHT: 161 LBS | DIASTOLIC BLOOD PRESSURE: 70 MMHG | HEIGHT: 67 IN | BODY MASS INDEX: 25.27 KG/M2 | HEART RATE: 70 BPM | SYSTOLIC BLOOD PRESSURE: 124 MMHG

## 2024-03-13 DIAGNOSIS — I48.0 PAROXYSMAL ATRIAL FIBRILLATION: Chronic | ICD-10-CM

## 2024-03-13 DIAGNOSIS — J30.2 SEASONAL ALLERGIC RHINITIS, UNSPECIFIED TRIGGER: ICD-10-CM

## 2024-03-13 DIAGNOSIS — K21.00 GASTROESOPHAGEAL REFLUX DISEASE WITH ESOPHAGITIS WITHOUT HEMORRHAGE: Chronic | ICD-10-CM

## 2024-03-13 DIAGNOSIS — R00.2 PALPITATIONS: Primary | ICD-10-CM

## 2024-03-13 NOTE — PROGRESS NOTES
"Chief Complaint  Hospital Follow Up Visit and Follow-up (States he is very confused today)  Subjective        Vicente Delgado presents to [facility]  History of Present Illness  The patient is a 89-year-old male who presents today for follow-up regarding recent emergency room for palpitations.    Palpitations  He saw Dr. Berry on 02/23/2024. On 03/11/2024, he was not feeling well, so he went to the hospital. He was feeling tired. He could tell when he took a shower that morning, he did not feel well. He feels like his heart rate has been irregular. He denies any chest pain.     He was at his daughter's farm the prior week and was \"rolling in the woods\" and when he got home that night, he was fighting allergies, raspiness, and scratchy voice. He thought it was a cold, but he never got any stuffiness or drippiness. He felt like his face was hot. This was the Sunday before he went to the emergency room. He knew he had an allergy, but he thought it was not a cold. He bought some Flonase. He was pushing the cart around at [employer] and that is when he noticed his heart beating irregularly.  He had ordered the [employer] and gave him decaffeinated skim milk. His palpitations got worse after that and is wondering if what he received had caffeine. He has not had any more of those episodes since he has been home from the emergency room.    His ECG showed sinus rhythm with ventricular premature complexes. Labs showed a normal TSH, no other abnormalities with her labs.    Acid reflux  He is confused on Pepcid. He read the instructions, and they told him not to use it for over 2 weeks. He tries to watch what he eats all the time. He always buys fresh fruit in the morning. He lays off citrus fruits.     Objective   Vital Signs:  /70 (BP Location: Left arm, Patient Position: Sitting, Cuff Size: Adult)   Pulse 70   Ht 170.2 cm (67\")   Wt 73 kg (161 lb) Comment: 165 at hospital yesterday  SpO2 " "99%   BMI 25.22 kg/m²   Estimated body mass index is 25.22 kg/m² as calculated from the following:    Height as of this encounter: 170.2 cm (67\").    Weight as of this encounter: 73 kg (161 lb).            Physical Exam  Vitals reviewed.   Constitutional:       Appearance: Normal appearance. He is well-developed. He is not ill-appearing.   HENT:      Head: Normocephalic and atraumatic.      Right Ear: Hearing, tympanic membrane and external ear normal.      Left Ear: Hearing, tympanic membrane and external ear normal.      Nose: Nose normal. No nasal deformity, mucosal edema or rhinorrhea.      Right Sinus: No maxillary sinus tenderness or frontal sinus tenderness.      Left Sinus: No maxillary sinus tenderness or frontal sinus tenderness.      Mouth/Throat:      Dentition: Normal dentition.   Eyes:      General: Lids are normal.         Right eye: No discharge.         Left eye: No discharge.      Conjunctiva/sclera: Conjunctivae normal.      Right eye: No exudate.     Left eye: No exudate.     Pupils: Pupils are equal, round, and reactive to light.   Neck:      Thyroid: No thyroid mass or thyromegaly.      Vascular: No carotid bruit.      Trachea: Trachea normal.   Cardiovascular:      Rate and Rhythm: Normal rate and regular rhythm.      Pulses: Normal pulses.      Heart sounds: Murmur heard.   Pulmonary:      Effort: Pulmonary effort is normal. No respiratory distress.      Breath sounds: Normal breath sounds. No decreased breath sounds, wheezing, rhonchi or rales.   Abdominal:      General: Bowel sounds are normal.      Palpations: Abdomen is soft.      Tenderness: There is no abdominal tenderness.   Musculoskeletal:      Cervical back: Normal range of motion. No edema.   Lymphadenopathy:      Head:      Right side of head: No submental, submandibular, tonsillar, preauricular, posterior auricular or occipital adenopathy.      Left side of head: No submental, submandibular, tonsillar, preauricular, posterior " auricular or occipital adenopathy.   Skin:     General: Skin is warm and dry.      Nails: There is no clubbing.   Neurological:      Mental Status: He is alert and oriented to person, place, and time.      GCS: GCS eye subscore is 4. GCS verbal subscore is 5. GCS motor subscore is 6.   Psychiatric:         Attention and Perception: Attention normal.         Mood and Affect: Mood normal.         Speech: Speech normal.         Behavior: Behavior normal. Behavior is cooperative.         Thought Content: Thought content normal.        Result Review :  The following data was reviewed by: GE Bustos on 03/13/2024:  Common labs          1/5/2024    07:28 2/23/2024    13:44 3/11/2024    15:03   Common Labs   Glucose  69  102    BUN  27  21    Creatinine  0.83  0.78    Sodium  138  139    Potassium  4.4  4.2    Chloride  101  103    Calcium  9.5  8.7    Albumin  4.4  4.1    Total Bilirubin  0.4  0.5    Alkaline Phosphatase  61  67    AST (SGOT)  28  26    ALT (SGPT)  20  16    WBC 6.16  7.50  3.70    Hemoglobin 13.9  14.4  13.2    Hematocrit 42.2  44.0  40.7    Platelets 183  190  135      Data reviewed : Recent hospitalization notes 3/11/24 ER               Assessment and Plan   Diagnoses and all orders for this visit:    1. Palpitations (Primary)  Comments:  Recent palpitations due to inadvertent caffeine intake, continue to monitor.    2. Paroxysmal atrial fibrillation  Assessment & Plan:  Recent EKG showed normal sinus rhythm, no evidence of atrial fibrillation.      3. Gastroesophageal reflux disease with esophagitis without hemorrhage  Assessment & Plan:  Discussed the use of Pepcid which he may use daily for mgmt of reflux, continue to avoid PPIs due to recent C diff infection.      4. Seasonal allergic rhinitis, unspecified trigger  Comments:  He notes increased drainage and congestion, continue Flonase with clsoe monitoring.    He is not currently experiencing symptoms, advised to notify the office with  any symptoms.         Follow Up   Return if symptoms worsen or fail to improve, for Next scheduled follow up.  Patient was given instructions and counseling regarding his condition or for health maintenance advice. Please see specific information pulled into the AVS if appropriate.      Transcribed from ambient dictation for GE Bustos by Ricki Hayes.  03/13/24   11:40 EDT    Patient or patient representative verbalized consent to the visit recording.  I have personally performed the services described in this document as transcribed by the above individual, and it is both accurate and complete.

## 2024-03-17 NOTE — ASSESSMENT & PLAN NOTE
Discussed the use of Pepcid which he may use daily for mgmt of reflux, continue to avoid PPIs due to recent C diff infection.

## 2024-04-05 ENCOUNTER — OFFICE VISIT (OUTPATIENT)
Dept: INTERNAL MEDICINE | Facility: CLINIC | Age: 89
End: 2024-04-05
Payer: MEDICARE

## 2024-04-05 VITALS
SYSTOLIC BLOOD PRESSURE: 124 MMHG | HEART RATE: 80 BPM | DIASTOLIC BLOOD PRESSURE: 80 MMHG | OXYGEN SATURATION: 98 % | WEIGHT: 157.8 LBS | HEIGHT: 67 IN | BODY MASS INDEX: 24.77 KG/M2

## 2024-04-05 DIAGNOSIS — R19.5 LOOSE STOOLS: Primary | ICD-10-CM

## 2024-04-05 LAB
BUN SERPL-MCNC: 17 MG/DL (ref 8–23)
BUN/CREAT SERPL: 19.8 (ref 7–25)
CALCIUM SERPL-MCNC: 9.2 MG/DL (ref 8.2–9.6)
CHLORIDE SERPL-SCNC: 103 MMOL/L (ref 98–107)
CO2 SERPL-SCNC: 27.6 MMOL/L (ref 22–29)
CREAT SERPL-MCNC: 0.86 MG/DL (ref 0.76–1.27)
EGFRCR SERPLBLD CKD-EPI 2021: 79.2 ML/MIN/1.73
ERYTHROCYTE [DISTWIDTH] IN BLOOD BY AUTOMATED COUNT: 13.1 % (ref 12.3–15.4)
GLUCOSE SERPL-MCNC: 84 MG/DL (ref 65–99)
HCT VFR BLD AUTO: 42.1 % (ref 37.5–51)
HGB BLD-MCNC: 13.7 G/DL (ref 13–17.7)
MCH RBC QN AUTO: 30.2 PG (ref 26.6–33)
MCHC RBC AUTO-ENTMCNC: 32.5 G/DL (ref 31.5–35.7)
MCV RBC AUTO: 92.7 FL (ref 79–97)
PLATELET # BLD AUTO: 162 10*3/MM3 (ref 140–450)
POTASSIUM SERPL-SCNC: 4.3 MMOL/L (ref 3.5–5.2)
RBC # BLD AUTO: 4.54 10*6/MM3 (ref 4.14–5.8)
SODIUM SERPL-SCNC: 139 MMOL/L (ref 136–145)
WBC # BLD AUTO: 3.56 10*3/MM3 (ref 3.4–10.8)

## 2024-04-05 PROCEDURE — 1159F MED LIST DOCD IN RCRD: CPT | Performed by: NURSE PRACTITIONER

## 2024-04-05 PROCEDURE — 1160F RVW MEDS BY RX/DR IN RCRD: CPT | Performed by: NURSE PRACTITIONER

## 2024-04-05 PROCEDURE — 99213 OFFICE O/P EST LOW 20 MIN: CPT | Performed by: NURSE PRACTITIONER

## 2024-04-15 ENCOUNTER — TELEPHONE (OUTPATIENT)
Age: 89
End: 2024-04-15
Payer: MEDICARE

## 2024-04-15 DIAGNOSIS — I73.9 PAD (PERIPHERAL ARTERY DISEASE): Primary | ICD-10-CM

## 2024-04-15 NOTE — TELEPHONE ENCOUNTER
Patient called saying that his legs and feet are turning red and his Veins are starting to protrude. Dr Rodríguez told him that if this starts to happen that he should call to schedule an appointment. His number is [phone]. Thank you

## 2024-04-16 PROBLEM — I73.9 PERIPHERAL VASCULAR DISEASE: Status: ACTIVE | Noted: 2024-04-16

## 2024-04-16 NOTE — TELEPHONE ENCOUNTER
Pt reports pain with walking and at rest. Pt set up for [facility] and follow up visit next week. He verbalizes understanding.

## 2024-04-21 NOTE — PROGRESS NOTES
"Chief Complaint  Diarrhea (Loose stools, clear jelly like at first - started 2 weeks ago/Ate crab cake, caramel donut, banana split)  Subjective        Vicente Delgado presents to [facility]  Diarrhea       History of Present Illness  The patient is a 89-year-old male who presents for evaluation of loose stools. He is accompanied by his daughter.    The patient began experiencing loose stools approximately 2 weeks ago, ffollowing a consumption of crab cake, donuts, and a banana slit on Friday. The following day, he experienced three episodes of diarrhea, characterized by cramping. He noticed a a clear, jelly-colored stool on Monday. He suspected a recurrence of C. difficile and initiated a BRAT diet for a week. Subsequently, he experienced loose stools, albeit without diarrhea. Despite these dietary modifications, he continued to experience small, semi-soft bowel movements. This morning, his stools were solid. His bowel movements typically occur 2 to 3 times a day. He occasionally experiences mild abdominal pain, but denies any history of indigestion.     He discontinued coffee 2 weeks ago and has not needed to take Pepcid in 2 weeks. He reports a foul odor in his stool, which has since resolved.    He has lost 6 pounds since his last visit (3/2024), from 161 pounds to 156 pounds this morning. He denies any stress. He inquired about the possibility of discontinuing milk and switching to lactose-free almond milk as well as a gluten-free diet. He maintains a regular exercise routine, including yoga and gym workouts. He is planning a trip to [place] on Monday.    Objective   Vital Signs:  /80 (BP Location: Left arm, Patient Position: Sitting, Cuff Size: Adult)   Pulse 80   Ht 170.2 cm (67\")   Wt 71.6 kg (157 lb 12.8 oz)   SpO2 98%   BMI 24.71 kg/m²   Estimated body mass index is 24.71 kg/m² as calculated from the following:    Height as of this encounter: 170.2 cm " "(67\").    Weight as of this encounter: 71.6 kg (157 lb 12.8 oz).    BMI is within normal parameters. No other follow-up for BMI required.      Physical Exam  Constitutional:       Appearance: He is well-developed. He is not ill-appearing.   HENT:      Head: Normocephalic.      Right Ear: Decreased hearing noted.      Left Ear: Decreased hearing noted.      Ears:      Comments: Bilateral hearing aids     Nose: Nose normal. No nasal deformity, mucosal edema or rhinorrhea.      Right Sinus: No maxillary sinus tenderness or frontal sinus tenderness.      Left Sinus: No maxillary sinus tenderness or frontal sinus tenderness.      Mouth/Throat:      Dentition: Normal dentition.   Eyes:      General: Lids are normal.         Right eye: No discharge.         Left eye: No discharge.      Conjunctiva/sclera: Conjunctivae normal.      Right eye: No exudate.     Left eye: No exudate.  Neck:      Thyroid: No thyroid mass or thyromegaly.      Vascular: No carotid bruit.      Trachea: Trachea normal.   Cardiovascular:      Rate and Rhythm: Regular rhythm.      Pulses: Normal pulses.      Heart sounds: Murmur heard.      Systolic murmur is present with a grade of 2/6.   Pulmonary:      Effort: No respiratory distress.      Breath sounds: Normal breath sounds. No decreased breath sounds, wheezing, rhonchi or rales.   Abdominal:      General: Bowel sounds are normal.      Palpations: Abdomen is soft.      Tenderness: There is no abdominal tenderness.   Musculoskeletal:      Cervical back: Normal range of motion. No edema.   Lymphadenopathy:      Head:      Right side of head: No submental, submandibular, tonsillar, preauricular, posterior auricular or occipital adenopathy.      Left side of head: No submental, submandibular, tonsillar, preauricular, posterior auricular or occipital adenopathy.   Skin:     General: Skin is warm and dry.      Nails: There is no clubbing.   Neurological:      Mental Status: He is alert.   Psychiatric:   "       Behavior: Behavior is cooperative.        Physical Exam  Vital Signs  Patient's weight is 157.    Result Review :  The following data was reviewed by: GE Bustos on 04/05/2024:  Common labs          2/23/2024    13:44 3/11/2024    15:03 4/5/2024    09:17   Common Labs   Glucose 69  102  84    BUN 27  21  17    Creatinine 0.83  0.78  0.86    Sodium 138  139  139    Potassium 4.4  4.2  4.3    Chloride 101  103  103    Calcium 9.5  8.7  9.2    Albumin 4.4  4.1     Total Bilirubin 0.4  0.5     Alkaline Phosphatase 61  67     AST (SGOT) 28  26     ALT (SGPT) 20  16     WBC 7.50  3.70  3.56    Hemoglobin 14.4  13.2  13.7    Hematocrit 44.0  40.7  42.1    Platelets 190  135  162           Results               Assessment and Plan   Diagnoses and all orders for this visit:    1. Loose stools (Primary)  -     CBC (No Diff)  -     Basic Metabolic Panel      Assessment & Plan  Diarrhea.  The patient's symptoms are not suggestive of C. Difficile, suggestive of IBS. A white blood cell count will be obtained. If the results are normal and the symptoms continue to improve over the weekend, no further action will be required. The patient has been advised to moderate his consumption of high fat, rich foods. A slight increase in food intake is recommended. Should the condition persist next week, stool studies will be considered.         Follow Up   Return if symptoms worsen or fail to improve, for Next scheduled follow up.  Patient was given instructions and counseling regarding his condition or for health maintenance advice. Please see specific information pulled into the AVS if appropriate.     Patient or patient representative verbalized consent for the use of Ambient Listening during the visit with  GE Bustos for chart documentation. 4/21/2024  06:44 EDT  Answers submitted by the patient for this visit:  Primary Reason for Visit (Submitted on 4/2/2024)  What is the primary reason for your visit?:  Other  Other (Submitted on 4/2/2024)  Please describe your symptoms.: Bowel irrations  Have you had these symptoms before?: Yes  How long have you been having these symptoms?: 1-2 weeks  Please describe any probable cause for these symptoms. : Age probably an eating wrong foods

## 2024-04-22 NOTE — PROGRESS NOTES
Chief Complaint  Claudication (F/U with [facility] scan)    Subjective        Vicente Delgado presents to [facility]  HPI   Vicente Delgado is a 89 y.o. male that has been followed in our office by Dr. Rodríguez for peripheral arterial disease history of a DVT.  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  He saw Dr. Rodríguez for lower extremity atherosclerosis with claudication and had a CT angiogram.  This showed a mildly dilated infrarenal abdominal aorta measuring 2.4 cm.  He had atherosclerotic calcifications in his common and external iliac.  Though no significant flow-limiting stenosis.  Dr. Rodríguez did not feel like surgical intervention was warranted.  He called our office reporting pain with walking and at rest.  He returns today in follow up along with ABIs. This is located to his bilateral feet and he describes it as severe that occurred one week ago.  Has had a history of neuropathy and follows with podiatry, Dr. Machado.  He has tried oral medications for neuropathy, though he did have side effects and this was discontinued.  He has a history of spinal stenosis as well which is likely contributing to his neuropathy.  He states prior to this episode of severe foot pain, he was traveling by car to [place], [place] to see his family.  His family is also concerned about his discoloration to his feet.      ...Review of Systems   Constitutional:  Negative for fever.   Eyes:  Negative for visual disturbance.   Cardiovascular:  Positive for leg swelling.   Gastrointestinal:  Negative for abdominal pain.   Musculoskeletal:  Positive for arthralgias and myalgias. Negative for back pain.   Skin:  Negative for color change, pallor and wound.   Neurological:  Negative for dizziness, facial asymmetry, speech difficulty and weakness.        Vicente Delgado  reports that he has been smoking cigars. He has been exposed to tobacco smoke. He has never used  smokeless tobacco..        Objective   Vital Signs:  Vitals:    04/23/24 1046   BP: 140/80      Body mass index is 24.82 kg/m².   BMI is within normal parameters. No other follow-up for BMI required.       Physical Exam  Vitals reviewed.   Constitutional:       Appearance: Normal appearance.   HENT:      Head: Normocephalic.   Cardiovascular:      Rate and Rhythm: Normal rate and regular rhythm.      Pulses: Normal pulses.           Dorsalis pedis pulses are 2+ on the left side.        Posterior tibial pulses are 2+ on the left side.   Pulmonary:      Effort: Pulmonary effort is normal.   Feet:      Right foot:      Toenail Condition: Right toenails are abnormally thick.      Left foot:      Toenail Condition: Left toenails are abnormally thick.      Comments: Acrocyanosis BLE   Skin:     General: Skin is warm.   Neurological:      General: No focal deficit present.      Mental Status: He is alert and oriented to person, place, and time.   Psychiatric:         Mood and Affect: Mood normal.          Result Review :  CT Angio Abdominal Aorta Bilateral Iliofem Runoff (12/04/2023 16:18)   ABIs from last year: 0.92 bilaterally    ABIs from today: Greater than 1 on the right 0.72 on the left.                       Assessment and Plan     Diagnoses and all orders for this visit:    1. Peripheral vascular disease (Primary)    2. 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 Neuropathy    4. Spinal stenosis of lumbar region with neurogenic claudication       Patient presents today after having an acute event of bilateral foot pain.  He describes this as neuropathy.  This occurred after a prolonged car ride.  He does have a history of spinal stenosis and I do feel like this is what exacerbated his neuropathy.  He follows with Dr. Machado with podiatry and has tried oral medications to treat neuropathy, though significant side effects.  We discussed talking with Dr. Machado about  possibly using a compounded cream based Neurontin and he will discuss this with him.  His ABIs today are greater than 1 on the right and 0.7 on the left.  His symptoms are not related to vascular claudication or rest pain.  We discussed the discoloration of his feet and that this is benign acrocyanosis.  He will return on an as-needed basis.    Follow Up     No follow-ups on file.  Patient was given instructions and counseling regarding his condition or for health maintenance advice. Please see specific information pulled into the AVS if appropriate.     GE Parmar

## 2024-04-23 ENCOUNTER — OFFICE VISIT (OUTPATIENT)
Age: 89
End: 2024-04-23
Payer: MEDICARE

## 2024-04-23 ENCOUNTER — HOSPITAL ENCOUNTER (OUTPATIENT)
Facility: HOSPITAL | Age: 89
Discharge: HOME | End: 2024-04-23
Admitting: SURGERY
Payer: MEDICARE

## 2024-04-23 VITALS
BODY MASS INDEX: 24.88 KG/M2 | HEIGHT: 67 IN | DIASTOLIC BLOOD PRESSURE: 80 MMHG | WEIGHT: 158.5 LBS | SYSTOLIC BLOOD PRESSURE: 140 MMHG

## 2024-04-23 DIAGNOSIS — I73.9 PERIPHERAL VASCULAR DISEASE: Primary | ICD-10-CM

## 2024-04-23 DIAGNOSIS — G62.9 NEUROPATHY: ICD-10-CM

## 2024-04-23 DIAGNOSIS — I73.89 ACROCYANOSIS: ICD-10-CM

## 2024-04-23 DIAGNOSIS — I73.9 PAD (PERIPHERAL ARTERY DISEASE): ICD-10-CM

## 2024-04-23 DIAGNOSIS — I70.212 ATHEROSCLEROSIS OF NATIVE ARTERY OF LEFT LOWER EXTREMITY WITH INTERMITTENT CLAUDICATION: Chronic | ICD-10-CM

## 2024-04-23 DIAGNOSIS — M48.062 SPINAL STENOSIS OF LUMBAR REGION WITH NEUROGENIC CLAUDICATION: ICD-10-CM

## 2024-04-23 PROBLEM — M48.00 SPINAL STENOSIS: Status: ACTIVE | Noted: 2024-04-23

## 2024-04-23 LAB
BH CV LOWER ARTERIAL LEFT ABI RATIO: 0.72
BH CV LOWER ARTERIAL LEFT DORSALIS PEDIS SYS MAX: 104
BH CV LOWER ARTERIAL LEFT POST TIBIAL SYS MAX: 110
BH CV LOWER ARTERIAL RIGHT ABI RATIO: 1.04
BH CV LOWER ARTERIAL RIGHT DORSALIS PEDIS SYS MAX: 140
BH CV LOWER ARTERIAL RIGHT POST TIBIAL SYS MAX: 158
UPPER ARTERIAL LEFT ARM BRACHIAL SYS MAX: NORMAL
UPPER ARTERIAL RIGHT ARM BRACHIAL SYS MAX: NORMAL

## 2024-04-23 PROCEDURE — 93922 UPR/L XTREMITY ART 2 LEVELS: CPT

## 2024-04-23 NOTE — LETTER
April 23, 2024       No Recipients    Patient: Vicente Delgado   YOB: 1928   Date of Visit: 4/23/2024     Dear Kristopher Machado DPM:       Thank you for referring Vicente Delgado to me for evaluation. Below are the relevant portions of my assessment and plan of care.    If you have questions, please do not hesitate to call me. I look forward to following Vicente along with you.         Sincerely,        Dina VazquezGE Rahman        CC:   No Recipients    GrzegorzDina wigginsaine, GE  04/23/24 1132  Incomplete  Chief Complaint  Claudication (F/U with SEEMA scan)    Subjective        Vicente Delgado presents to [facility] VASCULAR SURGERY  HPI   Vicente Delgado is a 89 y.o. male that has been followed in our office by Dr. Rodríguez for peripheral arterial disease history of a DVT.  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  He saw Dr. Rodríguez for lower extremity atherosclerosis with claudication and had a CT angiogram.  This showed a mildly dilated infrarenal abdominal aorta measuring 2.4 cm.  He had atherosclerotic calcifications in his common and external iliac.  Though no significant flow-limiting stenosis.  Dr. Rodríguez did not feel like surgical intervention was warranted.  He called our office reporting pain with walking and at rest.  He returns today in follow up along with ABIs. This is located to his bilateral feet and he describes it as severe that occurred one week ago.  Has had a history of neuropathy and follows with podiatry, Dr. Machado.  He has tried oral medications for neuropathy, though he did have side effects and this was discontinued.  He has a history of spinal stenosis as well which is likely contributing to his neuropathy.  He states prior to this episode of severe foot pain, he was traveling by car to [place], [place] to see his family.  His family is also concerned about his discoloration to his feet.      ...Review of  Systems   Constitutional:  Negative for fever.   Eyes:  Negative for visual disturbance.   Cardiovascular:  Positive for leg swelling.   Gastrointestinal:  Negative for abdominal pain.   Musculoskeletal:  Positive for arthralgias and myalgias. Negative for back pain.   Skin:  Negative for color change, pallor and wound.   Neurological:  Negative for dizziness, facial asymmetry, speech difficulty and weakness.        Vicente Delgado  reports that he has been smoking cigars. He has been exposed to tobacco smoke. He has never used smokeless tobacco..        Objective   Vital Signs:  Vitals:    04/23/24 1046   BP: 140/80      Body mass index is 24.82 kg/m².   BMI is within normal parameters. No other follow-up for BMI required.       Physical Exam  Vitals reviewed.   Constitutional:       Appearance: Normal appearance.   HENT:      Head: Normocephalic.   Cardiovascular:      Rate and Rhythm: Normal rate and regular rhythm.      Pulses: Normal pulses.           Dorsalis pedis pulses are 2+ on the left side.        Posterior tibial pulses are 2+ on the left side.   Pulmonary:      Effort: Pulmonary effort is normal.   Feet:      Right foot:      Toenail Condition: Right toenails are abnormally thick.      Left foot:      Toenail Condition: Left toenails are abnormally thick.      Comments: Acrocyanosis BLE   Skin:     General: Skin is warm.   Neurological:      General: No focal deficit present.      Mental Status: He is alert and oriented to person, place, and time.   Psychiatric:         Mood and Affect: Mood normal.          Result Review :  CT Angio Abdominal Aorta Bilateral Iliofem Runoff (12/04/2023 16:18)   ABIs from last year: 0.92 bilaterally    ABIs from today: Greater than 1 on the right 0.72 on the left.                       Assessment and Plan     Diagnoses and all orders for this visit:    1. Peripheral vascular disease (Primary)    2. Atherosclerosis of native artery of left lower extremity with  intermittent claudication    3. Neuropathy    4. Spinal stenosis of lumbar region with neurogenic claudication       Patient presents today after having an acute event of bilateral foot pain.  He describes this as neuropathy.  This occurred after a prolonged car ride.  He does have a history of spinal stenosis and I do feel like this is what exacerbated his neuropathy.  He follows with Dr. Machado with podiatry and has tried oral medications to treat neuropathy, though significant side effects.  We discussed talking with Dr. Machado about possibly using a compounded cream based Neurontin and he will discuss this with him.  His ABIs today are greater than 1 on the right and 0.7 on the left.  His symptoms are not related to vascular claudication or rest pain.  We discussed the discoloration of his feet and that this is benign acrocyanosis.  He will return on an as-needed basis.    Follow Up     No follow-ups on file.  Patient was given instructions and counseling regarding his condition or for health maintenance advice. Please see specific information pulled into the AVS if appropriate.     Dina Latnigua, GE Lantigua, GE Eaton  04/23/24 1118  Incomplete  Chief Complaint  Claudication (F/U with SEEMA scan)    Subjective       Vicente Delgado presents to [facility] VASCULAR SURGERY  HPI   Vicente Delgado is a 89 y.o. male that has been followed in our office by Dr. Rodríguez for peripheral arterial disease history of a DVT.  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  He called our office reporting pain with walking and at rest.  He returns today in follow up along with ABIs. This is located to his bilateral feet and he describes it as severe that occurred one week     Neuropthy; severe. X 1 week. Reports he can't       Review of Systems   Constitutional:  Negative for fever.   Eyes:  Negative for visual disturbance.   Cardiovascular:  Negative  for leg swelling.   Gastrointestinal:  Negative for abdominal pain.   Musculoskeletal:  Negative for back pain.   Skin:  Negative for color change, pallor and wound.   Neurological:  Negative for dizziness, facial asymmetry, speech difficulty and weakness.        Vicente Delgado  reports that he has been smoking cigars. He has been exposed to tobacco smoke. He has never used smokeless tobacco..        Objective  Vital Signs:  Vitals:    04/23/24 1046   BP: 140/80      Body mass index is 24.82 kg/m².   BMI is within normal parameters. No other follow-up for BMI required.       Physical Exam  Vitals reviewed.   Constitutional:       Appearance: Normal appearance.   HENT:      Head: Normocephalic.   Cardiovascular:      Rate and Rhythm: Normal rate and regular rhythm.      Pulses: Normal pulses.           Dorsalis pedis pulses are 3+ on the right side and 3+ on the left side.        Posterior tibial pulses are 3+ on the right side and 3+ on the left side.   Pulmonary:      Effort: Pulmonary effort is normal.   Skin:     General: Skin is warm.   Neurological:      General: No focal deficit present.      Mental Status: He is alert and oriented to person, place, and time.   Psychiatric:         Mood and Affect: Mood normal.        Result Review:  CT Angio Abdominal Aorta Bilateral Iliofem Runoff (12/04/2023 16:18)   ABIs from last year: 0.92 bilaterally    ABIs from today: Greater than 1 on the right 0.72 on the left.                   Assessment and Plan     Diagnoses and all orders for this visit:    1. Peripheral vascular disease (Primary)    2. 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 Neuropathy    4. Spinal stenosis of lumbar region with neurogenic claudication           Follow Up     No follow-ups on file.  Patient was given instructions and counseling regarding his condition or for health maintenance advice. Please see specific information pulled into the AVS if  appropriate.     Dina Lantigua, APRN

## 2024-05-09 ENCOUNTER — TELEPHONE (OUTPATIENT)
Dept: INTERNAL MEDICINE | Facility: CLINIC | Age: 89
End: 2024-05-09
Payer: MEDICARE

## 2024-05-10 ENCOUNTER — OFFICE VISIT (OUTPATIENT)
Dept: INTERNAL MEDICINE | Facility: CLINIC | Age: 89
End: 2024-05-10
Payer: MEDICARE

## 2024-05-10 VITALS
WEIGHT: 153.8 LBS | HEART RATE: 73 BPM | HEIGHT: 67 IN | DIASTOLIC BLOOD PRESSURE: 74 MMHG | BODY MASS INDEX: 24.14 KG/M2 | OXYGEN SATURATION: 98 % | SYSTOLIC BLOOD PRESSURE: 122 MMHG

## 2024-05-10 DIAGNOSIS — I10 PRIMARY HYPERTENSION: Chronic | ICD-10-CM

## 2024-05-10 DIAGNOSIS — Z86.19 HISTORY OF CLOSTRIDIOIDES DIFFICILE INFECTION: ICD-10-CM

## 2024-05-10 DIAGNOSIS — R19.5 LOOSE STOOLS: Primary | ICD-10-CM

## 2024-05-10 PROCEDURE — 1160F RVW MEDS BY RX/DR IN RCRD: CPT | Performed by: NURSE PRACTITIONER

## 2024-05-10 PROCEDURE — G2211 COMPLEX E/M VISIT ADD ON: HCPCS | Performed by: NURSE PRACTITIONER

## 2024-05-10 PROCEDURE — 1126F AMNT PAIN NOTED NONE PRSNT: CPT | Performed by: NURSE PRACTITIONER

## 2024-05-10 PROCEDURE — 99213 OFFICE O/P EST LOW 20 MIN: CPT | Performed by: NURSE PRACTITIONER

## 2024-05-10 PROCEDURE — 1159F MED LIST DOCD IN RCRD: CPT | Performed by: NURSE PRACTITIONER

## 2024-05-16 NOTE — PROGRESS NOTES
"[facility] OUTPATIENT FOLLOW UP CLINIC VISIT    REASON FOR FOLLOW-UP:    B-cell non-Hodgkin lymphoma, CD20 positive involving the distal ileum  4 doses of weekly rituximab complete 1/5/2024    HISTORY OF PRESENT ILLNESS:  Vicente Delgado is a 89 y.o. male who returns today for follow up of the above issue.     Overall he is doing great.  Abdominal pain has resolved.  He is consuming a lot of Joby and yogurt.  His stool is soft and not normal like it was before he had the C. difficile infection.  No evidence of recurrent C. difficile however.      REVIEW OF SYSTEMS:  As per the HPI    PHYSICAL EXAMINATION:  Vitals:    05/17/24 1253   BP: 111/63   Pulse: 61   Resp: 16   Temp: 97.6 °F (36.4 °C)   TempSrc: Oral   SpO2: 97%   Weight: 71 kg (156 lb 8 oz)   Height: 170 cm (66.93\")   PainSc: 0-No pain         General:  No acute distress, awake, alert and oriented  Skin:  Warm and dry, no visible rash  HEENT:  Normocephalic/atraumatic.  Hearing loss persists, stable.  Chest:  Normal respiratory effort.  Lungs clear to auscultation bilaterally.  Heart: Regular rate and rhythm, grade IV/VI holosystolic murmur.  Extremities:  No visible clubbing, cyanosis, or edema  Neuro/psych:  Grossly nonfocal other than hearing loss.  Normal mood and affect.       DIAGNOSTIC DATA:  CBC & Differential (05/17/2024 12:45)   Comprehensive Metabolic Panel (05/17/2024 12:45)  Lactate Dehydrogenase (05/17/2024 12:45)    IMAGING:    None reviewed    ASSESSMENT:    This is a 89 y.o. male with:    *B-cell non-Hodgkin lymphoma, CD20 positive involving the distal ileum  The patient began experiencing abdominal discomfort and diarrhea in early September after eating what he felt was some bad fish.    He presented to the emergency department on 9/20/2023.    CT imaging of the abdomen and pelvis was performed showing an enhancing mass in the cecum measuring about 3 cm with adjacent enlarged pericecal lymph nodes measuring up to 1.6 cm.  " Mild sigmoid diverticulosis was noted as well as mild wall thickening involving the mid sigmoid colon.  He was referred for colonoscopy which was performed by Dr. Ary Conway with colorectal surgery on 10/18/2023.  This showed multiple diverticula in sigmoid colon, 1 nonbleeding polyp in the distal ileum at 15 mm.    Biopsies show involvement by atypical CD20 positive B-cell non-Hodgkin lymphoma with extensive crush artifact and a Ki-67 that is estimated greater than 80%.  However, definitive morphology was not able to be visualized and rebiopsy has been suggested.  The initial biopsy was quite difficult per Dr. Conway given the location  A PET scan was performed on 11/8/2023.  There is some subtle hypermetabolic activity at the terminal ileum, maximal SUV 3.8 and an area measuring approximately 3 cm.  Adjacent mesenteric nodes are not significantly hypermetabolic and are either photopenic or have blood pool level activity.  Nonspecific low-level activity at the distal esophagus and GE junction with a maximal SUV of 4.3.  No obvious mass on CT imaging.  Spleen size normal.  No hypermetabolic cavity there.  Blood pool activity mediastinal lymphadenopathy  12/15/2023: 4 weeks of rituximab anticipated.  Cycle number 1 day 1 today.  12/22/2023: Proceed with week 2 rituximab.  Patient tolerated week 1 well and is overall feeling improvement in his energy.  12/29/2023: Proceed with week 3 rituximab.  Patient continues to have excellent tolerance to treatment and is feeling well.  1/5/24: week 4 ritiximab. Excellent tolerance thus far.   PET scan 2/16/2024 with a complete response in the distal ileum, likely physiologic activity in the cecum.     *History of C. difficile diarrhea  Symptoms initially resolved after 2 courses of oral vancomycin  Now with recurrent symptoms  CT imaging 11/12/2023 with thick-walled appearance of the colon from the splenic flexure to the rectum with most significant involvement in the  rectosigmoid colon, adjacent pericolonic soft tissue stranding consistent with colitis.  Mass in the cecum less apparent compared to 9/20/2023.  Dr. Pretty with ID evaluated him.  He recommended pursuing a longer vancomycin taper for approximately 5 weeks of therapy.  Therapy complete.  Symptoms essentially resolved.  12/22/2023: Patient reports he has 1 more day remaining of his vancomycin.  His doctor has had him slowly tapering off of his vancomycin.  He continues to incorporate yogurt into his diet daily.  He is anxious about symptoms returning after he remains off of his medication.  12/29/2023: Patient has remained off antibiotics for a week now and remains free of any GI symptoms.  He continues to have some anxiety related to possible recurrence.  Remains anxious about recurrence but no evidence to support this. Consuming yogurt.  Did not tolerate Joby due to indigestion and acid reflux.  5/17/2024: States is taking Joby and yogurt.  Complains of soft stool but no recurrent diarrhea consistent with C. difficile    *Aortic stenosis     *History of prostate cancer  Status post radical prostatectomy at approximately age 60     *Hearing loss    *Anxiety    PLAN:   CT chest abdomen and pelvis and 3 months and I will see him back after that with labs.

## 2024-05-17 ENCOUNTER — OFFICE VISIT (OUTPATIENT)
Dept: ONCOLOGY | Facility: CLINIC | Age: 89
End: 2024-05-17
Payer: MEDICARE

## 2024-05-17 ENCOUNTER — LAB (OUTPATIENT)
Dept: OTHER | Facility: HOSPITAL | Age: 89
End: 2024-05-17
Payer: MEDICARE

## 2024-05-17 VITALS
WEIGHT: 156.5 LBS | TEMPERATURE: 97.6 F | HEART RATE: 61 BPM | DIASTOLIC BLOOD PRESSURE: 63 MMHG | RESPIRATION RATE: 16 BRPM | BODY MASS INDEX: 24.56 KG/M2 | OXYGEN SATURATION: 97 % | HEIGHT: 67 IN | SYSTOLIC BLOOD PRESSURE: 111 MMHG

## 2024-05-17 DIAGNOSIS — C85.90 LYMPHOMA, UNSPECIFIED BODY REGION, UNSPECIFIED LYMPHOMA TYPE: ICD-10-CM

## 2024-05-17 DIAGNOSIS — C85.90 LYMPHOMA, UNSPECIFIED BODY REGION, UNSPECIFIED LYMPHOMA TYPE: Primary | ICD-10-CM

## 2024-05-17 LAB
ALBUMIN SERPL-MCNC: 3.8 G/DL (ref 3.5–5.2)
ALBUMIN/GLOB SERPL: 1.6 G/DL
ALP SERPL-CCNC: 72 U/L (ref 39–117)
ALT SERPL W P-5'-P-CCNC: 21 U/L (ref 1–41)
ANION GAP SERPL CALCULATED.3IONS-SCNC: 8.6 MMOL/L (ref 5–15)
AST SERPL-CCNC: 33 U/L (ref 1–40)
BASOPHILS # BLD AUTO: 0.04 10*3/MM3 (ref 0–0.2)
BASOPHILS NFR BLD AUTO: 0.7 % (ref 0–1.5)
BILIRUB SERPL-MCNC: 0.3 MG/DL (ref 0–1.2)
BUN SERPL-MCNC: 19 MG/DL (ref 8–23)
BUN/CREAT SERPL: 24.1 (ref 7–25)
CALCIUM SPEC-SCNC: 9.1 MG/DL (ref 8.2–9.6)
CHLORIDE SERPL-SCNC: 104 MMOL/L (ref 98–107)
CO2 SERPL-SCNC: 26.4 MMOL/L (ref 22–29)
CREAT SERPL-MCNC: 0.79 MG/DL (ref 0.76–1.27)
DEPRECATED RDW RBC AUTO: 45.2 FL (ref 37–54)
EGFRCR SERPLBLD CKD-EPI 2021: 81.3 ML/MIN/1.73
EOSINOPHIL # BLD AUTO: 0.07 10*3/MM3 (ref 0–0.4)
EOSINOPHIL NFR BLD AUTO: 1.2 % (ref 0.3–6.2)
ERYTHROCYTE [DISTWIDTH] IN BLOOD BY AUTOMATED COUNT: 13.2 % (ref 12.3–15.4)
GLOBULIN UR ELPH-MCNC: 2.4 GM/DL
GLUCOSE SERPL-MCNC: 126 MG/DL (ref 65–99)
HCT VFR BLD AUTO: 39.2 % (ref 37.5–51)
HGB BLD-MCNC: 12.9 G/DL (ref 13–17.7)
IMM GRANULOCYTES # BLD AUTO: 0.05 10*3/MM3 (ref 0–0.05)
IMM GRANULOCYTES NFR BLD AUTO: 0.8 % (ref 0–0.5)
LDH SERPL-CCNC: 214 U/L (ref 135–225)
LYMPHOCYTES # BLD AUTO: 1.25 10*3/MM3 (ref 0.7–3.1)
LYMPHOCYTES NFR BLD AUTO: 21 % (ref 19.6–45.3)
MCH RBC QN AUTO: 30.5 PG (ref 26.6–33)
MCHC RBC AUTO-ENTMCNC: 32.9 G/DL (ref 31.5–35.7)
MCV RBC AUTO: 92.7 FL (ref 79–97)
MONOCYTES # BLD AUTO: 1.01 10*3/MM3 (ref 0.1–0.9)
MONOCYTES NFR BLD AUTO: 17 % (ref 5–12)
NEUTROPHILS NFR BLD AUTO: 3.52 10*3/MM3 (ref 1.7–7)
NEUTROPHILS NFR BLD AUTO: 59.3 % (ref 42.7–76)
NRBC BLD AUTO-RTO: 0 /100 WBC (ref 0–0.2)
PLATELET # BLD AUTO: 184 10*3/MM3 (ref 140–450)
PMV BLD AUTO: 10.2 FL (ref 6–12)
POTASSIUM SERPL-SCNC: 4.1 MMOL/L (ref 3.5–5.2)
PROT SERPL-MCNC: 6.2 G/DL (ref 6–8.5)
RBC # BLD AUTO: 4.23 10*6/MM3 (ref 4.14–5.8)
SODIUM SERPL-SCNC: 139 MMOL/L (ref 136–145)
WBC NRBC COR # BLD AUTO: 5.94 10*3/MM3 (ref 3.4–10.8)

## 2024-05-17 PROCEDURE — 1159F MED LIST DOCD IN RCRD: CPT | Performed by: INTERNAL MEDICINE

## 2024-05-17 PROCEDURE — 99213 OFFICE O/P EST LOW 20 MIN: CPT | Performed by: INTERNAL MEDICINE

## 2024-05-17 PROCEDURE — 83615 LACTATE (LD) (LDH) ENZYME: CPT | Performed by: INTERNAL MEDICINE

## 2024-05-17 PROCEDURE — 36415 COLL VENOUS BLD VENIPUNCTURE: CPT

## 2024-05-17 PROCEDURE — 85025 COMPLETE CBC W/AUTO DIFF WBC: CPT | Performed by: INTERNAL MEDICINE

## 2024-05-17 PROCEDURE — 1126F AMNT PAIN NOTED NONE PRSNT: CPT | Performed by: INTERNAL MEDICINE

## 2024-05-17 PROCEDURE — 1160F RVW MEDS BY RX/DR IN RCRD: CPT | Performed by: INTERNAL MEDICINE

## 2024-05-17 PROCEDURE — 80053 COMPREHEN METABOLIC PANEL: CPT | Performed by: INTERNAL MEDICINE

## 2024-05-25 NOTE — PROGRESS NOTES
Chief Complaint  Diarrhea (Soft stools/gas since last Saturday, solid BM this morning), Abdominal Pain (Lower abdomen//Denies fever, nausea), and Hypertension (Patient states BP was running low 100/70)  Subjective        Vicente Delgado presents to [facility] PRIMARY CARE  History of Present Illness  History of Present Illness  The patient is a 89-year-old male who presents for evaluation of diarrhea.    The patient began experiencing diarrhea last Friday, which he initially attributed to a home-cooked meal of grilled chicken and sweet potatoes with butter on Saturday morning. Despite abstaining from food on Sunday, he experienced severe diarrhea that prevented him from rising from bed on Sunday. His daughter, a nurse, advised him to consume chicken broth with rice. Until yesterday, he experienced diarrhea after drinking water and had severe gas. He suspected a positive C. difficile infection, as his stool was not completely liquid, but formed. His stool occasionally became fuzzy and resembled a jelly-like substance. Postprandial cramping typically occurs within 30 minutes, but subsides within an hour, particularly in the morning.    This morning, he rnotes a regular bowel movement which is formed. His diet this morning consisted of half of an orange, fresh banana, a cup of decaffeinated tea, and a bowl of Cheerios. He discontinued coffee due to indigestion and has not needed to take Tums or Pepcid. He denies experiencing fever, vomiting, or stress. He rarely experiences constipation.    The patient has been monitoring his blood pressure this week, which has consistently ranged between 102 and 103 systolic and in the mid 70s diastolic. His heart rate was consistently around 66, but at home, it was 122/81. His diastolic blood pressure varied between 70 and 75.     His weight was 158 pounds at his last visit and currently, it is 153 pounds. His weight has consistently fluctuated between 178 and 180  "pounds during his cardiac rehabilitation.    Supplemental Information  He is scheduled to have carpal tunnel surgery on his left hand on XX/21/2023. 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    Objective   Vital Signs:  /74 (BP Location: Left arm, Patient Position: Sitting, Cuff Size: Adult)   Pulse 73   Ht 170.2 cm (67\")   Wt 69.8 kg (153 lb 12.8 oz)   SpO2 98%   BMI 24.09 kg/m²   Estimated body mass index is 24.09 kg/m² as calculated from the following:    Height as of this encounter: 170.2 cm (67\").    Weight as of this encounter: 69.8 kg (153 lb 12.8 oz).    BMI is within normal parameters. No other follow-up for BMI required.      Physical Exam  Constitutional:       Appearance: He is well-developed. He is not ill-appearing.   HENT:      Head: Normocephalic.      Right Ear: Tympanic membrane and external ear normal. Decreased hearing noted.      Left Ear: Tympanic membrane and external ear normal. Decreased hearing noted.      Nose: Nose normal. No nasal deformity, mucosal edema or rhinorrhea.      Right Sinus: No maxillary sinus tenderness or frontal sinus tenderness.      Left Sinus: No maxillary sinus tenderness or frontal sinus tenderness.      Mouth/Throat:      Dentition: Normal dentition.   Eyes:      General: Lids are normal.         Right eye: No discharge.         Left eye: No discharge.      Conjunctiva/sclera: Conjunctivae normal.      Right eye: No exudate.     Left eye: No exudate.  Neck:      Thyroid: No thyroid mass or thyromegaly.      Vascular: No carotid bruit.      Trachea: Trachea normal.   Cardiovascular:      Rate and Rhythm: Regular rhythm.      Pulses: Normal pulses.      Heart sounds: Normal heart sounds. No murmur heard.  Pulmonary:      Effort: No respiratory distress.      Breath sounds: Normal breath sounds. No decreased breath sounds, wheezing, rhonchi or rales.   Abdominal:      General: Bowel sounds are normal.      Palpations: Abdomen is soft. "      Tenderness: There is no abdominal tenderness.   Musculoskeletal:      Cervical back: Normal range of motion. No edema.   Lymphadenopathy:      Head:      Right side of head: No submental, submandibular, tonsillar, preauricular, posterior auricular or occipital adenopathy.      Left side of head: No submental, submandibular, tonsillar, preauricular, posterior auricular or occipital adenopathy.   Skin:     General: Skin is warm and dry.      Nails: There is no clubbing.   Neurological:      Mental Status: He is alert.   Psychiatric:         Behavior: Behavior is cooperative.        Physical Exam      Result Review :           Results               Assessment and Plan   Diagnoses and all orders for this visit:    1. Loose stools (Primary)    2. History of Clostridioides difficile infection    3. Primary hypertension  Assessment & Plan:  BP is low in the office today but home readings have been stable. Continue benazepril along with home BP monitoring.        Assessment & Plan  Diarrhea.  The patient's symptoms do not suggest a diagnosis of C. Difficile, especially since his bowel movement has returned to normal. The patient was advised to increase his food intake and avoid consumption of fried and spicy foods. He was also advised to consume Ensure. In the event of diarrhea, the patient is advised to take Imodium, provided it does not induce constipation.  We discussed increasing dora and yogurt to help with gut health.           Follow Up   Return if symptoms worsen or fail to improve, for Next scheduled follow up.  Patient was given instructions and counseling regarding his condition or for health maintenance advice. Please see specific information pulled into the AVS if appropriate.     Patient or patient representative verbalized consent for the use of Ambient Listening during the visit with  GE Bustos for chart documentation. 5/25/2024  12:54 EDT

## 2024-05-25 NOTE — ASSESSMENT & PLAN NOTE
BP is low in the office today but home readings have been stable. Continue benazepril along with home BP monitoring.

## 2024-06-14 ENCOUNTER — TELEPHONE (OUTPATIENT)
Dept: INTERNAL MEDICINE | Facility: CLINIC | Age: 89
End: 2024-06-14
Payer: MEDICARE

## 2024-06-14 NOTE — TELEPHONE ENCOUNTER
"  Caller: Vicente Delgado \"Ed\"    Relationship: Self    Best call back number: [phone]    What specialty or service is being requested: PROCTOLOGIST         Any additional details: PATIENT ASKS FOR REFERRAL OR RECOMMENDATION FOR A PROVIDER IN THE [facility]      "

## 2024-06-17 ENCOUNTER — OFFICE VISIT (OUTPATIENT)
Dept: INTERNAL MEDICINE | Facility: CLINIC | Age: 89
End: 2024-06-17
Payer: MEDICARE

## 2024-06-17 VITALS
DIASTOLIC BLOOD PRESSURE: 64 MMHG | OXYGEN SATURATION: 98 % | SYSTOLIC BLOOD PRESSURE: 104 MMHG | BODY MASS INDEX: 24.77 KG/M2 | WEIGHT: 157.8 LBS | HEART RATE: 73 BPM | HEIGHT: 67 IN

## 2024-06-17 DIAGNOSIS — G56.02 CARPAL TUNNEL SYNDROME OF LEFT WRIST: ICD-10-CM

## 2024-06-17 DIAGNOSIS — L30.9 DERMATITIS: Primary | ICD-10-CM

## 2024-06-17 PROCEDURE — 99213 OFFICE O/P EST LOW 20 MIN: CPT | Performed by: NURSE PRACTITIONER

## 2024-06-17 PROCEDURE — 1159F MED LIST DOCD IN RCRD: CPT | Performed by: NURSE PRACTITIONER

## 2024-06-17 PROCEDURE — 1126F AMNT PAIN NOTED NONE PRSNT: CPT | Performed by: NURSE PRACTITIONER

## 2024-06-17 PROCEDURE — 1160F RVW MEDS BY RX/DR IN RCRD: CPT | Performed by: NURSE PRACTITIONER

## 2024-06-17 PROCEDURE — G2211 COMPLEX E/M VISIT ADD ON: HCPCS | Performed by: NURSE PRACTITIONER

## 2024-06-29 NOTE — PROGRESS NOTES
"Chief Complaint  Follow-up (Requesting proctology referral) and Rash (Rash around anal area after prescriptions from hand surgery. Went to dermatology and rash has cleared up. Burning continues where thigh and buttock meet)  Subjective        Vicente Delgado presents to [facility]  Rash      History of Present Illness  The patient is a 89-year-old male who presents due to rectal irritaiton. He is accompanied by his daughter.    He underwent a left carpal tunnel release 5/21/24 by Dr. Marquez which he tolerated well. However, following the surgery, he reported soreness in his buttocks. His daughter observed deep, red, diaper rash bumps and scabs on both sides of his buttocks, which had burst.  He wears a pad and underwear. Despite the application of Vaseline and Bactroban ointment, the rash persisted, prompting a dermatology consultation. Fluconazole was administered, suspecting a yeast infection. The patient's scabbed areas have almost healed, but he continues to experience bumps on one side. He experiences discomfort when sitting firmly, such as on a stool or chair.     He discontinued Bactroban ointment and triamcinolone ointment on 06/12/2023. His daughter attempted to use Vaseline for a few day, which alleviated the burning sensation. The rash was itchy initially, but it has resolved. He denies any new medications or laundry detergents. He denies any constipation or diarrhea. He discontinued using cotton wet wipes since his C. difficile infection.    His daughter assists him with showering, using Dial antibacterial soap and goat milk soap on his hands and arms. He c/o discomfort with sitting due to pressure applied to area.    Objective   Vital Signs:  /64 (BP Location: Left arm, Patient Position: Sitting, Cuff Size: Adult)   Pulse 73   Ht 170 cm (66.93\")   Wt 71.6 kg (157 lb 12.8 oz)   SpO2 98%   BMI 24.77 kg/m²   Estimated body mass index is 24.77 kg/m² as calculated " "from the following:    Height as of this encounter: 170 cm (66.93\").    Weight as of this encounter: 71.6 kg (157 lb 12.8 oz).    BMI is within normal parameters. No other follow-up for BMI required.      Physical Exam  Exam conducted with a chaperone present (daughter).   Constitutional:       Appearance: He is well-developed. He is not ill-appearing.   HENT:      Head: Normocephalic.      Right Ear: Hearing, tympanic membrane and external ear normal.      Left Ear: Hearing, tympanic membrane and external ear normal.      Nose: Nose normal. No nasal deformity, mucosal edema or rhinorrhea.      Right Sinus: No maxillary sinus tenderness or frontal sinus tenderness.      Left Sinus: No maxillary sinus tenderness or frontal sinus tenderness.      Mouth/Throat:      Dentition: Normal dentition.   Eyes:      General: Lids are normal.         Right eye: No discharge.         Left eye: No discharge.      Conjunctiva/sclera: Conjunctivae normal.      Right eye: No exudate.     Left eye: No exudate.  Neck:      Thyroid: No thyroid mass or thyromegaly.      Vascular: No carotid bruit.      Trachea: Trachea normal.   Cardiovascular:      Rate and Rhythm: Regular rhythm.      Pulses: Normal pulses.      Heart sounds: Murmur heard.      Systolic murmur is present with a grade of 2/6.   Pulmonary:      Effort: No respiratory distress.      Breath sounds: Normal breath sounds. No decreased breath sounds, wheezing, rhonchi or rales.   Abdominal:      General: Bowel sounds are normal.      Palpations: Abdomen is soft.      Tenderness: There is no abdominal tenderness.   Genitourinary:     Comments: There is evidence of a previous rash on bilateral buttocks which is dry and without vesicular lesions, no acute inflammation  Musculoskeletal:      Cervical back: Normal range of motion. No edema.   Lymphadenopathy:      Head:      Right side of head: No submental, submandibular, tonsillar, preauricular, posterior auricular or occipital " adenopathy.      Left side of head: No submental, submandibular, tonsillar, preauricular, posterior auricular or occipital adenopathy.   Skin:     General: Skin is warm and dry.      Nails: There is no clubbing.   Neurological:      Mental Status: He is alert.   Psychiatric:         Behavior: Behavior is cooperative.        Physical Exam      Result Review :           Results               Assessment and Plan   Diagnoses and all orders for this visit:    1. Dermatitis (Primary)  Assessment & Plan:  His rash appears to have improved and is minimal on exam today, area has dried. We discussed use of TMC ointment qd due to irritation for the next few days to help with burning and itching, notify office if sx do not improve.      2. Carpal tunnel syndrome of left wrist  Comments:  He is s/p left carpal tunnel release and is doing well, continue to monitor      Assessment & Plan           Follow Up   Return if symptoms worsen or fail to improve, for Next scheduled follow up.  Patient was given instructions and counseling regarding his condition or for health maintenance advice. Please see specific information pulled into the AVS if appropriate.     Patient or patient representative verbalized consent for the use of Ambient Listening during the visit with  GE Bustos for chart documentation. 6/29/2024  16:14 EDT  Answers submitted by the patient for this visit:  Primary Reason for Visit (Submitted on 6/15/2024)  What is the primary reason for your visit?: Other  Other (Submitted on 6/15/2024)  Please describe your symptoms.: Serious rash,plus burning sensation on my rear  Have you had these symptoms before?: No  How long have you been having these symptoms?: 1-2 weeks  Please list any medications you are currently taking for this condition.: Mupirocin 2%.   Triamcinolone. 0.025% olnt  Please describe any probable cause for these symptoms. : Reaction from surgery i think

## 2024-06-29 NOTE — ASSESSMENT & PLAN NOTE
His rash appears to have improved and is minimal on exam today, area has dried. We discussed use of TMC ointment qd due to irritation for the next few days to help with burning and itching, notify office if sx do not improve.

## 2024-08-16 ENCOUNTER — OFFICE VISIT (OUTPATIENT)
Dept: INTERNAL MEDICINE | Facility: CLINIC | Age: 89
End: 2024-08-16
Payer: MEDICARE

## 2024-08-16 VITALS
DIASTOLIC BLOOD PRESSURE: 80 MMHG | SYSTOLIC BLOOD PRESSURE: 142 MMHG | HEIGHT: 67 IN | TEMPERATURE: 97.7 F | BODY MASS INDEX: 25.65 KG/M2 | WEIGHT: 163.4 LBS | HEART RATE: 70 BPM | OXYGEN SATURATION: 99 %

## 2024-08-16 DIAGNOSIS — R10.13 DYSPEPSIA: ICD-10-CM

## 2024-08-16 DIAGNOSIS — I10 PRIMARY HYPERTENSION: Chronic | ICD-10-CM

## 2024-08-16 DIAGNOSIS — I25.10 CORONARY ARTERY DISEASE INVOLVING NATIVE CORONARY ARTERY OF NATIVE HEART WITHOUT ANGINA PECTORIS: Chronic | ICD-10-CM

## 2024-08-16 DIAGNOSIS — I35.0 AORTIC STENOSIS, MODERATE: Primary | Chronic | ICD-10-CM

## 2024-08-16 DIAGNOSIS — G56.02 CARPAL TUNNEL SYNDROME OF LEFT WRIST: Chronic | ICD-10-CM

## 2024-08-16 PROCEDURE — 1126F AMNT PAIN NOTED NONE PRSNT: CPT | Performed by: NURSE PRACTITIONER

## 2024-08-16 PROCEDURE — 99214 OFFICE O/P EST MOD 30 MIN: CPT | Performed by: NURSE PRACTITIONER

## 2024-08-16 PROCEDURE — G2211 COMPLEX E/M VISIT ADD ON: HCPCS | Performed by: NURSE PRACTITIONER

## 2024-08-16 PROCEDURE — 1160F RVW MEDS BY RX/DR IN RCRD: CPT | Performed by: NURSE PRACTITIONER

## 2024-08-16 PROCEDURE — 1159F MED LIST DOCD IN RCRD: CPT | Performed by: NURSE PRACTITIONER

## 2024-08-17 PROBLEM — R10.13 DYSPEPSIA: Status: ACTIVE | Noted: 2024-08-17

## 2024-08-17 PROBLEM — I50.32 CHRONIC HEART FAILURE WITH PRESERVED EJECTION FRACTION (HFPEF): Chronic | Status: ACTIVE | Noted: 2023-09-22

## 2024-08-17 NOTE — ASSESSMENT & PLAN NOTE
He experiences occasional indigestion, particularly with spicy foods. He is advised to avoid spicy foods and Mexican cuisine to manage symptoms.

## 2024-08-17 NOTE — ASSESSMENT & PLAN NOTE
Blood pressure readings are within the normal range, with values between 120-130/70-80. He is advised to continue benazepril daily along with a low sodium diet.

## 2024-08-17 NOTE — ASSESSMENT & PLAN NOTE
History of mild disease.  Stress test 2017 with no ischemia.  He is on statin and Zetia.  No angina pectoris. EF normal 9/2023 echo

## 2024-08-17 NOTE — PROGRESS NOTES
"Chief Complaint  Hypertension (6 month follow up) and Rash (Has resolved with Benadryl use)  Subjective        Vicente Delgado presents to [facility]  History of Present Illness  History of Present Illness  The patient presents for a 6-month follow-up.    He reports feeling well overall, with regular bowel movements. His blood pressure readings have been within the normal range. He has eliminated fried foods from his diet and has not had any additional episodes of abdominal pain. He experiences mild indigestion after meals and tries to avoid spicy foods. He has been managing arthritic joint pain with Tylenol 500 mg.     He has been consuming Kefir daily in the morning. He attended physical therapy today for his wrist and has been lifting weights as part of his therapy. His therapist informed him that he is progressing well with his procedures. His carpal tunnel condition has improved post-surgery, but he continues to experience issues with his thumb. He has an appointment with his doctor on Monday. He also has an upcoming appointment with Dr. Berry, who plans to conduct blood work and a PET scan.    He notes rectal irritation and itching/rash have resolved.    Objective   Vital Signs:  /80 (BP Location: Left arm, Patient Position: Sitting, Cuff Size: Adult) Comment: just got done with PT  Pulse 70   Temp 97.7 °F (36.5 °C)   Ht 170 cm (66.93\")   Wt 74.1 kg (163 lb 6.4 oz)   SpO2 99%   BMI 25.65 kg/m²   Estimated body mass index is 25.65 kg/m² as calculated from the following:    Height as of this encounter: 170 cm (66.93\").    Weight as of this encounter: 74.1 kg (163 lb 6.4 oz).            Physical Exam  Constitutional:       Appearance: He is well-developed. He is not ill-appearing.   HENT:      Head: Normocephalic.      Right Ear: Hearing, tympanic membrane and external ear normal.      Left Ear: Hearing, tympanic membrane and external ear normal.      Nose: Nose normal. " No nasal deformity, mucosal edema or rhinorrhea.      Right Sinus: No maxillary sinus tenderness or frontal sinus tenderness.      Left Sinus: No maxillary sinus tenderness or frontal sinus tenderness.      Mouth/Throat:      Dentition: Normal dentition.   Eyes:      General: Lids are normal.         Right eye: No discharge.         Left eye: No discharge.      Conjunctiva/sclera: Conjunctivae normal.      Right eye: No exudate.     Left eye: No exudate.  Neck:      Thyroid: No thyroid mass or thyromegaly.      Vascular: No carotid bruit.      Trachea: Trachea normal.   Cardiovascular:      Rate and Rhythm: Regular rhythm.      Pulses: Normal pulses.      Heart sounds: Normal heart sounds. No murmur heard.      with a grade of 3/6.   Pulmonary:      Effort: No respiratory distress.      Breath sounds: Normal breath sounds. No decreased breath sounds, wheezing, rhonchi or rales.   Abdominal:      General: Bowel sounds are normal.      Palpations: Abdomen is soft.      Tenderness: There is no abdominal tenderness.   Musculoskeletal:      Cervical back: Normal range of motion. No edema.   Lymphadenopathy:      Head:      Right side of head: No submental, submandibular, tonsillar, preauricular, posterior auricular or occipital adenopathy.      Left side of head: No submental, submandibular, tonsillar, preauricular, posterior auricular or occipital adenopathy.   Skin:     General: Skin is warm and dry.      Nails: There is no clubbing.   Neurological:      Mental Status: He is alert.   Psychiatric:         Behavior: Behavior is cooperative.        Physical Exam  Heart murmur noted.    Vital Signs  Blood pressure measures 120s to 130/70-80.    Result Review :  The following data was reviewed by: GE Bustos on 08/16/2024:  Common labs          3/11/2024    15:03 4/5/2024    09:17 5/17/2024    12:45   Common Labs   Glucose 102  84  126    BUN 21  17  19    Creatinine 0.78  0.86  0.79    Sodium 139  139  139     Potassium 4.2  4.3  4.1    Chloride 103  103  104    Calcium 8.7  9.2  9.1    Albumin 4.1   3.8    Total Bilirubin 0.5   0.3    Alkaline Phosphatase 67   72    AST (SGOT) 26   33    ALT (SGPT) 16   21    WBC 3.70  3.56  5.94    Hemoglobin 13.2  13.7  12.9    Hematocrit 40.7  42.1  39.2    Platelets 135  162  184      Data reviewed : Cardiology studies echo 9/2023      Results               Assessment and Plan   Diagnoses and all orders for this visit:    1. Aortic stenosis, moderate (Primary)  Assessment & Plan:  Echo 6/2022: moderate to severe aortic stenosis  Echo 9/2023: moderate to severe aortic stenosis  He is followed by cardiology, denies dyspnea.      2. Coronary artery disease involving native coronary artery of native heart without angina pectoris  Assessment & Plan:  History of mild disease.  Stress test 2017 with no ischemia.  He is on statin and Zetia.  No angina pectoris. EF normal 9/2023 echo      3. Primary hypertension  Assessment & Plan:  Blood pressure readings are within the normal range, with values between 120-130/70-80. He is advised to continue benazepril daily along with a low sodium diet.      4. Dyspepsia  Assessment & Plan:  He experiences occasional indigestion, particularly with spicy foods. He is advised to avoid spicy foods and Mexican cuisine to manage symptoms.      5. Carpal tunnel syndrome of left wrist  Assessment & Plan:  He underwent left carpal tunnel release 5/21/24 which he tolerated well, currently receiving physical therapy        Assessment & Plan      Health Maintenance.  He is advised to receive the influenza vaccine in September or October. He is also recommended to get the new COVID-19 vaccine when it becomes available, likely in September.           Follow Up   Return in about 6 months (around 2/16/2025) for wellness.  Patient was given instructions and counseling regarding his condition or for health maintenance advice. Please see specific information pulled into  the AVS if appropriate.     Patient or patient representative verbalized consent for the use of Ambient Listening during the visit with  GE Bustos for chart documentation. 8/17/2024  10:52 EDT  Answers submitted by the patient for this visit:  Primary Reason for Visit (Submitted on 8/14/2024)  What is the primary reason for your visit?: Other  Other (Submitted on 8/14/2024)  Please describe your symptoms.: Routine and consulation  Have you had these symptoms before?: Yes  How long have you been having these symptoms?: Greater than 2 weeks  Please describe any probable cause for these symptoms. : Age

## 2024-08-17 NOTE — ASSESSMENT & PLAN NOTE
He underwent left carpal tunnel release 5/21/24 which he tolerated well, currently receiving physical therapy

## 2024-08-17 NOTE — ASSESSMENT & PLAN NOTE
Echo 6/2022: moderate to severe aortic stenosis  Echo 9/2023: moderate to severe aortic stenosis  He is followed by cardiology, denies dyspnea.

## 2024-08-28 ENCOUNTER — HOSPITAL ENCOUNTER (OUTPATIENT)
Dept: PET IMAGING | Facility: HOSPITAL | Age: 89
Discharge: HOME | End: 2024-08-28
Admitting: INTERNAL MEDICINE
Payer: MEDICARE

## 2024-08-28 DIAGNOSIS — C85.90 LYMPHOMA, UNSPECIFIED BODY REGION, UNSPECIFIED LYMPHOMA TYPE: ICD-10-CM

## 2024-08-28 PROCEDURE — 71260 CT THORAX DX C+: CPT

## 2024-08-28 PROCEDURE — 25510000001 IOPAMIDOL 61 % SOLUTION: Performed by: INTERNAL MEDICINE

## 2024-08-28 PROCEDURE — 74177 CT ABD & PELVIS W/CONTRAST: CPT

## 2024-08-28 PROCEDURE — 0 DIATRIZOATE MEGLUMINE & SODIUM PER 1 ML: Performed by: INTERNAL MEDICINE

## 2024-08-28 RX ORDER — IOPAMIDOL 612 MG/ML
100 INJECTION, SOLUTION INTRAVASCULAR
Status: COMPLETED | OUTPATIENT
Start: 2024-08-28 | End: 2024-08-28

## 2024-08-28 RX ADMIN — IOPAMIDOL 85 ML: 612 INJECTION, SOLUTION INTRAVENOUS at 09:46

## 2024-08-28 RX ADMIN — DIATRIZOATE MEGLUMINE AND DIATRIZOATE SODIUM 30 ML: 660; 100 LIQUID ORAL; RECTAL at 09:00

## 2024-08-28 NOTE — PROGRESS NOTES
Reviewed patient's history and EKG with anesthesia. Per anesthesia, OK to proceed with 9/5/24 procedure in ASC.     Tolerated exercise well, no complaints voiced.

## 2024-09-03 NOTE — PROGRESS NOTES
"[facility] GROUP OUTPATIENT FOLLOW UP CLINIC VISIT    REASON FOR FOLLOW-UP:    B-cell non-Hodgkin lymphoma, CD20 positive involving the distal ileum  4 doses of weekly rituximab complete 1/5/2024    HISTORY OF PRESENT ILLNESS:  Vicente Delgado is a 89 y.o. male who returns today for follow up of the above issue.     Healing from left hand surgery.  Overall doing well aside from what he thinks are couple of hemorrhoids.  He has used Preparation H with improvement.  No bleeding.      REVIEW OF SYSTEMS:  As per the [facility]    PHYSICAL EXAMINATION:  Vitals:    09/04/24 1448   BP: 121/69   Pulse: 67   Resp: 20   SpO2: 97%   Weight: 73 kg (161 lb)   Height: 170 cm (66.93\")   PainSc:   1   PainLoc: Hand         General:  No acute distress, awake, alert and oriented  Skin:  Warm and dry, no visible rash  HEENT:  Normocephalic/atraumatic.  Hearing loss persists, stable.  Chest:  Normal respiratory effort.  Lungs clear to auscultation bilaterally.  Heart: Regular rate and rhythm, grade IV/VI holosystolic murmur.  Extremities:  No visible clubbing, cyanosis, or edema  Neuro/psych:  Grossly nonfocal other than hearing loss.  Normal mood and affect.  Lymphatics: No palpable cervical supraclavicular or axillary adenopathy       DIAGNOSTIC DATA:  Lactate Dehydrogenase (09/04/2024 14:27)  Comprehensive Metabolic Panel (09/04/2024 14:27)  CBC & Differential (09/04/2024 14:27)    IMAGING:    CT Abdomen Pelvis With Contrast (08/28/2024 10:11)  CT Chest With Contrast Diagnostic (08/28/2024 10:11)    CT imaging without definite evidence of disease.  There is some nonspecific mild wall thickening in the distal transverse colon.    ASSESSMENT:    This is a 89 y.o. male with:    *B-cell non-Hodgkin lymphoma, CD20 positive involving the distal ileum  The patient began experiencing abdominal discomfort and diarrhea in early September after eating what he felt was some bad fish.    He presented to the emergency department on 9/20/2023.  "   CT imaging of the abdomen and pelvis was performed showing an enhancing mass in the cecum measuring about 3 cm with adjacent enlarged pericecal lymph nodes measuring up to 1.6 cm.  Mild sigmoid diverticulosis was noted as well as mild wall thickening involving the mid sigmoid colon.  He was referred for colonoscopy which was performed by Dr. Ary Conway with colorectal surgery on 10/18/2023.  This showed multiple diverticula in sigmoid colon, 1 nonbleeding polyp in the distal ileum at 15 mm.    Biopsies show involvement by atypical CD20 positive B-cell non-Hodgkin lymphoma with extensive crush artifact and a Ki-67 that is estimated greater than 80%.  However, definitive morphology was not able to be visualized and rebiopsy has been suggested.  The initial biopsy was quite difficult per Dr. Conway given the location  A PET scan was performed on 11/8/2023.  There is some subtle hypermetabolic activity at the terminal ileum, maximal SUV 3.8 and an area measuring approximately 3 cm.  Adjacent mesenteric nodes are not significantly hypermetabolic and are either photopenic or have blood pool level activity.  Nonspecific low-level activity at the distal esophagus and GE junction with a maximal SUV of 4.3.  No obvious mass on CT imaging.  Spleen size normal.  No hypermetabolic cavity there.  Blood pool activity mediastinal lymphadenopathy  12/15/2023: 4 weeks of rituximab anticipated.  Cycle number 1 day 1 today.  12/22/2023: Proceed with week 2 rituximab.  Patient tolerated week 1 well and is overall feeling improvement in his energy.  12/29/2023: Proceed with week 3 rituximab.  Patient continues to have excellent tolerance to treatment and is feeling well.  1/5/24: week 4 ritiximab. Excellent tolerance thus far.   PET scan 2/16/2024 with a complete response in the distal ileum, likely physiologic activity in the cecum.  8/28/2024:CT imaging without definite evidence of disease.  There is some nonspecific mild wall  thickening in the distal transverse colon.     *History of C. difficile diarrhea  Symptoms initially resolved after 2 courses of oral vancomycin  Now with recurrent symptoms  CT imaging 11/12/2023 with thick-walled appearance of the colon from the splenic flexure to the rectum with most significant involvement in the rectosigmoid colon, adjacent pericolonic soft tissue stranding consistent with colitis.  Mass in the cecum less apparent compared to 9/20/2023.  Dr. Pretty with ID evaluated him.  He recommended pursuing a longer vancomycin taper for approximately 5 weeks of therapy.  Therapy complete.  Symptoms essentially resolved.  12/22/2023: Patient reports he has 1 more day remaining of his vancomycin.  His doctor has had him slowly tapering off of his vancomycin.  He continues to incorporate yogurt into his diet daily.  He is anxious about symptoms returning after he remains off of his medication.  12/29/2023: Patient has remained off antibiotics for a week now and remains free of any GI symptoms.  He continues to have some anxiety related to possible recurrence.  Remains anxious about recurrence but no evidence to support this. Consuming yogurt.  Did not tolerate Joby due to indigestion and acid reflux.  5/17/2024: States is taking Joby and yogurt.  Complains of soft stool but no recurrent diarrhea consistent with C. difficile    *Aortic stenosis     *History of prostate cancer  Status post radical prostatectomy at approximately age 60     *Hearing loss    *Anxiety    *Hemorrhoids  Seem to be better with the use of Preparation H.  If they worsen, he can follow-up with Dr. Conway.    PLAN:   Follow-up CT imaging of the chest abdomen pelvis in 6 months and I will see him back after that with labs.  We are certainly available sooner if needed.

## 2024-09-04 ENCOUNTER — LAB (OUTPATIENT)
Dept: LAB | Facility: HOSPITAL | Age: 89
End: 2024-09-04
Payer: MEDICARE

## 2024-09-04 ENCOUNTER — OFFICE VISIT (OUTPATIENT)
Dept: ONCOLOGY | Facility: CLINIC | Age: 89
End: 2024-09-04
Payer: MEDICARE

## 2024-09-04 VITALS
SYSTOLIC BLOOD PRESSURE: 121 MMHG | HEIGHT: 67 IN | WEIGHT: 161 LBS | BODY MASS INDEX: 25.27 KG/M2 | OXYGEN SATURATION: 97 % | HEART RATE: 67 BPM | RESPIRATION RATE: 20 BRPM | DIASTOLIC BLOOD PRESSURE: 69 MMHG

## 2024-09-04 DIAGNOSIS — C85.90 LYMPHOMA, UNSPECIFIED BODY REGION, UNSPECIFIED LYMPHOMA TYPE: ICD-10-CM

## 2024-09-04 DIAGNOSIS — C85.90 LYMPHOMA, UNSPECIFIED BODY REGION, UNSPECIFIED LYMPHOMA TYPE: Primary | ICD-10-CM

## 2024-09-04 LAB
ALBUMIN SERPL-MCNC: 3.9 G/DL (ref 3.5–5.2)
ALBUMIN/GLOB SERPL: 1.4 G/DL
ALP SERPL-CCNC: 83 U/L (ref 39–117)
ALT SERPL W P-5'-P-CCNC: 14 U/L (ref 1–41)
ANION GAP SERPL CALCULATED.3IONS-SCNC: 10.7 MMOL/L (ref 5–15)
AST SERPL-CCNC: 26 U/L (ref 1–40)
BASOPHILS # BLD AUTO: 0.01 10*3/MM3 (ref 0–0.2)
BASOPHILS NFR BLD AUTO: 0.2 % (ref 0–1.5)
BILIRUB SERPL-MCNC: 0.3 MG/DL (ref 0–1.2)
BUN SERPL-MCNC: 20 MG/DL (ref 8–23)
BUN/CREAT SERPL: 25.6 (ref 7–25)
CALCIUM SPEC-SCNC: 8.5 MG/DL (ref 8.2–9.6)
CHLORIDE SERPL-SCNC: 102 MMOL/L (ref 98–107)
CO2 SERPL-SCNC: 25.3 MMOL/L (ref 22–29)
CREAT SERPL-MCNC: 0.78 MG/DL (ref 0.76–1.27)
DEPRECATED RDW RBC AUTO: 45.1 FL (ref 37–54)
EGFRCR SERPLBLD CKD-EPI 2021: 81.6 ML/MIN/1.73
EOSINOPHIL # BLD AUTO: 0.02 10*3/MM3 (ref 0–0.4)
EOSINOPHIL NFR BLD AUTO: 0.4 % (ref 0.3–6.2)
ERYTHROCYTE [DISTWIDTH] IN BLOOD BY AUTOMATED COUNT: 13.1 % (ref 12.3–15.4)
GLOBULIN UR ELPH-MCNC: 2.8 GM/DL
GLUCOSE SERPL-MCNC: 128 MG/DL (ref 65–99)
HCT VFR BLD AUTO: 40.2 % (ref 37.5–51)
HGB BLD-MCNC: 13.4 G/DL (ref 13–17.7)
IMM GRANULOCYTES # BLD AUTO: 0.02 10*3/MM3 (ref 0–0.05)
IMM GRANULOCYTES NFR BLD AUTO: 0.4 % (ref 0–0.5)
LDH SERPL-CCNC: 185 U/L (ref 135–225)
LYMPHOCYTES # BLD AUTO: 1.47 10*3/MM3 (ref 0.7–3.1)
LYMPHOCYTES NFR BLD AUTO: 27.4 % (ref 19.6–45.3)
MCH RBC QN AUTO: 31.1 PG (ref 26.6–33)
MCHC RBC AUTO-ENTMCNC: 33.3 G/DL (ref 31.5–35.7)
MCV RBC AUTO: 93.3 FL (ref 79–97)
MONOCYTES # BLD AUTO: 0.75 10*3/MM3 (ref 0.1–0.9)
MONOCYTES NFR BLD AUTO: 14 % (ref 5–12)
NEUTROPHILS NFR BLD AUTO: 3.1 10*3/MM3 (ref 1.7–7)
NEUTROPHILS NFR BLD AUTO: 57.6 % (ref 42.7–76)
NRBC BLD AUTO-RTO: 0 /100 WBC (ref 0–0.2)
PLATELET # BLD AUTO: 236 10*3/MM3 (ref 140–450)
PMV BLD AUTO: 9.9 FL (ref 6–12)
POTASSIUM SERPL-SCNC: 4.3 MMOL/L (ref 3.5–5.2)
PROT SERPL-MCNC: 6.7 G/DL (ref 6–8.5)
RBC # BLD AUTO: 4.31 10*6/MM3 (ref 4.14–5.8)
SODIUM SERPL-SCNC: 138 MMOL/L (ref 136–145)
WBC NRBC COR # BLD AUTO: 5.37 10*3/MM3 (ref 3.4–10.8)

## 2024-09-04 PROCEDURE — 99214 OFFICE O/P EST MOD 30 MIN: CPT | Performed by: INTERNAL MEDICINE

## 2024-09-04 PROCEDURE — 1159F MED LIST DOCD IN RCRD: CPT | Performed by: INTERNAL MEDICINE

## 2024-09-04 PROCEDURE — 83615 LACTATE (LD) (LDH) ENZYME: CPT

## 2024-09-04 PROCEDURE — 36415 COLL VENOUS BLD VENIPUNCTURE: CPT

## 2024-09-04 PROCEDURE — G2211 COMPLEX E/M VISIT ADD ON: HCPCS | Performed by: INTERNAL MEDICINE

## 2024-09-04 PROCEDURE — 85025 COMPLETE CBC W/AUTO DIFF WBC: CPT

## 2024-09-04 PROCEDURE — 80053 COMPREHEN METABOLIC PANEL: CPT

## 2024-09-04 PROCEDURE — 1160F RVW MEDS BY RX/DR IN RCRD: CPT | Performed by: INTERNAL MEDICINE

## 2024-09-04 PROCEDURE — 1125F AMNT PAIN NOTED PAIN PRSNT: CPT | Performed by: INTERNAL MEDICINE

## 2024-09-19 ENCOUNTER — OFFICE VISIT (OUTPATIENT)
Age: 89
End: 2024-09-19
Payer: MEDICARE

## 2024-09-19 VITALS
WEIGHT: 162 LBS | HEART RATE: 64 BPM | BODY MASS INDEX: 25.43 KG/M2 | HEIGHT: 67 IN | SYSTOLIC BLOOD PRESSURE: 138 MMHG | DIASTOLIC BLOOD PRESSURE: 72 MMHG

## 2024-09-19 DIAGNOSIS — I35.0 AORTIC STENOSIS, MODERATE: ICD-10-CM

## 2024-09-19 DIAGNOSIS — I10 PRIMARY HYPERTENSION: ICD-10-CM

## 2024-09-19 DIAGNOSIS — I73.9 PAD (PERIPHERAL ARTERY DISEASE): Primary | ICD-10-CM

## 2024-09-19 PROCEDURE — 99214 OFFICE O/P EST MOD 30 MIN: CPT | Performed by: INTERNAL MEDICINE

## 2024-09-19 PROCEDURE — 93000 ELECTROCARDIOGRAM COMPLETE: CPT | Performed by: INTERNAL MEDICINE

## 2024-09-30 ENCOUNTER — APPOINTMENT (OUTPATIENT)
Dept: GENERAL RADIOLOGY | Facility: HOSPITAL | Age: 89
DRG: 287 | End: 2024-09-30
Payer: MEDICARE

## 2024-09-30 ENCOUNTER — APPOINTMENT (OUTPATIENT)
Dept: CARDIOLOGY | Facility: HOSPITAL | Age: 89
DRG: 287 | End: 2024-09-30
Payer: MEDICARE

## 2024-09-30 ENCOUNTER — HOSPITAL ENCOUNTER (INPATIENT)
Facility: HOSPITAL | Age: 89
LOS: 3 days | Discharge: HOME | DRG: 287 | End: 2024-10-04
Attending: EMERGENCY MEDICINE | Admitting: STUDENT IN AN ORGANIZED HEALTH CARE EDUCATION/TRAINING PROGRAM
Payer: MEDICARE

## 2024-09-30 DIAGNOSIS — R55 NEAR SYNCOPE: Primary | ICD-10-CM

## 2024-09-30 DIAGNOSIS — I34.0 NONRHEUMATIC MITRAL VALVE REGURGITATION: Chronic | ICD-10-CM

## 2024-09-30 DIAGNOSIS — I35.0 AORTIC STENOSIS, MODERATE: Chronic | ICD-10-CM

## 2024-09-30 PROBLEM — R42 DIZZINESS: Status: ACTIVE | Noted: 2024-09-30

## 2024-09-30 LAB
ALBUMIN SERPL-MCNC: 4.1 G/DL (ref 3.5–5.2)
ALBUMIN/GLOB SERPL: 1.6 G/DL
ALP SERPL-CCNC: 67 U/L (ref 39–117)
ALT SERPL W P-5'-P-CCNC: 17 U/L (ref 1–41)
ANION GAP SERPL CALCULATED.3IONS-SCNC: 11 MMOL/L (ref 5–15)
AORTIC DIMENSIONLESS INDEX: 0.2 (DI)
AST SERPL-CCNC: 22 U/L (ref 1–40)
BASOPHILS # BLD AUTO: 0.01 10*3/MM3 (ref 0–0.2)
BASOPHILS NFR BLD AUTO: 0.2 % (ref 0–1.5)
BH CV ECHO MEAS - AI P1/2T: 756.8 MSEC
BH CV ECHO MEAS - AO MAX PG: 67.4 MMHG
BH CV ECHO MEAS - AO MEAN PG: 38.8 MMHG
BH CV ECHO MEAS - AO ROOT DIAM: 3.7 CM
BH CV ECHO MEAS - AO V2 MAX: 410.5 CM/SEC
BH CV ECHO MEAS - AO V2 VTI: 95 CM
BH CV ECHO MEAS - AVA(I,D): 0.76 CM2
BH CV ECHO MEAS - EDV(CUBED): 84.9 ML
BH CV ECHO MEAS - EDV(MOD-SP2): 137 ML
BH CV ECHO MEAS - EDV(MOD-SP4): 129 ML
BH CV ECHO MEAS - EF(MOD-BP): 61.8 %
BH CV ECHO MEAS - EF(MOD-SP2): 65 %
BH CV ECHO MEAS - EF(MOD-SP4): 56.6 %
BH CV ECHO MEAS - ESV(CUBED): 23.4 ML
BH CV ECHO MEAS - ESV(MOD-SP2): 48 ML
BH CV ECHO MEAS - ESV(MOD-SP4): 56 ML
BH CV ECHO MEAS - FS: 35 %
BH CV ECHO MEAS - IVS/LVPW: 1.2 CM
BH CV ECHO MEAS - IVSD: 1.63 CM
BH CV ECHO MEAS - LAT PEAK E' VEL: 3.2 CM/SEC
BH CV ECHO MEAS - LV DIASTOLIC VOL/BSA (35-75): 69.8 CM2
BH CV ECHO MEAS - LV MASS(C)D: 264.5 GRAMS
BH CV ECHO MEAS - LV MAX PG: 2.6 MMHG
BH CV ECHO MEAS - LV MEAN PG: 1.4 MMHG
BH CV ECHO MEAS - LV SYSTOLIC VOL/BSA (12-30): 30.3 CM2
BH CV ECHO MEAS - LV V1 MAX: 80.2 CM/SEC
BH CV ECHO MEAS - LV V1 VTI: 18.6 CM
BH CV ECHO MEAS - LVIDD: 4.4 CM
BH CV ECHO MEAS - LVIDS: 2.9 CM
BH CV ECHO MEAS - LVOT AREA: 3.9 CM2
BH CV ECHO MEAS - LVOT DIAM: 2.23 CM
BH CV ECHO MEAS - LVPWD: 1.36 CM
BH CV ECHO MEAS - MED PEAK E' VEL: 5.3 CM/SEC
BH CV ECHO MEAS - MR MAX PG: 125.4 MMHG
BH CV ECHO MEAS - MR MAX VEL: 560 CM/SEC
BH CV ECHO MEAS - MV A DUR: 0.11 SEC
BH CV ECHO MEAS - MV A MAX VEL: 107.4 CM/SEC
BH CV ECHO MEAS - MV DEC SLOPE: 508 CM/SEC2
BH CV ECHO MEAS - MV DEC TIME: 272 SEC
BH CV ECHO MEAS - MV E MAX VEL: 54.5 CM/SEC
BH CV ECHO MEAS - MV E/A: 0.51
BH CV ECHO MEAS - MV MAX PG: 4.7 MMHG
BH CV ECHO MEAS - MV MEAN PG: 2.17 MMHG
BH CV ECHO MEAS - MV P1/2T: 59.2 MSEC
BH CV ECHO MEAS - MV V2 VTI: 32.4 CM
BH CV ECHO MEAS - MVA(P1/2T): 3.7 CM2
BH CV ECHO MEAS - MVA(VTI): 2.24 CM2
BH CV ECHO MEAS - PA ACC TIME: 0.11 SEC
BH CV ECHO MEAS - PA V2 MAX: 167.3 CM/SEC
BH CV ECHO MEAS - PULM A REVS DUR: 0.1 SEC
BH CV ECHO MEAS - PULM A REVS VEL: 35.2 CM/SEC
BH CV ECHO MEAS - PULM DIAS VEL: 49.7 CM/SEC
BH CV ECHO MEAS - PULM S/D: 1.19
BH CV ECHO MEAS - PULM SYS VEL: 59.4 CM/SEC
BH CV ECHO MEAS - RAP SYSTOLE: 3 MMHG
BH CV ECHO MEAS - RV MAX PG: 1.35 MMHG
BH CV ECHO MEAS - RV V1 MAX: 58.2 CM/SEC
BH CV ECHO MEAS - RV V1 VTI: 11.9 CM
BH CV ECHO MEAS - RVSP: 24.9 MMHG
BH CV ECHO MEAS - SV(LVOT): 72.5 ML
BH CV ECHO MEAS - SV(MOD-SP2): 89 ML
BH CV ECHO MEAS - SV(MOD-SP4): 73 ML
BH CV ECHO MEAS - SVI(LVOT): 39.2 ML/M2
BH CV ECHO MEAS - SVI(MOD-SP2): 48.1 ML/M2
BH CV ECHO MEAS - SVI(MOD-SP4): 39.5 ML/M2
BH CV ECHO MEAS - TAPSE (>1.6): 2.7 CM
BH CV ECHO MEAS - TR MAX PG: 21.9 MMHG
BH CV ECHO MEAS - TR MAX VEL: 234.2 CM/SEC
BH CV ECHO MEASUREMENTS AVERAGE E/E' RATIO: 12.82
BH CV XLRA - RV BASE: 3.7 CM
BH CV XLRA - TDI S': 16.9 CM/SEC
BILIRUB SERPL-MCNC: 0.5 MG/DL (ref 0–1.2)
BUN SERPL-MCNC: 21 MG/DL (ref 8–23)
BUN/CREAT SERPL: 27.3 (ref 7–25)
CALCIUM SPEC-SCNC: 9.4 MG/DL (ref 8.2–9.6)
CHLORIDE SERPL-SCNC: 103 MMOL/L (ref 98–107)
CO2 SERPL-SCNC: 25 MMOL/L (ref 22–29)
CREAT SERPL-MCNC: 0.77 MG/DL (ref 0.76–1.27)
DEPRECATED RDW RBC AUTO: 43.2 FL (ref 37–54)
EGFRCR SERPLBLD CKD-EPI 2021: 81.9 ML/MIN/1.73
EOSINOPHIL # BLD AUTO: 0.02 10*3/MM3 (ref 0–0.4)
EOSINOPHIL NFR BLD AUTO: 0.3 % (ref 0.3–6.2)
ERYTHROCYTE [DISTWIDTH] IN BLOOD BY AUTOMATED COUNT: 12.6 % (ref 12.3–15.4)
GEN 5 2HR TROPONIN T REFLEX: 39 NG/L
GLOBULIN UR ELPH-MCNC: 2.6 GM/DL
GLUCOSE SERPL-MCNC: 87 MG/DL (ref 65–99)
HCT VFR BLD AUTO: 43.5 % (ref 37.5–51)
HGB BLD-MCNC: 14.4 G/DL (ref 13–17.7)
IMM GRANULOCYTES # BLD AUTO: 0.03 10*3/MM3 (ref 0–0.05)
IMM GRANULOCYTES NFR BLD AUTO: 0.5 % (ref 0–0.5)
INR PPP: 1.07 (ref 0.9–1.1)
LEFT ATRIUM VOLUME INDEX: 30.4 ML/M2
LYMPHOCYTES # BLD AUTO: 1.36 10*3/MM3 (ref 0.7–3.1)
LYMPHOCYTES NFR BLD AUTO: 21 % (ref 19.6–45.3)
MCH RBC QN AUTO: 31.3 PG (ref 26.6–33)
MCHC RBC AUTO-ENTMCNC: 33.1 G/DL (ref 31.5–35.7)
MCV RBC AUTO: 94.6 FL (ref 79–97)
MONOCYTES # BLD AUTO: 1.14 10*3/MM3 (ref 0.1–0.9)
MONOCYTES NFR BLD AUTO: 17.6 % (ref 5–12)
NEUTROPHILS NFR BLD AUTO: 3.91 10*3/MM3 (ref 1.7–7)
NEUTROPHILS NFR BLD AUTO: 60.4 % (ref 42.7–76)
NRBC BLD AUTO-RTO: 0 /100 WBC (ref 0–0.2)
PLATELET # BLD AUTO: 194 10*3/MM3 (ref 140–450)
PMV BLD AUTO: 10.2 FL (ref 6–12)
POTASSIUM SERPL-SCNC: 4.3 MMOL/L (ref 3.5–5.2)
PROT SERPL-MCNC: 6.7 G/DL (ref 6–8.5)
PROTHROMBIN TIME: 14.1 SECONDS (ref 11.7–14.2)
QT INTERVAL: 479 MS
QTC INTERVAL: 446 MS
RBC # BLD AUTO: 4.6 10*6/MM3 (ref 4.14–5.8)
SODIUM SERPL-SCNC: 139 MMOL/L (ref 136–145)
TROPONIN T DELTA: 4 NG/L
TROPONIN T SERPL HS-MCNC: 35 NG/L
WBC NRBC COR # BLD AUTO: 6.47 10*3/MM3 (ref 3.4–10.8)

## 2024-09-30 PROCEDURE — G0378 HOSPITAL OBSERVATION PER HR: HCPCS

## 2024-09-30 PROCEDURE — 84484 ASSAY OF TROPONIN QUANT: CPT | Performed by: EMERGENCY MEDICINE

## 2024-09-30 PROCEDURE — 93005 ELECTROCARDIOGRAM TRACING: CPT | Performed by: EMERGENCY MEDICINE

## 2024-09-30 PROCEDURE — 85025 COMPLETE CBC W/AUTO DIFF WBC: CPT | Performed by: EMERGENCY MEDICINE

## 2024-09-30 PROCEDURE — 25510000001 PERFLUTREN 6.52 MG/ML SUSPENSION 2 ML VIAL: Performed by: INTERNAL MEDICINE

## 2024-09-30 PROCEDURE — 93306 TTE W/DOPPLER COMPLETE: CPT

## 2024-09-30 PROCEDURE — 36415 COLL VENOUS BLD VENIPUNCTURE: CPT

## 2024-09-30 PROCEDURE — 93010 ELECTROCARDIOGRAM REPORT: CPT | Performed by: INTERNAL MEDICINE

## 2024-09-30 PROCEDURE — 85610 PROTHROMBIN TIME: CPT | Performed by: EMERGENCY MEDICINE

## 2024-09-30 PROCEDURE — 93306 TTE W/DOPPLER COMPLETE: CPT | Performed by: INTERNAL MEDICINE

## 2024-09-30 PROCEDURE — 99214 OFFICE O/P EST MOD 30 MIN: CPT | Performed by: INTERNAL MEDICINE

## 2024-09-30 PROCEDURE — 80053 COMPREHEN METABOLIC PANEL: CPT | Performed by: EMERGENCY MEDICINE

## 2024-09-30 PROCEDURE — 71045 X-RAY EXAM CHEST 1 VIEW: CPT

## 2024-09-30 PROCEDURE — 99285 EMERGENCY DEPT VISIT HI MDM: CPT

## 2024-09-30 RX ORDER — POLYETHYLENE GLYCOL 3350 17 G/17G
17 POWDER, FOR SOLUTION ORAL DAILY PRN
Status: DISCONTINUED | OUTPATIENT
Start: 2024-09-30 | End: 2024-10-04 | Stop reason: HOSPADM

## 2024-09-30 RX ORDER — SODIUM CHLORIDE 0.9 % (FLUSH) 0.9 %
10 SYRINGE (ML) INJECTION AS NEEDED
Status: DISCONTINUED | OUTPATIENT
Start: 2024-09-30 | End: 2024-10-04 | Stop reason: HOSPADM

## 2024-09-30 RX ORDER — AMOXICILLIN 250 MG
2 CAPSULE ORAL 2 TIMES DAILY PRN
Status: DISCONTINUED | OUTPATIENT
Start: 2024-09-30 | End: 2024-10-04 | Stop reason: HOSPADM

## 2024-09-30 RX ORDER — ACETAMINOPHEN 160 MG/5ML
650 SOLUTION ORAL EVERY 4 HOURS PRN
Status: DISCONTINUED | OUTPATIENT
Start: 2024-09-30 | End: 2024-09-30 | Stop reason: SDUPTHER

## 2024-09-30 RX ORDER — ROSUVASTATIN CALCIUM 10 MG/1
10 TABLET, COATED ORAL NIGHTLY
Status: DISCONTINUED | OUTPATIENT
Start: 2024-09-30 | End: 2024-10-04 | Stop reason: HOSPADM

## 2024-09-30 RX ORDER — SODIUM CHLORIDE 0.9 % (FLUSH) 0.9 %
10 SYRINGE (ML) INJECTION EVERY 12 HOURS SCHEDULED
Status: DISCONTINUED | OUTPATIENT
Start: 2024-09-30 | End: 2024-10-04 | Stop reason: HOSPADM

## 2024-09-30 RX ORDER — ACETAMINOPHEN 650 MG/1
650 SUPPOSITORY RECTAL EVERY 4 HOURS PRN
Status: DISCONTINUED | OUTPATIENT
Start: 2024-09-30 | End: 2024-10-04 | Stop reason: HOSPADM

## 2024-09-30 RX ORDER — BISACODYL 5 MG/1
5 TABLET, DELAYED RELEASE ORAL DAILY PRN
Status: DISCONTINUED | OUTPATIENT
Start: 2024-09-30 | End: 2024-10-04 | Stop reason: HOSPADM

## 2024-09-30 RX ORDER — ACETAMINOPHEN 325 MG/1
650 TABLET ORAL EVERY 4 HOURS PRN
Status: DISCONTINUED | OUTPATIENT
Start: 2024-09-30 | End: 2024-10-01 | Stop reason: SDUPTHER

## 2024-09-30 RX ORDER — DIPHENHYDRAMINE HCL 25 MG
25 CAPSULE ORAL NIGHTLY PRN
Status: DISCONTINUED | OUTPATIENT
Start: 2024-09-30 | End: 2024-10-04 | Stop reason: HOSPADM

## 2024-09-30 RX ORDER — LISINOPRIL 5 MG/1
10 TABLET ORAL EVERY 12 HOURS SCHEDULED
Status: DISCONTINUED | OUTPATIENT
Start: 2024-09-30 | End: 2024-10-01

## 2024-09-30 RX ORDER — NITROGLYCERIN 0.4 MG/1
0.4 TABLET SUBLINGUAL
Status: DISCONTINUED | OUTPATIENT
Start: 2024-09-30 | End: 2024-10-04 | Stop reason: HOSPADM

## 2024-09-30 RX ORDER — ACETAMINOPHEN 325 MG/1
650 TABLET ORAL EVERY 4 HOURS PRN
Status: DISCONTINUED | OUTPATIENT
Start: 2024-09-30 | End: 2024-09-30 | Stop reason: SDUPTHER

## 2024-09-30 RX ORDER — ACETAMINOPHEN 650 MG/1
650 SUPPOSITORY RECTAL EVERY 4 HOURS PRN
Status: DISCONTINUED | OUTPATIENT
Start: 2024-09-30 | End: 2024-09-30 | Stop reason: SDUPTHER

## 2024-09-30 RX ORDER — SODIUM CHLORIDE 9 MG/ML
40 INJECTION, SOLUTION INTRAVENOUS AS NEEDED
Status: DISCONTINUED | OUTPATIENT
Start: 2024-09-30 | End: 2024-10-04 | Stop reason: HOSPADM

## 2024-09-30 RX ORDER — BISACODYL 10 MG
10 SUPPOSITORY, RECTAL RECTAL DAILY PRN
Status: DISCONTINUED | OUTPATIENT
Start: 2024-09-30 | End: 2024-10-04 | Stop reason: HOSPADM

## 2024-09-30 RX ORDER — ACETAMINOPHEN 160 MG/5ML
650 SOLUTION ORAL EVERY 4 HOURS PRN
Status: DISCONTINUED | OUTPATIENT
Start: 2024-09-30 | End: 2024-10-04 | Stop reason: HOSPADM

## 2024-09-30 RX ADMIN — Medication 10 MG: at 23:27

## 2024-09-30 RX ADMIN — ROSUVASTATIN CALCIUM 10 MG: 10 TABLET, FILM COATED ORAL at 21:23

## 2024-09-30 RX ADMIN — LISINOPRIL 10 MG: 5 TABLET ORAL at 14:18

## 2024-09-30 RX ADMIN — LISINOPRIL 10 MG: 5 TABLET ORAL at 21:23

## 2024-09-30 RX ADMIN — Medication 10 ML: at 13:41

## 2024-09-30 RX ADMIN — Medication 10 ML: at 21:24

## 2024-09-30 RX ADMIN — PERFLUTREN 2 ML: 6.52 INJECTION, SUSPENSION INTRAVENOUS at 19:28

## 2024-09-30 NOTE — ED TRIAGE NOTES
Patient to er from home per ems with c/o he woke up around 0630 with full body weakness and dizziness and worse with standing up. Patient alert x 4 to base line. No blood thinners reported.

## 2024-09-30 NOTE — PROGRESS NOTES
Clinical Pharmacy Services: Medication History    Vicente Delgado is a 89 y.o. male presenting to [facility] for   Chief Complaint   Patient presents with    Syncope    Dizziness       He  has a past medical history of Acquired absence of other specified parts of digestive tract, Allergic rhinitis, Anxiety, Aortic valve insufficiency, Arthritis, Atherosclerosis of native arteries of extremities with intermittent claudication, left leg (05/17/2021), Atherosclerotic heart disease of native coronary artery without angina pectoris, Atrial fibrillation, Burn injury, CAD (coronary artery disease) (07/01/2016), Calculus of gallbladder without cholecystitis without obstruction, Cancer, Cataract (June 2018” and), Cholelithiasis (2020), 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 January), Colon polyps, Deep vein thrombosis (January2023 dr mclaughlin), Depression, Diverticulosis, Embolism and thrombosis of arteries of the lower extremities (05/17/2021), Esophagitis, Gastritis, GERD (gastroesophageal reflux disease), Heart disease, History of anxiety, History of medical problems (Right shoulder replacemd), History of prostate cancer (06/1990), History of transfusion (1990), HL (hearing loss) (Partial), Hypercholesterolemia, Hyperlipidemia, Hypertension (11/04/2021), Insomnia, Low back pain (Yes  from hworking), Lupus, Myocardial infarction, TAM (nonalcoholic steatohepatitis), Nonrheumatic mitral (valve) insufficiency, Pain of left lower leg (05/17/2021), Palpitations, 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    Allergies as of 09/30/2024  - Reviewed 09/30/2024   Allergen Reaction Noted    Lidocaine Dizziness 12/21/2016    Lovastatin Other (See Comments) 04/21/2016    Pravastatin Other (See Comments) 04/21/2016    Gabapentin GI Intolerance 09/10/2020    Procaine  04/21/2016    Simvastatin  04/21/2016       Medication information was obtained from: Patient  Pharmacy and Phone Number:     Prior to Admission Medications       Prescriptions Last Dose Informant Patient Reported? Taking?    benazepril (LOTENSIN) 20 MG tablet 9/29/2024 Self No Yes    Take 0.5 tablets by mouth 2 (Two) Times a Day.    ezetimibe (ZETIA) 10 MG tablet 9/29/2024 Self No Yes    TAKE ONE TABLET BY MOUTH DAILY    rosuvastatin (CRESTOR) 10 MG tablet 9/29/2024 Self No Yes    Take 1 tablet by mouth Every Night.              Medication notes:     This medication list is complete to the best of my knowledge as of 9/30/2024    Please call if questions.    Anders Schuler  Medication History Technician  [phone]    9/30/2024 10:37 EDT

## 2024-09-30 NOTE — Clinical Note
Hemostasis started on the right radial artery. Manual pressure applied to vessel. Manual pressure was held by Jean RT (R). Manual pressure was held for 5 min. Hemostasis achieved successfully.
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
## 2024-09-30 NOTE — PLAN OF CARE
Goal Outcome Evaluation:   Patient has continued to receive meds and testing as prescribed today. Patient received ECHO testing. Patient is on a cardiac diet at the moment.

## 2024-09-30 NOTE — ED NOTES
Nursing report ED to floor  Vicente Delgado  89 y.o.  male    HPI :  HPI (Adult)  Stated Reason for Visit: dizzy/    Chief Complaint  Chief Complaint   Patient presents with    Syncope    Dizziness       Admitting doctor:   Norberto Zepeda II, MD    Admitting diagnosis:   The primary encounter diagnosis was Near syncope. Diagnoses of Aortic stenosis, moderate and Nonrheumatic mitral valve regurgitation were also pertinent to this visit.    Code status:   Current Code Status       Date Active Code Status Order ID Comments User Context       Prior            Allergies:   Lidocaine, Lovastatin, Pravastatin, Gabapentin, Procaine, and Simvastatin    Isolation:   No active isolations    Intake and Output    Intake/Output Summary (Last 24 hours) at 9/30/2024 1034  Last data filed at 9/30/2024 0823  Gross per 24 hour   Intake 400 ml   Output --   Net 400 ml       Weight:   There were no vitals filed for this visit.    Most recent vitals:   Vitals:    09/30/24 0825 09/30/24 0921 09/30/24 0940   BP: 148/66 145/69    Pulse: 55 56 52   Resp: 18     Temp: 97.4 °F (36.3 °C)     TempSrc: Tympanic     SpO2: 100% 100% 99%       Active LDAs/IV Access:   Lines, Drains & Airways       Active LDAs       Name Placement date Placement time Site Days    Peripheral IV 09/30/24 Anterior;Left Forearm 09/30/24  --  Forearm  less than 1                    Labs (abnormal labs have a star):   Labs Reviewed   COMPREHENSIVE METABOLIC PANEL - Abnormal; Notable for the following components:       Result Value    BUN/Creatinine Ratio 27.3 (*)     All other components within normal limits    Narrative:     GFR Normal >60  Chronic Kidney Disease <60  Kidney Failure <15    The GFR formula is only valid for adults with stable renal function between ages 18 and 70.   TROPONIN - Abnormal; Notable for the following components:    HS Troponin T 35 (*)     All other components within normal limits    Narrative:     High Sensitive Troponin T Reference  Range:  <14.0 ng/L- Negative Female for AMI  <22.0 ng/L- Negative Male for AMI  >=14 - Abnormal Female indicating possible myocardial injury.  >=22 - Abnormal Male indicating possible myocardial injury.   Clinicians would have to utilize clinical acumen, EKG, Troponin, and serial changes to determine if it is an Acute Myocardial Infarction or myocardial injury due to an underlying chronic condition.        CBC WITH AUTO DIFFERENTIAL - Abnormal; Notable for the following components:    Monocyte % 17.6 (*)     Monocytes, Absolute 1.14 (*)     All other components within normal limits   PROTIME-INR - Normal   HIGH SENSITIVITIY TROPONIN T 2HR   CBC AND DIFFERENTIAL    Narrative:     The following orders were created for panel order CBC & Differential.  Procedure                               Abnormality         Status                     ---------                               -----------         ------                     CBC Auto Differential[[id number]]        Abnormal            Final result                 Please view results for these tests on the individual orders.       EKG:   ECG 12 Lead Syncope   Preliminary Result   HEART RATE=52  bpm   RR Bgmxlfnk=7391  ms   WI Cylzneyv=912  ms   P Horizontal Axis=4  deg   P Front Axis=-14  deg   QRSD Glsdecif=774  ms   QT Clbcxooi=908  ms   RLaT=757  ms   QRS Axis=-41  deg   T Wave Axis=5  deg   - ABNORMAL ECG -   Sinus rhythm   Multiple ventricular premature complexes   Prolonged WI interval   Inferior infarct, old   Anterior infarct, old   Date and Time of Study:2024-09-30 08:45:19          Meds given in ED:   Medications   sodium chloride 0.9 % flush 10 mL (has no administration in time range)   sodium chloride 0.9 % flush 10 mL (has no administration in time range)   sodium chloride 0.9 % infusion 40 mL (has no administration in time range)   nitroglycerin (NITROSTAT) SL tablet 0.4 mg (has no administration in time range)   Potassium Replacement - Follow Nurse / BPA  Driven Protocol (has no administration in time range)   Magnesium Standard Dose Replacement - Follow Nurse / BPA Driven Protocol (has no administration in time range)   Phosphorus Replacement - Follow Nurse / BPA Driven Protocol (has no administration in time range)   Calcium Replacement - Follow Nurse / BPA Driven Protocol (has no administration in time range)   acetaminophen (TYLENOL) tablet 650 mg (has no administration in time range)     Or   acetaminophen (TYLENOL) 160 MG/5ML oral solution 650 mg (has no administration in time range)     Or   acetaminophen (TYLENOL) suppository 650 mg (has no administration in time range)   sennosides-docusate (PERICOLACE) 8.6-50 MG per tablet 2 tablet (has no administration in time range)     And   polyethylene glycol (MIRALAX) packet 17 g (has no administration in time range)     And   bisacodyl (DULCOLAX) EC tablet 5 mg (has no administration in time range)     And   bisacodyl (DULCOLAX) suppository 10 mg (has no administration in time range)       Imaging results:  XR Chest 1 View    Result Date: 9/30/2024  Borderline cardiomegaly. No evidence for active disease in the chest.       Ambulatory status:   - assist Xs 1    Social issues:   Social History     Socioeconomic History    Marital status:    Tobacco Use    Smoking status: Some Days     Types: Cigars     Passive exposure: Yes    Smokeless tobacco: Never    Tobacco comments:     Smoke a cigar ocassionallyp   Vaping Use    Vaping status: Never Used   Substance and Sexual Activity    Alcohol use: No     Comment: Daily caffeine use    Drug use: No    Sexual activity: Not Currently     Partners: Female       Peripheral Neurovascular  Peripheral Neurovascular (Adult)  Peripheral Neurovascular WDL: WDL    Neuro Cognitive  Neuro Cognitive (Adult)  Cognitive/Neuro/Behavioral WDL: .WDL except  Orientation: oriented x 4  Speech: clear, logical  Mood/Behavior: anxious  Additional Documentation: Dizziness Assessment  (Group)  Pupils  Pupil PERRLA: yes  Dizziness Assessment  Dizziness Reported Symptoms: waves of dizziness, environment spinning  Dizziness Occurs With: position change    Learning  Learning Assessment (Adult)  Learning Readiness and Ability: no barriers identified  Education Provided  Person Taught: patient, family member/friend  Teaching Method: verbal instruction  Teaching Focus: symptom/problem overview, risk factors/triggers, diagnostic test, medical device/equipment use  Education Outcome Evaluation: acceptance expressed, verbalizes understanding    Respiratory  Respiratory WDL  Respiratory WDL: WDL    Abdominal Pain       Pain Assessments  Pain (Adult)  (0-10) Pain Rating: Rest: 0  (0-10) Pain Rating: Activity: 0    NIH Stroke Scale       Leah Mcgee RN  09/30/24 10:34 EDT

## 2024-09-30 NOTE — ED PROVIDER NOTES
" EMERGENCY DEPARTMENT ENCOUNTER  Room Number:  27/27  PCP: Mechelle Landa APRN  Independent Historians: Patient, EMS who brought patient, daughter at bedside      HPI:  Chief Complaint: had concerns including Syncope and Dizziness.     A complete HPI/ROS/PMH/PSH/SH/FH are unobtainable due to:   Chronic or social conditions impacting patient care (Social Determinants of Health):       Context: Vicente Delgado is a 89 y.o. male with a medical history of hypertension, aortic stenosis, hyperlipidemia who presents to the ED c/o acute weakness, near syncope.  Patient woke up around 5 AM feeling kind of poorly.  He states that he felt \"heavy\" and weak all over.  When he got up he felt dizzy and almost passed out.  He has been weak all over and symptoms are worsened with standing.  When he stood up he again almost passed out.  Denies any chest discomfort.  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  Daughter states that patient is under increased stress as this is near the anniversary of wife's death.    Review of prior external notes (non-ED) -and- Review of prior external test results outside of this encounter:   I reviewed prior medical records including most recent cardiology office note with Dr. Sales.  Patient with history of moderate to severe aortic valvular stenosis and they have discussed possible TAVR.  Patient does have history of paroxysmal atrial fibrillation but is off of anticoagulants secondary to epistaxis.      Prescription drug monitoring program review:         PAST MEDICAL HISTORY  Active Ambulatory Problems     Diagnosis Date Noted    HTN (hypertension) 04/21/2016    Nonrheumatic mitral valve regurgitation 04/21/2016    CAD (coronary artery disease) 07/01/2016    Hyperlipidemia 07/01/2016    Calculus of gallbladder without cholecystitis without obstruction 09/28/2020    S/P cholecystectomy 01/09/2021    Nonrheumatic " aortic valve insufficiency 02/04/2021    Non-rheumatic mitral regurgitation 02/04/2021    Trigger middle finger of left hand 07/11/2021    Gastroesophageal reflux disease with esophagitis 07/11/2021    Carpal tunnel syndrome of left wrist 07/11/2021    Amaurosis fugax of left eye 07/11/2021    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 11/26/2021    Anxiety 02/08/2022    Dermatitis 02/08/2022    Chronic stable angina 06/16/2022    Aortic stenosis, moderate 07/01/2022    Chronic right shoulder pain 10/17/2022    Controlled substance agreement signed 11/09/2022    TIA (transient ischemic attack) 11/15/2022    Paroxysmal atrial fibrillation 12/21/2022    Epistaxis 04/17/2023    History of prostate cancer 06/1990    Lupus 09/20/2023    TAM (nonalcoholic steatohepatitis) 09/20/2023    Chronic diastolic CHF (congestive heart failure) 1a 09/20/2023    Colitis due to enteropathogenic Escherichia coli 09/21/2023    Chronic heart failure with preserved ejection fraction (HFpEF) 09/22/2023    C. difficile diarrhea 11/13/2023    Lymphoma 12/06/2023    Exudative age-related macular degeneration, bilateral, with active choroidal neovascularization 02/25/2024    Neuropathy 04/23/2024    Spinal stenosis 04/23/2024    Acrocyanosis 04/23/2024    Dyspepsia 08/17/2024     Resolved Ambulatory Problems     Diagnosis Date Noted    Disorder of right ventricle of heart 04/21/2016    Leg pain, anterior, left 05/13/2021    Atypical chest pain 09/26/2021    Atherosclerosis of native artery of left lower extremity with intermittent claudication 11/26/2021    Diverticulitis 09/20/2023    Diverticulosis 09/20/2023    Mass of colon 09/20/2023    Weight loss, unintentional 09/20/2023    General weakness 09/20/2023    Colitis due to Clostridium difficile 09/20/2023    Severe malnutrition 09/22/2023    Abnormal CT of the abdomen 10/13/2023    Colitis 11/12/2023    Encounter for screening for other viral diseases 12/06/2023    Peripheral vascular  disease 04/16/2024     Past Medical History:   Diagnosis Date    Acquired absence of other specified parts of digestive tract     Allergic rhinitis     Aortic valve insufficiency     Arthritis     Atherosclerosis of native arteries of extremities with intermittent claudication, left leg 05/17/2021    Atherosclerotic heart disease of native coronary artery without angina pectoris     Atrial fibrillation     Burn injury     Cancer     Cataract June 2018” and    Cholelithiasis 2020    Chronic deep vein thrombosis (DVT) of iliac vein 12/18/2023    Chronic deep vein thrombosis (DVT) of left femoral vein 12/18/2023    Clotting disorder 2023. January    Colon polyps     Deep vein thrombosis January2023 dr mclaughlin    Depression     Embolism and thrombosis of arteries of the lower extremities 05/17/2021    Esophagitis     Gastritis     GERD (gastroesophageal reflux disease)     Heart disease     History of anxiety     History of medical problems Right shoulder replacemd    History of transfusion 1990    HL (hearing loss) Partial    Hypercholesterolemia     Hypertension 11/04/2021    Insomnia     Low back pain Yes  from hworking    Myocardial infarction     Nonrheumatic mitral (valve) insufficiency     Pain of left lower leg 05/17/2021    Palpitations     Postphlebitic syndrome 12/18/2023    Sciatica of left side 05/17/2021    Sciatica of right side     Varicose veins of right lower extremity with pain 08/10/2022    Venous insufficiency (chronic) (peripheral) 08/10/2022    Visual impairment Ocular mygraine         PAST SURGICAL HISTORY  Past Surgical History:   Procedure Laterality Date    ABDOMINAL SURGERY      CARDIAC CATHETERIZATION  11/16/1995    CARDIAC SURGERY  11/16/1995    CARPAL TUNNEL RELEASE Left 05/21/2024    Dr Marquez    CATARACT EXTRACTION Left 07/2018    CHOLECYSTECTOMY  2020    CHOLECYSTECTOMY WITH INTRAOPERATIVE CHOLANGIOGRAM N/A 09/29/2020    Procedure: Laparoscopic cholecystectomy;  Surgeon: Javi Peralta  MD GAMALIEL;  Location: [facility] MAIN OR;  Service: General;  Laterality: N/A;    COLONOSCOPY  01/09/2002    COLONOSCOPY N/A 10/18/2023    15 MM POLYP IN DISTAL ILEUM, PATH: LYMPHOMA VERSUS NEUROENDOCRINE TUMOR, RESCOPE IN 1 YR, DR. ROBERT SOTELO AT [facility]    ELBOW PROCEDURE      FRACTURE SURGERY      JOINT REPLACEMENT      LUNG BIOPSY      LYMPH NODE BIOPSY  Yes    1992    NASAL POLYP SURGERY      PACEMAKER IMPLANTATION      PROSTATE SURGERY  06/1990    PROSTATECTOMY      SHOULDER ROTATOR CUFF REPAIR Right 08/24/2011    Dr. Bach    SIGMOIDOSCOPY      TONSILLECTOMY  Yes 1943    UPPER GASTROINTESTINAL ENDOSCOPY  09/12/1997    Gastritis, Duodenitis, hiatal hernia (Pathology: Gastric antrum minimal chronic inflammation)    UPPER GASTROINTESTINAL ENDOSCOPY  04/11/2005    Mild esophagitis, Small hiatal hernia, Mild gastritis and mild duodenitis         FAMILY HISTORY  Family History   Problem Relation Age of Onset    Heart failure Mother     Hearing loss Mother     Heart disease Mother         Mother had congestive heart failure    Hyperlipidemia Mother     Alcohol abuse Father         Never new him    Diabetes Father          SOCIAL HISTORY  Social History     Socioeconomic History    Marital status:    Tobacco Use    Smoking status: Some Days     Types: Cigars     Passive exposure: Yes    Smokeless tobacco: Never    Tobacco comments:     Smoke a cigar ocassionallyp   Vaping Use    Vaping status: Never Used   Substance and Sexual Activity    Alcohol use: No     Comment: Daily caffeine use    Drug use: No    Sexual activity: Not Currently     Partners: Female         ALLERGIES  Lidocaine, Lovastatin, Pravastatin, Gabapentin, Procaine, and Simvastatin      REVIEW OF SYSTEMS  Review of Systems   Constitutional:  Negative for fever.   Respiratory:  Negative for shortness of breath.    Cardiovascular:  Negative for chest pain.   Neurological:  Positive for dizziness, weakness and light-headedness.   All other systems reviewed  and are negative.    Included in HPI  All systems reviewed and negative except for those discussed in HPI.      PHYSICAL EXAM    I have reviewed the triage vital signs and nursing notes.    ED Triage Vitals [09/30/24 0825]   Temp Heart Rate Resp BP SpO2   97.4 °F (36.3 °C) 55 18 148/66 100 %      Temp src Heart Rate Source Patient Position BP Location FiO2 (%)   Tympanic -- -- -- --       Physical Exam  GENERAL: Alert and pleasant male who looks markedly younger than stated age.  Triage vitals notable for blood pressure 148/66.  Pulse 55.  Temperature and O2 sats are normal  SKIN: Warm, dry  HENT: Normocephalic, atraumatic  EYES: no scleral icterus  CV: regular rhythm, regular rate-noted systolic murmur  RESPIRATORY: normal effort, lungs clear-O2 sats 100% on room air  ABDOMEN: soft, nontender, nondistended  MUSCULOSKELETAL: no deformity  NEURO: alert, moves all extremities, follows commands      LAB RESULTS  Recent Results (from the past 24 hour(s))   ECG 12 Lead Syncope    Collection Time: 09/30/24  8:45 AM   Result Value Ref Range    QT Interval 479 ms    QTC Interval 446 ms   Comprehensive Metabolic Panel    Collection Time: 09/30/24  9:01 AM    Specimen: Blood   Result Value Ref Range    Glucose 87 65 - 99 mg/dL    BUN 21 8 - 23 mg/dL    Creatinine 0.77 0.76 - 1.27 mg/dL    Sodium 139 136 - 145 mmol/L    Potassium 4.3 3.5 - 5.2 mmol/L    Chloride 103 98 - 107 mmol/L    CO2 25.0 22.0 - 29.0 mmol/L    Calcium 9.4 8.2 - 9.6 mg/dL    Total Protein 6.7 6.0 - 8.5 g/dL    Albumin 4.1 3.5 - 5.2 g/dL    ALT (SGPT) 17 1 - 41 U/L    AST (SGOT) 22 1 - 40 U/L    Alkaline Phosphatase 67 39 - 117 U/L    Total Bilirubin 0.5 0.0 - 1.2 mg/dL    Globulin 2.6 gm/dL    A/G Ratio 1.6 g/dL    BUN/Creatinine Ratio 27.3 (H) 7.0 - 25.0    Anion Gap 11.0 5.0 - 15.0 mmol/L    eGFR 81.9 >60.0 mL/min/1.73   Protime-INR    Collection Time: 09/30/24  9:01 AM    Specimen: Blood   Result Value Ref Range    Protime 14.1 11.7 - 14.2 Seconds     INR 1.07 0.90 - 1.10   High Sensitivity Troponin T    Collection Time: 09/30/24  9:01 AM    Specimen: Blood   Result Value Ref Range    HS Troponin T 35 (H) <22 ng/L   CBC Auto Differential    Collection Time: 09/30/24  9:01 AM    Specimen: Blood   Result Value Ref Range    WBC 6.47 3.40 - 10.80 10*3/mm3    RBC 4.60 4.14 - 5.80 10*6/mm3    Hemoglobin 14.4 13.0 - 17.7 g/dL    Hematocrit 43.5 37.5 - 51.0 %    MCV 94.6 79.0 - 97.0 fL    MCH 31.3 26.6 - 33.0 pg    MCHC 33.1 31.5 - 35.7 g/dL    RDW 12.6 12.3 - 15.4 %    RDW-SD 43.2 37.0 - 54.0 fl    MPV 10.2 6.0 - 12.0 fL    Platelets 194 140 - 450 10*3/mm3    Neutrophil % 60.4 42.7 - 76.0 %    Lymphocyte % 21.0 19.6 - 45.3 %    Monocyte % 17.6 (H) 5.0 - 12.0 %    Eosinophil % 0.3 0.3 - 6.2 %    Basophil % 0.2 0.0 - 1.5 %    Immature Grans % 0.5 0.0 - 0.5 %    Neutrophils, Absolute 3.91 1.70 - 7.00 10*3/mm3    Lymphocytes, Absolute 1.36 0.70 - 3.10 10*3/mm3    Monocytes, Absolute 1.14 (H) 0.10 - 0.90 10*3/mm3    Eosinophils, Absolute 0.02 0.00 - 0.40 10*3/mm3    Basophils, Absolute 0.01 0.00 - 0.20 10*3/mm3    Immature Grans, Absolute 0.03 0.00 - 0.05 10*3/mm3    nRBC 0.0 0.0 - 0.2 /100 WBC         RADIOLOGY  XR Chest 1 View    Result Date: 9/30/2024  CHEST SINGLE VIEW  HISTORY: 89 years of age, male.  Weakness.  COMPARISON: CT chest 08/28/2024, AP chest 03/11/2024.  FINDINGS: Heart size is borderline enlarged. Lungs appear clear and there is no evidence for pulmonary edema or pleural effusion. There is a reverse right shoulder arthroplasty. Cardiac monitoring leads are present.      Borderline cardiomegaly. No evidence for active disease in the chest.         MEDICATIONS GIVEN IN ER  Medications   sodium chloride 0.9 % flush 10 mL (has no administration in time range)   sodium chloride 0.9 % flush 10 mL (has no administration in time range)   sodium chloride 0.9 % infusion 40 mL (has no administration in time range)   nitroglycerin (NITROSTAT) SL tablet 0.4 mg (has no  administration in time range)   Potassium Replacement - Follow Nurse / BPA Driven Protocol (has no administration in time range)   Magnesium Standard Dose Replacement - Follow Nurse / BPA Driven Protocol (has no administration in time range)   Phosphorus Replacement - Follow Nurse / BPA Driven Protocol (has no administration in time range)   Calcium Replacement - Follow Nurse / BPA Driven Protocol (has no administration in time range)   acetaminophen (TYLENOL) tablet 650 mg (has no administration in time range)     Or   acetaminophen (TYLENOL) 160 MG/5ML oral solution 650 mg (has no administration in time range)     Or   acetaminophen (TYLENOL) suppository 650 mg (has no administration in time range)   sennosides-docusate (PERICOLACE) 8.6-50 MG per tablet 2 tablet (has no administration in time range)     And   polyethylene glycol (MIRALAX) packet 17 g (has no administration in time range)     And   bisacodyl (DULCOLAX) EC tablet 5 mg (has no administration in time range)     And   bisacodyl (DULCOLAX) suppository 10 mg (has no administration in time range)         ORDERS PLACED DURING THIS VISIT:  Orders Placed This Encounter   Procedures    XR Chest 1 View    Comprehensive Metabolic Panel    Protime-INR    High Sensitivity Troponin T    CBC Auto Differential    High Sensitivity Troponin T 2Hr    Basic Metabolic Panel    CBC Auto Differential    Diet: Cardiac; Healthy Heart (2-3 Na+); Fluid Consistency: Thin (IDDSI 0)    Vital Signs    Intake & Output    Weigh Patient    Oral Care    Maintain IV Access    Telemetry - Place Orders & Notify Provider of Results When Patient Experiences Acute Chest Pain, Dysrhythmia or Respiratory Distress    May Be Off Telemetry for Tests    Inpatient Cardiology Consult    ECG 12 Lead Syncope    Insert Peripheral IV    Initiate ED Observation Status    CBC & Differential         OUTPATIENT MEDICATION MANAGEMENT:  Current Facility-Administered Medications Ordered in Epic   Medication  Dose Route Frequency Provider Last Rate Last Admin    acetaminophen (TYLENOL) tablet 650 mg  650 mg Oral Q4H PRN Blevens, Honey C, APRN        Or    acetaminophen (TYLENOL) 160 MG/5ML oral solution 650 mg  650 mg Oral Q4H PRN Blevens, Honey C, APRN        Or    acetaminophen (TYLENOL) suppository 650 mg  650 mg Rectal Q4H PRN Blevens, Honey C, APRN        sennosides-docusate (PERICOLACE) 8.6-50 MG per tablet 2 tablet  2 tablet Oral BID PRN Blevens, Honey C, APRN        And    polyethylene glycol (MIRALAX) packet 17 g  17 g Oral Daily PRN Blevens, Honey C, APRN        And    bisacodyl (DULCOLAX) EC tablet 5 mg  5 mg Oral Daily PRN Blevens, Honey C, APRN        And    bisacodyl (DULCOLAX) suppository 10 mg  10 mg Rectal Daily PRN Blevens, Honey C, APRN        Calcium Replacement - Follow Nurse / BPA Driven Protocol   Does not apply PRN Blevens, Honey C, APRN        Magnesium Standard Dose Replacement - Follow Nurse / BPA Driven Protocol   Does not apply PRN Blevens, Honey C, APRN        nitroglycerin (NITROSTAT) SL tablet 0.4 mg  0.4 mg Sublingual Q5 Min PRN Blevens, Honey C, APRN        Phosphorus Replacement - Follow Nurse / BPA Driven Protocol   Does not apply PRN Blevens, Honey C, APRN        Potassium Replacement - Follow Nurse / BPA Driven Protocol   Does not apply PRN Blevens, Honey C, APRN        sodium chloride 0.9 % flush 10 mL  10 mL Intravenous Q12H Blevens, Honey C, APRN        sodium chloride 0.9 % flush 10 mL  10 mL Intravenous PRN Blevens, Honey C, APRN        sodium chloride 0.9 % infusion 40 mL  40 mL Intravenous PRN Blevens, Honey C, APRN         Current Outpatient Medications Ordered in Epic   Medication Sig Dispense Refill    benazepril (LOTENSIN) 20 MG tablet Take 0.5 tablets by mouth 2 (Two) Times a Day. 90 tablet 3    ezetimibe (ZETIA) 10 MG tablet TAKE ONE TABLET BY MOUTH DAILY 90 tablet 3    rosuvastatin (CRESTOR) 10 MG tablet Take 1 tablet by mouth Every Night. 90 tablet 3          PROCEDURES  Procedures            PROGRESS, DATA ANALYSIS, CONSULTS, AND MEDICAL DECISION MAKING  All labs have been independently interpreted by me.  All radiology studies have been reviewed by me. All EKG's have been independently viewed and interpreted by me.  Discussion below represents my analysis of pertinent findings related to patient's condition, differential diagnosis, treatment plan and final disposition.    Differential diagnosis includes but is not limited to arrhythmia, hypotension, valvular heart disease, dehydration, anemia.      ED Course as of 09/30/24 1022   Mon Sep 30, 2024   0918 EKG independently interpreted by me    Time 8:45 AM  Sinus 52 with frequent PVC  P waves-normal P waves, prolonged ND interval  QRS-left axis deviation, inferior and anterior Q waves are noted  ST, T waves-inferior T wave flattening    When compared to 9/19/2024 there is no significant change. [DB]   1006 Chest x-ray independently interpreted by me shows no obvious acute disease.    Official reading by radiologist suggest borderline cardiomegaly. [DB]   1007 Labs reviewed are fairly benign without evidence of infection, anemia.  Chemistries do not show significant hepatic or renal failure.  Electrolytes are unremarkable.    High-sensitivity troponin of 35 is mildly elevated but near baseline and felt to represent chronic coronary disease, may be elevated related to aortic stenosis. [DB]   1007 At this point I suspect patient's weakness may be related to valvular heart disease.  Would like to admit to observation unit for cardiology workup and possible repeat echocardiogram. [DB]   1018 I discussed results of testing with patient and daughter at bedside.    He is feeling better and was able to ambulate to the restroom without much assistance.    I am concerned that his symptoms are related to worsening of his valvular heart disease and known severe aortic stenosis.  Will admit to observation unit for cardiology  consultation. [DB]   1019 I did discuss treatment and evaluation with APRN Honey Beebe who will admit to the observation unit for further evaluation and treatment. [DB]      ED Course User Index  [DB] Elijah Sanchez MD             AS OF 10:22 EDT VITALS:    BP - 145/69  HR - 52  TEMP - 97.4 °F (36.3 °C) (Tympanic)  O2 SATS - 99%    COMPLEXITY OF CARE  89-year-old male with history of aortic stenosis presents with dizziness and generalized weakness upon waking this morning.    Please see ED course above for my independent evaluation interpretation of ED testing including EKG, x-ray and relevant laboratory analysis.    I suspect patient's symptoms are most likely related to valvular heart disease.  Patient has been seen by Dr. Sales and they have considered TAVR.  Will admit to observation unit for further evaluation and cardiology consultation.      DIAGNOSIS  Final diagnoses:   Near syncope   Aortic stenosis, moderate   Nonrheumatic mitral valve regurgitation         DISPOSITION  ED Disposition       ED Disposition   Decision to Admit    Condition   --    Comment   --                Please note that portions of this document were completed with a voice recognition program.    Note Disclaimer: At [facility], we believe that sharing information builds trust and better relationships. You are receiving this note because you recently visited [facility]. It is possible you will see health information before a provider has talked with you about it. This kind of information can be easy to misunderstand. To help you fully understand what it means for your health, we urge you to discuss this note with your provider.         Elijah Sanchez MD  09/30/24 5732
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
## 2024-09-30 NOTE — Clinical Note
Hemostasis started on the right brachial vein. Manual pressure applied to vessel. Manual pressure was held by Jean RT(R). Manual pressure was held for 5 min. Hemostasis achieved successfully.

## 2024-10-01 PROBLEM — I35.0 AORTIC STENOSIS: Status: ACTIVE | Noted: 2024-10-01

## 2024-10-01 LAB
ANION GAP SERPL CALCULATED.3IONS-SCNC: 11.1 MMOL/L (ref 5–15)
BASOPHILS # BLD AUTO: 0.02 10*3/MM3 (ref 0–0.2)
BASOPHILS NFR BLD AUTO: 0.3 % (ref 0–1.5)
BUN SERPL-MCNC: 19 MG/DL (ref 8–23)
BUN/CREAT SERPL: 25.3 (ref 7–25)
CALCIUM SPEC-SCNC: 8.9 MG/DL (ref 8.2–9.6)
CHLORIDE SERPL-SCNC: 103 MMOL/L (ref 98–107)
CO2 SERPL-SCNC: 23.9 MMOL/L (ref 22–29)
CREAT SERPL-MCNC: 0.75 MG/DL (ref 0.76–1.27)
DEPRECATED RDW RBC AUTO: 44.3 FL (ref 37–54)
EGFRCR SERPLBLD CKD-EPI 2021: 82.6 ML/MIN/1.73
EOSINOPHIL # BLD AUTO: 0.09 10*3/MM3 (ref 0–0.4)
EOSINOPHIL NFR BLD AUTO: 1.5 % (ref 0.3–6.2)
ERYTHROCYTE [DISTWIDTH] IN BLOOD BY AUTOMATED COUNT: 12.8 % (ref 12.3–15.4)
GLUCOSE SERPL-MCNC: 94 MG/DL (ref 65–99)
HCT VFR BLD AUTO: 42.4 % (ref 37.5–51)
HGB BLD-MCNC: 14.2 G/DL (ref 13–17.7)
IMM GRANULOCYTES # BLD AUTO: 0.02 10*3/MM3 (ref 0–0.05)
IMM GRANULOCYTES NFR BLD AUTO: 0.3 % (ref 0–0.5)
LYMPHOCYTES # BLD AUTO: 1.54 10*3/MM3 (ref 0.7–3.1)
LYMPHOCYTES NFR BLD AUTO: 25.5 % (ref 19.6–45.3)
MCH RBC QN AUTO: 31.4 PG (ref 26.6–33)
MCHC RBC AUTO-ENTMCNC: 33.5 G/DL (ref 31.5–35.7)
MCV RBC AUTO: 93.8 FL (ref 79–97)
MONOCYTES # BLD AUTO: 0.98 10*3/MM3 (ref 0.1–0.9)
MONOCYTES NFR BLD AUTO: 16.2 % (ref 5–12)
NEUTROPHILS NFR BLD AUTO: 3.39 10*3/MM3 (ref 1.7–7)
NEUTROPHILS NFR BLD AUTO: 56.2 % (ref 42.7–76)
NRBC BLD AUTO-RTO: 0 /100 WBC (ref 0–0.2)
PLATELET # BLD AUTO: 177 10*3/MM3 (ref 140–450)
PMV BLD AUTO: 10.3 FL (ref 6–12)
POTASSIUM SERPL-SCNC: 4.6 MMOL/L (ref 3.5–5.2)
QT INTERVAL: 444 MS
QTC INTERVAL: 486 MS
RBC # BLD AUTO: 4.52 10*6/MM3 (ref 4.14–5.8)
SODIUM SERPL-SCNC: 138 MMOL/L (ref 136–145)
TROPONIN T SERPL HS-MCNC: 32 NG/L
WBC NRBC COR # BLD AUTO: 6.04 10*3/MM3 (ref 3.4–10.8)

## 2024-10-01 PROCEDURE — 4A023N8 MEASUREMENT OF CARDIAC SAMPLING AND PRESSURE, BILATERAL, PERCUTANEOUS APPROACH: ICD-10-PCS | Performed by: INTERNAL MEDICINE

## 2024-10-01 PROCEDURE — 25010000002 MIDAZOLAM PER 1 MG: Performed by: INTERNAL MEDICINE

## 2024-10-01 PROCEDURE — 25010000002 FENTANYL CITRATE (PF) 50 MCG/ML SOLUTION: Performed by: INTERNAL MEDICINE

## 2024-10-01 PROCEDURE — 82810 BLOOD GASES O2 SAT ONLY: CPT

## 2024-10-01 PROCEDURE — 84484 ASSAY OF TROPONIN QUANT: CPT | Performed by: PHYSICIAN ASSISTANT

## 2024-10-01 PROCEDURE — 85025 COMPLETE CBC W/AUTO DIFF WBC: CPT | Performed by: NURSE PRACTITIONER

## 2024-10-01 PROCEDURE — 99232 SBSQ HOSP IP/OBS MODERATE 35: CPT | Performed by: INTERNAL MEDICINE

## 2024-10-01 PROCEDURE — 25010000002 LIDOCAINE 2% SOLUTION: Performed by: INTERNAL MEDICINE

## 2024-10-01 PROCEDURE — C1894 INTRO/SHEATH, NON-LASER: HCPCS | Performed by: INTERNAL MEDICINE

## 2024-10-01 PROCEDURE — 80048 BASIC METABOLIC PNL TOTAL CA: CPT | Performed by: NURSE PRACTITIONER

## 2024-10-01 PROCEDURE — 93010 ELECTROCARDIOGRAM REPORT: CPT | Performed by: INTERNAL MEDICINE

## 2024-10-01 PROCEDURE — 25010000002 HEPARIN (PORCINE) PER 1000 UNITS: Performed by: INTERNAL MEDICINE

## 2024-10-01 PROCEDURE — 93005 ELECTROCARDIOGRAM TRACING: CPT | Performed by: NURSE PRACTITIONER

## 2024-10-01 PROCEDURE — C1769 GUIDE WIRE: HCPCS | Performed by: INTERNAL MEDICINE

## 2024-10-01 PROCEDURE — 25810000003 SODIUM CHLORIDE 0.9 % SOLUTION: Performed by: INTERNAL MEDICINE

## 2024-10-01 PROCEDURE — 93456 R HRT CORONARY ARTERY ANGIO: CPT | Performed by: INTERNAL MEDICINE

## 2024-10-01 PROCEDURE — 85014 HEMATOCRIT: CPT

## 2024-10-01 PROCEDURE — 85018 HEMOGLOBIN: CPT

## 2024-10-01 PROCEDURE — B2111ZZ FLUOROSCOPY OF MULTIPLE CORONARY ARTERIES USING LOW OSMOLAR CONTRAST: ICD-10-PCS | Performed by: INTERNAL MEDICINE

## 2024-10-01 PROCEDURE — 25010000002 DIPHENHYDRAMINE PER 50 MG: Performed by: INTERNAL MEDICINE

## 2024-10-01 PROCEDURE — 25510000001 IOPAMIDOL PER 1 ML: Performed by: INTERNAL MEDICINE

## 2024-10-01 RX ORDER — HYDROCODONE BITARTRATE AND ACETAMINOPHEN 5; 325 MG/1; MG/1
1 TABLET ORAL EVERY 4 HOURS PRN
Status: DISCONTINUED | OUTPATIENT
Start: 2024-10-01 | End: 2024-10-04 | Stop reason: HOSPADM

## 2024-10-01 RX ORDER — MIDAZOLAM HYDROCHLORIDE 1 MG/ML
INJECTION INTRAMUSCULAR; INTRAVENOUS
Status: DISCONTINUED | OUTPATIENT
Start: 2024-10-01 | End: 2024-10-01 | Stop reason: HOSPADM

## 2024-10-01 RX ORDER — VERAPAMIL HYDROCHLORIDE 2.5 MG/ML
INJECTION, SOLUTION INTRAVENOUS
Status: DISCONTINUED | OUTPATIENT
Start: 2024-10-01 | End: 2024-10-01 | Stop reason: HOSPADM

## 2024-10-01 RX ORDER — LIDOCAINE HYDROCHLORIDE 20 MG/ML
INJECTION, SOLUTION INFILTRATION; PERINEURAL
Status: DISCONTINUED | OUTPATIENT
Start: 2024-10-01 | End: 2024-10-01 | Stop reason: HOSPADM

## 2024-10-01 RX ORDER — FENTANYL CITRATE 50 UG/ML
INJECTION, SOLUTION INTRAMUSCULAR; INTRAVENOUS
Status: DISCONTINUED | OUTPATIENT
Start: 2024-10-01 | End: 2024-10-01 | Stop reason: HOSPADM

## 2024-10-01 RX ORDER — SODIUM CHLORIDE 9 MG/ML
50 INJECTION, SOLUTION INTRAVENOUS CONTINUOUS
Status: ACTIVE | OUTPATIENT
Start: 2024-10-01 | End: 2024-10-01

## 2024-10-01 RX ORDER — HEPARIN SODIUM 1000 [USP'U]/ML
INJECTION, SOLUTION INTRAVENOUS; SUBCUTANEOUS
Status: DISCONTINUED | OUTPATIENT
Start: 2024-10-01 | End: 2024-10-01 | Stop reason: HOSPADM

## 2024-10-01 RX ORDER — IOPAMIDOL 755 MG/ML
INJECTION, SOLUTION INTRAVASCULAR
Status: DISCONTINUED | OUTPATIENT
Start: 2024-10-01 | End: 2024-10-01 | Stop reason: HOSPADM

## 2024-10-01 RX ORDER — DIPHENHYDRAMINE HYDROCHLORIDE 50 MG/ML
INJECTION INTRAMUSCULAR; INTRAVENOUS
Status: DISCONTINUED | OUTPATIENT
Start: 2024-10-01 | End: 2024-10-01 | Stop reason: HOSPADM

## 2024-10-01 RX ORDER — ONDANSETRON 2 MG/ML
4 INJECTION INTRAMUSCULAR; INTRAVENOUS EVERY 6 HOURS PRN
Status: DISCONTINUED | OUTPATIENT
Start: 2024-10-01 | End: 2024-10-04 | Stop reason: HOSPADM

## 2024-10-01 RX ORDER — ACETAMINOPHEN 325 MG/1
650 TABLET ORAL EVERY 4 HOURS PRN
Status: DISCONTINUED | OUTPATIENT
Start: 2024-10-01 | End: 2024-10-04 | Stop reason: HOSPADM

## 2024-10-01 RX ORDER — ONDANSETRON 4 MG/1
4 TABLET, ORALLY DISINTEGRATING ORAL EVERY 6 HOURS PRN
Status: DISCONTINUED | OUTPATIENT
Start: 2024-10-01 | End: 2024-10-04 | Stop reason: HOSPADM

## 2024-10-01 RX ADMIN — SODIUM CHLORIDE 50 ML/HR: 9 INJECTION, SOLUTION INTRAVENOUS at 13:30

## 2024-10-01 RX ADMIN — ROSUVASTATIN CALCIUM 10 MG: 10 TABLET, FILM COATED ORAL at 22:27

## 2024-10-01 RX ADMIN — Medication 10 ML: at 08:43

## 2024-10-01 RX ADMIN — LISINOPRIL 10 MG: 5 TABLET ORAL at 08:43

## 2024-10-01 RX ADMIN — Medication 10 ML: at 22:28

## 2024-10-01 NOTE — PROGRESS NOTES
ED OBSERVATION PROGRESS/DISCHARGE SUMMARY    Date of Admission: 9/30/2024   LOS: 0 days   PCP: Mechelle Landa APRN    Final Diagnosis dizziness, aortic stenosis      Subjective     Hospital Outcome:     Vicente Delgado is a 89 y.o. male who presented to the emergency department with complaints of dizziness that started this morning.  Past medical history significant for not limited to aortic stenosis, non-Hodgkin's lymphoma, prior TIA, PAF, diastolic heart failure GERD.  He says that he woke up to go to the bathroom early this morning and did not feel well.  He laid back down.  When he tried to get back up he felt very dizzy and nearly passed out.  He denies any recent illness, fever, chills, nausea, vomiting.  He denies chest pain or dyspnea.     Initial high-sensitivity troponin was 35, this appears comparable to his prior.  EKG shows sinus bradycardia without evidence of acute ischemia.  Chest x-ray negative for acute findings, cardiomegaly noted.  CBC and CMP were largely unremarkable.  He will be admitted to the ED observation unit.  We will consult cardiology.    10/1: patient seen and evaluated by Dr. Sales with the cardiology service who recommends right and left heart cath for further workup of patient's aortic stenosis. He has been NPO since midnight.     ROS:  General: no fevers, chills  Respiratory: no cough, dyspnea  Cardiovascular: no chest pain, palpitations  Abdomen: No abdominal pain, nausea, vomiting, or diarrhea  Neurologic: No focal weakness    Objective   Physical Exam:  I have reviewed the vital signs.  Temp:  [97.4 °F (36.3 °C)-97.9 °F (36.6 °C)] 97.5 °F (36.4 °C)  Heart Rate:  [50-73] 56  Resp:  [18-20] 18  BP: (113-155)/(64-93) 124/64  General Appearance:    Alert, cooperative, no distress, appears well for stated age  Head:    Normocephalic, atraumatic  Eyes:    Sclerae anicteric  Neck:   Supple, no mass  Lungs: Clear to auscultation bilaterally, respirations unlabored  Heart: Regular  rate and rhythm, S1 and S2 normal, + murmur, no rub or gallop  Abdomen:  Soft, nontender, bowel sounds active, nondistended  Extremities: No clubbing, cyanosis, or edema to lower extremities  Pulses:  2+ and symmetric in distal lower extremities  Skin: No rashes   Neurologic: Oriented x3, Normal strength to extremities    Results Review:    I have reviewed the labs, radiology results and diagnostic studies.    Results from last 7 days   Lab Units 09/30/24  0901   WBC 10*3/mm3 6.47   HEMOGLOBIN g/dL 14.4   HEMATOCRIT % 43.5   PLATELETS 10*3/mm3 194     Results from last 7 days   Lab Units 09/30/24  0901   SODIUM mmol/L 139   POTASSIUM mmol/L 4.3   CHLORIDE mmol/L 103   CO2 mmol/L 25.0   BUN mg/dL 21   CREATININE mg/dL 0.77   CALCIUM mg/dL 9.4   BILIRUBIN mg/dL 0.5   ALK PHOS U/L 67   ALT (SGPT) U/L 17   AST (SGOT) U/L 22   GLUCOSE mg/dL 87     Imaging Results (Last 24 Hours)       Procedure Component Value Units Date/Time    XR Chest 1 View [[id number]] Collected: 09/30/24 0946     Updated: 09/30/24 1108    Narrative:      CHEST SINGLE VIEW     HISTORY: 89 years of age, male.  Weakness.     COMPARISON: CT chest 08/28/2024, AP chest 03/11/2024.     FINDINGS: Heart size is borderline enlarged. Lungs appear clear and  there is no evidence for pulmonary edema or pleural effusion. There is a  reverse right shoulder arthroplasty. Cardiac monitoring leads are  present.       Impression:      Borderline cardiomegaly. No evidence for active disease in  the chest.     This report was finalized on 9/30/2024 11:05 AM by Ovi Yang M.D  on Workstation: [id number]               I have reviewed the medications.  ---------------------------------------------------------------------------------------------  Assessment & Plan   Assessment/Problem List    Dizziness      Plan:    Dizziness/lightheadedness  Moderate to severe aortic stenosis  -Initial troponin 35, trend  -EKG sinus bradycardia, no acute ischemia  -Continuous  telemetry monitoring  -Vital signs per nursing routine  -Orthostatic vital signs were negative  -Echo shows severe aortic valve stenosis is present.  Severely calcified.  -Cardiology consult, reccomends right and left heart cath today     Hypertension  -Continue benazepril     Hyperlipidemia  -Continue rosuvastatin     VTE Prophylaxis:  Mechanical VTE prophylaxis orders are present.    Disposition: to cath lab    This note will serve as a transfer and progress note    EDITH Vyas 10/01/24 04:59 EDT    I have worn appropriate PPE during this patient encounter, sanitized my hands both with entering and exiting patient's room.      51 minutes has been spent by [facility] providers in the care of this patient while under observation status

## 2024-10-01 NOTE — PROGRESS NOTES
Discharge Planning Assessment  [facility]     Patient Name: Vicente Delgado  MRN: [id number]  Today's Date: 10/1/2024    Admit Date: 9/30/2024    Plan: Return to [facility]   Discharge Needs Assessment       Row Name 10/01/24 0940       Living Environment    People in Home alone    Current Living Arrangements home    Potentially Unsafe Housing Conditions none    Primary Care Provided by self    Provides Primary Care For no one    Family Caregiver if Needed child(tracie), adult    Family Caregiver Names Sandra Thornton [phone] daughter    Quality of Family Relationships supportive;involved;helpful    Able to Return to Prior Arrangements yes       Resource/Environmental Concerns    Resource/Environmental Concerns none    Transportation Concerns none       Transition Planning    Patient/Family Anticipates Transition to home    Transportation Anticipated family or friend will provide       Discharge Needs Assessment    Equipment Currently Used at Home rollator    Concerns to be Addressed denies needs/concerns at this time                   Discharge Plan       Row Name 10/01/24 0942       Plan    Plan Return to [facility]    Plan Comments Spoke with pt and his wayne Flores at [facility] to screen for DCp/needs.  Pt did confirm facesheet information as correct including PCP as Mechelle CAROLINA.  Pt reports that he does live in a senior living independent apt at [facility]. Pt reports being independent with self care and does use arollator to ambulate longer distances.  Pt does go to the dining jarrett at [facility] for his meals.  Pt reports that he does still drive. Pt denied using any HH or SNF in the past.  Pt stated that he does an exercise call at [facility] 5 days a week.  Per pt and his daughter [facility] does have skilled PT on site if pt would need this at any time.  Pt's does get his meds filled at [facility] and denies having  any issues getting or affording his home meds.  Pt's daughter can transport pt home at [place].                  Continued Care and Services - Admitted Since 9/30/2024    No active coordination exists for this encounter.       Selected Continued Care - Episodes Includes continued care and service providers with selected services from the active episodes listed below      High Risk Care Management Episode start date: 10/1/2024   There are no active outsourced providers for this episode.                 Expected Discharge Date and Time       Expected Discharge Date Expected Discharge Time    Oct 1, 2024            Demographic Summary       Row Name 10/01/24 0938       General Information    Admission Type observation    Arrived From home    Required Notices Provided Observation Status Notice    Referral Source admission list    Reason for Consult discharge planning    Preferred Language English                   Functional Status       Row Name 10/01/24 0939       Functional Status    Usual Activity Tolerance moderate    Current Activity Tolerance moderate       Functional Status, IADL    Medications independent;assistive person  pt from [facility]    Meal Preparation assistive person  pt from [facility]    Housekeeping assistive person  pt from [facility]    Laundry assistive person  pt from [facility]    Shopping assistive person  pt from [facility]       Mental Status    General Appearance WDL WDL       Mental Status Summary    Recent Changes in Mental Status/Cognitive Functioning no changes                   Psychosocial    No documentation.                  Abuse/Neglect    No documentation.                  Legal    No documentation.                  Substance Abuse    No documentation.                  Patient Forms    No documentation.                     Conchita Quintana MSW
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
## 2024-10-01 NOTE — PROGRESS NOTES
MD ATTESTATION NOTE    SHARED VISIT: This visit was performed by BOTH a physician and an APC. The substantive portion of the medical decision making was performed by this attesting physician who made or approved the management plan and takes responsibility for patient management. All studies in the APC note (if performed) were independently interpreted by me.     I provided a substantive portion of the care of the patient.  I personally performed the physical exam in its entirety, and below are my findings.      Brief HPI: Patient states that he overall feels well except for feeling tired.  However, he would not want to go to the bathroom he did feel short of breath and lightheaded.    PHYSICAL EXAM  ED Triage Vitals   Temp Heart Rate Resp BP SpO2   09/30/24 0825 09/30/24 0825 09/30/24 0825 09/30/24 0825 09/30/24 0825   97.4 °F (36.3 °C) 55 18 148/66 100 %      Temp src Heart Rate Source Patient Position BP Location FiO2 (%)   09/30/24 0825 09/30/24 1130 09/30/24 1130 09/30/24 1130 --   Tympanic Monitor Lying Left arm          GENERAL: no acute distress  HENT: nares patent  EYES: no scleral icterus  CV: regular rhythm, normal rate, systolic murmur at the right upper sternal border  RESPIRATORY: normal effort  ABDOMEN: soft, nontender  MUSCULOSKELETAL: no deformity  NEURO: alert, moves all extremities, follows commands  PSYCH:  calm, cooperative  SKIN: warm, dry    Vital signs and nursing notes reviewed.        Plan:   Seen by cardiology.  Stop lisinopril therapy.  Plan for left and right heart catheterization.

## 2024-10-01 NOTE — PROGRESS NOTES
"Patient Care Team:  Mechelle Landa APRN as PCP - General (Internal Medicine)  Rgeinaldo Palacio MD (Dermatology)  Janki Elias MD as Consulting Physician (Ophthalmology)  Kristopher Machado DPM as Consulting Physician (Podiatry)  Aby Marquez MD as Consulting Physician (Hand Surgery)  Wilton Ramos MD as Consulting Physician (Orthopedic Surgery)  Destinee Snider MD as Consulting Physician (Pain Medicine)  Breezy Frausto MD as Consulting Physician (Cardiology)  Velia Watkins PA-C as Physician Assistant (Physician Assistant)  Hafsa Conway MD as Referring Physician (Colon and Rectal Surgery)  Justin Berry MD as Consulting Physician (Hematology and Oncology)  Shauna Lance, ALEXA as Ambulatory  ([facility])    Chief Complaint: Severe degenerative aortic valve stenosis    Interval History:   No complaints today    Objective   Vital Signs  Temp:  [97.2 °F (36.2 °C)-97.9 °F (36.6 °C)] 97.3 °F (36.3 °C)  Heart Rate:  [50-73] 59  Resp:  [18-20] 18  BP: (113-145)/(64-93) 127/72    Intake/Output Summary (Last 24 hours) at 10/1/2024 1000  Last data filed at 9/30/2024 2100  Gross per 24 hour   Intake 480 ml   Output --   Net 480 ml     Flowsheet Rows      Flowsheet Row First Filed Value   Admission Height 170.2 cm (67.01\") Documented at 09/30/2024 1309   Admission Weight 73.5 kg (162 lb 0.6 oz) Documented at 09/30/2024 1309            Temp:  [97.2 °F (36.2 °C)-97.9 °F (36.6 °C)] 97.3 °F (36.3 °C)  Heart Rate:  [50-73] 59  Resp:  [18-20] 18  BP: (113-145)/(64-93) 127/72    Intake/Output Summary (Last 24 hours) at 10/1/2024 1000  Last data filed at 9/30/2024 2100  Gross per 24 hour   Intake 480 ml   Output --   Net 480 ml     Flowsheet Rows      Flowsheet Row First Filed Value   Admission Height 170.2 cm (67.01\") Documented at 09/30/2024 1309   Admission Weight 73.5 kg (162 lb 0.6 oz) Documented at 09/30/2024 1309            General Appearance:    Alert, cooperative, in " no acute distress   Head:    Normocephalic, without obvious abnormality, atraumatic       Neck/Lymph   No adenopathy, supple, no thyromegaly, no carotid bruit, no    JVD   Lungs:     Clear to auscultation bilaterally, no wheezes, rales, or     rhonchi    Cardiac:    Normal rate, regular rhythm, no murmur, no rub, no gallop   Chest Wall:    No abnormalities observed   GI:     Normal bowel sounds, soft, nontender, nondistended,            no rebound tenderness   Extremities:   No cyanosis, clubbing, or edema   Circulatory/Peripheral Vascular :   Pulses palpable and equal bilaterally   Integumentary:   No bleeding or rash. Normal temperature                lisinopril, 10 mg, Oral, Q12H  rosuvastatin, 10 mg, Oral, Nightly  sodium chloride, 10 mL, Intravenous, Q12H             Results Review:    Results from last 7 days   Lab Units 10/01/24  0454   SODIUM mmol/L 138   POTASSIUM mmol/L 4.6   CHLORIDE mmol/L 103   CO2 mmol/L 23.9   BUN mg/dL 19   CREATININE mg/dL 0.75*   GLUCOSE mg/dL 94   CALCIUM mg/dL 8.9     Results from last 7 days   Lab Units 10/01/24  0454 09/30/24  1106 09/30/24  0901   HSTROP T ng/L 32* 39* 35*     Results from last 7 days   Lab Units 10/01/24  0454   WBC 10*3/mm3 6.04   HEMOGLOBIN g/dL 14.2   HEMATOCRIT % 42.4   PLATELETS 10*3/mm3 177     Results from last 7 days   Lab Units 09/30/24  0901   INR  1.07                 @LABNT(bnp)@  I reviewed the patient's new clinical results.  I personally viewed and interpreted the patient's EKG/Telemetry data            Assessment & Plan   1.  Severe symptomatic degenerative aortic valve stenosis  2.  Non-Hodgkin's lymphoma  3.  PVCs  4.  History of TIA  5.  Chronic heart failure with preserved ejection fraction  6.  Essential hypertension: Currently well-controlled.  7.  Weakness and dizziness  8.  Paroxysmal atrial fibrillation with slow ventricular rate: Prior intolerance to anticoagulation.  Currently sinus    -Likely patient's lower blood pressure on  lisinopril therapy and his aortic valve stenosis  -Stop lisinopril therapy  -Transthoracic echocardiogram has been ordered.  Severe degenerative aortic valve stenosis noted with preserved ejection fraction.  -Recommend left and right heart catheterization today.  -We will hold antihypertensive therapy and start down the path of evaluation for transcatheter aortic valve replacement

## 2024-10-01 NOTE — PLAN OF CARE
Goal Outcome Evaluation:  Plan of Care Reviewed With: patient  Pt remains in Obs unit for dizziness.Cardiology following. Echo complete, SB to monitor, stable vitals. AAO. SBA to BR. Cardiac enzymes obtained. No acute changes noted.      Problem: Adult Inpatient Plan of Care  Goal: Plan of Care Review  Outcome: Progressing  Flowsheets (Taken 10/1/2024 0558)  Progress: improving  Plan of Care Reviewed With: patient  Outcome Evaluation: Eval/ Tx in obs unit for dizziness.  Goal: Patient-Specific Goal (Individualized)  10/1/2024 0615 by Iris Rivas RN  Outcome: Progressing  10/1/2024 0610 by Iris Rivas RN  Outcome: Progressing  Goal: Absence of Hospital-Acquired Illness or Injury  10/1/2024 0615 by Iris Rivas RN  Outcome: Progressing  10/1/2024 0610 by Iris Rivas RN  Outcome: Progressing  Intervention: Identify and Manage Fall Risk  Recent Flowsheet Documentation  Taken 10/1/2024 0400 by Iris Rivas RN  Safety Promotion/Fall Prevention:   activity supervised   clutter free environment maintained   fall prevention program maintained   nonskid shoes/slippers when out of bed   room organization consistent   safety round/check completed   lighting adjusted  Taken 10/1/2024 0200 by Iris Rivas RN  Safety Promotion/Fall Prevention:   activity supervised   clutter free environment maintained   fall prevention program maintained   nonskid shoes/slippers when out of bed   room organization consistent   safety round/check completed   lighting adjusted  Taken 10/1/2024 0000 by Iris Rivas RN  Safety Promotion/Fall Prevention:   activity supervised   clutter free environment maintained   fall prevention program maintained   nonskid shoes/slippers when out of bed   room organization consistent   safety round/check completed   lighting adjusted  Taken 9/30/2024 2200 by Iris Rivas RN  Safety Promotion/Fall Prevention:   activity supervised   clutter free environment maintained   fall prevention  program maintained   nonskid shoes/slippers when out of bed   room organization consistent   safety round/check completed   lighting adjusted  Taken 9/30/2024 2000 by Iris Rivas RN  Safety Promotion/Fall Prevention:   activity supervised   clutter free environment maintained   fall prevention program maintained   nonskid shoes/slippers when out of bed   room organization consistent   safety round/check completed   lighting adjusted  Taken 9/30/2024 1942 by Iris Rivas RN  Safety Promotion/Fall Prevention:   nonskid shoes/slippers when out of bed   safety round/check completed   activity supervised   lighting adjusted  Intervention: Prevent Skin Injury  Recent Flowsheet Documentation  Taken 10/1/2024 0400 by Iris Rivas RN  Body Position: position changed independently  Taken 10/1/2024 0200 by Iris Rivas RN  Body Position: position changed independently  Taken 10/1/2024 0000 by Iris Rivas RN  Body Position: position changed independently  Taken 9/30/2024 2200 by Iris Rivas RN  Body Position: position changed independently  Taken 9/30/2024 2000 by Iris Rivas RN  Body Position: position changed independently  Taken 9/30/2024 1942 by Iris Rivas RN  Body Position: position changed independently  Intervention: Prevent and Manage VTE (Venous Thromboembolism) Risk  Recent Flowsheet Documentation  Taken 10/1/2024 0400 by Iris Rivas RN  Activity Management:   up ad sallie   activity encouraged  Taken 10/1/2024 0200 by Iris Rivas RN  Activity Management:   up ad sallie   activity encouraged  Taken 10/1/2024 0000 by Iris Rivas RN  Activity Management:   up ad sallie   activity encouraged  Taken 9/30/2024 2200 by Iris Rivas RN  Activity Management:   up ad sallie   activity encouraged  Taken 9/30/2024 2000 by Iris Rivas RN  Activity Management:   up ad sallie   activity encouraged  Taken 9/30/2024 1942 by Iris Rivas RN  Activity Management:   ambulated to bathroom   ambulated  in room   ambulated outside room   back to bed  VTE Prevention/Management: (Pt educated, MD notified.)   bilateral   sequential compression devices off   patient refused intervention  Intervention: Prevent Infection  Recent Flowsheet Documentation  Taken 10/1/2024 0400 by Iris Rivas RN  Infection Prevention:   single patient room provided   rest/sleep promoted   hand hygiene promoted  Taken 10/1/2024 0200 by Iris Rivas RN  Infection Prevention:   single patient room provided   rest/sleep promoted   hand hygiene promoted  Taken 10/1/2024 0000 by Iris Rivas RN  Infection Prevention:   single patient room provided   rest/sleep promoted   hand hygiene promoted  Taken 9/30/2024 2200 by Iris Rivas RN  Infection Prevention:   single patient room provided   rest/sleep promoted   hand hygiene promoted  Taken 9/30/2024 2000 by Iris Rivas RN  Infection Prevention:   single patient room provided   rest/sleep promoted   hand hygiene promoted  Goal: Optimal Comfort and Wellbeing  10/1/2024 0615 by Iris Rivas RN  Outcome: Progressing  10/1/2024 0610 by Iris Rivas RN  Outcome: Progressing  Intervention: Provide Person-Centered Care  Recent Flowsheet Documentation  Taken 9/30/2024 1942 by Iris Rivas RN  Trust Relationship/Rapport: care explained  Goal: Readiness for Transition of Care  10/1/2024 0615 by Iris Rivas RN  Outcome: Progressing  10/1/2024 0610 by Iris Rivas RN  Outcome: Progressing     Problem: Fall Injury Risk  Goal: Absence of Fall and Fall-Related Injury  10/1/2024 0615 by Iris Rivas RN  Outcome: Progressing  10/1/2024 0610 by Iris Rivas RN  Outcome: Progressing  Intervention: Identify and Manage Contributors  Recent Flowsheet Documentation  Taken 10/1/2024 0400 by Iris Rivas RN  Medication Review/Management: medications reviewed  Taken 10/1/2024 0200 by Iris Rivas RN  Medication Review/Management: medications reviewed  Taken 10/1/2024 0000 by  Iris Rivas RN  Medication Review/Management: medications reviewed  Taken 9/30/2024 2200 by Iris Rivas RN  Medication Review/Management: medications reviewed  Taken 9/30/2024 2000 by Iris Rivas RN  Medication Review/Management: medications reviewed  Intervention: Promote Injury-Free Environment  Recent Flowsheet Documentation  Taken 10/1/2024 0400 by Iris Rivas RN  Safety Promotion/Fall Prevention:   activity supervised   clutter free environment maintained   fall prevention program maintained   nonskid shoes/slippers when out of bed   room organization consistent   safety round/check completed   lighting adjusted  Taken 10/1/2024 0200 by Iris Rivas RN  Safety Promotion/Fall Prevention:   activity supervised   clutter free environment maintained   fall prevention program maintained   nonskid shoes/slippers when out of bed   room organization consistent   safety round/check completed   lighting adjusted  Taken 10/1/2024 0000 by Iris Rivas RN  Safety Promotion/Fall Prevention:   activity supervised   clutter free environment maintained   fall prevention program maintained   nonskid shoes/slippers when out of bed   room organization consistent   safety round/check completed   lighting adjusted  Taken 9/30/2024 2200 by Iris Rivas RN  Safety Promotion/Fall Prevention:   activity supervised   clutter free environment maintained   fall prevention program maintained   nonskid shoes/slippers when out of bed   room organization consistent   safety round/check completed   lighting adjusted  Taken 9/30/2024 2000 by Iris Rivas RN  Safety Promotion/Fall Prevention:   activity supervised   clutter free environment maintained   fall prevention program maintained   nonskid shoes/slippers when out of bed   room organization consistent   safety round/check completed   lighting adjusted  Taken 9/30/2024 1942 by Iris Rivas RN  Safety Promotion/Fall Prevention:   nonskid shoes/slippers when out  of bed   safety round/check completed   activity supervised   lighting adjusted     Problem: Heart Failure Comorbidity  Goal: Maintenance of Heart Failure Symptom Control  10/1/2024 0615 by Iris Rivas RN  Outcome: Progressing  10/1/2024 0610 by Iris Rivas RN  Outcome: Progressing  Intervention: Maintain Heart Failure-Management  Recent Flowsheet Documentation  Taken 10/1/2024 0400 by Iris Rivas RN  Medication Review/Management: medications reviewed  Taken 10/1/2024 0200 by Iris Rivas RN  Medication Review/Management: medications reviewed  Taken 10/1/2024 0000 by Iris Rivas RN  Medication Review/Management: medications reviewed  Taken 9/30/2024 2200 by Iris Rivas RN  Medication Review/Management: medications reviewed  Taken 9/30/2024 2000 by Iris Rivas RN  Medication Review/Management: medications reviewed     Problem: Hypertension Comorbidity  Goal: Blood Pressure in Desired Range  10/1/2024 0615 by Iris Rivas RN  Outcome: Progressing  10/1/2024 0610 by Iris Rivas RN  Outcome: Progressing  Intervention: Maintain Blood Pressure Management  Recent Flowsheet Documentation  Taken 10/1/2024 0400 by Iris Rivas RN  Medication Review/Management: medications reviewed  Taken 10/1/2024 0200 by Iris Rivas RN  Medication Review/Management: medications reviewed  Taken 10/1/2024 0000 by Iris Rivas RN  Medication Review/Management: medications reviewed  Taken 9/30/2024 2200 by Iris Rivas RN  Medication Review/Management: medications reviewed  Taken 9/30/2024 2000 by Iris Rivas RN  Medication Review/Management: medications reviewed  Taken 9/30/2024 1942 by Iris Rivas RN  Syncope Management: position changed slowly     Problem: Syncope  Goal: Absence of Syncopal Symptoms  10/1/2024 0615 by Iris Rivas RN  Outcome: Progressing  10/1/2024 0610 by Iris Rivas RN  Outcome: Progressing  Intervention: Manage Effect of Syncopal Symptoms  Recent Flowsheet  Documentation  Taken 9/30/2024 1942 by Iris Rivas, RN  Syncope Management: position changed slowly     Progress: improving  Outcome Evaluation: Eval/ Tx in obs unit for dizziness.

## 2024-10-01 NOTE — PLAN OF CARE
Problem: Adult Inpatient Plan of Care  Goal: Plan of Care Review  Outcome: Progressing  Goal: Patient-Specific Goal (Individualized)  Outcome: Progressing  Goal: Absence of Hospital-Acquired Illness or Injury  Outcome: Progressing  Intervention: Identify and Manage Fall Risk  Recent Flowsheet Documentation  Taken 10/1/2024 1315 by Shannon Hill RN  Safety Promotion/Fall Prevention:   safety round/check completed   room organization consistent   lighting adjusted   nonskid shoes/slippers when out of bed   fall prevention program maintained   clutter free environment maintained   assistive device/personal items within reach   activity supervised  Intervention: Prevent Skin Injury  Recent Flowsheet Documentation  Taken 10/1/2024 1315 by Shannon Hill RN  Body Position: position changed independently  Intervention: Prevent and Manage VTE (Venous Thromboembolism) Risk  Recent Flowsheet Documentation  Taken 10/1/2024 1315 by Shannon Hill RN  Activity Management: activity encouraged  Range of Motion: active ROM (range of motion) encouraged  Intervention: Prevent Infection  Recent Flowsheet Documentation  Taken 10/1/2024 1315 by Shannon Hill RN  Infection Prevention: single patient room provided  Goal: Optimal Comfort and Wellbeing  Outcome: Progressing  Intervention: Provide Person-Centered Care  Recent Flowsheet Documentation  Taken 10/1/2024 1315 by Shannon Hill RN  Trust Relationship/Rapport:   care explained   choices provided   emotional support provided   empathic listening provided   questions answered   questions encouraged   reassurance provided   thoughts/feelings acknowledged  Goal: Readiness for Transition of Care  Outcome: Progressing     Problem: Fall Injury Risk  Goal: Absence of Fall and Fall-Related Injury  Outcome: Progressing  Intervention: Identify and Manage Contributors  Recent Flowsheet Documentation  Taken 10/1/2024 1315 by Shannon Hill RN  Medication Review/Management:  infusion initiated  Intervention: Promote Injury-Free Environment  Recent Flowsheet Documentation  Taken 10/1/2024 1315 by Shannon Hill RN  Safety Promotion/Fall Prevention:   safety round/check completed   room organization consistent   lighting adjusted   nonskid shoes/slippers when out of bed   fall prevention program maintained   clutter free environment maintained   assistive device/personal items within reach   activity supervised     Problem: Heart Failure Comorbidity  Goal: Maintenance of Heart Failure Symptom Control  Outcome: Progressing  Intervention: Maintain Heart Failure-Management  Recent Flowsheet Documentation  Taken 10/1/2024 1315 by Shannon Hill RN  Medication Review/Management: infusion initiated     Problem: Hypertension Comorbidity  Goal: Blood Pressure in Desired Range  Outcome: Progressing  Intervention: Maintain Blood Pressure Management  Recent Flowsheet Documentation  Taken 10/1/2024 1315 by Shannon Hill RN  Syncope Management: position changed slowly  Medication Review/Management: infusion initiated     Problem: Syncope  Goal: Absence of Syncopal Symptoms  Outcome: Progressing  Intervention: Manage Effect of Syncopal Symptoms  Recent Flowsheet Documentation  Taken 10/1/2024 1315 by Shannon Hill RN  Syncope Management: position changed slowly

## 2024-10-02 ENCOUNTER — DOCUMENTATION (OUTPATIENT)
Dept: CARDIOLOGY | Facility: HOSPITAL | Age: 89
End: 2024-10-02
Payer: MEDICARE

## 2024-10-02 LAB
ANION GAP SERPL CALCULATED.3IONS-SCNC: 9 MMOL/L (ref 5–15)
BUN SERPL-MCNC: 19 MG/DL (ref 8–23)
BUN/CREAT SERPL: 26 (ref 7–25)
CALCIUM SPEC-SCNC: 8.9 MG/DL (ref 8.2–9.6)
CHLORIDE SERPL-SCNC: 105 MMOL/L (ref 98–107)
CO2 SERPL-SCNC: 23 MMOL/L (ref 22–29)
CREAT SERPL-MCNC: 0.73 MG/DL (ref 0.76–1.27)
DEPRECATED RDW RBC AUTO: 46.4 FL (ref 37–54)
EGFRCR SERPLBLD CKD-EPI 2021: 83.3 ML/MIN/1.73
ERYTHROCYTE [DISTWIDTH] IN BLOOD BY AUTOMATED COUNT: 13 % (ref 12.3–15.4)
GLUCOSE SERPL-MCNC: 86 MG/DL (ref 65–99)
HCT VFR BLD AUTO: 43.6 % (ref 37.5–51)
HGB BLD-MCNC: 13.9 G/DL (ref 13–17.7)
MCH RBC QN AUTO: 30.7 PG (ref 26.6–33)
MCHC RBC AUTO-ENTMCNC: 31.9 G/DL (ref 31.5–35.7)
MCV RBC AUTO: 96.2 FL (ref 79–97)
PLATELET # BLD AUTO: 177 10*3/MM3 (ref 140–450)
PMV BLD AUTO: 10.4 FL (ref 6–12)
POTASSIUM SERPL-SCNC: 4.2 MMOL/L (ref 3.5–5.2)
RBC # BLD AUTO: 4.53 10*6/MM3 (ref 4.14–5.8)
SODIUM SERPL-SCNC: 137 MMOL/L (ref 136–145)
WBC NRBC COR # BLD AUTO: 6.56 10*3/MM3 (ref 3.4–10.8)

## 2024-10-02 PROCEDURE — 85027 COMPLETE CBC AUTOMATED: CPT | Performed by: INTERNAL MEDICINE

## 2024-10-02 PROCEDURE — 80048 BASIC METABOLIC PNL TOTAL CA: CPT | Performed by: INTERNAL MEDICINE

## 2024-10-02 PROCEDURE — 99232 SBSQ HOSP IP/OBS MODERATE 35: CPT | Performed by: NURSE PRACTITIONER

## 2024-10-02 RX ADMIN — Medication 10 ML: at 20:00

## 2024-10-02 RX ADMIN — ROSUVASTATIN CALCIUM 10 MG: 10 TABLET, FILM COATED ORAL at 20:00

## 2024-10-02 RX ADMIN — Medication 10 ML: at 08:22

## 2024-10-02 NOTE — PLAN OF CARE
Goal Outcome Evaluation:              Outcome Evaluation: VSS, NSR on tele, on RA, no c/o pain. SBA when ambulating. Poss. TAVR later this week. Will continue plan of care and update as needed.

## 2024-10-02 NOTE — NURSING NOTE
I met with Mr Delgado and his daughter Mere and introduced the structural heart program We discussed aortic stenosis and the evaluation process The plan is for him to remain in the hospital and have the TAVR CTA 10/3/24. I have provided them with our introductory packet including information on shared decision making I have been able to answer their questions and have given them my contact information and encouraged them to call with any further questions.

## 2024-10-02 NOTE — PLAN OF CARE
Goal Outcome Evaluation:      Patient resting at this time, denies pain , vital signs stable, pt on room air, pt right wrist soft dry and intact, Sr SB on the monitor, educate to call for assist, call light intact, bed alarm on cont  to monitor.

## 2024-10-02 NOTE — PROGRESS NOTES
HOSPITAL FOLLOW UP NOTE    Patient Name: Vicente Delgado  Patient : 1928        Date of Service:10/02/24  Provider of Service: GE Rizo  Place of Service: [facility]  Referral Provider: No ref. provider found          Follow Up: severe degenerative aortic valve stenosis    Interval Hx: No complaints, vital signs stable.  Sinus rhythm on telemetry    OBJECTIVE  Temp:  [97.3 °F (36.3 °C)-97.7 °F (36.5 °C)] 97.3 °F (36.3 °C)  Heart Rate:  [57-80] 63  Resp:  [16-20] 18  BP: (100-153)/(57-82) 100/57     Intake/Output Summary (Last 24 hours) at 10/2/2024 0759  Last data filed at 10/2/2024 0034  Gross per 24 hour   Intake 225 ml   Output 925 ml   Net -700 ml     Body mass index is 25.37 kg/m².      24  1309 24  1927   Weight: 73.5 kg (162 lb 0.6 oz) 73.5 kg (162 lb)         Physical Exam:     General Appearance:    Alert, cooperative, in no acute distress   Head:    Normocephalic, without obvious abnormality, atraumatic   Eyes:            Conjunctivae and sclerae normal, no   icterus, no pallor, corneas clear, PERRLA   Neck:   No adenopathy, supple, trachea midline, no thyromegaly, no   carotid bruit, no JVD   Lungs:     Clear to auscultation,respirations regular, even and unlabored    Heart:    Regular rhythm and normal rate, normal S1 and S2, no murmur, no gallop, no rub, no click   Chest Wall:    No abnormalities observed   Abdomen:     Normal bowel sounds, no masses, no organomegaly, soft, nontender, nondistended, no guarding, no rebound  tenderness   Extremities:   Moves all extremities well, no edema, no cyanosis, no redness   Pulses:   Pulses palpable and equal bilaterally.          CURRENT MEDS    Scheduled Meds:rosuvastatin, 10 mg, Oral, Nightly  sodium chloride, 10 mL, Intravenous, Q12H      Continuous Infusions:       Lab Review:   Results from last 7 days   Lab Units 10/02/24  0353 10/01/24  0454 24  0901   SODIUM mmol/L 137 138 139   POTASSIUM mmol/L  4.2 4.6 4.3   CHLORIDE mmol/L 105 103 103   CO2 mmol/L 23.0 23.9 25.0   BUN mg/dL 19 19 21   CREATININE mg/dL 0.73* 0.75* 0.77   GLUCOSE mg/dL 86 94 87   CALCIUM mg/dL 8.9 8.9 9.4   AST (SGOT) U/L  --   --  22   ALT (SGPT) U/L  --   --  17         Results from last 7 days   Lab Units 10/02/24  0353 10/01/24  0454   WBC 10*3/mm3 6.56 6.04   HEMOGLOBIN g/dL 13.9 14.2   HEMATOCRIT % 43.6 42.4   PLATELETS 10*3/mm3 177 177     Results from last 7 days   Lab Units 09/30/24  0901   INR  1.07     Left/ Right Heart Cath 10/01/2024:  1. Left main: Normal  2. LAD: Mild calcification of proximal segment with diffuse 30% stenosis.  Tubular 60% mid vessel stenosis  3. LCX: Diffuse 50% proximal OM1 stenosis  4. RCA: Small caliber.  Dominant.  Chronic total occlusion mid segment.  Fills via bridging collaterals  5.  Normal right and left-sided filling pressures.  6.  No pulmonary hypertension     TTE 09/30/2024:    Left ventricular systolic function is normal. Calculated left ventricular EF = 61.8%    Left ventricular wall thickness is consistent with moderate concentric hypertrophy.    Left ventricular diastolic function is consistent with (grade I) impaired relaxation.    The aortic valve appears likely trileaflet and severely calcified. Peak velocity of the flow distal to the aortic valve is 410.5 cm/s. Aortic valve mean pressure gradient is 39 mmHg. Aortic valve dimensionless index is 0.2. Severe aortic valve stenosis is present. Aortic valve area is 0.8 cm2.    Mild aortic regurgitation.    There is mild, bileaflet mitral valve thickening present along with moderate mitral annular calcification (MAC).    Normal biatrial and IVC size.    ASSESSMENT & PLAN    Dizziness    Aortic stenosis    1.  Severe symptomatic degenerative aortic valve stenosis: TAVR evaluation in progress.  Plan for CTA TAVR chest, abdomen, pelvis tomorrow.  Creatinine remains normal after cardiac cath yesterday  2.  Nonobstructive coronary artery disease:  60% mid LAD, 50% proximal OM,  mid RCA that fills via bridging collaterals.  On statin therapy  3.  Non-Hodgkin's lymphoma  4.  History of TIA  5.  Chronic heart failure with preserved ejection fraction  6.  Hypertension: BP has been running soft.  ACEi stopped yesterday.  7.  Paroxysmal atrial fibrillation with slow ventricular rate: Prior intolerance to anticoagulation.  8.  PVCs: occasional.  Not on beta blocker 2/2 resting bradycardia        Maki Mcmanus, GE  10/02/24

## 2024-10-03 ENCOUNTER — APPOINTMENT (OUTPATIENT)
Dept: CT IMAGING | Facility: HOSPITAL | Age: 89
DRG: 287 | End: 2024-10-03
Payer: MEDICARE

## 2024-10-03 LAB
GLUCOSE BLDC GLUCOMTR-MCNC: 93 MG/DL (ref 70–130)
HCT VFR BLDA CALC: 42 % (ref 38–51)
HCT VFR BLDA CALC: 42 % (ref 38–51)
HCT VFR BLDA CALC: 43 % (ref 38–51)
HGB BLDA-MCNC: 14.3 G/DL (ref 12–17)
HGB BLDA-MCNC: 14.3 G/DL (ref 12–17)
HGB BLDA-MCNC: 14.6 G/DL (ref 12–17)
SAO2 % BLDA: 56 % (ref 95–98)
SAO2 % BLDA: 64 % (ref 95–98)
SAO2 % BLDA: 95 % (ref 95–98)

## 2024-10-03 PROCEDURE — 25510000001 IOPAMIDOL PER 1 ML: Performed by: EMERGENCY MEDICINE

## 2024-10-03 PROCEDURE — 82948 REAGENT STRIP/BLOOD GLUCOSE: CPT

## 2024-10-03 PROCEDURE — 99232 SBSQ HOSP IP/OBS MODERATE 35: CPT | Performed by: INTERNAL MEDICINE

## 2024-10-03 PROCEDURE — 71275 CT ANGIOGRAPHY CHEST: CPT

## 2024-10-03 PROCEDURE — 74174 CTA ABD&PLVS W/CONTRAST: CPT

## 2024-10-03 RX ORDER — IOPAMIDOL 755 MG/ML
95 INJECTION, SOLUTION INTRAVASCULAR
Status: COMPLETED | OUTPATIENT
Start: 2024-10-03 | End: 2024-10-03

## 2024-10-03 RX ADMIN — IOPAMIDOL 95 ML: 755 INJECTION, SOLUTION INTRAVENOUS at 14:15

## 2024-10-03 RX ADMIN — ROSUVASTATIN CALCIUM 10 MG: 10 TABLET, FILM COATED ORAL at 20:55

## 2024-10-03 RX ADMIN — Medication 10 ML: at 20:55

## 2024-10-03 RX ADMIN — Medication 10 ML: at 08:02

## 2024-10-03 NOTE — PLAN OF CARE
Goal Outcome Evaluation:              Outcome Evaluation: VSS, NSR on tele, on RA, no c/o pain. Independent when ambulating. Refusing all falls prevention alarms. Will continue plan of care and update as needed.

## 2024-10-03 NOTE — PROGRESS NOTES
Echo reviewed.  Discussed with Dr. Irby who recommends proceeding with TAVR evaluation.     Electronically signed by GE Neumann, 10/03/24, 11:48 AM EDT.

## 2024-10-03 NOTE — PLAN OF CARE
Goal Outcome Evaluation:         Patient resting at this time, vital signs stable, SR on the monitor, pt on room  air, educate to call for assist, bed alarm intact , educate to call for assist, call light in reach cont to monitor.

## 2024-10-03 NOTE — PROGRESS NOTES
"Patient Care Team:  Mechelle Landa APRN as PCP - General (Internal Medicine)  Reginaldo Palacio MD (Dermatology)  Janki Elias MD as Consulting Physician (Ophthalmology)  Kristopher Machado DPM as Consulting Physician (Podiatry)  Aby Marquez MD as Consulting Physician (Hand Surgery)  Wilton Ramos MD as Consulting Physician (Orthopedic Surgery)  Destinee Snider MD as Consulting Physician (Pain Medicine)  Breezy Frausto MD as Consulting Physician (Cardiology)  Velia Watkins PA-C as Physician Assistant (Physician Assistant)  Hafsa Conway MD as Referring Physician (Colon and Rectal Surgery)  Justin Berry MD as Consulting Physician (Hematology and Oncology)    Chief Complaint: Severe degenerative aortic valve stenosis    Interval History:   No new complaints today. Awaiting CTA    Objective   Vital Signs  Temp:  [97.4 °F (36.3 °C)-98 °F (36.7 °C)] 97.4 °F (36.3 °C)  Heart Rate:  [63-74] 71  Resp:  [17-18] 17  BP: (106-130)/(48-76) 127/66    Intake/Output Summary (Last 24 hours) at 10/3/2024 1133  Last data filed at 10/3/2024 0914  Gross per 24 hour   Intake 760 ml   Output 1450 ml   Net -690 ml     Flowsheet Rows      Flowsheet Row First Filed Value   Admission Height 170.2 cm (67.01\") Documented at 09/30/2024 1309   Admission Weight 73.5 kg (162 lb 0.6 oz) Documented at 09/30/2024 1309            Temp:  [97.4 °F (36.3 °C)-98 °F (36.7 °C)] 97.4 °F (36.3 °C)  Heart Rate:  [63-74] 71  Resp:  [17-18] 17  BP: (106-130)/(48-76) 127/66    Intake/Output Summary (Last 24 hours) at 10/3/2024 1133  Last data filed at 10/3/2024 0914  Gross per 24 hour   Intake 760 ml   Output 1450 ml   Net -690 ml     Flowsheet Rows      Flowsheet Row First Filed Value   Admission Height 170.2 cm (67.01\") Documented at 09/30/2024 1309   Admission Weight 73.5 kg (162 lb 0.6 oz) Documented at 09/30/2024 1309            General Appearance:    Alert, cooperative, in no acute distress   Head:    " Normocephalic, without obvious abnormality, atraumatic       Neck/Lymph   No adenopathy, supple, no thyromegaly, no carotid bruit, no    JVD   Lungs:     Clear to auscultation bilaterally, no wheezes, rales, or     rhonchi    Cardiac:    Normal rate, regular rhythm, III/VI systolic murmur peak late,  no rub, no gallop   Chest Wall:    No abnormalities observed   GI:     Normal bowel sounds, soft, nontender, nondistended,            no rebound tenderness   Extremities:   No cyanosis, clubbing, or edema   Circulatory/Peripheral Vascular :   Pulses palpable and equal bilaterally   Integumentary:   No bleeding or rash. Normal temperature                rosuvastatin, 10 mg, Oral, Nightly  sodium chloride, 10 mL, Intravenous, Q12H             Results Review:    Results from last 7 days   Lab Units 10/02/24  0353   SODIUM mmol/L 137   POTASSIUM mmol/L 4.2   CHLORIDE mmol/L 105   CO2 mmol/L 23.0   BUN mg/dL 19   CREATININE mg/dL 0.73*   GLUCOSE mg/dL 86   CALCIUM mg/dL 8.9     Results from last 7 days   Lab Units 10/01/24  0454 09/30/24  1106 09/30/24  0901   HSTROP T ng/L 32* 39* 35*     Results from last 7 days   Lab Units 10/02/24  0353   WBC 10*3/mm3 6.56   HEMOGLOBIN g/dL 13.9   HEMATOCRIT % 43.6   PLATELETS 10*3/mm3 177     Results from last 7 days   Lab Units 09/30/24  0901   INR  1.07                 @LABRCNT(bnp)@  I reviewed the patient's new clinical results.  I personally viewed and interpreted the patient's EKG/Telemetry data          Assessment & Plan   1.  Severe symptomatic degenerative aortic valve stenosis  2.  Non-Hodgkin's lymphoma  3.  PVCs  4.  History of TIA  5.  Chronic heart failure with preserved ejection fraction  6.  Essential hypertension: Currently well-controlled.  7.  Weakness and dizziness  8.  Paroxysmal atrial fibrillation with slow ventricular rate: Prior intolerance to anticoagulation.  Currently sinus     -Continue off anti-hypertensive therapy.   -TAVR CTA today  -Plan on d/c tomorrow.  TAVR next week

## 2024-10-04 ENCOUNTER — READMISSION MANAGEMENT (OUTPATIENT)
Dept: CALL CENTER | Facility: HOSPITAL | Age: 89
End: 2024-10-04
Payer: MEDICARE

## 2024-10-04 ENCOUNTER — DOCUMENTATION (OUTPATIENT)
Dept: CARDIOLOGY | Facility: HOSPITAL | Age: 89
End: 2024-10-04
Payer: MEDICARE

## 2024-10-04 VITALS
SYSTOLIC BLOOD PRESSURE: 162 MMHG | WEIGHT: 162 LBS | HEART RATE: 64 BPM | RESPIRATION RATE: 17 BRPM | HEIGHT: 67 IN | DIASTOLIC BLOOD PRESSURE: 78 MMHG | BODY MASS INDEX: 25.43 KG/M2 | TEMPERATURE: 97.3 F | OXYGEN SATURATION: 100 %

## 2024-10-04 DIAGNOSIS — I35.0 AORTIC STENOSIS, MODERATE: Primary | ICD-10-CM

## 2024-10-04 PROCEDURE — 99239 HOSP IP/OBS DSCHRG MGMT >30: CPT | Performed by: INTERNAL MEDICINE

## 2024-10-04 RX ADMIN — Medication 10 ML: at 08:00

## 2024-10-04 NOTE — PLAN OF CARE
Goal Outcome Evaluation:               Patient s/p heart cath, workup for TAVR. VSS, SR 60s, BP 100s-110s, on room air. Possible discharge today, care ongoing.

## 2024-10-04 NOTE — NURSING NOTE
I met with Mr Delgado and spoke with is daughter Sandra on the phone while he was still hospitalized today . We discussed the TAVR procedure in detail. He lives inan independent living facility and ambulates with a walker. He does not require supplemental oxygen His skin is dry and shows no signs of infection specifically in his groin/access site. He has been hospitalized once during the last twelve months for heart failure Labs have been reviewed We discussed the Bactroban and Peridex which have been ordered from his local [facility] pharmacy. I have answered their questions and will let them know an arrival time once arrangements for the TAVR have been finalized I have given them my contact information and they will call with any further questions

## 2024-10-04 NOTE — DISCHARGE SUMMARY
Date of Discharge:  10/4/2024  Date of Admit: 9/30/2024    Discharge Diagnosis:  1.  Severe symptomatic degenerative aortic valve stenosis  2.  Non-Hodgkin's lymphoma  3.  PVCs  4.  History of TIA  5.  Chronic heart failure with preserved ejection fraction  6.  Essential hypertensio  7.  Weakness and dizziness  8.  Paroxysmal atrial fibrillation with slow ventricular rate: Prior intolerance to anticoagulation.  Currently sinus       Hospital Course:   89-year-old male with a medical history of severe degenerative aortic valve stenosis, history of non-Hodgkin's lymphoma, history of TIA, chronic heart failure with preserved ejection fraction, essential hypertension, who presented to the hospital with lightheadedness and weakness.  He was noted to have low normal blood pressures and his lisinopril was stopped.  He underwent transthoracic echocardiogram which confirmed severe degenerative aortic valve stenosis.  He subsequently underwent left and right heart catheterization on 10/1/2024.  He was noted to have a tubular 60% mid vessel LAD stenosis along with diffuse 50% proximal OM1 stenosis.  The RCA was chronically occluded in the midsegment.  He had normal filling pressures and no pulmonary hypertension.  He underwent transcatheter aortic valve replacement CTA and his kidney function was monitored after.  This has been stable.  He is appropriate for discharge home today with plan for TAVR on Tuesday.    Procedures Performed  Procedure(s):  Left Heart Cath  Right Heart Cath     Conclusions:   1. Left main: Normal  2. LAD: Mild calcification of proximal segment with diffuse 30% stenosis.  Tubular 60% mid vessel stenosis  3. LCX: Diffuse 50% proximal OM1 stenosis  4. RCA: Small caliber.  Dominant.  Chronic total occlusion mid segment.  Fills via bridging collaterals  5.  Normal right and left-sided filling pressures.  6.  No pulmonary hypertension       Consults       Date and Time Order Name Status Description     "10/1/2024  1:23 PM Inpatient Cardiothoracic Surgery Consult Completed     9/30/2024 10:19 AM Inpatient Cardiology Consult Completed             Pertinent Test Results:   TTE   9/30/24    Left ventricular systolic function is normal. Calculated left ventricular EF = 61.8%    Left ventricular wall thickness is consistent with moderate concentric hypertrophy.    Left ventricular diastolic function is consistent with (grade I) impaired relaxation.    The aortic valve appears likely trileaflet and severely calcified. Peak velocity of the flow distal to the aortic valve is 410.5 cm/s. Aortic valve mean pressure gradient is 39 mmHg. Aortic valve dimensionless index is 0.2. Severe aortic valve stenosis is present. Aortic valve area is 0.8 cm2.    Mild aortic regurgitation.    There is mild, bileaflet mitral valve thickening present along with moderate mitral annular calcification (MAC).    Normal biatrial and IVC size      Discharge Physical Exam:  Temp:  [97.3 °F (36.3 °C)-98.7 °F (37.1 °C)] 97.3 °F (36.3 °C)  Heart Rate:  [60-68] 64  Resp:  [17] 17  BP: (107-162)/(59-95) 162/78    Intake/Output Summary (Last 24 hours) at 10/4/2024 0905  Last data filed at 10/4/2024 0759  Gross per 24 hour   Intake 560 ml   Output 900 ml   Net -340 ml     Flowsheet Rows      Flowsheet Row First Filed Value   Admission Height 170.2 cm (67.01\") Documented at 09/30/2024 1309   Admission Weight 73.5 kg (162 lb 0.6 oz) Documented at 09/30/2024 1309            General Appearance:    Alert, cooperative, in no acute distress   Head:    Normocephalic, without obvious abnormality, atraumatic       Neck/Lymph   No adenopathy, supple, no thyromegaly, no carotid bruit, no    JVD   Lungs:     Clear to auscultation bilaterally, no wheezes, rales, or     rhonchi    Cardiac:    Normal rate, regular rhythm, 3 out of 6 systolic murmur peak late, no rub, no gallop   Chest Wall:    No abnormalities observed   GI:     Normal bowel sounds, soft, nontender, " nondistended,            no rebound tenderness   Extremities:   No cyanosis, clubbing, or edema   Circulatory/Peripheral Vascular :   Pulses palpable and equal bilaterally   Integumentary:   No bleeding or rash. Normal temperature                  Discharge Medications     Your medication list        CONTINUE taking these medications        Instructions Last Dose Given Next Dose Due   ezetimibe 10 MG tablet  Commonly known as: ZETIA      TAKE ONE TABLET BY MOUTH DAILY       rosuvastatin 10 MG tablet  Commonly known as: CRESTOR      Take 1 tablet by mouth Every Night.              STOP taking these medications      benazepril 20 MG tablet  Commonly known as: LOTENSIN                      Discharge Diet: Cardiac, carbohydrate controlled.    Activity at Discharge: No lifting greater than 10 pounds for 1 week after heart catheterization    Discharge disposition: home    Condition on Discharge: stable    Follow-up Appointments  Future Appointments   Date Time Provider Department Center   2/21/2025 10:30 AM Mechelle Landa APRN [facility]  [facility]   3/5/2025  1:15 PM [facility] PET CT  [facility]   3/20/2025 10:00 AM Maki Mcmanus APRN [facility]   3/26/2025 12:40 PM LAB CHAIR 1 [facility]  [facility]   3/26/2025  1:00 PM Justin Berry MD [facility]   9/25/2025 11:00 AM Olaf Sales MD [facility] CD [id number] None         Test Results Pending at Discharge       Olaf Sales MD  10/04/24  09:03 EDT    Greater than 30 min spent in reviewing records, discussion and examination of the patient and discussion with other members of the patient's medical team.     Dictated utilizing Dragon dictation

## 2024-10-04 NOTE — OUTREACH NOTE
Prep Survey      Flowsheet Row Responses   [facility] patient discharged from? [place]   Is LACE score < 7 ? No   Eligibility [facility]   Date of Admission 09/30/24   Date of Discharge 10/04/24   Discharge Disposition Home or Self Care   Discharge diagnosis Dizziness, heart cath, plan for TAVR on 10/8   Does the patient have one of the following disease processes/diagnoses(primary or secondary)? Other   Does the patient have Home health ordered? No   Is there a DME ordered? No   General alerts for this patient [facility]   Prep survey completed? Yes            Kristy JEFF - Registered Nurse

## 2024-10-05 ENCOUNTER — PREP FOR SURGERY (OUTPATIENT)
Dept: OTHER | Facility: HOSPITAL | Age: 89
End: 2024-10-05
Payer: MEDICARE

## 2024-10-05 ENCOUNTER — TELEPHONE (OUTPATIENT)
Dept: CARDIOLOGY | Facility: HOSPITAL | Age: 89
End: 2024-10-05
Payer: MEDICARE

## 2024-10-05 DIAGNOSIS — I35.0 AORTIC VALVE STENOSIS, ETIOLOGY OF CARDIAC VALVE DISEASE UNSPECIFIED: Primary | ICD-10-CM

## 2024-10-05 RX ORDER — CHLORHEXIDINE GLUCONATE ORAL RINSE 1.2 MG/ML
15 SOLUTION DENTAL EVERY 12 HOURS
OUTPATIENT
Start: 2024-10-05 | End: 2024-10-06

## 2024-10-05 RX ORDER — CHLORHEXIDINE GLUCONATE ORAL RINSE 1.2 MG/ML
15 SOLUTION DENTAL ONCE
OUTPATIENT
Start: 2024-10-05 | End: 2024-10-05

## 2024-10-05 RX ORDER — SODIUM CHLORIDE 9 MG/ML
40 INJECTION, SOLUTION INTRAVENOUS ONCE
OUTPATIENT
Start: 2024-10-05

## 2024-10-05 RX ORDER — SODIUM CHLORIDE 0.9 % (FLUSH) 0.9 %
10 SYRINGE (ML) INJECTION AS NEEDED
OUTPATIENT
Start: 2024-10-05

## 2024-10-05 RX ORDER — SODIUM CHLORIDE 0.9 % (FLUSH) 0.9 %
10 SYRINGE (ML) INJECTION EVERY 12 HOURS SCHEDULED
OUTPATIENT
Start: 2024-10-05

## 2024-10-05 NOTE — TELEPHONE ENCOUNTER
I spoke with Mrs Norberto Delgado daughter that he asked I contact and informed her the TAVR has been scheduled for 10/8/24. We have not finalized the order for the day and I will call her back once an arrival time has been determined She has my contact information and will call with any further questions

## 2024-10-07 ENCOUNTER — TRANSITIONAL CARE MANAGEMENT TELEPHONE ENCOUNTER (OUTPATIENT)
Dept: CALL CENTER | Facility: HOSPITAL | Age: 89
End: 2024-10-07
Payer: MEDICARE

## 2024-10-07 NOTE — OUTREACH NOTE
Call Center TCM Note      Flowsheet Row Responses   [facility] patient discharged from? [place]   Does the patient have one of the following disease processes/diagnoses(primary or secondary)? Other   TCM attempt successful? Yes   Call start time 0941   General alerts for this patient [facility]   Discharge diagnosis Dizziness, heart cath, plan for TAVR on 10/8   Meds reviewed with patient/caregiver? Yes   Does the patient have all medications ordered at discharge? N/A   Prescription comments Only change to medications at this hospital discharge is discontinuation of Benazepril   Is the patient taking all medications as directed (includes completed medication regime)? Yes   Does the patient have an appointment with their PCP within 7-14 days of discharge? No appointments available   Nursing Interventions Routed TCM call to PCP office   Has home health visited the patient within 72 hours of discharge? N/A   Psychosocial issues? No   Did the patient receive a copy of their discharge instructions? Yes   Nursing interventions Reviewed instructions with patient   What is the patient's perception of their health status since discharge? Improving   Is the patient/caregiver able to teach back signs and symptoms related to disease process for when to call PCP? Yes   Is the patient/caregiver able to teach back signs and symptoms related to disease process for when to call 911? Yes   Is the patient/caregiver able to teach back the hierarchy of who to call/visit for symptoms/problems? PCP, Specialist, Home health nurse, Urgent Care, ED, 911 Yes   TCM call completed? Yes   Wrap up additional comments D/C DX: Dizziness, heart cath, plan for TAVR on 10/8 - Pt doing pretty well, still some intermittent mild dizziness,  lightheadedness, but feels better. Pt is scheduled for TAVR tomorrow. No TCM APPTS available with PCP GE Landa right now, but we will follow up with pt again after he is  home from heart sx.            Vandana Lowe MA    10/7/2024, 09:49 EDT

## 2024-10-07 NOTE — H&P (VIEW-ONLY)
"Enter Query Response Below      Query Response:   - Heart failure due to HTN, Aortic stenosis and Chronic Atrial fibrillation              If applicable, please update the problem list.     Patient: Vicente Delgado \"Ed\"        : 1928  Account: [id number]           Admit Date:         How to Respond to this query:       a. Click New Note     b. Answer query within the yellow box.                c. Update the Problem List, if applicable.    ,     89 year old male with HTN, severe Aortic stenosis, Chronic Diastolic HF, Atrial fibrillation admitted 10/1 for severe symptomatic Aortic stenosis.  Atrial fibrillation is noted as \"Chronic Atrial fibrillation\" in the Cardiology PN , otherwise noted as \"Paroxysmal atrial fibrillation\"  Patient takes Benazepril, Ezetimibe, Eliquis and Crestor at home.    Please clarify the etiology of the Chronic diastolic HF as:    - Heart failure due to Hypertension   - Heart failure due to Aortic Stenosis  - Heart failure due to Chronic Atrial fibrillation  - Hear failure due to HTN, Aortic stenosis and Chronic Atrial fibrillation  - Other____________ (Please specify)    By submitting this query, we are merely seeking further clarification of documentation to accurately reflect all conditions that you are monitoring, evaluating, treating or that extend the hospitalization or utilize additional resources of care. Please utilize your independent clinical judgment when addressing the question(s) above.     This query and your response, once completed, will be entered into the legal medical record.    Sincerely,  Abram Lau RN CDIS  Clinical Documentation Integrity Program   [email or web address]  "

## 2024-10-07 NOTE — PROGRESS NOTES
"Enter Query Response Below      Query Response:   - Heart failure due to HTN, Aortic stenosis and Chronic Atrial fibrillation              If applicable, please update the problem list.     Patient: Vicente Delgado \"Ed\"        : 1928  Account: [id number]           Admit Date:         How to Respond to this query:       a. Click New Note     b. Answer query within the yellow box.                c. Update the Problem List, if applicable.    ,     89 year old male with HTN, severe Aortic stenosis, Chronic Diastolic HF, Atrial fibrillation admitted 10/1 for severe symptomatic Aortic stenosis.  Atrial fibrillation is noted as \"Chronic Atrial fibrillation\" in the Cardiology PN , otherwise noted as \"Paroxysmal atrial fibrillation\"  Patient takes Benazepril, Ezetimibe, Eliquis and Crestor at home.    Please clarify the etiology of the Chronic diastolic HF as:    - Heart failure due to Hypertension   - Heart failure due to Aortic Stenosis  - Heart failure due to Chronic Atrial fibrillation  - Hear failure due to HTN, Aortic stenosis and Chronic Atrial fibrillation  - Other____________ (Please specify)    By submitting this query, we are merely seeking further clarification of documentation to accurately reflect all conditions that you are monitoring, evaluating, treating or that extend the hospitalization or utilize additional resources of care. Please utilize your independent clinical judgment when addressing the question(s) above.     This query and your response, once completed, will be entered into the legal medical record.    Sincerely,  Abram Lau RN CDIS  Clinical Documentation Integrity Program   [email or web address]  "

## 2024-10-08 ENCOUNTER — HOSPITAL ENCOUNTER (INPATIENT)
Facility: HOSPITAL | Age: 89
LOS: 2 days | Discharge: HOME | End: 2024-10-10
Attending: THORACIC SURGERY (CARDIOTHORACIC VASCULAR SURGERY) | Admitting: THORACIC SURGERY (CARDIOTHORACIC VASCULAR SURGERY)
Payer: MEDICARE

## 2024-10-08 ENCOUNTER — ANESTHESIA EVENT (OUTPATIENT)
Dept: PERIOP | Facility: HOSPITAL | Age: 89
End: 2024-10-08
Payer: MEDICARE

## 2024-10-08 ENCOUNTER — ANCILLARY PROCEDURE (OUTPATIENT)
Dept: PERIOP | Facility: HOSPITAL | Age: 89
End: 2024-10-08
Payer: MEDICARE

## 2024-10-08 ENCOUNTER — ANESTHESIA (OUTPATIENT)
Dept: PERIOP | Facility: HOSPITAL | Age: 89
End: 2024-10-08
Payer: MEDICARE

## 2024-10-08 ENCOUNTER — READMISSION MANAGEMENT (OUTPATIENT)
Dept: CALL CENTER | Facility: HOSPITAL | Age: 89
End: 2024-10-08
Payer: MEDICARE

## 2024-10-08 DIAGNOSIS — I35.0 AORTIC STENOSIS, MODERATE: Primary | ICD-10-CM

## 2024-10-08 DIAGNOSIS — Z95.3 S/P TAVR (TRANSCATHETER AORTIC VALVE REPLACEMENT), BIOPROSTHETIC: ICD-10-CM

## 2024-10-08 DIAGNOSIS — I35.0 AORTIC STENOSIS, MODERATE: ICD-10-CM

## 2024-10-08 DIAGNOSIS — I35.0 AORTIC STENOSIS, SEVERE: ICD-10-CM

## 2024-10-08 DIAGNOSIS — I50.32 CHRONIC HEART FAILURE WITH PRESERVED EJECTION FRACTION (HFPEF): Primary | ICD-10-CM

## 2024-10-08 DIAGNOSIS — Z95.2 S/P TAVR (TRANSCATHETER AORTIC VALVE REPLACEMENT): ICD-10-CM

## 2024-10-08 DIAGNOSIS — I35.0 AORTIC VALVE STENOSIS, ETIOLOGY OF CARDIAC VALVE DISEASE UNSPECIFIED: ICD-10-CM

## 2024-10-08 DIAGNOSIS — I35.0 NONRHEUMATIC AORTIC VALVE STENOSIS: Primary | ICD-10-CM

## 2024-10-08 LAB
ABO GROUP BLD: NORMAL
ABO GROUP BLD: NORMAL
AORTIC DIMENSIONLESS INDEX: 0.5 (DI)
BH CV ECHO MEAS - AO MAX PG: 10.1 MMHG
BH CV ECHO MEAS - AO MEAN PG: 5.1 MMHG
BH CV ECHO MEAS - AO V2 MAX: 159.1 CM/SEC
BH CV ECHO MEAS - AO V2 VTI: 32.4 CM
BH CV ECHO MEAS - AVA(I,D): 2.19 CM2
BH CV ECHO MEAS - LV MAX PG: 2.8 MMHG
BH CV ECHO MEAS - LV MEAN PG: 1.3 MMHG
BH CV ECHO MEAS - LV V1 MAX: 83.8 CM/SEC
BH CV ECHO MEAS - LV V1 VTI: 20 CM
BH CV ECHO MEAS - LVOT AREA: 3.6 CM2
BH CV ECHO MEAS - LVOT DIAM: 2.13 CM
BH CV ECHO MEAS - SV(LVOT): 71 ML
BLD GP AB SCN SERPL QL: NEGATIVE
QT INTERVAL: 490 MS
QTC INTERVAL: 473 MS
RH BLD: NEGATIVE
RH BLD: NEGATIVE
T&S EXPIRATION DATE: NORMAL

## 2024-10-08 PROCEDURE — 02RF38Z REPLACEMENT OF AORTIC VALVE WITH ZOOPLASTIC TISSUE, PERCUTANEOUS APPROACH: ICD-10-PCS | Performed by: THORACIC SURGERY (CARDIOTHORACIC VASCULAR SURGERY)

## 2024-10-08 PROCEDURE — 82947 ASSAY GLUCOSE BLOOD QUANT: CPT

## 2024-10-08 PROCEDURE — 86900 BLOOD TYPING SEROLOGIC ABO: CPT | Performed by: THORACIC SURGERY (CARDIOTHORACIC VASCULAR SURGERY)

## 2024-10-08 PROCEDURE — C1760 CLOSURE DEV, VASC: HCPCS | Performed by: THORACIC SURGERY (CARDIOTHORACIC VASCULAR SURGERY)

## 2024-10-08 PROCEDURE — 33361 REPLACE AORTIC VALVE PERQ: CPT | Performed by: THORACIC SURGERY (CARDIOTHORACIC VASCULAR SURGERY)

## 2024-10-08 PROCEDURE — 86850 RBC ANTIBODY SCREEN: CPT | Performed by: THORACIC SURGERY (CARDIOTHORACIC VASCULAR SURGERY)

## 2024-10-08 PROCEDURE — C1769 GUIDE WIRE: HCPCS | Performed by: THORACIC SURGERY (CARDIOTHORACIC VASCULAR SURGERY)

## 2024-10-08 PROCEDURE — C1894 INTRO/SHEATH, NON-LASER: HCPCS | Performed by: THORACIC SURGERY (CARDIOTHORACIC VASCULAR SURGERY)

## 2024-10-08 PROCEDURE — 85347 COAGULATION TIME ACTIVATED: CPT

## 2024-10-08 PROCEDURE — 93321 DOPPLER ECHO F-UP/LMTD STD: CPT

## 2024-10-08 PROCEDURE — 33361 REPLACE AORTIC VALVE PERQ: CPT | Performed by: INTERNAL MEDICINE

## 2024-10-08 PROCEDURE — 93308 TTE F-UP OR LMTD: CPT

## 2024-10-08 PROCEDURE — 85014 HEMATOCRIT: CPT

## 2024-10-08 PROCEDURE — 85018 HEMOGLOBIN: CPT

## 2024-10-08 PROCEDURE — 25010000002 CEFAZOLIN PER 500 MG: Performed by: NURSE PRACTITIONER

## 2024-10-08 PROCEDURE — 82803 BLOOD GASES ANY COMBINATION: CPT

## 2024-10-08 PROCEDURE — 25010000002 LIDOCAINE 2% SOLUTION: Performed by: INTERNAL MEDICINE

## 2024-10-08 PROCEDURE — 25010000002 PROPOFOL 10 MG/ML EMULSION: Performed by: ANESTHESIOLOGY

## 2024-10-08 PROCEDURE — 25010000002 PROTAMINE SULFATE PER 10 MG: Performed by: ANESTHESIOLOGY

## 2024-10-08 PROCEDURE — 93010 ELECTROCARDIOGRAM REPORT: CPT | Performed by: INTERNAL MEDICINE

## 2024-10-08 PROCEDURE — 25810000003 LACTATED RINGERS PER 1000 ML: Performed by: ANESTHESIOLOGY

## 2024-10-08 PROCEDURE — B41D1ZZ FLUOROSCOPY OF AORTA AND BILATERAL LOWER EXTREMITY ARTERIES USING LOW OSMOLAR CONTRAST: ICD-10-PCS | Performed by: THORACIC SURGERY (CARDIOTHORACIC VASCULAR SURGERY)

## 2024-10-08 PROCEDURE — 93005 ELECTROCARDIOGRAM TRACING: CPT | Performed by: INTERNAL MEDICINE

## 2024-10-08 PROCEDURE — B3101ZZ FLUOROSCOPY OF THORACIC AORTA USING LOW OSMOLAR CONTRAST: ICD-10-PCS | Performed by: THORACIC SURGERY (CARDIOTHORACIC VASCULAR SURGERY)

## 2024-10-08 PROCEDURE — C1889 IMPLANT/INSERT DEVICE, NOC: HCPCS | Performed by: THORACIC SURGERY (CARDIOTHORACIC VASCULAR SURGERY)

## 2024-10-08 PROCEDURE — 25010000002 HYDRALAZINE PER 20 MG: Performed by: ANESTHESIOLOGY

## 2024-10-08 PROCEDURE — 93325 DOPPLER ECHO COLOR FLOW MAPG: CPT

## 2024-10-08 PROCEDURE — 25010000002 HEPARIN (PORCINE) PER 1000 UNITS: Performed by: ANESTHESIOLOGY

## 2024-10-08 PROCEDURE — 25510000001 IOPAMIDOL PER 1 ML: Performed by: THORACIC SURGERY (CARDIOTHORACIC VASCULAR SURGERY)

## 2024-10-08 PROCEDURE — 86901 BLOOD TYPING SEROLOGIC RH(D): CPT

## 2024-10-08 PROCEDURE — 86900 BLOOD TYPING SEROLOGIC ABO: CPT

## 2024-10-08 PROCEDURE — 86901 BLOOD TYPING SEROLOGIC RH(D): CPT | Performed by: THORACIC SURGERY (CARDIOTHORACIC VASCULAR SURGERY)

## 2024-10-08 DEVICE — VLV HEART TRNSCATH SAPIEN3 29MM: Type: IMPLANTABLE DEVICE | Site: HEART | Status: FUNCTIONAL

## 2024-10-08 RX ORDER — CHLORHEXIDINE GLUCONATE ORAL RINSE 1.2 MG/ML
15 SOLUTION DENTAL ONCE
Status: COMPLETED | OUTPATIENT
Start: 2024-10-08 | End: 2024-10-08

## 2024-10-08 RX ORDER — CLOPIDOGREL BISULFATE 75 MG/1
75 TABLET ORAL DAILY
Status: DISCONTINUED | OUTPATIENT
Start: 2024-10-09 | End: 2024-10-09

## 2024-10-08 RX ORDER — SODIUM CHLORIDE 9 MG/ML
50 INJECTION, SOLUTION INTRAVENOUS CONTINUOUS
Status: ACTIVE | OUTPATIENT
Start: 2024-10-08 | End: 2024-10-08

## 2024-10-08 RX ORDER — ONDANSETRON 2 MG/ML
4 INJECTION INTRAMUSCULAR; INTRAVENOUS ONCE AS NEEDED
Status: DISCONTINUED | OUTPATIENT
Start: 2024-10-08 | End: 2024-10-08 | Stop reason: HOSPADM

## 2024-10-08 RX ORDER — FENTANYL CITRATE 50 UG/ML
25 INJECTION, SOLUTION INTRAMUSCULAR; INTRAVENOUS
Status: DISCONTINUED | OUTPATIENT
Start: 2024-10-08 | End: 2024-10-08 | Stop reason: HOSPADM

## 2024-10-08 RX ORDER — HYDROCODONE BITARTRATE AND ACETAMINOPHEN 5; 325 MG/1; MG/1
1 TABLET ORAL ONCE AS NEEDED
Status: DISCONTINUED | OUTPATIENT
Start: 2024-10-08 | End: 2024-10-08 | Stop reason: HOSPADM

## 2024-10-08 RX ORDER — ROSUVASTATIN CALCIUM 10 MG/1
10 TABLET, COATED ORAL NIGHTLY
Status: DISCONTINUED | OUTPATIENT
Start: 2024-10-08 | End: 2024-10-10 | Stop reason: HOSPADM

## 2024-10-08 RX ORDER — ONDANSETRON 4 MG/1
4 TABLET, ORALLY DISINTEGRATING ORAL EVERY 6 HOURS PRN
Status: DISCONTINUED | OUTPATIENT
Start: 2024-10-08 | End: 2024-10-10 | Stop reason: HOSPADM

## 2024-10-08 RX ORDER — HEPARIN SODIUM 1000 [USP'U]/ML
INJECTION, SOLUTION INTRAVENOUS; SUBCUTANEOUS AS NEEDED
Status: DISCONTINUED | OUTPATIENT
Start: 2024-10-08 | End: 2024-10-08 | Stop reason: SURG

## 2024-10-08 RX ORDER — SODIUM CHLORIDE 0.9 % (FLUSH) 0.9 %
10 SYRINGE (ML) INJECTION EVERY 12 HOURS SCHEDULED
Status: DISCONTINUED | OUTPATIENT
Start: 2024-10-08 | End: 2024-10-08 | Stop reason: HOSPADM

## 2024-10-08 RX ORDER — NALOXONE HCL 0.4 MG/ML
0.2 VIAL (ML) INJECTION AS NEEDED
Status: DISCONTINUED | OUTPATIENT
Start: 2024-10-08 | End: 2024-10-08 | Stop reason: HOSPADM

## 2024-10-08 RX ORDER — IOPAMIDOL 755 MG/ML
INJECTION, SOLUTION INTRAVASCULAR AS NEEDED
Status: DISCONTINUED | OUTPATIENT
Start: 2024-10-08 | End: 2024-10-08 | Stop reason: HOSPADM

## 2024-10-08 RX ORDER — PROMETHAZINE HYDROCHLORIDE 25 MG/1
25 TABLET ORAL ONCE AS NEEDED
Status: DISCONTINUED | OUTPATIENT
Start: 2024-10-08 | End: 2024-10-08 | Stop reason: HOSPADM

## 2024-10-08 RX ORDER — EPHEDRINE SULFATE 50 MG/ML
5 INJECTION, SOLUTION INTRAVENOUS ONCE AS NEEDED
Status: DISCONTINUED | OUTPATIENT
Start: 2024-10-08 | End: 2024-10-08 | Stop reason: HOSPADM

## 2024-10-08 RX ORDER — FAMOTIDINE 10 MG/ML
20 INJECTION, SOLUTION INTRAVENOUS ONCE
Status: COMPLETED | OUTPATIENT
Start: 2024-10-08 | End: 2024-10-08

## 2024-10-08 RX ORDER — HYDROCODONE BITARTRATE AND ACETAMINOPHEN 7.5; 325 MG/1; MG/1
1 TABLET ORAL EVERY 4 HOURS PRN
Status: DISCONTINUED | OUTPATIENT
Start: 2024-10-08 | End: 2024-10-08 | Stop reason: HOSPADM

## 2024-10-08 RX ORDER — HYDROMORPHONE HYDROCHLORIDE 1 MG/ML
0.25 INJECTION, SOLUTION INTRAMUSCULAR; INTRAVENOUS; SUBCUTANEOUS
Status: DISCONTINUED | OUTPATIENT
Start: 2024-10-08 | End: 2024-10-08 | Stop reason: HOSPADM

## 2024-10-08 RX ORDER — ONDANSETRON 2 MG/ML
4 INJECTION INTRAMUSCULAR; INTRAVENOUS EVERY 6 HOURS PRN
Status: DISCONTINUED | OUTPATIENT
Start: 2024-10-08 | End: 2024-10-10 | Stop reason: HOSPADM

## 2024-10-08 RX ORDER — SODIUM CHLORIDE 9 MG/ML
40 INJECTION, SOLUTION INTRAVENOUS ONCE
Status: COMPLETED | OUTPATIENT
Start: 2024-10-08 | End: 2024-10-08

## 2024-10-08 RX ORDER — ASPIRIN 81 MG/1
81 TABLET ORAL DAILY
Status: DISCONTINUED | OUTPATIENT
Start: 2024-10-08 | End: 2024-10-10 | Stop reason: HOSPADM

## 2024-10-08 RX ORDER — DIPHENHYDRAMINE HYDROCHLORIDE 50 MG/ML
12.5 INJECTION INTRAMUSCULAR; INTRAVENOUS
Status: DISCONTINUED | OUTPATIENT
Start: 2024-10-08 | End: 2024-10-08 | Stop reason: HOSPADM

## 2024-10-08 RX ORDER — HYDRALAZINE HYDROCHLORIDE 20 MG/ML
5 INJECTION INTRAMUSCULAR; INTRAVENOUS
Status: DISCONTINUED | OUTPATIENT
Start: 2024-10-08 | End: 2024-10-08 | Stop reason: HOSPADM

## 2024-10-08 RX ORDER — LABETALOL HYDROCHLORIDE 5 MG/ML
5 INJECTION, SOLUTION INTRAVENOUS
Status: DISCONTINUED | OUTPATIENT
Start: 2024-10-08 | End: 2024-10-08 | Stop reason: HOSPADM

## 2024-10-08 RX ORDER — LIDOCAINE HYDROCHLORIDE 20 MG/ML
INJECTION, SOLUTION INFILTRATION; PERINEURAL AS NEEDED
Status: DISCONTINUED | OUTPATIENT
Start: 2024-10-08 | End: 2024-10-08 | Stop reason: HOSPADM

## 2024-10-08 RX ORDER — SODIUM CHLORIDE 0.9 % (FLUSH) 0.9 %
3-10 SYRINGE (ML) INJECTION AS NEEDED
Status: DISCONTINUED | OUTPATIENT
Start: 2024-10-08 | End: 2024-10-08 | Stop reason: HOSPADM

## 2024-10-08 RX ORDER — PROMETHAZINE HYDROCHLORIDE 25 MG/1
25 SUPPOSITORY RECTAL ONCE AS NEEDED
Status: DISCONTINUED | OUTPATIENT
Start: 2024-10-08 | End: 2024-10-08 | Stop reason: HOSPADM

## 2024-10-08 RX ORDER — DROPERIDOL 2.5 MG/ML
0.62 INJECTION, SOLUTION INTRAMUSCULAR; INTRAVENOUS
Status: DISCONTINUED | OUTPATIENT
Start: 2024-10-08 | End: 2024-10-08 | Stop reason: HOSPADM

## 2024-10-08 RX ORDER — PROTAMINE SULFATE 10 MG/ML
INJECTION, SOLUTION INTRAVENOUS AS NEEDED
Status: DISCONTINUED | OUTPATIENT
Start: 2024-10-08 | End: 2024-10-08 | Stop reason: SURG

## 2024-10-08 RX ORDER — NITROGLYCERIN 0.4 MG/1
0.4 TABLET SUBLINGUAL
Status: DISCONTINUED | OUTPATIENT
Start: 2024-10-08 | End: 2024-10-10 | Stop reason: HOSPADM

## 2024-10-08 RX ORDER — ACETAMINOPHEN 325 MG/1
650 TABLET ORAL EVERY 4 HOURS PRN
Status: DISCONTINUED | OUTPATIENT
Start: 2024-10-08 | End: 2024-10-10 | Stop reason: HOSPADM

## 2024-10-08 RX ORDER — FLUMAZENIL 0.1 MG/ML
0.2 INJECTION INTRAVENOUS AS NEEDED
Status: DISCONTINUED | OUTPATIENT
Start: 2024-10-08 | End: 2024-10-08 | Stop reason: HOSPADM

## 2024-10-08 RX ORDER — SODIUM CHLORIDE 0.9 % (FLUSH) 0.9 %
3 SYRINGE (ML) INJECTION EVERY 12 HOURS SCHEDULED
Status: DISCONTINUED | OUTPATIENT
Start: 2024-10-08 | End: 2024-10-08 | Stop reason: HOSPADM

## 2024-10-08 RX ORDER — MIDAZOLAM HYDROCHLORIDE 1 MG/ML
0.5 INJECTION INTRAMUSCULAR; INTRAVENOUS
Status: DISCONTINUED | OUTPATIENT
Start: 2024-10-08 | End: 2024-10-08 | Stop reason: HOSPADM

## 2024-10-08 RX ORDER — SODIUM CHLORIDE 9 MG/ML
INJECTION, SOLUTION INTRAVENOUS CONTINUOUS PRN
Status: DISCONTINUED | OUTPATIENT
Start: 2024-10-08 | End: 2024-10-08 | Stop reason: SURG

## 2024-10-08 RX ORDER — SODIUM CHLORIDE, SODIUM LACTATE, POTASSIUM CHLORIDE, CALCIUM CHLORIDE 600; 310; 30; 20 MG/100ML; MG/100ML; MG/100ML; MG/100ML
9 INJECTION, SOLUTION INTRAVENOUS CONTINUOUS
Status: ACTIVE | OUTPATIENT
Start: 2024-10-08 | End: 2024-10-09

## 2024-10-08 RX ORDER — IPRATROPIUM BROMIDE AND ALBUTEROL SULFATE 2.5; .5 MG/3ML; MG/3ML
3 SOLUTION RESPIRATORY (INHALATION) ONCE AS NEEDED
Status: DISCONTINUED | OUTPATIENT
Start: 2024-10-08 | End: 2024-10-08 | Stop reason: HOSPADM

## 2024-10-08 RX ORDER — NOREPINEPHRINE BITARTRATE 1 MG/ML
INJECTION, SOLUTION INTRAVENOUS CONTINUOUS PRN
Status: DISCONTINUED | OUTPATIENT
Start: 2024-10-08 | End: 2024-10-08 | Stop reason: SURG

## 2024-10-08 RX ORDER — SODIUM CHLORIDE 0.9 % (FLUSH) 0.9 %
10 SYRINGE (ML) INJECTION AS NEEDED
Status: DISCONTINUED | OUTPATIENT
Start: 2024-10-08 | End: 2024-10-08 | Stop reason: HOSPADM

## 2024-10-08 RX ORDER — LIDOCAINE HYDROCHLORIDE 10 MG/ML
0.5 INJECTION, SOLUTION INFILTRATION; PERINEURAL ONCE AS NEEDED
Status: DISCONTINUED | OUTPATIENT
Start: 2024-10-08 | End: 2024-10-08 | Stop reason: HOSPADM

## 2024-10-08 RX ADMIN — PROTAMINE SULFATE 40 MG: 10 INJECTION, SOLUTION INTRAVENOUS at 10:58

## 2024-10-08 RX ADMIN — NOREPINEPHRINE BITARTRATE 0.02 MCG/KG/MIN: 1 SOLUTION INTRAVENOUS at 10:23

## 2024-10-08 RX ADMIN — FAMOTIDINE 20 MG: 10 INJECTION INTRAVENOUS at 09:14

## 2024-10-08 RX ADMIN — SODIUM CHLORIDE 1 MCG/KG/HR: 9 INJECTION, SOLUTION INTRAVENOUS at 09:59

## 2024-10-08 RX ADMIN — SODIUM CHLORIDE, POTASSIUM CHLORIDE, SODIUM LACTATE AND CALCIUM CHLORIDE 9 ML/HR: 600; 310; 30; 20 INJECTION, SOLUTION INTRAVENOUS at 09:14

## 2024-10-08 RX ADMIN — PROPOFOL 100 MCG/KG/MIN: 10 INJECTION, EMULSION INTRAVENOUS at 09:58

## 2024-10-08 RX ADMIN — ACETAMINOPHEN 325MG 650 MG: 325 TABLET ORAL at 21:26

## 2024-10-08 RX ADMIN — HYDRALAZINE HYDROCHLORIDE 5 MG: 20 INJECTION, SOLUTION INTRAMUSCULAR; INTRAVENOUS at 12:42

## 2024-10-08 RX ADMIN — MUPIROCIN 1 APPLICATION: 20 OINTMENT TOPICAL at 05:30

## 2024-10-08 RX ADMIN — SODIUM CHLORIDE: 9 INJECTION, SOLUTION INTRAVENOUS at 09:49

## 2024-10-08 RX ADMIN — HYDRALAZINE HYDROCHLORIDE 5 MG: 20 INJECTION, SOLUTION INTRAMUSCULAR; INTRAVENOUS at 12:32

## 2024-10-08 RX ADMIN — SODIUM CHLORIDE 80 ML/HR: 9 INJECTION, SOLUTION INTRAVENOUS at 09:58

## 2024-10-08 RX ADMIN — SODIUM CHLORIDE 2000 MG: 900 INJECTION INTRAVENOUS at 09:42

## 2024-10-08 RX ADMIN — ASPIRIN 81 MG: 81 TABLET, COATED ORAL at 14:54

## 2024-10-08 RX ADMIN — PROPOFOL 75 MCG/KG/MIN: 10 INJECTION, EMULSION INTRAVENOUS at 10:07

## 2024-10-08 RX ADMIN — PROPOFOL 60 MCG/KG/MIN: 10 INJECTION, EMULSION INTRAVENOUS at 10:19

## 2024-10-08 RX ADMIN — ROSUVASTATIN CALCIUM 10 MG: 10 TABLET, FILM COATED ORAL at 21:26

## 2024-10-08 RX ADMIN — HEPARIN SODIUM 12000 UNITS: 1000 INJECTION, SOLUTION INTRAVENOUS; SUBCUTANEOUS at 10:38

## 2024-10-08 RX ADMIN — 0.12% CHLORHEXIDINE GLUCONATE 15 ML: 1.2 RINSE ORAL at 05:30

## 2024-10-08 NOTE — ANESTHESIA PROCEDURE NOTES
Arterial Line      Patient reassessed immediately prior to procedure    Patient location during procedure: pre-op  Start time: 10/8/2024 9:05 AM  Stop Time:10/8/2024 9:07 AM       Line placed for respiratory failure, hemodynamic monitoring, MD/Surgeon request and ABGs/Labs/ISTAT.  Performed By   Anesthesiologist: Brittany Barba MD   Preanesthetic Checklist  Completed: patient identified, IV checked, site marked, risks and benefits discussed, surgical consent, monitors and equipment checked, pre-op evaluation and timeout performed  Arterial Line Prep    Sterile Tech: cap, gloves, sterile barriers and mask  Prep: ChloraPrep  Patient monitoring: EKG, continuous pulse oximetry and blood pressure monitoring  Arterial Line Procedure   Laterality:left  Location:  radial artery  Catheter size: 20 G   Guidance: ultrasound guided  PROCEDURE NOTE/ULTRASOUND INTERPRETATION.  Using ultrasound guidance the potential vascular sites for insertion of the catheter were visualized to determine the patency of the vessel to be used for vascular access.  After selecting the appropriate site for insertion, the needle was visualized under ultrasound being inserted into the radial artery, followed by ultrasound confirmation of wire and catheter placement. There were no abnormalities seen on ultrasound; an image was taken; and the patient tolerated the procedure with no complications.   Number of attempts: 1  Successful placement: yes Images: still images not obtained  Post Assessment   Dressing Type: wrist guard applied, secured with tape and occlusive dressing applied.   Complications no  Circ/Move/Sens Assessment: normal and unchanged.   Patient Tolerance: patient tolerated the procedure well with no apparent complications  Additional Notes  Using ultrasound guidance the potential vascular sites for insertion of the catheter were visualized to determine the patency of the vessel to be used for vascular access.  After selecting the  appropriate site for insertion, the needle was visualized under ultrasound being inserted into the artery, followed by ultrasound confirmation of wire and catheter placement.  There were no abnormalities seen on ultrasound; an image was taken/ and the patient tolerated the procedure with no complications.

## 2024-10-08 NOTE — OP NOTE
DATE OF PROCEDURE: 10/8/24    ATTENDING SURGEON: Sapna Delgado MD    CO-SURGEON: Olaf Sales MD    PREOPERATIVE DIAGNOSIS: Severe aortic stenosis.    POSTOPERATIVE DIAGNOSIS: Severe aortic stenosis.    PROCEDURE PERFORMED:  1. Placement of temporary ventricular pacing wire.  2. Iliofemoral angiography.  3. Aortic root angiography.  4. Transcatheter aortic valve replacement with a 29 mm Tejeda DANISH 3 valve.    ANESTHESIA: MAC and topical with lidocaine.    PROCEDURE IN DETAIL:    The patient was taken to the operating room and placed on the table in the supine position. Antibiotics were administered. A formal timeout was performed. He underwent conscious sedation and was prepped and draped in sterile fashion. Lidocaine was used to anesthetize the subcutaneous tissue in the bilateral groins. The left common femoral artery and vein were accessed percutaneously under sonographic guidance. 6-Swiss sheaths were placed via  Seldinger technique. Angiography was performed to verify location of the sheath in the left common femoral artery. The right femoral artery was accessed percutaneously under sonographic guidance and a 6-Swiss introducer sheath was placed via Seldinger technique. Angiography was performed to confirm position of the arterial sheath.    A temporary right ventricular pacing lead was placed through the femoral vein and advanced into the right ventricle under fluoroscopic guidance. A pigtail was placed in the right coronary cusp to allow for visualization of the annulus. Two Perclose devices were placed in the right common femoral artery. The sheath was upsized to a 16-Swiss E sheath. The patient was systemically heparinized. The aortic valve was crossed and an Amplatz wire was placed in the left ventricular apex. A 29 mm Tejeda DANISH 3 valve was advanced across the aortic valve. Position was verified and the valve was deployed during rapid right ventricular pacing under fluoroscopic  guidance.    A transthoracic echocardiogram was performed, which revealed no perivalvular leak and a well-seated valve. Contrast aortography was performed, which revealed proper positioning of the valve and no aortic insufficiency. The coronary arteries were patent. The catheters were extracted. Hemostasis of the right groin was then achieved with closure of the Perclose devices. Aortofemoral angiography was then performed revealing no defect in the arteries. The left common femoral artery was controlled with a Perclose.    The patient remained hemodynamically stable and tolerated the procedure well. There were no complications and the patient was transferred to the PACU in stable condition.

## 2024-10-08 NOTE — ANESTHESIA POSTPROCEDURE EVALUATION
Patient: Vicente Delgado    Procedure Summary       Date: 10/08/24 Room / Location: [address] CARDIOVASCULAR OPERATING ROOM    Anesthesia Start: 0947 Anesthesia Stop: 1129    Procedures:       TRANSFEMORAL TRANSCATHETER AORTIC VALVE REPLACEMENT with intraoperative Transthoracic echocardiogram (Chest)      Transfemoral Transcatheter Aortic Valve Replacement with intraoperative transthoracic echocardiogram and possible open surgical rescue Diagnosis:       Aortic stenosis, moderate      (Aortic stenosis, moderate [I35.0])    Surgeons: Sapna Delgado MD; Olaf Sales MD Provider: Yousif Wilks MD    Anesthesia Type: MAC ASA Status: 4            Anesthesia Type: MAC    Vitals  Vitals Value Taken Time   /75 10/08/24 1230   Temp 36.5 °C (97.7 °F) 10/08/24 1128   Pulse 51 10/08/24 1244   Resp 16 10/08/24 1230   SpO2 99 % 10/08/24 1244   Vitals shown include unfiled device data.        Post Anesthesia Care and Evaluation    Patient location during evaluation: bedside  Level of consciousness: awake  Pain management: adequate    Airway patency: patent  Anesthetic complications: No anesthetic complications    Cardiovascular status: acceptable  Respiratory status: acceptable  Hydration status: acceptable    Comments: */75   Pulse 59   Temp 36.5 °C (97.7 °F) (Oral)   Resp 16   SpO2 100%

## 2024-10-08 NOTE — ANESTHESIA PREPROCEDURE EVALUATION
Anesthesia Evaluation     Patient summary reviewed and Nursing notes reviewed   NPO Solid Status: > 8 hours  NPO Liquid Status: > 2 hours           Airway   Mallampati: II  TM distance: >3 FB  Neck ROM: full  Possible difficult intubation  Dental      Pulmonary    (+) a smoker Current,    ROS comment: Previous grade IIa with bougie    Cardiovascular     ECG reviewed  PT is on anticoagulation therapy  Patient on routine beta blocker and Beta blocker given within 24 hours of surgery    (+) hypertension, valvular problems/murmurs (severe AS) MR and AS, past MI , CAD, dysrhythmias Atrial Fib, Paroxysmal Atrial Fib, angina (stable, chroni), CHF Diastolic >=55%, PVD, DVT (left femoral; iliac), hyperlipidemia      Neuro/Psych  (+) TIA, dizziness/light headedness, numbness (Left CTS, B/l sciatica), psychiatric history Anxiety and Depression  GI/Hepatic/Renal/Endo    (+) GERD, liver disease fatty liver disease    Musculoskeletal     Abdominal    Substance History      OB/GYN          Other   arthritis,   history of cancer (lymphoma)    ROS/Med Hx Other: CATH report:    1. Left main: Normal  2. LAD: Mild calcification of proximal segment with diffuse 30% stenosis.  Tubular 60% mid vessel stenosis  3. LCX: Diffuse 50% proximal OM1 stenosis  4. RCA: Small caliber.  Dominant.  Chronic total occlusion mid segment.  Fills via bridging collaterals  5.  Normal right and left-sided filling pressures.  6.  No pulmonary hypertension    ECHO:    ·  Left ventricular systolic function is normal. Calculated left ventricular EF = 61.8%  ·  Left ventricular wall thickness is consistent with moderate concentric hypertrophy.  ·  Left ventricular diastolic function is consistent with (grade I) impaired relaxation.  ·  The aortic valve appears likely trileaflet and severely calcified. Peak velocity of the flow distal to the aortic valve is 410.5 cm/s. Aortic valve mean pressure gradient is 39 mmHg. Aortic valve dimensionless index is 0.2.  "Severe aortic valve stenosis is present. Aortic valve area is 0.8 cm2.  ·  Mild aortic regurgitation.  ·  There is mild, bileaflet mitral valve thickening present along with moderate mitral annular calcification (MAC).  ·  Normal biatrial and IVC size.                         Anesthesia Plan    ASA 4     MAC     (Left Art line/ poss CVC/SGC / poss GETA, post op ventilation; MICAELA discussed. Patient then informed me he was DNR (do not see order in computer) and \"does not want to be resuscitated. I told patient and daughter that this must be discussed with surgeon in detail prior to going back to surgery, per [facility] policy, surgeon must enter perioperative DNR order.    I have reviewed the patient's history and chart with the patient, including all pertinent laboratory results and imaging. I have explained the risks of anesthesia including but not limited to dental damage, corneal abrasion, nerve injury, MI, stroke, aspiration, and death. Patient has agreed to proceed.      /96 (BP Location: Right arm, Patient Position: Lying)   Pulse 73   Temp 36.6 °C (97.8 °F) (Oral)   Resp 16   SpO2 100%   )  intravenous induction     Anesthetic plan, risks, benefits, and alternatives have been provided, discussed and informed consent has been obtained with: patient.    CODE STATUS:         "

## 2024-10-09 ENCOUNTER — APPOINTMENT (OUTPATIENT)
Dept: CARDIOLOGY | Facility: HOSPITAL | Age: 89
End: 2024-10-09
Payer: MEDICARE

## 2024-10-09 LAB
ACT BLD: 134 SECONDS (ref 82–152)
ACT BLD: 134 SECONDS (ref 82–152)
ACT BLD: 287 SECONDS (ref 82–152)
ANION GAP SERPL CALCULATED.3IONS-SCNC: 10.3 MMOL/L (ref 5–15)
ANION GAP SERPL CALCULATED.3IONS-SCNC: 8.9 MMOL/L (ref 5–15)
AORTIC DIMENSIONLESS INDEX: 0.5 (DI)
ASCENDING AORTA: 3.6 CM
BACTERIA UR QL AUTO: NORMAL /HPF
BH CV ECHO AV AORTIC VALVE AT ACCEL TIME CALCULATED: 82 MSEC
BH CV ECHO MEAS - AO MAX PG: 19.4 MMHG
BH CV ECHO MEAS - AO MEAN PG: 8.4 MMHG
BH CV ECHO MEAS - AO ROOT DIAM: 2.7 CM
BH CV ECHO MEAS - AO V2 MAX: 220.2 CM/SEC
BH CV ECHO MEAS - AO V2 VTI: 38.9 CM
BH CV ECHO MEAS - AT: 0.08 SEC
BH CV ECHO MEAS - AVA(I,D): 1.38 CM2
BH CV ECHO MEAS - EDV(CUBED): 59.4 ML
BH CV ECHO MEAS - EDV(MOD-SP2): 173 ML
BH CV ECHO MEAS - EDV(MOD-SP4): 179 ML
BH CV ECHO MEAS - EF(MOD-BP): 63.9 %
BH CV ECHO MEAS - EF(MOD-SP2): 64.2 %
BH CV ECHO MEAS - EF(MOD-SP4): 66.5 %
BH CV ECHO MEAS - ESV(CUBED): 14 ML
BH CV ECHO MEAS - ESV(MOD-SP2): 62 ML
BH CV ECHO MEAS - ESV(MOD-SP4): 60 ML
BH CV ECHO MEAS - FS: 38.3 %
BH CV ECHO MEAS - IVS/LVPW: 1 CM
BH CV ECHO MEAS - IVSD: 1.48 CM
BH CV ECHO MEAS - LAT PEAK E' VEL: 3.7 CM/SEC
BH CV ECHO MEAS - LV DIASTOLIC VOL/BSA (35-75): 96.8 CM2
BH CV ECHO MEAS - LV MASS(C)D: 219.8 GRAMS
BH CV ECHO MEAS - LV MAX PG: 3.9 MMHG
BH CV ECHO MEAS - LV MEAN PG: 1.8 MMHG
BH CV ECHO MEAS - LV SYSTOLIC VOL/BSA (12-30): 32.5 CM2
BH CV ECHO MEAS - LV V1 MAX: 98.5 CM/SEC
BH CV ECHO MEAS - LV V1 VTI: 19.3 CM
BH CV ECHO MEAS - LVIDD: 3.9 CM
BH CV ECHO MEAS - LVIDS: 2.41 CM
BH CV ECHO MEAS - LVOT AREA: 2.8 CM2
BH CV ECHO MEAS - LVOT DIAM: 1.88 CM
BH CV ECHO MEAS - LVPWD: 1.48 CM
BH CV ECHO MEAS - MED PEAK E' VEL: 3.7 CM/SEC
BH CV ECHO MEAS - MV A MAX VEL: 120 CM/SEC
BH CV ECHO MEAS - MV DEC SLOPE: 299.6 CM/SEC2
BH CV ECHO MEAS - MV DEC TIME: 0.27 SEC
BH CV ECHO MEAS - MV E MAX VEL: 76.5 CM/SEC
BH CV ECHO MEAS - MV E/A: 0.64
BH CV ECHO MEAS - MV MAX PG: 5.8 MMHG
BH CV ECHO MEAS - MV MEAN PG: 1.5 MMHG
BH CV ECHO MEAS - MV P1/2T: 89.2 MSEC
BH CV ECHO MEAS - MV V2 VTI: 44.8 CM
BH CV ECHO MEAS - MVA(P1/2T): 2.47 CM2
BH CV ECHO MEAS - MVA(VTI): 1.2 CM2
BH CV ECHO MEAS - PA ACC TIME: 0.14 SEC
BH CV ECHO MEAS - PA V2 MAX: 41.7 CM/SEC
BH CV ECHO MEAS - QP/QS: 0.63
BH CV ECHO MEAS - RAP SYSTOLE: 3 MMHG
BH CV ECHO MEAS - RV MAX PG: 1.15 MMHG
BH CV ECHO MEAS - RV V1 MAX: 53.6 CM/SEC
BH CV ECHO MEAS - RV V1 VTI: 11.7 CM
BH CV ECHO MEAS - RVOT DIAM: 1.92 CM
BH CV ECHO MEAS - RVSP: 21.1 MMHG
BH CV ECHO MEAS - SV(LVOT): 53.6 ML
BH CV ECHO MEAS - SV(MOD-SP2): 111 ML
BH CV ECHO MEAS - SV(MOD-SP4): 119 ML
BH CV ECHO MEAS - SV(RVOT): 33.9 ML
BH CV ECHO MEAS - SVI(LVOT): 29 ML/M2
BH CV ECHO MEAS - SVI(MOD-SP2): 60 ML/M2
BH CV ECHO MEAS - SVI(MOD-SP4): 64.4 ML/M2
BH CV ECHO MEAS - TR MAX PG: 18.1 MMHG
BH CV ECHO MEAS - TR MAX VEL: 212.7 CM/SEC
BH CV ECHO MEASUREMENTS AVERAGE E/E' RATIO: 20.68
BH CV XLRA - TDI S': 15.2 CM/SEC
BILIRUB UR QL STRIP: NEGATIVE
BUN SERPL-MCNC: 14 MG/DL (ref 8–23)
BUN SERPL-MCNC: 20 MG/DL (ref 8–23)
BUN/CREAT SERPL: 22.6 (ref 7–25)
BUN/CREAT SERPL: 29 (ref 7–25)
CALCIUM SPEC-SCNC: 8.5 MG/DL (ref 8.2–9.6)
CALCIUM SPEC-SCNC: 9.1 MG/DL (ref 8.2–9.6)
CHLORIDE SERPL-SCNC: 102 MMOL/L (ref 98–107)
CHLORIDE SERPL-SCNC: 98 MMOL/L (ref 98–107)
CLARITY UR: CLEAR
CO2 SERPL-SCNC: 23.7 MMOL/L (ref 22–29)
CO2 SERPL-SCNC: 24.1 MMOL/L (ref 22–29)
COLOR UR: YELLOW
CREAT SERPL-MCNC: 0.62 MG/DL (ref 0.76–1.27)
CREAT SERPL-MCNC: 0.69 MG/DL (ref 0.76–1.27)
DEPRECATED RDW RBC AUTO: 42.3 FL (ref 37–54)
EGFRCR SERPLBLD CKD-EPI 2021: 84.7 ML/MIN/1.73
EGFRCR SERPLBLD CKD-EPI 2021: 87.5 ML/MIN/1.73
ERYTHROCYTE [DISTWIDTH] IN BLOOD BY AUTOMATED COUNT: 12.5 % (ref 12.3–15.4)
GLUCOSE SERPL-MCNC: 84 MG/DL (ref 65–99)
GLUCOSE SERPL-MCNC: 98 MG/DL (ref 65–99)
GLUCOSE UR STRIP-MCNC: NEGATIVE MG/DL
HCT VFR BLD AUTO: 39.7 % (ref 37.5–51)
HGB BLD-MCNC: 13.2 G/DL (ref 13–17.7)
HGB UR QL STRIP.AUTO: ABNORMAL
HYALINE CASTS UR QL AUTO: NORMAL /LPF
KETONES UR QL STRIP: NEGATIVE
LEFT ATRIUM VOLUME INDEX: 47.5 ML/M2
LEUKOCYTE ESTERASE UR QL STRIP.AUTO: NEGATIVE
MAGNESIUM SERPL-MCNC: 2.1 MG/DL (ref 1.7–2.3)
MCH RBC QN AUTO: 30.6 PG (ref 26.6–33)
MCHC RBC AUTO-ENTMCNC: 33.2 G/DL (ref 31.5–35.7)
MCV RBC AUTO: 92.1 FL (ref 79–97)
NITRITE UR QL STRIP: NEGATIVE
PH UR STRIP.AUTO: 5.5 [PH] (ref 5–8)
PLATELET # BLD AUTO: 138 10*3/MM3 (ref 140–450)
PMV BLD AUTO: 10.5 FL (ref 6–12)
POTASSIUM SERPL-SCNC: 4 MMOL/L (ref 3.5–5.2)
POTASSIUM SERPL-SCNC: 4.1 MMOL/L (ref 3.5–5.2)
PROT UR QL STRIP: NEGATIVE
QT INTERVAL: 458 MS
QT INTERVAL: 469 MS
QTC INTERVAL: 435 MS
QTC INTERVAL: 465 MS
RBC # BLD AUTO: 4.31 10*6/MM3 (ref 4.14–5.8)
RBC # UR STRIP: NORMAL /HPF
REF LAB TEST METHOD: NORMAL
SODIUM SERPL-SCNC: 131 MMOL/L (ref 136–145)
SODIUM SERPL-SCNC: 136 MMOL/L (ref 136–145)
SP GR UR STRIP: 1.02 (ref 1–1.03)
SQUAMOUS #/AREA URNS HPF: NORMAL /HPF
UROBILINOGEN UR QL STRIP: ABNORMAL
WBC # UR STRIP: NORMAL /HPF
WBC NRBC COR # BLD AUTO: 8.23 10*3/MM3 (ref 3.4–10.8)

## 2024-10-09 PROCEDURE — 80048 BASIC METABOLIC PNL TOTAL CA: CPT | Performed by: INTERNAL MEDICINE

## 2024-10-09 PROCEDURE — 81001 URINALYSIS AUTO W/SCOPE: CPT

## 2024-10-09 PROCEDURE — 93010 ELECTROCARDIOGRAM REPORT: CPT | Performed by: INTERNAL MEDICINE

## 2024-10-09 PROCEDURE — 93306 TTE W/DOPPLER COMPLETE: CPT

## 2024-10-09 PROCEDURE — 99232 SBSQ HOSP IP/OBS MODERATE 35: CPT | Performed by: INTERNAL MEDICINE

## 2024-10-09 PROCEDURE — 25510000001 PERFLUTREN 6.52 MG/ML SUSPENSION 2 ML VIAL: Performed by: INTERNAL MEDICINE

## 2024-10-09 PROCEDURE — 83735 ASSAY OF MAGNESIUM: CPT | Performed by: INTERNAL MEDICINE

## 2024-10-09 PROCEDURE — 93306 TTE W/DOPPLER COMPLETE: CPT | Performed by: INTERNAL MEDICINE

## 2024-10-09 PROCEDURE — 85027 COMPLETE CBC AUTOMATED: CPT | Performed by: INTERNAL MEDICINE

## 2024-10-09 PROCEDURE — 93005 ELECTROCARDIOGRAM TRACING: CPT | Performed by: INTERNAL MEDICINE

## 2024-10-09 RX ORDER — BISACODYL 10 MG
10 SUPPOSITORY, RECTAL RECTAL ONCE
Status: DISCONTINUED | OUTPATIENT
Start: 2024-10-09 | End: 2024-10-10 | Stop reason: HOSPADM

## 2024-10-09 RX ORDER — POLYETHYLENE GLYCOL 3350 17 G/17G
17 POWDER, FOR SOLUTION ORAL DAILY
Status: DISCONTINUED | OUTPATIENT
Start: 2024-10-09 | End: 2024-10-10 | Stop reason: HOSPADM

## 2024-10-09 RX ADMIN — Medication 5 MG: at 01:21

## 2024-10-09 RX ADMIN — ROSUVASTATIN CALCIUM 10 MG: 10 TABLET, FILM COATED ORAL at 19:52

## 2024-10-09 RX ADMIN — POLYETHYLENE GLYCOL 3350 17 G: 17 POWDER, FOR SOLUTION ORAL at 15:25

## 2024-10-09 RX ADMIN — ASPIRIN 81 MG: 81 TABLET, COATED ORAL at 08:35

## 2024-10-09 RX ADMIN — PERFLUTREN 2 ML: 6.52 INJECTION, SUSPENSION INTRAVENOUS at 11:10

## 2024-10-09 RX ADMIN — CLOPIDOGREL BISULFATE 75 MG: 75 TABLET, FILM COATED ORAL at 08:35

## 2024-10-09 NOTE — PROGRESS NOTES
Patient Care Team:  Mechelle Landa APRN as PCP - General (Internal Medicine)  Reginaldo Palacio MD (Dermatology)  Janki Elias MD as Consulting Physician (Ophthalmology)  Kristopher Machado DPM as Consulting Physician (Podiatry)  Aby Marquez MD as Consulting Physician (Hand Surgery)  Wilton Ramos MD as Consulting Physician (Orthopedic Surgery)  Destinee Snider MD as Consulting Physician (Pain Medicine)  Breezy Frausto MD as Consulting Physician (Cardiology)  Velia Watkins PA-C as Physician Assistant (Physician Assistant)  Hafsa Conway MD as Referring Physician (Colon and Rectal Surgery)  Justin Berry MD as Consulting Physician (Hematology and Oncology)    Chief Complaint: Severe degenerative aortic valve stenosis, status post transcatheter aortic valve replacement.    Interval History:   No new complaints.  Resting.    Objective   Vital Signs  Temp:  [97.5 °F (36.4 °C)-99.4 °F (37.4 °C)] 99.4 °F (37.4 °C)  Heart Rate:  [51-93] 93  Resp:  [16-18] 18  BP: ()/(55-94) 128/68  Arterial Line BP: (133-174)/(61-81) 174/81    Intake/Output Summary (Last 24 hours) at 10/9/2024 1026  Last data filed at 10/9/2024 0846  Gross per 24 hour   Intake 1820 ml   Output --   Net 1820 ml         Temp:  [97.5 °F (36.4 °C)-99.4 °F (37.4 °C)] 99.4 °F (37.4 °C)  Heart Rate:  [51-93] 93  Resp:  [16-18] 18  BP: ()/(55-94) 128/68  Arterial Line BP: (133-174)/(61-81) 174/81    Intake/Output Summary (Last 24 hours) at 10/9/2024 1026  Last data filed at 10/9/2024 0846  Gross per 24 hour   Intake 1820 ml   Output --   Net 1820 ml         General Appearance:    Alert, cooperative, in no acute distress   Head:    Normocephalic, without obvious abnormality, atraumatic       Neck/Lymph   No adenopathy, supple, no thyromegaly, no carotid bruit, no    JVD   Lungs:     Clear to auscultation bilaterally, no wheezes, rales, or     rhonchi    Cardiac:    Normal rate, regular rhythm, no murmur, no  rub, no gallop   Chest Wall:    No abnormalities observed   GI:     Normal bowel sounds, soft, nontender, nondistended,            no rebound tenderness   Extremities:   No cyanosis, clubbing, or edema   Circulatory/Peripheral Vascular :   Pulses palpable and equal bilaterally   Integumentary:   No bleeding or rash. Normal temperature                aspirin, 81 mg, Oral, Daily  rosuvastatin, 10 mg, Oral, Nightly             Results Review:    Results from last 7 days   Lab Units 10/09/24  0423   SODIUM mmol/L 136   POTASSIUM mmol/L 4.1   CHLORIDE mmol/L 102   CO2 mmol/L 23.7   BUN mg/dL 20   CREATININE mg/dL 0.69*   GLUCOSE mg/dL 84   CALCIUM mg/dL 8.5         Results from last 7 days   Lab Units 10/09/24  0423   WBC 10*3/mm3 8.23   HEMOGLOBIN g/dL 13.2   HEMATOCRIT % 39.7   PLATELETS 10*3/mm3 138*                     @LABRCNT(bnp)@  I reviewed the patient's new clinical results.  I personally viewed and interpreted the patient's EKG/Telemetry data            Assessment & Plan   1.  Severe degenerative aortic valve stenosis: Status post transcatheter aortic valve replacement with sapient 3 ultra 29 mm  2.  Chronic heart failure with preserved ejection fraction  3.  Essential hypertension  4.  Paroxysmal atrial fibrillation  5.  History of TIA    -With advanced age aspirin monotherapy would be fine.  - Rhythm looks good today.  No significant issues.  - Transthoracic echocardiogram today pending.  -Assuming that echo looks good he is okay for home today.

## 2024-10-09 NOTE — OUTREACH NOTE
Medical Week 2 Survey      Flowsheet Row Responses   [facility] patient discharged from? [facility]   Does the patient have one of the following disease processes/diagnoses(primary or secondary)? Other   Week 2 attempt successful? No   Unsuccessful attempts Attempt 1   Revoke Readmitted            HORTENCIA KIM - Registered Nurse

## 2024-10-09 NOTE — CASE MANAGEMENT/SOCIAL WORK
Discharge Planning Assessment  [facility]     Patient Name: Vicente Delgado  MRN: [id number]  Today's Date: 10/9/2024    Admit Date: 10/8/2024    Plan: [facility]   Discharge Needs Assessment       Row Name 10/09/24 1409       Living Environment    People in Home alone    Unique Family Situation Pt lives in INDEPENDENT LIVING (not a facility resident).    Current Living Arrangements independent living facility  [facility]. INDEPENDENT LIVING    Potentially Unsafe Housing Conditions none    In the past 12 months has the electric, gas, oil, or water company threatened to shut off services in your home? No    Primary Care Provided by self    Provides Primary Care For no one    Family Caregiver if Needed child(tracie), adult    Family Caregiver Names Daughters/Sandra Norberto & Constance Cota RN.    Quality of Family Relationships helpful;involved;supportive    Able to Return to Prior Arrangements yes       Resource/Environmental Concerns    Resource/Environmental Concerns none    Transportation Concerns none       Transportation Needs    In the past 12 months, has lack of transportation kept you from medical appointments or from getting medications? no    In the past 12 months, has lack of transportation kept you from meetings, work, or from getting things needed for daily living? No       Food Insecurity    Within the past 12 months, you worried that your food would run out before you got the money to buy more. Never true    Within the past 12 months, the food you bought just didn't last and you didn't have money to get more. Never true       Transition Planning    Patient/Family Anticipates Transition to home    Patient/Family Anticipated Services at Transition none    Transportation Anticipated family or friend will provide       Discharge Needs Assessment    Readmission Within the Last 30 Days no previous admission in last 30 days     Equipment Currently Used at Home rollator;bp cuff;grab bar;bath bench    Concerns to be Addressed no discharge needs identified;denies needs/concerns at this time    Anticipated Changes Related to Illness none    Equipment Needed After Discharge none    Provided Post Acute Provider List? Refused    Refused Provider List Comment Patient declined need for home health services and states if he needs therapy, they offer it at his Independent Living home.                   Discharge Plan       Row Name 10/09/24 1413       Plan    Plan [facility] (Independent Living)    Plan Comments CCP spoke with pleasant patient at bedside.  Explained role of CCP and discharge planning discussed.  Information on face sheet verified.  Patient stated he is IADL's, uses a rollator walker, retired and still drives.  Patient lives in an Independent Living skilled nursing Community at [facility].  Patient takes care of his own medications but reports his daughter Constance is a RN and will help if needed.  Patients PCP confirmed as, Mechelle Landa and pharmacy is at [facility].  Patient has the following DME- rollator, BP cuff, grab bars and bath bench.  Pt reports he is hopeful to discharge today if so, he can go to [employer] tomorrow with his friends from the prison [facility] he lives at.  Patient denies use of past home health or going to a sub-acute rehab.  Patient plans to return home at discharge and his daughter/Sandra Thornton will transport.  Patient denies current discharge needs and reports that his [facility] offers physical therapy if he wants it.  CCP will continue to follow….…Becki HOFFMANN /DANIEL.                  Continued Care and Services - Admitted Since 10/8/2024    No active coordination exists for this encounter.       Expected Discharge Date and Time       Expected Discharge Date Expected Discharge Time    Oct 10, 2024            Demographic  Summary       Row Name 10/09/24 1408       General Information    Admission Type inpatient    Arrived From home    Required Notices Provided Important Message from Medicare    Referral Source admission list    Reason for Consult discharge planning    Preferred Language English       Contact Information    Permission Granted to Share Info With family/designee                   Functional Status       Row Name 10/09/24 1408       Functional Status    Usual Activity Tolerance moderate    Current Activity Tolerance moderate       Assessment of Health Literacy    How often do you have someone help you read hospital materials? Never    Health Literacy Good       Functional Status, IADL    Medications independent    Meal Preparation independent    Housekeeping assistive equipment    Laundry assistive equipment    Shopping assistive equipment    IADL Comments Uses a rollator       Mental Status    General Appearance WDL WDL       Mental Status Summary    Recent Changes in Mental Status/Cognitive Functioning no changes       Employment/    Employment Status retired                   Psychosocial    No documentation.                  Abuse/Neglect    No documentation.                  Legal    No documentation.                  Substance Abuse    No documentation.                  Patient Forms    No documentation.                     Becki Antunez RN

## 2024-10-09 NOTE — PLAN OF CARE
Goal Outcome Evaluation:  Plan of Care Reviewed With: patient        Progress: no change       Problem: Adult Inpatient Plan of Care  Goal: Plan of Care Review  Outcome: Progressing  Flowsheets (Taken 10/9/2024 0647)  Progress: no change  Plan of Care Reviewed With: patient  Goal: Patient-Specific Goal (Individualized)  Outcome: Progressing  Goal: Absence of Hospital-Acquired Illness or Injury  Outcome: Progressing  Intervention: Identify and Manage Fall Risk  Description: Perform standard risk assessment on admission using a validated tool or comprehensive approach appropriate to the patient; reassess fall risk frequently, with change in status or transfer to another level of care.  Communicate fall injury risk to interprofessional healthcare team.  Determine need for increased observation, equipment and environmental modification, such as low bed, signage and supportive, nonskid footwear.  Adjust safety measures to individual developmental age, stage and identified risk factors.  Reinforce the importance of safety and physical activity with patient and family.  Perform regular intentional rounding to assess need for position change, pain assessment and personal needs, including assistance with toileting.  Recent Flowsheet Documentation  Taken 10/9/2024 0457 by Jacobo Munoz, RN  Safety Promotion/Fall Prevention:   activity supervised   assistive device/personal items within reach   clutter free environment maintained   fall prevention program maintained   lighting adjusted   mobility aid in reach   nonskid shoes/slippers when out of bed   muscle strengthening facilitated   safety round/check completed   room organization consistent  Taken 10/9/2024 0200 by Jacobo Munoz, RN  Safety Promotion/Fall Prevention:   assistive device/personal items within reach   activity supervised   clutter free environment maintained   fall prevention program maintained   mobility aid in reach   lighting adjusted   nonskid  shoes/slippers when out of bed   muscle strengthening facilitated   room organization consistent   safety round/check completed  Taken 10/9/2024 0020 by Jacobo Munoz RN  Safety Promotion/Fall Prevention:   assistive device/personal items within reach   activity supervised   clutter free environment maintained   fall prevention program maintained   lighting adjusted   mobility aid in reach   safety round/check completed   room organization consistent   nonskid shoes/slippers when out of bed   muscle strengthening facilitated  Taken 10/8/2024 2200 by Jacobo Munoz RN  Safety Promotion/Fall Prevention:   assistive device/personal items within reach   activity supervised   clutter free environment maintained   fall prevention program maintained   mobility aid in reach   lighting adjusted   nonskid shoes/slippers when out of bed   muscle strengthening facilitated   room organization consistent   safety round/check completed  Taken 10/8/2024 2030 by Jacobo Munoz RN  Safety Promotion/Fall Prevention:   assistive device/personal items within reach   activity supervised   fall prevention program maintained   clutter free environment maintained   elopement precautions   muscle strengthening facilitated   lighting adjusted   mobility aid in reach   nonskid shoes/slippers when out of bed   room organization consistent   safety round/check completed   toileting scheduled  Intervention: Prevent Skin Injury  Description: Perform a screening for skin injury risk, such as pressure or moisture associated skin damage on admission and at regular intervals throughout hospital stay.  Keep all areas of skin (especially folds) clean and dry.  Maintain adequate skin hydration.  Relieve and redistribute pressure and protect bony prominences; implement measures based on patient-specific risk factors.  Match turning and repositioning schedule to clinical condition.  Encourage weight shift frequently; assist with reposition if unable to  complete independently.  Float heels off bed; avoid pressure on the Achilles tendon.  Keep skin free from extended contact with medical devices.  Encourage functional activity and mobility, as early as tolerated.  Use aids (e.g., slide boards, mechanical lift) during transfer.  Recent Flowsheet Documentation  Taken 10/9/2024 0020 by Jacobo Munoz RN  Body Position: position changed independently  Taken 10/8/2024 2030 by Jacobo Munoz RN  Body Position: position changed independently  Intervention: Prevent and Manage VTE (Venous Thromboembolism) Risk  Description: Assess for VTE (venous thromboembolism) risk.  Encourage and assist with early ambulation.  Initiate and maintain compression or other therapy, as indicated, based on identified risk in accordance with organizational protocol and provider order.  Encourage both active and passive leg exercises while in bed, if unable to ambulate.  Recent Flowsheet Documentation  Taken 10/9/2024 0457 by Jacobo Munoz RN  Activity Management: activity encouraged  Taken 10/9/2024 0200 by Jacobo Munoz RN  Activity Management: activity encouraged  Taken 10/9/2024 0020 by Jacobo Munoz RN  Activity Management: up in chair  Taken 10/8/2024 2200 by Jacobo Munoz RN  Activity Management: activity encouraged  Taken 10/8/2024 2030 by Jacobo Munoz RN  Activity Management: activity encouraged  VTE Prevention/Management:   compression stockings on   bilateral  Intervention: Prevent Infection  Description: Maintain skin and mucous membrane integrity; promote hand, oral and pulmonary hygiene.  Optimize fluid balance, nutrition, sleep and glycemic control to maximize infection resistance.  Identify potential sources of infection early to prevent or mitigate progression of infection (e.g., wound, lines, devices).  Evaluate ongoing need for invasive devices; remove promptly when no longer indicated.  Recent Flowsheet Documentation  Taken 10/9/2024 0457 by Jacobo Munoz RN  Infection  Prevention:   visitors restricted/screened   rest/sleep promoted   single patient room provided   hand hygiene promoted   personal protective equipment utilized   equipment surfaces disinfected   environmental surveillance performed   cohorting utilized  Taken 10/9/2024 0200 by Jacobo Munoz RN  Infection Prevention:   visitors restricted/screened   single patient room provided   personal protective equipment utilized   cohorting utilized   hand hygiene promoted   environmental surveillance performed   equipment surfaces disinfected   rest/sleep promoted  Taken 10/9/2024 0020 by Jacobo Munoz RN  Infection Prevention:   visitors restricted/screened   single patient room provided   personal protective equipment utilized   hand hygiene promoted   equipment surfaces disinfected   environmental surveillance performed   cohorting utilized   rest/sleep promoted  Taken 10/8/2024 2200 by Jacobo Munoz RN  Infection Prevention:   visitors restricted/screened   single patient room provided   rest/sleep promoted   personal protective equipment utilized   hand hygiene promoted   equipment surfaces disinfected   environmental surveillance performed   cohorting utilized  Taken 10/8/2024 2030 by Jacobo Munoz RN  Infection Prevention:   visitors restricted/screened   single patient room provided   rest/sleep promoted   personal protective equipment utilized   hand hygiene promoted   equipment surfaces disinfected   environmental surveillance performed   cohorting utilized  Goal: Optimal Comfort and Wellbeing  Outcome: Progressing  Intervention: Provide Person-Centered Care  Description: Use a family-focused approach to care.  Develop trust and rapport by proactively providing information, encouraging questions, addressing concerns and offering reassurance.  Acknowledge emotional response to hospitalization.  Recognize and utilize personal coping strategies.  Honor spiritual and cultural preferences.  Recent Flowsheet  Documentation  Taken 10/8/2024 2030 by Jacobo Munoz, RN  Trust Relationship/Rapport:   thoughts/feelings acknowledged   reassurance provided   questions encouraged   questions answered   emotional support provided   choices provided   care explained   empathic listening provided  Goal: Readiness for Transition of Care  Outcome: Progressing

## 2024-10-09 NOTE — CONSULTS
Met with patient to discuss the benefits of cardiac rehab. Provided phase II information along with the contact information for cardiac rehab here at [facility].Pt is familiar with our program has attended previously. Will call patient after discharge and schedule.

## 2024-10-09 NOTE — PLAN OF CARE
Goal Outcome Evaluation:           Problem: Adult Inpatient Plan of Care  Goal: Plan of Care Review  Outcome: Progressing  Flowsheets (Taken 10/9/2024 9066)  Progress: improving  Plan of Care Reviewed With: patient  Outcome Evaluation: Pt is SR, HR 70-90s. /68, on room air. Pt had no complaints of pain during shift. Pt ambulates SBA with nursing staff. Pt has no further complaints as of the present.

## 2024-10-09 NOTE — PROGRESS NOTES
" LOS: 1 day   Patient Care Team:  Mechelle Landa APRN as PCP - General (Internal Medicine)  Reginaldo Palacio MD (Dermatology)  Janki Elias MD as Consulting Physician (Ophthalmology)  Kristopher Machado DPM as Consulting Physician (Podiatry)  Aby Marquez MD as Consulting Physician (Hand Surgery)  Wilton Ramos MD as Consulting Physician (Orthopedic Surgery)  Destinee Snider MD as Consulting Physician (Pain Medicine)  Breezy Frausto MD as Consulting Physician (Cardiology)  Velia Watkins PA-C as Physician Assistant (Physician Assistant)  Hafsa Conway MD as Referring Physician (Colon and Rectal Surgery)  Justin Berry MD as Consulting Physician (Hematology and Oncology)    Chief Complaint: Post-op TAVR    Subjective     Subjective    Patient reports that he feels weak today, is afraid to stand on his own     Objective     Vital Signs  Temp:  [97.5 °F (36.4 °C)-99.4 °F (37.4 °C)] 99.4 °F (37.4 °C)  Heart Rate:  [51-93] 93  Resp:  [18] 18  BP: ()/(55-80) 128/68  Body mass index is 25.37 kg/m².    Intake/Output Summary (Last 24 hours) at 10/9/2024 1303  Last data filed at 10/9/2024 1219  Gross per 24 hour   Intake 1260 ml   Output --   Net 1260 ml     I/O this shift:  In: 780 [P.O.:780]  Out: -     Wt Readings from Last 3 Encounters:   10/09/24 73.5 kg (162 lb)   09/30/24 73.5 kg (162 lb)   09/19/24 73.5 kg (162 lb)       Flowsheet Rows      Flowsheet Row First Filed Value   Admission Height 170.2 cm (67\") Documented at 10/09/2024 1109   Admission Weight 73.5 kg (162 lb) Documented at 10/09/2024 1109            Objective:  General Appearance:  Comfortable and in no acute distress.    Vital signs: (most recent): Blood pressure 128/68, pulse 93, temperature 99.4 °F (37.4 °C), temperature source Oral, resp. rate 18, height 170.2 cm (67\"), weight 73.5 kg (162 lb), SpO2 96%.  Vital signs are normal.  No fever.    Output: Producing urine and no stool output.    Lungs:  " "Normal effort and normal respiratory rate.    Heart: Normal rate.  Regular rhythm.    Abdomen: Abdomen is soft.  Bowel sounds are normal.     Extremities: Normal range of motion.  There is no dependent edema.    Neurological: Patient is alert and oriented to person, place and time.    Skin:  Warm and dry.                Results Review:        Results from last 7 days   Lab Units 10/09/24  0423   WBC 10*3/mm3 8.23   HEMOGLOBIN g/dL 13.2   HEMATOCRIT % 39.7   PLATELETS 10*3/mm3 138*         PT/INR:  No results found for: \"PROTIME\"/No results found for: \"INR\"    Results from last 7 days   Lab Units 10/09/24  0423   SODIUM mmol/L 136   POTASSIUM mmol/L 4.1   CHLORIDE mmol/L 102   CO2 mmol/L 23.7   BUN mg/dL 20   CREATININE mg/dL 0.69*   GLUCOSE mg/dL 84   CALCIUM mg/dL 8.5         Scheduled Meds:  aspirin, 81 mg, Oral, Daily  rosuvastatin, 10 mg, Oral, Nightly        Infusions:         Assessment & Plan         Aortic stenosis, moderate    Aortic stenosis      Assessment & Plan    - Severe aortic valve stenosis -- s/p TAVR with Dr. Sales and Dr. Delgado 10/8/24  - Hypertension   - CAD  - Hyperlipidemia   - PAF  - Hx lymphoma  - CHF    Patient does not feel well today, states he is scared to get up on his own, feels weak and light-headed   Groins look good, without hematoma  Creatinine WNL, labwork reviewed  TTE completed and reviewed by Dr. Sales, well functioning TAVR valve  Patient reports he has not had a BM since Saturday     We will keep patient overnight and see how he does. I have ordered miralax and a suppository. Patient does not have any neuro deficits.     Jazmyne Renner PA-C  10/09/24  13:03 EDT   "

## 2024-10-10 ENCOUNTER — READMISSION MANAGEMENT (OUTPATIENT)
Dept: CALL CENTER | Facility: HOSPITAL | Age: 89
End: 2024-10-10
Payer: MEDICARE

## 2024-10-10 ENCOUNTER — TELEPHONE (OUTPATIENT)
Dept: INTERNAL MEDICINE | Facility: CLINIC | Age: 89
End: 2024-10-10
Payer: MEDICARE

## 2024-10-10 ENCOUNTER — APPOINTMENT (OUTPATIENT)
Dept: CARDIOLOGY | Facility: HOSPITAL | Age: 89
End: 2024-10-10
Payer: MEDICARE

## 2024-10-10 VITALS
OXYGEN SATURATION: 97 % | HEIGHT: 67 IN | RESPIRATION RATE: 16 BRPM | TEMPERATURE: 98.7 F | DIASTOLIC BLOOD PRESSURE: 60 MMHG | HEART RATE: 74 BPM | BODY MASS INDEX: 25.43 KG/M2 | SYSTOLIC BLOOD PRESSURE: 97 MMHG | WEIGHT: 162 LBS

## 2024-10-10 LAB
BASE EXCESS BLDA CALC-SCNC: -1 MMOL/L (ref -5–5)
BASE EXCESS BLDA CALC-SCNC: 0 MMOL/L (ref -5–5)
CO2 BLDA-SCNC: 25 MMOL/L (ref 24–29)
CO2 BLDA-SCNC: 27 MMOL/L (ref 24–29)
GLUCOSE BLDC GLUCOMTR-MCNC: 85 MG/DL (ref 70–130)
GLUCOSE BLDC GLUCOMTR-MCNC: 94 MG/DL (ref 70–130)
HCO3 BLDA-SCNC: 24.2 MMOL/L (ref 22–26)
HCO3 BLDA-SCNC: 25.3 MMOL/L (ref 22–26)
HCT VFR BLDA CALC: 34 % (ref 38–51)
HCT VFR BLDA CALC: 39 % (ref 38–51)
HGB BLDA-MCNC: 11.6 G/DL (ref 12–17)
HGB BLDA-MCNC: 13.3 G/DL (ref 12–17)
PCO2 BLDA: 40.2 MM HG (ref 35–45)
PCO2 BLDA: 46 MM HG (ref 35–45)
PH BLDA: 7.36 PH UNITS (ref 7.35–7.6)
PH BLDA: 7.4 PH UNITS (ref 7.35–7.6)
PO2 BLDA: 106 MMHG (ref 80–105)
PO2 BLDA: 75 MMHG (ref 80–105)
POTASSIUM BLDA-SCNC: 3.8 MMOL/L (ref 3.5–4.9)
POTASSIUM BLDA-SCNC: 3.9 MMOL/L (ref 3.5–4.9)
QT INTERVAL: 431 MS
QTC INTERVAL: 452 MS
SAO2 % BLDA: 95 % (ref 95–98)
SAO2 % BLDA: 98 % (ref 95–98)

## 2024-10-10 PROCEDURE — 93005 ELECTROCARDIOGRAM TRACING: CPT | Performed by: INTERNAL MEDICINE

## 2024-10-10 PROCEDURE — 93246 EXT ECG>7D<15D RECORDING: CPT

## 2024-10-10 PROCEDURE — 99232 SBSQ HOSP IP/OBS MODERATE 35: CPT | Performed by: INTERNAL MEDICINE

## 2024-10-10 PROCEDURE — 93010 ELECTROCARDIOGRAM REPORT: CPT | Performed by: INTERNAL MEDICINE

## 2024-10-10 RX ORDER — ASPIRIN 81 MG/1
81 TABLET ORAL DAILY
Qty: 30 TABLET | Refills: 2 | Status: SHIPPED | OUTPATIENT
Start: 2024-10-11 | End: 2025-01-09

## 2024-10-10 RX ADMIN — ASPIRIN 81 MG: 81 TABLET, COATED ORAL at 08:02

## 2024-10-10 NOTE — PROGRESS NOTES
Patient Care Team:  Mechelle Landa APRN as PCP - General (Internal Medicine)  Reginaldo Palacio MD (Dermatology)  Janki Elias MD as Consulting Physician (Ophthalmology)  Kristopher Machado DPM as Consulting Physician (Podiatry)  Aby Marquez MD as Consulting Physician (Hand Surgery)  Wilton Ramos MD as Consulting Physician (Orthopedic Surgery)  Destinee Snider MD as Consulting Physician (Pain Medicine)  Breezy Frausto MD as Consulting Physician (Cardiology)  Velia Watkins PA-C as Physician Assistant (Physician Assistant)  Hafsa Conway MD as Referring Physician (Colon and Rectal Surgery)  Justin Berry MD as Consulting Physician (Hematology and Oncology)    Chief Complaint: Severe degenerative aortic valve stenosis, status post transcatheter aortic valve replacement.    Interval History:   No new complaints.  Resting.    Objective   Vital Signs  Temp:  [98 °F (36.7 °C)-98.7 °F (37.1 °C)] 98.7 °F (37.1 °C)  Heart Rate:  [74-82] 74  Resp:  [16-18] 16  BP: ()/(60-92) 97/60    Intake/Output Summary (Last 24 hours) at 10/10/2024 0956  Last data filed at 10/10/2024 0749  Gross per 24 hour   Intake 980 ml   Output 1100 ml   Net -120 ml         Temp:  [98 °F (36.7 °C)-98.7 °F (37.1 °C)] 98.7 °F (37.1 °C)  Heart Rate:  [74-82] 74  Resp:  [16-18] 16  BP: ()/(60-92) 97/60    Intake/Output Summary (Last 24 hours) at 10/10/2024 0956  Last data filed at 10/10/2024 0749  Gross per 24 hour   Intake 980 ml   Output 1100 ml   Net -120 ml         General Appearance:    Alert, cooperative, in no acute distress   Head:    Normocephalic, without obvious abnormality, atraumatic       Neck/Lymph   No adenopathy, supple, no thyromegaly, no carotid bruit, no    JVD   Lungs:     Clear to auscultation bilaterally, no wheezes, rales, or     rhonchi    Cardiac:    Normal rate, regular rhythm, no murmur, no rub, no gallop   Chest Wall:    No abnormalities observed   GI:     Normal bowel  sounds, soft, nontender, nondistended,            no rebound tenderness   Extremities:   No cyanosis, clubbing, or edema   Circulatory/Peripheral Vascular :   Pulses palpable and equal bilaterally   Integumentary:   No bleeding or rash. Normal temperature                aspirin, 81 mg, Oral, Daily  bisacodyl, 10 mg, Rectal, Once  polyethylene glycol, 17 g, Oral, Daily  rosuvastatin, 10 mg, Oral, Nightly             Results Review:    Results from last 7 days   Lab Units 10/09/24  2153   SODIUM mmol/L 131*   POTASSIUM mmol/L 4.0   CHLORIDE mmol/L 98   CO2 mmol/L 24.1   BUN mg/dL 14   CREATININE mg/dL 0.62*   GLUCOSE mg/dL 98   CALCIUM mg/dL 9.1         Results from last 7 days   Lab Units 10/09/24  0423   WBC 10*3/mm3 8.23   HEMOGLOBIN g/dL 13.2   HEMATOCRIT % 39.7   PLATELETS 10*3/mm3 138*             Results from last 7 days   Lab Units 10/09/24  2153   MAGNESIUM mg/dL 2.1         @LABRCNT(bnp)@  I reviewed the patient's new clinical results.  I personally viewed and interpreted the patient's EKG/Telemetry data            Assessment & Plan   1.  Severe degenerative aortic valve stenosis: Status post transcatheter aortic valve replacement with niko 3 ultra 29 mm  2.  Chronic heart failure with preserved ejection fraction  3.  Essential hypertension  4.  Paroxysmal atrial fibrillation  5.  History of TIA  6.  Mobitz 1 secondary AV block    -With advanced age aspirin monotherapy would be fine.  - Mobitz 1 secondary AV block overnight and this morning.  Asymptomatic.  Recommend that he goes home with a monitor.  -Transthoracic echocardiogram looks good

## 2024-10-10 NOTE — OUTREACH NOTE
Prep Survey      Flowsheet Row Responses   [facility] patient discharged from? [place]   Is LACE score < 7 ? No   Eligibility [facility]   Date of Admission 10/08/24   Date of Discharge 10/10/24   Discharge Disposition Home or Self Care   Discharge diagnosis Aortic stenosis, moderate, TRANSFEMORAL TRANSCATHETER AORTIC VALVE REPLACEMENT   Does the patient have one of the following disease processes/diagnoses(primary or secondary)? Cardiothoracic surgery   Does the patient have Home health ordered? No   Is there a DME ordered? No   Prep survey completed? Yes            Kristy Shields Registered Nurse

## 2024-10-10 NOTE — CASE MANAGEMENT/SOCIAL WORK
Case Management Discharge Note      Final Note: Pt discharged home to [facility], 10/10/24.  No needs identified…....Becki S/ALEXA CM    Provided Post Acute Provider List?: Refused  Refused Provider List Comment: Patient declined need for home health services and states if he needs therapy, they offer it at his Independent Living home.    Selected Continued Care - Discharged on 10/10/2024 Admission date: 10/8/2024 - Discharge disposition: Home or Self Care      Destination    No services have been selected for the patient.                Durable Medical Equipment    No services have been selected for the patient.                Dialysis/Infusion    No services have been selected for the patient.                Home Medical Care    No services have been selected for the patient.                Therapy    No services have been selected for the patient.                Community Resources    No services have been selected for the patient.                Community & DME    No services have been selected for the patient.                         Final Discharge Disposition Code: 01 - home or self-care

## 2024-10-10 NOTE — TELEPHONE ENCOUNTER
[facility] called regarding paialen Vicente Delgado JR is being disharged from hospital today 10/10/2024 after having a Transfemoral Transcatheter Aortic Valve replacement they would like patient to have a Hospital follow up with Mechelle Landa . Can we work in with Mechelle and will need to contact patient Vicente Delgado Jr with appointment information.

## 2024-10-10 NOTE — PLAN OF CARE
Goal Outcome Evaluation:  Plan of Care Reviewed With: patient     Progress: improving     Problem: Adult Inpatient Plan of Care  Goal: Plan of Care Review  Outcome: Progressing  Flowsheets (Taken 10/10/2024 0501)  Progress: improving  Goal: Patient-Specific Goal (Individualized)  Outcome: Progressing  Goal: Absence of Hospital-Acquired Illness or Injury  Outcome: Progressing  Intervention: Identify and Manage Fall Risk  Description: Perform standard risk assessment on admission using a validated tool or comprehensive approach appropriate to the patient; reassess fall risk frequently, with change in status or transfer to another level of care.  Communicate fall injury risk to interprofessional healthcare team.  Determine need for increased observation, equipment and environmental modification, such as low bed, signage and supportive, nonskid footwear.  Adjust safety measures to individual developmental age, stage and identified risk factors.  Reinforce the importance of safety and physical activity with patient and family.  Perform regular intentional rounding to assess need for position change, pain assessment and personal needs, including assistance with toileting.  Recent Flowsheet Documentation  Taken 10/10/2024 0412 by Jacobo Munoz RN  Safety Promotion/Fall Prevention:   assistive device/personal items within reach   activity supervised   clutter free environment maintained   fall prevention program maintained   lighting adjusted   mobility aid in reach   nonskid shoes/slippers when out of bed   muscle strengthening facilitated   room organization consistent   safety round/check completed  Taken 10/10/2024 0233 by Jacobo Munoz RN  Safety Promotion/Fall Prevention:   activity supervised   assistive device/personal items within reach   clutter free environment maintained   fall prevention program maintained   lighting adjusted   mobility aid in reach   nonskid shoes/slippers when out of bed   muscle  strengthening facilitated   room organization consistent   safety round/check completed  Taken 10/10/2024 0020 by Jacobo Munoz RN  Safety Promotion/Fall Prevention:   assistive device/personal items within reach   activity supervised   clutter free environment maintained   fall prevention program maintained   mobility aid in reach   lighting adjusted   muscle strengthening facilitated   nonskid shoes/slippers when out of bed   safety round/check completed   room organization consistent  Taken 10/9/2024 2200 by Jacobo Munoz RN  Safety Promotion/Fall Prevention:   activity supervised   assistive device/personal items within reach   clutter free environment maintained   fall prevention program maintained   lighting adjusted   mobility aid in reach   muscle strengthening facilitated   nonskid shoes/slippers when out of bed   room organization consistent   safety round/check completed  Taken 10/9/2024 2000 by Jacobo Munoz RN  Safety Promotion/Fall Prevention:   assistive device/personal items within reach   activity supervised   fall prevention program maintained   clutter free environment maintained   nonskid shoes/slippers when out of bed   muscle strengthening facilitated   mobility aid in reach   lighting adjusted   room organization consistent   safety round/check completed  Intervention: Prevent Skin Injury  Description: Perform a screening for skin injury risk, such as pressure or moisture associated skin damage on admission and at regular intervals throughout hospital stay.  Keep all areas of skin (especially folds) clean and dry.  Maintain adequate skin hydration.  Relieve and redistribute pressure and protect bony prominences; implement measures based on patient-specific risk factors.  Match turning and repositioning schedule to clinical condition.  Encourage weight shift frequently; assist with reposition if unable to complete independently.  Float heels off bed; avoid pressure on the Achilles tendon.  Keep  skin free from extended contact with medical devices.  Encourage functional activity and mobility, as early as tolerated.  Use aids (e.g., slide boards, mechanical lift) during transfer.  Recent Flowsheet Documentation  Taken 10/10/2024 0412 by Jacobo Munoz RN  Body Position: position changed independently  Taken 10/10/2024 0020 by Jaocbo Munoz RN  Body Position: position changed independently  Taken 10/9/2024 2200 by Jacobo Munoz RN  Body Position:   weight shifting   upper extremity elevated  Taken 10/9/2024 2000 by Jacobo Munoz RN  Body Position: position changed independently  Intervention: Prevent and Manage VTE (Venous Thromboembolism) Risk  Description: Assess for VTE (venous thromboembolism) risk.  Encourage and assist with early ambulation.  Initiate and maintain compression or other therapy, as indicated, based on identified risk in accordance with organizational protocol and provider order.  Encourage both active and passive leg exercises while in bed, if unable to ambulate.  Recent Flowsheet Documentation  Taken 10/10/2024 0412 by Jacobo Munoz RN  Activity Management: activity encouraged  Taken 10/10/2024 0233 by Jacobo Munoz RN  Activity Management: activity encouraged  Taken 10/10/2024 0020 by Jacobo Munoz RN  Activity Management: activity encouraged  Taken 10/9/2024 2200 by Jacobo Munoz RN  Activity Management: activity encouraged  Taken 10/9/2024 2000 by Jacobo Munoz RN  Activity Management: activity encouraged  Intervention: Prevent Infection  Description: Maintain skin and mucous membrane integrity; promote hand, oral and pulmonary hygiene.  Optimize fluid balance, nutrition, sleep and glycemic control to maximize infection resistance.  Identify potential sources of infection early to prevent or mitigate progression of infection (e.g., wound, lines, devices).  Evaluate ongoing need for invasive devices; remove promptly when no longer indicated.  Recent Flowsheet Documentation  Taken  10/10/2024 0412 by Jacobo Munoz RN  Infection Prevention:   visitors restricted/screened   single patient room provided   rest/sleep promoted   personal protective equipment utilized   hand hygiene promoted   equipment surfaces disinfected   environmental surveillance performed   cohorting utilized  Taken 10/10/2024 0233 by Jacobo Munoz RN  Infection Prevention:   visitors restricted/screened   single patient room provided   environmental surveillance performed   cohorting utilized   equipment surfaces disinfected   personal protective equipment utilized   rest/sleep promoted   hand hygiene promoted  Taken 10/10/2024 0020 by Jacobo Munoz RN  Infection Prevention:   visitors restricted/screened   single patient room provided   rest/sleep promoted   personal protective equipment utilized   hand hygiene promoted   equipment surfaces disinfected   environmental surveillance performed   cohorting utilized  Taken 10/9/2024 2200 by Jacobo Munoz RN  Infection Prevention:   visitors restricted/screened   single patient room provided   rest/sleep promoted   personal protective equipment utilized   hand hygiene promoted   equipment surfaces disinfected   environmental surveillance performed   cohorting utilized  Taken 10/9/2024 2000 by Jacobo Munoz RN  Infection Prevention:   visitors restricted/screened   single patient room provided   rest/sleep promoted   personal protective equipment utilized   hand hygiene promoted   equipment surfaces disinfected   environmental surveillance performed   cohorting utilized  Goal: Optimal Comfort and Wellbeing  Outcome: Progressing  Intervention: Provide Person-Centered Care  Description: Use a family-focused approach to care.  Develop trust and rapport by proactively providing information, encouraging questions, addressing concerns and offering reassurance.  Acknowledge emotional response to hospitalization.  Recognize and utilize personal coping strategies.  Honor spiritual and  cultural preferences.  Recent Flowsheet Documentation  Taken 10/10/2024 0020 by Jacobo Munoz, RN  Trust Relationship/Rapport:   thoughts/feelings acknowledged   reassurance provided   questions encouraged   questions answered   empathic listening provided   emotional support provided   choices provided   care explained  Taken 10/9/2024 2000 by Jacobo Munoz, RN  Trust Relationship/Rapport:   thoughts/feelings acknowledged   questions answered   reassurance provided   questions encouraged   choices provided   emotional support provided   empathic listening provided   care explained  Goal: Readiness for Transition of Care  Outcome: Progressing

## 2024-10-10 NOTE — DISCHARGE SUMMARY
Date of Admission:   Date of Discharge:  10/10/2024    Discharge Diagnosis:   - Severe aortic valve stenosis -- s/p TAVR with Dr. Sales and Dr. Delgado 10/8/24  - Hypertension   - CAD  - Hyperlipidemia   - PAF  - Hx lymphoma  - CHF with preserved EF  -Mobitz 1 AV block    Presenting Problem/History of Present Illness  Aortic stenosis, moderate [I35.0]  Aortic stenosis [I35.0]     Hospital Course  Patient is a 89 y.o. male presented for scheduled TAVR with Dr. Sales and Dr. Delgado.  Post operatively he did well.  Post TAVR echo revealed well seated TAVR valve.  He will be sent home with a zio monitor due to mobitz 1 AV block.  On post operative day 2 he was deemed ready for discharge.  Follow up and further care per cardiology.    Procedures Performed  Procedure(s):  TRANSFEMORAL TRANSCATHETER AORTIC VALVE REPLACEMENT with intraoperative Transthoracic echocardiogram  Transfemoral Transcatheter Aortic Valve Replacement with intraoperative transthoracic echocardiogram and possible open surgical rescue       Consults:   Consults       Date and Time Order Name Status Description    10/1/2024  1:23 PM Inpatient Cardiothoracic Surgery Consult Completed     9/30/2024 10:19 AM Inpatient Cardiology Consult Completed             Pertinent Test Results:    Lab Results   Component Value Date    WBC 8.23 10/09/2024    HGB 13.2 10/09/2024    HCT 39.7 10/09/2024    MCV 92.1 10/09/2024     (L) 10/09/2024      Lab Results   Component Value Date    GLUCOSE 98 10/09/2024    CALCIUM 9.1 10/09/2024     (L) 10/09/2024    K 4.0 10/09/2024    CO2 24.1 10/09/2024    CL 98 10/09/2024    BUN 14 10/09/2024    CREATININE 0.62 (L) 10/09/2024    EGFRIFAFRI 82 02/07/2022    EGFRIFNONA 71 02/07/2022    BCR 22.6 10/09/2024    ANIONGAP 8.9 10/09/2024     Lab Results   Component Value Date    INR 1.07 09/30/2024    PROTIME 14.1 09/30/2024         Condition on Discharge:  good    Vital Signs  Temp:  [98 °F (36.7 °C)-98.7 °F (37.1  °C)] 98.7 °F (37.1 °C)  Heart Rate:  [74-82] 74  Resp:  [16-18] 16  BP: ()/(60-92) 97/60      Discharge Disposition  Home or Self Care    Discharge Medications     Discharge Medications        New Medications        Instructions Start Date   aspirin 81 MG EC tablet   81 mg, Oral, Daily   Start Date: October 11, 2024            Continue These Medications        Instructions Start Date   ezetimibe 10 MG tablet  Commonly known as: ZETIA   10 mg, Oral, Daily      rosuvastatin 10 MG tablet  Commonly known as: CRESTOR   10 mg, Oral, Nightly               Discharge Diet:     Activity at Discharge:   Activity Instructions       Activity as Tolerated      Measure Weight      Daily weight, notify if over 3 lbs in one day            Follow-up Appointments  Future Appointments   Date Time Provider Department Center   10/15/2024 10:00 AM Maki cMmanus APRN [facility] CD [facility]   11/7/2024  9:15 AM [facility] ECHO/VAS RM 1  [facility] ECHO [facility]   11/7/2024 10:00 AM Olaf Sales MD [facility] CD [facility] None   2/21/2025 10:30 AM Mechelle Landa APRN [facility] PC MDEST [facility]   3/5/2025  1:15 PM [facility] PET CT  [facility] PET [facility]   3/20/2025 10:00 AM Maki Mcmanus APRN [facility]   3/26/2025 12:40 PM LAB CHAIR 1 [facility]  LAB [facility]   3/26/2025  1:00 PM Justin Berry MD [facility] CBC [facility]   9/11/2025 12:00 PM [facility] ECHO/VAS FRONT RM  [facility] ECHO [facility]   9/11/2025 12:40 PM Olaf Sales MD [facility] CD [facility]40 None   9/25/2025 11:00 AM Olaf Sales MD [facility] CD L[facility] None     Additional Instructions for the Follow-ups that You Need to Schedule       Ambulatory Referral to Cardiac Rehab   As directed      Ambulatory Referral to Cardiac Rehab   As directed      Discharge Follow-up with Specialty: Dr. Sales; 1 Month   As directed      Specialty: Dr. Sales   Follow Up: 1 Month        Discharge Follow-up with Specified Provider: Ramona Mcmanus cardiology APRN; 1 Week   As directed      To: Ramona Mcmanus cardiology APRN    Follow Up: 1 Week                Test Results Pending at Discharge       GE Neumann  10/10/24  11:41 EDT

## 2024-10-11 ENCOUNTER — TRANSITIONAL CARE MANAGEMENT TELEPHONE ENCOUNTER (OUTPATIENT)
Dept: CALL CENTER | Facility: HOSPITAL | Age: 89
End: 2024-10-11
Payer: MEDICARE

## 2024-10-11 NOTE — OUTREACH NOTE
Call Center TCM Note      Flowsheet Row Responses   [facility] patient discharged from? [place]   Does the patient have one of the following disease processes/diagnoses(primary or secondary)? Cardiothoracic surgery   TCM attempt successful? Yes   Call start time 1343   Call end time 1347   General alerts for this patient [place] [facility]   Discharge diagnosis Aortic stenosis, moderate, TRANSFEMORAL TRANSCATHETER AORTIC VALVE REPLACEMENT   Person spoke with today (if not patient) and relationship dtr   Meds reviewed with patient/caregiver? Yes   Is the patient having any side effects they believe may be caused by any medication additions or changes? No   Does the patient have all medications related to this admission filled (includes all antibiotics, pain medications, cardiac medications, etc.) Yes   Is the patient taking all medications as directed (includes completed medication regime)? Yes   Comments Cardiology 10/15/24,  Needs cardiac rehab appt   Does the patient have an appointment with their PCP within 7-14 days of discharge? Yes  [10/16/2024 at 8:15 AM]   Psychosocial issues? No   Did the patient receive a copy of their discharge instructions? Yes   Nursing interventions Reviewed instructions with patient   Nursing interventions Nurse provided patient education   Is the patient/caregiver able to teach back normal signs of recovery? Pain or discomfort at incisional site   Nursing interventions Reassured on normal signs of recovery   Is the patient /caregiver able to teach back basic post-op care? Shower daily, Lifting as instructed by MD in discharge instructions   Is the patient/caregiver able to teach back signs and symptoms of incisional infection? Increased redness, swelling or pain at the incisonal site, Increased drainage or bleeding, Incisional warmth, Pus or odor from incision, Fever   Is the patient/caregiver able to teach back the hierarchy of who to call/visit for  symptoms/problems? PCP, Specialist, Home health nurse, Urgent Care, ED, 911 Yes   TCM call completed? Yes   Wrap up additional comments Dtr states pt is doing fine and denies any issues. Reviewed aspirin with dtr. Dtr verified cardiology, PCP fu appts.   Call end time 1347   Would this patient benefit from a Referral to [facility] Social Work? No   Is the patient interested in additional calls from an ambulatory ? No            Jana Sánchez RN    10/11/2024, 13:48 EDT

## 2024-10-15 ENCOUNTER — HOSPITAL ENCOUNTER (OUTPATIENT)
Dept: CARDIOLOGY | Facility: HOSPITAL | Age: 89
Discharge: HOME | End: 2024-10-15
Admitting: NURSE PRACTITIONER
Payer: MEDICARE

## 2024-10-15 ENCOUNTER — OFFICE VISIT (OUTPATIENT)
Dept: CARDIOLOGY | Facility: CLINIC | Age: 89
End: 2024-10-15
Payer: MEDICARE

## 2024-10-15 VITALS
SYSTOLIC BLOOD PRESSURE: 118 MMHG | HEIGHT: 67 IN | HEART RATE: 67 BPM | BODY MASS INDEX: 25.11 KG/M2 | DIASTOLIC BLOOD PRESSURE: 68 MMHG | WEIGHT: 160 LBS

## 2024-10-15 DIAGNOSIS — I25.10 NONOBSTRUCTIVE ATHEROSCLEROSIS OF CORONARY ARTERY: ICD-10-CM

## 2024-10-15 DIAGNOSIS — I35.0 SEVERE AORTIC STENOSIS: ICD-10-CM

## 2024-10-15 DIAGNOSIS — I44.0 FIRST DEGREE AV BLOCK: ICD-10-CM

## 2024-10-15 DIAGNOSIS — Z95.2 S/P TAVR (TRANSCATHETER AORTIC VALVE REPLACEMENT): Primary | ICD-10-CM

## 2024-10-15 DIAGNOSIS — I50.32 CHRONIC HEART FAILURE WITH PRESERVED EJECTION FRACTION (HFPEF): Chronic | ICD-10-CM

## 2024-10-15 DIAGNOSIS — I10 PRIMARY HYPERTENSION: Chronic | ICD-10-CM

## 2024-10-15 DIAGNOSIS — I72.4 ANEURYSM OF ARTERY OF LOWER EXTREMITY: ICD-10-CM

## 2024-10-15 LAB
BH CV LEFT GROIN PSA PROCEDURE SCRIPTING LRR: 1
BH CV RIGHT GROIN PSA PROCEDURE SCRIPTING LRR: 1
BH CV VAS L EIA VELOCITY PSV: 151 CM/SEC
BH CV XLRA MEAS EXT ILIAC A PSV RIGHT: 87.7 CM/SEC
LEFT GROIN CFA SYS: 126 CM/SEC
PROX PFA PSV LEFT: 59.8 CM/SEC
PROX PFA PSV RIGHT: 110 CM/SEC
PROX SFA PSV LEFT: 82.3 CM/SEC
PROX SFA PSV RIGHT: 54 CM/SEC
RIGHT GROIN CFA SYS: 126 CM/SEC

## 2024-10-15 PROCEDURE — 93925 LOWER EXTREMITY STUDY: CPT

## 2024-10-15 PROCEDURE — 93925 LOWER EXTREMITY STUDY: CPT | Performed by: STUDENT IN AN ORGANIZED HEALTH CARE EDUCATION/TRAINING PROGRAM

## 2024-10-15 NOTE — PROGRESS NOTES
Date of Office Visit: 10/15/2024  Encounter Provider: GE Rizo  Place of Service: [facility] CARDIOLOGY  Patient Name: Vicente Delgado  :1928    No chief complaint on file.  : Severe aortic valve stenosis status post TAVR    HPI: Vicente Delgado is a 89 y.o. male who is a patient of  Dr. Sales.  Resents today for 1 week TAVR follow-up.  He has history of severe degenerative aortic valve stenosis, chronic heart failure with preserved ejection fraction, hypertension, paroxysmal atrial fibrillation and history of TIA.  He has been followed in our office for his aortic valve stenosis.  On 2024, he came to the emergency room as he felt as he was going to pass out when he stood.  His blood pressures were a bit soft therefore lisinopril was stopped.  Echocardiogram confirmed severe aortic stenosis and moderate AI.  He underwent TAVR workup.  Left and right heart cath showed tubular 60% mid vessel LAD stenosis along with diffuse 50% proximal OM1 stenosis, RCA chronically occluded in mid segment.  He had normal filling pressures no pulmonary hypertension.    He was able to go home and presented back to the hospital for scheduled TAVR on  10/09/24 with Jessica Sales and Sandy.  Echo revealed well-seated TAVR valve.  He was sent home with a ZIO monitor due to Mobitz 1 AV block.  Discharged on monotherapy baby aspirin.    Mr. Delgado presents today with his son-in-law.  He is just as delightful as ever.  His blood pressure is well-controlled.  He states that he can now take a good deep breath in as he cannot do that prior to his TAVR.  Bilateral groin sites are hardened.  He has bruising across the groin.  He has noticed that it feels more painful over the last couple of days and may have gotten a little bit larger in size.  He continues to wear his Zio patch.  EKG today shows sinus rhythm with PVC and first-degree AV block.  He has no issues with dizziness or  lightheadedness.     Previous testing and notes have been reviewed by me.   Past Medical History:   Diagnosis Date    Acquired absence of other specified parts of digestive tract     Allergic rhinitis     Anxiety     Aortic valve insufficiency     Arthritis     Atherosclerosis of native arteries of extremities with intermittent claudication, left leg 05/17/2021    Atherosclerotic heart disease of native coronary artery without angina pectoris     Atrial fibrillation     Burn injury     CAD (coronary artery disease) 07/01/2016    History of mild disease.  Stress test 2017 with no ischemia.  He is on aspirin statin and Zetia.  No angina pectoris. EF normal 1/2017 echo    Calculus of gallbladder without cholecystitis without obstruction     Cancer     Cataract June 2018” and    Ocular mygraine    Cholelithiasis 2020    Chronic deep vein thrombosis (DVT) of iliac vein 12/18/2023    Chronic deep vein thrombosis (DVT) of left femoral vein 12/18/2023    Clostridioides difficile diarrhea 2023    Clotting disorder 2023. January    Wrong medicine    Colon polyps     FOLLOWED BY DR. ROBERT SOTELO    Deep vein thrombosis January2023 dr arnoldo Mathews     Diverticulosis     Elevated cholesterol     Embolism and thrombosis of arteries of the lower extremities 05/17/2021    Esophagitis     Gastritis     GERD (gastroesophageal reflux disease)     Heart disease     History of anxiety     History of medical problems Right shoulder replacemd    2008    History of prostate cancer 06/1990    History of transfusion 1990    4 pints    HL (hearing loss) Partial    Hypercholesterolemia     Hyperlipidemia     Hypertension 11/04/2021    Insomnia     Low back pain Yes  from hworking    Lupus     Myocardial infarction     TAM (nonalcoholic steatohepatitis)     Nonrheumatic mitral (valve) insufficiency     Pain of left lower leg 05/17/2021    Palpitations     Postphlebitic syndrome 12/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 Rodríguez 10/2021    Varicose veins of right lower extremity with pain 08/10/2022    Venous insufficiency (chronic) (peripheral) 08/10/2022    Visual impairment Ocular mygraine       Past Surgical History:   Procedure Laterality Date    ABDOMINAL SURGERY      AORTIC VALVE REPAIR/REPLACEMENT N/A 10/8/2024    Procedure: TRANSFEMORAL TRANSCATHETER AORTIC VALVE REPLACEMENT with intraoperative Transthoracic echocardiogram;  Surgeon: Sapna Delgado MD;  Location: [facility];  Service: Cardiothoracic;  Laterality: N/A;    AORTIC VALVE REPAIR/REPLACEMENT N/A 10/8/2024    Procedure: Transfemoral Transcatheter Aortic Valve Replacement with intraoperative transthoracic echocardiogram and possible open surgical rescue;  Surgeon: Olaf Sales MD;  Location: [facility];  Service: Cardiovascular;  Laterality: N/A;    CARDIAC CATHETERIZATION  11/16/1995    CARDIAC CATHETERIZATION N/A 10/1/2024    Procedure: Left Heart Cath;  Surgeon: Olaf Sales MD;  Location: [facility] CATH INVASIVE LOCATION;  Service: Cardiology;  Laterality: N/A;    CARDIAC CATHETERIZATION N/A 10/1/2024    Procedure: Right Heart Cath;  Surgeon: Olaf Sales MD;  Location: [facility] CATH INVASIVE LOCATION;  Service: Cardiology;  Laterality: N/A;    CARDIAC SURGERY  11/16/1995    CARPAL TUNNEL RELEASE Left 05/21/2024    Dr Marquez    CATARACT EXTRACTION Left 07/2018    CHOLECYSTECTOMY  2020    CHOLECYSTECTOMY WITH INTRAOPERATIVE CHOLANGIOGRAM N/A 09/29/2020    Procedure: Laparoscopic cholecystectomy;  Surgeon: Javi Peralta MD;  Location: [facility] OR;  Service: General;  Laterality: N/A;    COLONOSCOPY  01/09/2002    COLONOSCOPY N/A 10/18/2023    15 MM POLYP IN DISTAL ILEUM, PATH: LYMPHOMA VERSUS NEUROENDOCRINE TUMOR, RESCOPE IN 1 YR, DR. ROBERT SOTELO AT [facility]    ELBOW PROCEDURE      FRACTURE SURGERY      JOINT  REPLACEMENT      LUNG BIOPSY      LYMPH NODE BIOPSY  Yes    1992    NASAL POLYP SURGERY      PACEMAKER IMPLANTATION      PROSTATE SURGERY  06/1990    PROSTATECTOMY      SHOULDER ROTATOR CUFF REPAIR Right 08/24/2011    Dr. Bach    SIGMOIDOSCOPY      TONSILLECTOMY  Yes 1943    UPPER GASTROINTESTINAL ENDOSCOPY  09/12/1997    Gastritis, Duodenitis, hiatal hernia (Pathology: Gastric antrum minimal chronic inflammation)    UPPER GASTROINTESTINAL ENDOSCOPY  04/11/2005    Mild esophagitis, Small hiatal hernia, Mild gastritis and mild duodenitis       Social History     Socioeconomic History    Marital status:    Tobacco Use    Smoking status: Some Days     Types: Cigars     Passive exposure: Yes    Smokeless tobacco: Never    Tobacco comments:     Smoke a cigar ocassionallyp   Vaping Use    Vaping status: Never Used   Substance and Sexual Activity    Alcohol use: No     Comment: Daily caffeine use    Drug use: No    Sexual activity: Not Currently     Partners: Female       Family History   Problem Relation Age of Onset    Heart failure Mother     Hearing loss Mother     Heart disease Mother         Mother had congestive heart failure    Hyperlipidemia Mother     Alcohol abuse Father         Never new him    Diabetes Father        Review of Systems   Constitutional: Negative.   HENT: Negative.     Eyes: Negative.    Cardiovascular: Negative.    Respiratory: Negative.     Endocrine: Negative.    Hematologic/Lymphatic:        Asa 81mg   Skin:         Bruising hardness throughout groin   Musculoskeletal: Negative.    Gastrointestinal: Negative.    Genitourinary: Negative.    Neurological: Negative.    Psychiatric/Behavioral: Negative.     Allergic/Immunologic: Negative.        Allergies   Allergen Reactions    Lidocaine Dizziness     Reaction to novacaine    Lovastatin Other (See Comments)     Liver enzymes elevated    Pravastatin Other (See Comments)     Elevated liver enzymes     Gabapentin GI Intolerance      "Bloating, incontinence     Procaine     Simvastatin          Current Outpatient Medications:     aspirin 81 MG EC tablet, Take 1 tablet by mouth Daily for 90 days., Disp: 30 tablet, Rfl: 2    ezetimibe (ZETIA) 10 MG tablet, TAKE ONE TABLET BY MOUTH DAILY, Disp: 90 tablet, Rfl: 3    rosuvastatin (CRESTOR) 10 MG tablet, Take 1 tablet by mouth Every Night., Disp: 90 tablet, Rfl: 3      Objective:     Vitals:    10/15/24 0950   BP: 118/68   Pulse: 67   Weight: 72.6 kg (160 lb)   Height: 170.2 cm (67\")     Body mass index is 25.06 kg/m².    TTE (post TAVR) 10/09/2024:    Left ventricular systolic function is normal. Left ventricular ejection fraction appears to be 61 - 65%.    Left ventricular wall thickness is consistent with moderate concentric hypertrophy.    Left ventricular diastolic function is consistent with (grade Ia w/high LAP) impaired relaxation.    The left atrial cavity is moderate to severely dilated.    Peak velocity of the flow distal to the aortic valve is 220.2 cm/s. Aortic valve mean pressure gradient is 8 mmHg. Aortic valve dimensionless index is 0.5 .    There is a TAVR valve present.  Normal prosthetic valve function.  No significant aortic regurgitation    Estimated right ventricular systolic pressure from tricuspid regurgitation is normal (<35 mmHg).     Right/ Left Heart Cath 10/01/2024:  1. Left main: Normal  2. LAD: Mild calcification of proximal segment with diffuse 30% stenosis.  Tubular 60% mid vessel stenosis  3. LCX: Diffuse 50% proximal OM1 stenosis  4. RCA: Small caliber.  Dominant.  Chronic total occlusion mid segment.  Fills via bridging collaterals  5.  Normal right and left-sided filling pressures.  6.  No pulmonary hypertension     TTE 09/30/2024:    Left ventricular systolic function is normal. Calculated left ventricular EF = 61.8%    Left ventricular wall thickness is consistent with moderate concentric hypertrophy.    Left ventricular diastolic function is consistent with " (grade I) impaired relaxation.    The aortic valve appears likely trileaflet and severely calcified. Peak velocity of the flow distal to the aortic valve is 410.5 cm/s. Aortic valve mean pressure gradient is 39 mmHg. Aortic valve dimensionless index is 0.2. Severe aortic valve stenosis is present. Aortic valve area is 0.8 cm2.    Mild aortic regurgitation.    There is mild, bileaflet mitral valve thickening present along with moderate mitral annular calcification (MAC).    Normal biatrial and IVC size.  PHYSICAL EXAM:    Constitutional:       Appearance: Healthy appearance. Not in distress.   Neck:      Vascular: No JVR. JVD normal.   Pulmonary:      Effort: Pulmonary effort is normal.      Breath sounds: Normal breath sounds. No wheezing. No rhonchi. No rales.   Chest:      Chest wall: Not tender to palpatation.   Cardiovascular:      PMI at left midclavicular line. Normal rate. Regular rhythm. Normal S1. Normal S2.       Murmurs: There is no murmur.      No gallop.  No click. No rub.   Pulses:     Intact distal pulses.   Edema:     Peripheral edema absent.   Abdominal:      General: Bowel sounds are normal.      Palpations: Abdomen is soft.      Tenderness: There is no abdominal tenderness.   Musculoskeletal: Normal range of motion.         General: No tenderness. Skin:     General: Skin is warm and dry.      Comments: Wu groin sites hardened, sore, bruising   Neurological:      General: No focal deficit present.      Mental Status: Alert and oriented to person, place and time.           ECG 12 Lead    Date/Time: 10/15/2024 10:54 AM  Performed by: Maki Mcmanus APRN    Authorized by: Maki Mcmanus APRN  Comparison: compared with previous ECG from 10/10/2024  Similar to previous ECG  Rhythm: sinus rhythm  BPM: 67  Conduction: 1st degree AV block  QRS axis: normal            Assessment:       Diagnosis Plan   1. S/P TAVR (transcatheter aortic valve replacement)  Duplex Pseudoaneurysm CAR      2.  Aneurysm of artery of lower extremity  Duplex Pseudoaneurysm CAR      3. Primary hypertension        4. Nonobstructive atherosclerosis of coronary artery        5. Chronic heart failure with preserved ejection fraction (HFpEF)        6. Severe aortic stenosis        7. First degree AV block          No orders of the defined types were placed in this encounter.         Plan:       1.  Severe degenerative aortic valve stenosis: Status post TAVR 10/8/2024.  Postprocedure echo shows well-seated valve, no significant regurgitation.  Bilateral groin sounds are hardened.  Patient notes that they are little more sore and maybe a little larger than previous.  Stat bilateral LE ultrasound ordered at this time.  2.  Chronic heart failure with preserved ejection fraction  2.  Hypertension: Well-controlled on current medications  4.  Paroxysmal atrial fibrillation: Sinus rhythm with first-degree AV block on EKG.  5.  History of TIA  6.  Mobitz 1 secondary AV block this TAVR procedure: Patient is wearing Zio patch at this time.  He has no complaints of lightheadedness or dizziness.    Mr. Delgado has a scheduled echo and follow up with Dr. Sales in one month.     Addendum: Duplex US LE negative for pseudoaneurysm.      Your medication list            Accurate as of October 15, 2024 10:52 AM. If you have any questions, ask your nurse or doctor.                CONTINUE taking these medications        Instructions Last Dose Given Next Dose Due   Aspirin Low Dose 81 MG EC tablet  Generic drug: aspirin      Take 1 tablet by mouth Daily for 90 days.       ezetimibe 10 MG tablet  Commonly known as: ZETIA      TAKE ONE TABLET BY MOUTH DAILY       rosuvastatin 10 MG tablet  Commonly known as: CRESTOR      Take 1 tablet by mouth Every Night.                  As always, it has been a pleasure to participate in your patient's care.      Sincerely,       GE Alarcon

## 2024-10-16 ENCOUNTER — OFFICE VISIT (OUTPATIENT)
Dept: INTERNAL MEDICINE | Facility: CLINIC | Age: 89
End: 2024-10-16
Payer: MEDICARE

## 2024-10-16 VITALS
HEART RATE: 67 BPM | BODY MASS INDEX: 25.3 KG/M2 | TEMPERATURE: 97.6 F | OXYGEN SATURATION: 97 % | WEIGHT: 161.2 LBS | RESPIRATION RATE: 18 BRPM | DIASTOLIC BLOOD PRESSURE: 70 MMHG | SYSTOLIC BLOOD PRESSURE: 118 MMHG | HEIGHT: 67 IN

## 2024-10-16 DIAGNOSIS — R10.13 DYSPEPSIA: ICD-10-CM

## 2024-10-16 DIAGNOSIS — I35.0 SEVERE AORTIC STENOSIS: Primary | ICD-10-CM

## 2024-10-16 DIAGNOSIS — Z95.2 S/P TAVR (TRANSCATHETER AORTIC VALVE REPLACEMENT): ICD-10-CM

## 2024-10-16 PROCEDURE — 99495 TRANSJ CARE MGMT MOD F2F 14D: CPT | Performed by: NURSE PRACTITIONER

## 2024-10-16 PROCEDURE — 1125F AMNT PAIN NOTED PAIN PRSNT: CPT | Performed by: NURSE PRACTITIONER

## 2024-10-16 PROCEDURE — 1160F RVW MEDS BY RX/DR IN RCRD: CPT | Performed by: NURSE PRACTITIONER

## 2024-10-16 PROCEDURE — 1159F MED LIST DOCD IN RCRD: CPT | Performed by: NURSE PRACTITIONER

## 2024-10-16 PROCEDURE — 1111F DSCHRG MED/CURRENT MED MERGE: CPT | Performed by: NURSE PRACTITIONER

## 2024-10-18 ENCOUNTER — TELEPHONE (OUTPATIENT)
Age: 89
End: 2024-10-18
Payer: MEDICARE

## 2024-10-18 NOTE — TELEPHONE ENCOUNTER
I need a favor, can you get him in to see any of the EP doctors sometime in the next 2 weeks. He has never been seen by EP. But the event monitor shows multiple transmission of 2nd degree heart block.    Dr. Sales wants to see if we can get him in with them before he sees him on 11/7/24.    I have the strips in his office as well if they would like to see them.    Thanks a bunch!    Marta

## 2024-10-22 NOTE — TELEPHONE ENCOUNTER
I spoke with Sarah and got him worked in for this Thu 10/24/24 at 10:45am. I called Vicente and let him know the date/time/and floor. He will be here.    Mrata

## 2024-10-24 ENCOUNTER — OFFICE VISIT (OUTPATIENT)
Age: 89
End: 2024-10-24
Payer: MEDICARE

## 2024-10-24 VITALS
DIASTOLIC BLOOD PRESSURE: 68 MMHG | BODY MASS INDEX: 24.8 KG/M2 | HEART RATE: 62 BPM | WEIGHT: 158 LBS | SYSTOLIC BLOOD PRESSURE: 128 MMHG | HEIGHT: 67 IN

## 2024-10-24 DIAGNOSIS — I44.2 COMPLETE HEART BLOCK: ICD-10-CM

## 2024-10-24 DIAGNOSIS — I45.9 HEART BLOCK: Primary | ICD-10-CM

## 2024-10-24 NOTE — PROGRESS NOTES
Date of Office Visit: 10/24/2024  Encounter Provider: Frandy Edmonds MD  Place of Service: [facility] CARDIOLOGY  Patient Name: Vicente Delgado  :1928    Chief Complaint   Patient presents with    2nd degree heart block   :     HPI: Vicente Delgado is a 89 y.o. male who presents today for abnormal Holter monitor.     Patient had a TAVR on Oct 8.  Prior to TAVR he had prolonged 1st degree AV block.      He had a vague history of light headedness that predates the TAVR    Due to abnormal EKG, he had a Zio AT placed at discharge from TAVR hospitalization.     He denies any current symptoms and states that he currently feels well.           Past Medical History:   Diagnosis Date    Acquired absence of other specified parts of digestive tract     ADHD (attention deficit hyperactivity disorder)     Allergic rhinitis     Anemia     Anxiety     Aortic valve insufficiency     Arthritis     Atherosclerosis of native arteries of extremities with intermittent claudication, left leg 2021    Atherosclerotic heart disease of native coronary artery without angina pectoris     Atrial fibrillation     Burn injury     CAD (coronary artery disease) 2016    History of mild disease.  Stress test  with no ischemia.  He is on aspirin statin and Zetia.  No angina pectoris. EF normal 2017 echo    Calculus of gallbladder without cholecystitis without obstruction     Cancer     Cataract 2018” and    Ocular mygraine    Cholelithiasis     Chronic deep vein thrombosis (DVT) of iliac vein 2023    Chronic deep vein thrombosis (DVT) of left femoral vein 2023    Clostridioides difficile diarrhea     Clotting disorder . January    Wrong medicine    Colon polyps     FOLLOWED BY DR. ROBERT SOTELO    Deep vein thrombosis 2023 dr mclaughlin    Depression     Diverticulosis     Elevated cholesterol     Embolism and thrombosis of arteries of the lower extremities  05/17/2021    Esophagitis     Gastritis     GERD (gastroesophageal reflux disease)     Heart disease     Heart murmur     History of anxiety     History of medical problems Right shoulder replacemd    2008    History of prostate cancer 06/1990    History of transfusion 1990    4 pints    HL (hearing loss) Partial    Hypercholesterolemia     Hyperlipidemia     Hypertension 11/04/2021    Insomnia     Low back pain Yes  from hworking    Lupus     Myocardial infarction     TAM (nonalcoholic steatohepatitis)     Nonrheumatic mitral (valve) insufficiency     Pain of left lower leg 05/17/2021    Palpitations     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 Rodríguez 10/2021    Varicose veins of right lower extremity with pain 08/10/2022    Venous insufficiency (chronic) (peripheral) 08/10/2022    Visual impairment Ocular mygraine       Past Surgical History:   Procedure Laterality Date    ABDOMINAL SURGERY      AORTIC VALVE REPAIR/REPLACEMENT N/A 10/8/2024    Procedure: TRANSFEMORAL TRANSCATHETER AORTIC VALVE REPLACEMENT with intraoperative Transthoracic echocardiogram;  Surgeon: Sapna Delgado MD;  Location: [facility];  Service: Cardiothoracic;  Laterality: N/A;    AORTIC VALVE REPAIR/REPLACEMENT N/A 10/8/2024    Procedure: Transfemoral Transcatheter Aortic Valve Replacement with intraoperative transthoracic echocardiogram and possible open surgical rescue;  Surgeon: Olaf Sales MD;  Location: [facility];  Service: Cardiovascular;  Laterality: N/A;    CARDIAC CATHETERIZATION  11/16/1995    CARDIAC CATHETERIZATION N/A 10/1/2024    Procedure: Left Heart Cath;  Surgeon: Olaf Sales MD;  Location: [facility] INVASIVE LOCATION;  Service: Cardiology;  Laterality: N/A;    CARDIAC CATHETERIZATION N/A 10/1/2024    Procedure: Right Heart Cath;  Surgeon: Henrry  Olaf KIM MD;  Location: [facility] CATH INVASIVE LOCATION;  Service: Cardiology;  Laterality: N/A;    CARDIAC SURGERY  11/16/1995    CARPAL TUNNEL RELEASE Left 05/21/2024    Dr Marquez    CATARACT EXTRACTION Left 07/2018    CHOLECYSTECTOMY  2020    CHOLECYSTECTOMY WITH INTRAOPERATIVE CHOLANGIOGRAM N/A 09/29/2020    Procedure: Laparoscopic cholecystectomy;  Surgeon: Javi Peralta MD;  Location: [facility] MAIN OR;  Service: General;  Laterality: N/A;    COLONOSCOPY  01/09/2002    COLONOSCOPY N/A 10/18/2023    15 MM POLYP IN DISTAL ILEUM, PATH: LYMPHOMA VERSUS NEUROENDOCRINE TUMOR, RESCOPE IN 1 YR, DR. ROBERT SOTELO AT [facility]    ELBOW PROCEDURE      FRACTURE SURGERY      JOINT REPLACEMENT      LUNG BIOPSY      LYMPH NODE BIOPSY  Yes    1992    NASAL POLYP SURGERY      PACEMAKER IMPLANTATION      PROSTATE SURGERY  06/1990    PROSTATECTOMY      SHOULDER ROTATOR CUFF REPAIR Right 08/24/2011    Dr. Bach    SIGMOIDOSCOPY      TONSILLECTOMY  Yes 1943    UPPER GASTROINTESTINAL ENDOSCOPY  09/12/1997    Gastritis, Duodenitis, hiatal hernia (Pathology: Gastric antrum minimal chronic inflammation)    UPPER GASTROINTESTINAL ENDOSCOPY  04/11/2005    Mild esophagitis, Small hiatal hernia, Mild gastritis and mild duodenitis       Social History     Socioeconomic History    Marital status:    Tobacco Use    Smoking status: Some Days     Types: Cigars     Passive exposure: Yes    Smokeless tobacco: Never    Tobacco comments:     Smoke a cigar ocassionallyp   Vaping Use    Vaping status: Never Used   Substance and Sexual Activity    Alcohol use: No     Comment: Daily caffeine use    Drug use: No    Sexual activity: Not Currently     Partners: Female       Family History   Problem Relation Age of Onset    Heart failure Mother     Hearing loss Mother     Heart disease Mother         Mother had congestive heart failure    Hyperlipidemia Mother     Alcohol abuse Father         Never new him    Diabetes Father        Review of  "Systems   Constitutional: Negative.   Cardiovascular: Negative.    Respiratory: Negative.     Gastrointestinal: Negative.        Allergies   Allergen Reactions    Lidocaine Dizziness     Reaction to novacaine    Lovastatin Other (See Comments)     Liver enzymes elevated    Pravastatin Other (See Comments)     Elevated liver enzymes     Gabapentin GI Intolerance     Bloating, incontinence     Procaine     Simvastatin          Current Outpatient Medications:     aspirin 81 MG EC tablet, Take 1 tablet by mouth Daily for 90 days., Disp: 30 tablet, Rfl: 2    ezetimibe (ZETIA) 10 MG tablet, TAKE ONE TABLET BY MOUTH DAILY, Disp: 90 tablet, Rfl: 3    rosuvastatin (CRESTOR) 10 MG tablet, Take 1 tablet by mouth Every Night., Disp: 90 tablet, Rfl: 3      Objective:     Vitals:    10/24/24 0953   BP: 128/68   BP Location: Right arm   Patient Position: Sitting   Pulse: 62   Weight: 71.7 kg (158 lb)   Height: 170.2 cm (67\")     Body mass index is 24.75 kg/m².    PHYSICAL EXAM:    Vitals and nursing note reviewed.   Constitutional:       Appearance: Well-developed.   Eyes:      General:         Right eye: No discharge.         Left eye: No discharge.      Conjunctiva/sclera: Conjunctivae normal.   HENT:      Head: Normocephalic and atraumatic.   Neck:      Vascular: No JVD.   Pulmonary:      Effort: No respiratory distress.   Cardiovascular:      Normal rate.   Edema:     Peripheral edema absent.   Abdominal:      General: There is no distension.      Tenderness: There is no abdominal tenderness.   Musculoskeletal: Normal range of motion.      Cervical back: Neck supple. Neurological:      Cranial Nerves: No cranial nerve deficit.             ECG 12 Lead    Date/Time: 10/24/2024 5:40 PM  Performed by: Frandy Edmonds MD    Authorized by: Frandy Edmonds MD  Comparison: compared with previous ECG   Similar to previous ECG  Rhythm: sinus rhythm  Conduction: 1st degree AV block    Clinical impression: normal ECG          "   Assessment:       Diagnosis Plan   1. Heart block  Case Request EP Lab: Pacemaker DC new    CBC (No Diff)    Basic Metabolic Panel      2. Complete heart block  Case Request EP Lab: Pacemaker DC new    CBC (No Diff)    Basic Metabolic Panel             Plan:       His monitor alerts demonstrate complete heart block.   He did not vu any symptoms with these episodes, but feels that he was likely awake for at least one of them.  His escape rhythm is good on strips so maybe this is why.      Despite lack of symptoms, given history and monitor findings a recommended that we should proceed with pacemaker.     We discussed the risk of not doing the pacemaker and the risk of procedure.     He and his son agreed that we should proceed.     Plan for dual chamber CSP    As always, it has been a pleasure to participate in your patient's care.      Sincerely,         Frandy Edmonds MD

## 2024-10-27 NOTE — ASSESSMENT & PLAN NOTE
He experienced dizziness severe enough to require EMS intervention, initially suspected to be a stroke. However, it was determined to be related to his severe aortic stenosis. No further episodes of dizziness have been reported since undergoing TAVR.

## 2024-10-27 NOTE — ASSESSMENT & PLAN NOTE
He was advised to take Pepcid daily if necessary as it will not increase his risk of  C. Difficile colitis. It was explained that frequent acid reflux could cause gastric irritation and inflammation and daily Pepcid could help manage these symptoms. He currently uses Tums and Pepcid as needed which help control his symptoms. Consider further evaluation if sx worsen.

## 2024-10-27 NOTE — ASSESSMENT & PLAN NOTE
The patient's Doppler ultrasound results were satisfactory, showing no signs of pseudoaneurysm. The previously noted knots were found to be benign.

## 2024-10-27 NOTE — PROGRESS NOTES
Transitional Care Follow Up Visit  Subjective     Vicente Delgado is a 89 y.o. male who presents for a transitional care management visit.    Within 48 business hours after discharge our office contacted him via telephone to coordinate his care and needs.      I reviewed and discussed the details of that call along with the discharge summary, hospital problems, inpatient lab results, inpatient diagnostic studies, and consultation reports with Vicente.    Current Outpatient Medications:     aspirin 81 MG EC tablet, Take 1 tablet by mouth Daily for 90 days., Disp: 30 tablet, Rfl: 2    ezetimibe (ZETIA) 10 MG tablet, TAKE ONE TABLET BY MOUTH DAILY, Disp: 90 tablet, Rfl: 3    rosuvastatin (CRESTOR) 10 MG tablet, Take 1 tablet by mouth Every Night., Disp: 90 tablet, Rfl: 3    Current outpatient and discharge medications have been reconciled for the patient.  Reviewed by: GE Munguia          10/10/2024     5:10 PM   Date of TCM Phone Call   [facility]   Date of Admission 10/8/2024   Date of Discharge 10/10/2024   Discharge Disposition Home or Self Care     Risk for Readmission (LACE) No data recorded    History of Present Illness   History of Present Illness    Course During Hospital Stay:     The patient presents for evaluation of multiple medical concerns. He is accompanied by his daughter.    He presented to the ER 9/30/24 due to c/o dizziness with generalized weakness. He was also experiencing near syncopal episodes with change in position. His troponin was elevated in the ER and he was admitted for further evaluation. His echo showed severe aortic stenosis and moderate AI. He therefore underwent TAVR with Dr. Sales and Dr. Delgado 10/8/24 which he tolerated well. He reports feeling better with decreased dizziness and lightheadedness. He is currently wearing a Zio patch for further monitoring.    He continues to experience stomach issues, describing a burning sensation in his  stomach rather than his throat. He notes that certain foods can trigger this burning sensation, which comes and goes. He finds relief with Tums and Pepcid, the latter of which he takes as needed. He does have a hx of C diff and therefore tries to avoid PPIs. He also mentions avoiding certain foods and making healthier choices, such as steamed chicken and vegetables.    SOCIAL HISTORY  He has been smoking cigars all his life, but he does not smoke them every day.        The following portions of the patient's history were reviewed and updated as appropriate: allergies, current medications, past family history, past medical history, past social history, past surgical history, and problem list.    Review of Systems   Constitutional:  Positive for fatigue. Negative for fever.   HENT:  Positive for congestion, postnasal drip and rhinorrhea.    Respiratory:  Positive for shortness of breath. Negative for cough and chest tightness.    Cardiovascular:  Negative for chest pain, palpitations and leg swelling.   Gastrointestinal:  Negative for abdominal pain.   Neurological:  Positive for dizziness and light-headedness.       Objective   Physical Exam  Constitutional:       Appearance: He is well-developed. He is not ill-appearing.   HENT:      Head: Normocephalic.      Right Ear: Hearing, tympanic membrane and external ear normal.      Left Ear: Hearing, tympanic membrane and external ear normal.      Nose: Nose normal. No nasal deformity, mucosal edema or rhinorrhea.      Right Sinus: No maxillary sinus tenderness or frontal sinus tenderness.      Left Sinus: No maxillary sinus tenderness or frontal sinus tenderness.      Mouth/Throat:      Dentition: Normal dentition.   Eyes:      General: Lids are normal.         Right eye: No discharge.         Left eye: No discharge.      Conjunctiva/sclera: Conjunctivae normal.      Right eye: No exudate.     Left eye: No exudate.  Neck:      Thyroid: No thyroid mass or thyromegaly.       Vascular: No carotid bruit.      Trachea: Trachea normal.   Cardiovascular:      Rate and Rhythm: Regular rhythm.      Pulses: Normal pulses.      Heart sounds: Normal heart sounds. No murmur heard.      with a grade of 2/6.   Pulmonary:      Effort: No respiratory distress.      Breath sounds: Normal breath sounds. No decreased breath sounds, wheezing, rhonchi or rales.   Abdominal:      General: Bowel sounds are normal.      Palpations: Abdomen is soft.      Tenderness: There is no abdominal tenderness.   Musculoskeletal:      Cervical back: Normal range of motion. No edema.   Lymphadenopathy:      Head:      Right side of head: No submental, submandibular, tonsillar, preauricular, posterior auricular or occipital adenopathy.      Left side of head: No submental, submandibular, tonsillar, preauricular, posterior auricular or occipital adenopathy.   Skin:     General: Skin is warm and dry.      Nails: There is no clubbing.   Neurological:      Mental Status: He is alert.   Psychiatric:         Behavior: Behavior is cooperative.       Physical Exam      Results  Imaging  Doppler ultrasound showed no evidence of a pseudoaneurysm.    Assessment & Plan   Diagnoses and all orders for this visit:    1. Severe aortic stenosis (Primary)    2. S/P TAVR (transcatheter aortic valve replacement)  Assessment & Plan:  The patient's Doppler ultrasound results were satisfactory, showing no signs of pseudoaneurysm. The previously noted knots were found to be benign.       3. Dyspepsia  Assessment & Plan:  He was advised to take Pepcid daily if necessary as it will not increase his risk of  C. Difficile colitis. It was explained that frequent acid reflux could cause gastric irritation and inflammation and daily Pepcid could help manage these symptoms. He currently uses Tums and Pepcid as needed which help control his symptoms. Consider further evaluation if sx worsen.        Assessment & Plan              Patient or patient  representative verbalized consent for the use of Ambient Listening during the visit with  GE Bustos for chart documentation. 10/27/2024  10:41 EDT

## 2024-10-31 ENCOUNTER — LAB (OUTPATIENT)
Dept: LAB | Facility: HOSPITAL | Age: 89
End: 2024-10-31
Payer: MEDICARE

## 2024-10-31 DIAGNOSIS — Z95.3 S/P TAVR (TRANSCATHETER AORTIC VALVE REPLACEMENT), BIOPROSTHETIC: ICD-10-CM

## 2024-10-31 DIAGNOSIS — I35.0 AORTIC STENOSIS, SEVERE: ICD-10-CM

## 2024-10-31 DIAGNOSIS — I45.9 HEART BLOCK: ICD-10-CM

## 2024-10-31 DIAGNOSIS — I50.32 CHRONIC HEART FAILURE WITH PRESERVED EJECTION FRACTION (HFPEF): ICD-10-CM

## 2024-10-31 DIAGNOSIS — I44.2 COMPLETE HEART BLOCK: ICD-10-CM

## 2024-10-31 LAB
ALBUMIN SERPL-MCNC: 4 G/DL (ref 3.5–5.2)
ALBUMIN/GLOB SERPL: 1.5 G/DL
ALP SERPL-CCNC: 85 U/L (ref 39–117)
ALT SERPL W P-5'-P-CCNC: 15 U/L (ref 1–41)
ANION GAP SERPL CALCULATED.3IONS-SCNC: 8.3 MMOL/L (ref 5–15)
AST SERPL-CCNC: 26 U/L (ref 1–40)
BILIRUB SERPL-MCNC: 0.3 MG/DL (ref 0–1.2)
BUN SERPL-MCNC: 21 MG/DL (ref 8–23)
BUN/CREAT SERPL: 24.1 (ref 7–25)
CALCIUM SPEC-SCNC: 8.9 MG/DL (ref 8.2–9.6)
CHLORIDE SERPL-SCNC: 101 MMOL/L (ref 98–107)
CO2 SERPL-SCNC: 27.7 MMOL/L (ref 22–29)
CREAT SERPL-MCNC: 0.87 MG/DL (ref 0.76–1.27)
DEPRECATED RDW RBC AUTO: 42.4 FL (ref 37–54)
EGFRCR SERPLBLD CKD-EPI 2021: 79 ML/MIN/1.73
ERYTHROCYTE [DISTWIDTH] IN BLOOD BY AUTOMATED COUNT: 12.3 % (ref 12.3–15.4)
GLOBULIN UR ELPH-MCNC: 2.7 GM/DL
GLUCOSE SERPL-MCNC: 89 MG/DL (ref 65–99)
HCT VFR BLD AUTO: 40.3 % (ref 37.5–51)
HGB BLD-MCNC: 13.6 G/DL (ref 13–17.7)
MCH RBC QN AUTO: 31.9 PG (ref 26.6–33)
MCHC RBC AUTO-ENTMCNC: 33.7 G/DL (ref 31.5–35.7)
MCV RBC AUTO: 94.6 FL (ref 79–97)
PLATELET # BLD AUTO: 176 10*3/MM3 (ref 140–450)
PMV BLD AUTO: 9.8 FL (ref 6–12)
POTASSIUM SERPL-SCNC: 4.5 MMOL/L (ref 3.5–5.2)
PROT SERPL-MCNC: 6.7 G/DL (ref 6–8.5)
RBC # BLD AUTO: 4.26 10*6/MM3 (ref 4.14–5.8)
SODIUM SERPL-SCNC: 137 MMOL/L (ref 136–145)
WBC NRBC COR # BLD AUTO: 4.91 10*3/MM3 (ref 3.4–10.8)

## 2024-10-31 PROCEDURE — 36415 COLL VENOUS BLD VENIPUNCTURE: CPT

## 2024-10-31 PROCEDURE — 80053 COMPREHEN METABOLIC PANEL: CPT

## 2024-10-31 PROCEDURE — 85027 COMPLETE CBC AUTOMATED: CPT

## 2024-11-04 ENCOUNTER — OFFICE VISIT (OUTPATIENT)
Dept: CARDIAC REHAB | Facility: HOSPITAL | Age: 89
End: 2024-11-04
Payer: MEDICARE

## 2024-11-04 DIAGNOSIS — Z95.2 S/P TAVR (TRANSCATHETER AORTIC VALVE REPLACEMENT): Primary | ICD-10-CM

## 2024-11-04 PROCEDURE — 93798 PHYS/QHP OP CAR RHAB W/ECG: CPT

## 2024-11-04 PROCEDURE — 93797 PHYS/QHP OP CAR RHAB WO ECG: CPT

## 2024-11-07 ENCOUNTER — OFFICE VISIT (OUTPATIENT)
Age: 89
End: 2024-11-07
Payer: MEDICARE

## 2024-11-07 ENCOUNTER — HOSPITAL ENCOUNTER (OUTPATIENT)
Dept: CARDIOLOGY | Facility: HOSPITAL | Age: 89
Discharge: HOME | End: 2024-11-07
Admitting: INTERNAL MEDICINE
Payer: MEDICARE

## 2024-11-07 VITALS — HEIGHT: 67 IN | WEIGHT: 158 LBS | BODY MASS INDEX: 24.8 KG/M2

## 2024-11-07 VITALS
SYSTOLIC BLOOD PRESSURE: 156 MMHG | HEIGHT: 67 IN | DIASTOLIC BLOOD PRESSURE: 72 MMHG | BODY MASS INDEX: 24.8 KG/M2 | OXYGEN SATURATION: 97 % | HEART RATE: 67 BPM | WEIGHT: 158 LBS

## 2024-11-07 DIAGNOSIS — I50.32 CHRONIC HEART FAILURE WITH PRESERVED EJECTION FRACTION (HFPEF): ICD-10-CM

## 2024-11-07 DIAGNOSIS — Z95.3 S/P TAVR (TRANSCATHETER AORTIC VALVE REPLACEMENT), BIOPROSTHETIC: ICD-10-CM

## 2024-11-07 DIAGNOSIS — I35.0 SEVERE AORTIC STENOSIS: Primary | ICD-10-CM

## 2024-11-07 DIAGNOSIS — E78.2 MIXED HYPERLIPIDEMIA: Chronic | ICD-10-CM

## 2024-11-07 DIAGNOSIS — I44.2 COMPLETE HEART BLOCK: ICD-10-CM

## 2024-11-07 DIAGNOSIS — I35.0 AORTIC STENOSIS, SEVERE: ICD-10-CM

## 2024-11-07 LAB
AORTIC ARCH: 3 CM
AORTIC DIMENSIONLESS INDEX: 0.4 (DI)
ASCENDING AORTA: 4.1 CM
BH CV ECHO AV AORTIC VALVE AT ACCEL TIME CALCULATED: 90 MSEC
BH CV ECHO LEFT VENTRICLE GLOBAL LONGITUDINAL STRAIN: -24 %
BH CV ECHO MEAS - AO MAX PG: 14.4 MMHG
BH CV ECHO MEAS - AO MEAN PG: 7.5 MMHG
BH CV ECHO MEAS - AO ROOT AREA (BSA CORRECTED): 1.8 CM2
BH CV ECHO MEAS - AO ROOT DIAM: 3.2 CM
BH CV ECHO MEAS - AO V2 MAX: 189.6 CM/SEC
BH CV ECHO MEAS - AO V2 VTI: 35.4 CM
BH CV ECHO MEAS - AT: 0.09 SEC
BH CV ECHO MEAS - AVA(I,D): 1.43 CM2
BH CV ECHO MEAS - EDV(CUBED): 62.3 ML
BH CV ECHO MEAS - EDV(MOD-SP2): 87 ML
BH CV ECHO MEAS - EDV(MOD-SP4): 88 ML
BH CV ECHO MEAS - EF(MOD-BP): 55.4 %
BH CV ECHO MEAS - EF(MOD-SP2): 54 %
BH CV ECHO MEAS - EF(MOD-SP4): 56.8 %
BH CV ECHO MEAS - EF_3D-VOL: 63 %
BH CV ECHO MEAS - ESV(CUBED): 19.5 ML
BH CV ECHO MEAS - ESV(MOD-SP2): 40 ML
BH CV ECHO MEAS - ESV(MOD-SP4): 38 ML
BH CV ECHO MEAS - FS: 32.1 %
BH CV ECHO MEAS - IVS/LVPW: 0.94 CM
BH CV ECHO MEAS - IVSD: 1.68 CM
BH CV ECHO MEAS - LAT PEAK E' VEL: 4.9 CM/SEC
BH CV ECHO MEAS - LV DIASTOLIC VOL/BSA (35-75): 48 CM2
BH CV ECHO MEAS - LV MASS(C)D: 289.9 GRAMS
BH CV ECHO MEAS - LV MAX PG: 2.5 MMHG
BH CV ECHO MEAS - LV MEAN PG: 1.41 MMHG
BH CV ECHO MEAS - LV SYSTOLIC VOL/BSA (12-30): 20.7 CM2
BH CV ECHO MEAS - LV V1 MAX: 79.1 CM/SEC
BH CV ECHO MEAS - LV V1 VTI: 14.4 CM
BH CV ECHO MEAS - LVIDD: 4 CM
BH CV ECHO MEAS - LVIDS: 2.7 CM
BH CV ECHO MEAS - LVOT AREA: 3.5 CM2
BH CV ECHO MEAS - LVOT DIAM: 2.12 CM
BH CV ECHO MEAS - LVPWD: 1.79 CM
BH CV ECHO MEAS - MED PEAK E' VEL: 4.1 CM/SEC
BH CV ECHO MEAS - MR MAX PG: 121.7 MMHG
BH CV ECHO MEAS - MR MAX VEL: 551.6 CM/SEC
BH CV ECHO MEAS - MV A DUR: 0.11 SEC
BH CV ECHO MEAS - MV A MAX VEL: 135.5 CM/SEC
BH CV ECHO MEAS - MV DEC SLOPE: 292.1 CM/SEC2
BH CV ECHO MEAS - MV DEC TIME: 0.29 SEC
BH CV ECHO MEAS - MV E MAX VEL: 86.2 CM/SEC
BH CV ECHO MEAS - MV E/A: 0.64
BH CV ECHO MEAS - MV MAX PG: 7.4 MMHG
BH CV ECHO MEAS - MV MEAN PG: 2.6 MMHG
BH CV ECHO MEAS - MV P1/2T: 79.3 MSEC
BH CV ECHO MEAS - MV V2 VTI: 36.3 CM
BH CV ECHO MEAS - MVA(P1/2T): 2.8 CM2
BH CV ECHO MEAS - MVA(VTI): 1.4 CM2
BH CV ECHO MEAS - PA ACC TIME: 0.05 SEC
BH CV ECHO MEAS - PA V2 MAX: 84.1 CM/SEC
BH CV ECHO MEAS - PULM A REVS DUR: 0.09 SEC
BH CV ECHO MEAS - PULM A REVS VEL: 30.6 CM/SEC
BH CV ECHO MEAS - PULM DIAS VEL: 50.2 CM/SEC
BH CV ECHO MEAS - PULM S/D: 1.05
BH CV ECHO MEAS - PULM SYS VEL: 52.5 CM/SEC
BH CV ECHO MEAS - QP/QS: 1.23
BH CV ECHO MEAS - RAP SYSTOLE: 3 MMHG
BH CV ECHO MEAS - RV MAX PG: 1.31 MMHG
BH CV ECHO MEAS - RV V1 MAX: 57.1 CM/SEC
BH CV ECHO MEAS - RV V1 VTI: 11.8 CM
BH CV ECHO MEAS - RVOT DIAM: 2.6 CM
BH CV ECHO MEAS - RVSP: 29 MMHG
BH CV ECHO MEAS - SV(LVOT): 50.7 ML
BH CV ECHO MEAS - SV(MOD-SP2): 47 ML
BH CV ECHO MEAS - SV(MOD-SP4): 50 ML
BH CV ECHO MEAS - SV(RVOT): 62.4 ML
BH CV ECHO MEAS - SVI(LVOT): 27.7 ML/M2
BH CV ECHO MEAS - SVI(MOD-SP2): 25.6 ML/M2
BH CV ECHO MEAS - SVI(MOD-SP4): 27.3 ML/M2
BH CV ECHO MEAS - TAPSE (>1.6): 3 CM
BH CV ECHO MEAS - TR MAX PG: 26 MMHG
BH CV ECHO MEAS - TR MAX VEL: 255.1 CM/SEC
BH CV ECHO MEASUREMENTS AVERAGE E/E' RATIO: 19.16
BH CV XLRA - RV BASE: 3.4 CM
BH CV XLRA - RV LENGTH: 7.9 CM
BH CV XLRA - RV MID: 3.3 CM
BH CV XLRA - TDI S': 15.9 CM/SEC
LEFT ATRIUM VOLUME INDEX: 50.3 ML/M2
SINUS: 3.1 CM
STJ: 3.2 CM

## 2024-11-07 PROCEDURE — 93356 MYOCRD STRAIN IMG SPCKL TRCK: CPT

## 2024-11-07 PROCEDURE — 93306 TTE W/DOPPLER COMPLETE: CPT

## 2024-11-07 RX ORDER — EZETIMIBE 10 MG/1
10 TABLET ORAL DAILY
Qty: 90 TABLET | Refills: 3 | Status: SHIPPED | OUTPATIENT
Start: 2024-11-07

## 2024-11-07 NOTE — PROGRESS NOTES
Vicente Delgado  1/11/1928  Date of Office Visit: 11/07/24  Encounter Provider: Olaf Sales MD  Place of Service: [facility] CARDIOLOGY      CHIEF COMPLAINT:  Transcatheter aortic valve replacement  Complete heart block      HISTORY OF PRESENT ILLNESS:  89 y.o. male with a medical history of severe degenerative aortic valve stenosis, chronic heart failure with preserved ejection fraction, hypertension, paroxysmal atrial fibrillation and history of TIA.  He has been followed in our office for his aortic valve stenosis.  On 09/30/2024, he came to the emergency room as he felt as he was going to pass out when he stood.  His blood pressures were a bit soft therefore lisinopril was stopped.  Echocardiogram confirmed severe aortic stenosis and moderate AI.  He underwent TAVR workup.  Left and right heart cath showed tubular 60% mid vessel LAD stenosis along with diffuse 50% proximal OM1 stenosis, RCA chronically occluded in mid segment.  He had normal filling pressures no pulmonary hypertension.    He was able to go home and presented back to the hospital for scheduled TAVR on  10/09/24 .  He underwent placement of a 26 mm DANISH 3 ultra transcatheter aortic valve.  He tolerated this well but was noted to have Mobitz 1 second-degree AV block while in the hospital and went home with a monitor.  His follow-up transthoracic echocardiogram done on 10/9/2024 showed normal valve function with a mean gradient of 8 mmHg across the aortic valve.  He wore a Zio patch that was abnormal and did have evidence of high-grade AV block.  He subsequently was evaluated by the electrophysiology team and is scheduled to have a permanent pacemaker on 11/18/2024    Review of Systems   Constitutional: Negative for fever and malaise/fatigue.   HENT:  Negative for nosebleeds and sore throat.    Eyes:  Negative for blurred vision and double vision.   Cardiovascular:  Negative for chest pain, claudication,  palpitations and syncope.   Respiratory:  Negative for cough, shortness of breath and snoring.    Endocrine: Negative for cold intolerance, heat intolerance and polydipsia.   Skin:  Negative for itching, poor wound healing and rash.   Musculoskeletal:  Negative for joint pain, joint swelling, muscle weakness and myalgias.   Gastrointestinal:  Negative for abdominal pain, melena, nausea and vomiting.   Neurological:  Negative for light-headedness, loss of balance, seizures, vertigo and weakness.   Psychiatric/Behavioral:  Negative for altered mental status and depression.      Past Medical History:   Diagnosis Date    Acquired absence of other specified parts of digestive tract     ADHD (attention deficit hyperactivity disorder)     Allergic rhinitis     Anemia     Anxiety     Aortic valve insufficiency     Aortic valve replaced 10/08    Arthritis     Atherosclerosis of native arteries of extremities with intermittent claudication, left leg 05/17/2021    Atherosclerotic heart disease of native coronary artery without angina pectoris     Atrial fibrillation     Burn injury     CAD (coronary artery disease) 07/01/2016    History of mild disease.  Stress test 2017 with no ischemia.  He is on aspirin statin and Zetia.  No angina pectoris. EF normal 1/2017 echo    Calculus of gallbladder without cholecystitis without obstruction     Cancer     Cataract June 2018” and    Ocular mygraine    Cholelithiasis 2020    Chronic deep vein thrombosis (DVT) of iliac vein 12/18/2023    Chronic deep vein thrombosis (DVT) of left femoral vein 12/18/2023    Clostridioides difficile diarrhea 2023    Clotting disorder 2023. January    Wrong medicine    Colon polyps     FOLLOWED BY DR. ROBERT SOTELO    Deep vein thrombosis January2023 dr arnoldo Mathews     Diverticulosis     Elevated cholesterol     Embolism and thrombosis of arteries of the lower extremities 05/17/2021    Esophagitis     Gastritis     GERD (gastroesophageal reflux  "disease)     Heart disease     Heart murmur     History of anxiety     History of medical problems Right shoulder replacemd    2008    History of prostate cancer 06/1990    History of transfusion 1990    4 pints    HL (hearing loss) Partial    Hypercholesterolemia     Hyperlipidemia     Hypertension 11/04/2021    Insomnia     Low back pain Yes  from hworking    Lupus     Myocardial infarction     TAM (nonalcoholic steatohepatitis)     Nonrheumatic mitral (valve) insufficiency     Pain of left lower leg 05/17/2021    Palpitations     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 Rodríguez 10/2021    Varicose veins of right lower extremity with pain 08/10/2022    Venous insufficiency (chronic) (peripheral) 08/10/2022    Visual impairment Ocular mygraine       The following portions of the patient's history were reviewed and updated as appropriate: Social history , Family history, and Surgical history     Current Outpatient Medications on File Prior to Visit   Medication Sig Dispense Refill    aspirin 81 MG EC tablet Take 1 tablet by mouth Daily for 90 days. 30 tablet 2    rosuvastatin (CRESTOR) 10 MG tablet Take 1 tablet by mouth Every Night. 90 tablet 3    [DISCONTINUED] ezetimibe (ZETIA) 10 MG tablet TAKE ONE TABLET BY MOUTH DAILY 90 tablet 3     No current facility-administered medications on file prior to visit.       Allergies   Allergen Reactions    Lidocaine Dizziness     Reaction to novacaine    Lovastatin Other (See Comments)     Liver enzymes elevated    Pravastatin Other (See Comments)     Elevated liver enzymes     Gabapentin GI Intolerance     Bloating, incontinence     Procaine     Simvastatin        Vitals:    11/07/24 1010   Height: 170.2 cm (67\")     Body mass index is 24.75 kg/m².   Physical Exam    Lab Results   Component Value Date    WBC 4.91 " 10/31/2024    HGB 13.6 10/31/2024    HCT 40.3 10/31/2024    MCV 94.6 10/31/2024     10/31/2024       Lab Results   Component Value Date    GLUCOSE 89 10/31/2024    BUN 21 10/31/2024    CREATININE 0.87 10/31/2024     10/31/2024    K 4.5 10/31/2024     10/31/2024    CALCIUM 8.9 10/31/2024    PROTEINTOT 6.7 10/31/2024    ALBUMIN 4.0 10/31/2024    ALT 15 10/31/2024    AST 26 10/31/2024    ALKPHOS 85 10/31/2024    BILITOT 0.3 10/31/2024    GLOB 2.7 10/31/2024    AGRATIO 1.5 10/31/2024    BCR 24.1 10/31/2024    ANIONGAP 8.3 10/31/2024    EGFR 79.0 10/31/2024       Lab Results   Component Value Date    GLUCOSE 89 10/31/2024    CALCIUM 8.9 10/31/2024     10/31/2024    K 4.5 10/31/2024    CO2 27.7 10/31/2024     10/31/2024    BUN 21 10/31/2024    CREATININE 0.87 10/31/2024    EGFRRESULT 79.2 04/05/2024    EGFR 79.0 10/31/2024    BCR 24.1 10/31/2024    ANIONGAP 8.3 10/31/2024       Lab Results   Component Value Date    CHOL 132 11/15/2022    CHLPL 140 10/17/2022    TRIG 146 11/15/2022    HDL 41 11/15/2022    LDL 66 11/15/2022         ECG 12 Lead    Date/Time: 11/7/2024 10:52 AM  Performed by: Olaf Sales MD    Authorized by: Olaf Sales MD  Comparison: compared with previous ECG from 10/24/2024  Similar to previous ECG  Rhythm: sinus rhythm  Rate: normal  Conduction: non-specific intraventricular conduction delay  QRS axis: normal           Results for orders placed during the hospital encounter of 10/08/24    Echocardiogram 2D Complete    Interpretation Summary    Left ventricular systolic function is normal. Left ventricular ejection fraction appears to be 61 - 65%.    Left ventricular wall thickness is consistent with moderate concentric hypertrophy.    Left ventricular diastolic function is consistent with (grade Ia w/high LAP) impaired relaxation.    The left atrial cavity is moderate to severely dilated.    Peak velocity of the flow distal to the aortic valve is 220.2  cm/s. Aortic valve mean pressure gradient is 8 mmHg. Aortic valve dimensionless index is 0.5 .    There is a TAVR valve present.  Normal prosthetic valve function.  No significant aortic regurgitation    Estimated right ventricular systolic pressure from tricuspid regurgitation is normal (<35 mmHg).      DISCUSSION/SUMMARY  89-year-old male with a medical history of severe degenerative aortic valve stenosis, transcatheter aortic valve replacement, chronic heart failure with preserved ejection fraction with paroxysmal atrial fibrillation, TIA, and post procedure complete heart block who presents to see me in follow-up.  He has been evaluated by the electrophysiology team and is subsequently scheduled for permanent pacemaker.  He states he has been doing well.  He denies chest pain or BARR.  He occasionally does have lightheadedness upon standing from a seated position.    1.  Severe degenerative aortic valve stenosis: Status post transcatheter aortic valve replacement with 26 mm DANISH 3 ultra transcatheter aortic valve  - Echo today has been reviewed.  Valve is functioning well.  Mean gradient 8 mmHg with a dimensionless index of 0.4.  No paravalvular regurgitation.  -Continue aspirin 81 mg p.o. daily.  -Antibiotic prophylaxis in the future for any dental work.    2.  Hyperlipidemia: Continues on low-dose Crestor and Zetia therapy  -CMP has been reviewed.  Liver function is fine.  He is 89 and I do not think we need to keep following his lipid panel.  I am not can to change his medicine.

## 2024-11-07 NOTE — H&P (VIEW-ONLY)
Vicente Delgado  1/11/1928  Date of Office Visit: 11/07/24  Encounter Provider: Olaf Sales MD  Place of Service: [facility] CARDIOLOGY      CHIEF COMPLAINT:  Transcatheter aortic valve replacement  Complete heart block      HISTORY OF PRESENT ILLNESS:  89 y.o. male with a medical history of severe degenerative aortic valve stenosis, chronic heart failure with preserved ejection fraction, hypertension, paroxysmal atrial fibrillation and history of TIA.  He has been followed in our office for his aortic valve stenosis.  On 09/30/2024, he came to the emergency room as he felt as he was going to pass out when he stood.  His blood pressures were a bit soft therefore lisinopril was stopped.  Echocardiogram confirmed severe aortic stenosis and moderate AI.  He underwent TAVR workup.  Left and right heart cath showed tubular 60% mid vessel LAD stenosis along with diffuse 50% proximal OM1 stenosis, RCA chronically occluded in mid segment.  He had normal filling pressures no pulmonary hypertension.    He was able to go home and presented back to the hospital for scheduled TAVR on  10/09/24 .  He underwent placement of a 26 mm DANISH 3 ultra transcatheter aortic valve.  He tolerated this well but was noted to have Mobitz 1 second-degree AV block while in the hospital and went home with a monitor.  His follow-up transthoracic echocardiogram done on 10/9/2024 showed normal valve function with a mean gradient of 8 mmHg across the aortic valve.  He wore a Zio patch that was abnormal and did have evidence of high-grade AV block.  He subsequently was evaluated by the electrophysiology team and is scheduled to have a permanent pacemaker on 11/18/2024    Review of Systems   Constitutional: Negative for fever and malaise/fatigue.   HENT:  Negative for nosebleeds and sore throat.    Eyes:  Negative for blurred vision and double vision.   Cardiovascular:  Negative for chest pain, claudication,  palpitations and syncope.   Respiratory:  Negative for cough, shortness of breath and snoring.    Endocrine: Negative for cold intolerance, heat intolerance and polydipsia.   Skin:  Negative for itching, poor wound healing and rash.   Musculoskeletal:  Negative for joint pain, joint swelling, muscle weakness and myalgias.   Gastrointestinal:  Negative for abdominal pain, melena, nausea and vomiting.   Neurological:  Negative for light-headedness, loss of balance, seizures, vertigo and weakness.   Psychiatric/Behavioral:  Negative for altered mental status and depression.      Past Medical History:   Diagnosis Date    Acquired absence of other specified parts of digestive tract     ADHD (attention deficit hyperactivity disorder)     Allergic rhinitis     Anemia     Anxiety     Aortic valve insufficiency     Aortic valve replaced 10/08    Arthritis     Atherosclerosis of native arteries of extremities with intermittent claudication, left leg 05/17/2021    Atherosclerotic heart disease of native coronary artery without angina pectoris     Atrial fibrillation     Burn injury     CAD (coronary artery disease) 07/01/2016    History of mild disease.  Stress test 2017 with no ischemia.  He is on aspirin statin and Zetia.  No angina pectoris. EF normal 1/2017 echo    Calculus of gallbladder without cholecystitis without obstruction     Cancer     Cataract June 2018” and    Ocular mygraine    Cholelithiasis 2020    Chronic deep vein thrombosis (DVT) of iliac vein 12/18/2023    Chronic deep vein thrombosis (DVT) of left femoral vein 12/18/2023    Clostridioides difficile diarrhea 2023    Clotting disorder 2023. January    Wrong medicine    Colon polyps     FOLLOWED BY DR. ROBERT SOTELO    Deep vein thrombosis January2023 dr arnoldo Mathews     Diverticulosis     Elevated cholesterol     Embolism and thrombosis of arteries of the lower extremities 05/17/2021    Esophagitis     Gastritis     GERD (gastroesophageal reflux  "disease)     Heart disease     Heart murmur     History of anxiety     History of medical problems Right shoulder replacemd    2008    History of prostate cancer 06/1990    History of transfusion 1990    4 pints    HL (hearing loss) Partial    Hypercholesterolemia     Hyperlipidemia     Hypertension 11/04/2021    Insomnia     Low back pain Yes  from hworking    Lupus     Myocardial infarction     TAM (nonalcoholic steatohepatitis)     Nonrheumatic mitral (valve) insufficiency     Pain of left lower leg 05/17/2021    Palpitations     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 Rodríguez 10/2021    Varicose veins of right lower extremity with pain 08/10/2022    Venous insufficiency (chronic) (peripheral) 08/10/2022    Visual impairment Ocular mygraine       The following portions of the patient's history were reviewed and updated as appropriate: Social history , Family history, and Surgical history     Current Outpatient Medications on File Prior to Visit   Medication Sig Dispense Refill    aspirin 81 MG EC tablet Take 1 tablet by mouth Daily for 90 days. 30 tablet 2    rosuvastatin (CRESTOR) 10 MG tablet Take 1 tablet by mouth Every Night. 90 tablet 3    [DISCONTINUED] ezetimibe (ZETIA) 10 MG tablet TAKE ONE TABLET BY MOUTH DAILY 90 tablet 3     No current facility-administered medications on file prior to visit.       Allergies   Allergen Reactions    Lidocaine Dizziness     Reaction to novacaine    Lovastatin Other (See Comments)     Liver enzymes elevated    Pravastatin Other (See Comments)     Elevated liver enzymes     Gabapentin GI Intolerance     Bloating, incontinence     Procaine     Simvastatin        Vitals:    11/07/24 1010   Height: 170.2 cm (67\")     Body mass index is 24.75 kg/m².   Physical Exam    Lab Results   Component Value Date    WBC 4.91 " 10/31/2024    HGB 13.6 10/31/2024    HCT 40.3 10/31/2024    MCV 94.6 10/31/2024     10/31/2024       Lab Results   Component Value Date    GLUCOSE 89 10/31/2024    BUN 21 10/31/2024    CREATININE 0.87 10/31/2024     10/31/2024    K 4.5 10/31/2024     10/31/2024    CALCIUM 8.9 10/31/2024    PROTEINTOT 6.7 10/31/2024    ALBUMIN 4.0 10/31/2024    ALT 15 10/31/2024    AST 26 10/31/2024    ALKPHOS 85 10/31/2024    BILITOT 0.3 10/31/2024    GLOB 2.7 10/31/2024    AGRATIO 1.5 10/31/2024    BCR 24.1 10/31/2024    ANIONGAP 8.3 10/31/2024    EGFR 79.0 10/31/2024       Lab Results   Component Value Date    GLUCOSE 89 10/31/2024    CALCIUM 8.9 10/31/2024     10/31/2024    K 4.5 10/31/2024    CO2 27.7 10/31/2024     10/31/2024    BUN 21 10/31/2024    CREATININE 0.87 10/31/2024    EGFRRESULT 79.2 04/05/2024    EGFR 79.0 10/31/2024    BCR 24.1 10/31/2024    ANIONGAP 8.3 10/31/2024       Lab Results   Component Value Date    CHOL 132 11/15/2022    CHLPL 140 10/17/2022    TRIG 146 11/15/2022    HDL 41 11/15/2022    LDL 66 11/15/2022         ECG 12 Lead    Date/Time: 11/7/2024 10:52 AM  Performed by: Olaf Sales MD    Authorized by: Olaf Sales MD  Comparison: compared with previous ECG from 10/24/2024  Similar to previous ECG  Rhythm: sinus rhythm  Rate: normal  Conduction: non-specific intraventricular conduction delay  QRS axis: normal           Results for orders placed during the hospital encounter of 10/08/24    Echocardiogram 2D Complete    Interpretation Summary    Left ventricular systolic function is normal. Left ventricular ejection fraction appears to be 61 - 65%.    Left ventricular wall thickness is consistent with moderate concentric hypertrophy.    Left ventricular diastolic function is consistent with (grade Ia w/high LAP) impaired relaxation.    The left atrial cavity is moderate to severely dilated.    Peak velocity of the flow distal to the aortic valve is 220.2  cm/s. Aortic valve mean pressure gradient is 8 mmHg. Aortic valve dimensionless index is 0.5 .    There is a TAVR valve present.  Normal prosthetic valve function.  No significant aortic regurgitation    Estimated right ventricular systolic pressure from tricuspid regurgitation is normal (<35 mmHg).      DISCUSSION/SUMMARY  89-year-old male with a medical history of severe degenerative aortic valve stenosis, transcatheter aortic valve replacement, chronic heart failure with preserved ejection fraction with paroxysmal atrial fibrillation, TIA, and post procedure complete heart block who presents to see me in follow-up.  He has been evaluated by the electrophysiology team and is subsequently scheduled for permanent pacemaker.  He states he has been doing well.  He denies chest pain or BARR.  He occasionally does have lightheadedness upon standing from a seated position.    1.  Severe degenerative aortic valve stenosis: Status post transcatheter aortic valve replacement with 26 mm DANISH 3 ultra transcatheter aortic valve  - Echo today has been reviewed.  Valve is functioning well.  Mean gradient 8 mmHg with a dimensionless index of 0.4.  No paravalvular regurgitation.  -Continue aspirin 81 mg p.o. daily.  -Antibiotic prophylaxis in the future for any dental work.    2.  Hyperlipidemia: Continues on low-dose Crestor and Zetia therapy  -CMP has been reviewed.  Liver function is fine.  He is 89 and I do not think we need to keep following his lipid panel.  I am not can to change his medicine.

## 2024-11-08 ENCOUNTER — TREATMENT (OUTPATIENT)
Dept: CARDIAC REHAB | Facility: HOSPITAL | Age: 89
End: 2024-11-08
Payer: MEDICARE

## 2024-11-08 DIAGNOSIS — Z95.2 S/P TAVR (TRANSCATHETER AORTIC VALVE REPLACEMENT): Primary | ICD-10-CM

## 2024-11-08 PROCEDURE — 93798 PHYS/QHP OP CAR RHAB W/ECG: CPT

## 2024-11-11 ENCOUNTER — TREATMENT (OUTPATIENT)
Dept: CARDIAC REHAB | Facility: HOSPITAL | Age: 89
End: 2024-11-11
Payer: MEDICARE

## 2024-11-11 ENCOUNTER — LAB (OUTPATIENT)
Facility: HOSPITAL | Age: 89
End: 2024-11-11
Payer: MEDICARE

## 2024-11-11 DIAGNOSIS — Z95.3 S/P TAVR (TRANSCATHETER AORTIC VALVE REPLACEMENT), BIOPROSTHETIC: ICD-10-CM

## 2024-11-11 DIAGNOSIS — I45.9 HEART BLOCK: ICD-10-CM

## 2024-11-11 DIAGNOSIS — I35.0 AORTIC STENOSIS, SEVERE: ICD-10-CM

## 2024-11-11 DIAGNOSIS — Z95.2 S/P TAVR (TRANSCATHETER AORTIC VALVE REPLACEMENT): Primary | ICD-10-CM

## 2024-11-11 DIAGNOSIS — I50.32 CHRONIC HEART FAILURE WITH PRESERVED EJECTION FRACTION (HFPEF): ICD-10-CM

## 2024-11-11 DIAGNOSIS — I44.2 COMPLETE HEART BLOCK: ICD-10-CM

## 2024-11-11 LAB
ALBUMIN SERPL-MCNC: 4 G/DL (ref 3.5–5.2)
ALBUMIN/GLOB SERPL: 1.4 G/DL
ALP SERPL-CCNC: 84 U/L (ref 39–117)
ALT SERPL W P-5'-P-CCNC: 15 U/L (ref 1–41)
ANION GAP SERPL CALCULATED.3IONS-SCNC: 9 MMOL/L (ref 5–15)
AST SERPL-CCNC: 25 U/L (ref 1–40)
BILIRUB SERPL-MCNC: 0.4 MG/DL (ref 0–1.2)
BUN SERPL-MCNC: 22 MG/DL (ref 8–23)
BUN/CREAT SERPL: 29.7 (ref 7–25)
CALCIUM SPEC-SCNC: 9.1 MG/DL (ref 8.2–9.6)
CHLORIDE SERPL-SCNC: 105 MMOL/L (ref 98–107)
CO2 SERPL-SCNC: 24 MMOL/L (ref 22–29)
CREAT SERPL-MCNC: 0.74 MG/DL (ref 0.76–1.27)
DEPRECATED RDW RBC AUTO: 41 FL (ref 37–54)
EGFRCR SERPLBLD CKD-EPI 2021: 82.9 ML/MIN/1.73
ERYTHROCYTE [DISTWIDTH] IN BLOOD BY AUTOMATED COUNT: 12.5 % (ref 12.3–15.4)
GLOBULIN UR ELPH-MCNC: 2.8 GM/DL
GLUCOSE SERPL-MCNC: 110 MG/DL (ref 65–99)
HCT VFR BLD AUTO: 40.5 % (ref 37.5–51)
HGB BLD-MCNC: 13.7 G/DL (ref 13–17.7)
MCH RBC QN AUTO: 30.8 PG (ref 26.6–33)
MCHC RBC AUTO-ENTMCNC: 33.8 G/DL (ref 31.5–35.7)
MCV RBC AUTO: 91 FL (ref 79–97)
PLATELET # BLD AUTO: 154 10*3/MM3 (ref 140–450)
PMV BLD AUTO: 10.7 FL (ref 6–12)
POTASSIUM SERPL-SCNC: 4 MMOL/L (ref 3.5–5.2)
PROT SERPL-MCNC: 6.8 G/DL (ref 6–8.5)
RBC # BLD AUTO: 4.45 10*6/MM3 (ref 4.14–5.8)
SODIUM SERPL-SCNC: 138 MMOL/L (ref 136–145)
WBC NRBC COR # BLD AUTO: 5.01 10*3/MM3 (ref 3.4–10.8)

## 2024-11-11 PROCEDURE — 80053 COMPREHEN METABOLIC PANEL: CPT

## 2024-11-11 PROCEDURE — 93798 PHYS/QHP OP CAR RHAB W/ECG: CPT

## 2024-11-11 PROCEDURE — 85027 COMPLETE CBC AUTOMATED: CPT

## 2024-11-11 PROCEDURE — 36415 COLL VENOUS BLD VENIPUNCTURE: CPT

## 2024-11-13 ENCOUNTER — TREATMENT (OUTPATIENT)
Dept: CARDIAC REHAB | Facility: HOSPITAL | Age: 89
End: 2024-11-13
Payer: MEDICARE

## 2024-11-13 DIAGNOSIS — Z95.2 S/P TAVR (TRANSCATHETER AORTIC VALVE REPLACEMENT): Primary | ICD-10-CM

## 2024-11-13 PROCEDURE — 93798 PHYS/QHP OP CAR RHAB W/ECG: CPT

## 2024-11-15 ENCOUNTER — TREATMENT (OUTPATIENT)
Dept: CARDIAC REHAB | Facility: HOSPITAL | Age: 89
End: 2024-11-15
Payer: MEDICARE

## 2024-11-15 DIAGNOSIS — Z95.2 S/P TAVR (TRANSCATHETER AORTIC VALVE REPLACEMENT): Primary | ICD-10-CM

## 2024-11-15 PROCEDURE — 93798 PHYS/QHP OP CAR RHAB W/ECG: CPT

## 2024-11-18 ENCOUNTER — APPOINTMENT (OUTPATIENT)
Dept: GENERAL RADIOLOGY | Facility: HOSPITAL | Age: 89
End: 2024-11-18
Payer: MEDICARE

## 2024-11-18 ENCOUNTER — HOSPITAL ENCOUNTER (OUTPATIENT)
Facility: HOSPITAL | Age: 89
Discharge: HOME | End: 2024-11-19
Attending: INTERNAL MEDICINE | Admitting: INTERNAL MEDICINE
Payer: MEDICARE

## 2024-11-18 ENCOUNTER — APPOINTMENT (OUTPATIENT)
Dept: CARDIAC REHAB | Facility: HOSPITAL | Age: 89
End: 2024-11-18
Payer: MEDICARE

## 2024-11-18 DIAGNOSIS — I45.9 HEART BLOCK: ICD-10-CM

## 2024-11-18 DIAGNOSIS — I44.2 COMPLETE HEART BLOCK: ICD-10-CM

## 2024-11-18 LAB
QT INTERVAL: 423 MS
QT INTERVAL: 429 MS
QTC INTERVAL: 486 MS
QTC INTERVAL: 492 MS

## 2024-11-18 PROCEDURE — G0378 HOSPITAL OBSERVATION PER HR: HCPCS

## 2024-11-18 PROCEDURE — 33208 INSRT HEART PM ATRIAL & VENT: CPT | Performed by: INTERNAL MEDICINE

## 2024-11-18 PROCEDURE — C1785 PMKR, DUAL, RATE-RESP: HCPCS | Performed by: INTERNAL MEDICINE

## 2024-11-18 PROCEDURE — C1887 CATHETER, GUIDING: HCPCS | Performed by: INTERNAL MEDICINE

## 2024-11-18 PROCEDURE — 71045 X-RAY EXAM CHEST 1 VIEW: CPT

## 2024-11-18 PROCEDURE — C1894 INTRO/SHEATH, NON-LASER: HCPCS | Performed by: INTERNAL MEDICINE

## 2024-11-18 PROCEDURE — 93005 ELECTROCARDIOGRAM TRACING: CPT | Performed by: INTERNAL MEDICINE

## 2024-11-18 PROCEDURE — 25010000002 LIDOCAINE-EPINEPHRINE (PF) 1 %-1:200000 SOLUTION: Performed by: INTERNAL MEDICINE

## 2024-11-18 PROCEDURE — 25810000003 SODIUM CHLORIDE 0.9 % SOLUTION: Performed by: INTERNAL MEDICINE

## 2024-11-18 PROCEDURE — 25010000002 MIDAZOLAM PER 1 MG: Performed by: INTERNAL MEDICINE

## 2024-11-18 PROCEDURE — 25010000002 FENTANYL CITRATE (PF) 50 MCG/ML SOLUTION: Performed by: INTERNAL MEDICINE

## 2024-11-18 PROCEDURE — C1898 LEAD, PMKR, OTHER THAN TRANS: HCPCS | Performed by: INTERNAL MEDICINE

## 2024-11-18 PROCEDURE — 93010 ELECTROCARDIOGRAM REPORT: CPT | Performed by: INTERNAL MEDICINE

## 2024-11-18 DEVICE — LD PM CAPSUREFIX NOVUS5076 52CM: Type: IMPLANTABLE DEVICE | Site: HEART | Status: FUNCTIONAL

## 2024-11-18 DEVICE — IMPLANTABLE DEVICE: Type: IMPLANTABLE DEVICE | Site: HEART | Status: FUNCTIONAL

## 2024-11-18 DEVICE — GEN PM AZURE XT SURESCAN DR MRI: Type: IMPLANTABLE DEVICE | Site: CHEST WALL | Status: FUNCTIONAL

## 2024-11-18 RX ORDER — SODIUM CHLORIDE 9 MG/ML
75 INJECTION, SOLUTION INTRAVENOUS CONTINUOUS
Status: DISCONTINUED | OUTPATIENT
Start: 2024-11-18 | End: 2024-11-19 | Stop reason: HOSPADM

## 2024-11-18 RX ORDER — NITROGLYCERIN 0.4 MG/1
0.4 TABLET SUBLINGUAL
Status: DISCONTINUED | OUTPATIENT
Start: 2024-11-18 | End: 2024-11-19 | Stop reason: HOSPADM

## 2024-11-18 RX ORDER — SODIUM CHLORIDE 0.9 % (FLUSH) 0.9 %
3 SYRINGE (ML) INJECTION EVERY 12 HOURS SCHEDULED
Status: DISCONTINUED | OUTPATIENT
Start: 2024-11-18 | End: 2024-11-19 | Stop reason: HOSPADM

## 2024-11-18 RX ORDER — FENTANYL CITRATE 50 UG/ML
INJECTION, SOLUTION INTRAMUSCULAR; INTRAVENOUS
Status: DISCONTINUED | OUTPATIENT
Start: 2024-11-18 | End: 2024-11-18 | Stop reason: HOSPADM

## 2024-11-18 RX ORDER — ONDANSETRON 2 MG/ML
4 INJECTION INTRAMUSCULAR; INTRAVENOUS EVERY 6 HOURS PRN
Status: DISCONTINUED | OUTPATIENT
Start: 2024-11-18 | End: 2024-11-19 | Stop reason: HOSPADM

## 2024-11-18 RX ORDER — SODIUM CHLORIDE 9 MG/ML
40 INJECTION, SOLUTION INTRAVENOUS AS NEEDED
Status: DISCONTINUED | OUTPATIENT
Start: 2024-11-18 | End: 2024-11-19 | Stop reason: HOSPADM

## 2024-11-18 RX ORDER — ACETAMINOPHEN 500 MG
1000 TABLET ORAL EVERY 4 HOURS PRN
Status: DISCONTINUED | OUTPATIENT
Start: 2024-11-18 | End: 2024-11-19 | Stop reason: HOSPADM

## 2024-11-18 RX ORDER — NITROGLYCERIN 0.4 MG/1
0.4 TABLET SUBLINGUAL
Status: DISCONTINUED | OUTPATIENT
Start: 2024-11-18 | End: 2024-11-18 | Stop reason: HOSPADM

## 2024-11-18 RX ORDER — MIDAZOLAM HYDROCHLORIDE 1 MG/ML
INJECTION, SOLUTION INTRAMUSCULAR; INTRAVENOUS
Status: DISCONTINUED | OUTPATIENT
Start: 2024-11-18 | End: 2024-11-18 | Stop reason: HOSPADM

## 2024-11-18 RX ORDER — SODIUM CHLORIDE 0.9 % (FLUSH) 0.9 %
10 SYRINGE (ML) INJECTION AS NEEDED
Status: DISCONTINUED | OUTPATIENT
Start: 2024-11-18 | End: 2024-11-18 | Stop reason: HOSPADM

## 2024-11-18 RX ORDER — ASPIRIN 81 MG/1
81 TABLET ORAL DAILY
Status: DISCONTINUED | OUTPATIENT
Start: 2024-11-19 | End: 2024-11-19 | Stop reason: HOSPADM

## 2024-11-18 RX ORDER — METHOHEXITAL IN WATER/PF 100MG/10ML
SYRINGE (ML) INTRAVENOUS
Status: DISCONTINUED | OUTPATIENT
Start: 2024-11-18 | End: 2024-11-18 | Stop reason: HOSPADM

## 2024-11-18 RX ORDER — SODIUM CHLORIDE 0.9 % (FLUSH) 0.9 %
10 SYRINGE (ML) INJECTION AS NEEDED
Status: DISCONTINUED | OUTPATIENT
Start: 2024-11-18 | End: 2024-11-19 | Stop reason: HOSPADM

## 2024-11-18 RX ORDER — ROSUVASTATIN CALCIUM 20 MG/1
10 TABLET, COATED ORAL NIGHTLY
Status: DISCONTINUED | OUTPATIENT
Start: 2024-11-18 | End: 2024-11-19 | Stop reason: HOSPADM

## 2024-11-18 RX ADMIN — Medication 3 ML: at 21:30

## 2024-11-18 RX ADMIN — SODIUM CHLORIDE 75 ML/HR: 9 INJECTION, SOLUTION INTRAVENOUS at 13:46

## 2024-11-18 RX ADMIN — ROSUVASTATIN CALCIUM 10 MG: 20 TABLET, FILM COATED ORAL at 21:18

## 2024-11-18 RX ADMIN — ACETAMINOPHEN 1000 MG: 500 TABLET ORAL at 23:20

## 2024-11-18 NOTE — PLAN OF CARE
Goal Outcome Evaluation:  Plan of Care Reviewed With: patient, family           Outcome Evaluation: Pt s/p pacemaker. Incision site is CDI. BP slightly elevated, pt states it is baseline. VSS. Possible d/c tomorrow.

## 2024-11-19 ENCOUNTER — TELEPHONE (OUTPATIENT)
Dept: CARDIOLOGY | Facility: CLINIC | Age: 89
End: 2024-11-19

## 2024-11-19 ENCOUNTER — READMISSION MANAGEMENT (OUTPATIENT)
Dept: CALL CENTER | Facility: HOSPITAL | Age: 89
End: 2024-11-19
Payer: MEDICARE

## 2024-11-19 VITALS
SYSTOLIC BLOOD PRESSURE: 149 MMHG | WEIGHT: 160 LBS | RESPIRATION RATE: 16 BRPM | HEART RATE: 65 BPM | BODY MASS INDEX: 25.11 KG/M2 | TEMPERATURE: 97.8 F | HEIGHT: 67 IN | DIASTOLIC BLOOD PRESSURE: 84 MMHG | OXYGEN SATURATION: 96 %

## 2024-11-19 PROCEDURE — G0378 HOSPITAL OBSERVATION PER HR: HCPCS

## 2024-11-19 PROCEDURE — 99024 POSTOP FOLLOW-UP VISIT: CPT | Performed by: NURSE PRACTITIONER

## 2024-11-19 RX ADMIN — ASPIRIN 81 MG: 81 TABLET, COATED ORAL at 08:06

## 2024-11-19 RX ADMIN — ACETAMINOPHEN 1000 MG: 500 TABLET ORAL at 08:06

## 2024-11-19 RX ADMIN — Medication 3 ML: at 08:06

## 2024-11-19 NOTE — PLAN OF CARE
Goal Outcome Evaluation:  Plan of Care Reviewed With: patient        Progress: improving  Outcome Evaluation: Patient with c/o pain in his left chest and shoulder which improved with Tylenol. Patient left chest incision with no issues or edema. Patient b/p 120-150's/60-90's HR 60-70's remains AV paced. Patient up with x 1 assist with no c/o dizziness. Will continue to monitor and await discharge plans.

## 2024-11-19 NOTE — DISCHARGE SUMMARY
DISCHARGE NOTE    Patient Name: Vicente Delgado  Age/Sex: 89 y.o. male  : 1928  MRN: [id number]    Date of Discharge:  2024   Date of Admit: 2024  Encounter Provider: GE Richards  Place of Service: [facility] CARDIOLOGY  Patient Care Team:  Mechelle Landa APRN as PCP - General (Internal Medicine)  Reginaldo Palacio MD (Dermatology)  Janki Elias MD (Inactive) as Consulting Physician (Ophthalmology)  Kristopher Machado DPM as Consulting Physician (Podiatry)  Aby Marquez MD as Consulting Physician (Hand Surgery)  Wilton Ramos MD as Consulting Physician (Orthopedic Surgery)  Destinee Snider MD as Consulting Physician (Pain Medicine)  Breezy Frausto MD as Consulting Physician (Cardiology)  Velia Watkins PA-C as Physician Assistant (Physician Assistant)  Hafsa Conway MD as Referring Physician (Colon and Rectal Surgery)  Justin Berry MD as Consulting Physician (Hematology and Oncology)    Subjective:     Discharge Diagnosis:    Heart block    Complete heart block      Hospital Course:     89 yr old patient of Dr. Sales's, severe AS, s/p TAVR 10/9, HFpEF, HTN, PAF and TIA.     Noted to have some brief Mobitz 1 AV block post TAVR, dc'd with Zio AT which did show evidence of intermittent high degree AV block, pacemaker recommended.     2024 PPM by Dr. Edmonds, tolerated procedure well, monitored overnight.     He is doing well this morning, some incisional soreness. No redness, drainage or hematoma.     Device checked by rep this morning, normal testing and function.    He has been having intermittent dizziness that does not correlate with his periods of AV block. He had a brief episode this morning, sitting in the chair when he talking with me and moved his head---suspect this is vertigo.     Daughter at bedside  this morning, discussed trying OTC meclizine but also reach out to GE Prasad--his PCP if it continued..     Dc home today, incision check in 1 week.     Vital Signs  Temp:  [97.5 °F (36.4 °C)-97.8 °F (36.6 °C)] 97.8 °F (36.6 °C)  Heart Rate:  [64-75] 65  Resp:  [12-22] 16  BP: (120-163)/() 149/84    Intake/Output Summary (Last 24 hours) at 11/19/2024 1044  Last data filed at 11/19/2024 0617  Gross per 24 hour   Intake 360 ml   Output 1700 ml   Net -1340 ml       Physical Exam:    General Appearance: No acute distress   HENT: Atraumatic, normocephalic. External eyes, ears, and nose normal.   Respiratory: No signs of respiratory distress. Respiration rhythm and depth normal.   Clear to auscultation. No rales, crackles, rhonchi, or wheezing auscultated.   Cardiovascular:  Heart Rate and Rhythm: Normal, Heart Sounds: S1 and S2.   Lower Extremities: No edema noted.  Musculoskeletal: Normal movement of extremities  Skin: Warm and dry.   Psychiatric: Patient alert and oriented to person, place, and time. Speech and behavior appropriate. Normal mood and affect.        Discharge Diet:    Dietary Orders (From admission, onward)       Start     Ordered    11/18/24 1718  Diet: Regular/House; Fluid Consistency: Thin (IDDSI 0)  Diet Effective Now        References:    Diet Order [facility]walk   Question Answer Comment   Diets: Regular/House    Fluid Consistency: Thin (IDDSI 0)        11/18/24 1717                      Activity at Discharge:  Instructions given to patient and family.     Discharge Medications     Discharge Medications        Continue These Medications        Instructions Start Date   Aspirin Low Dose 81 MG EC tablet  Generic drug: aspirin   81 mg, Oral, Daily      ezetimibe 10 MG tablet  Commonly known as: ZETIA   10 mg, Oral, Daily      rosuvastatin 10 MG tablet  Commonly known as: CRESTOR   10 mg, Oral, Nightly               Discharge disposition: home    Follow-up Appointments   Follow-up Information        [facility] Follow up in 1 week(s).    Why: Incision check  Contact information:  [phone]             Gordon Woodruff, APRN Follow up in 1 month(s).    Specialties: Nurse Practitioner, Cardiology  Why: with pacemaker check  Contact information:  [address]  [address]  [facility] [address]  [phone]               Mechelle Landa, APRN .    Specialty: Internal Medicine  Contact information:  [address]  [address]  [facility] [address]  [phone]                           Future Appointments   Date Time Provider Department Center   11/20/2024  1:00 PM TELEMETRY MONITOR - [facility]   11/22/2024  1:00 PM TELEMETRY MONITOR - [facility]   11/25/2024  1:00 PM TELEMETRY MONITOR - [facility]   11/27/2024  1:00 PM TELEMETRY MONITOR - [facility]   11/29/2024  1:00 PM TELEMETRY MONITOR - [facility]   12/2/2024  1:00 PM TELEMETRY MONITOR - [facility]   12/4/2024  1:00 PM TELEMETRY MONITOR - [facility]   12/6/2024  1:00 PM TELEMETRY MONITOR - [facility]   12/9/2024  1:00 PM TELEMETRY MONITOR - [facility]   12/11/2024  1:00 PM TELEMETRY MONITOR - [facility]   12/13/2024  1:00 PM TELEMETRY MONITOR - [facility]   12/16/2024  1:00 PM TELEMETRY MONITOR - [facility]   12/18/2024  1:00 PM TELEMETRY MONITOR - [facility] CARD [facility]   12/20/2024  1:00 PM TELEMETRY MONITOR - [facility]   12/23/2024  1:00 PM TELEMETRY MONITOR - [facility]ARD [facility]   12/27/2024  1:00 PM TELEMETRY MONITOR - [facility] CARD [facility] CAR [facility]   12/30/2024  1:00 PM TELEMETRY MONITOR -  [facility] CARD  [facility] CAR [facility]   1/3/2025  1:00 PM TELEMETRY MONITOR -  [facility] CARD  [facility] CAR [facility]   1/6/2025  1:00 PM TELEMETRY MONITOR -  [facility] CARD  [facility] CAR [facility]   1/8/2025  1:00 PM TELEMETRY MONITOR -  STEPH[facility] CARD   STEPHEN CAR [facility]   1/10/2025  1:00 PM TELEMETRY MONITOR -  [facility] CARD [facility] CAR [facility]   1/13/2025  1:00 PM TELEMETRY MONITOR -  STEPH[facility] CARD BH [facility] CAR [facility]   1/15/2025  1:00 PM TELEMETRY MONITOR -  STEPHEN CARD BH [facility] CAR [facility]   1/17/2025  1:00 PM TELEMETRY MONITOR -  STEPHEN CARD [facility] CAR [facility]   1/20/2025  1:00 PM TELEMETRY MONITOR -  STEPHEN CARD BH [facility] CAR [facility]   1/22/2025  1:00 PM TELEMETRY MONITOR -  STEPHEN CARD BH [facility] CAR [facility]   1/24/2025  1:00 PM TELEMETRY MONITOR -  STEPHEN CARD BH STEPHEN CAR [facility]   1/27/2025  1:00 PM TELEMETRY MONITOR -  STPEHEN CARD  STEPHEN CAR STEPHEN   1/29/2025  1:00 PM TELEMETRY MONITOR -  STEPHEN CARD  STEPHEN CAR STEPHEN   1/31/2025  1:00 PM TELEMETRY MONITOR -  STEPHEN CARD  STEPHEN CAR STEPHEN   2/3/2025  1:00 PM TELEMETRY MONITOR -  STEPHEN CARD  STEPHEN CAR STEPHEN   2/21/2025 10:30 AM Mechelle Landa APRN [facility] PC MDEST [facility]   3/5/2025  1:15 PM STEPHEN PET CT  STEPHEN PET STEPHEN   3/20/2025 10:00 AM Maki Mcmanus APRN [facility] CD LCGKR STEPHEN   3/26/2025 12:40 PM LAB CHAIR 1 CBC KRESGE  LAB KRES [facility]   3/26/2025  1:00 PM Justin Berry MD [facility] CBC KRES [facility]   9/11/2025 12:00 PM STEPHEN LCG ECHO/VAS FRONT RM  LCG ECHO [facility]   9/11/2025 12:40 PM Olaf Sales MD [facility] CD [id number] None   9/25/2025 11:00 AM Olaf Sales MD MG[facility] CD [id number] None         GE Richards  11/19/24  10:44 EST

## 2024-11-19 NOTE — TELEPHONE ENCOUNTER
Caller: Sandra Thornton    Relationship to patient: Emergency Contact    Best call back number: [phone]    Chief complaint: NA    Type of visit: HOSPITAL FU    Requested date: 12.19.24     If rescheduling, when is the original appointment: NA     Additional notes:PATIENT'S DAUGHTER CALLED TO SCHEDULE HOSPITAL FU NO AVAILABLE APPOINTMENT WITHIN TIME FRAME PLEASE ADVISE.

## 2024-11-19 NOTE — OUTREACH NOTE
Prep Survey      Flowsheet Row Responses   [facility], [place] by [facility] patient discharged from? [place]   Is LACE score < 7 ? No   Eligibility [facility]   Date of Admission 11/18/24   Date of Discharge 11/19/24   Discharge Disposition Home or Self Care   Discharge diagnosis Heart block  [Pacemaker DC new medt]   Does the patient have one of the following disease processes/diagnoses(primary or secondary)? General Surgery   Does the patient have Home health ordered? No   Is there a DME ordered? No   Prep survey completed? Yes            Rosie BAKER - Registered Nurse

## 2024-11-19 NOTE — PLAN OF CARE
Problem: Adult Inpatient Plan of Care  Goal: Plan of Care Review  Outcome: Met  Goal: Patient-Specific Goal (Individualized)  Outcome: Met  Goal: Absence of Hospital-Acquired Illness or Injury  Outcome: Met  Intervention: Identify and Manage Fall Risk  Recent Flowsheet Documentation  Taken 11/19/2024 1000 by Shannon Hill RN  Safety Promotion/Fall Prevention: safety round/check completed  Taken 11/19/2024 0806 by Shannon Hill RN  Safety Promotion/Fall Prevention:   activity supervised   safety round/check completed   room organization consistent   nonskid shoes/slippers when out of bed   assistive device/personal items within reach   clutter free environment maintained   fall prevention program maintained  Intervention: Prevent Skin Injury  Recent Flowsheet Documentation  Taken 11/19/2024 0806 by Shannon Hill RN  Body Position: (up in chair) other (see comments)  Intervention: Prevent Infection  Recent Flowsheet Documentation  Taken 11/19/2024 1000 by Shannon Hill RN  Infection Prevention: single patient room provided  Taken 11/19/2024 0806 by Shannon Hill RN  Infection Prevention: single patient room provided  Goal: Optimal Comfort and Wellbeing  Outcome: Met  Intervention: Provide Person-Centered Care  Recent Flowsheet Documentation  Taken 11/19/2024 0806 by Shannon Hill RN  Trust Relationship/Rapport:   care explained   choices provided   emotional support provided   questions answered   empathic listening provided   questions encouraged   reassurance provided   thoughts/feelings acknowledged  Goal: Readiness for Transition of Care  Outcome: Met     Problem: Cardiac Rhythm Management Device  Goal: Optimal Adjustment to Device  Outcome: Met  Intervention: Optimize Psychosocial Response to Device Implantation  Recent Flowsheet Documentation  Taken 11/19/2024 0806 by Shannon Hill RN  Supportive Measures: active listening utilized  Family/Support System Care: self-care encouraged  Goal:  Absence of Bleeding  Outcome: Met  Goal: Effective Device Function  Outcome: Met  Intervention: Optimize Device Care and Function  Recent Flowsheet Documentation  Taken 11/19/2024 0806 by Shannon Hill, RN  Pacer Function:   sensing   capturing  Goal: Absence of Infection Signs and Symptoms  Outcome: Met  Goal: Acceptable Pain Level  Outcome: Met  Goal: Effective Oxygenation and Ventilation  Outcome: Met     Problem: Skin Injury Risk Increased  Goal: Skin Health and Integrity  Outcome: Met  Intervention: Optimize Skin Protection  Recent Flowsheet Documentation  Taken 11/19/2024 0806 by Shannon Hill, RN  Activity Management:   up in chair   activity encouraged  Head of Bed (HOB) Positioning: (up in chair) other (see comments)   Goal Outcome Evaluation: IV/tele removed, pt prepared for d/c, daughter at bedside.

## 2024-11-20 ENCOUNTER — APPOINTMENT (OUTPATIENT)
Dept: CARDIAC REHAB | Facility: HOSPITAL | Age: 89
End: 2024-11-20
Payer: MEDICARE

## 2024-11-20 ENCOUNTER — TRANSITIONAL CARE MANAGEMENT TELEPHONE ENCOUNTER (OUTPATIENT)
Dept: CALL CENTER | Facility: HOSPITAL | Age: 89
End: 2024-11-20
Payer: MEDICARE

## 2024-11-20 NOTE — OUTREACH NOTE
Call Center TCM Note      Flowsheet Row Responses   [facility], [place] patient discharged from? [place]   Does the patient have one of the following disease processes/diagnoses(primary or secondary)? General Surgery   TCM attempt successful? Yes  [vr for Sandra Thornton dtr]   Call start time 0844   Call end time 0851   General alerts for this patient Ayanna Alegria Senior Independent Appt   Discharge diagnosis Heart block/PM placement   Does the patient have all medications related to this admission filled (includes all antibiotics, pain medications, etc.) N/A   Prescription comments no new medications at discharge   Comments Pt will be following up with ENT and cardiology at this time but will call PCP if needed.  Pt has ENT appt on 11/22/24 regarding vertigo and will also have hearing exam   Does the patient have an appointment with their PCP within 7-14 days of discharge? No   Nursing Interventions Routed TCM call to PCP office   Has home health visited the patient within 72 hours of discharge? N/A   Psychosocial issues? No   Did the patient receive a copy of their discharge instructions? Yes   Nursing interventions Reviewed instructions with patient   What is the patient's perception of their health status since discharge? Improving  [Reports he is sore and exhausted from procedure. He is up and ambulatory and getting ready to fix his breakfast.  Aware of site and actiivty precautions, reviewed with him.  Appts in place.]   Nursing interventions Nurse provided patient education   Is the patient /caregiver able to teach back basic post-op care? Keep incision areas clean,dry and protected, Lifting as instructed by MD in discharge instructions   Is the patient/caregiver able to teach back signs and symptoms of incisional infection? Increased redness, swelling or pain at the incisonal site, Increased drainage or bleeding, Incisional warmth, Pus or odor from incision, Fever  [pt reports some redness to area but is  going t have his dtr that is an Rn to inspect today when she visits, reviewed s/s infection with pt]   Is the patient/caregiver able to teach back the hierarchy of who to call/visit for symptoms/problems? PCP, Specialist, Home health nurse, Urgent Care, ED, 911 Yes   Additional teach back comments pt to see ENT later this week regarding his issues with vertigo   TCM call completed? Yes   Call end time 0851            Honey Casillas RN    11/20/2024, 08:59 EST

## 2024-11-22 ENCOUNTER — APPOINTMENT (OUTPATIENT)
Dept: CARDIAC REHAB | Facility: HOSPITAL | Age: 89
End: 2024-11-22
Payer: MEDICARE

## 2024-11-22 ENCOUNTER — TELEPHONE (OUTPATIENT)
Dept: INTERNAL MEDICINE | Facility: CLINIC | Age: 89
End: 2024-11-22
Payer: MEDICARE

## 2024-11-22 NOTE — TELEPHONE ENCOUNTER
Spoke with patient and his daughter - he states that he just saw ENT today and daughter states the ENT APRN feels it has to do with his inner ear. She suggests vestibular physical therapy as dizzy spells are very quick with head movement.     Patient and daughter advised to contact us with any further needs or issues.

## 2024-11-22 NOTE — TELEPHONE ENCOUNTER
----- Message from Mechelle Landa sent at 11/20/2024  7:40 AM EST -----  Regarding: FW: appt  Please call patient and work him in if he is still experiencing dizziness. Thanks.  ----- Message -----  From: Adri Marie APRN  Sent: 11/19/2024  12:11 PM EST  To: GE Munguia  Subject: appt                                             Hajrit Mechelle,     Just wanted to give you a heads up about him---he just had TAVR and then a pacemaker for intermittent heart block but sounds like he is having issues with vertigo    He is going home today post pacemaker while just sitting in the chair and talking with me he moved his head and had brief dizziness. His daughter was there with him, we talked about trying low dose OTC meclizine---just wonder if the changing of the seasons maybe some fluid on his ears (definitely not may area of expertise :-)))---told him to reach out to you, if persisted you might have some better treatments.     Thanks,   Adri

## 2024-11-25 ENCOUNTER — APPOINTMENT (OUTPATIENT)
Dept: CARDIAC REHAB | Facility: HOSPITAL | Age: 89
End: 2024-11-25
Payer: MEDICARE

## 2024-11-26 ENCOUNTER — CLINICAL SUPPORT NO REQUIREMENTS (OUTPATIENT)
Age: 89
End: 2024-11-26
Payer: MEDICARE

## 2024-11-26 DIAGNOSIS — Z95.0 PRESENCE OF CARDIAC PACEMAKER: Primary | ICD-10-CM

## 2024-11-27 ENCOUNTER — APPOINTMENT (OUTPATIENT)
Dept: CARDIAC REHAB | Facility: HOSPITAL | Age: 89
End: 2024-11-27
Payer: MEDICARE

## 2024-11-29 ENCOUNTER — APPOINTMENT (OUTPATIENT)
Dept: CARDIAC REHAB | Facility: HOSPITAL | Age: 89
End: 2024-11-29
Payer: MEDICARE

## 2024-11-29 ENCOUNTER — TELEPHONE (OUTPATIENT)
Dept: CARDIOLOGY | Facility: CLINIC | Age: 89
End: 2024-11-29

## 2024-11-29 ENCOUNTER — TELEPHONE (OUTPATIENT)
Dept: CARDIOLOGY | Facility: CLINIC | Age: 89
End: 2024-11-29
Payer: MEDICARE

## 2024-11-29 NOTE — TELEPHONE ENCOUNTER
Patient's daughter, Sandra page the service this morning.  She said dressing on pacemaker site is saturated with blood.  They placed another dressing and it saturated again.  I recommended evaluation in the ED.

## 2024-11-29 NOTE — TELEPHONE ENCOUNTER
I called the patient's daughter to verify that they were actually coming to the emergency department.  She said that she redressed the patient's incision approximately 2 hours ago when she talked to Judith and it had not saturated the new bandage, so they were not going to come to the ER.  Updated Judith and told the patient's daughter if anything changes please come to the emergency department.

## 2024-12-02 ENCOUNTER — APPOINTMENT (OUTPATIENT)
Dept: CARDIAC REHAB | Facility: HOSPITAL | Age: 89
End: 2024-12-02
Payer: MEDICARE

## 2024-12-02 ENCOUNTER — CLINICAL SUPPORT NO REQUIREMENTS (OUTPATIENT)
Age: 89
End: 2024-12-02
Payer: MEDICARE

## 2024-12-02 DIAGNOSIS — Z95.0 PRESENCE OF CARDIAC PACEMAKER: Primary | ICD-10-CM

## 2024-12-02 NOTE — TELEPHONE ENCOUNTER
I called and spoke to the pt. I rescheduled him to come into the pacemaker clinic this morning 12/02/2024 at 09:30 so we can reassess his incision site.

## 2024-12-02 NOTE — PROGRESS NOTES
Dr. Edmonds and I saw patient for incision recheck. Incision without any signs of infection.     Hematoma improved. He did have some bleeding over the weekend but none today.     We are going to continue to monitor. There are currently no active signs of infection.     Advised patient could apply dressing if bleeding continues.     He will come back later this week for incision check.

## 2024-12-04 ENCOUNTER — APPOINTMENT (OUTPATIENT)
Dept: CARDIAC REHAB | Facility: HOSPITAL | Age: 89
End: 2024-12-04
Payer: MEDICARE

## 2024-12-05 ENCOUNTER — CLINICAL SUPPORT NO REQUIREMENTS (OUTPATIENT)
Age: 89
End: 2024-12-05
Payer: MEDICARE

## 2024-12-05 DIAGNOSIS — Z95.0 PRESENCE OF CARDIAC PACEMAKER: Primary | ICD-10-CM

## 2024-12-06 ENCOUNTER — APPOINTMENT (OUTPATIENT)
Dept: CARDIAC REHAB | Facility: HOSPITAL | Age: 89
End: 2024-12-06
Payer: MEDICARE

## 2024-12-09 ENCOUNTER — TREATMENT (OUTPATIENT)
Dept: CARDIAC REHAB | Facility: HOSPITAL | Age: 89
End: 2024-12-09
Payer: MEDICARE

## 2024-12-09 DIAGNOSIS — Z95.2 S/P TAVR (TRANSCATHETER AORTIC VALVE REPLACEMENT): Primary | ICD-10-CM

## 2024-12-09 PROCEDURE — 93798 PHYS/QHP OP CAR RHAB W/ECG: CPT

## 2024-12-09 NOTE — PROGRESS NOTES
No SBT at this time. Patient is currently sedated. Will continue to follow. See scanned session report.

## 2024-12-11 ENCOUNTER — TREATMENT (OUTPATIENT)
Dept: CARDIAC REHAB | Facility: HOSPITAL | Age: 89
End: 2024-12-11
Payer: MEDICARE

## 2024-12-11 DIAGNOSIS — Z95.2 S/P TAVR (TRANSCATHETER AORTIC VALVE REPLACEMENT): Primary | ICD-10-CM

## 2024-12-11 PROCEDURE — 93798 PHYS/QHP OP CAR RHAB W/ECG: CPT

## 2024-12-13 ENCOUNTER — TREATMENT (OUTPATIENT)
Dept: CARDIAC REHAB | Facility: HOSPITAL | Age: 89
End: 2024-12-13
Payer: MEDICARE

## 2024-12-13 DIAGNOSIS — Z95.2 S/P TAVR (TRANSCATHETER AORTIC VALVE REPLACEMENT): Primary | ICD-10-CM

## 2024-12-13 PROCEDURE — 93798 PHYS/QHP OP CAR RHAB W/ECG: CPT

## 2024-12-16 ENCOUNTER — APPOINTMENT (OUTPATIENT)
Dept: CARDIAC REHAB | Facility: HOSPITAL | Age: 89
End: 2024-12-16
Payer: MEDICARE

## 2024-12-18 ENCOUNTER — TREATMENT (OUTPATIENT)
Dept: CARDIAC REHAB | Facility: HOSPITAL | Age: 89
End: 2024-12-18
Payer: MEDICARE

## 2024-12-18 DIAGNOSIS — Z95.2 S/P TAVR (TRANSCATHETER AORTIC VALVE REPLACEMENT): Primary | ICD-10-CM

## 2024-12-18 PROCEDURE — 93798 PHYS/QHP OP CAR RHAB W/ECG: CPT

## 2024-12-19 ENCOUNTER — OFFICE VISIT (OUTPATIENT)
Age: 89
End: 2024-12-19
Payer: MEDICARE

## 2024-12-19 ENCOUNTER — CLINICAL SUPPORT NO REQUIREMENTS (OUTPATIENT)
Age: 89
End: 2024-12-19
Payer: MEDICARE

## 2024-12-19 VITALS
HEIGHT: 67 IN | DIASTOLIC BLOOD PRESSURE: 68 MMHG | SYSTOLIC BLOOD PRESSURE: 100 MMHG | BODY MASS INDEX: 25.43 KG/M2 | WEIGHT: 162 LBS | HEART RATE: 82 BPM

## 2024-12-19 DIAGNOSIS — Z95.2 S/P TAVR (TRANSCATHETER AORTIC VALVE REPLACEMENT): ICD-10-CM

## 2024-12-19 DIAGNOSIS — I48.0 PAROXYSMAL ATRIAL FIBRILLATION: Chronic | ICD-10-CM

## 2024-12-19 DIAGNOSIS — I44.2 COMPLETE HEART BLOCK: Primary | ICD-10-CM

## 2024-12-19 DIAGNOSIS — Z95.0 PRESENCE OF CARDIAC PACEMAKER: Primary | ICD-10-CM

## 2024-12-19 DIAGNOSIS — Z95.0 PRESENCE OF CARDIAC PACEMAKER: ICD-10-CM

## 2024-12-19 DIAGNOSIS — I25.810 CORONARY ARTERY DISEASE INVOLVING CORONARY BYPASS GRAFT OF NATIVE HEART WITHOUT ANGINA PECTORIS: ICD-10-CM

## 2024-12-19 PROBLEM — I50.32 CHRONIC DIASTOLIC CHF (CONGESTIVE HEART FAILURE): Status: RESOLVED | Noted: 2023-09-20 | Resolved: 2024-12-19

## 2024-12-19 PROBLEM — I34.0 NON-RHEUMATIC MITRAL REGURGITATION: Chronic | Status: RESOLVED | Noted: 2021-02-04 | Resolved: 2024-12-19

## 2024-12-19 PROBLEM — I44.0 FIRST DEGREE AV BLOCK: Status: RESOLVED | Noted: 2024-10-15 | Resolved: 2024-12-19

## 2024-12-19 PROBLEM — I20.89 CHRONIC STABLE ANGINA: Status: RESOLVED | Noted: 2022-06-16 | Resolved: 2024-12-19

## 2024-12-19 PROBLEM — I45.9 HEART BLOCK: Status: RESOLVED | Noted: 2024-10-24 | Resolved: 2024-12-19

## 2024-12-19 PROBLEM — I35.1 NONRHEUMATIC AORTIC VALVE INSUFFICIENCY: Chronic | Status: RESOLVED | Noted: 2021-02-04 | Resolved: 2024-12-19

## 2024-12-19 PROBLEM — I35.0 AORTIC STENOSIS, MODERATE: Chronic | Status: RESOLVED | Noted: 2022-07-01 | Resolved: 2024-12-19

## 2024-12-19 NOTE — PROGRESS NOTES
ELECTROPHYSIOLOGY   Date of Office Visit: 2024  Patient Name: Vicente Delgado  : 1928  Encounter Provider: Javier Bradley PA-C  Primary Cardiologist: Olaf Sales MD  Electrophysiologist: Dr. Edmonds  CHIEF COMPLAINT / REASON FOR OFFICE VISIT     heart block, Pacemaker Check, Aortic Stenosis, Atrial Fibrillation, and s/p TAVR  Hospital follow up     HISTORY OF PRESENT ILLNESS     This is a 89 y.o. year old male who presents to [facility] CARD[facility]OLOG[facility] for a for a procedure follow up.     He has a history of severe aortic stenosis and underwent a TAVR on 10/8/2024.    Postoperatively, he was seen to have a prolonged first-degree AV block and a Zio patch was paced at discharge.  He had an alert that showed a complete heart block and he was recommended to undergo a pacemaker.    S/p dual-chamber LB-RV lead pacemaker 2024    Device interrogation today shows normal device function with no new episodes or events.  A-fib burden is 0%.  He has 45% atrial paced beats and 79.1% RV paced beats.  Mode set to DDD 60.    Today the patient reports he really has noticed an improvement in his breathing and overall energy levels in the last month.  He says he feels better than he is felt in about 10 years.    He still is really active and quite involved with the [employer] in [place].    He denies any episodes of chest pain or pressure.  After initial implant the device had some discharge coming from it however this has resolved and it looks good today.    He is complaining of lightheadedness that will happen intermittently when he is standing or when he gets up.  He went to ENT and everything was normal also had his eyes evaluated and they did not make any changes.  I am concerned that he might be having low blood pressure since his blood pressure today is only 100/68.    He has been on apixaban in the past due to history of atrial fibrillation but it was stopped due to  "recurrent epistaxis.    PMHx:  Complete heart block, Aortic stenosis s/p TAVR, PAF, CAD seen on cath 10/2024, HL, HTN, TAM, GERD, non-Hodgkin's lymphoma involving the distal ileum, prostate CA, Lupus, prior TIA,     PHYSICAL EXAMINATION     Vital Signs:  /68 (BP Location: Right arm, Patient Position: Sitting)   Pulse 82   Ht 170.2 cm (67\")   Wt 73.5 kg (162 lb)   BMI 25.37 kg/m²   Estimated body mass index is 25.37 kg/m² as calculated from the following:    Height as of this encounter: 170.2 cm (67\").    Weight as of this encounter: 73.5 kg (162 lb).               Physical Exam  Constitutional:       Appearance: Normal appearance.   HENT:      Head: Normocephalic and atraumatic.   Cardiovascular:      Rate and Rhythm: Normal rate and regular rhythm.      Pulses: Normal pulses.      Heart sounds: Normal heart sounds.      Comments: Pacemaker site is well healed at incision site  Pulmonary:      Effort: Pulmonary effort is normal.      Breath sounds: Normal breath sounds.   Musculoskeletal:      Right lower leg: No edema.      Left lower leg: No edema.   Skin:     General: Skin is warm and dry.   Neurological:      General: No focal deficit present.      Mental Status: He is alert and oriented to person, place, and time.          Cardiac Testing/Results     Cardiac Testing:   - Echo 11/2024: EF of 55.4% with grade 1 diastolic dysfunction.  Speckled myocardium suggestive infiltrative cardiomyopathy.  TAVR valve present with mean pressure gradient of 7.5 mmHg    -Heart catheterization 10/01/2024: LM patent, LAD 60%, left circumflex 50%, RCA chronic total occlusion      Result Review :  The following data was reviewed by: Javier Bradley PA-C on 12/19/2024:       Lab Results   Component Value Date     11/11/2024     10/31/2024    K 4.0 11/11/2024    K 4.5 10/31/2024     11/11/2024     10/31/2024    CO2 24.0 11/11/2024    CO2 27.7 10/31/2024    BUN 22 11/11/2024    BUN 21 " 10/31/2024    CREATININE 0.74 (L) 11/11/2024    CREATININE 0.87 10/31/2024    EGFRIFNONA 71 02/07/2022    EGFRIFNONA 74 05/13/2021    EGFRIFAFRI 82 02/07/2022    EGFRIFAFRI 91 03/10/2021    GLUCOSE 110 (H) 11/11/2024    GLUCOSE 89 10/31/2024    CALCIUM 9.1 11/11/2024    CALCIUM 8.9 10/31/2024    ALBUMIN 4.0 11/11/2024    ALBUMIN 4.0 10/31/2024    AST 25 11/11/2024    AST 26 10/31/2024    ALT 15 11/11/2024    ALT 15 10/31/2024     Lab Results   Component Value Date    WBC 5.01 11/11/2024    WBC 4.91 10/31/2024    HGB 13.7 11/11/2024    HGB 13.6 10/31/2024    HCT 40.5 11/11/2024    HCT 40.3 10/31/2024    MCV 91.0 11/11/2024    MCV 94.6 10/31/2024     11/11/2024     10/31/2024     Lab Results   Component Value Date    PROBNP 160.0 06/05/2022    PROBNP 126.2 03/05/2018     Lab Results   Component Value Date    CKTOTAL 68 03/11/2024    TROPONINT 32 (H) 10/01/2024     Lab Results   Component Value Date    TSH 1.950 03/11/2024    TSH 2.440 09/20/2023                 ECG 12 Lead    Date/Time: 12/19/2024 10:46 AM  Performed by: Javier Shields PA-C    Authorized by: Javier Shields PA-C  Comparison: compared with previous ECG from 11/18/2024  Rhythm: paced  Rate: normal  BPM: 82  QRS axis: left  Comments: Atrial paced rhythm with some breakthrough PVCs.                ASSESSMENT & PLAN       Diagnoses and all orders for this visit:    1. Complete heart block (Primary)  Previous to TAVR he had significant prolonged first-degree block but did had a monitor showing a complete heart block and had a subsequent dual-chamber pacemaker in 11/2024  He is here today for 1 month follow-up.  Discussed lifting restrictions  We will plan to see him back in 3 months for repeat device interrogation and office visit.  At that point, we will follow-up with him yearly and alternate with Dr. Sales  2. s/p dual chamber LB-RV lead pacemaker 11/2024  Normal device function on interrogation today with 45% atrial paced  beats and 79.1% RV paced beats  The pacemaker pocket has really healed up well and has no more drainage.  3. S/P TAVR (transcatheter aortic valve replacement)  He had a TAVR valve placed in October 2024  Echocardiogram from November reviewed with good valvular function  He is following with Dr. Sales in September  He is cleared to go back to cardiac rehab without any restrictions  4. Paroxysmal atrial fibrillation  Remote history.  Will have pacemaker to continue to monitor burden long-term  Previously was on a DOAC and had significant issues with recurrent epistaxis  Continue aspirin  Since implant his A-fib burden's been 0%.  Will keep an eye on this long-term  5. Coronary artery disease involving coronary bypass graft of native heart without angina pectoris  Heart cath shows prior complete total occlusion of the RCA as well as some moderate disease.  Continue cholesterol therapy and aspirin.       Follow Up:  Return in about 3 months (around 3/19/2025) for Dr. Edmonds-Routine, Device check.  Patient was given instructions and counseling regarding his condition or for health maintenance advice. Please contact office if worsening symptoms or proceed to ER when appropriate.      Javier Bradley PA-C  12/19/24  10:46 EST    MEDICATIONS         Discharge Medications            Accurate as of December 19, 2024 10:46 AM. If you have any questions, ask your nurse or doctor.                Continue These Medications        Instructions Start Date   Aspirin Low Dose 81 MG EC tablet  Generic drug: aspirin   81 mg, Oral, Daily      ezetimibe 10 MG tablet  Commonly known as: ZETIA   10 mg, Oral, Daily      rosuvastatin 10 MG tablet  Commonly known as: CRESTOR   10 mg, Oral, Nightly                   **[place] Disclaimer: This note was dictated using an electronic transcription. The electronic translation of spoken language may permit erroneous, or at times, nonsensical words or phrases to be inadvertently transcribed.  Although I have reviewed the note for such errors, some may still exist.

## 2024-12-20 ENCOUNTER — TREATMENT (OUTPATIENT)
Dept: CARDIAC REHAB | Facility: HOSPITAL | Age: 89
End: 2024-12-20
Payer: MEDICARE

## 2024-12-20 DIAGNOSIS — Z95.2 S/P TAVR (TRANSCATHETER AORTIC VALVE REPLACEMENT): Primary | ICD-10-CM

## 2024-12-20 PROCEDURE — 93798 PHYS/QHP OP CAR RHAB W/ECG: CPT

## 2024-12-23 ENCOUNTER — TREATMENT (OUTPATIENT)
Dept: CARDIAC REHAB | Facility: HOSPITAL | Age: 89
End: 2024-12-23
Payer: MEDICARE

## 2024-12-23 DIAGNOSIS — Z95.2 S/P TAVR (TRANSCATHETER AORTIC VALVE REPLACEMENT): Primary | ICD-10-CM

## 2024-12-23 PROCEDURE — 93798 PHYS/QHP OP CAR RHAB W/ECG: CPT

## 2024-12-27 ENCOUNTER — TREATMENT (OUTPATIENT)
Dept: CARDIAC REHAB | Facility: HOSPITAL | Age: 89
End: 2024-12-27
Payer: MEDICARE

## 2024-12-27 DIAGNOSIS — Z95.2 S/P TAVR (TRANSCATHETER AORTIC VALVE REPLACEMENT): Primary | ICD-10-CM

## 2024-12-27 PROCEDURE — 93798 PHYS/QHP OP CAR RHAB W/ECG: CPT

## 2024-12-30 ENCOUNTER — TREATMENT (OUTPATIENT)
Dept: CARDIAC REHAB | Facility: HOSPITAL | Age: 89
End: 2024-12-30
Payer: MEDICARE

## 2024-12-30 DIAGNOSIS — Z95.2 S/P TAVR (TRANSCATHETER AORTIC VALVE REPLACEMENT): Primary | ICD-10-CM

## 2024-12-30 PROCEDURE — 93798 PHYS/QHP OP CAR RHAB W/ECG: CPT

## 2025-01-03 ENCOUNTER — TREATMENT (OUTPATIENT)
Dept: CARDIAC REHAB | Facility: HOSPITAL | Age: OVER 89
End: 2025-01-03
Payer: MEDICARE

## 2025-01-03 DIAGNOSIS — Z95.2 S/P TAVR (TRANSCATHETER AORTIC VALVE REPLACEMENT): Primary | ICD-10-CM

## 2025-01-03 PROCEDURE — 93798 PHYS/QHP OP CAR RHAB W/ECG: CPT

## 2025-01-08 ENCOUNTER — APPOINTMENT (OUTPATIENT)
Dept: CARDIAC REHAB | Facility: HOSPITAL | Age: OVER 89
End: 2025-01-08
Payer: MEDICARE

## 2025-01-10 ENCOUNTER — APPOINTMENT (OUTPATIENT)
Dept: CARDIAC REHAB | Facility: HOSPITAL | Age: OVER 89
End: 2025-01-10
Payer: MEDICARE

## 2025-01-15 ENCOUNTER — TREATMENT (OUTPATIENT)
Dept: CARDIAC REHAB | Facility: HOSPITAL | Age: OVER 89
End: 2025-01-15
Payer: MEDICARE

## 2025-01-15 DIAGNOSIS — Z95.2 S/P TAVR (TRANSCATHETER AORTIC VALVE REPLACEMENT): Primary | ICD-10-CM

## 2025-01-15 PROCEDURE — 93798 PHYS/QHP OP CAR RHAB W/ECG: CPT

## 2025-01-17 ENCOUNTER — APPOINTMENT (OUTPATIENT)
Dept: CARDIAC REHAB | Facility: HOSPITAL | Age: OVER 89
End: 2025-01-17
Payer: MEDICARE

## 2025-01-22 ENCOUNTER — APPOINTMENT (OUTPATIENT)
Dept: CARDIAC REHAB | Facility: HOSPITAL | Age: OVER 89
End: 2025-01-22
Payer: MEDICARE

## 2025-01-24 ENCOUNTER — TREATMENT (OUTPATIENT)
Dept: CARDIAC REHAB | Facility: HOSPITAL | Age: OVER 89
End: 2025-01-24
Payer: MEDICARE

## 2025-01-24 DIAGNOSIS — Z95.2 S/P TAVR (TRANSCATHETER AORTIC VALVE REPLACEMENT): Primary | ICD-10-CM

## 2025-01-24 PROCEDURE — 93798 PHYS/QHP OP CAR RHAB W/ECG: CPT

## 2025-01-27 ENCOUNTER — TREATMENT (OUTPATIENT)
Dept: CARDIAC REHAB | Facility: HOSPITAL | Age: OVER 89
End: 2025-01-27
Payer: MEDICARE

## 2025-01-27 DIAGNOSIS — Z95.2 S/P TAVR (TRANSCATHETER AORTIC VALVE REPLACEMENT): Primary | ICD-10-CM

## 2025-01-27 PROCEDURE — 93798 PHYS/QHP OP CAR RHAB W/ECG: CPT

## 2025-01-29 ENCOUNTER — APPOINTMENT (OUTPATIENT)
Dept: CARDIAC REHAB | Facility: HOSPITAL | Age: OVER 89
End: 2025-01-29
Payer: MEDICARE

## 2025-01-31 ENCOUNTER — APPOINTMENT (OUTPATIENT)
Dept: CARDIAC REHAB | Facility: HOSPITAL | Age: OVER 89
End: 2025-01-31
Payer: MEDICARE

## 2025-02-03 ENCOUNTER — APPOINTMENT (OUTPATIENT)
Dept: CARDIAC REHAB | Facility: HOSPITAL | Age: OVER 89
End: 2025-02-03
Payer: MEDICARE

## 2025-02-05 ENCOUNTER — APPOINTMENT (OUTPATIENT)
Dept: CARDIAC REHAB | Facility: HOSPITAL | Age: OVER 89
End: 2025-02-05
Payer: MEDICARE

## 2025-02-07 ENCOUNTER — APPOINTMENT (OUTPATIENT)
Dept: CARDIAC REHAB | Facility: HOSPITAL | Age: OVER 89
End: 2025-02-07
Payer: MEDICARE

## 2025-02-10 ENCOUNTER — APPOINTMENT (OUTPATIENT)
Dept: CARDIAC REHAB | Facility: HOSPITAL | Age: OVER 89
End: 2025-02-10
Payer: MEDICARE

## 2025-02-12 ENCOUNTER — APPOINTMENT (OUTPATIENT)
Dept: CARDIAC REHAB | Facility: HOSPITAL | Age: OVER 89
End: 2025-02-12
Payer: MEDICARE

## 2025-02-14 ENCOUNTER — APPOINTMENT (OUTPATIENT)
Dept: CARDIAC REHAB | Facility: HOSPITAL | Age: OVER 89
End: 2025-02-14
Payer: MEDICARE

## 2025-02-14 PROCEDURE — 93296 REM INTERROG EVL PM/IDS: CPT | Performed by: INTERNAL MEDICINE

## 2025-02-14 PROCEDURE — 93294 REM INTERROG EVL PM/LDLS PM: CPT | Performed by: INTERNAL MEDICINE

## 2025-02-17 ENCOUNTER — APPOINTMENT (OUTPATIENT)
Dept: CARDIAC REHAB | Facility: HOSPITAL | Age: OVER 89
End: 2025-02-17
Payer: MEDICARE

## 2025-02-21 ENCOUNTER — APPOINTMENT (OUTPATIENT)
Dept: CARDIAC REHAB | Facility: HOSPITAL | Age: OVER 89
End: 2025-02-21
Payer: MEDICARE

## 2025-02-21 ENCOUNTER — LAB (OUTPATIENT)
Facility: HOSPITAL | Age: OVER 89
End: 2025-02-21
Payer: MEDICARE

## 2025-02-21 ENCOUNTER — OFFICE VISIT (OUTPATIENT)
Dept: INTERNAL MEDICINE | Facility: CLINIC | Age: OVER 89
End: 2025-02-21
Payer: MEDICARE

## 2025-02-21 VITALS
TEMPERATURE: 98.1 F | HEIGHT: 67 IN | DIASTOLIC BLOOD PRESSURE: 70 MMHG | HEART RATE: 77 BPM | OXYGEN SATURATION: 97 % | BODY MASS INDEX: 25.33 KG/M2 | WEIGHT: 161.4 LBS | SYSTOLIC BLOOD PRESSURE: 136 MMHG

## 2025-02-21 DIAGNOSIS — K21.00 GASTROESOPHAGEAL REFLUX DISEASE WITH ESOPHAGITIS WITHOUT HEMORRHAGE: Chronic | ICD-10-CM

## 2025-02-21 DIAGNOSIS — I48.0 PAROXYSMAL ATRIAL FIBRILLATION: Chronic | ICD-10-CM

## 2025-02-21 DIAGNOSIS — I25.810 CORONARY ARTERY DISEASE INVOLVING CORONARY BYPASS GRAFT OF NATIVE HEART WITHOUT ANGINA PECTORIS: ICD-10-CM

## 2025-02-21 DIAGNOSIS — I10 PRIMARY HYPERTENSION: Chronic | ICD-10-CM

## 2025-02-21 DIAGNOSIS — Z00.00 MEDICARE ANNUAL WELLNESS VISIT, SUBSEQUENT: Primary | ICD-10-CM

## 2025-02-21 LAB
ALBUMIN SERPL-MCNC: 4.1 G/DL (ref 3.5–5.2)
ALBUMIN/GLOB SERPL: 1.3 G/DL
ALP SERPL-CCNC: 83 U/L (ref 39–117)
ALT SERPL W P-5'-P-CCNC: 20 U/L (ref 1–41)
ANION GAP SERPL CALCULATED.3IONS-SCNC: 12.6 MMOL/L (ref 5–15)
AST SERPL-CCNC: 29 U/L (ref 1–40)
BILIRUB SERPL-MCNC: 0.5 MG/DL (ref 0–1.2)
BUN SERPL-MCNC: 25 MG/DL (ref 8–23)
BUN/CREAT SERPL: 28.4 (ref 7–25)
CALCIUM SPEC-SCNC: 9.8 MG/DL (ref 8.2–9.6)
CHLORIDE SERPL-SCNC: 98 MMOL/L (ref 98–107)
CHOLEST SERPL-MCNC: 147 MG/DL (ref 0–200)
CO2 SERPL-SCNC: 24.4 MMOL/L (ref 22–29)
CREAT SERPL-MCNC: 0.88 MG/DL (ref 0.76–1.27)
DEPRECATED RDW RBC AUTO: 42 FL (ref 37–54)
EGFRCR SERPLBLD CKD-EPI 2021: 78.2 ML/MIN/1.73
ERYTHROCYTE [DISTWIDTH] IN BLOOD BY AUTOMATED COUNT: 12.2 % (ref 12.3–15.4)
GLOBULIN UR ELPH-MCNC: 3.2 GM/DL
GLUCOSE SERPL-MCNC: 62 MG/DL (ref 65–99)
HCT VFR BLD AUTO: 45.8 % (ref 37.5–51)
HDLC SERPL-MCNC: 50 MG/DL (ref 40–60)
HGB BLD-MCNC: 14.5 G/DL (ref 13–17.7)
LDLC SERPL CALC-MCNC: 73 MG/DL (ref 0–100)
LDLC/HDLC SERPL: 1.38 {RATIO}
MCH RBC QN AUTO: 29.7 PG (ref 26.6–33)
MCHC RBC AUTO-ENTMCNC: 31.7 G/DL (ref 31.5–35.7)
MCV RBC AUTO: 93.9 FL (ref 79–97)
PLATELET # BLD AUTO: 179 10*3/MM3 (ref 140–450)
PMV BLD AUTO: 10.6 FL (ref 6–12)
POTASSIUM SERPL-SCNC: 4.3 MMOL/L (ref 3.5–5.2)
PROT SERPL-MCNC: 7.3 G/DL (ref 6–8.5)
RBC # BLD AUTO: 4.88 10*6/MM3 (ref 4.14–5.8)
SODIUM SERPL-SCNC: 135 MMOL/L (ref 136–145)
TRIGL SERPL-MCNC: 139 MG/DL (ref 0–150)
TSH SERPL DL<=0.05 MIU/L-ACNC: 2.19 UIU/ML (ref 0.27–4.2)
VLDLC SERPL-MCNC: 24 MG/DL (ref 5–40)
WBC NRBC COR # BLD AUTO: 7.11 10*3/MM3 (ref 3.4–10.8)

## 2025-02-21 PROCEDURE — 80061 LIPID PANEL: CPT | Performed by: NURSE PRACTITIONER

## 2025-02-21 PROCEDURE — 84443 ASSAY THYROID STIM HORMONE: CPT | Performed by: NURSE PRACTITIONER

## 2025-02-21 PROCEDURE — 85027 COMPLETE CBC AUTOMATED: CPT | Performed by: NURSE PRACTITIONER

## 2025-02-21 PROCEDURE — 36415 COLL VENOUS BLD VENIPUNCTURE: CPT | Performed by: NURSE PRACTITIONER

## 2025-02-21 PROCEDURE — 80053 COMPREHEN METABOLIC PANEL: CPT | Performed by: NURSE PRACTITIONER

## 2025-02-24 ENCOUNTER — TREATMENT (OUTPATIENT)
Dept: CARDIAC REHAB | Facility: HOSPITAL | Age: OVER 89
End: 2025-02-24
Payer: MEDICARE

## 2025-02-24 DIAGNOSIS — Z95.2 S/P TAVR (TRANSCATHETER AORTIC VALVE REPLACEMENT): Primary | ICD-10-CM

## 2025-02-24 PROCEDURE — 93798 PHYS/QHP OP CAR RHAB W/ECG: CPT

## 2025-02-26 ENCOUNTER — APPOINTMENT (OUTPATIENT)
Dept: CARDIAC REHAB | Facility: HOSPITAL | Age: OVER 89
End: 2025-02-26
Payer: MEDICARE

## 2025-02-28 ENCOUNTER — APPOINTMENT (OUTPATIENT)
Dept: CARDIAC REHAB | Facility: HOSPITAL | Age: OVER 89
End: 2025-02-28
Payer: MEDICARE

## 2025-02-28 ENCOUNTER — TELEPHONE (OUTPATIENT)
Dept: CARDIAC REHAB | Facility: HOSPITAL | Age: OVER 89
End: 2025-02-28
Payer: MEDICARE

## 2025-03-01 NOTE — TELEPHONE ENCOUNTER
Mr. Delgado was discharged from Cardiac rehab Phase II on 2/26/25. He attended 17 sessions. The patient plans on continuing his exercise at his assisted living facility.

## 2025-03-05 ENCOUNTER — HOSPITAL ENCOUNTER (OUTPATIENT)
Dept: PET IMAGING | Facility: HOSPITAL | Age: OVER 89
Discharge: HOME | End: 2025-03-05
Admitting: INTERNAL MEDICINE
Payer: MEDICARE

## 2025-03-05 DIAGNOSIS — C85.90 LYMPHOMA, UNSPECIFIED BODY REGION, UNSPECIFIED LYMPHOMA TYPE: ICD-10-CM

## 2025-03-05 PROCEDURE — 25510000002 DIATRIZOATE MEGLUMINE & SODIUM PER 1 ML: Performed by: INTERNAL MEDICINE

## 2025-03-05 PROCEDURE — 74177 CT ABD & PELVIS W/CONTRAST: CPT

## 2025-03-05 PROCEDURE — 71260 CT THORAX DX C+: CPT

## 2025-03-05 PROCEDURE — 25510000001 IOPAMIDOL 61 % SOLUTION: Performed by: INTERNAL MEDICINE

## 2025-03-05 RX ORDER — DIATRIZOATE MEGLUMINE AND DIATRIZOATE SODIUM 660; 100 MG/ML; MG/ML
30 SOLUTION ORAL; RECTAL
Status: COMPLETED | OUTPATIENT
Start: 2025-03-05 | End: 2025-03-05

## 2025-03-05 RX ORDER — IOPAMIDOL 612 MG/ML
100 INJECTION, SOLUTION INTRAVASCULAR
Status: COMPLETED | OUTPATIENT
Start: 2025-03-05 | End: 2025-03-05

## 2025-03-05 RX ADMIN — IOPAMIDOL 85 ML: 612 INJECTION, SOLUTION INTRAVENOUS at 13:02

## 2025-03-05 RX ADMIN — DIATRIZOATE MEGLUMINE AND DIATRIZOATE SODIUM 30 ML: 660; 100 LIQUID ORAL; RECTAL at 12:08

## 2025-03-08 PROBLEM — R10.13 DYSPEPSIA: Status: RESOLVED | Noted: 2024-08-17 | Resolved: 2025-03-08

## 2025-03-08 NOTE — ASSESSMENT & PLAN NOTE
The patient experiences occasional heartburn, which is managed with Pepcid as needed, especially when consuming meals with unknown seasoning. He has not experienced significant heartburn in the past 3 weeks. He will continue to avoid spicy and fried foods and use Pepcid as needed (avoid PPIs due to hx C diff).

## 2025-03-08 NOTE — ASSESSMENT & PLAN NOTE
History of mild disease.  Stress test 2017 with no ischemia.  He is on statin and Zetia.  No angina pectoris. EF normal 1/2017 echo  Heart cath shows prior complete total occlusion of the RCA as well as some moderate disease. Continue cholesterol therapy and aspirin.

## 2025-03-08 NOTE — PROGRESS NOTES
Subjective   The ABCs of the Annual Wellness Visit  Medicare Wellness Visit      Vicente Delgado is a 89 y.o. patient who presents for a Medicare Wellness Visit.    The following portions of the patient's history were reviewed and   updated as appropriate: allergies, current medications, past family history, past medical history, past social history, past surgical history, and problem list.    Compared to one year ago, the patient's physical   health is the same.  Compared to one year ago, the patient's mental   health is the same.    Recent Hospitalizations:  This patient has had a [facility] admission record on file within the last 365 days.  Current Medical Providers:  Patient Care Team:  Mechelle Landa APRN as PCP - General (Internal Medicine)  Reginaldo Palacio MD (Dermatology)  Janki Elias MD (Inactive) as Consulting Physician (Ophthalmology)  Kristopher Machado DPM as Consulting Physician (Podiatry)  Aby Marquez MD as Consulting Physician (Hand Surgery)  Wilton Ramos MD as Consulting Physician (Orthopedic Surgery)  Destinee Snider MD as Consulting Physician (Pain Medicine)  Breezy Frausto MD (Inactive) as Consulting Physician (Cardiology)  Velia Watkins PA-C as Physician Assistant (Physician Assistant)  Hafsa Conway MD as Referring Physician (Colon and Rectal Surgery)  Justin Berry MD as Consulting Physician (Hematology and Oncology)    Outpatient Medications Prior to Visit   Medication Sig Dispense Refill    ASPIRIN 81 PO Take 81 mg by mouth Daily.      ezetimibe (ZETIA) 10 MG tablet TAKE 1 TABLET BY MOUTH DAILY 90 tablet 3    Famotidine (PEPCID PO) Take  by mouth.      rosuvastatin (CRESTOR) 10 MG tablet Take 1 tablet by mouth Every Night. 90 tablet 3     No facility-administered medications prior to visit.     No opioid medication identified on active medication list. I have reviewed chart for other potential  high risk medication/s and harmful drug  interactions in the elderly.      Aspirin is on active medication list. Aspirin use is indicated based on review of current medical condition/s. Pros and cons of this therapy have been discussed today. Benefits of this medication outweigh potential harm.  Patient has been encouraged to continue taking this medication.  .      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  ACP discussion was held with the patient during this visit. Patient has an advance directive in EMR which is still valid.             Objective   Vitals:    02/21/25 1018   BP: 136/70   BP Location: Left arm   Patient Position: Sitting   Cuff Size: Adult   Pulse: 77   Temp: 98.1 °F (36.7 °C)   SpO2: 97%   Weight: 73.2  "kg (161 lb 6.4 oz)   Height: 170.2 cm (67\")   PainSc: 0-No pain       Estimated body mass index is 25.28 kg/m² as calculated from the following:    Height as of this encounter: 170.2 cm (67\").    Weight as of this encounter: 73.2 kg (161 lb 6.4 oz).                Does the patient have evidence of cognitive impairment? No  Lab Results   Component Value Date    TRIG 139 2025    HDL 50 2025    LDL 73 2025    VLDL 24 2025                                                                                                Health  Risk Assessment    Smoking Status:  Social History     Tobacco Use   Smoking Status Some Days    Types: Cigars    Passive exposure: Yes   Smokeless Tobacco Never   Tobacco Comments    Smoke a cigar ocassionally     Alcohol Consumption:  Social History     Substance and Sexual Activity   Alcohol Use No    Comment: Daily caffeine use       Fall Risk Screen  STEADI Fall Risk Assessment was completed, and patient is at LOW risk for falls.Assessment completed on:2025    Depression Screening   Little interest or pleasure in doing things? Not at all   Feeling down, depressed, or hopeless? Not at all   PHQ-2 Total Score 0      Health Habits and Functional and Cognitive Screenin/21/2025    10:24 AM   Functional & Cognitive Status   Do you have difficulty preparing food and eating? No   Do you have difficulty bathing yourself, getting dressed or grooming yourself? No   Do you have difficulty using the toilet? No   Do you have difficulty moving around from place to place? No   Do you have trouble with steps or getting out of a bed or a chair? No   Current Diet Well Balanced Diet   Dental Exam Up to date   Eye Exam Up to date   Exercise (times per week) 6 times per week   Current Exercises Include Treadmill;Cardiovascular Workout   Do you need help using the phone?  No   Are you deaf or do you have serious difficulty hearing?  Yes   Do you need help to go to places out of " walking distance? No   Do you need help shopping? No   Do you need help preparing meals?  No   Do you need help with housework?  No   Do you need help with laundry? No   Do you need help taking your medications? No   Do you need help managing money? No   Do you ever drive or ride in a car without wearing a seat belt? No   Have you felt unusual stress, anger or loneliness in the last month? No   Who do you live with? Community   If you need help, do you have trouble finding someone available to you? No   Have you been bothered in the last four weeks by sexual problems? No   Do you have difficulty concentrating, remembering or making decisions? No           Age-appropriate Screening Schedule:  Refer to the list below for future screening recommendations based on patient's age, sex and/or medical conditions. Orders for these recommended tests are listed in the plan section. The patient has been provided with a written plan.    Health Maintenance List  Health Maintenance   Topic Date Due    ZOSTER VACCINE (1 of 2) Never done    RSV Vaccine - Adults (1 - 1-dose 75+ series) Never done    TDAP/TD VACCINES (3 - Td or Tdap) 09/09/2024    ANNUAL WELLNESS VISIT  02/12/2025    COVID-19 Vaccine (11 - 2024-25 season) 03/07/2025    BMI FOLLOWUP  08/16/2025    LIPID PANEL  02/21/2026    INFLUENZA VACCINE  Completed    Pneumococcal Vaccine 50+  Completed                                                                                                                                                CMS Preventative Services Quick Reference  Risk Factors Identified During Encounter  Hearing Problem:  has seen ENT  Immunizations Discussed/Encouraged: Tdap and Shingrix    The above risks/problems have been discussed with the patient.  Pertinent information has been shared with the patient in the After Visit Summary.  An After Visit Summary and PPPS were made available to the patient.    Follow Up:   Next Medicare Wellness visit to be  scheduled in 1 year.         Additional E&M Note during same encounter follows:  Patient has additional, significant, and separately identifiable condition(s)/problem(s) that require work above and beyond the Medicare Wellness Visit     Chief Complaint  Medicare Wellness-subsequent, Med Management (Discuss Pepcid), Blister (Blisters on bottom of feet - ointment from podiatrist works but it comes back a couple weeks later//Welps on buttocks//Had red rash when he was visiting his daughter), and Dizziness (Random through the day, while on computer eating breakfast)    Subjective    HPI  Ed is also being seen today for additional medical problem/s.       The patient presents for evaluation of blisters on his feet, rash, dizziness, heartburn, ocular migraine, and health maintenance. He is accompanied by his son-in-law.    He has been experiencing small, pinhead-sized blisters on his feet for over a year, predominantly on the left foot. These blisters are itchy but not painful. A dermatologist attributed these blisters to a compromised immune system and prescribed an ointment that effectively clears the blisters within 2 days. He was informed that this condition is chronic and will require ongoing management with the ointment.    He also reports a recurrent rash on his back, which are itchy upon contact. He was prescribed 2 ointments by a dermatologist, which have been effective in managing the symptoms.    A few weeks prior to a visit to his daughter in [place], he developed redhead-like lesions on his shoulder and back, which were successfully treated with clobetasol spray, resulting in complete resolution within 10 days.    He experiences dizziness, primarily in the morning, which he has attempted to manage by delaying his medication intake until after breakfast, but this has not been effective. The dizziness does not occur upon waking and is not associated with shortness of breath or palpitations. He reports feeling  "as though he may faint during these episodes. He uses a walker for mobility and reports no associated headaches. He has been evaluated by an ENT specialist for vertigo, who found no abnormalities in his ears.    He has been experiencing heartburn, which improved during a recent visit to his daughter in [place] where he consumed a diet devoid of spices. He avoids spicy and fried foods, with occasional exceptions such as a monthly fried fish sandwich. He takes Pepcid as needed, typically in the morning, and finds it effective. He has not experienced heartburn for the past 3 weeks.    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 He was advised to use reading glasses with a strength of 1.25, which he has found helpful.    He is scheduled to see Dr. Berry, his oncologist, on 03/26/2025.     He had a valve replacement and pacemaker placed in December 2024.     Review of Systems   HENT:  Positive for congestion and postnasal drip.    Eyes:  Positive for visual disturbance.   Respiratory:  Negative for cough, chest tightness and shortness of breath.    Cardiovascular:  Negative for chest pain, palpitations and leg swelling.   Gastrointestinal:  Negative for abdominal pain.   Musculoskeletal:  Positive for arthralgias.   Neurological:  Positive for dizziness.          Objective   Vital Signs:  /70 (BP Location: Left arm, Patient Position: Sitting, Cuff Size: Adult)   Pulse 77   Temp 98.1 °F (36.7 °C)   Ht 170.2 cm (67\")   Wt 73.2 kg (161 lb 6.4 oz)   SpO2 97%   BMI 25.28 kg/m²   Physical Exam  Constitutional:       Appearance: He is well-developed. He is not ill-appearing.   HENT:      Head: Normocephalic.      Right Ear: Hearing, tympanic membrane and external ear normal.      Left Ear: Hearing, tympanic membrane and external ear normal.      Nose: Nose normal. No nasal deformity, mucosal edema or " rhinorrhea.      Right Sinus: No maxillary sinus tenderness or frontal sinus tenderness.      Left Sinus: No maxillary sinus tenderness or frontal sinus tenderness.      Mouth/Throat:      Dentition: Normal dentition.   Eyes:      General: Lids are normal.         Right eye: No discharge.         Left eye: No discharge.      Conjunctiva/sclera: Conjunctivae normal.      Right eye: No exudate.     Left eye: No exudate.  Neck:      Thyroid: No thyroid mass or thyromegaly.      Vascular: No carotid bruit.      Trachea: Trachea normal.   Cardiovascular:      Rate and Rhythm: Regular rhythm.      Pulses: Normal pulses.      Heart sounds: Normal heart sounds. No murmur heard.      with a grade of 2/6.   Pulmonary:      Effort: No respiratory distress.      Breath sounds: Normal breath sounds. No decreased breath sounds, wheezing, rhonchi or rales.   Abdominal:      General: Bowel sounds are normal.      Palpations: Abdomen is soft.      Tenderness: There is no abdominal tenderness.   Musculoskeletal:      Cervical back: Normal range of motion. No edema.   Lymphadenopathy:      Head:      Right side of head: No submental, submandibular, tonsillar, preauricular, posterior auricular or occipital adenopathy.      Left side of head: No submental, submandibular, tonsillar, preauricular, posterior auricular or occipital adenopathy.   Skin:     General: Skin is warm and dry.      Nails: There is no clubbing.   Neurological:      Mental Status: He is alert.   Psychiatric:         Behavior: Behavior is cooperative.               The following data was reviewed by: GE Bustos on 02/21/2025:  Data reviewed : Consultant notes cardiology 12/19/24  Common labs          10/31/2024    13:20 11/11/2024    12:36 2/21/2025    11:36   Common Labs   Glucose 89  110  62    BUN 21  22  25    Creatinine 0.87  0.74  0.88    Sodium 137  138  135    Potassium 4.5  4.0  4.3    Chloride 101  105  98    Calcium 8.9  9.1  9.8    Albumin 4.0   4.0  4.1    Total Bilirubin 0.3  0.4  0.5    Alkaline Phosphatase 85  84  83    AST (SGOT) 26  25  29    ALT (SGPT) 15  15  20    WBC 4.91  5.01  7.11    Hemoglobin 13.6  13.7  14.5    Hematocrit 40.3  40.5  45.8    Platelets 176  154  179    Total Cholesterol   147    Triglycerides   139    HDL Cholesterol   50    LDL Cholesterol    73        Results             Assessment and Plan Additional age appropriate preventative wellness advice topics were discussed during today's preventative wellness exam(some topics already addressed during AWV portion of the note above):    Physical Activity: Advised cardiovascular activity 150 minutes per week as tolerated. (example brisk walk for 30 minutes, 5 days a week).     Nutrition: Discussed nutrition plan with patient. Information shared in after visit summary. Goal is for a well balanced diet to enhance overall health.       Health maintenance.  The patient is up to date on his influenza and COVID-19 vaccines. He was advised to receive the RSV vaccine, which can be administered at the pharmacy.      Diagnoses and all orders for this visit:    1. Medicare annual wellness visit, subsequent (Primary)    2. Coronary artery disease involving coronary bypass graft of native heart without angina pectoris  Assessment & Plan:  History of mild disease.  Stress test 2017 with no ischemia.  He is on statin and Zetia.  No angina pectoris. EF normal 1/2017 echo  Heart cath shows prior complete total occlusion of the RCA as well as some moderate disease. Continue cholesterol therapy and aspirin.       3. Primary hypertension  Assessment & Plan:  Blood pressure readings are within the normal range, with values between 120-130/70-80. He is advised to continue benazepril daily along with a low sodium diet.    Orders:  -     CBC (No Diff)  -     Comprehensive Metabolic Panel  -     Lipid Panel  -     TSH    4. Paroxysmal atrial fibrillation  Assessment & Plan:  Has not had any additional  episodes since implant, monitored by cardiology      5. Gastroesophageal reflux disease with esophagitis without hemorrhage  Assessment & Plan:  The patient experiences occasional heartburn, which is managed with Pepcid as needed, especially when consuming meals with unknown seasoning. He has not experienced significant heartburn in the past 3 weeks. He will continue to avoid spicy and fried foods and use Pepcid as needed (avoid PPIs due to hx C diff).               Follow Up   Return in about 6 months (around 8/21/2025).  Patient was given instructions and counseling regarding his condition or for health maintenance advice. Please see specific information pulled into the AVS if appropriate.  Patient or patient representative verbalized consent for the use of Ambient Listening during the visit with  GE Bustos for chart documentation. 3/8/2025  16:15 EST

## 2025-03-08 NOTE — PATIENT INSTRUCTIONS
Medicare Wellness  Personal Prevention Plan of Service     Date of Office Visit:    Encounter Provider:  GE Bustos  Place of Service:  [facility]  Patient Name: Vicente Delgado  :  1928    As part of the Medicare Wellness portion of your visit today, we are providing you with this personalized preventive plan of services (PPPS). This plan is based upon recommendations of the [place] Preventive Services Task Force (USPSTF) and the Advisory Committee on Immunization Practices (ACIP).    This lists the preventive care services that should be considered, and provides dates of when you are due. Items listed as completed are up-to-date and do not require any further intervention.    Health Maintenance   Topic Date Due    ZOSTER VACCINE (1 of 2) Never done    RSV Vaccine - Adults (1 - 1-dose 75+ series) Never done    TDAP/TD VACCINES (3 - Td or Tdap) 2024    ANNUAL WELLNESS VISIT  2025    COVID-19 Vaccine (2024- season) 2025 (Originally 3/7/2025)    BMI FOLLOWUP  2025    LIPID PANEL  2026    INFLUENZA VACCINE  Completed    Pneumococcal Vaccine 50+  Completed       Orders Placed This Encounter   Procedures    CBC (No Diff)     Release to patient:   Routine Release [[id number]]    Comprehensive Metabolic Panel     Release to patient:   Routine Release [[id number]]    Lipid Panel     Release to patient:   Routine Release [[id number]]    TSH     Release to patient:   Routine Release [[id number]]       Return in about 6 months (around 2025).

## 2025-03-20 ENCOUNTER — OFFICE VISIT (OUTPATIENT)
Dept: CARDIOLOGY | Facility: CLINIC | Age: OVER 89
End: 2025-03-20
Payer: MEDICARE

## 2025-03-20 VITALS
HEART RATE: 78 BPM | DIASTOLIC BLOOD PRESSURE: 70 MMHG | WEIGHT: 161 LBS | HEIGHT: 67 IN | BODY MASS INDEX: 25.27 KG/M2 | SYSTOLIC BLOOD PRESSURE: 126 MMHG

## 2025-03-20 DIAGNOSIS — I44.2 COMPLETE HEART BLOCK: ICD-10-CM

## 2025-03-20 DIAGNOSIS — E78.2 MIXED HYPERLIPIDEMIA: Chronic | ICD-10-CM

## 2025-03-20 DIAGNOSIS — Z95.2 S/P TAVR (TRANSCATHETER AORTIC VALVE REPLACEMENT): ICD-10-CM

## 2025-03-20 DIAGNOSIS — I50.32 CHRONIC HEART FAILURE WITH PRESERVED EJECTION FRACTION (HFPEF): Chronic | ICD-10-CM

## 2025-03-20 DIAGNOSIS — I35.0 SEVERE AORTIC STENOSIS: Primary | ICD-10-CM

## 2025-03-20 DIAGNOSIS — I34.0 NONRHEUMATIC MITRAL VALVE REGURGITATION: Chronic | ICD-10-CM

## 2025-03-20 DIAGNOSIS — I10 PRIMARY HYPERTENSION: Chronic | ICD-10-CM

## 2025-03-20 DIAGNOSIS — Z95.0 PRESENCE OF CARDIAC PACEMAKER: ICD-10-CM

## 2025-03-20 DIAGNOSIS — I25.810 CORONARY ARTERY DISEASE INVOLVING CORONARY BYPASS GRAFT OF NATIVE HEART WITHOUT ANGINA PECTORIS: Chronic | ICD-10-CM

## 2025-03-20 DIAGNOSIS — I48.0 PAROXYSMAL ATRIAL FIBRILLATION: Chronic | ICD-10-CM

## 2025-03-20 RX ORDER — CARBOXYMETHYLCELLULOSE SODIUM 5 MG/ML
SOLUTION/ DROPS OPHTHALMIC 3 TIMES DAILY PRN
COMMUNITY

## 2025-03-20 NOTE — PROGRESS NOTES
Subjective:     Encounter Date:03/20/2025      Patient ID: Vicente Delgado is a 89 y.o. male.    Chief Complaint:follow up aortic stenosis  History of Present Illness  This is a 89 y/o man who follows with Dr. Sales and is new to me today. He has a pmhx of severe aortic valve stenosis, chronic heart failure with preserved EF, HTN, PAF, and history of TIA. In September 2024 he presented to the ED with complaints of feeling like he was going to pass out when he stood. Blood pressure was a little soft and lisinopril was stopped. Echocardiogram showed severe aortic stenosis. He underwent a right and left heart cath for TAVR workup. This showed tubular 60% mid vessel LAD stenosis along with diffuse 50% proximal OM1 stenosis, RCA chronically occluded in mid segment. He had normal filling pressures no pulmonary hypertension.     He returned for scheduled TAVR on 10/09/24. He underwent placement of a 26 mm DANISH 3 ultra transcatheter aortic valve. He tolerated this well but was noted to have Mobitz 1 second-degree AV block while in the hospital and went home with a monitor. His follow-up transthoracic echocardiogram done on 10/9/2024 showed normal valve function with a mean gradient of 8 mmHg across the aortic valve. He wore a Zio patch that was abnormal and did have evidence of high-grade AV block. He subsequently was evaluated by the electrophysiology team and is scheduled to have a permanent pacemaker on 11/18/2024     He was seen by EP in office in December 2024 and was doing well. Device interrogation showed a normal device function.     He is here today for a follow up visit. He denies any concerning symptoms today. He was having some dizzy spells and but since starting eye drops prescribed by a retinal specialist, he has had no issues. He is feeling better than he thought he ever could. He feels like his breathing has improved significantly. No reported chest pain, swelling in his lower extremities, orthopnea  or PND. He is wondering whether he should be taking aspirin. He thought he was told that he could stop if he wanted. Now he believes that may have been a different medication that was discussed. He has been taking his aspirin.     I have reviewed and updated as appropriate allergies, current medications, past family history, past medical history, past surgical history and problem list.    Review of Systems   Constitutional: Negative for fever, malaise/fatigue, weight gain and weight loss.   HENT:  Negative for congestion, hoarse voice and sore throat.    Eyes:  Negative for blurred vision and double vision.   Cardiovascular:  Negative for chest pain, dyspnea on exertion, leg swelling, orthopnea, palpitations and syncope.   Respiratory:  Negative for cough, shortness of breath and wheezing.    Gastrointestinal:  Negative for abdominal pain, hematemesis, hematochezia and melena.   Genitourinary:  Negative for dysuria and hematuria.   Neurological:  Negative for dizziness, headaches, light-headedness and numbness.   Psychiatric/Behavioral:  Negative for depression. The patient is not nervous/anxious.          Current Outpatient Medications:     ASPIRIN 81 PO, Take 81 mg by mouth Daily., Disp: , Rfl:     ezetimibe (ZETIA) 10 MG tablet, TAKE 1 TABLET BY MOUTH DAILY, Disp: 90 tablet, Rfl: 3    Famotidine (PEPCID PO), Take  by mouth., Disp: , Rfl:     rosuvastatin (CRESTOR) 10 MG tablet, Take 1 tablet by mouth Every Night., Disp: 90 tablet, Rfl: 3    RSV Pre-Fusion F A&B Vac Rcmb (Abrysvo) 120 MCG/0.5ML reconstituted solution injection, Inject 0.5 mL into the appropriate muscle as directed by prescriber., Disp: 0.5 mL, Rfl: 0    Past Medical History:   Diagnosis Date    Acquired absence of other specified parts of digestive tract     ADHD (attention deficit hyperactivity disorder)     Allergic rhinitis     Anemia     Anxiety     Aortic valve insufficiency     Aortic valve replaced 10/08    Arthritis     Atherosclerosis of  native arteries of extremities with intermittent claudication, left leg 05/17/2021    Atherosclerotic heart disease of native coronary artery without angina pectoris     Atrial fibrillation     Burn injury     CAD (coronary artery disease) 07/01/2016    History of mild disease.  Stress test 2017 with no ischemia.  He is on aspirin statin and Zetia.  No angina pectoris. EF normal 1/2017 echo    Calculus of gallbladder without cholecystitis without obstruction     Cancer     Cataract June 2018” and    Ocular mygraine    Cholelithiasis 2020    Chronic deep vein thrombosis (DVT) of iliac vein 12/18/2023    Chronic deep vein thrombosis (DVT) of left femoral vein 12/18/2023    Clostridioides difficile diarrhea 2023    Clotting disorder 2023. January    Wrong medicine    Colon polyps     FOLLOWED BY DR. ROBERT SOTELO    Deep vein thrombosis January2023 dr rodríguez    Depression     Diverticulosis     Elevated cholesterol     Embolism and thrombosis of arteries of the lower extremities 05/17/2021    Esophagitis     Gastritis     GERD (gastroesophageal reflux disease)     Heart disease     Heart murmur     History of anxiety     History of medical problems Right shoulder replacemd    2008    History of prostate cancer 06/1990    History of transfusion 1990    4 pints    HL (hearing loss) Partial    Hypercholesterolemia     Hyperlipidemia     Hypertension 11/04/2021    Insomnia     Low back pain Yes  from hworking    Lupus     Myocardial infarction     TAM (nonalcoholic steatohepatitis)     Nonrheumatic mitral (valve) insufficiency     Pain of left lower leg 05/17/2021    Palpitations     Postphlebitic syndrome 12/18/2023    wo compli LLE    Sciatica of left side 05/17/2021    Sciatica of right side     Seizures     Stroke 2022    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 Rodríguez 10/2021    Varicose veins of right lower extremity with pain 08/10/2022    Venous insufficiency (chronic) (peripheral)  08/10/2022    Visual impairment Ocular mygraine       Past Surgical History:   Procedure Laterality Date    ABDOMINAL SURGERY      AORTIC VALVE REPAIR/REPLACEMENT N/A 10/08/2024    Procedure: TRANSFEMORAL TRANSCATHETER AORTIC VALVE REPLACEMENT with intraoperative Transthoracic echocardiogram;  Surgeon: Sapna Delgado MD;  Location: [facility] CV[facility];  Service: Cardiothoracic;  Laterality: N/A;    AORTIC VALVE REPAIR/REPLACEMENT N/A 10/08/2024    Procedure: Transfemoral Transcatheter Aortic Valve Replacement with intraoperative transthoracic echocardiogram and possible open surgical rescue;  Surgeon: Olaf Sales MD;  Location: [facility] CVOR;  Service: Cardiovascular;  Laterality: N/A;    AORTIC VALVE SURGERY  10/08    CARDIAC CATHETERIZATION  11/16/1995    CARDIAC CATHETERIZATION N/A 10/01/2024    Procedure: Left Heart Cath;  Surgeon: Olaf Sales MD;  Location: [facility] CATH INVASIVE LOCATION;  Service: Cardiology;  Laterality: N/A;    CARDIAC CATHETERIZATION N/A 10/01/2024    Procedure: Right Heart Cath;  Surgeon: Olaf Sales MD;  Location: [facility] CATH INVASIVE LOCATION;  Service: Cardiology;  Laterality: N/A;    CARDIAC ELECTROPHYSIOLOGY PROCEDURE N/A 11/18/2024    Procedure: Pacemaker DC new medt;  Surgeon: Frandy Edmonds MD;  Location: [facility] CATH INVASIVE LOCATION;  Service: Cardiovascular;  Laterality: N/A;    CARDIAC SURGERY  11/16/1995    CARDIAC VALVE REPLACEMENT  2024    Aortic    CARPAL TUNNEL RELEASE Left 05/21/2024    Dr Marquez    CATARACT EXTRACTION Bilateral 07/2018    CHOLECYSTECTOMY  2020    CHOLECYSTECTOMY WITH INTRAOPERATIVE CHOLANGIOGRAM N/A 09/29/2020    Procedure: Laparoscopic cholecystectomy;  Surgeon: Javi Peralta MD;  Location: [facility] OR;  Service: General;  Laterality: N/A;    COLONOSCOPY  01/09/2002    COLONOSCOPY N/A 10/18/2023    15 MM POLYP IN DISTAL ILEUM, PATH: LYMPHOMA VERSUS NEUROENDOCRINE TUMOR, RESCOPE IN 1 YR, DR. ROBERT SOTELO AT [facility]  "   ELBOW PROCEDURE      FRACTURE SURGERY      JOINT REPLACEMENT  2008 shoulder replaced    LUNG BIOPSY      LYMPH NODE BIOPSY  Yes    1992    NASAL POLYP SURGERY      PACEMAKER IMPLANTATION      PROSTATE SURGERY  06/1990    PROSTATECTOMY      SHOULDER ROTATOR CUFF REPAIR Right 08/24/2011    Dr. Bach    SIGMOIDOSCOPY      TONSILLECTOMY  Yes 1943    TOTAL SHOULDER REVERSE ARTHROPLASTY Right     UPPER GASTROINTESTINAL ENDOSCOPY  09/12/1997    Gastritis, Duodenitis, hiatal hernia (Pathology: Gastric antrum minimal chronic inflammation)    UPPER GASTROINTESTINAL ENDOSCOPY  04/11/2005    Mild esophagitis, Small hiatal hernia, Mild gastritis and mild duodenitis       Family History   Problem Relation Age of Onset    Heart failure Mother     Hearing loss Mother     Heart disease Mother         Mother had congestive heart failure    Hyperlipidemia Mother     Arrhythmia Mother     Alcohol abuse Father         Never new him    Diabetes Father        Social History     Tobacco Use    Smoking status: Some Days     Types: Cigars     Passive exposure: Yes    Smokeless tobacco: Never    Tobacco comments:     Smoke a cigar ocassionally   Vaping Use    Vaping status: Never Used   Substance Use Topics    Alcohol use: No     Comment: Daily caffeine use    Drug use: No         ECG 12 Lead    Date/Time: 3/20/2025 3:23 PM  Performed by: Drea Menchaca APRN    Authorized by: Drea Menchaca APRN  Comparison: compared with previous ECG from 12/19/2024  Similar to previous ECG  Rhythm: paced             Objective:     Visit Vitals  Ht 170.2 cm (67\")   BMI 25.28 kg/m²             Physical Exam  Constitutional:       Appearance: Normal appearance. He is normal weight.   HENT:      Head: Normocephalic.   Neck:      Vascular: No carotid bruit.   Cardiovascular:      Rate and Rhythm: Normal rate and regular rhythm.      Chest Wall: PMI is not displaced.      Pulses: Normal pulses.           Radial pulses are 2+ on the right side and 2+ on the " left side.        Posterior tibial pulses are 2+ on the right side and 2+ on the left side.      Heart sounds: Normal heart sounds. No murmur heard.     No friction rub. No gallop.   Pulmonary:      Effort: Pulmonary effort is normal.      Breath sounds: Normal breath sounds.   Abdominal:      General: Bowel sounds are normal. There is no distension.      Palpations: Abdomen is soft.   Musculoskeletal:      Right lower leg: No edema.      Left lower leg: No edema.   Skin:     General: Skin is warm and dry.      Capillary Refill: Capillary refill takes less than 2 seconds.   Neurological:      Mental Status: He is alert and oriented to person, place, and time.   Psychiatric:         Mood and Affect: Mood normal.         Behavior: Behavior normal.         Thought Content: Thought content normal.          Lab Review:   Lipid Panel          2/21/2025    11:36   Lipid Panel   Total Cholesterol 147    Triglycerides 139    HDL Cholesterol 50    VLDL Cholesterol 24    LDL Cholesterol  73    LDL/HDL Ratio 1.38          Cardiac Procedures:       Assessment:         Diagnoses and all orders for this visit:    1. Severe aortic stenosis (Primary)    2. S/P TAVR (transcatheter aortic valve replacement)    3. s/p dual chamber LB-RV lead pacemaker 11/2024    4. Paroxysmal atrial fibrillation    5. Nonrheumatic mitral valve regurgitation    6. Mixed hyperlipidemia    7. Primary hypertension    8. Coronary artery disease involving coronary bypass graft of native heart without angina pectoris    9. Chronic heart failure with preserved ejection fraction (HFpEF)    10. Complete heart block            Plan:       Nonrheumatic aortic valve stenosis: s/p TAVR on 10/8/24. Echocardiogram in November showed well seated valve. He is feeling well. Currently on aspirin but thought he was told he could stop. Will d/w Dr. Sales and let patient know.  HLD: on Crestor and Zetia. No changes.  CHB: s/p PPM. Follows with Dr. Edmonds. Recent device  interrogation showed normal function  Chronic diastolic CHF: appears compensated on exam today. No changes.    Thank you for allowing me to participate in this patient's care. Please call with any questions or concerns. Mr. Delgado will follow up with Dr. Sales in 6 months.          Your medication list            Accurate as of March 20, 2025 11:27 AM. If you have any questions, ask your nurse or doctor.                CONTINUE taking these medications        Instructions Last Dose Given Next Dose Due   Abrysvo 120 MCG/0.5ML reconstituted solution injection  Generic drug: RSV Pre-Fusion F A&B Vac Rcmb      Inject 0.5 mL into the appropriate muscle as directed by prescriber.       ASPIRIN 81 PO      Take 81 mg by mouth Daily.       ezetimibe 10 MG tablet  Commonly known as: ZETIA      TAKE 1 TABLET BY MOUTH DAILY       PEPCID PO      Take  by mouth.       rosuvastatin 10 MG tablet  Commonly known as: CRESTOR      Take 1 tablet by mouth Every Night.                  Drea Menchaca, GE  03/20/25  11:27 AM EDT

## 2025-03-21 ENCOUNTER — TELEPHONE (OUTPATIENT)
Dept: CARDIOLOGY | Facility: CLINIC | Age: OVER 89
End: 2025-03-21
Payer: MEDICARE

## 2025-03-21 NOTE — TELEPHONE ENCOUNTER
SANJIVM with daughter and spoke with Mr. Delgado explaining that he should continue taking the aspirin 81 mg daily.

## 2025-03-25 ENCOUNTER — CLINICAL SUPPORT NO REQUIREMENTS (OUTPATIENT)
Age: OVER 89
End: 2025-03-25
Payer: MEDICARE

## 2025-03-25 ENCOUNTER — OFFICE VISIT (OUTPATIENT)
Age: OVER 89
End: 2025-03-25
Payer: MEDICARE

## 2025-03-25 VITALS
WEIGHT: 162 LBS | HEIGHT: 67 IN | HEART RATE: 62 BPM | BODY MASS INDEX: 25.43 KG/M2 | SYSTOLIC BLOOD PRESSURE: 128 MMHG | DIASTOLIC BLOOD PRESSURE: 74 MMHG

## 2025-03-25 DIAGNOSIS — Z95.0 PRESENCE OF CARDIAC PACEMAKER: Primary | ICD-10-CM

## 2025-03-25 DIAGNOSIS — Z95.0 PRESENCE OF CARDIAC PACEMAKER: ICD-10-CM

## 2025-03-25 DIAGNOSIS — Z95.2 S/P TAVR (TRANSCATHETER AORTIC VALVE REPLACEMENT): Primary | ICD-10-CM

## 2025-03-25 PROCEDURE — 99213 OFFICE O/P EST LOW 20 MIN: CPT | Performed by: INTERNAL MEDICINE

## 2025-03-25 PROCEDURE — 93000 ELECTROCARDIOGRAM COMPLETE: CPT | Performed by: INTERNAL MEDICINE

## 2025-03-25 NOTE — PROGRESS NOTES
Date of Office Visit: 2025  Encounter Provider: Frandy Edmonds MD  Place of Service: [facility] CARDIOLOGY  Patient Name: Vicente Delgado  :1928    Chief Complaint   Patient presents with    complete heart block    paroxysmal AFIB    Pacemaker Check   :     HPI: Vicente Delgado is a 89 y.o. male who presents today for follow-up complete heart block.    He was noted to have heart block on monitor following TAVR.     He had implantation of dual chamber pacemaker with conduction pacing following this result.     Device testing today is normal, CSP looks ok    He is worried the device leads are prominent.  He reports some weight loss.           Past Medical History:   Diagnosis Date    Acquired absence of other specified parts of digestive tract     ADHD (attention deficit hyperactivity disorder)     Allergic rhinitis     Anemia     Anxiety     Aortic valve insufficiency     Aortic valve replaced 10/08    Arthritis     Atherosclerosis of native arteries of extremities with intermittent claudication, left leg 2021    Atherosclerotic heart disease of native coronary artery without angina pectoris     Atrial fibrillation     Burn injury     CAD (coronary artery disease) 2016    History of mild disease.  Stress test  with no ischemia.  He is on aspirin statin and Zetia.  No angina pectoris. EF normal 2017 echo    Calculus of gallbladder without cholecystitis without obstruction     Cancer     Cataract 2018” and    Ocular mygraine    Cholelithiasis     Chronic deep vein thrombosis (DVT) of iliac vein 2023    Chronic deep vein thrombosis (DVT) of left femoral vein 2023    Clostridioides difficile diarrhea     Clotting disorder . January    Wrong medicine    Colon polyps     FOLLOWED BY DR. ROBERT SOTELO    Deep vein thrombosis 2023 dr arnoldo Mathews     Diverticulitis of colon ....    Diverticulosis     Elevated cholesterol      Embolism and thrombosis of arteries of the lower extremities 05/17/2021    Esophagitis     Gastritis     GERD (gastroesophageal reflux disease)     Heart disease     Heart murmur     History of anxiety     History of medical problems Right shoulder replacemd    2008    History of prostate cancer 06/1990    History of transfusion 1990    4 pints    HL (hearing loss) Partial    Hypercholesterolemia     Hyperlipidemia     Hypertension 11/04/2021    Insomnia     Low back pain Yes  from hworking    Lupus     Myocardial infarction     TAM (nonalcoholic steatohepatitis)     Nonrheumatic mitral (valve) insufficiency     Pain of left lower leg 05/17/2021    Palpitations     Postphlebitic syndrome 12/18/2023    wo compli LLE    Sciatica of left side 05/17/2021    Sciatica of right side     Seizures     Small intestine cancer 10/#2024    Dr. Berry    Stroke 2022    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 Rodríguez 10/2021    Varicose veins of right lower extremity with pain 08/10/2022    Venous insufficiency (chronic) (peripheral) 08/10/2022    Visual impairment Ocular mygraine       Past Surgical History:   Procedure Laterality Date    ABDOMINAL SURGERY      AORTIC VALVE REPAIR/REPLACEMENT N/A 10/08/2024    Procedure: TRANSFEMORAL TRANSCATHETER AORTIC VALVE REPLACEMENT with intraoperative Transthoracic echocardiogram;  Surgeon: Sapna Delgado MD;  Location: [facility];  Service: Cardiothoracic;  Laterality: N/A;    AORTIC VALVE REPAIR/REPLACEMENT N/A 10/08/2024    Procedure: Transfemoral Transcatheter Aortic Valve Replacement with intraoperative transthoracic echocardiogram and possible open surgical rescue;  Surgeon: Olaf Sales MD;  Location: [facility];  Service: Cardiovascular;  Laterality: N/A;    AORTIC VALVE SURGERY  10/08    CARDIAC CATHETERIZATION  11/16/1995    CARDIAC CATHETERIZATION N/A 10/01/2024    Procedure: Left Heart Cath;  Surgeon: Olaf Sales MD;   Location:  [facility] CATH INVASIVE LOCATION;  Service: Cardiology;  Laterality: N/A;    CARDIAC CATHETERIZATION N/A 10/01/2024    Procedure: Right Heart Cath;  Surgeon: Olaf Sales MD;  Location:  [facility] CATH INVASIVE LOCATION;  Service: Cardiology;  Laterality: N/A;    CARDIAC ELECTROPHYSIOLOGY PROCEDURE N/A 11/18/2024    Procedure: Pacemaker DC new medt;  Surgeon: Frandy Edmonds MD;  Location:  [facility] CATH INVASIVE LOCATION;  Service: Cardiovascular;  Laterality: N/A;    CARDIAC SURGERY  11/16/1995    CARDIAC VALVE REPLACEMENT  2024    Aortic    CARPAL TUNNEL RELEASE Left 05/21/2024    Dr Marquez    CATARACT EXTRACTION Bilateral 07/2018    CHOLECYSTECTOMY  2020    CHOLECYSTECTOMY WITH INTRAOPERATIVE CHOLANGIOGRAM N/A 09/29/2020    Procedure: Laparoscopic cholecystectomy;  Surgeon: Javi Peralta MD;  Location:  [facility];  Service: General;  Laterality: N/A;    COLONOSCOPY  01/09/2002    COLONOSCOPY N/A 10/18/2023    15 MM POLYP IN DISTAL ILEUM, PATH: LYMPHOMA VERSUS NEUROENDOCRINE TUMOR, RESCOPE IN 1 YR, DR. ROBERT SOTELO AT [facility]    ELBOW PROCEDURE      FRACTURE SURGERY      INSERT / REPLACE / REMOVE PACEMAKER  Oct24    JOINT REPLACEMENT  2008 shoulder replaced    LUNG BIOPSY      LYMPH NODE BIOPSY  Yes    1992    NASAL POLYP SURGERY      PACEMAKER IMPLANTATION      PROSTATE SURGERY  06/1990    PROSTATECTOMY      SHOULDER ROTATOR CUFF REPAIR Right 08/24/2011    Dr. Bach    SIGMOIDOSCOPY      TONSILLECTOMY  Yes 1943    TOTAL SHOULDER REVERSE ARTHROPLASTY Right     UPPER GASTROINTESTINAL ENDOSCOPY  09/12/1997    Gastritis, Duodenitis, hiatal hernia (Pathology: Gastric antrum minimal chronic inflammation)    UPPER GASTROINTESTINAL ENDOSCOPY  04/11/2005    Mild esophagitis, Small hiatal hernia, Mild gastritis and mild duodenitis       Social History     Socioeconomic History    Marital status:    Tobacco Use    Smoking status: Some Days     Types: Cigars     Passive exposure: Yes     "Smokeless tobacco: Never    Tobacco comments:     Smoke a cigar ocassionally   Vaping Use    Vaping status: Never Used   Substance and Sexual Activity    Alcohol use: No     Comment: Daily caffeine use    Drug use: No    Sexual activity: Not Currently     Partners: Female       Family History   Problem Relation Age of Onset    Heart failure Mother     Hearing loss Mother     Heart disease Mother         Mother had congestive heart failure    Hyperlipidemia Mother     Arrhythmia Mother     Alcohol abuse Father         Never new him    Diabetes Father     Diabetes Maternal Grandmother        Review of Systems   Constitutional: Negative.   Cardiovascular: Negative.    Respiratory: Negative.     Gastrointestinal: Negative.        Allergies   Allergen Reactions    Lidocaine Dizziness     Reaction to novacaine    Lovastatin Other (See Comments)     Liver enzymes elevated    Pravastatin Other (See Comments)     Elevated liver enzymes     Gabapentin GI Intolerance     Bloating, incontinence     Procaine     Simvastatin          Current Outpatient Medications:     ASPIRIN 81 PO, Take 81 mg by mouth Daily., Disp: , Rfl:     carboxymethylcellulose (REFRESH PLUS) 0.5 % solution, 3 (Three) Times a Day As Needed for Dry Eyes. 4 times a day, Disp: , Rfl:     ezetimibe (ZETIA) 10 MG tablet, TAKE 1 TABLET BY MOUTH DAILY, Disp: 90 tablet, Rfl: 3    Famotidine (PEPCID PO), Take  by mouth., Disp: , Rfl:     rosuvastatin (CRESTOR) 10 MG tablet, Take 1 tablet by mouth Every Night., Disp: 90 tablet, Rfl: 3    RSV Pre-Fusion F A&B Vac Rcmb (Abrysvo) 120 MCG/0.5ML reconstituted solution injection, Inject 0.5 mL into the appropriate muscle as directed by prescriber., Disp: 0.5 mL, Rfl: 0      Objective:     Vitals:    03/25/25 0929   BP: 128/74   BP Location: Right arm   Patient Position: Sitting   Cuff Size: Adult   Pulse: 62   Weight: 73.5 kg (162 lb)   Height: 170.2 cm (67\")     Body mass index is 25.37 kg/m².    PHYSICAL EXAM:    Vitals " and nursing note reviewed.   Constitutional:       General: Not in acute distress.     Appearance: Normal and healthy appearance. Not in distress.   HENT:    Mouth/Throat:      Pharynx: Oropharynx is clear.   Pulmonary:      Effort: Pulmonary effort is normal. No respiratory distress.   Cardiovascular:      Normal rate. Regular rhythm.      Murmurs: There is no murmur.   Edema:     Peripheral edema absent.   Skin:     General: Skin is warm and dry.   Neurological:      Mental Status: Alert and oriented to person, place, and time.   Psychiatric:         Behavior: Behavior normal. Behavior is cooperative.         Thought Content: Thought content normal.         Judgment: Judgment normal.             ECG 12 Lead    Date/Time: 3/25/2025 10:40 AM  Performed by: rFandy Edmonds MD    Authorized by: Frandy Edmonds MD  Comparison: compared with previous ECG from 12/24/2024  Similar to previous ECG  Rhythm: sinus rhythm and paced            Assessment:       Diagnosis Plan   1. S/P TAVR (transcatheter aortic valve replacement)        2. s/p dual chamber LB-RV lead pacemaker 11/2024               Plan:       Normal device follow-up for complete heart block.  His device pocket is prominent, but normal.  I'm not too concerned about erosion at this time.   He will follow-up in one year.     As always, it has been a pleasure to participate in your patient's care.      Sincerely,         Frandy Edmonds MD

## 2025-03-25 NOTE — PROGRESS NOTES
"[facility] GROUP OUTPATIENT FOLLOW UP CLINIC VISIT    REASON FOR FOLLOW-UP:    B-cell non-Hodgkin lymphoma, CD20 positive involving the distal ileum  4 doses of weekly rituximab complete 1/5/2024    HISTORY OF PRESENT ILLNESS:  Vicente Delgado is a 89 y.o. male who returns today for follow up of the above issue.     He is doing well. He's had some abdominal pain recently lately, likely related to diet and anxiety.     No palpable adenopathy. No fevers or chills.     He did have a TAVR and now has a pacemaker.       REVIEW OF SYSTEMS:  As per the hospitals    PHYSICAL EXAMINATION:  Vitals:    03/26/25 1317   BP: 149/81   Pulse: 61   Resp: 16   Temp: 97.6 °F (36.4 °C)   TempSrc: Oral   SpO2: 97%   Weight: 73.7 kg (162 lb 8 oz)   Height: 170.2 cm (67.01\")   PainSc: 0-No pain       General:  No acute distress, awake, alert and oriented  Skin:  Warm and dry, no visible rash  HEENT:  Normocephalic/atraumatic.  Hearing loss persists, stable, now has hearing aids.  Chest:  Normal respiratory effort.  Lungs clear to auscultation bilaterally.  Heart: Regular rate and rhythm, grade IV/VI holosystolic murmur.  Extremities:  No visible clubbing, cyanosis, or edema  Neuro/psych:  Grossly nonfocal other than hearing loss.  Normal mood and affect.  Lymphatics: No palpable cervical supraclavicular or axillary adenopathy     DIAGNOSTIC DATA:  Lactate Dehydrogenase (03/26/2025 12:54)  CBC & Differential (03/26/2025 12:54)  Comprehensive Metabolic Panel (03/26/2025 12:54)    IMAGING:    CT Abdomen Pelvis With Contrast (03/05/2025 13:03)  CT Chest With Contrast Diagnostic (03/05/2025 13:03)    CT images reviewed. No adenopathy.       ASSESSMENT:    This is a 89 y.o. male with:    *B-cell non-Hodgkin lymphoma, CD20 positive involving the distal ileum  The patient began experiencing abdominal discomfort and diarrhea in early September after eating what he felt was some bad fish.    He presented to the emergency department on 9/20/2023.  "   CT imaging of the abdomen and pelvis was performed showing an enhancing mass in the cecum measuring about 3 cm with adjacent enlarged pericecal lymph nodes measuring up to 1.6 cm.  Mild sigmoid diverticulosis was noted as well as mild wall thickening involving the mid sigmoid colon.  He was referred for colonoscopy which was performed by Dr. Ary Conway with colorectal surgery on 10/18/2023.  This showed multiple diverticula in sigmoid colon, 1 nonbleeding polyp in the distal ileum at 15 mm.    Biopsies show involvement by atypical CD20 positive B-cell non-Hodgkin lymphoma with extensive crush artifact and a Ki-67 that is estimated greater than 80%.  However, definitive morphology was not able to be visualized and rebiopsy has been suggested.  The initial biopsy was quite difficult per Dr. Conway given the location  A PET scan was performed on 11/8/2023.  There is some subtle hypermetabolic activity at the terminal ileum, maximal SUV 3.8 and an area measuring approximately 3 cm.  Adjacent mesenteric nodes are not significantly hypermetabolic and are either photopenic or have blood pool level activity.  Nonspecific low-level activity at the distal esophagus and GE junction with a maximal SUV of 4.3.  No obvious mass on CT imaging.  Spleen size normal.  No hypermetabolic cavity there.  Blood pool activity mediastinal lymphadenopathy  12/15/2023: 4 weeks of rituximab anticipated.  Cycle number 1 day 1 today.  12/22/2023: Proceed with week 2 rituximab.  Patient tolerated week 1 well and is overall feeling improvement in his energy.  12/29/2023: Proceed with week 3 rituximab.  Patient continues to have excellent tolerance to treatment and is feeling well.  1/5/24: week 4 ritiximab. Excellent tolerance thus far.   PET scan 2/16/2024 with a complete response in the distal ileum, likely physiologic activity in the cecum.  8/28/2024:CT imaging without definite evidence of disease.  There is some nonspecific mild wall  thickening in the distal transverse colon.  3/5/2025: CT imaging [facility]     *History of C. difficile diarrhea  Symptoms initially resolved after 2 courses of oral vancomycin  Now with recurrent symptoms  CT imaging 11/12/2023 with thick-walled appearance of the colon from the splenic flexure to the rectum with most significant involvement in the rectosigmoid colon, adjacent pericolonic soft tissue stranding consistent with colitis.  Mass in the cecum less apparent compared to 9/20/2023.  Dr. Pretty with ID evaluated him.  He recommended pursuing a longer vancomycin taper for approximately 5 weeks of therapy.  Therapy complete.  Symptoms essentially resolved.  12/22/2023: Patient reports he has 1 more day remaining of his vancomycin.  His doctor has had him slowly tapering off of his vancomycin.  He continues to incorporate yogurt into his diet daily.  He is anxious about symptoms returning after he remains off of his medication.  12/29/2023: Patient has remained off antibiotics for a week now and remains free of any GI symptoms.  He continues to have some anxiety related to possible recurrence.  Remains anxious about recurrence but no evidence to support this. Consuming yogurt.  Did not tolerate Joby due to indigestion and acid reflux.  5/17/2024: States is taking Joby and yogurt.  Complains of soft stool but no recurrent diarrhea consistent with C. difficile    *Aortic stenosis, s/p TAVR 10/8/24     *History of prostate cancer  Status post radical prostatectomy at approximately age 60     *Hearing loss, now with hearing aids    *Anxiety    *Hemorrhoids  Seem to be better with the use of Preparation H.  If they worsen, he can follow-up with Dr. Conway.    PLAN:   Follow up in 6 months with CT imaging done one week prior. We are certainly available sooner if needed.

## 2025-03-26 ENCOUNTER — OFFICE VISIT (OUTPATIENT)
Dept: ONCOLOGY | Facility: CLINIC | Age: OVER 89
End: 2025-03-26
Payer: MEDICARE

## 2025-03-26 ENCOUNTER — LAB (OUTPATIENT)
Dept: LAB | Facility: HOSPITAL | Age: OVER 89
End: 2025-03-26
Payer: MEDICARE

## 2025-03-26 VITALS
BODY MASS INDEX: 25.51 KG/M2 | RESPIRATION RATE: 16 BRPM | HEIGHT: 67 IN | SYSTOLIC BLOOD PRESSURE: 149 MMHG | WEIGHT: 162.5 LBS | TEMPERATURE: 97.6 F | OXYGEN SATURATION: 97 % | DIASTOLIC BLOOD PRESSURE: 81 MMHG | HEART RATE: 61 BPM

## 2025-03-26 DIAGNOSIS — C85.90 LYMPHOMA, UNSPECIFIED BODY REGION, UNSPECIFIED LYMPHOMA TYPE: Primary | ICD-10-CM

## 2025-03-26 DIAGNOSIS — C85.90 LYMPHOMA, UNSPECIFIED BODY REGION, UNSPECIFIED LYMPHOMA TYPE: ICD-10-CM

## 2025-03-26 LAB
ALBUMIN SERPL-MCNC: 4 G/DL (ref 3.5–5.2)
ALBUMIN/GLOB SERPL: 1.4 G/DL
ALP SERPL-CCNC: 71 U/L (ref 39–117)
ALT SERPL W P-5'-P-CCNC: 16 U/L (ref 1–41)
ANION GAP SERPL CALCULATED.3IONS-SCNC: 11 MMOL/L (ref 5–15)
AST SERPL-CCNC: 27 U/L (ref 1–40)
BASOPHILS # BLD AUTO: 0.02 10*3/MM3 (ref 0–0.2)
BASOPHILS NFR BLD AUTO: 0.3 % (ref 0–1.5)
BILIRUB SERPL-MCNC: 0.5 MG/DL (ref 0–1.2)
BUN SERPL-MCNC: 26 MG/DL (ref 8–23)
BUN/CREAT SERPL: 30.2 (ref 7–25)
CALCIUM SPEC-SCNC: 9.4 MG/DL (ref 8.2–9.6)
CHLORIDE SERPL-SCNC: 100 MMOL/L (ref 98–107)
CO2 SERPL-SCNC: 25 MMOL/L (ref 22–29)
CREAT SERPL-MCNC: 0.86 MG/DL (ref 0.76–1.27)
DEPRECATED RDW RBC AUTO: 45 FL (ref 37–54)
EGFRCR SERPLBLD CKD-EPI 2021: 78.8 ML/MIN/1.73
EOSINOPHIL # BLD AUTO: 0.04 10*3/MM3 (ref 0–0.4)
EOSINOPHIL NFR BLD AUTO: 0.7 % (ref 0.3–6.2)
ERYTHROCYTE [DISTWIDTH] IN BLOOD BY AUTOMATED COUNT: 13.2 % (ref 12.3–15.4)
GLOBULIN UR ELPH-MCNC: 2.9 GM/DL
GLUCOSE SERPL-MCNC: 84 MG/DL (ref 65–99)
HCT VFR BLD AUTO: 43.5 % (ref 37.5–51)
HGB BLD-MCNC: 14.4 G/DL (ref 13–17.7)
IMM GRANULOCYTES # BLD AUTO: 0.02 10*3/MM3 (ref 0–0.05)
IMM GRANULOCYTES NFR BLD AUTO: 0.3 % (ref 0–0.5)
LDH SERPL-CCNC: 210 U/L (ref 135–225)
LYMPHOCYTES # BLD AUTO: 1.72 10*3/MM3 (ref 0.7–3.1)
LYMPHOCYTES NFR BLD AUTO: 28.8 % (ref 19.6–45.3)
MCH RBC QN AUTO: 30.6 PG (ref 26.6–33)
MCHC RBC AUTO-ENTMCNC: 33.1 G/DL (ref 31.5–35.7)
MCV RBC AUTO: 92.6 FL (ref 79–97)
MONOCYTES # BLD AUTO: 1.06 10*3/MM3 (ref 0.1–0.9)
MONOCYTES NFR BLD AUTO: 17.8 % (ref 5–12)
NEUTROPHILS NFR BLD AUTO: 3.11 10*3/MM3 (ref 1.7–7)
NEUTROPHILS NFR BLD AUTO: 52.1 % (ref 42.7–76)
NRBC BLD AUTO-RTO: 0 /100 WBC (ref 0–0.2)
PLATELET # BLD AUTO: 171 10*3/MM3 (ref 140–450)
PMV BLD AUTO: 10 FL (ref 6–12)
POTASSIUM SERPL-SCNC: 4.4 MMOL/L (ref 3.5–5.2)
PROT SERPL-MCNC: 6.9 G/DL (ref 6–8.5)
RBC # BLD AUTO: 4.7 10*6/MM3 (ref 4.14–5.8)
SODIUM SERPL-SCNC: 136 MMOL/L (ref 136–145)
WBC NRBC COR # BLD AUTO: 5.97 10*3/MM3 (ref 3.4–10.8)

## 2025-03-26 PROCEDURE — 83615 LACTATE (LD) (LDH) ENZYME: CPT

## 2025-03-26 PROCEDURE — 36415 COLL VENOUS BLD VENIPUNCTURE: CPT

## 2025-03-26 PROCEDURE — 85025 COMPLETE CBC W/AUTO DIFF WBC: CPT

## 2025-03-26 PROCEDURE — 80053 COMPREHEN METABOLIC PANEL: CPT

## 2025-03-30 DIAGNOSIS — E78.2 MIXED HYPERLIPIDEMIA: Chronic | ICD-10-CM

## 2025-03-31 RX ORDER — ROSUVASTATIN CALCIUM 10 MG/1
10 TABLET, COATED ORAL NIGHTLY
Qty: 90 TABLET | Refills: 3 | Status: SHIPPED | OUTPATIENT
Start: 2025-03-31

## 2025-04-04 ENCOUNTER — OFFICE VISIT (OUTPATIENT)
Dept: INTERNAL MEDICINE | Facility: CLINIC | Age: OVER 89
End: 2025-04-04
Payer: MEDICARE

## 2025-04-04 VITALS
WEIGHT: 163.2 LBS | SYSTOLIC BLOOD PRESSURE: 120 MMHG | BODY MASS INDEX: 25.62 KG/M2 | HEIGHT: 67 IN | OXYGEN SATURATION: 100 % | HEART RATE: 72 BPM | DIASTOLIC BLOOD PRESSURE: 70 MMHG

## 2025-04-04 DIAGNOSIS — K21.00 GASTROESOPHAGEAL REFLUX DISEASE WITH ESOPHAGITIS WITHOUT HEMORRHAGE: Primary | Chronic | ICD-10-CM

## 2025-04-04 PROCEDURE — 1160F RVW MEDS BY RX/DR IN RCRD: CPT | Performed by: NURSE PRACTITIONER

## 2025-04-04 PROCEDURE — 99213 OFFICE O/P EST LOW 20 MIN: CPT | Performed by: NURSE PRACTITIONER

## 2025-04-04 PROCEDURE — 1159F MED LIST DOCD IN RCRD: CPT | Performed by: NURSE PRACTITIONER

## 2025-04-04 PROCEDURE — 1126F AMNT PAIN NOTED NONE PRSNT: CPT | Performed by: NURSE PRACTITIONER

## 2025-04-04 NOTE — PATIENT INSTRUCTIONS
Continue pepcid once a day.       Follow antireflux/GERD precautions:     Avoiding eating within 3 to 4 hours of bedtime.    Avoid foods that can trigger symptoms which may include:  citrus fruits  spicy foods  tomatoes  red sauces  chocolate  coffee/tea  caffeinated or carbonated beverages  alcohol       Limit sugars/ frankie cookies.

## 2025-04-04 NOTE — PROGRESS NOTES
"        Chief Complaint  Heartburn (Last few weeks )     Subjective:      History of Present Illness {CC  Problem List  Visit  Diagnosis   Encounters  Notes  Medications  Labs  Result Review Imaging  Media :23}     Vicente Delgado presents to [facility] for:        GERD    PPI CI 2/2 hx c diff and doesn't want to risk.     Meatball sandwich.   Oranges / grapefruit     Self medicating with frankie cookies from  [employer]     Has cut out coffee one week ago.     Lab Results   Component Value Date    CHOL 147 02/21/2025    CHLPL 140 10/17/2022    TRIG 139 02/21/2025    HDL 50 02/21/2025    LDL 73 02/21/2025        Just enough to annoy him    2 weeks: getting constipation       Pepcid   Tums       Smokes cigars occasionally   Lives in assited living       Wt Readings from Last 3 Encounters:   04/04/25 74 kg (163 lb 3.2 oz)   03/26/25 73.7 kg (162 lb 8 oz)   03/25/25 73.5 kg (162 lb)        {HPI (Optional):82616}    {Vaccine requested today (Optional):64670}    I have reviewed patient's medical history, any new submitted information provided by patient or medical assistant and updated medical record.      Objective:      Physical Exam   Result Review  Data Reviewed:{ Labs  Result Review  Imaging  Med Tab  Media :23}     {The following data was reviewed by (Optional):23482}  {Ambulatory Labs (Optional):70125}  {AMBULATORY LABS (Optional):99445}  {Data reviewed (Optional):40971}     Vital Signs:   /70 (BP Location: Left arm, Patient Position: Sitting, Cuff Size: Adult)   Pulse 72   Ht 170.2 cm (67\")   Wt 74 kg (163 lb 3.2 oz)   SpO2 100%   BMI 25.56 kg/m²   Estimated body mass index is 25.56 kg/m² as calculated from the following:    Height as of this encounter: 170.2 cm (67\").    Weight as of this encounter: 74 kg (163 lb 3.2 oz).        Requested Prescriptions      No prescriptions requested or ordered in this encounter       Routine medications provided by this " office will also be refilled via pharmacy request.       Current Outpatient Medications:   •  ASPIRIN 81 PO, Take 81 mg by mouth Daily., Disp: , Rfl:   •  carboxymethylcellulose (REFRESH PLUS) 0.5 % solution, 3 (Three) Times a Day As Needed for Dry Eyes. 4 times a day, Disp: , Rfl:   •  ezetimibe (ZETIA) 10 MG tablet, TAKE 1 TABLET BY MOUTH DAILY, Disp: 90 tablet, Rfl: 3  •  Famotidine (PEPCID PO), Take  by mouth., Disp: , Rfl:   •  rosuvastatin (CRESTOR) 10 MG tablet, TAKE ONE TABLET BY MOUTH ONCE NIGHTLY, Disp: 90 tablet, Rfl: 3  •  RSV Pre-Fusion F A&B Vac Rcmb (Abrysvo) 120 MCG/0.5ML reconstituted solution injection, Inject 0.5 mL into the appropriate muscle as directed by prescriber. (Patient not taking: Reported on 4/4/2025), Disp: 0.5 mL, Rfl: 0     Assessment and Plan:      Assessment and Plan {CC Problem List  Visit Diagnosis  ROS  Review (Popup)  Health Maintenance  Quality  BestPractice  Medications  SmartSets  SnapShot Encounters  Media :23}     There are no diagnoses linked to this encounter.         No orders of the defined types were placed in this encounter.      On demand antacids       Vonoprazan could increase risk of C diff  Vonoprazan May Induce Clostridium difficile Infection and Nephrotoxicity - Gastroenterology     {Time Spent-Use for E/M Coding (Optional):08694}    Follow Up {Instructions Charge Capture  Follow-up Communications :23}     No follow-ups on file.      Patient was given instructions and counseling regarding his condition or for health maintenance advice. Please see specific information pulled into the AVS if appropriate.    Dragon disclaimer:   Much of this encounter note is an electronic transcription/translation of spoken language to printed text. The electronic translation of spoken language may permit erroneous, or at times, nonsensical words or phrases to be inadvertently transcribed; Although I have reviewed the note for such errors, some may still exist.      Additional Patient Counseling:       There are no Patient Instructions on file for this visit.

## 2025-04-04 NOTE — PROGRESS NOTES
"        Chief Complaint  Heartburn (Last few weeks )     Subjective:      History of Present Illness {CC  Problem List  Visit  Diagnosis   Encounters  Notes  Medications  Labs  Result Review Imaging  Media :23}     Vicente Delgado is a patient of Mechelle Landa APRN and presents to [facility] for     Heartburn  He complains of heartburn. This is a recurrent problem. The current episode started more than 1 year ago. The problem occurs frequently. The problem has been unchanged. The heartburn duration is several minutes. The heartburn is located in the substernum. The heartburn is of mild intensity. The heartburn does not wake him from sleep. The heartburn does not limit his activity. The symptoms are aggravated by certain foods and lying down. Pertinent negatives include no melena or weight loss. Risk factors include smoking/tobacco exposure and caffeine use. He has tried a PPI, a histamine-2 antagonist, a diet change and an antacid for the symptoms. The treatment provided mild relief.      PPI CI 2/2 hx c diff and doesn't want to risk.     He is here with his daughter today.   States reflux has been more frequent for the last 2 weeks.   His daughter states he is self medicating: frankie cookies from  [employer], crystallized frankie and frankie tea.    States discomfort is not the worse:  \"Just enough to annoy him\"  Diet recall yesterday: Meatball sandwich.     States frequent Oranges / grapefruit (weekly)      Smokes cigars occasionally   Lives in assited living       Wt Readings from Last 3 Encounters:   04/04/25 74 kg (163 lb 3.2 oz)   03/26/25 73.7 kg (162 lb 8 oz)   03/25/25 73.5 kg (162 lb)       I have reviewed patient's medical history, any new submitted information provided by patient or medical assistant and updated medical record.      Objective:      Physical Exam  HENT:      Mouth/Throat:      Pharynx: No posterior oropharyngeal erythema.   Cardiovascular:      " "Rate and Rhythm: Normal rate and regular rhythm.      Pulses: Normal pulses.   Pulmonary:      Effort: Pulmonary effort is normal.   Abdominal:      General: Bowel sounds are normal. There is no distension.      Tenderness: There is no abdominal tenderness.        Result Review  Data Reviewed:{ Labs  Result Review  Imaging  Med Tab  Media :23}                Vital Signs:   /70 (BP Location: Left arm, Patient Position: Sitting, Cuff Size: Adult)   Pulse 72   Ht 170.2 cm (67\")   Wt 74 kg (163 lb 3.2 oz)   SpO2 100%   BMI 25.56 kg/m²         Requested Prescriptions      No prescriptions requested or ordered in this encounter       Routine medications provided by this office will also be refilled via pharmacy request.       Current Outpatient Medications:     ASPIRIN 81 PO, Take 81 mg by mouth Daily., Disp: , Rfl:     carboxymethylcellulose (REFRESH PLUS) 0.5 % solution, 3 (Three) Times a Day As Needed for Dry Eyes. 4 times a day, Disp: , Rfl:     ezetimibe (ZETIA) 10 MG tablet, TAKE 1 TABLET BY MOUTH DAILY, Disp: 90 tablet, Rfl: 3    Famotidine (PEPCID PO), Take  by mouth., Disp: , Rfl:     rosuvastatin (CRESTOR) 10 MG tablet, TAKE ONE TABLET BY MOUTH ONCE NIGHTLY, Disp: 90 tablet, Rfl: 3    RSV Pre-Fusion F A&B Vac Rcmb (Abrysvo) 120 MCG/0.5ML reconstituted solution injection, Inject 0.5 mL into the appropriate muscle as directed by prescriber. (Patient not taking: Reported on 4/4/2025), Disp: 0.5 mL, Rfl: 0     Assessment and Plan:      Assessment and Plan {CC Problem List  Visit Diagnosis  ROS  Review (Popup)  Health Maintenance  Quality  BestPractice  Medications  SmartSets  SnapShot Encounters  Media :23}     Problem List Items Addressed This Visit          Gastrointestinal Abdominal     Gastroesophageal reflux disease with esophagitis - Primary (Chronic)    Overview   Pantoprazole discontinued 9/20/23 due to C difficile colitis.         Current Assessment & Plan   Follow antireflux/GERD " precautions:     Avoiding eating within 3 to 4 hours of bedtime.    Avoid foods that can trigger symptoms which may include:  citrus fruits  spicy foods  tomatoes  red sauces  chocolate  coffee/tea  caffeinated or carbonated beverages  alcohol               Continue pepcid and tums as needed.   Discussed diet modification - printed out changes for him to take with him.       Follow Up {Instructions Charge Capture  Follow-up Communications :23}     Return if symptoms worsen or fail to improve.    Follow up with PCP as scheduled.     Patient was given instructions and counseling regarding his condition or for health maintenance advice. Please see specific information pulled into the AVS if appropriate.    Dragon disclaimer:   Much of this encounter note is an electronic transcription/translation of spoken language to printed text. The electronic translation of spoken language may permit erroneous, or at times, nonsensical words or phrases to be inadvertently transcribed; Although I have reviewed the note for such errors, some may still exist.     Additional Patient Counseling:       Patient Instructions   Continue pepcid once a day.       Follow antireflux/GERD precautions:     Avoiding eating within 3 to 4 hours of bedtime.    Avoid foods that can trigger symptoms which may include:  citrus fruits  spicy foods  tomatoes  red sauces  chocolate  coffee/tea  caffeinated or carbonated beverages  alcohol       Limit sugars/ ginger cookies.

## 2025-05-09 ENCOUNTER — TELEPHONE (OUTPATIENT)
Dept: INTERNAL MEDICINE | Facility: CLINIC | Age: OVER 89
End: 2025-05-09
Payer: MEDICARE

## 2025-05-09 NOTE — TELEPHONE ENCOUNTER
Patient wants a referral to the therapy group within his [facility] home. He had one previously and would like to return to them. Can you please advse the patient?

## 2025-05-09 NOTE — TELEPHONE ENCOUNTER
"Called patient and LMOM to get more Info  When patient calls back ask patient.  HUB to RELAY  \"What he is needing therapy for and what the name of the therapy is\".  "

## 2025-05-12 NOTE — TELEPHONE ENCOUNTER
"Name: Vicente Delgado GERMAN \"Ed\"      Relationship: Self      Best Callback Number: [phone]      HUB PROVIDED THE RELAY MESSAGE FROM THE OFFICE      PATIENT: VOICED UNDERSTANDING AND HAS NO FURTHER QUESTIONS AT THIS TIME    ADDITIONAL INFORMATION:  PATIENT STATES REFERRAL NEEDS TO GO TO  [facility] LOC[facility] AT [facility] WHERE PATIENT LIVES   THERAPY FOR LEFT SHOULDER   PATIENT STATES [facility] HAS FAXED FORMS OVER REQUESTING ORDERS     "

## 2025-05-13 DIAGNOSIS — M25.512 ACUTE PAIN OF LEFT SHOULDER: Primary | ICD-10-CM

## 2025-05-14 ENCOUNTER — TELEPHONE (OUTPATIENT)
Age: OVER 89
End: 2025-05-14

## 2025-05-14 NOTE — TELEPHONE ENCOUNTER
Manual transmission sent today 5/14 as requested. No further AF. Event that was ongoing at the time of Alert remote from 5/10 1837 lasted 1 hr 14 min with avg Vrate of 74bpm. [place] is reported as 0.3% as of today. Presents today AS/ 60s.   NS

## 2025-05-14 NOTE — TELEPHONE ENCOUNTER
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  Pt is intermittently dependent.     Since 3/25/25 clinic, 5 AT/AF events recorded all from 5/10 1329 -1837. Strips show true AT/AF with longest 2hrs 44 min with Avg Vrate of 91bpm. Per EPIC, pt has Hx PAF. However, this is first AF recorded since pacer implant 11/2024. Pt is not on AC due to Hx of bleeding. [place] remains low at 0.5%.       Just wanted to let you know as this is first AF seen since implant and appears per ov notes PAF is remote. I spoke with pt. He does recall feeling some fluttering in his chest. Pt also reports bouts of indigestion and L shoulder blade pain over the weekend. This does not necessarily correlate with AF episodes. Pt feels well today. As AF was ongoing at the time of transmission 5/10 1933, pt is going to send a manual transmission when he gets home today to assess whether event remains ongoing.     Normal DDD LBB pacer function. Estim 12.5 yrs to DHIRAJ. Presents AF/irreg VS and occ  with some intermittent undersensing of fine AF. Vrate 60s - 90s. RA sensitivity is currently set at 0.3mV. Could be adjusted a bit at next clinic. AP 24.1%,  97.4%. Auto lead trends wnl.      Currently next clinic with Dr Sales is 9/11 and [facility] [place] 3/26/26.   NS                         .

## 2025-05-15 ENCOUNTER — TELEPHONE (OUTPATIENT)
Dept: INTERNAL MEDICINE | Facility: CLINIC | Age: OVER 89
End: 2025-05-15
Payer: MEDICARE

## 2025-05-15 NOTE — TELEPHONE ENCOUNTER
Attempted to call patient LVM advising below per  verbal release    Received and faxed back PT orders from patient's living facility. They should receive sometime today or early tomorrow

## 2025-08-19 LAB
MC_CV_MDC_IDC_RATE_1: 150
MC_CV_MDC_IDC_RATE_1: 171
MC_CV_MDC_IDC_THERAPIES: NORMAL
MC_CV_MDC_IDC_ZONE_ID: 2
MC_CV_MDC_IDC_ZONE_ID: 6
MDC_IDC_MSMT_BATTERY_REMAINING_LONGEVITY: 145 MO
MDC_IDC_MSMT_BATTERY_RRT_TRIGGER: 2.62
MDC_IDC_MSMT_BATTERY_STATUS: NORMAL
MDC_IDC_MSMT_BATTERY_VOLTAGE: 3.1
MDC_IDC_MSMT_LEADCHNL_RA_DTM: NORMAL
MDC_IDC_MSMT_LEADCHNL_RA_IMPEDANCE_VALUE: 494
MDC_IDC_MSMT_LEADCHNL_RA_PACING_THRESHOLD_AMPLITUDE: 0.62
MDC_IDC_MSMT_LEADCHNL_RA_PACING_THRESHOLD_POLARITY: NORMAL
MDC_IDC_MSMT_LEADCHNL_RA_PACING_THRESHOLD_PULSEWIDTH: 0.4
MDC_IDC_MSMT_LEADCHNL_RA_SENSING_INTR_AMPL: 2.25
MDC_IDC_MSMT_LEADCHNL_RV_DTM: NORMAL
MDC_IDC_MSMT_LEADCHNL_RV_IMPEDANCE_VALUE: 532
MDC_IDC_MSMT_LEADCHNL_RV_PACING_THRESHOLD_AMPLITUDE: 0.62
MDC_IDC_MSMT_LEADCHNL_RV_PACING_THRESHOLD_POLARITY: NORMAL
MDC_IDC_MSMT_LEADCHNL_RV_PACING_THRESHOLD_PULSEWIDTH: 0.4
MDC_IDC_MSMT_LEADCHNL_RV_SENSING_INTR_AMPL: 24.12
MDC_IDC_PG_IMPLANT_DTM: NORMAL
MDC_IDC_PG_MFG: NORMAL
MDC_IDC_PG_MODEL: NORMAL
MDC_IDC_PG_SERIAL: NORMAL
MDC_IDC_PG_TYPE: NORMAL
MDC_IDC_SESS_DTM: NORMAL
MDC_IDC_SESS_TYPE: NORMAL
MDC_IDC_SET_BRADY_AT_MODE_SWITCH_RATE: 171
MDC_IDC_SET_BRADY_LOWRATE: 60
MDC_IDC_SET_BRADY_MAX_SENSOR_RATE: 130
MDC_IDC_SET_BRADY_MAX_TRACKING_RATE: 130
MDC_IDC_SET_BRADY_MODE: NORMAL
MDC_IDC_SET_BRADY_PAV_DELAY: 180
MDC_IDC_SET_BRADY_SAV_DELAY: 150
MDC_IDC_SET_LEADCHNL_RA_PACING_AMPLITUDE: 1.5
MDC_IDC_SET_LEADCHNL_RA_PACING_POLARITY: NORMAL
MDC_IDC_SET_LEADCHNL_RA_PACING_PULSEWIDTH: 0.4
MDC_IDC_SET_LEADCHNL_RA_SENSING_POLARITY: NORMAL
MDC_IDC_SET_LEADCHNL_RA_SENSING_SENSITIVITY: 0.3
MDC_IDC_SET_LEADCHNL_RV_PACING_AMPLITUDE: 2
MDC_IDC_SET_LEADCHNL_RV_PACING_POLARITY: NORMAL
MDC_IDC_SET_LEADCHNL_RV_PACING_PULSEWIDTH: 0.4
MDC_IDC_SET_LEADCHNL_RV_SENSING_POLARITY: NORMAL
MDC_IDC_SET_LEADCHNL_RV_SENSING_SENSITIVITY: 0.9
MDC_IDC_SET_ZONE_STATUS: NORMAL
MDC_IDC_SET_ZONE_STATUS: NORMAL
MDC_IDC_SET_ZONE_TYPE: NORMAL
MDC_IDC_SET_ZONE_TYPE: NORMAL
MDC_IDC_STAT_AT_BURDEN_PERCENT: 0.2
MDC_IDC_STAT_BRADY_RA_PERCENT_PACED: 29.35
MDC_IDC_STAT_BRADY_RV_PERCENT_PACED: 97.33

## 2025-08-21 ENCOUNTER — OFFICE VISIT (OUTPATIENT)
Dept: INTERNAL MEDICINE | Facility: CLINIC | Age: OVER 89
End: 2025-08-21
Payer: MEDICARE

## 2025-08-21 VITALS
HEIGHT: 67 IN | HEART RATE: 72 BPM | DIASTOLIC BLOOD PRESSURE: 84 MMHG | SYSTOLIC BLOOD PRESSURE: 142 MMHG | WEIGHT: 163 LBS | OXYGEN SATURATION: 97 % | BODY MASS INDEX: 25.58 KG/M2

## 2025-08-21 DIAGNOSIS — K21.00 GASTROESOPHAGEAL REFLUX DISEASE WITH ESOPHAGITIS WITHOUT HEMORRHAGE: Chronic | ICD-10-CM

## 2025-08-21 DIAGNOSIS — I10 PRIMARY HYPERTENSION: Chronic | ICD-10-CM

## 2025-08-21 DIAGNOSIS — E78.2 MIXED HYPERLIPIDEMIA: Chronic | ICD-10-CM

## 2025-08-21 DIAGNOSIS — I50.32 CHRONIC HEART FAILURE WITH PRESERVED EJECTION FRACTION (HFPEF): Chronic | ICD-10-CM

## 2025-08-21 DIAGNOSIS — H35.3220 EXUDATIVE AGE-RELATED MACULAR DEGENERATION OF LEFT EYE, UNSPECIFIED STAGE: Primary | ICD-10-CM

## 2025-08-21 DIAGNOSIS — I25.810 CORONARY ARTERY DISEASE INVOLVING CORONARY BYPASS GRAFT OF NATIVE HEART WITHOUT ANGINA PECTORIS: Chronic | ICD-10-CM

## 2025-08-21 LAB
ALBUMIN SERPL-MCNC: 4.6 G/DL (ref 3.5–5.2)
ALBUMIN/GLOB SERPL: 1.8 G/DL
ALP SERPL-CCNC: 77 U/L (ref 39–117)
ALT SERPL-CCNC: 19 U/L (ref 1–41)
AST SERPL-CCNC: 27 U/L (ref 1–40)
BILIRUB SERPL-MCNC: 0.6 MG/DL (ref 0–1.2)
BUN SERPL-MCNC: 21 MG/DL (ref 8–23)
BUN/CREAT SERPL: 24.4 (ref 7–25)
CALCIUM SERPL-MCNC: 9.4 MG/DL (ref 8.2–9.6)
CHLORIDE SERPL-SCNC: 102 MMOL/L (ref 98–107)
CHOLEST SERPL-MCNC: 150 MG/DL (ref 0–200)
CO2 SERPL-SCNC: 25.3 MMOL/L (ref 22–29)
CREAT SERPL-MCNC: 0.86 MG/DL (ref 0.76–1.27)
EGFRCR SERPLBLD CKD-EPI 2021: 78.8 ML/MIN/1.73
ERYTHROCYTE [DISTWIDTH] IN BLOOD BY AUTOMATED COUNT: 12.8 % (ref 12.3–15.4)
GLOBULIN SER CALC-MCNC: 2.5 GM/DL
GLUCOSE SERPL-MCNC: 80 MG/DL (ref 65–99)
HCT VFR BLD AUTO: 45.9 % (ref 37.5–51)
HDLC SERPL-MCNC: 53 MG/DL (ref 40–60)
HGB BLD-MCNC: 15.2 G/DL (ref 13–17.7)
LDLC SERPL CALC-MCNC: 71 MG/DL (ref 0–100)
MCH RBC QN AUTO: 31.3 PG (ref 26.6–33)
MCHC RBC AUTO-ENTMCNC: 33.1 G/DL (ref 31.5–35.7)
MCV RBC AUTO: 94.4 FL (ref 79–97)
PLATELET # BLD AUTO: 178 10*3/MM3 (ref 140–450)
POTASSIUM SERPL-SCNC: 4.5 MMOL/L (ref 3.5–5.2)
PROT SERPL-MCNC: 7.1 G/DL (ref 6–8.5)
RBC # BLD AUTO: 4.86 10*6/MM3 (ref 4.14–5.8)
SODIUM SERPL-SCNC: 140 MMOL/L (ref 136–145)
TRIGL SERPL-MCNC: 153 MG/DL (ref 0–150)
TSH SERPL DL<=0.005 MIU/L-ACNC: 2.35 UIU/ML (ref 0.27–4.2)
VLDLC SERPL CALC-MCNC: 26 MG/DL (ref 5–40)
WBC # BLD AUTO: 6.51 10*3/MM3 (ref 3.4–10.8)

## 2025-08-21 RX ORDER — BETAMETHASONE DIPROPIONATE 0.5 MG/G
1 CREAM TOPICAL 2 TIMES DAILY
COMMUNITY

## 2025-08-21 RX ORDER — MUPIROCIN 2 %
1 OINTMENT (GRAM) TOPICAL 3 TIMES DAILY
COMMUNITY

## 2025-08-21 RX ORDER — CLOTRIMAZOLE AND BETAMETHASONE DIPROPIONATE 10; .64 MG/G; MG/G
1 CREAM TOPICAL 2 TIMES DAILY
COMMUNITY

## 2025-08-23 PROBLEM — H35.3220 EXUDATIVE AGE-RELATED MACULAR DEGENERATION OF LEFT EYE: Chronic | Status: ACTIVE | Noted: 2024-02-25

## (undated) DEVICE — STPCK 3WY D201 DISCOFIX

## (undated) DEVICE — SINGLE-USE BIOPSY FORCEPS: Brand: RADIAL JAW 4

## (undated) DEVICE — GLIDESHEATH BASIC HYDROPHILIC COATED INTRODUCER SHEATH: Brand: GLIDESHEATH

## (undated) DEVICE — DGW .035 FC J3MM 260CM TEF: Brand: EMERALD

## (undated) DEVICE — DECANTER BAG 9": Brand: MEDLINE INDUSTRIES, INC.

## (undated) DEVICE — CATH DIAG IMPULSE FR4 5F 100CM

## (undated) DEVICE — DRP INCISE CARDIO W/ADHS 115X85X151IN LG STRL

## (undated) DEVICE — DRSNG SURESITE WNDW 2.38X2.75

## (undated) DEVICE — APPL HEMO SURG ARISTA/AH/FLEXITIP XL 38CM

## (undated) DEVICE — SOL ISO/ALC 70PCT 4OZ

## (undated) DEVICE — INTRO SHEATH PRELUDE SNAP .038 9F 13CM W/SDPRT BLK

## (undated) DEVICE — INTRO SHEATH ART/FEM ENGAGE .035 6F12CM

## (undated) DEVICE — GLV SURG BIOGEL LTX PF 8

## (undated) DEVICE — KT ORCA ORCAPOD DISP STRL

## (undated) DEVICE — Device

## (undated) DEVICE — SHORT SPIKE VENTED W/3-WAY SC: Brand: MEDLINE INDUSTRIES, INC.

## (undated) DEVICE — SUT SILK 2 SUTUPAK TIE 60IN SA8H 2STRAND

## (undated) DEVICE — KTTNER ENDO BLNT DISSCT

## (undated) DEVICE — PERCLOSE™ PROSTYLE™ SUTURE-MEDIATED CLOSURE AND REPAIR SYSTEM: Brand: PERCLOSE™ PROSTYLE™

## (undated) DEVICE — ENDOPATH XCEL BLADELESS TROCARS WITH STABILITY SLEEVES: Brand: ENDOPATH XCEL

## (undated) DEVICE — EQUIPMENT COVER BAG TYPE 48” X 36” (122CM X 91CM): Brand: EQUIPMENT COVER BAG TYPE

## (undated) DEVICE — DRAPE,REIN 53X77,STERILE: Brand: MEDLINE

## (undated) DEVICE — TRAP FLD MINIVAC MEGADYNE 100ML

## (undated) DEVICE — VIOLET BRAIDED (POLYGLACTIN 910), SYNTHETIC ABSORBABLE SUTURE: Brand: COATED VICRYL

## (undated) DEVICE — 3M™ STERI-STRIP™ COMPOUND BENZOIN TINCTURE 40 BAGS/CARTON 4 CARTONS/CASE C1544: Brand: 3M™ STERI-STRIP™

## (undated) DEVICE — CVR PROB 96IN LF STRL

## (undated) DEVICE — CATH DIAG IMPULSE PIG145 6F 110CM

## (undated) DEVICE — CATH DIAG IMPULSE FL4 5F 100CM

## (undated) DEVICE — CATH DIAG IMPULSE PIG 5F 100CM

## (undated) DEVICE — PINNACLE INTRODUCER SHEATH: Brand: PINNACLE

## (undated) DEVICE — GW INQWIRE FC PTFE STR .035IN 150

## (undated) DEVICE — CATH DIAG IMPULSE FR5 5F 100CM

## (undated) DEVICE — CVR HNDL LT SURG ACCSSRY BLU STRL

## (undated) DEVICE — 3M™ TEGADERM™ CHG DRESSING 25/CARTON 4 CARTONS/CASE 1658: Brand: TEGADERM™

## (undated) DEVICE — PK CATH CARD 40

## (undated) DEVICE — SOL NS 500ML

## (undated) DEVICE — GLIDESHEATH SLENDER STAINLESS STEEL KIT: Brand: GLIDESHEATH SLENDER

## (undated) DEVICE — ST. SORBAVIEW ULTIMATE IJ SYSTEM A,C: Brand: CENTURION

## (undated) DEVICE — LOU LAP CHOLE: Brand: MEDLINE INDUSTRIES, INC.

## (undated) DEVICE — PENCL E/S ULTRAVAC TELESCP NOSE HOLSTR 10FT

## (undated) DEVICE — SENSR O2 OXIMAX FNGR A/ 18IN NONSTR

## (undated) DEVICE — BALN PRESS WEDGE 5F 110CM

## (undated) DEVICE — EXTENSION SET, MALE LUER LOCK ADAPTER WITH RETRACTABLE COLLAR

## (undated) DEVICE — LABEL SHEET CUSTOM 2X2 YELLOW: Brand: MEDLINE INDUSTRIES, INC.

## (undated) DEVICE — 3M™ IOBAN™ 2 ANTIMICROBIAL INCISE DRAPE 6650EZ: Brand: IOBAN™ 2

## (undated) DEVICE — DRSNG WND GZ PAD BORDERED 4X8IN STRL

## (undated) DEVICE — TUBING, SUCTION, 1/4" X 10', STRAIGHT: Brand: MEDLINE

## (undated) DEVICE — CATH DIAG IMPULSE AL1 6F 100CM

## (undated) DEVICE — CATH IV INSYTE AUTOGARD 14G 1 1/2IN ORNG

## (undated) DEVICE — CATH DEFLECT SELECTSITE 9F 43CM W/ART HNDL

## (undated) DEVICE — CONTAINER,SPECIMEN,OR STERILE,4OZ: Brand: MEDLINE

## (undated) DEVICE — ENDOPATH XCEL BLUNT TIP TROCARS WITH SMOOTH SLEEVES: Brand: ENDOPATH XCEL

## (undated) DEVICE — TOWEL,OR,DSP,ST,BLUE,STD,4/PK,20PK/CS: Brand: MEDLINE

## (undated) DEVICE — CATH DIAG IMPULSE FL3.5 5F 100CM

## (undated) DEVICE — ADAPT CLN BIOGUARD AIR/H2O DISP

## (undated) DEVICE — SOL NACL 0.9PCT 1000ML

## (undated) DEVICE — INTRO SHEATH PRELUDE SNAP .038 7F 13CM W/SDPRT

## (undated) DEVICE — SOL IRR NACL 0.9PCT BT 1000ML

## (undated) DEVICE — GLV SURG BIOGEL LTX PF 8 1/2

## (undated) DEVICE — PENCL SMOKE/EVAC MEGADYNE TELESCP 10FT

## (undated) DEVICE — CANN O2 ETCO2 FITS ALL CONN CO2 SMPL A/ 7IN DISP LF

## (undated) DEVICE — CATH CHOLANG 4.5F18IN BRGNDY

## (undated) DEVICE — KT MANIFLD CARDIAC

## (undated) DEVICE — VISUALIZATION SYSTEM: Brand: CLEARIFY

## (undated) DEVICE — SNAP KAP: Brand: UNBRANDED

## (undated) DEVICE — DRP C/ARM 41X74IN

## (undated) DEVICE — SPNG GZ WOVN 4X4IN 12PLY 10/BX STRL

## (undated) DEVICE — SPONGE,LAP,18"X18",DLX,XR,ST,5/PK,40/PK: Brand: MEDLINE

## (undated) DEVICE — APPL CHLORAPREP HI/LITE 26ML ORNG

## (undated) DEVICE — LOU PACE DEFIB: Brand: MEDLINE INDUSTRIES, INC.

## (undated) DEVICE — TBG PRESS 96IN M/F ROT BRAID: Brand: MEDLINE INDUSTRIES, INC.

## (undated) DEVICE — LAPAROSCOPIC SMOKE FILTRATION SYSTEM: Brand: PALL LAPAROSHIELD® PLUS LAPAROSCOPIC SMOKE FILTRATION SYSTEM

## (undated) DEVICE — SOL IRR H2O BTL 1000ML STRL

## (undated) DEVICE — ENDOPOUCH RETRIEVER SPECIMEN RETRIEVAL BAGS: Brand: ENDOPOUCH RETRIEVER

## (undated) DEVICE — ENDOPATH XCEL UNIVERSAL TROCAR STABLILITY SLEEVES: Brand: ENDOPATH XCEL

## (undated) DEVICE — LN SMPL CO2 SHTRM SD STREAM W/M LUER

## (undated) DEVICE — GLV SURG BIOGEL LTX PF 7 1/2

## (undated) DEVICE — GW AMPLTZ XSTIF STR .025IN 260CM

## (undated) DEVICE — TAVR: Brand: MEDLINE INDUSTRIES, INC.

## (undated) DEVICE — ADHS SKIN SURG TISS VISC PREMIERPRO EXOFIN HI/VISC FAST/DRY

## (undated) DEVICE — DISPOSABLE ADAPTER

## (undated) DEVICE — TBG INJ CONTRL PVCCLR RIGD RA 1200PSI 10

## (undated) DEVICE — ENDOCUT SCISSOR TIP, DISPOSABLE: Brand: RENEW

## (undated) DEVICE — 3M™ BAIR HUGGER® UNDERBODY BLANKET, FULL ACCESS, 10 PER CASE 63500: Brand: BAIR HUGGER™

## (undated) DEVICE — SENSR CERBRL O2 PK/2

## (undated) DEVICE — SUT MNCRYL 4/0 PS2 18 IN